# Patient Record
Sex: MALE | Race: WHITE | NOT HISPANIC OR LATINO | Employment: FULL TIME | URBAN - METROPOLITAN AREA
[De-identification: names, ages, dates, MRNs, and addresses within clinical notes are randomized per-mention and may not be internally consistent; named-entity substitution may affect disease eponyms.]

---

## 2017-01-13 ENCOUNTER — ANTICOAGULATION THERAPY VISIT (OUTPATIENT)
Dept: ANTICOAGULATION | Facility: CLINIC | Age: 47
End: 2017-01-13
Payer: COMMERCIAL

## 2017-01-13 DIAGNOSIS — M06.09 RHEUMATOID ARTHRITIS OF MULTIPLE SITES WITH NEGATIVE RHEUMATOID FACTOR (H): Primary | ICD-10-CM

## 2017-01-13 DIAGNOSIS — Z79.01 LONG-TERM (CURRENT) USE OF ANTICOAGULANTS: Primary | ICD-10-CM

## 2017-01-13 LAB — INR POINT OF CARE: 2.4 (ref 0.86–1.14)

## 2017-01-13 PROCEDURE — 36416 COLLJ CAPILLARY BLOOD SPEC: CPT

## 2017-01-13 PROCEDURE — 99207 ZZC NO CHARGE NURSE ONLY: CPT

## 2017-01-13 PROCEDURE — 85610 PROTHROMBIN TIME: CPT | Mod: QW

## 2017-01-13 RX ORDER — TRAMADOL HYDROCHLORIDE 50 MG/1
TABLET ORAL
Qty: 60 TABLET | Refills: 0 | Status: SHIPPED | OUTPATIENT
Start: 2017-01-13 | End: 2017-02-13

## 2017-01-13 NOTE — PROGRESS NOTES
ANTICOAGULATION FOLLOW-UP CLINIC VISIT    Patient Name:  Hollis Diggs  Date:  1/13/2017  Contact Type:  Face to Face    SUBJECTIVE:     Patient Findings     Positives No Problem Findings           OBJECTIVE    INR PROTIME   Date Value Ref Range Status   01/13/2017 2.4* 0.86 - 1.14 Final     FACTOR 2 ASSAY   Date Value Ref Range Status   12/17/2015 33* 60 - 140 % Final     Comment:     Analyte Specific Reagents (ASRs) are used in many laboratory tests necessary   for   standard medical care and generally do not require FDA approval.  This test   was   developed and its performance characteristics determined by [facility].  It has not been cleared or approved by   the US Food and Drug Administration.         ASSESSMENT / PLAN  INR assessment THER    Recheck INR In: 5 WEEKS    INR Location Clinic      Anticoagulation Summary as of 1/13/2017     INR goal 2.0-3.0   Selected INR 2.4 (1/13/2017)   Maintenance plan 7.5 mg (5 mg x 1.5) on Tue, Thu; 5 mg (5 mg x 1) all other days   Full instructions 7.5 mg on Tue, Thu; 5 mg all other days   Weekly total 40 mg   Plan last modified Lilliam Blanchard, RN (1/13/2017)   Next INR check 2/17/2017   Target end date     Indications   DVT (deep venous thrombosis) (H) (Resolved) [I82.409]  Long-term (current) use of anticoagulants [Z79.01] [Z79.01]         Anticoagulation Episode Summary     INR check location     Preferred lab     Send INR reminders to [facility] POOL    Comments 5 mg       Anticoagulation Care Providers     Provider Role Specialty Phone number    Sylvester Cash MD [facility] [phone]            See the Encounter Report to view Anticoagulation Flowsheet and Dosing Calendar (Go to Encounters tab in chart review, and find the Anticoagulation Therapy Visit)    Dosage adjustment made based on physician directed care plan.    Lilliam Blanchard, QUENTIN

## 2017-02-13 DIAGNOSIS — M06.09 RHEUMATOID ARTHRITIS OF MULTIPLE SITES WITH NEGATIVE RHEUMATOID FACTOR (H): ICD-10-CM

## 2017-02-13 RX ORDER — TRAMADOL HYDROCHLORIDE 50 MG/1
TABLET ORAL
Qty: 60 TABLET | Refills: 0 | Status: SHIPPED | OUTPATIENT
Start: 2017-02-13 | End: 2017-03-13

## 2017-02-17 ENCOUNTER — ANTICOAGULATION THERAPY VISIT (OUTPATIENT)
Dept: ANTICOAGULATION | Facility: CLINIC | Age: 47
End: 2017-02-17
Payer: COMMERCIAL

## 2017-02-17 DIAGNOSIS — Z79.01 LONG-TERM (CURRENT) USE OF ANTICOAGULANTS: ICD-10-CM

## 2017-02-17 LAB — INR POINT OF CARE: 2.6 (ref 0.86–1.14)

## 2017-02-17 PROCEDURE — 99207 ZZC NO CHARGE NURSE ONLY: CPT

## 2017-02-17 PROCEDURE — 85610 PROTHROMBIN TIME: CPT | Mod: QW

## 2017-02-17 PROCEDURE — 36416 COLLJ CAPILLARY BLOOD SPEC: CPT

## 2017-02-17 NOTE — MR AVS SNAPSHOT
Hollis Diggs   2/17/2017 8:15 AM   Anticoagulation Therapy Visit    Description:  46 year old male   Provider:  [facility]   Department:  Ph Anticoag           INR as of 2/17/2017     Today's INR 2.6      Anticoagulation Summary as of 2/17/2017     INR goal 2.0-3.0   Today's INR 2.6   Full instructions 7.5 mg on Tue, Thu; 5 mg all other days   Next INR check 3/31/2017    Indications   DVT (deep venous thrombosis) (H) (Resolved) [I82.409]  Long-term (current) use of anticoagulants [Z79.01] [Z79.01]         Your next Anticoagulation Clinic appointment(s)     Feb 17, 2017  8:15 AM CST   Anticoagulation Visit with PH ANTI COAG   [address] [address]   [phone]            Mar 31, 2017  8:15 AM CDT   Anticoagulation Visit with PH ANTI COAG   [address] [address]   [phone]              Contact Numbers     Clinic Number:         February 2017 Details    Sun Mon Tue Wed Thu Fri Sat        1               2               3               4                 5               6               7               8               9               10               11                 12               13               14               15               16               17      5 mg   See details      18      5 mg           19      5 mg         20      5 mg         21      7.5 mg         22      5 mg         23      7.5 mg         24      5 mg         25      5 mg           26      5 mg         27      5 mg         28      7.5 mg              Date Details   02/17 This INR check               How to take your warfarin dose     To take:  5 mg Take 1 of the 5 mg tablets.    To take:  7.5 mg Take 1.5 of the 5 mg tablets.           March 2017 Details    Sun Mon Tue Wed Thu Fri Sat        1      5 mg         2      7.5 mg         3      5 mg         4      5 mg            5      5 mg         6      5 mg         7      7.5 mg         8      5 mg         9      7.5 mg         10      5 mg         11      5 mg           12      5 mg         13      5 mg         14      7.5 mg         15      5 mg         16      7.5 mg         17      5 mg         18      5 mg           19      5 mg         20      5 mg         21      7.5 mg         22      5 mg         23      7.5 mg         24      5 mg         25      5 mg           26      5 mg         27      5 mg         28      7.5 mg         29      5 mg         30      7.5 mg         31              Date Details   No additional details    Date of next INR:  3/31/2017         How to take your warfarin dose     To take:  5 mg Take 1 of the 5 mg tablets.    To take:  7.5 mg Take 1.5 of the 5 mg tablets.

## 2017-03-13 DIAGNOSIS — M06.09 RHEUMATOID ARTHRITIS OF MULTIPLE SITES WITH NEGATIVE RHEUMATOID FACTOR (H): ICD-10-CM

## 2017-03-13 RX ORDER — TRAMADOL HYDROCHLORIDE 50 MG/1
TABLET ORAL
Qty: 60 TABLET | Refills: 0 | Status: SHIPPED | OUTPATIENT
Start: 2017-03-13 | End: 2017-04-14

## 2017-03-14 ENCOUNTER — TELEPHONE (OUTPATIENT)
Dept: RHEUMATOLOGY | Facility: CLINIC | Age: 47
End: 2017-03-14

## 2017-03-14 NOTE — TELEPHONE ENCOUNTER
I left a message for the patient to return a call to the clinic to let us know if he would be willing to switch from Dr. Hedrick's schedule to Alpesh Jefferson on 5/10/2017.

## 2017-03-21 NOTE — TELEPHONE ENCOUNTER
Left message for patient to return a call to the clinic to see if he would switch his 5/10/17 appt with Dr. Hedrick to Alpesh Jefferson's schedule.

## 2017-03-31 ENCOUNTER — ANTICOAGULATION THERAPY VISIT (OUTPATIENT)
Dept: ANTICOAGULATION | Facility: CLINIC | Age: 47
End: 2017-03-31
Payer: COMMERCIAL

## 2017-03-31 DIAGNOSIS — Z79.01 LONG-TERM (CURRENT) USE OF ANTICOAGULANTS: ICD-10-CM

## 2017-03-31 LAB — INR POINT OF CARE: 2.6 (ref 0.86–1.14)

## 2017-03-31 PROCEDURE — 36416 COLLJ CAPILLARY BLOOD SPEC: CPT

## 2017-03-31 PROCEDURE — 99207 ZZC NO CHARGE NURSE ONLY: CPT

## 2017-03-31 PROCEDURE — 85610 PROTHROMBIN TIME: CPT | Mod: QW

## 2017-03-31 NOTE — PROGRESS NOTES
ANTICOAGULATION FOLLOW-UP CLINIC VISIT    Patient Name:  Hollis Diggs  Date:  3/31/2017  Contact Type:  Face to Face    SUBJECTIVE:     Patient Findings     Positives No Problem Findings           OBJECTIVE    INR Protime   Date Value Ref Range Status   03/31/2017 2.6 (A) 0.86 - 1.14 Final     Factor 2 Assay   Date Value Ref Range Status   12/17/2015 33 (L) 60 - 140 % Final     Comment:     Analyte Specific Reagents (ASRs) are used in many laboratory tests necessary   for   standard medical care and generally do not require FDA approval.  This test   was   developed and its performance characteristics determined by [facility].  It has not been cleared or approved by   the US Food and Drug Administration.         ASSESSMENT / PLAN  INR assessment THER    Recheck INR In: 6 WEEKS    INR Location Clinic      Anticoagulation Summary as of 3/31/2017     INR goal 2.0-3.0   Today's INR 2.6   Maintenance plan 7.5 mg (5 mg x 1.5) on Tue, Thu; 5 mg (5 mg x 1) all other days   Full instructions 7.5 mg on Tue, Thu; 5 mg all other days   Weekly total 40 mg   No change documented Lilliam Blanchard RN   Plan last modified Lilliam Blanchard RN (1/13/2017)   Next INR check 5/12/2017   Target end date     Indications   DVT (deep venous thrombosis) (H) (Resolved) [I82.409]  Long-term (current) use of anticoagulants [Z79.01] [Z79.01]         Anticoagulation Episode Summary     INR check location     Preferred lab     Send INR reminders to [facility] POOL    Comments 5 mg       Anticoagulation Care Providers     Provider Role Specialty Phone number    Sylvester Cash MD [facility] [phone]            See the Encounter Report to view Anticoagulation Flowsheet and Dosing Calendar (Go to Encounters tab in chart review, and find the Anticoagulation Therapy Visit)    Dosage adjustment made based on physician directed care plan.    Lilliam Blanchard RN

## 2017-03-31 NOTE — MR AVS SNAPSHOT
Hollis Diggs   3/31/2017 8:15 AM   Anticoagulation Therapy Visit    Description:  46 year old male   Provider:  [facility] ANTI COAG   Department:  Ph Anticoag           INR as of 3/31/2017     Today's INR 2.6      Anticoagulation Summary as of 3/31/2017     INR goal 2.0-3.0   Today's INR 2.6   Full instructions 7.5 mg on Tue, Thu; 5 mg all other days   Next INR check 5/12/2017    Indications   DVT (deep venous thrombosis) (H) (Resolved) [I82.409]  Long-term (current) use of anticoagulants [Z79.01] [Z79.01]         Your next Anticoagulation Clinic appointment(s)     Mar 31, 2017  8:15 AM CDT   Anticoagulation Visit with PH ANTI COAG   [address] [address]   [phone]            May 12, 2017  8:15 AM CDT   Anticoagulation Visit with PH ANTI COAG   [address] [address]   [phone]              Contact Numbers     Clinic Number:         March 2017 Details    Sun Mon Tue Wed Thu Fri Sat        1               2               3               4                 5               6               7               8               9               10               11                 12               13               14               15               16               17               18                 19               20               21               22               23               24               25                 26               27               28               29               30               31      5 mg   See details        Date Details   03/31 This INR check               How to take your warfarin dose     To take:  5 mg Take 1 of the 5 mg tablets.           April 2017 Details    Sun Mon Tue Wed Thu Fri Sat           1      5 mg           2      5 mg         3      5 mg         4      7.5 mg         5      5 mg         6      7.5 mg         7       5 mg         8      5 mg           9      5 mg         10      5 mg         11      7.5 mg         12      5 mg         13      7.5 mg         14      5 mg         15      5 mg           16      5 mg         17      5 mg         18      7.5 mg         19      5 mg         20      7.5 mg         21      5 mg         22      5 mg           23      5 mg         24      5 mg         25      7.5 mg         26      5 mg         27      7.5 mg         28      5 mg         29      5 mg           30      5 mg                Date Details   No additional details            How to take your warfarin dose     To take:  5 mg Take 1 of the 5 mg tablets.    To take:  7.5 mg Take 1.5 of the 5 mg tablets.           May 2017 Details    Sun Mon Tue Wed Thu Fri Sat      1      5 mg         2      7.5 mg         3      5 mg         4      7.5 mg         5      5 mg         6      5 mg           7      5 mg         8      5 mg         9      7.5 mg         10      5 mg         11      7.5 mg         12            13                 14               15               16               17               18               19               20                 21               22               23               24               25               26               27                 28               29               30               31                   Date Details   No additional details    Date of next INR:  5/12/2017         How to take your warfarin dose     To take:  5 mg Take 1 of the 5 mg tablets.    To take:  7.5 mg Take 1.5 of the 5 mg tablets.

## 2017-04-05 DIAGNOSIS — M06.09 RHEUMATOID ARTHRITIS OF MULTIPLE SITES WITH NEGATIVE RHEUMATOID FACTOR (H): ICD-10-CM

## 2017-04-06 RX ORDER — LEFLUNOMIDE 20 MG/1
20 TABLET ORAL DAILY
Qty: 30 TABLET | Refills: 0 | Status: SHIPPED | OUTPATIENT
Start: 2017-04-06 | End: 2017-05-12

## 2017-04-06 NOTE — TELEPHONE ENCOUNTER
Medication/Dose: leflunomide (ARAVA) 20 MG tablet  Last Written Prescription Date: 12/14/16  Last Fill Quantity: 30, # refills: 0  Last Office Visit with Rheumatology Provider:  12/14/16  Last opthalmology visit: na  Next 5 appointments (look out 90 days)     May 10, 2017  2:30 PM CDT   Return Visit with FACUNDO Ortez CNP   [facility]    [address] [address] [facility] [address] [phone]                     WBC   Date Value Ref Range Status   12/09/2016 4.4 4.0 - 11.0 10e9/L Final     RBC Count   Date Value Ref Range Status   12/09/2016 5.70 4.4 - 5.9 10e12/L Final     Hemoglobin   Date Value Ref Range Status   12/09/2016 15.3 13.3 - 17.7 g/dL Final     Hematocrit   Date Value Ref Range Status   12/09/2016 45.4 40.0 - 53.0 % Final     MCV   Date Value Ref Range Status   12/09/2016 80 78 - 100 fl Final     MCH   Date Value Ref Range Status   12/09/2016 26.8 26.5 - 33.0 pg Final     MCHC   Date Value Ref Range Status   12/09/2016 33.7 31.5 - 36.5 g/dL Final     RDW   Date Value Ref Range Status   12/09/2016 13.8 10.0 - 15.0 % Final     Platelet Count   Date Value Ref Range Status   12/09/2016 231 150 - 450 10e9/L Final     AST   Date Value Ref Range Status   12/09/2016 24 0 - 45 U/L Final     ALT   Date Value Ref Range Status   12/09/2016 36 0 - 70 U/L Final     Creatinine   Date Value Ref Range Status   12/09/2016 0.93 0.66 - 1.25 mg/dL Final     Albumin   Date Value Ref Range Status   12/09/2016 3.3 (L) 3.4 - 5.0 g/dL Final     Color Urine (no units)   Date Value   10/05/2015 Straw     Appearance Urine (no units)   Date Value   10/05/2015 Clear     Glucose Urine (mg/dL)   Date Value   10/05/2015 Negative     Bilirubin Urine (no units)   Date Value   10/05/2015 Negative     Ketones Urine (mg/dL)   Date Value   10/05/2015 Negative     Specific Gravity Urine (no units)   Date Value   10/05/2015 1.003     pH Urine (pH)   Date Value   10/05/2015 5.0      Protein Albumin Urine (mg/dL)   Date Value   10/05/2015 Negative     Urobilinogen Urine (EU/dL)   Date Value   06/28/2013 0.2     Nitrite Urine (no units)   Date Value   10/05/2015 Negative     Leukocyte Esterase Urine (no units)   Date Value   10/05/2015 Negative               Standing lab orders every 3 months per providers last office visit.     Attempted to contact patient, left detailed message that patient needs to complete laboratory testing ASAP.     Set up for 30 days supply, 0 refills    Routed to Dr. Hedrick for review and approval of medication request.      Monica Matos, SURESHN, RN, PHN  Rheumatology Care Coordinator  [facility]

## 2017-04-10 ENCOUNTER — TELEPHONE (OUTPATIENT)
Dept: FAMILY MEDICINE | Facility: CLINIC | Age: 47
End: 2017-04-10

## 2017-04-10 NOTE — TELEPHONE ENCOUNTER
Reason for call:  Patient reporting a symptom    Symptom or request: leg pain in thigh area, can barely walk on it and painful to touch.    Duration (how long have symptoms been present): going on 5 days and worsening    Have you been treated for this before? yes    Additional comments:     Phone Number patient can be reached at:  Cell number on file:    Telephone Information:   Mobile [phone]       Best Time:      Can we leave a detailed message on this number:  YES    Call taken on 4/10/2017 at 8:41 AM by Lyndsey Bender

## 2017-04-10 NOTE — TELEPHONE ENCOUNTER
Per Dr. Cash he will need to wait for our next non acute opening or he will need to see another provider KB/BETHEL

## 2017-04-10 NOTE — TELEPHONE ENCOUNTER
Hollis Diggs is a 46 year old male who calls with hip/thigh pain.    NURSING ASSESSMENT:  Description:  Patient is calling to report he has had thigh pain for the past 5 days.  He is reporting it is numb just in that area, painful to the touch and numb.  Patient is reporting the pain to be at 7/10.  He states he cannot come in to be seen until after 3:00 on Tuesday or Wednesday because he has to work.  He would like to be seen in [place] by PCP.  He is informed message can be sent to PCP about possible working in on Wednesday, or today.  Patient is denying the following: swelling, increased warmth to the area, redness, inability to walk, chest pain, coughing up blood, SOB, cold or blue foot or toes, lack of pulse to foot, new prescription, fever, waking him up at night.  Onset/duration:  5 days  Precip. factors:  None - no injury  Associated symptoms:  See above  Improves/worsens symptoms:  same  Pain scale (0-10)   7/10  Last exam/Treatment:  10/13/16  Allergies:   Allergies   Allergen Reactions     No Known Drug Allergies          NURSING PLAN: Routed to provider Yes    RECOMMENDED DISPOSITION:  See in 24 hours   Will comply with recommendation: Yes  If further questions/concerns or if symptoms do not improve, worsen or new symptoms develop, call your PCP or [facility] as soon as possible.      Guideline used: Leg pain/swelling  Telephone Triage Protocols for Nurses, Fifth Edition, Umu Kitchen RN

## 2017-04-10 NOTE — TELEPHONE ENCOUNTER
Left message for patient to call back and speak with any .    Thank you,  Melina Dobbs   for [facility]

## 2017-04-11 NOTE — TELEPHONE ENCOUNTER
I see patient has been scheduled for an appt.  Thank you,  Melina Dobbs   for [facility]

## 2017-04-14 ENCOUNTER — OFFICE VISIT (OUTPATIENT)
Dept: FAMILY MEDICINE | Facility: CLINIC | Age: 47
End: 2017-04-14
Payer: COMMERCIAL

## 2017-04-14 ENCOUNTER — HOSPITAL ENCOUNTER (OUTPATIENT)
Dept: ULTRASOUND IMAGING | Facility: CLINIC | Age: 47
Discharge: HOME | End: 2017-04-14
Attending: FAMILY MEDICINE | Admitting: FAMILY MEDICINE
Payer: COMMERCIAL

## 2017-04-14 VITALS
BODY MASS INDEX: 32.2 KG/M2 | WEIGHT: 243 LBS | RESPIRATION RATE: 16 BRPM | TEMPERATURE: 96.1 F | OXYGEN SATURATION: 98 % | DIASTOLIC BLOOD PRESSURE: 80 MMHG | HEART RATE: 77 BPM | SYSTOLIC BLOOD PRESSURE: 132 MMHG | HEIGHT: 73 IN

## 2017-04-14 DIAGNOSIS — Z79.899 ENCOUNTER FOR LONG-TERM (CURRENT) USE OF HIGH-RISK MEDICATION: ICD-10-CM

## 2017-04-14 DIAGNOSIS — M06.00 SERONEGATIVE RHEUMATOID ARTHRITIS (H): ICD-10-CM

## 2017-04-14 DIAGNOSIS — Z86.718 HISTORY OF DEEP VENOUS THROMBOSIS: ICD-10-CM

## 2017-04-14 DIAGNOSIS — M51.369 DDD (DEGENERATIVE DISC DISEASE), LUMBAR: ICD-10-CM

## 2017-04-14 DIAGNOSIS — M79.605 LEG PAIN, LATERAL, LEFT: Primary | ICD-10-CM

## 2017-04-14 DIAGNOSIS — M79.605 LEG PAIN, LATERAL, LEFT: ICD-10-CM

## 2017-04-14 LAB
ALBUMIN SERPL-MCNC: 3.6 G/DL (ref 3.4–5)
ALT SERPL W P-5'-P-CCNC: 29 U/L (ref 0–70)
AST SERPL W P-5'-P-CCNC: 21 U/L (ref 0–45)
BASOPHILS # BLD AUTO: 0 10E9/L (ref 0–0.2)
BASOPHILS NFR BLD AUTO: 0.7 %
CREAT SERPL-MCNC: 0.93 MG/DL (ref 0.66–1.25)
CRP SERPL-MCNC: <2.9 MG/L (ref 0–8)
DIFFERENTIAL METHOD BLD: NORMAL
EOSINOPHIL # BLD AUTO: 0.1 10E9/L (ref 0–0.7)
EOSINOPHIL NFR BLD AUTO: 3 %
ERYTHROCYTE [DISTWIDTH] IN BLOOD BY AUTOMATED COUNT: 14.1 % (ref 10–15)
GFR SERPL CREATININE-BSD FRML MDRD: 87 ML/MIN/1.7M2
HCT VFR BLD AUTO: 45.9 % (ref 40–53)
HGB BLD-MCNC: 15.7 G/DL (ref 13.3–17.7)
IMM GRANULOCYTES # BLD: 0 10E9/L (ref 0–0.4)
IMM GRANULOCYTES NFR BLD: 0 %
LYMPHOCYTES # BLD AUTO: 1.4 10E9/L (ref 0.8–5.3)
LYMPHOCYTES NFR BLD AUTO: 30.8 %
MCH RBC QN AUTO: 26.7 PG (ref 26.5–33)
MCHC RBC AUTO-ENTMCNC: 34.2 G/DL (ref 31.5–36.5)
MCV RBC AUTO: 78 FL (ref 78–100)
MONOCYTES # BLD AUTO: 0.4 10E9/L (ref 0–1.3)
MONOCYTES NFR BLD AUTO: 8.9 %
NEUTROPHILS # BLD AUTO: 2.5 10E9/L (ref 1.6–8.3)
NEUTROPHILS NFR BLD AUTO: 56.6 %
PLATELET # BLD AUTO: 234 10E9/L (ref 150–450)
RBC # BLD AUTO: 5.89 10E12/L (ref 4.4–5.9)
WBC # BLD AUTO: 4.4 10E9/L (ref 4–11)

## 2017-04-14 PROCEDURE — 85025 COMPLETE CBC W/AUTO DIFF WBC: CPT | Performed by: NURSE PRACTITIONER

## 2017-04-14 PROCEDURE — 82040 ASSAY OF SERUM ALBUMIN: CPT | Performed by: NURSE PRACTITIONER

## 2017-04-14 PROCEDURE — 84460 ALANINE AMINO (ALT) (SGPT): CPT | Performed by: NURSE PRACTITIONER

## 2017-04-14 PROCEDURE — 86140 C-REACTIVE PROTEIN: CPT | Performed by: NURSE PRACTITIONER

## 2017-04-14 PROCEDURE — 82565 ASSAY OF CREATININE: CPT | Performed by: NURSE PRACTITIONER

## 2017-04-14 PROCEDURE — 99214 OFFICE O/P EST MOD 30 MIN: CPT | Performed by: FAMILY MEDICINE

## 2017-04-14 PROCEDURE — 36415 COLL VENOUS BLD VENIPUNCTURE: CPT | Performed by: NURSE PRACTITIONER

## 2017-04-14 PROCEDURE — 93971 EXTREMITY STUDY: CPT | Mod: LT

## 2017-04-14 PROCEDURE — 84450 TRANSFERASE (AST) (SGOT): CPT | Performed by: NURSE PRACTITIONER

## 2017-04-14 RX ORDER — OXYCODONE AND ACETAMINOPHEN 5; 325 MG/1; MG/1
1 TABLET ORAL EVERY 8 HOURS PRN
Qty: 30 TABLET | Refills: 0 | Status: SHIPPED | OUTPATIENT
Start: 2017-04-14 | End: 2017-05-09

## 2017-04-14 RX ORDER — TRAMADOL HYDROCHLORIDE 50 MG/1
TABLET ORAL
Qty: 60 TABLET | Refills: 0 | Status: SHIPPED | OUTPATIENT
Start: 2017-04-14 | End: 2017-05-24

## 2017-04-14 NOTE — NURSING NOTE
"Chief Complaint   Patient presents with     Musculoskeletal Problem     Left leg pain x 5 days       Initial /80  Pulse 77  Temp 96.1  F (35.6  C) (Temporal)  Resp 16  Ht 6' 1\" (1.854 m)  Wt 243 lb (110.2 kg)  SpO2 98%  BMI 32.06 kg/m2 Estimated body mass index is 32.06 kg/(m^2) as calculated from the following:    Height as of this encounter: 6' 1\" (1.854 m).    Weight as of this encounter: 243 lb (110.2 kg).  Medication Reconciliation: complete    "

## 2017-04-14 NOTE — PROGRESS NOTES
SUBJECTIVE:                                                    Hollis Diggs is a 46 year old male who presents to clinic today for the following health issues:      Joint Pain     Onset: 5 days ago    Description:   Location: Left thigh  Character: Sharp and Burning    Intensity: moderate, severe    Progression of Symptoms: worse    Accompanying Signs & Symptoms:  Other symptoms: Numbness   History:   Previous similar pain: Yes when he had a blood clot in his lower leg      Precipitating factors:   Trauma or overuse: no     Alleviating factors:  Improved by: nothing       Therapies Tried and outcome: None          Problem list and histories reviewed & adjusted, as indicated.  Additional history: as documented        Reviewed and updated as needed this visit by clinical staff  Tobacco  Allergies  Meds  Soc Hx      Reviewed and updated as needed this visit by Provider        SUBJECTIVE:  Hollis  is a 46 year old male who presents for:  Onset of left leg pain in his upper lateral over thigh area that came on rather rapidly over the last 5 days. He is concerned of another blood clot. He has had a DVT in this leg. His latest INR was therapeutic however. No known injury. But coincident with this his back has become very bad. He has long history of low back pain. Had been stable. He's had no cough no shortness of breath no fever.    Past Medical History:   Diagnosis Date     Asthma     Exercise     blood clot in leg      Depressive disorder, not elsewhere classified     Depression (non-psychotic)     ANKIT (generalised anxiety disorder)      HH (hiatus hernia)      Hypercholesteremia     normal with weight loss 3/09     Lumbar disc herniation 1992     Past Surgical History:   Procedure Laterality Date     APPENDECTOMY       C NONSPECIFIC PROCEDURE  91 or 92    back surgery. lumbar. lamiectomy     COLONOSCOPY N/A 8/2/2016    Procedure: COMBINED COLONOSCOPY, SINGLE OR MULTIPLE BIOPSY/POLYPECTOMY BY BIOPSY;  Surgeon:  Sydnee Walton MD;  Location: [facility] OR     COLONOSCOPY WITH CO2 INSUFFLATION N/A 8/2/2016    Procedure: COLONOSCOPY WITH CO2 INSUFFLATION;  Surgeon: Sydnee Walton MD;  Location: [facility] OR     COMBINED ESOPHAGOSCOPY, GASTROSCOPY, DUODENOSCOPY (EGD) WITH CO2 INSUFFLATION N/A 8/2/2016    Procedure: COMBINED ESOPHAGOSCOPY, GASTROSCOPY, DUODENOSCOPY (EGD) WITH CO2 INSUFFLATION;  Surgeon: Sydnee Walton MD;  Location: [facility] OR     ESOPHAGOSCOPY, GASTROSCOPY, DUODENOSCOPY (EGD), COMBINED N/A 8/2/2016    Procedure: COMBINED ESOPHAGOSCOPY, GASTROSCOPY, DUODENOSCOPY (EGD), BIOPSY SINGLE OR MULTIPLE;  Surgeon: Sydnee Walton MD;  Location: [facility] OR     HC REMOVAL OF TONSILS,<10 Y/O      Tonsils <11y.o.     HC REPAIR INCISIONAL HERNIA,REDUCIBLE  1970's    Hernia Repair, Incisional, Unilateral     HC UGI ENDOSCOPY DIAG W BIOPSY  02/01/06     HC VASECTOMY UNILAT/BILAT W POSTOP SEMEN  1/05    Vasectomy     History back lumbar laminectomy       Social History   Substance Use Topics     Smoking status: Never Smoker     Smokeless tobacco: Never Used     Alcohol use No      Comment: rare     Current Outpatient Prescriptions   Medication Sig Dispense Refill     oxyCODONE-acetaminophen (PERCOCET) 5-325 MG per tablet Take 1 tablet by mouth every 8 hours as needed for moderate to severe pain 30 tablet 0     traMADol (ULTRAM) 50 MG tablet Take 1 tablet as needed twice day for pain. Max 2 tab daily. No driving or alcohol use while taking medication. 60 tablet 0     leflunomide (ARAVA) 20 MG tablet Take 1 tablet (20 mg) by mouth daily 30 tablet 0     finasteride (PROSCAR) 5 MG tablet TAKE ONE TABLET BY MOUTH ONCE DAILY 30 tablet 10     warfarin (COUMADIN) 5 MG tablet Take 7.5 mg on Tuesday, Thursday and 5 mg all other days, or as directed by the coumadin clinic. 100 tablet 3     OLANZapine (ZYPREXA) 5 MG tablet TAKE THREE TABLETS BY MOUTH AT BEDTIME 90 tablet 4     DULoxetine (CYMBALTA) 20 MG  "capsule Take 1 capsule (20 mg) by mouth 2 times daily 60 capsule 3     amitriptyline (ELAVIL) 10 MG tablet Take 1 tablet (10 mg) by mouth At Bedtime 60 tablet 6     folic acid (FOLVITE) 1 MG tablet TAKE ONE TABLET BY MOUTH ONCE DAILY 90 tablet 3     tretinoin (RETIN-A) 0.05 % cream Apply  topically At Bedtime. 45 g 3     albuterol (PROAIR HFA, PROVENTIL HFA, VENTOLIN HFA) 108 (90 BASE) MCG/ACT inhaler Inhale 2 puffs into the lungs every 4 hours as needed for shortness of breath / dyspnea 3 Inhaler 1       REVIEW OF SYSTEMS:   5 point ROS negative except as noted above in HPI, including Gen., Resp, CV, GI &  system review.     OBJECTIVE:  Vitals: /80  Pulse 77  Temp 96.1  F (35.6  C) (Temporal)  Resp 16  Ht 6' 1\" (1.854 m)  Wt 243 lb (110.2 kg)  SpO2 98%  BMI 32.06 kg/m2  BMI= Body mass index is 32.06 kg/(m^2).  Appears uncomfortable. Throat clear. Neck supple. Lungs are clear to auscultation. Heart regular rhythm normal. He has some tenderness to deep palpation in the lateral mid to upper thigh area on the left. Straight leg raising is negative. Calf is soft. He goes from sitting to standing with considerable discomfort. Difficult for him to straighten out. A Doppler ultrasound was done because his left lower extremity. This was clear.    ASSESSMENT:  Acute left leg pain. #2 degenerative lumbar disc disease. #3 history of DVT    PLAN:  Felt we needed to rule out a DVT in his left leg as he said it felt similar to when he had one. Now I'm thinking mainly pain is from his back. Renewed his tramadol and Percocet neither one of which he was taking. He wants to give it a few days of just resting before we pursue any other diagnostic or imaging workup.        Sylvester Cash MD  [facility]              "

## 2017-04-15 ASSESSMENT — PATIENT HEALTH QUESTIONNAIRE - PHQ9: SUM OF ALL RESPONSES TO PHQ QUESTIONS 1-9: 7

## 2017-05-04 DIAGNOSIS — M06.09 RHEUMATOID ARTHRITIS OF MULTIPLE SITES WITH NEGATIVE RHEUMATOID FACTOR (H): ICD-10-CM

## 2017-05-04 RX ORDER — FOLIC ACID 1 MG/1
1 TABLET ORAL DAILY
Qty: 90 TABLET | Refills: 3 | Status: SHIPPED | OUTPATIENT
Start: 2017-05-04 | End: 2018-10-19

## 2017-05-04 NOTE — TELEPHONE ENCOUNTER
folic acid (FOLVITE) 1 MG tablet      Last Written Prescription Date: 4/11/16  Last Fill Quantity: 90,  # refills: 3   Last Office Visit with G, PHILIPPEP or [facility] prescribing provider: 12/14/16                                         Next 5 appointments (look out 90 days)     May 10, 2017  2:30 PM CDT   Return Visit with FACUNDO Ortez CNP   [facility]    [address] [place] [address]   [phone]                    Prescription approved per Rheumatology Refill Protocol for 90 days supply and 3 refills.    Monica Maots, BSN, RN, PHN  Rheumatology Care Coordinator  [facility]

## 2017-05-09 DIAGNOSIS — M51.369 DDD (DEGENERATIVE DISC DISEASE), LUMBAR: ICD-10-CM

## 2017-05-09 RX ORDER — OXYCODONE AND ACETAMINOPHEN 5; 325 MG/1; MG/1
1 TABLET ORAL EVERY 8 HOURS PRN
Qty: 30 TABLET | Refills: 0 | Status: SHIPPED | OUTPATIENT
Start: 2017-05-09 | End: 2017-06-08

## 2017-05-09 NOTE — TELEPHONE ENCOUNTER
Script faxed to [facility] [phone] and put in black box up front.  Karen Mccarty MA

## 2017-05-12 ENCOUNTER — ANTICOAGULATION THERAPY VISIT (OUTPATIENT)
Dept: ANTICOAGULATION | Facility: CLINIC | Age: 47
End: 2017-05-12
Payer: COMMERCIAL

## 2017-05-12 ENCOUNTER — OFFICE VISIT (OUTPATIENT)
Dept: RHEUMATOLOGY | Facility: CLINIC | Age: 47
End: 2017-05-12
Payer: COMMERCIAL

## 2017-05-12 VITALS
DIASTOLIC BLOOD PRESSURE: 82 MMHG | BODY MASS INDEX: 31.69 KG/M2 | SYSTOLIC BLOOD PRESSURE: 126 MMHG | HEART RATE: 95 BPM | WEIGHT: 240.2 LBS

## 2017-05-12 DIAGNOSIS — M06.09 RHEUMATOID ARTHRITIS OF MULTIPLE SITES WITH NEGATIVE RHEUMATOID FACTOR (H): ICD-10-CM

## 2017-05-12 DIAGNOSIS — Z79.01 LONG-TERM (CURRENT) USE OF ANTICOAGULANTS: ICD-10-CM

## 2017-05-12 DIAGNOSIS — Z79.899 LONG-TERM USE OF HIGH-RISK MEDICATION: ICD-10-CM

## 2017-05-12 DIAGNOSIS — M06.00 SERONEGATIVE RHEUMATOID ARTHRITIS (H): Primary | ICD-10-CM

## 2017-05-12 LAB — INR POINT OF CARE: 2.4 (ref 0.86–1.14)

## 2017-05-12 PROCEDURE — 85610 PROTHROMBIN TIME: CPT | Mod: QW

## 2017-05-12 PROCEDURE — 99207 ZZC NO CHARGE NURSE ONLY: CPT

## 2017-05-12 PROCEDURE — 36416 COLLJ CAPILLARY BLOOD SPEC: CPT

## 2017-05-12 PROCEDURE — 99213 OFFICE O/P EST LOW 20 MIN: CPT | Performed by: NURSE PRACTITIONER

## 2017-05-12 RX ORDER — LEFLUNOMIDE 20 MG/1
20 TABLET ORAL DAILY
Qty: 30 TABLET | Refills: 3 | Status: SHIPPED | OUTPATIENT
Start: 2017-05-12 | End: 2017-09-21

## 2017-05-12 ASSESSMENT — PAIN SCALES - GENERAL: PAINLEVEL: MILD PAIN (3)

## 2017-05-12 NOTE — MR AVS SNAPSHOT
Hollis Diggs   5/12/2017 8:15 AM   Anticoagulation Therapy Visit    Description:  47 year old male   Provider:  [facility]   Department:  Ph Anticoag           INR as of 5/12/2017     Today's INR 2.4      Anticoagulation Summary as of 5/12/2017     INR goal 2.0-3.0   Today's INR 2.4   Full instructions 7.5 mg on Tue, Thu; 5 mg all other days   Next INR check 6/23/2017    Indications   DVT (deep venous thrombosis) (H) (Resolved) [I82.409]  Long-term (current) use of anticoagulants [Z79.01] [Z79.01]         Your next Anticoagulation Clinic appointment(s)     May 12, 2017  8:15 AM CDT   Anticoagulation Visit with [facility]   [address] [address]   [phone]            Jun 23, 2017  8:15 AM CDT   Anticoagulation Visit with [facility]   [facility] ([address] [address]   [phone]              Contact Numbers     Clinic Number:         May 2017 Details    Sun Mon Tue Wed Thu Fri Sat      1               2               3               4               5               6                 7               8               9               10               11               12      5 mg   See details      13      5 mg           14      5 mg         15      5 mg         16      7.5 mg         17      5 mg         18      7.5 mg         19      5 mg         20      5 mg           21      5 mg         22      5 mg         23      7.5 mg         24      5 mg         25      7.5 mg         26      5 mg         27      5 mg           28      5 mg         29      5 mg         30      7.5 mg         31      5 mg             Date Details   05/12 This INR check               How to take your warfarin dose     To take:  5 mg Take 1 of the 5 mg tablets.    To take:  7.5 mg Take 1.5 of the 5 mg tablets.           June 2017 Details    Sun Mon Tue Wed Thu Fri Sat         1       7.5 mg         2      5 mg         3      5 mg           4      5 mg         5      5 mg         6      7.5 mg         7      5 mg         8      7.5 mg         9      5 mg         10      5 mg           11      5 mg         12      5 mg         13      7.5 mg         14      5 mg         15      7.5 mg         16      5 mg         17      5 mg           18      5 mg         19      5 mg         20      7.5 mg         21      5 mg         22      7.5 mg         23            24                 25               26               27               28               29               30                 Date Details   No additional details    Date of next INR:  6/23/2017         How to take your warfarin dose     To take:  5 mg Take 1 of the 5 mg tablets.    To take:  7.5 mg Take 1.5 of the 5 mg tablets.

## 2017-05-12 NOTE — PROGRESS NOTES
"Rheumatology Visit     Hollis Diggs \"Cayden\" MRN# [id number]   YOB: 1970 Age: 47 year old     Date of visit: May 12, 2017  Last seen: 2-7-2016   Primary care provider: Sylvester Cash          Assessment/Plan:     1. Seronegative non-erosive RA (-MARCUS/RF/CCP/dsDNA/C3/C4/ADDI panel, no erosion x-ray 5-2015 x mild PFS knees, +FH RA). 100% relief w/prednisone [progressive symptoms, some synovitis R wrist, EAS, incomplete prayer sign]. Failed SSZ and MTX. RA-remission on arava monotherapy. Labs NL 4-2017. Based on this, will continue this plan. Explained he needs to inform coumadin clinic of medication changes closely monitor INRs.    2. Long term high risk medication. Labs monitoring for immunosuppression.   3. APLS on coumadin. F/U Dr. Dr. Cantu. S/p LLE DVT.  +FH mom blood clot.   3. Heberdone nodes (OA).   4. LBP, lumbar hx laminectomy for herniated discs. No symptoms now. F/U PCP     Plan:  1. Arava 20 mg once a day    --Labs Q 8-12 week. Close INR with coumadin clinic.   2. RTC 4-5 months   3. F/U oncology. Any s/sx clot, or symptoms seek immediate medical attn.    The patient understood the rational for the tests, treatment, labs and written information on SSZ given. Patient shared in the decision making. All questions were answered to best of my ability and the patient's satisfaction  Alpesh Jefferson APRN, CNP, MSN  [facility]  Department of Rheumatology & Autoimmune Disorders          History of Present Illness:   Hlolis Diggs is a 47 year old male who presents as f/u for sero-negative RA. Prednisone 100% responsive. Failed SSZ and MTX (stopped 7-2016). Arava 20 mg once a day since 7-2016      Forward hx 5-2015: C/o several years bilateral knee pain (hard to get up if on knees), b/l wrists, ankles, bilateral thumbs areas S/t, stiffness in his hands, hard to get out of bed mornings, hard to get out of the car. Better as day goes on but hard if sits too long. " "Mornings-feels weaker to get out of bed, all his joints very stiff, hard to tell swelling in those areas, only swelling  In near his thumb areas. Hands are not weak, at times may drop objects, able to use and maybe harder to open jar. Same the past few years, more in ankles and knees. Inner wrists thumbs 5 out 10, knees 8--9 out 10, ankles 8-9/10 pain. Hx lumbar surgery once year severe pain, will lay or on floor for week then gets better. Driving makes it harder or sleeping. Worse with bending over. May wake with back pain, some if he walks will get better. Better sits for then try to get up gets stiff. Knees over his patella bother the most, like when he goes from sit to stand, or on the floor to get up. Tried mobic-not effective so stopped. Ibuprofen 5 tab at a time and no relief so stopped. Not tried medrol or prednisone. Last prednisone for back about 1 year ago.   Interval hx 9-9-2015: Prednisone trial [Take 20 mg day, then 15 mg day, then 10 mg day then 5 mg day each for 5 days then stop]---Progressive symptoms, more EAS, reduced ROM in his joints [knees, ankles, wrists, bilateral 2nd DIPs, mild in MCP, whole body stiff with joints and muscles, hands morning right hand right wrist to anterior hand]. No red, swelling. Able to use hands. Back is pain x 1 weeks, lower to left side \"stiff\" in the mornings. Hx lumbar surgery. Taking percocet for back, helps overall pain. Ibuprofen 5 tab not helpful. Energy low worse now. No prednisone. No other changes in PMSH nor family hx. No reflux now, occasionally. Asthma controlled.     Copy forward 12-7-2015:  Last visit started low dose MTX [pain wrists, shoulders, ankles, hands and knees, EAS with pain 8 out 10 that was impacting his QoL and function].   Methotrexate 10 mg Q Tues, FA 1 mg day. Tolerating. Taken 2 months. No s/e GI, upset stomach, sores. Pain continues in ankles, then right wrist with swelling, then knees and hands. Hands are weaker, harder to . EAS " "for about 4 hours, then improves. Little improvement on low dose MTX--not like when the prednisone gave him 100% relief. Able to make a fist and bend his fingers now-improvement. No NSAIDs.   Low/mid back pain, its been 4-5 yrs ago since evaluated by PCP or specialist. Hx lumbar laminectomy, last MRI 2009. More pain this last week, non-radiating \"like someone kicked me in my spine\" worse with movement. Non-radiating, no change in BB, no leg weakness and feels this is improving. Declined PT or imaging. He will discuss with PCP as he's prescribing his narcotics and pain medications.   Denies fever, chills, cough, SOB, CP, raynauds, psoriasis. Seen Dr. Cantu unprovoked LLE popiteal clot as his testing showed +lupus and cardiolipin. He has f/u in March with her for labs and U/S. No recur blood clot. Mild pain left knee,  Intermittent but no swelling and notices when twists. He declined U/S as feels better.     May 12, 2017  Seen Dr. Hedrick  now on arava 20 mg once a day (July 2016) , dx PFS (prior x-rays)     RA controlled no flaring, joint pain or swelling and is doing great. Taking arava 20 mg once a day with daily FA 1 mg day. NO infections, or changes to his PMSH. No back pain this went away. Mild intermittent in his knees (PFS) and is not doing his exercises which he will do. No swelling. Getting INRs as on coumadin and I asked him to f/u with oncologist which he agreed to do. Hands are stronger, no weakness. No EAS. Feels great. No swelling in neck. No NSAID or prednisone in a long time. No s/e to the arava and wishes to continue this. PMSH reviewed and update by me today         Past Medical/Surgical History:   Injuries-None  No Blood transfusions  Medical-seronegative RA   Past Medical History:   Diagnosis Date     Antiphospholipid syndrome (H)      Arthritis      Asthma     Exercise     blood clot in leg      Depressive disorder, not elsewhere classified     Depression (non-psychotic)     ANKIT " "(generalised anxiety disorder)      HH (hiatus hernia)      Hypercholesteremia     normal with weight loss 3/09     Lumbar disc herniation 1992     Seronegative rheumatoid arthritis (H)      Surgical-  Past Surgical History:   Procedure Laterality Date     APPENDECTOMY       C NONSPECIFIC PROCEDURE  91 or 92    back surgery. lumbar. lamiectomy     COLONOSCOPY N/A 8/2/2016    Procedure: COMBINED COLONOSCOPY, SINGLE OR MULTIPLE BIOPSY/POLYPECTOMY BY BIOPSY;  Surgeon: Sydnee Walton MD;  Location: [facility] OR     COLONOSCOPY WITH CO2 INSUFFLATION N/A 8/2/2016    Procedure: COLONOSCOPY WITH CO2 INSUFFLATION;  Surgeon: Sydnee Walton MD;  Location: [facility] OR     COMBINED ESOPHAGOSCOPY, GASTROSCOPY, DUODENOSCOPY (EGD) WITH CO2 INSUFFLATION N/A 8/2/2016    Procedure: COMBINED ESOPHAGOSCOPY, GASTROSCOPY, DUODENOSCOPY (EGD) WITH CO2 INSUFFLATION;  Surgeon: Sydnee Walton MD;  Location: [facility] OR     ESOPHAGOSCOPY, GASTROSCOPY, DUODENOSCOPY (EGD), COMBINED N/A 8/2/2016    Procedure: COMBINED ESOPHAGOSCOPY, GASTROSCOPY, DUODENOSCOPY (EGD), BIOPSY SINGLE OR MULTIPLE;  Surgeon: Sydnee Walton MD;  Location: [facility] OR     HC REMOVAL OF TONSILS,<12 Y/O      Tonsils <11y.o.     HC REPAIR INCISIONAL HERNIA,REDUCIBLE  1970's    Hernia Repair, Incisional, Unilateral     HC UGI ENDOSCOPY DIAG W BIOPSY  02/01/06     HC VASECTOMY UNILAT/BILAT W POSTOP SEMEN  1/05    Vasectomy     History back lumbar laminectomy            Family/Social History:   Family-    Family History   Problem Relation Age of Onset     DIABETES Maternal Grandmother      CANCER Maternal Grandfather      lung     Arthritis Cousin      cousins x 2 \"RA\"     CEREBROVASCULAR DISEASE Maternal Grandmother      tim age ~70     Musculoskeletal Disorder Maternal Aunt      MS     Alcohol/Drug Paternal Grandfather      alcoholic     Neurologic Disorder Paternal Grandmother      Parkinsons      Brain Tumor Mother      Benign     HEART " "DISEASE Father      \"wont tell anyone\"     Family History Negative       psoriasis, crohns, UC, SLE     Deep Vein Thrombosis Mother      \"neck\"      Social-4 children. Detailed cars. Rare alcohol use. Never smoke. No IVDU or illicit drugs. No STD, hepatitis or TB exposures. Drives long distance to work             Allergies/Medications:     Allergies   Allergen Reactions     No Known Drug Allergies         Current Outpatient Prescriptions   Medication Sig Dispense Refill     oxyCODONE-acetaminophen (PERCOCET) 5-325 MG per tablet Take 1 tablet by mouth every 8 hours as needed for moderate to severe pain 30 tablet 0     folic acid (FOLVITE) 1 MG tablet Take 1 tablet (1 mg) by mouth daily 90 tablet 3     traMADol (ULTRAM) 50 MG tablet Take 1 tablet as needed twice day for pain. Max 2 tab daily. No driving or alcohol use while taking medication. 60 tablet 0     leflunomide (ARAVA) 20 MG tablet Take 1 tablet (20 mg) by mouth daily 30 tablet 0     finasteride (PROSCAR) 5 MG tablet TAKE ONE TABLET BY MOUTH ONCE DAILY 30 tablet 10     warfarin (COUMADIN) 5 MG tablet Take 7.5 mg on Tuesday, Thursday and 5 mg all other days, or as directed by the coumadin clinic. 100 tablet 3     OLANZapine (ZYPREXA) 5 MG tablet TAKE THREE TABLETS BY MOUTH AT BEDTIME 90 tablet 4     DULoxetine (CYMBALTA) 20 MG capsule Take 1 capsule (20 mg) by mouth 2 times daily 60 capsule 3     amitriptyline (ELAVIL) 10 MG tablet Take 1 tablet (10 mg) by mouth At Bedtime 60 tablet 6     tretinoin (RETIN-A) 0.05 % cream Apply  topically At Bedtime. 45 g 3     albuterol (PROAIR HFA, PROVENTIL HFA, VENTOLIN HFA) 108 (90 BASE) MCG/ACT inhaler Inhale 2 puffs into the lungs every 4 hours as needed for shortness of breath / dyspnea 3 Inhaler 1            Review of Systems:   +See MDHAQ and above  -no infections this year     CONSTITUTIONAL: No fevers, night sweats or unintentional weight change. No acute distress, swollen glands  EYES: No vision change, diplopia, " pain in eyes or red eyes   EARS, NOSE, MOUTH, THROAT: No tinnitus or hearing change, no epistaxis or nasal discharge, no oral lesions, throat clear. Normal saliva pool.  No drymouth. No thyroid  enlargement  CARDIOVASCULAR: No chest pain, palpitations, or pain with walking, no orthopnea or PND   RESPIRATORY: No dyspnea, cough, shortness of breath or wheezing. No pleurisy.   GI: No nausea, vomiting, diarrhea or constipation, no abdominal pain, or blood in stools.   : No change in urine, no dysuria or hematuria   MUSCKL: See above No enthesitis, plantar fascitis or heel pain.  INTEGUMENTARY: No concerning lesions or moles   NEURO: No loss of strength or sensation, no numbness or tingling, no tremor, no dizziness.  No falls   ENDO: No polyuria or polydipsia, no temperature intolerance   HEME/LYMPH:No concerning bumps, bleeding problems, or swollen lymph nodes. No recent infections, hospitalizations or new illnesses.   ALLERGY: hay straw, grass  PSYCH: +depression controlled denies suicidal ideation  Otherwise 14 point ROS obtained, reviewed and found negative.          Physical Exam:   Blood pressure 126/82, pulse 95, weight 109 kg (240 lb 3.2 oz).  Wt Readings from Last 4 Encounters:   05/12/17 109 kg (240 lb 3.2 oz)   04/14/17 110.2 kg (243 lb)   12/14/16 108.3 kg (238 lb 12.8 oz)   10/13/16 106.1 kg (234 lb)     Repeat BP still DBP 95  Constitutional: WD-WN-WG cooperative  Eyes: nl EOM, PERRLA, vision, conjunctiva, sclera  ENT: nl external ears, nose, hearing, lips, teeth, gums, throat. Nl saliva pool  No mucous membrane lesions, normal saliva pool  Neck: no mass or thyroid enlargement. non-tender.  Resp: lungs clear to auscultation, nl to palpation, nl breath sounds  CV: RRR, no murmurs, rubs or gallops, no edema  GI: no ABD mass or tenderness, no HSM. No RUQ pain.   : not tested  Lymph: no cervical, supraclavicular, inguinal or epitrochlear nodes  MSK: +heberdones. All TMJ, neck, shoulder, elbow, wrist,  MCP/PIP/DIP, spine, hip, knee, ankle, and foot MTP/IP joints were examined and  found normal. No active synovitis or deformity. Full ROM.  No periuncle erythema. Normal  strength. No dactylitis,  tenosynovitis, enthespathy. Negative MCP and MTP squeeze. No impingment signs of shoulders. Negative Lhermitte's sign. -SI/ITB or trochanteric bursa T.   9-9-2015:  incomplete prayer sign, right wrist S/T, prominent right styloid, erythema over 2-3 MCPs and prominent, laxity sytloids. , right 2nd finger curl painful, +heberdone nodes 2 PIPs, 3PIPs less, pain right wrist with flexion, pain in thenar areas bilaterally with the left trace cool swelling, left 3rd pain finger curl, mild pain at full adduction.   Skin: No nail pitting, alopecia, rash, nodules or lesions.   Neuro: nl cranial nerves, strength, sensation   Psych: nl judgement, orientation, memory, affect.         Labs/Imaging:   Reviewed medications, labs, imaging, and EMR.   Reviewed Rheumatology Lab Flowsheet & Lab Flowsheet    10/2015 Negative [MTB QG, UA, CBCD, ALt, AST, ALB, hep B/C, G6PD, lymes ] Vitamin D 28=recommend 1000 unit day. Need hep b next draw  Component      Latest Ref Rng & Units 4/14/2017   WBC      4.0 - 11.0 10e9/L 4.4   RBC Count      4.4 - 5.9 10e12/L 5.89   Hemoglobin      13.3 - 17.7 g/dL 15.7   Hematocrit      40.0 - 53.0 % 45.9   MCV      78 - 100 fl 78   MCH      26.5 - 33.0 pg 26.7   MCHC      31.5 - 36.5 g/dL 34.2   RDW      10.0 - 15.0 % 14.1   Platelet Count      150 - 450 10e9/L 234   Diff Method       Automated Method   % Neutrophils      % 56.6   % Lymphocytes      % 30.8   % Monocytes      % 8.9   % Eosinophils      % 3.0   % Basophils      % 0.7   % Immature Granulocytes      % 0.0   Absolute Neutrophil      1.6 - 8.3 10e9/L 2.5   Absolute Lymphocytes      0.8 - 5.3 10e9/L 1.4   Absolute Monocytes      0.0 - 1.3 10e9/L 0.4   Absolute Eosinophils      0.0 - 0.7 10e9/L 0.1   Absolute Basophils      0.0 - 0.2 10e9/L 0.0   Abs  Immature Granulocytes      0 - 0.4 10e9/L 0.0   Creatinine      0.66 - 1.25 mg/dL 0.93   GFR Estimate      >60 mL/min/1.7m2 87   GFR Estimate If Black      >60 mL/min/1.7m2 >90 . . .   ALT      0 - 70 U/L 29   AST      0 - 45 U/L 21   Albumin      3.4 - 5.0 g/dL 3.6   CRP Inflammation      0.0 - 8.0 mg/L <2.9   INR      0.86 - 1.14          Exam: Bilateral hands and wrists, previous each. 5/1/2015.  Comparison: None.  Clinical history: Pain.  Findings: AP, oblique, and lateral views of the bilateral hands and  wrists were obtained. The bones are well aligned. Joint spaces are  well-maintained. No erosive changes are noted. No soft tissue  abnormalities are seen.  IMPRESSION  Impression: No erosive changes in the bilateral hands or wrists.  DESIRE BRENNAN MD    Recent Labs   Lab Test  04/14/17   0757  12/09/16   0808  06/28/16   1513   12/17/15   1010   07/11/14   0823  06/28/13   1045  01/10/13   0834   WBC  4.4  4.4  7.2   < >  4.9   < >  6.4  6.7  6.2   HGB  15.7  15.3  15.3   < >  15.7   < >  15.9  15.7  16.0   HCT  45.9  45.4  44.7   < >  45.7   < >  47.4  46.2  46.6   MCV  78  80  82   < >  83   < >  83  83  82   PLT  234  231  230   < >  230   < >  249  217  252   BUN   --   6*   --    --    --    --   12  9  12   TSH   --   2.45   --    --    --    --   1.22   --   3.06   AST  21  24  20   < >  20   < >  22   --   23   ALT  29  36  28   < >  37   < >  32   --   30   ALKPHOS   --   80   --    --   82   --   59   --   64    < > = values in this interval not displayed.     Alpesh Jefferson APRN, CNP, MSN  [facility]  Department of Rheumatology & Autoimmune Disorders

## 2017-05-12 NOTE — PROGRESS NOTES
ANTICOAGULATION FOLLOW-UP CLINIC VISIT    Patient Name:  Hollis Diggs  Date:  5/12/2017  Contact Type:  Face to Face    SUBJECTIVE:     Patient Findings     Positives No Problem Findings           OBJECTIVE    INR Protime   Date Value Ref Range Status   05/12/2017 2.4 (A) 0.86 - 1.14 Final     Factor 2 Assay   Date Value Ref Range Status   12/17/2015 33 (L) 60 - 140 % Final     Comment:     Analyte Specific Reagents (ASRs) are used in many laboratory tests necessary   for   standard medical care and generally do not require FDA approval.  This test   was   developed and its performance characteristics determined by [facility].  It has not been cleared or approved by   the [place] Food and Drug Administration.         ASSESSMENT / PLAN  INR assessment THER    Recheck INR In: 6 WEEKS    INR Location Clinic      Anticoagulation Summary as of 5/12/2017     INR goal 2.0-3.0   Today's INR 2.4   Maintenance plan 7.5 mg (5 mg x 1.5) on Tue, Thu; 5 mg (5 mg x 1) all other days   Full instructions 7.5 mg on Tue, Thu; 5 mg all other days   Weekly total 40 mg   No change documented Lilliam Blanchard RN   Plan last modified Lilliam Blanchard RN (1/13/2017)   Next INR check 6/23/2017   Target end date     Indications   DVT (deep venous thrombosis) (H) (Resolved) [I82.409]  Long-term (current) use of anticoagulants [Z79.01] [Z79.01]         Anticoagulation Episode Summary     INR check location     Preferred lab     Send INR reminders to [facility] POOL    Comments 5 mg       Anticoagulation Care Providers     Provider Role Specialty Phone number    Sylvester Cash MD [facility] [phone]            See the Encounter Report to view Anticoagulation Flowsheet and Dosing Calendar (Go to Encounters tab in chart review, and find the Anticoagulation Therapy Visit)    Dosage adjustment made based on physician directed care plan.    Lilliam Blanchard RN

## 2017-05-12 NOTE — MR AVS SNAPSHOT
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      Your next 10 appointments already scheduled     Jun 23, 2017  8:15 AM CDT   Anticoagulation Visit with PH ANTI COAG   [facility])    [address] [place] [phone]   [phone]            Jul 14, 2017  8:15 AM CDT   LAB with NL LAB PMC   [facility])    [address] [place] [phone]   [phone]           Patient must bring picture ID.  Patient should be prepared to give a urine specimen  Please do not eat 10-12 hours before your appointment if you are coming in fasting for labs on lipids, cholesterol, or glucose (sugar).  Pregnant women should follow their Care Team instructions. Water with medications is okay. Do not drink coffee or other fluids.   If you have concerns about taking  your medications, please ask at office or if scheduling via [employer], send a message by clicking on Secure Messaging, Message Your Care Team.            Oct 13, 2017  9:00 AM CDT   Return Visit with FACUNDO Ortez CNP [facility] ([facility])    [id number] [place] [id number] [phone]              Future tests that were ordered for you today     Open Standing Orders        Priority Remaining Interval Expires Ordered     AST Routine 5/5 every 8-12 weeks 5/12/2018 5/12/2017    ALT Routine 5/5 every 8-12 weeks 5/12/2018 5/12/2017    Albumin level Routine 5/5 every 8-12 weeks 5/12/2018 5/12/2017    Creatinine Routine 5/5 every 8-12 weeks 5/12/2018 5/12/2017    CRP inflammation Routine 5/5 every 8-12 weeks 5/12/2018 5/12/2017    Erythrocyte sedimentation rate auto Routine 5/5 every 8-12 weeks 5/12/2018 5/12/2017    CBC with platelets Routine 5/5 every 8-12 weeks 5/12/2018 5/12/2017            Who to contact     If you have questions or need follow up information about today's clinic visit or your schedule please contact [facility] directly at [phone].  Normal or non-critical lab and imaging results will be communicated to you by [employer], letter or phone within 4 business days after the clinic has received the results. If you do not hear from us within 7 days, please contact the clinic through [employer] or phone. If you have a critical or abnormal lab result, we will notify you by phone as soon as possible.  Submit refill requests through [employer] or call your pharmacy and they will forward the refill request to us. Please allow 3 business days for your refill to be completed.          Additional Information About Your Visit        MyChart Information     [employer] gives you secure access to your electronic health record. If you see a primary care provider, you can also send messages to your care team and make appointments. If you have questions, please call your primary care clinic.  If you do not have a primary care provider, please call [phone] and they will assist you.      [employer] is an electronic gateway that provides easy, online access to your medical records. With [employer], you can request a clinic appointment, read your test results, renew a prescription or communicate with your care team.     To access your existing account, please contact your [facility] or call  [phone] for assistance.        Care EveryWhere ID     This is your Care EveryWhere ID. This could be used by other organizations to access your [place] medical records  [id number]        Your Vitals Were     Pulse BMI (Body Mass Index)                95 31.69 kg/m2           Blood Pressure from Last 3 Encounters:   05/12/17 126/82   04/14/17 132/80   12/14/16 148/78    Weight from Last 3 Encounters:   05/12/17 109 kg (240 lb 3.2 oz)   04/14/17 110.2 kg (243 lb)   12/14/16 108.3 kg (238 lb 12.8 oz)                 Where to get your medicines      These medications were sent to [facility] [place] - [address]  [address], [facility] [address]     Phone:  [phone]     leflunomide 20 MG tablet          Primary Care Provider Office Phone # Fax #    Sylvester Cash [phone]       [facility] [address] [place] [address]        Thank you!     Thank you for choosing [facility]  for your care. Our goal is always to provide you with excellent care. Hearing back from our patients is one way we can continue to improve our services. Please take a few minutes to complete the written survey that you may receive in the mail after your visit with us. Thank you!             Your Updated Medication List - Protect others around you: Learn how to safely use, store and throw away your medicines at www.disposemymeds.org.          This list is accurate as of: 5/12/17 11:11 AM.  Always use your most recent med list.                   Brand Name Dispense Instructions for use    albuterol 108 (90 BASE) MCG/ACT Inhaler    PROAIR HFA/PROVENTIL HFA/VENTOLIN HFA    3 Inhaler    Inhale 2 puffs into the lungs every 4 hours as needed for shortness of breath / dyspnea       amitriptyline 10 MG tablet    ELAVIL    60 tablet    Take 1 tablet (10 mg) by mouth At Bedtime       DULoxetine 20 MG EC capsule    CYMBALTA    60 capsule    Take 1 capsule (20 mg) by  mouth 2 times daily       finasteride 5 MG tablet    PROSCAR    30 tablet    TAKE ONE TABLET BY MOUTH ONCE DAILY       folic acid 1 MG tablet    FOLVITE    90 tablet    Take 1 tablet (1 mg) by mouth daily       leflunomide 20 MG tablet    ARAVA    30 tablet    Take 1 tablet (20 mg) by mouth daily       OLANZapine 5 MG tablet    zyPREXA    90 tablet    TAKE THREE TABLETS BY MOUTH AT BEDTIME       oxyCODONE-acetaminophen 5-325 MG per tablet    PERCOCET    30 tablet    Take 1 tablet by mouth every 8 hours as needed for moderate to severe pain       traMADol 50 MG tablet    ULTRAM    60 tablet    Take 1 tablet as needed twice day for pain. Max 2 tab daily. No driving or alcohol use while taking medication.       tretinoin 0.05 % cream    RETIN-A    45 g    Apply  topically At Bedtime.       warfarin 5 MG tablet    COUMADIN    100 tablet    Take 7.5 mg on Tuesday, Thursday and 5 mg all other days, or as directed by the coumadin clinic.

## 2017-05-12 NOTE — NURSING NOTE
"Hollis Diggs's goals for this visit include:   Chief Complaint   Patient presents with     RECHECK       He requests these members of his care team be copied on today's visit information: yes    PCP: Sylvester Cash    Referring Provider:  ESTABLISHED PATIENT  No address on file    Chief Complaint   Patient presents with     RECHECK       Initial /82  Pulse 95  Wt 109 kg (240 lb 3.2 oz)  BMI 31.69 kg/m2 Estimated body mass index is 31.69 kg/(m^2) as calculated from the following:    Height as of 4/14/17: 1.854 m (6' 1\").    Weight as of this encounter: 109 kg (240 lb 3.2 oz).  Medication Reconciliation: complete    Do you need any medication refills at today's visit? No     Amorryue Marcial CMA        "

## 2017-05-24 DIAGNOSIS — M51.369 DDD (DEGENERATIVE DISC DISEASE), LUMBAR: ICD-10-CM

## 2017-05-24 RX ORDER — TRAMADOL HYDROCHLORIDE 50 MG/1
TABLET ORAL
Qty: 60 TABLET | Refills: 0 | Status: SHIPPED | OUTPATIENT
Start: 2017-05-24 | End: 2017-06-26

## 2017-05-24 NOTE — TELEPHONE ENCOUNTER
Script faxed to [facility] [phone] pharmacy.  Karen Mccarty MA

## 2017-06-08 DIAGNOSIS — M51.369 DDD (DEGENERATIVE DISC DISEASE), LUMBAR: ICD-10-CM

## 2017-06-08 RX ORDER — OXYCODONE AND ACETAMINOPHEN 5; 325 MG/1; MG/1
1 TABLET ORAL EVERY 8 HOURS PRN
Qty: 30 TABLET | Refills: 0 | Status: SHIPPED | OUTPATIENT
Start: 2017-06-08 | End: 2017-07-11

## 2017-06-08 NOTE — TELEPHONE ENCOUNTER
Oxycodone-acetaminophen (PERCOCET) 5-325 MG per tablet      Last Written Prescription Date:  05/09/2017  Last Fill Quantity: 30,   # refills: 0  Last Office Visit with [facility] or [facility] prescribing provider: 04/14/2017  Future Office visit:       Routing refill request to provider for review/approval because:  Drug not on the [facility], [facility] or [facility] refill protocol or controlled substance    Marisa Cordon CMA

## 2017-06-23 ENCOUNTER — ANTICOAGULATION THERAPY VISIT (OUTPATIENT)
Dept: ANTICOAGULATION | Facility: CLINIC | Age: 47
End: 2017-06-23
Payer: COMMERCIAL

## 2017-06-23 DIAGNOSIS — Z79.01 LONG-TERM (CURRENT) USE OF ANTICOAGULANTS: ICD-10-CM

## 2017-06-23 LAB — INR POINT OF CARE: 2.9 (ref 0.86–1.14)

## 2017-06-23 PROCEDURE — 99207 ZZC NO CHARGE NURSE ONLY: CPT

## 2017-06-23 PROCEDURE — 85610 PROTHROMBIN TIME: CPT | Mod: QW

## 2017-06-23 PROCEDURE — 36416 COLLJ CAPILLARY BLOOD SPEC: CPT

## 2017-06-23 NOTE — PROGRESS NOTES
ANTICOAGULATION FOLLOW-UP CLINIC VISIT    Patient Name:  Hollis Diggs  Date:  6/23/2017  Contact Type:  Face to Face    SUBJECTIVE:     Patient Findings     Positives No Problem Findings           OBJECTIVE    INR Protime   Date Value Ref Range Status   06/23/2017 2.9 (A) 0.86 - 1.14 Final     Factor 2 Assay   Date Value Ref Range Status   12/17/2015 33 (L) 60 - 140 % Final     Comment:     Analyte Specific Reagents (ASRs) are used in many laboratory tests necessary   for   standard medical care and generally do not require FDA approval.  This test   was   developed and its performance characteristics determined by [facility].  It has not been cleared or approved by   the US Food and Drug Administration.         ASSESSMENT / PLAN  INR assessment THER    Recheck INR In: 6 WEEKS    INR Location Clinic      Anticoagulation Summary as of 6/23/2017     INR goal 2.0-3.0   Today's INR 2.9   Maintenance plan 7.5 mg (5 mg x 1.5) on Tue, Thu; 5 mg (5 mg x 1) all other days   Full instructions 7.5 mg on Tue, Thu; 5 mg all other days   Weekly total 40 mg   No change documented Lilliam Pro RN   Plan last modified Lilliam Pro RN (1/13/2017)   Next INR check 8/4/2017   Target end date     Indications   DVT (deep venous thrombosis) (H) (Resolved) [I82.409]  Long-term (current) use of anticoagulants [Z79.01] [Z79.01]         Anticoagulation Episode Summary     INR check location     Preferred lab     Send INR reminders to [facility] POOL    Comments 5 mg       Anticoagulation Care Providers     Provider Role Specialty Phone number    Sylvester Cash MD [facility] [phone]            See the Encounter Report to view Anticoagulation Flowsheet and Dosing Calendar (Go to Encounters tab in chart review, and find the Anticoagulation Therapy Visit)    Dosage adjustment made based on physician directed care plan.        Lilliam Pro RN

## 2017-06-23 NOTE — MR AVS SNAPSHOT
Hollis Diggs   6/23/2017 8:15 AM   Anticoagulation Therapy Visit    Description:  47 year old male   Provider:  ALYSSA ANTI COAGRACE   Department:  [facility]           INR as of 6/23/2017     Today's INR 2.9      Anticoagulation Summary as of 6/23/2017     INR goal 2.0-3.0   Today's INR 2.9   Full instructions 7.5 mg on Tue, Thu; 5 mg all other days   Next INR check 8/4/2017    Indications   DVT (deep venous thrombosis) (H) (Resolved) [I82.409]  Long-term (current) use of anticoagulants [Z79.01] [Z79.01]         Your next Anticoagulation Clinic appointment(s)     Aug 04, 2017  8:30 AM CDT   Anticoagulation Visit with PH ANTI COAG   [facility] ([address] [address]   [phone]              Contact Numbers     Clinic Number:         June 2017 Details    Sun Mon Tue Wed Thu Fri Sat         1               2               3                 4               5               6               7               8               9               10                 11               12               13               14               15               16               17                 18               19               20               21               22               23      5 mg   See details      24      5 mg           25      5 mg         26      5 mg         27      7.5 mg         28      5 mg         29      7.5 mg         30      5 mg           Date Details   06/23 This INR check               How to take your warfarin dose     To take:  5 mg Take 1 of the 5 mg tablets.    To take:  7.5 mg Take 1.5 of the 5 mg tablets.           July 2017 Details    Sun Mon Tue Wed Thu Fri Sat           1      5 mg           2      5 mg         3      5 mg         4      7.5 mg         5      5 mg         6      7.5 mg         7      5 mg         8      5 mg           9      5 mg         10      5 mg         11      7.5 mg         12      5 mg         13      7.5 mg          14      5 mg         15      5 mg           16      5 mg         17      5 mg         18      7.5 mg         19      5 mg         20      7.5 mg         21      5 mg         22      5 mg           23      5 mg         24      5 mg         25      7.5 mg         26      5 mg         27      7.5 mg         28      5 mg         29      5 mg           30      5 mg         31      5 mg               Date Details   No additional details            How to take your warfarin dose     To take:  5 mg Take 1 of the 5 mg tablets.    To take:  7.5 mg Take 1.5 of the 5 mg tablets.           August 2017 Details    Sun Mon Tue Wed Thu Fri Sat       1      7.5 mg         2      5 mg         3      7.5 mg         4            5                 6               7               8               9               10               11               12                 13               14               15               16               17               18               19                 20               21               22               23               24               25               26                 27               28               29               30               31                  Date Details   No additional details    Date of next INR:  8/4/2017         How to take your warfarin dose     To take:  5 mg Take 1 of the 5 mg tablets.    To take:  7.5 mg Take 1.5 of the 5 mg tablets.

## 2017-06-26 DIAGNOSIS — M51.369 DDD (DEGENERATIVE DISC DISEASE), LUMBAR: ICD-10-CM

## 2017-06-26 RX ORDER — TRAMADOL HYDROCHLORIDE 50 MG/1
TABLET ORAL
Qty: 60 TABLET | Refills: 0 | Status: SHIPPED | OUTPATIENT
Start: 2017-06-26 | End: 2017-07-28

## 2017-07-11 DIAGNOSIS — M51.369 DDD (DEGENERATIVE DISC DISEASE), LUMBAR: ICD-10-CM

## 2017-07-11 NOTE — TELEPHONE ENCOUNTER
Oxycodone-acetamiophen      Last Written Prescription Date:  6/8/17  Last Fill Quantity: 30,   # refills: 0  Last Office Visit with [facility] or [facility] prescribing provider: 4/14/17  Future Office visit:       Routing refill request to provider for review/approval because:  Drug not on the [facility], [facility] or  Health refill protocol or controlled substance

## 2017-07-12 RX ORDER — OXYCODONE AND ACETAMINOPHEN 5; 325 MG/1; MG/1
1 TABLET ORAL EVERY 8 HOURS PRN
Qty: 30 TABLET | Refills: 0 | Status: SHIPPED | OUTPATIENT
Start: 2017-07-12 | End: 2017-08-10

## 2017-07-14 DIAGNOSIS — Z79.899 LONG-TERM USE OF HIGH-RISK MEDICATION: ICD-10-CM

## 2017-07-14 DIAGNOSIS — M06.00 SERONEGATIVE RHEUMATOID ARTHRITIS (H): ICD-10-CM

## 2017-07-14 DIAGNOSIS — M06.09 RHEUMATOID ARTHRITIS OF MULTIPLE SITES WITH NEGATIVE RHEUMATOID FACTOR (H): ICD-10-CM

## 2017-07-14 LAB
ALBUMIN SERPL-MCNC: 3.3 G/DL (ref 3.4–5)
ALT SERPL W P-5'-P-CCNC: 28 U/L (ref 0–70)
AST SERPL W P-5'-P-CCNC: 20 U/L (ref 0–45)
CREAT SERPL-MCNC: 0.92 MG/DL (ref 0.66–1.25)
CRP SERPL-MCNC: <2.9 MG/L (ref 0–8)
ERYTHROCYTE [DISTWIDTH] IN BLOOD BY AUTOMATED COUNT: 13.8 % (ref 10–15)
ERYTHROCYTE [SEDIMENTATION RATE] IN BLOOD BY WESTERGREN METHOD: 7 MM/H (ref 0–15)
GFR SERPL CREATININE-BSD FRML MDRD: 88 ML/MIN/1.7M2
HCT VFR BLD AUTO: 45 % (ref 40–53)
HGB BLD-MCNC: 14.6 G/DL (ref 13.3–17.7)
MCH RBC QN AUTO: 26.6 PG (ref 26.5–33)
MCHC RBC AUTO-ENTMCNC: 32.4 G/DL (ref 31.5–36.5)
MCV RBC AUTO: 82 FL (ref 78–100)
PLATELET # BLD AUTO: 225 10E9/L (ref 150–450)
RBC # BLD AUTO: 5.49 10E12/L (ref 4.4–5.9)
WBC # BLD AUTO: 4.9 10E9/L (ref 4–11)

## 2017-07-14 PROCEDURE — 36415 COLL VENOUS BLD VENIPUNCTURE: CPT | Performed by: NURSE PRACTITIONER

## 2017-07-14 PROCEDURE — 84450 TRANSFERASE (AST) (SGOT): CPT | Performed by: NURSE PRACTITIONER

## 2017-07-14 PROCEDURE — 85027 COMPLETE CBC AUTOMATED: CPT | Performed by: NURSE PRACTITIONER

## 2017-07-14 PROCEDURE — 84460 ALANINE AMINO (ALT) (SGPT): CPT | Performed by: NURSE PRACTITIONER

## 2017-07-14 PROCEDURE — 82040 ASSAY OF SERUM ALBUMIN: CPT | Performed by: NURSE PRACTITIONER

## 2017-07-14 PROCEDURE — 85652 RBC SED RATE AUTOMATED: CPT | Performed by: NURSE PRACTITIONER

## 2017-07-14 PROCEDURE — 86140 C-REACTIVE PROTEIN: CPT | Performed by: NURSE PRACTITIONER

## 2017-07-14 PROCEDURE — 82565 ASSAY OF CREATININE: CPT | Performed by: NURSE PRACTITIONER

## 2017-07-14 NOTE — PROGRESS NOTES
The blood counts, liver, kidney and CRP inflammation labs are normal.     Alpesh LOREDO, CNP, MSN  7/14/2017  10:58 AM

## 2017-07-28 DIAGNOSIS — M51.369 DDD (DEGENERATIVE DISC DISEASE), LUMBAR: ICD-10-CM

## 2017-08-01 RX ORDER — TRAMADOL HYDROCHLORIDE 50 MG/1
TABLET ORAL
Qty: 60 TABLET | Refills: 0 | Status: SHIPPED | OUTPATIENT
Start: 2017-08-01 | End: 2017-08-31

## 2017-08-04 ENCOUNTER — ANTICOAGULATION THERAPY VISIT (OUTPATIENT)
Dept: ANTICOAGULATION | Facility: CLINIC | Age: 47
End: 2017-08-04
Payer: COMMERCIAL

## 2017-08-04 DIAGNOSIS — Z79.01 LONG-TERM (CURRENT) USE OF ANTICOAGULANTS: ICD-10-CM

## 2017-08-04 LAB — INR POINT OF CARE: 2.5 (ref 0.86–1.14)

## 2017-08-04 PROCEDURE — 36416 COLLJ CAPILLARY BLOOD SPEC: CPT

## 2017-08-04 PROCEDURE — 99207 ZZC NO CHARGE NURSE ONLY: CPT

## 2017-08-04 PROCEDURE — 85610 PROTHROMBIN TIME: CPT | Mod: QW

## 2017-08-04 NOTE — MR AVS SNAPSHOT
Hollis Diggs   8/4/2017 8:30 AM   Anticoagulation Therapy Visit    Description:  47 year old male   Provider:  PH ANTI COAG   Department:  Ph Anticoag           INR as of 8/4/2017     Today's INR 2.5      Anticoagulation Summary as of 8/4/2017     INR goal 2.0-3.0   Today's INR 2.5   Full instructions 7.5 mg on Tue, Thu; 5 mg all other days   Next INR check 9/15/2017    Indications   DVT (deep venous thrombosis) (H) (Resolved) [I82.409]  Long-term (current) use of anticoagulants [Z79.01] [Z79.01]         Your next Anticoagulation Clinic appointment(s)     Aug 04, 2017  8:30 AM CDT   Anticoagulation Visit with PH ANTI COAG   [address] [address]   [phone]            Sep 15, 2017  8:15 AM CDT   Anticoagulation Visit with PH ANTI COAG   [address] [address]   [phone]              Contact Numbers     Clinic Number:         August 2017 Details    Sun Mon Tue Wed Thu Fri Sat       1               2               3               4      5 mg   See details      5      5 mg           6      5 mg         7      5 mg         8      7.5 mg         9      5 mg         10      7.5 mg         11      5 mg         12      5 mg           13      5 mg         14      5 mg         15      7.5 mg         16      5 mg         17      7.5 mg         18      5 mg         19      5 mg           20      5 mg         21      5 mg         22      7.5 mg         23      5 mg         24      7.5 mg         25      5 mg         26      5 mg           27      5 mg         28      5 mg         29      7.5 mg         30      5 mg         31      7.5 mg            Date Details   08/04 This INR check               How to take your warfarin dose     To take:  5 mg Take 1 of the 5 mg tablets.    To take:  7.5 mg Take 1.5 of the 5 mg tablets.           September 2017  Details    Sun Mon Tue Wed Thu Fri Sat          1      5 mg         2      5 mg           3      5 mg         4      5 mg         5      7.5 mg         6      5 mg         7      7.5 mg         8      5 mg         9      5 mg           10      5 mg         11      5 mg         12      7.5 mg         13      5 mg         14      7.5 mg         15            16                 17               18               19               20               21               22               23                 24               25               26               27               28               29               30                Date Details   No additional details    Date of next INR:  9/15/2017         How to take your warfarin dose     To take:  5 mg Take 1 of the 5 mg tablets.    To take:  7.5 mg Take 1.5 of the 5 mg tablets.

## 2017-08-04 NOTE — PROGRESS NOTES
ANTICOAGULATION FOLLOW-UP CLINIC VISIT    Patient Name:  Hollis Diggs  Date:  8/4/2017  Contact Type:  Face to Face    SUBJECTIVE:     Patient Findings     Positives No Problem Findings           OBJECTIVE    INR Protime   Date Value Ref Range Status   08/04/2017 2.5 (A) 0.86 - 1.14 Final     Factor 2 Assay   Date Value Ref Range Status   12/17/2015 33 (L) 60 - 140 % Final     Comment:     Analyte Specific Reagents (ASRs) are used in many laboratory tests necessary   for   standard medical care and generally do not require FDA approval.  This test   was   developed and its performance characteristics determined by [facility].  It has not been cleared or approved by   the US Food and Drug Administration.         ASSESSMENT / PLAN  INR assessment THER    Recheck INR In: 6 WEEKS    INR Location Clinic      Anticoagulation Summary as of 8/4/2017     INR goal 2.0-3.0   Today's INR 2.5   Maintenance plan 7.5 mg (5 mg x 1.5) on Tue, Thu; 5 mg (5 mg x 1) all other days   Full instructions 7.5 mg on Tue, Thu; 5 mg all other days   Weekly total 40 mg   No change documented Lilliam Pro RN   Plan last modified Lilliam Pro RN (1/13/2017)   Next INR check 9/15/2017   Target end date     Indications   DVT (deep venous thrombosis) (H) (Resolved) [I82.409]  Long-term (current) use of anticoagulants [Z79.01] [Z79.01]         Anticoagulation Episode Summary     INR check location     Preferred lab     Send INR reminders to [facility] POOL    Comments 5 mg       Anticoagulation Care Providers     Provider Role Specialty Phone number    Sylvester Cash MD [facility] [phone]            See the Encounter Report to view Anticoagulation Flowsheet and Dosing Calendar (Go to Encounters tab in chart review, and find the Anticoagulation Therapy Visit)    Dosage adjustment made based on physician directed care plan.    Lilliam Pro RN

## 2017-08-10 DIAGNOSIS — M51.369 DDD (DEGENERATIVE DISC DISEASE), LUMBAR: ICD-10-CM

## 2017-08-10 RX ORDER — OXYCODONE AND ACETAMINOPHEN 5; 325 MG/1; MG/1
1 TABLET ORAL EVERY 8 HOURS PRN
Qty: 30 TABLET | Refills: 0 | Status: SHIPPED | OUTPATIENT
Start: 2017-08-10 | End: 2017-09-08

## 2017-08-10 NOTE — TELEPHONE ENCOUNTER
Oxycodone-acetaminophen (percocert) 5-325 MG per tablet      Last Written Prescription Date:  8/10/17  Last Fill Quantity: 30,   # refills: 0  Last Office Visit with [facility] or  [employer] prescribing provider: 4/14/17  Future Office visit:    Next 5 appointments (look out 90 days)     Oct 13, 2017  9:00 AM CDT   Return Visit with FACUNDO Ortez CNP   [facility]    [address] [place] [address] [phone]                   Routing refill request to provider for review/approval because:  Drug not on the [facility], [facility] or  [employer] refill protocol or controlled substance

## 2017-08-10 NOTE — TELEPHONE ENCOUNTER
Routing refill request to provider for review/approval because:  Drug not on the [facility] refill protocol     Li Eaton RN

## 2017-08-10 NOTE — TELEPHONE ENCOUNTER
Oxycodone-acetaminophen      Last Written Prescription Date:  7/12/17  Last Fill Quantity: 30,   # refills: 0  Last Office Visit with [facility], [facility] or [facility] prescribing provider: 4/14/17  Future Office visit:    Next 5 appointments (look out 90 days)     Oct 13, 2017  9:00 AM CDT   Return Visit with FACUNDO Ortez CNP   [facility] ([facility])    [address] [place] [id number] [phone]                   Routing refill request to provider for review/approval because:  Drug not on the [facility] or [employer] refill protocol or controlled substance

## 2017-08-11 RX ORDER — OXYCODONE AND ACETAMINOPHEN 5; 325 MG/1; MG/1
1 TABLET ORAL EVERY 8 HOURS PRN
Qty: 30 TABLET | Refills: 0 | OUTPATIENT
Start: 2017-08-11

## 2017-08-31 DIAGNOSIS — M51.369 DDD (DEGENERATIVE DISC DISEASE), LUMBAR: ICD-10-CM

## 2017-08-31 RX ORDER — TRAMADOL HYDROCHLORIDE 50 MG/1
TABLET ORAL
Qty: 60 TABLET | Refills: 0 | Status: SHIPPED | OUTPATIENT
Start: 2017-08-31 | End: 2017-10-06

## 2017-08-31 NOTE — TELEPHONE ENCOUNTER
Tramadol      Last Written Prescription Date:  8/1/17  Last Fill Quantity: 60,   # refills: 0  Last Office Visit with [facility], [facility] or [facility] prescribing provider: 4/4/17  Future Office visit:    Next 5 appointments (look out 90 days)     Sep 15, 2017  8:40 AM CDT   PHYSICAL with Sylvester Cash MD   [facility]    [address] [place] [address]   [phone]            Oct 13, 2017  9:00 AM CDT   Return Visit with FACUNDO Ortez [facility] ([facility])    [address] [place] [address] [phone]                   Routing refill request to provider for review/approval because:  Drug not on the [facility], [facility] or [facility] refill protocol or controlled substance

## 2017-09-08 DIAGNOSIS — M51.369 DDD (DEGENERATIVE DISC DISEASE), LUMBAR: ICD-10-CM

## 2017-09-08 RX ORDER — OXYCODONE AND ACETAMINOPHEN 5; 325 MG/1; MG/1
1 TABLET ORAL EVERY 8 HOURS PRN
Qty: 30 TABLET | Refills: 0 | Status: SHIPPED | OUTPATIENT
Start: 2017-09-08 | End: 2017-09-28

## 2017-09-08 NOTE — TELEPHONE ENCOUNTER
Oxycodone-acetaminophen      Last Written Prescription Date:  8/10/17  Last Fill Quantity: 30,   # refills: 0  Last Office Visit with [facility], [facility] or [facility] prescribing provider: 4/14/17  Future Office visit:    Next 5 appointments (look out 90 days)     Sep 15, 2017  8:40 AM CDT   PHYSICAL with Sylvester Cash MD   [facility]    [address] [place] [id number] [phone]            Oct 13, 2017  9:00 AM CDT   Return Visit with FACUNDO Ortez CNP   [facility] ([facility])    [address] [place] [address] [phone]                   Routing refill request to provider for review/approval because:  Drug not on the [facility], [facility] or [facility] refill protocol or controlled substance

## 2017-09-15 ENCOUNTER — ANTICOAGULATION THERAPY VISIT (OUTPATIENT)
Dept: ANTICOAGULATION | Facility: CLINIC | Age: 47
End: 2017-09-15
Payer: COMMERCIAL

## 2017-09-15 ENCOUNTER — OFFICE VISIT (OUTPATIENT)
Dept: FAMILY MEDICINE | Facility: CLINIC | Age: 47
End: 2017-09-15
Payer: COMMERCIAL

## 2017-09-15 VITALS
HEIGHT: 73 IN | BODY MASS INDEX: 31.41 KG/M2 | HEART RATE: 88 BPM | DIASTOLIC BLOOD PRESSURE: 88 MMHG | RESPIRATION RATE: 16 BRPM | SYSTOLIC BLOOD PRESSURE: 138 MMHG | WEIGHT: 237 LBS

## 2017-09-15 DIAGNOSIS — Z00.00 ROUTINE GENERAL MEDICAL EXAMINATION AT A HEALTH CARE FACILITY: Primary | ICD-10-CM

## 2017-09-15 DIAGNOSIS — Z79.01 LONG-TERM (CURRENT) USE OF ANTICOAGULANTS: ICD-10-CM

## 2017-09-15 DIAGNOSIS — L90.8 SKIN AGING: ICD-10-CM

## 2017-09-15 DIAGNOSIS — B07.0 PLANTAR WARTS: ICD-10-CM

## 2017-09-15 DIAGNOSIS — Z23 NEED FOR PROPHYLACTIC VACCINATION AND INOCULATION AGAINST INFLUENZA: ICD-10-CM

## 2017-09-15 LAB — INR POINT OF CARE: 3.1 (ref 0.86–1.14)

## 2017-09-15 PROCEDURE — 90686 IIV4 VACC NO PRSV 0.5 ML IM: CPT | Performed by: FAMILY MEDICINE

## 2017-09-15 PROCEDURE — 99207 ZZC NO CHARGE NURSE ONLY: CPT

## 2017-09-15 PROCEDURE — 99396 PREV VISIT EST AGE 40-64: CPT | Mod: 25 | Performed by: FAMILY MEDICINE

## 2017-09-15 PROCEDURE — 36416 COLLJ CAPILLARY BLOOD SPEC: CPT

## 2017-09-15 PROCEDURE — 85610 PROTHROMBIN TIME: CPT | Mod: QW

## 2017-09-15 PROCEDURE — 90471 IMMUNIZATION ADMIN: CPT | Performed by: FAMILY MEDICINE

## 2017-09-15 PROCEDURE — 17110 DESTRUCTION B9 LES UP TO 14: CPT | Performed by: FAMILY MEDICINE

## 2017-09-15 RX ORDER — TRETINOIN 0.5 MG/G
CREAM TOPICAL
Qty: 45 G | Refills: 3 | Status: SHIPPED | OUTPATIENT
Start: 2017-09-15 | End: 2019-04-17

## 2017-09-15 ASSESSMENT — PAIN SCALES - GENERAL: PAINLEVEL: NO PAIN (0)

## 2017-09-15 NOTE — MR AVS SNAPSHOT
Hollis Diggs   9/15/2017 8:15 AM   Anticoagulation Therapy Visit    Description:  47 year old male   Provider:  PH ANTI COAG   Department:  Ph Anticoag           INR as of 9/15/2017     Today's INR 3.1!      Anticoagulation Summary as of 9/15/2017     INR goal 2.0-3.0   Today's INR 3.1!   Full instructions 7.5 mg on Tue, Thu; 5 mg all other days   Next INR check 10/27/2017    Indications   DVT (deep venous thrombosis) (H) (Resolved) [I82.409]  Long-term (current) use of anticoagulants [Z79.01] [Z79.01]         Your next Anticoagulation Clinic appointment(s)     Sep 15, 2017  8:15 AM CDT   Anticoagulation Visit with PH ANTI COAG   [facility] ([address] [address]   [phone]            Oct 27, 2017  8:15 AM CDT   Anticoagulation Visit with PH ANTI COAG   [facility] ([address] [address]   [phone]              Contact Numbers     Clinic Number:         September 2017 Details    Sun Mon Tue Wed Thu Fri Sat          1               2                 3               4               5               6               7               8               9                 10               11               12               13               14               15      5 mg   See details      16      5 mg           17      5 mg         18      5 mg         19      7.5 mg         20      5 mg         21      7.5 mg         22      5 mg         23      5 mg           24      5 mg         25      5 mg         26      7.5 mg         27      5 mg         28      7.5 mg         29      5 mg         30      5 mg          Date Details   09/15 This INR check               How to take your warfarin dose     To take:  5 mg Take 1 of the 5 mg tablets.    To take:  7.5 mg Take 1.5 of the 5 mg tablets.           October 2017 Details    Sun Mon Tue Wed Thu Fri Sat     1      5 mg         2      5  mg         3      7.5 mg         4      5 mg         5      7.5 mg         6      5 mg         7      5 mg           8      5 mg         9      5 mg         10      7.5 mg         11      5 mg         12      7.5 mg         13      5 mg         14      5 mg           15      5 mg         16      5 mg         17      7.5 mg         18      5 mg         19      7.5 mg         20      5 mg         21      5 mg           22      5 mg         23      5 mg         24      7.5 mg         25      5 mg         26      7.5 mg         27            28                 29               30               31                    Date Details   No additional details    Date of next INR:  10/27/2017         How to take your warfarin dose     To take:  5 mg Take 1 of the 5 mg tablets.    To take:  7.5 mg Take 1.5 of the 5 mg tablets.

## 2017-09-15 NOTE — NURSING NOTE
"Chief Complaint   Patient presents with     Physical       Initial /88  Pulse 88  Resp 16  Ht 6' 1\" (1.854 m)  Wt 237 lb (107.5 kg)  BMI 31.27 kg/m2 Estimated body mass index is 31.27 kg/(m^2) as calculated from the following:    Height as of this encounter: 6' 1\" (1.854 m).    Weight as of this encounter: 237 lb (107.5 kg).  Medication Reconciliation: complete   Prior to injection verified patient identity using patient's name and date of birth.. Patient instructed to wait 20 minutes before leaving clinic. Observe for s/sx of complications and or reactions.    Screening Questionnaire for Adult Immunization    Are you sick today?   No   Do you have allergies to medications, food, a vaccine component or latex?   No   Have you ever had a serious reaction after receiving a vaccination?   No   Do you have a long-term health problem with heart disease, lung disease, asthma, kidney disease, metabolic disease (e.g. diabetes), anemia, or other blood disorder?   No   Do you have cancer, leukemia, HIV/AIDS, or any other immune system problem?   No   In the past 3 months, have you taken medications that affect  your immune system, such as prednisone, other steroids, or anticancer drugs; drugs for the treatment of rheumatoid arthritis, Crohn s disease, or psoriasis; or have you had radiation treatments?   No   Have you had a seizure, or a brain or other nervous system problem?   No   During the past year, have you received a transfusion of blood or blood     products, or been given immune (gamma) globulin or antiviral drug?   No   For women: Are you pregnant or is there a chance you could become        pregnant during the next month?   No   Have you received any vaccinations in the past 4 weeks?   No     Immunization questionnaire answers were all negative.        Per orders of Dr. Cash, injection of flu vaccine given by Danitza Loco. Patient instructed to remain in clinic for 15 minutes afterwards, and to " report any adverse reaction to me immediately.     Screening performed by Danitza Loco on 9/15/2017 at 9:30 AM.    Danitza Loco [facility] ([facility])

## 2017-09-15 NOTE — PROGRESS NOTES
ANTICOAGULATION FOLLOW-UP CLINIC VISIT    Patient Name:  Hollis Diggs  Date:  9/15/2017  Contact Type:  Face to Face    SUBJECTIVE:     Patient Findings     Positives No Problem Findings           OBJECTIVE    INR Protime   Date Value Ref Range Status   09/15/2017 3.1 (A) 0.86 - 1.14 Final     Factor 2 Assay   Date Value Ref Range Status   12/17/2015 33 (L) 60 - 140 % Final     Comment:     Analyte Specific Reagents (ASRs) are used in many laboratory tests necessary   for   standard medical care and generally do not require FDA approval.  This test   was   developed and its performance characteristics determined by [facility].  It has not been cleared or approved by   the US Food and Drug Administration.         ASSESSMENT / PLAN  INR assessment THER    Recheck INR In: 6 WEEKS    INR Location Clinic      Anticoagulation Summary as of 9/15/2017     INR goal 2.0-3.0   Today's INR 3.1!   Maintenance plan 7.5 mg (5 mg x 1.5) on Tue, Thu; 5 mg (5 mg x 1) all other days   Full instructions 7.5 mg on Tue, Thu; 5 mg all other days   Weekly total 40 mg   No change documented Lilliam Pro RN   Plan last modified Lilliam Pro RN (1/13/2017)   Next INR check 10/27/2017   Target end date     Indications   DVT (deep venous thrombosis) (H) (Resolved) [I82.409]  Long-term (current) use of anticoagulants [Z79.01] [Z79.01]         Anticoagulation Episode Summary     INR check location     Preferred lab     Send INR reminders to MIHM POOL    Comments 5 mg       Anticoagulation Care Providers     Provider Role Specialty Phone number    Sylvester Cash MD [facility] [phone]            See the Encounter Report to view Anticoagulation Flowsheet and Dosing Calendar (Go to Encounters tab in chart review, and find the Anticoagulation Therapy Visit)    Dosage adjustment made based on physician directed care plan.      Lilliam Pro RN

## 2017-09-15 NOTE — MR AVS SNAPSHOT
After Visit Summary   9/15/2017    Hollis Diggs    MRN: [id number]           Patient Information     Date Of Birth          1970        Visit Information        Provider Department      9/15/2017 8:40 AM Sylvester Cash MD [facility]        Today's Diagnoses     Routine general medical examination at a health care facility    -  1    Skin aging        Need for prophylactic vaccination and inoculation against influenza        Plantar warts          Care Instructions      Preventive Health Recommendations  Male Ages 40 to 49    Yearly exam:             See your health care provider every year in order to  o   Review health changes.   o   Discuss preventive care.    o   Review your medicines if your doctor has prescribed any.    You should be tested each year for STDs (sexually transmitted diseases) if you re at risk.     Have a cholesterol test every 5 years.     Have a colonoscopy (test for colon cancer) if someone in your family has had colon cancer or polyps before age 50.     After age 45, have a diabetes test (fasting glucose). If you are at risk for diabetes, you should have this test every 3 years.      Talk with your health care provider about whether or not a prostate cancer screening test (PSA) is right for you.    Shots: Get a flu shot each year. Get a tetanus shot every 10 years.     Nutrition:    Eat at least 5 servings of fruits and vegetables daily.     Eat whole-grain bread, whole-wheat pasta and brown rice instead of white grains and rice.     Talk to your provider about Calcium and Vitamin D.     Lifestyle    Exercise for at least 150 minutes a week (30 minutes a day, 5 days a week). This will help you control your weight and prevent disease.     Limit alcohol to one drink per day.     No smoking.     Wear sunscreen to prevent skin cancer.     See your dentist every six months for an exam and cleaning.              Follow-ups after your visit        Your next  "10 appointments already scheduled     Oct 13, 2017  9:00 AM CDT   Return Visit with FACUNDO Ortez CNP   [facility]    [id number] [place] [address] [phone]            Oct 27, 2017  8:15 AM CDT   Anticoagulation Visit with PH ANTI COAG   [facility]    [address] [address] [phone]              Who to contact     If you have questions or need follow up information about today's clinic visit or your schedule please contact [facility] directly at [phone].  Normal or non-critical lab and imaging results will be communicated to you by Coravinhart, letter or phone within 4 business days after the clinic has received the results. If you do not hear from us within 7 days, please contact the clinic through Coravinhart or phone. If you have a critical or abnormal lab result, we will notify you by phone as soon as possible.  Submit refill requests through [employer] [facility] or call your pharmacy and they will forward the refill request to us. Please allow 3 business days for your refill to be completed.          Additional Information About Your Visit        Coravinhart Information     [employer] gives you secure access to your electronic health record. If you see a primary care provider, you can also send messages to your care team and make appointments. If you have questions, please call your primary care clinic.  If you do not have a primary care provider, please call [phone] and they will assist you.        Care EveryWhere ID     This is your Care EveryWhere ID. This could be used by other organizations to access your [place] medical records  [id number]        Your Vitals Were     Pulse Respirations Height BMI (Body Mass Index)          88 16 6' 1\" (1.854 m) 31.27 kg/m2         Blood Pressure from Last 3 Encounters:   09/15/17 138/88   05/12/17 126/82   04/14/17 " 132/80    Weight from Last 3 Encounters:   09/15/17 237 lb (107.5 kg)   05/12/17 240 lb 3.2 oz (109 kg)   04/14/17 243 lb (110.2 kg)              We Performed the Following     DESTRUCT BENIGN LESION, UP TO 14     FLU VAC, SPLIT VIRUS IM > 3 YO (QUADRIVALENT) [65856]     Vaccine Administration, Initial [25540]          Where to get your medicines      These medications were sent to [address]  [address], [facility] [address]     Phone:  [phone]     tretinoin 0.05 % cream          Primary Care Provider Office Phone # Fax #    Sylvester Cash [phone]       [facility] [address] [place] [address]        Equal Access to Services     [facility] FIDEL : Hadii cuauhtemoc nance hadasho Sorenetta, waaxda luqadaha, qaybta kaalmada adechristianoyada, cody shane . So [facility] [phone].    ATENCIÓN: Si habla español, tiene a martin disposición servicios gratuitos de asistencia lingüística. Mike al [phone].    We comply with applicable federal civil rights laws and [place] laws. We do not discriminate on the basis of race, color, national origin, age, disability sex, sexual orientation or gender identity.            Thank you!     Thank you for choosing [facility]  for your care. Our goal is always to provide you with excellent care. Hearing back from our patients is one way we can continue to improve our services. Please take a few minutes to complete the written survey that you may receive in the mail after your visit with us. Thank you!             Your Updated Medication List - Protect others around you: Learn how to safely use, store and throw away your medicines at www.disposemymeds.org.          This list is accurate as of: 9/15/17  9:58 AM.  Always use your most recent med list.                   Brand Name Dispense Instructions for use Diagnosis    albuterol 108 (90 BASE) MCG/ACT Inhaler    PROAIR  HFA/PROVENTIL HFA/VENTOLIN HFA    3 Inhaler    Inhale 2 puffs into the lungs every 4 hours as needed for shortness of breath / dyspnea    Bronchospasm       amitriptyline 10 MG tablet    ELAVIL    60 tablet    Take 1 tablet (10 mg) by mouth At Bedtime    Midline low back pain, with sciatica presence unspecified       DULoxetine 20 MG EC capsule    CYMBALTA    60 capsule    Take 1 capsule (20 mg) by mouth 2 times daily    Major depressive disorder, recurrent episode, mild (H)       finasteride 5 MG tablet    PROSCAR    30 tablet    TAKE ONE TABLET BY MOUTH ONCE DAILY    Alopecia       folic acid 1 MG tablet    FOLVITE    90 tablet    Take 1 tablet (1 mg) by mouth daily    Rheumatoid arthritis of multiple sites with negative rheumatoid factor (H)       leflunomide 20 MG tablet    ARAVA    30 tablet    Take 1 tablet (20 mg) by mouth daily    Rheumatoid arthritis of multiple sites with negative rheumatoid factor (H)       oxyCODONE-acetaminophen 5-325 MG per tablet    PERCOCET    30 tablet    Take 1 tablet by mouth every 8 hours as needed for moderate to severe pain    DDD (degenerative disc disease), lumbar       traMADol 50 MG tablet    ULTRAM    60 tablet    Take 1 tablet as needed twice day for pain. Max 2 tab daily. No driving or alcohol use while taking medication.    DDD (degenerative disc disease), lumbar       tretinoin 0.05 % cream    RETIN-A    45 g    Apply  topically At Bedtime.    Skin aging       warfarin 5 MG tablet    COUMADIN    100 tablet    Take 7.5 mg on Tuesday, Thursday and 5 mg all other days, or as directed by the coumadin clinic.    Deep vein thrombosis (DVT) of proximal lower extremity, unspecified chronicity, unspecified laterality (H)

## 2017-09-15 NOTE — PROGRESS NOTES
SUBJECTIVE:   CC: Hollis Diggs is an 47 year old male who presents for preventative health visit.     Healthy Habits:    Do you get at least three servings of calcium containing foods daily (dairy, green leafy vegetables, etc.)? yes    Amount of exercise or daily activities, outside of work: 0 day(s) per week    Problems taking medications regularly No    Medication side effects: No    Have you had an eye exam in the past two years? yes    Do you see a dentist twice per year? yes    Do you have sleep apnea, excessive snoring or daytime drowsiness?yes    He has 2 warts on his left foot that are really bothering him. He would like these frozen. He is followed by rheumatology and he is followed by the Coumadin clinic. He has chronic back pain and anxiety.      Anxiety Follow-Up    Status since last visit: Stable    Other associated symptoms:None    Complicating factors:   Significant life event: No   Current substance abuse: None  Depression symptoms: No  ANKIT-7 SCORE 9/18/2015 10/14/2015 4/27/2016   Total Score - - -   Total Score 4 1 18       GAD7                Today's PHQ-2 Score:   PHQ-2 ( 1999 [employer]) 9/15/2017 10/13/2016   Q1: Little interest or pleasure in doing things 0 1   Q2: Feeling down, depressed or hopeless 0 1   PHQ-2 Score 0 2         Abuse: Current or Past(Physical, Sexual or Emotional)- No  Do you feel safe in your environment - Yes  Social History   Substance Use Topics     Smoking status: Never Smoker     Smokeless tobacco: Never Used     Alcohol use No      Comment: rare     The patient does not drink >3 drinks per day nor >7 drinks per week.    Last PSA: No results found for: PSA    Reviewed orders with patient. Reviewed health maintenance and updated orders accordingly - Yes      Reviewed and updated as needed this visit by clinical staff  Tobacco  Allergies  Meds         Reviewed and updated as needed this visit by Provider        Past Medical History:   Diagnosis Date      Antiphospholipid syndrome (H)      Arthritis      Asthma     Exercise     blood clot in leg      Depressive disorder, not elsewhere classified     Depression (non-psychotic)     ANKIT (generalised anxiety disorder)      HH (hiatus hernia)      Hypercholesteremia     normal with weight loss 3/09     Lumbar disc herniation 1992     Seronegative rheumatoid arthritis (H)       Past Surgical History:   Procedure Laterality Date     APPENDECTOMY       C NONSPECIFIC PROCEDURE  91 or 92    back surgery. lumbar. lamiectomy     COLONOSCOPY N/A 8/2/2016    Procedure: COMBINED COLONOSCOPY, SINGLE OR MULTIPLE BIOPSY/POLYPECTOMY BY BIOPSY;  Surgeon: Sydnee Walton MD;  Location: [facility] OR     COLONOSCOPY WITH CO2 INSUFFLATION N/A 8/2/2016    Procedure: COLONOSCOPY WITH CO2 INSUFFLATION;  Surgeon: Sydnee Walton MD;  Location: [facility]     COMBINED ESOPHAGOSCOPY, GASTROSCOPY, DUODENOSCOPY (EGD) WITH CO2 INSUFFLATION N/A 8/2/2016    Procedure: COMBINED ESOPHAGOSCOPY, GASTROSCOPY, DUODENOSCOPY (EGD) WITH CO2 INSUFFLATION;  Surgeon: Sydnee Walton MD;  Location: [facility]     ESOPHAGOSCOPY, GASTROSCOPY, DUODENOSCOPY (EGD), COMBINED N/A 8/2/2016    Procedure: COMBINED ESOPHAGOSCOPY, GASTROSCOPY, DUODENOSCOPY (EGD), BIOPSY SINGLE OR MULTIPLE;  Surgeon: Sydnee Walton MD;  Location: [facility] OR     HC REMOVAL OF TONSILS,<12 Y/O      Tonsils <11y.o.     HC REPAIR INCISIONAL HERNIA,REDUCIBLE  1970's    Hernia Repair, Incisional, Unilateral     HC UGI ENDOSCOPY DIAG W BIOPSY  02/01/06     HC VASECTOMY UNILAT/BILAT W POSTOP SEMEN  1/05    Vasectomy     History back lumbar laminectomy         ROS:  C: NEGATIVE for fever, chills, change in weight  INTEGUMENTARY/SKIN: 2 plantar warts that are painful  E: NEGATIVE for vision changes or irritation  ENT: NEGATIVE for ear, mouth and throat problems  R: NEGATIVE for significant cough or SOB  CV: NEGATIVE for chest pain, palpitations or peripheral edema  GI:  "NEGATIVE for nausea, abdominal pain, heartburn, or change in bowel habits   male: negative for dysuria, hematuria, decreased urinary stream, erectile dysfunction, urethral discharge  M: NEGATIVE for significant arthralgias or myalgia  N: NEGATIVE for weakness, dizziness or paresthesias  P: NEGATIVE for changes in mood or affect    OBJECTIVE:   /88  Pulse 88  Resp 16  Ht 6' 1\" (1.854 m)  Wt 237 lb (107.5 kg)  BMI 31.27 kg/m2  EXAM:  GENERAL: healthy, alert and no distress  EYES: Eyes grossly normal to inspection, PERRL and conjunctivae and sclerae normal  HENT: ear canals and TM's normal, nose and mouth without ulcers or lesions  NECK: no adenopathy, no asymmetry, masses, or scars and thyroid normal to palpation  RESP: lungs clear to auscultation - no rales, rhonchi or wheezes  CV: regular rate and rhythm, normal S1 S2, no S3 or S4, no murmur, click or rub, no peripheral edema and peripheral pulses strong  ABDOMEN: soft, nontender, no hepatosplenomegaly, no masses and bowel sounds normal  MS: no gross musculoskeletal defects noted, no edema  SKIN: 2 warts on the lateral surface of the left foot prepped with alcohol and pared down with sterile blade and frozen solidly twice with liquid nitrogen.  NEURO: Normal strength and tone, mentation intact and speech normal  PSYCH: mentation appears normal, affect normal/bright    ASSESSMENT/PLAN:   1. Routine general medical examination at a health care facility  Generally feeling well.  - FLU VAC, SPLIT VIRUS IM > 3 YO (QUADRIVALENT) [97594]    2. Skin aging    - tretinoin (RETIN-A) 0.05 % cream; Apply  topically At Bedtime.  Dispense: 45 g; Refill: 3    3. Need for prophylactic vaccination and inoculation against influenza    - Vaccine Administration, Initial [41089]  - FLU VAC, SPLIT VIRUS IM > 3 YO (QUADRIVALENT) [71169]    4. Plantar warts  To watch for blistering infection return in 2-3 weeks if not resolving.  - DESTRUCT BENIGN LESION, UP TO " "14    COUNSELING:  Reviewed preventive health counseling, as reflected in patient instructions       Regular exercise       Healthy diet/nutrition       Vision screening         reports that he has never smoked. He has never used smokeless tobacco.      Estimated body mass index is 31.27 kg/(m^2) as calculated from the following:    Height as of this encounter: 6' 1\" (1.854 m).    Weight as of this encounter: 237 lb (107.5 kg).   Weight management plan: Discussed healthy diet and exercise guidelines and patient will follow up in 12 months in clinic to re-evaluate.    Counseling Resources:  ATP IV Guidelines  Pooled Cohorts Equation Calculator  FRAX Risk Assessment  ICSI Preventive Guidelines  Dietary Guidelines for Americans, 2010  [employer]'s MyPlate  ASA Prophylaxis  Lung CA Screening    Sylvester Cash MD  [facility]  Injectable Influenza Immunization Documentation    1.  Are you sick today? (Fever of 100.5 or higher on the day of the clinic)   No    2.  Have you ever had Guillain-[place] Syndrome within 6 weeks of an influenza vaccionation?  No    3. Do you have a life-threatening allergy to eggs?  No    4. Do you have a life-threatening allergy to a component of the vaccine? May include antibiotics, gelatin or latex.  No     5. Have you ever had a reaction to a dose of flu vaccine that needed immediate medical attention?  No     Form completed by Danitza Loco CMA (AAMA)     "

## 2017-09-28 DIAGNOSIS — M51.369 DDD (DEGENERATIVE DISC DISEASE), LUMBAR: ICD-10-CM

## 2017-09-28 RX ORDER — OXYCODONE AND ACETAMINOPHEN 5; 325 MG/1; MG/1
1 TABLET ORAL EVERY 8 HOURS PRN
Qty: 30 TABLET | Refills: 0 | Status: SHIPPED | OUTPATIENT
Start: 2017-09-28 | End: 2017-10-20

## 2017-09-28 NOTE — TELEPHONE ENCOUNTER
Oxycodone-acetaminophen      Last Written Prescription Date:  9/8/17  Last Fill Quantity: 30,   # refills: 0  Last Office Visit with [facility], [facility] or [facility] prescribing provider: 9/15/17  Future Office visit:    Next 5 appointments (look out 90 days)     Oct 13, 2017  9:00 AM CDT   Return Visit with FACUNDO Ortez CNP   [facility]    [address] [place] [id number] [phone]                   Routing refill request to provider for review/approval because:  Drug not on the [facility], [facility] or [facility] refill protocol or controlled substance

## 2017-10-06 DIAGNOSIS — F33.9 DEPRESSION, RECURRENT (H): Primary | ICD-10-CM

## 2017-10-06 DIAGNOSIS — Z79.899 HIGH RISK MEDICATION USE: ICD-10-CM

## 2017-10-06 DIAGNOSIS — Z13.1 SCREENING FOR DIABETES MELLITUS: ICD-10-CM

## 2017-10-06 DIAGNOSIS — F41.1 GENERALIZED ANXIETY DISORDER: ICD-10-CM

## 2017-10-06 DIAGNOSIS — Z13.220 LIPID SCREENING: ICD-10-CM

## 2017-10-06 DIAGNOSIS — M51.369 DDD (DEGENERATIVE DISC DISEASE), LUMBAR: ICD-10-CM

## 2017-10-06 DIAGNOSIS — E55.9 VITAMIN D DEFICIENCY: ICD-10-CM

## 2017-10-06 DIAGNOSIS — Z51.81 THERAPEUTIC DRUG MONITORING: ICD-10-CM

## 2017-10-06 LAB
ALBUMIN SERPL-MCNC: 3.5 G/DL (ref 3.4–5)
ALP SERPL-CCNC: 84 U/L (ref 40–150)
ALT SERPL W P-5'-P-CCNC: 33 U/L (ref 0–70)
ANION GAP SERPL CALCULATED.3IONS-SCNC: 9 MMOL/L (ref 3–14)
AST SERPL W P-5'-P-CCNC: 22 U/L (ref 0–45)
BASOPHILS # BLD AUTO: 0 10E9/L (ref 0–0.2)
BASOPHILS NFR BLD AUTO: 0.5 %
BILIRUB SERPL-MCNC: 0.5 MG/DL (ref 0.2–1.3)
BUN SERPL-MCNC: 9 MG/DL (ref 7–30)
CALCIUM SERPL-MCNC: 8.6 MG/DL (ref 8.5–10.1)
CHLORIDE SERPL-SCNC: 109 MMOL/L (ref 94–109)
CHOLEST SERPL-MCNC: 224 MG/DL
CO2 SERPL-SCNC: 27 MMOL/L (ref 20–32)
CREAT SERPL-MCNC: 0.96 MG/DL (ref 0.66–1.25)
DEPRECATED CALCIDIOL+CALCIFEROL SERPL-MC: 41 UG/L (ref 20–75)
DIFFERENTIAL METHOD BLD: NORMAL
EOSINOPHIL # BLD AUTO: 0.1 10E9/L (ref 0–0.7)
EOSINOPHIL NFR BLD AUTO: 2.8 %
ERYTHROCYTE [DISTWIDTH] IN BLOOD BY AUTOMATED COUNT: 14.1 % (ref 10–15)
GFR SERPL CREATININE-BSD FRML MDRD: 83 ML/MIN/1.7M2
GLUCOSE SERPL-MCNC: 105 MG/DL (ref 70–99)
HBA1C MFR BLD: 5.3 % (ref 4.3–6)
HCT VFR BLD AUTO: 45.1 % (ref 40–53)
HDLC SERPL-MCNC: 51 MG/DL
HGB BLD-MCNC: 14.6 G/DL (ref 13.3–17.7)
IMM GRANULOCYTES # BLD: 0 10E9/L (ref 0–0.4)
IMM GRANULOCYTES NFR BLD: 0 %
LDLC SERPL CALC-MCNC: 157 MG/DL
LYMPHOCYTES # BLD AUTO: 1.4 10E9/L (ref 0.8–5.3)
LYMPHOCYTES NFR BLD AUTO: 32.9 %
MCH RBC QN AUTO: 26.6 PG (ref 26.5–33)
MCHC RBC AUTO-ENTMCNC: 32.4 G/DL (ref 31.5–36.5)
MCV RBC AUTO: 82 FL (ref 78–100)
MONOCYTES # BLD AUTO: 0.4 10E9/L (ref 0–1.3)
MONOCYTES NFR BLD AUTO: 8.5 %
NEUTROPHILS # BLD AUTO: 2.4 10E9/L (ref 1.6–8.3)
NEUTROPHILS NFR BLD AUTO: 55.3 %
NONHDLC SERPL-MCNC: 173 MG/DL
PLATELET # BLD AUTO: 222 10E9/L (ref 150–450)
POTASSIUM SERPL-SCNC: 3.1 MMOL/L (ref 3.4–5.3)
PROT SERPL-MCNC: 7.2 G/DL (ref 6.8–8.8)
RBC # BLD AUTO: 5.49 10E12/L (ref 4.4–5.9)
SODIUM SERPL-SCNC: 145 MMOL/L (ref 133–144)
TRIGL SERPL-MCNC: 81 MG/DL
TSH SERPL DL<=0.005 MIU/L-ACNC: 1.8 MU/L (ref 0.4–4)
WBC # BLD AUTO: 4.3 10E9/L (ref 4–11)

## 2017-10-06 PROCEDURE — 82306 VITAMIN D 25 HYDROXY: CPT | Performed by: NURSE PRACTITIONER

## 2017-10-06 PROCEDURE — 80175 DRUG SCREEN QUAN LAMOTRIGINE: CPT | Mod: 90 | Performed by: NURSE PRACTITIONER

## 2017-10-06 PROCEDURE — 99000 SPECIMEN HANDLING OFFICE-LAB: CPT | Performed by: NURSE PRACTITIONER

## 2017-10-06 PROCEDURE — 83036 HEMOGLOBIN GLYCOSYLATED A1C: CPT | Mod: QW | Performed by: NURSE PRACTITIONER

## 2017-10-06 PROCEDURE — 36415 COLL VENOUS BLD VENIPUNCTURE: CPT | Performed by: NURSE PRACTITIONER

## 2017-10-06 PROCEDURE — 80050 GENERAL HEALTH PANEL: CPT | Performed by: NURSE PRACTITIONER

## 2017-10-06 PROCEDURE — 80061 LIPID PANEL: CPT | Performed by: NURSE PRACTITIONER

## 2017-10-07 LAB — LAMOTRIGINE SERPL-MCNC: 3.9 UG/ML (ref 2.5–15)

## 2017-10-10 RX ORDER — TRAMADOL HYDROCHLORIDE 50 MG/1
TABLET ORAL
Qty: 60 TABLET | Refills: 0 | Status: SHIPPED | OUTPATIENT
Start: 2017-10-10 | End: 2017-11-17

## 2017-10-20 DIAGNOSIS — M51.369 DDD (DEGENERATIVE DISC DISEASE), LUMBAR: ICD-10-CM

## 2017-10-20 RX ORDER — OXYCODONE AND ACETAMINOPHEN 5; 325 MG/1; MG/1
1 TABLET ORAL EVERY 8 HOURS PRN
Qty: 30 TABLET | Refills: 0 | Status: SHIPPED | OUTPATIENT
Start: 2017-10-20 | End: 2017-11-07

## 2017-10-20 NOTE — TELEPHONE ENCOUNTER
Oxycodone-acetaminophen      Last Written Prescription Date:  9/28/17  Last Fill Quantity: 30,   # refills: 0  Future Office visit:    Next 5 appointments (look out 90 days)     Oct 27, 2017  2:00 PM CDT   [facility] Visit with Sylvester Cash MD   [facility]    [address] [place] [address]   [phone]                   Routing refill request to provider for review/approval because:  Drug not on the [facility], [facility] or [facility] refill protocol or controlled substance

## 2017-10-27 ENCOUNTER — OFFICE VISIT (OUTPATIENT)
Dept: FAMILY MEDICINE | Facility: CLINIC | Age: 47
End: 2017-10-27
Payer: COMMERCIAL

## 2017-10-27 ENCOUNTER — ANTICOAGULATION THERAPY VISIT (OUTPATIENT)
Dept: ANTICOAGULATION | Facility: CLINIC | Age: 47
End: 2017-10-27
Payer: COMMERCIAL

## 2017-10-27 VITALS
HEIGHT: 73 IN | SYSTOLIC BLOOD PRESSURE: 132 MMHG | HEART RATE: 114 BPM | DIASTOLIC BLOOD PRESSURE: 82 MMHG | TEMPERATURE: 98.5 F | WEIGHT: 244.4 LBS | BODY MASS INDEX: 32.39 KG/M2 | OXYGEN SATURATION: 98 %

## 2017-10-27 DIAGNOSIS — B07.0 PLANTAR WARTS: Primary | ICD-10-CM

## 2017-10-27 DIAGNOSIS — Z79.01 LONG-TERM (CURRENT) USE OF ANTICOAGULANTS: ICD-10-CM

## 2017-10-27 LAB — INR POINT OF CARE: 2.3 (ref 0.86–1.14)

## 2017-10-27 PROCEDURE — 17110 DESTRUCTION B9 LES UP TO 14: CPT | Performed by: FAMILY MEDICINE

## 2017-10-27 PROCEDURE — 36416 COLLJ CAPILLARY BLOOD SPEC: CPT

## 2017-10-27 PROCEDURE — 85610 PROTHROMBIN TIME: CPT | Mod: QW

## 2017-10-27 PROCEDURE — 99207 ZZC NO CHARGE NURSE ONLY: CPT

## 2017-10-27 NOTE — MR AVS SNAPSHOT
Hollis Diggs   10/27/2017 8:15 AM   Anticoagulation Therapy Visit    Description:  47 year old male   Provider:  PH ANTI COAG   Department:  Ph Anticoag           INR as of 10/27/2017     Today's INR 2.3      Anticoagulation Summary as of 10/27/2017     INR goal 2.0-3.0   Today's INR 2.3   Full instructions 7.5 mg on Tue, Thu; 5 mg all other days   Next INR check 12/8/2017    Indications   DVT (deep venous thrombosis) (H) (Resolved) [I82.409]  Long-term (current) use of anticoagulants [Z79.01] [Z79.01]         Your next Anticoagulation Clinic appointment(s)     Oct 27, 2017  8:15 AM CDT   Anticoagulation Visit with PH ANTI COAG   [address] [address]   [phone]            Dec 08, 2017  8:15 AM CST   Anticoagulation Visit with PH ANTI COAG   [address] [address]   [phone]              Contact Numbers     Clinic Number:         October 2017 Details    Sun Mon Tue Wed Thu Fri Sat     1               2               3               4               5               6               7                 8               9               10               11               12               13               14                 15               16               17               18               19               20               21                 22               23               24               25               26               27      5 mg   See details      28      5 mg           29      5 mg         30      5 mg         31      7.5 mg              Date Details   10/27 This INR check               How to take your warfarin dose     To take:  5 mg Take 1 of the 5 mg tablets.    To take:  7.5 mg Take 1.5 of the 5 mg tablets.           November 2017 Details    Sun Mon Tue Wed Thu Fri Sat        1      5 mg         2      7.5 mg         3      5 mg          4      5 mg           5      5 mg         6      5 mg         7      7.5 mg         8      5 mg         9      7.5 mg         10      5 mg         11      5 mg           12      5 mg         13      5 mg         14      7.5 mg         15      5 mg         16      7.5 mg         17      5 mg         18      5 mg           19      5 mg         20      5 mg         21      7.5 mg         22      5 mg         23      7.5 mg         24      5 mg         25      5 mg           26      5 mg         27      5 mg         28      7.5 mg         29      5 mg         30      7.5 mg            Date Details   No additional details            How to take your warfarin dose     To take:  5 mg Take 1 of the 5 mg tablets.    To take:  7.5 mg Take 1.5 of the 5 mg tablets.           December 2017 Details    Sun Mon Tue Wed Thu Fri Sat          1      5 mg         2      5 mg           3      5 mg         4      5 mg         5      7.5 mg         6      5 mg         7      7.5 mg         8            9                 10               11               12               13               14               15               16                 17               18               19               20               21               22               23                 24               25               26               27               28               29               30                 31                      Date Details   No additional details    Date of next INR:  12/8/2017         How to take your warfarin dose     To take:  5 mg Take 1 of the 5 mg tablets.    To take:  7.5 mg Take 1.5 of the 5 mg tablets.

## 2017-10-27 NOTE — PROGRESS NOTES
SUBJECTIVE:   Hollis Diggs is a 47 year old male who presents to clinic today for the following health issues:  Patient had these warts frozen off about 1 month ago and they are back.     WART(S)  Onset: Left foot     Description:   Location: left foot   Number of warts: 2   Painful: YES    Accompanying Signs & Symptoms:  Signs of infection: no     History:   History of trauma: no   Prior warts: YES- 1 month ago.     Therapies Tried and outcome: liquid nitrogen          Problem list and histories reviewed & adjusted, as indicated.  Additional history: as documented        Reviewed and updated as needed this visit by clinical staff     Reviewed and updated as needed this visit by Provider        SUBJECTIVE:  Hollis  is a 47 year old male who presents for:  All above to 2 warts on his left foot lateral forefoot. They have callused up very hard.    Past Medical History:   Diagnosis Date     Antiphospholipid syndrome (H)      Arthritis      Asthma     Exercise     blood clot in leg      Depressive disorder, not elsewhere classified     Depression (non-psychotic)     ANKIT (generalised anxiety disorder)      HH (hiatus hernia)      Hypercholesteremia     normal with weight loss 3/09     Lumbar disc herniation 1992     Seronegative rheumatoid arthritis (H)      Past Surgical History:   Procedure Laterality Date     APPENDECTOMY       C NONSPECIFIC PROCEDURE  91 or 92    back surgery. lumbar. lamiectomy     COLONOSCOPY N/A 8/2/2016    Procedure: COMBINED COLONOSCOPY, SINGLE OR MULTIPLE BIOPSY/POLYPECTOMY BY BIOPSY;  Surgeon: Sydnee Walton MD;  Location: [facility] OR     COLONOSCOPY WITH CO2 INSUFFLATION N/A 8/2/2016    Procedure: COLONOSCOPY WITH CO2 INSUFFLATION;  Surgeon: Sydnee Walton MD;  Location: [facility]     COMBINED ESOPHAGOSCOPY, GASTROSCOPY, DUODENOSCOPY (EGD) WITH CO2 INSUFFLATION N/A 8/2/2016    Procedure: COMBINED ESOPHAGOSCOPY, GASTROSCOPY, DUODENOSCOPY (EGD) WITH CO2 INSUFFLATION;   Surgeon: Sydnee Walton MD;  Location: [facility]     ESOPHAGOSCOPY, GASTROSCOPY, DUODENOSCOPY (EGD), COMBINED N/A 8/2/2016    Procedure: COMBINED ESOPHAGOSCOPY, GASTROSCOPY, DUODENOSCOPY (EGD), BIOPSY SINGLE OR MULTIPLE;  Surgeon: Sydnee Walton MD;  Location: [facility] OR     HC REMOVAL OF TONSILS,<12 Y/O      Tonsils <11y.o.     HC REPAIR INCISIONAL HERNIA,REDUCIBLE  1970's    Hernia Repair, Incisional, Unilateral     HC UGI ENDOSCOPY DIAG W BIOPSY  02/01/06     HC VASECTOMY UNILAT/BILAT W POSTOP SEMEN  1/05    Vasectomy     History back lumbar laminectomy       Social History   Substance Use Topics     Smoking status: Never Smoker     Smokeless tobacco: Never Used     Alcohol use No      Comment: rare     Current Outpatient Prescriptions   Medication Sig Dispense Refill     oxyCODONE-acetaminophen (PERCOCET) 5-325 MG per tablet Take 1 tablet by mouth every 8 hours as needed for moderate to severe pain 30 tablet 0     traMADol (ULTRAM) 50 MG tablet Take 1 tablet as needed twice day for pain. Max 2 tab daily. No driving or alcohol use while taking medication. 60 tablet 0     leflunomide (ARAVA) 20 MG tablet TAKE ONE TABLET BY MOUTH ONCE DAILY 30 tablet 2     tretinoin (RETIN-A) 0.05 % cream Apply  topically At Bedtime. 45 g 3     folic acid (FOLVITE) 1 MG tablet Take 1 tablet (1 mg) by mouth daily 90 tablet 3     finasteride (PROSCAR) 5 MG tablet TAKE ONE TABLET BY MOUTH ONCE DAILY 30 tablet 10     warfarin (COUMADIN) 5 MG tablet Take 7.5 mg on Tuesday, Thursday and 5 mg all other days, or as directed by the coumadin clinic. 100 tablet 3     DULoxetine (CYMBALTA) 20 MG capsule Take 1 capsule (20 mg) by mouth 2 times daily 60 capsule 3     amitriptyline (ELAVIL) 10 MG tablet Take 1 tablet (10 mg) by mouth At Bedtime 60 tablet 6     albuterol (PROAIR HFA, PROVENTIL HFA, VENTOLIN HFA) 108 (90 BASE) MCG/ACT inhaler Inhale 2 puffs into the lungs every 4 hours as needed for shortness of breath /  "dyspnea 3 Inhaler 1       REVIEW OF SYSTEMS:   5 point ROS negative except as noted above in HPI, including Gen., Resp, CV, GI &  system review.     OBJECTIVE:  Vitals: /82 (BP Location: Right arm, Patient Position: Chair, Cuff Size: Adult Large)  Pulse 114  Temp 98.5  F (36.9  C) (Tympanic)  Ht 6' 1\" (1.854 m)  Wt 244 lb 6.4 oz (110.9 kg)  SpO2 98%  BMI 32.24 kg/m2  BMI= Body mass index is 32.24 kg/(m^2).  He appears comfortable. Foot shows 2 plantar warts with very hard callus. These cored out with a 15 blade. Frozen solidly twice with liquid nitrogen for 40 seconds each.    ASSESSMENT:  Plantar warts    PLAN:  Watch for blistering keep covered if so will return if not resolved        Sylvester Cash MD  [facility]              "

## 2017-10-27 NOTE — PROGRESS NOTES
ANTICOAGULATION FOLLOW-UP CLINIC VISIT    Patient Name:  Hollis Diggs  Date:  10/27/2017  Contact Type:  Face to Face    SUBJECTIVE:     Patient Findings     Positives No Problem Findings           OBJECTIVE    INR Protime   Date Value Ref Range Status   10/27/2017 2.3 (A) 0.86 - 1.14 Final     Factor 2 Assay   Date Value Ref Range Status   12/17/2015 33 (L) 60 - 140 % Final     Comment:     Analyte Specific Reagents (ASRs) are used in many laboratory tests necessary   for   standard medical care and generally do not require FDA approval.  This test   was   developed and its performance characteristics determined by [facility].  It has not been cleared or approved by   the US Food and Drug Administration.         ASSESSMENT / PLAN  INR assessment THER    Recheck INR In: 6 WEEKS    INR Location Clinic      Anticoagulation Summary as of 10/27/2017     INR goal 2.0-3.0   Today's INR 2.3   Maintenance plan 7.5 mg (5 mg x 1.5) on Tue, Thu; 5 mg (5 mg x 1) all other days   Full instructions 7.5 mg on Tue, Thu; 5 mg all other days   Weekly total 40 mg   No change documented Lilliam Pro RN   Plan last modified Lilliam Pro RN (1/13/2017)   Next INR check 12/8/2017   Target end date     Indications   DVT (deep venous thrombosis) (H) (Resolved) [I82.409]  Long-term (current) use of anticoagulants [Z79.01] [Z79.01]         Anticoagulation Episode Summary     INR check location     Preferred lab     Send INR reminders to [facility] POOL    Comments 5 mg       Anticoagulation Care Providers     Provider Role Specialty Phone number    Sylvester Cash MD [facility] [phone]            See the Encounter Report to view Anticoagulation Flowsheet and Dosing Calendar (Go to Encounters tab in chart review, and find the Anticoagulation Therapy Visit)    Dosage adjustment made based on physician directed care plan.      Lilliam Pro RN

## 2017-10-27 NOTE — MR AVS SNAPSHOT
After Visit Summary   10/27/2017    Hollis Diggs    MRN: [id number]           Patient Information     Date Of Birth          1970        Visit Information        Provider Department      10/27/2017 2:00 PM Sylvester Cash MD [facility]        Today's Diagnoses     Plantar warts    -  1       Follow-ups after your visit        Your next 10 appointments already scheduled     Dec 08, 2017  8:15 AM CST   Anticoagulation Visit with PH ANTI COAG   [facility] ([facility])    [address] [place] [id number] [phone]              Who to contact     If you have questions or need follow up information about today's clinic visit or your schedule please contact [facility] directly at [phone].  Normal or non-critical lab and imaging results will be communicated to you by MyChart, letter or phone within 4 business days after the clinic has received the results. If you do not hear from us within 7 days, please contact the clinic through MyChart or phone. If you have a critical or abnormal lab result, we will notify you by phone as soon as possible.  Submit refill requests through [employer] or call your pharmacy and they will forward the refill request to us. Please allow 3 business days for your refill to be completed.          Additional Information About Your Visit        MyChart Information     [employer] gives you secure access to your electronic health record. If you see a primary care provider, you can also send messages to your care team and make appointments. If you have questions, please call your primary care clinic.  If you do not have a primary care provider, please call [phone] and they will assist you.        Care EveryWhere ID     This is your Care EveryWhere ID. This could be used by other organizations to access your [place] medical records  [id number]        Your Vitals Were     Pulse Temperature  "Height Pulse Oximetry BMI (Body Mass Index)       114 98.5  F (36.9  C) (Tympanic) 6' 1\" (1.854 m) 98% 32.24 kg/m2        Blood Pressure from Last 3 Encounters:   10/27/17 132/82   09/15/17 138/88   05/12/17 126/82    Weight from Last 3 Encounters:   10/27/17 244 lb 6.4 oz (110.9 kg)   09/15/17 237 lb (107.5 kg)   05/12/17 240 lb 3.2 oz (109 kg)              We Performed the Following     DESTRUCT BENIGN LESION, UP TO 14        Primary Care Provider Office Phone # Fax #    Sylvester Cash [phone]       [facility] [address] [place] [address]        Equal Access to Services     [facility] FIDEL : Hadii cuauhtemoc nance hadasho Soomaali, waaxda luqadaha, qaybta kaalmada adeegyada, cody shane . So [facility] [phone].    ATENCIÓN: Si habla español, tiene a martin disposición servicios gratuitos de asistencia lingüística. Mike al [phone].    We comply with applicable federal civil rights laws and [place] laws. We do not discriminate on the basis of race, color, national origin, age, disability, sex, sexual orientation, or gender identity.            Thank you!     Thank you for choosing [facility]  for your care. Our goal is always to provide you with excellent care. Hearing back from our patients is one way we can continue to improve our services. Please take a few minutes to complete the written survey that you may receive in the mail after your visit with us. Thank you!             Your Updated Medication List - Protect others around you: Learn how to safely use, store and throw away your medicines at www.disposemymeds.org.          This list is accurate as of: 10/27/17  2:52 PM.  Always use your most recent med list.                   Brand Name Dispense Instructions for use Diagnosis    albuterol 108 (90 BASE) MCG/ACT Inhaler    PROAIR HFA/PROVENTIL HFA/VENTOLIN HFA    3 Inhaler    Inhale 2 puffs into the lungs every 4 hours as " needed for shortness of breath / dyspnea    Bronchospasm       amitriptyline 10 MG tablet    ELAVIL    60 tablet    Take 1 tablet (10 mg) by mouth At Bedtime    Midline low back pain, with sciatica presence unspecified       DULoxetine 20 MG EC capsule    CYMBALTA    60 capsule    Take 1 capsule (20 mg) by mouth 2 times daily    Major depressive disorder, recurrent episode, mild (H)       finasteride 5 MG tablet    PROSCAR    30 tablet    TAKE ONE TABLET BY MOUTH ONCE DAILY    Alopecia       folic acid 1 MG tablet    FOLVITE    90 tablet    Take 1 tablet (1 mg) by mouth daily    Rheumatoid arthritis of multiple sites with negative rheumatoid factor (H)       leflunomide 20 MG tablet    ARAVA    30 tablet    TAKE ONE TABLET BY MOUTH ONCE DAILY    Rheumatoid arthritis of multiple sites with negative rheumatoid factor (H)       oxyCODONE-acetaminophen 5-325 MG per tablet    PERCOCET    30 tablet    Take 1 tablet by mouth every 8 hours as needed for moderate to severe pain    DDD (degenerative disc disease), lumbar       traMADol 50 MG tablet    ULTRAM    60 tablet    Take 1 tablet as needed twice day for pain. Max 2 tab daily. No driving or alcohol use while taking medication.    DDD (degenerative disc disease), lumbar       tretinoin 0.05 % cream    RETIN-A    45 g    Apply  topically At Bedtime.    Skin aging       warfarin 5 MG tablet    COUMADIN    100 tablet    Take 7.5 mg on Tuesday, Thursday and 5 mg all other days, or as directed by the coumadin clinic.    Deep vein thrombosis (DVT) of proximal lower extremity, unspecified chronicity, unspecified laterality (H)

## 2017-10-27 NOTE — NURSING NOTE
"Chief Complaint   Patient presents with     Wart     wart removal        Initial /82 (BP Location: Right arm, Patient Position: Chair, Cuff Size: Adult Large)  Pulse 114  Temp 98.5  F (36.9  C) (Tympanic)  Ht 6' 1\" (1.854 m)  Wt 244 lb 6.4 oz (110.9 kg)  SpO2 98%  BMI 32.24 kg/m2 Estimated body mass index is 32.24 kg/(m^2) as calculated from the following:    Height as of this encounter: 6' 1\" (1.854 m).    Weight as of this encounter: 244 lb 6.4 oz (110.9 kg).  Medication Reconciliation: complete    "

## 2017-11-07 DIAGNOSIS — M51.369 DDD (DEGENERATIVE DISC DISEASE), LUMBAR: ICD-10-CM

## 2017-11-10 DIAGNOSIS — M51.369 DDD (DEGENERATIVE DISC DISEASE), LUMBAR: ICD-10-CM

## 2017-11-10 RX ORDER — OXYCODONE AND ACETAMINOPHEN 5; 325 MG/1; MG/1
1 TABLET ORAL EVERY 8 HOURS PRN
Qty: 30 TABLET | Refills: 0 | Status: SHIPPED | OUTPATIENT
Start: 2017-11-10 | End: 2017-11-10

## 2017-11-10 NOTE — TELEPHONE ENCOUNTER
.Oxycodone  Last Written Prescription Date:  09/08/17  Last Fill Quantity: 30,   # refills: 0  Future Office visit:       Routing refill request to provider for review/approval because:  Drug not on the [facility], P or [facility] refill protocol or controlled substance

## 2017-11-14 RX ORDER — OXYCODONE AND ACETAMINOPHEN 5; 325 MG/1; MG/1
1 TABLET ORAL EVERY 8 HOURS PRN
Qty: 30 TABLET | Refills: 0 | Status: SHIPPED | OUTPATIENT
Start: 2017-11-14 | End: 2017-12-14

## 2017-11-17 DIAGNOSIS — M51.369 DDD (DEGENERATIVE DISC DISEASE), LUMBAR: ICD-10-CM

## 2017-11-17 RX ORDER — TRAMADOL HYDROCHLORIDE 50 MG/1
TABLET ORAL
Qty: 60 TABLET | Refills: 0 | Status: SHIPPED | OUTPATIENT
Start: 2017-11-17 | End: 2017-12-27

## 2017-11-17 NOTE — TELEPHONE ENCOUNTER
Tramadol 50 MG      Last Written Prescription Date:  10-10-17  Last Fill Quantity: 60,   # refills: 0  Future Office visit:       Routing refill request to provider for review/approval because:  Drug not on the [facility], P or [facility] refill protocol or controlled substance

## 2017-11-24 DIAGNOSIS — I82.4Y9 DEEP VEIN THROMBOSIS (DVT) OF PROXIMAL LOWER EXTREMITY, UNSPECIFIED CHRONICITY, UNSPECIFIED LATERALITY (H): ICD-10-CM

## 2017-11-24 RX ORDER — WARFARIN SODIUM 5 MG/1
TABLET ORAL
Qty: 100 TABLET | Refills: 3 | Status: SHIPPED | OUTPATIENT
Start: 2017-11-24 | End: 2018-03-09

## 2017-11-24 NOTE — TELEPHONE ENCOUNTER
warfarin (COUMADIN) 5 MG tablet   Last Written Prescription Date: 12-14-16  Last Fill Qty: 100, # refills: 3  Last Office Visit with G, P or [facility] prescribing provider: 10-27-17       Date and Result of Last PT/INR:   Lab Results   Component Value Date    INR 2.3 10/27/2017    INR 3.1 09/15/2017    INR 3.15 06/28/2016    INR 2.28 01/13/2016

## 2017-12-14 DIAGNOSIS — M51.369 DDD (DEGENERATIVE DISC DISEASE), LUMBAR: ICD-10-CM

## 2017-12-14 RX ORDER — OXYCODONE AND ACETAMINOPHEN 5; 325 MG/1; MG/1
1 TABLET ORAL EVERY 8 HOURS PRN
Qty: 30 TABLET | Refills: 0 | Status: SHIPPED | OUTPATIENT
Start: 2017-12-14 | End: 2017-12-22

## 2017-12-14 NOTE — TELEPHONE ENCOUNTER
Oxycodone-Acetaminophen 5-325 MG      Last Written Prescription Date:  11/14/17  Last Fill Quantity: 30,   # refills: 0  Last Office Visit: 10/27/17  Future Office visit:    Next 5 appointments (look out 90 days)     Dec 22, 2017  8:00 AM CST   [facility] with Sylvester Cash [facility]   [facility]    [address] [place] [id number] [phone]                   Routing refill request to provider for review/approval because:  Drug not on the [facility], [facility] or [facility] refill protocol or controlled substance

## 2017-12-15 ENCOUNTER — ANTICOAGULATION THERAPY VISIT (OUTPATIENT)
Dept: ANTICOAGULATION | Facility: CLINIC | Age: 47
End: 2017-12-15
Payer: COMMERCIAL

## 2017-12-15 DIAGNOSIS — Z79.01 LONG-TERM (CURRENT) USE OF ANTICOAGULANTS: ICD-10-CM

## 2017-12-15 LAB — INR POINT OF CARE: 1.7 (ref 0.86–1.14)

## 2017-12-15 PROCEDURE — 99207 ZZC NO CHARGE NURSE ONLY: CPT

## 2017-12-15 PROCEDURE — 85610 PROTHROMBIN TIME: CPT | Mod: QW

## 2017-12-15 PROCEDURE — 36416 COLLJ CAPILLARY BLOOD SPEC: CPT

## 2017-12-15 NOTE — MR AVS SNAPSHOT
Hollis Diggs   12/15/2017 8:45 AM   Anticoagulation Therapy Visit    Description:  47 year old male   Provider:  ALYSSA ANTI COAG   Department:  Ph Anticoag           INR as of 12/15/2017     Today's INR 1.7!      Anticoagulation Summary as of 12/15/2017     INR goal 2.0-3.0   Today's INR 1.7!   Full instructions 12/15: 10 mg; Otherwise 7.5 mg on Tue, Thu; 5 mg all other days   Next INR check 1/26/2018    Indications   DVT (deep venous thrombosis) (H) (Resolved) [I82.409]  Long-term (current) use of anticoagulants [Z79.01] [Z79.01]         Your next Anticoagulation Clinic appointment(s)     Jan 26, 2018  8:15 AM CST   Anticoagulation Visit with ALYSSA ANTI RADHA   [facility]    [address] [place] [address]   [phone]              Contact Numbers     Clinic Number:         December 2017 Details    Sun Mon Tue Wed Thu Fri Sat          1               2                 3               4               5               6               7               8               9                 10               11               12               13               14               15      10 mg   See details      16      5 mg           17      5 mg         18      5 mg         19      7.5 mg         20      5 mg         21      7.5 mg         22      5 mg         23      5 mg           24      5 mg         25      5 mg         26      7.5 mg         27      5 mg         28      7.5 mg         29      5 mg         30      5 mg           31      5 mg                Date Details   12/15 This INR check               How to take your warfarin dose     To take:  5 mg Take 1 of the 5 mg tablets.    To take:  7.5 mg Take 1.5 of the 5 mg tablets.    To take:  10 mg Take 2 of the 5 mg tablets.           January 2018 Details    Sun Mon Tue Wed Thu Fri Sat      1      5 mg         2      7.5 mg         3      5 mg         4      7.5 mg         5      5 mg         6      5 mg            7      5 mg         8      5 mg         9      7.5 mg         10      5 mg         11      7.5 mg         12      5 mg         13      5 mg           14      5 mg         15      5 mg         16      7.5 mg         17      5 mg         18      7.5 mg         19      5 mg         20      5 mg           21      5 mg         22      5 mg         23      7.5 mg         24      5 mg         25      7.5 mg         26            27                 28               29               30               31                   Date Details   No additional details    Date of next INR:  1/26/2018         How to take your warfarin dose     To take:  5 mg Take 1 of the 5 mg tablets.    To take:  7.5 mg Take 1.5 of the 5 mg tablets.

## 2017-12-15 NOTE — PROGRESS NOTES
ANTICOAGULATION FOLLOW-UP CLINIC VISIT    Patient Name:  Hollis Diggs  Date:  12/15/2017  Contact Type:  Face to Face    SUBJECTIVE:     Patient Findings     Positives Missed doses (missed dose last week)           OBJECTIVE    INR Protime   Date Value Ref Range Status   12/15/2017 1.7 (A) 0.86 - 1.14 Final     Factor 2 Assay   Date Value Ref Range Status   12/17/2015 33 (L) 60 - 140 % Final     Comment:     Analyte Specific Reagents (ASRs) are used in many laboratory tests necessary   for   standard medical care and generally do not require FDA approval.  This test   was   developed and its performance characteristics determined by [facility].  It has not been cleared or approved by   the US Food and Drug Administration.         ASSESSMENT / PLAN  INR assessment SUB    Recheck INR In: 6 WEEKS    INR Location Clinic      Anticoagulation Summary as of 12/15/2017     INR goal 2.0-3.0   Today's INR 1.7!   Maintenance plan 7.5 mg (5 mg x 1.5) on Tue, Thu; 5 mg (5 mg x 1) all other days   Full instructions 7.5 mg on Tue, Thu; 5 mg all other days   Weekly total 40 mg   Plan last modified Lilliam Pro RN (1/13/2017)   Next INR check 1/26/2018   Target end date     Indications   DVT (deep venous thrombosis) (H) (Resolved) [I82.409]  Long-term (current) use of anticoagulants [Z79.01] [Z79.01]         Anticoagulation Episode Summary     INR check location     Preferred lab     Send INR reminders to [facility] POOL    Comments 5 mg       Anticoagulation Care Providers     Provider Role Specialty Phone number    Sylvester Cash MD [facility] [phone]            See the Encounter Report to view Anticoagulation Flowsheet and Dosing Calendar (Go to Encounters tab in chart review, and find the Anticoagulation Therapy Visit)    Dosage adjustment made based on physician directed care plan.  10 mg x1 then resume     Lilliam Pro, QUENTIN

## 2017-12-22 ENCOUNTER — TELEPHONE (OUTPATIENT)
Dept: FAMILY MEDICINE | Facility: CLINIC | Age: 47
End: 2017-12-22

## 2017-12-22 ENCOUNTER — OFFICE VISIT (OUTPATIENT)
Dept: FAMILY MEDICINE | Facility: CLINIC | Age: 47
End: 2017-12-22
Payer: COMMERCIAL

## 2017-12-22 ENCOUNTER — HOSPITAL ENCOUNTER (OUTPATIENT)
Dept: ULTRASOUND IMAGING | Facility: CLINIC | Age: 47
Discharge: HOME | End: 2017-12-22
Attending: FAMILY MEDICINE | Admitting: FAMILY MEDICINE
Payer: COMMERCIAL

## 2017-12-22 VITALS
BODY MASS INDEX: 32.06 KG/M2 | OXYGEN SATURATION: 100 % | SYSTOLIC BLOOD PRESSURE: 130 MMHG | WEIGHT: 243 LBS | HEART RATE: 99 BPM | TEMPERATURE: 95.4 F | DIASTOLIC BLOOD PRESSURE: 84 MMHG

## 2017-12-22 DIAGNOSIS — M51.369 DDD (DEGENERATIVE DISC DISEASE), LUMBAR: ICD-10-CM

## 2017-12-22 DIAGNOSIS — M79.662 PAIN OF LEFT LOWER LEG: ICD-10-CM

## 2017-12-22 DIAGNOSIS — Z86.718 HISTORY OF DEEP VENOUS THROMBOSIS: ICD-10-CM

## 2017-12-22 DIAGNOSIS — B07.0 PLANTAR WARTS: ICD-10-CM

## 2017-12-22 DIAGNOSIS — Z86.718 HISTORY OF DEEP VENOUS THROMBOSIS: Primary | ICD-10-CM

## 2017-12-22 PROCEDURE — 99214 OFFICE O/P EST MOD 30 MIN: CPT | Mod: 25 | Performed by: FAMILY MEDICINE

## 2017-12-22 PROCEDURE — 93971 EXTREMITY STUDY: CPT | Mod: LT

## 2017-12-22 PROCEDURE — 17110 DESTRUCTION B9 LES UP TO 14: CPT | Mod: 76 | Performed by: FAMILY MEDICINE

## 2017-12-22 RX ORDER — OXYCODONE AND ACETAMINOPHEN 5; 325 MG/1; MG/1
1 TABLET ORAL EVERY 8 HOURS PRN
Qty: 30 TABLET | Refills: 0 | Status: SHIPPED | OUTPATIENT
Start: 2017-12-22 | End: 2018-01-10

## 2017-12-22 ASSESSMENT — PAIN SCALES - GENERAL: PAINLEVEL: EXTREME PAIN (8)

## 2017-12-22 NOTE — PROGRESS NOTES
SUBJECTIVE:   Hollis Diggs is a 47 year old male who presents to clinic today for the following health issues:      Chief Complaint   Patient presents with     RECHECK     wart     Musculoskeletal Problem     left, knee down stop at ankle, about a week             Problem list and histories reviewed & adjusted, as indicated.  Additional history: as documented        Reviewed and updated as needed this visit by clinical staff  Tobacco  Allergies  Meds       Reviewed and updated as needed this visit by Provider        SUBJECTIVE:  Hollis  is a 47 year old male who presents for: Warts on his foot that have returned.  These have been frozen before.  He wants to try it one more time.  He also needs a refill on his oxycodone for his back pain.  He mentions that on his left leg behind the knee he is having some pain.  He has a history of DVT.  He is on anticoagulation.  His last INR was slightly subtherapeutic    Past Medical History:   Diagnosis Date     Antiphospholipid syndrome (H)      Arthritis      Asthma     Exercise     blood clot in leg      Depressive disorder, not elsewhere classified     Depression (non-psychotic)     ANKIT (generalised anxiety disorder)      HH (hiatus hernia)      Hypercholesteremia     normal with weight loss 3/09     Lumbar disc herniation 1992     Seronegative rheumatoid arthritis (H)      Past Surgical History:   Procedure Laterality Date     APPENDECTOMY       C NONSPECIFIC PROCEDURE  91 or 92    back surgery. lumbar. lamiectomy     COLONOSCOPY N/A 8/2/2016    Procedure: COMBINED COLONOSCOPY, SINGLE OR MULTIPLE BIOPSY/POLYPECTOMY BY BIOPSY;  Surgeon: Sydnee Walton MD;  Location:  OR     COLONOSCOPY WITH CO2 INSUFFLATION N/A 8/2/2016    Procedure: COLONOSCOPY WITH CO2 INSUFFLATION;  Surgeon: Sydnee Walton MD;  Location:  OR     COMBINED ESOPHAGOSCOPY, GASTROSCOPY, DUODENOSCOPY (EGD) WITH CO2 INSUFFLATION N/A 8/2/2016    Procedure: COMBINED  ESOPHAGOSCOPY, GASTROSCOPY, DUODENOSCOPY (EGD) WITH CO2 INSUFFLATION;  Surgeon: Sydnee Walton MD;  Location: [facility] OR     ESOPHAGOSCOPY, GASTROSCOPY, DUODENOSCOPY (EGD), COMBINED N/A 8/2/2016    Procedure: COMBINED ESOPHAGOSCOPY, GASTROSCOPY, DUODENOSCOPY (EGD), BIOPSY SINGLE OR MULTIPLE;  Surgeon: Sydnee Walton MD;  Location: [facility] OR     HC REMOVAL OF TONSILS,<12 Y/O      Tonsils <11y.o.     HC REPAIR INCISIONAL HERNIA,REDUCIBLE  1970's    Hernia Repair, Incisional, Unilateral     HC UGI ENDOSCOPY DIAG W BIOPSY  02/01/06     HC VASECTOMY UNILAT/BILAT W POSTOP SEMEN  1/05    Vasectomy     History back lumbar laminectomy       Social History   Substance Use Topics     Smoking status: Never Smoker     Smokeless tobacco: Never Used     Alcohol use No      Comment: rare     Current Outpatient Prescriptions   Medication Sig Dispense Refill     oxyCODONE-acetaminophen (PERCOCET) 5-325 MG per tablet Take 1 tablet by mouth every 8 hours as needed for moderate to severe pain 30 tablet 0     warfarin (COUMADIN) 5 MG tablet TAKE 1 & 1/2 TABLETS BY MOUTH ON TUESDAY AND THURSDAY. AND TAKE 1 TABLET ALL OTHER DAYS OF THE WEEK OR AS DIRECTED BY COUMADIN CLINIC. 100 tablet 3     traMADol (ULTRAM) 50 MG tablet Take 1 tablet as needed twice day for pain. Max 2 tab daily. No driving or alcohol use while taking medication. 60 tablet 0     leflunomide (ARAVA) 20 MG tablet TAKE ONE TABLET BY MOUTH ONCE DAILY 30 tablet 2     tretinoin (RETIN-A) 0.05 % cream Apply  topically At Bedtime. 45 g 3     folic acid (FOLVITE) 1 MG tablet Take 1 tablet (1 mg) by mouth daily 90 tablet 3     finasteride (PROSCAR) 5 MG tablet TAKE ONE TABLET BY MOUTH ONCE DAILY 30 tablet 10     DULoxetine (CYMBALTA) 20 MG capsule Take 1 capsule (20 mg) by mouth 2 times daily 60 capsule 3     amitriptyline (ELAVIL) 10 MG tablet Take 1 tablet (10 mg) by mouth At Bedtime 60 tablet 6     albuterol (PROAIR HFA, PROVENTIL HFA, VENTOLIN HFA) 108 (90  BASE) MCG/ACT inhaler Inhale 2 puffs into the lungs every 4 hours as needed for shortness of breath / dyspnea 3 Inhaler 1       REVIEW OF SYSTEMS:   5 point ROS negative except as noted above in HPI, including Gen., Resp, CV, GI &  system review.     OBJECTIVE:  Vitals: /84  Pulse 99  Temp 95.4  F (35.2  C) (Tympanic)  Wt 243 lb (110.2 kg)  SpO2 100%  BMI 32.06 kg/m2  BMI= Body mass index is 32.06 kg/(m^2).  He is in no distress.  Lungs are clear.  He has warts were treated on his left foot 2 of them plantar warts frozen solidly twice with liquid nitrogen for 40 seconds each.  Palpation of the left calf does have some tenderness deep down no swelling no redness or warmth.  Good range of motion of the knee negative drawer sign no tenderness on the joint line.  Skin is normal.  Doppler ultrasound was performed.    ASSESSMENT:  #1 pain in the left lower leg with history of DVT #2 plantar warts #3 degenerative lumbar disc disease.    PLAN:  He will watch for blistering of these warts hopefully this will do it.  Discussed the ultrasound report with him after he came back.  This must be just some musculoskeletal issue.  Renewed his oxycodone that he takes for his chronic back pain as needed.        Sylvester Cash MD  [facility]            \

## 2017-12-22 NOTE — MR AVS SNAPSHOT
After Visit Summary   12/22/2017    Hollis Diggs    MRN: [id number]           Patient Information     Date Of Birth          1970        Visit Information        Provider Department      12/22/2017 8:00 AM Sylvester Cash MD [facility]        Today's Diagnoses     History of deep venous thrombosis    -  1    Plantar warts        Pain of left lower leg        DDD (degenerative disc disease), lumbar           Follow-ups after your visit        Your next 10 appointments already scheduled     Jan 26, 2018  8:15 AM CST   Anticoagulation Visit with PH ANTI COAG   [facility] ([facility]    [address] [place] [id number] [phone]              Who to contact     If you have questions or need follow up information about today's clinic visit or your schedule please contact [facility] directly at [phone].  Normal or non-critical lab and imaging results will be communicated to you by Emtricshart, letter or phone within 4 business days after the clinic has received the results. If you do not hear from us within 7 days, please contact the clinic through Emtricshart or phone. If you have a critical or abnormal lab result, we will notify you by phone as soon as possible.  Submit refill requests through Filecoin or call your pharmacy and they will forward the refill request to us. Please allow 3 business days for your refill to be completed.          Additional Information About Your Visit        MyChart Information     File[employer] gives you secure access to your electronic health record. If you see a primary care provider, you can also send messages to your care team and make appointments. If you have questions, please call your primary care clinic.  If you do not have a primary care provider, please call [phone] and they will assist you.        Care EveryWhere ID     This is your Care EveryWhere ID. This could be used by other  organizations to access your [facility] medical records  [id number]        Your Vitals Were     Pulse Temperature Pulse Oximetry BMI (Body Mass Index)          99 95.4  F (35.2  C) (Tympanic) 100% 32.06 kg/m2         Blood Pressure from Last 3 Encounters:   12/22/17 130/84   10/27/17 132/82   09/15/17 138/88    Weight from Last 3 Encounters:   12/22/17 243 lb (110.2 kg)   10/27/17 244 lb 6.4 oz (110.9 kg)   09/15/17 237 lb (107.5 kg)              We Performed the Following     DESTRUCT BENIGN LESION, UP TO 14          Where to get your medicines      Some of these will need a paper prescription and others can be bought over the counter.  Ask your nurse if you have questions.     Bring a paper prescription for each of these medications     oxyCODONE-acetaminophen 5-325 MG per tablet          Primary Care Provider Office Phone # Fax #    Sylvester Cash [phone]       [place] [address] [place] [address]        Equal Access to Services     [facility]: Hadii cuauhtemoc ku hadasho Soomaali, waaxda luqadaha, qaybta kaalmada adeegyajolene, cody shane . So [facility] [phone].    ATENCIÓN: Si habla español, tiene a martin disposición servicios gratuitos de asistencia lingüística. Llame al [phone].    We comply with applicable federal civil rights laws and [place] laws. We do not discriminate on the basis of race, color, national origin, age, disability, sex, sexual orientation, or gender identity.            Thank you!     Thank you for choosing [facility]  for your care. Our goal is always to provide you with excellent care. Hearing back from our patients is one way we can continue to improve our services. Please take a few minutes to complete the written survey that you may receive in the mail after your visit with us. Thank you!             Your Updated Medication List - Protect others around you: Learn how to safely use, store  and throw away your medicines at www.disposemymeds.org.          This list is accurate as of: 12/22/17 11:59 PM.  Always use your most recent med list.                   Brand Name Dispense Instructions for use Diagnosis    albuterol 108 (90 BASE) MCG/ACT Inhaler    PROAIR HFA/PROVENTIL HFA/VENTOLIN HFA    3 Inhaler    Inhale 2 puffs into the lungs every 4 hours as needed for shortness of breath / dyspnea    Bronchospasm       amitriptyline 10 MG tablet    ELAVIL    60 tablet    Take 1 tablet (10 mg) by mouth At Bedtime    Midline low back pain, with sciatica presence unspecified       DULoxetine 20 MG EC capsule    CYMBALTA    60 capsule    Take 1 capsule (20 mg) by mouth 2 times daily    Major depressive disorder, recurrent episode, mild (H)       finasteride 5 MG tablet    PROSCAR    30 tablet    TAKE ONE TABLET BY MOUTH ONCE DAILY    Alopecia       folic acid 1 MG tablet    FOLVITE    90 tablet    Take 1 tablet (1 mg) by mouth daily    Rheumatoid arthritis of multiple sites with negative rheumatoid factor (H)       leflunomide 20 MG tablet    ARAVA    30 tablet    TAKE ONE TABLET BY MOUTH ONCE DAILY    Rheumatoid arthritis of multiple sites with negative rheumatoid factor (H)       oxyCODONE-acetaminophen 5-325 MG per tablet    PERCOCET    30 tablet    Take 1 tablet by mouth every 8 hours as needed for moderate to severe pain    DDD (degenerative disc disease), lumbar       traMADol 50 MG tablet    ULTRAM    60 tablet    Take 1 tablet as needed twice day for pain. Max 2 tab daily. No driving or alcohol use while taking medication.    DDD (degenerative disc disease), lumbar       tretinoin 0.05 % cream    RETIN-A    45 g    Apply  topically At Bedtime.    Skin aging       warfarin 5 MG tablet    COUMADIN    100 tablet    TAKE 1 & 1/2 TABLETS BY MOUTH ON TUESDAY AND THURSDAY. AND TAKE 1 TABLET ALL OTHER DAYS OF THE WEEK OR AS DIRECTED BY COUMADIN CLINIC.    Deep vein thrombosis (DVT) of proximal lower extremity,  unspecified chronicity, unspecified laterality (H)

## 2017-12-22 NOTE — TELEPHONE ENCOUNTER
----- Message from Sylvester Cash MD sent at 12/22/2017 12:39 PM CST -----  Abdominal ultrasound was negative for clot.  I suspect just a strained ligament or muscle give it some time if worsening would have him see orthopedics

## 2017-12-22 NOTE — NURSING NOTE
"Chief Complaint   Patient presents with     RECHECK     wart     Musculoskeletal Problem     left, knee down stop at ankle, about a week       Initial /84  Pulse 99  Temp 95.4  F (35.2  C) (Tympanic)  Wt 243 lb (110.2 kg)  SpO2 100%  BMI 32.06 kg/m2 Estimated body mass index is 32.06 kg/(m^2) as calculated from the following:    Height as of 10/27/17: 6' 1\" (1.854 m).    Weight as of this encounter: 243 lb (110.2 kg).  Medication Reconciliation: complete    "

## 2017-12-22 NOTE — LETTER
[address]   [address]  Tel. [phone] / Fax [phone]    December 27, 2017        Hollis Diggs  [address] [place] [id number]          Dear Hollis,    Abdominal ultrasound was negative for clot.  I suspect just a strained ligament or muscle give it some time if worsening would have him see orthopedics.    Please contact our clinic if you want to orthopedics consult or if you have any further questions.     Sincerely,        Sylvester Cash M.D.

## 2017-12-22 NOTE — PROGRESS NOTES
Abdominal ultrasound was negative for clot.  I suspect just a strained ligament or muscle give it some time if worsening would have him see orthopedics

## 2017-12-27 DIAGNOSIS — M51.369 DDD (DEGENERATIVE DISC DISEASE), LUMBAR: ICD-10-CM

## 2017-12-27 RX ORDER — TRAMADOL HYDROCHLORIDE 50 MG/1
TABLET ORAL
Qty: 60 TABLET | Refills: 0 | Status: SHIPPED | OUTPATIENT
Start: 2017-12-27 | End: 2018-01-29

## 2017-12-27 NOTE — TELEPHONE ENCOUNTER
Tramadol 50 MG       Last Written Prescription Date:  11/17/17  Last Fill Quantity: 60,   # refills: 0  Last Office Visit: 12/11/17  Future Office visit:       Routing refill request to provider for review/approval because:  Drug not on the [facility], P or [facility] refill protocol or controlled substance

## 2018-01-10 DIAGNOSIS — M51.369 DDD (DEGENERATIVE DISC DISEASE), LUMBAR: ICD-10-CM

## 2018-01-10 NOTE — TELEPHONE ENCOUNTER
Oxycodone-Acetaminophen 5-325 MG      Last Written Prescription Date:  12/22/17  Last Fill Quantity: 30,   # refills: 0  Last Office Visit: 12/22/17  Future Office visit:       Routing refill request to provider for review/approval because:  Drug not on the [facility], [facility] or [facility] refill protocol or controlled substance

## 2018-01-11 DIAGNOSIS — L65.9 ALOPECIA: ICD-10-CM

## 2018-01-11 RX ORDER — OXYCODONE AND ACETAMINOPHEN 5; 325 MG/1; MG/1
1 TABLET ORAL EVERY 8 HOURS PRN
Qty: 30 TABLET | Refills: 0 | Status: SHIPPED | OUTPATIENT
Start: 2018-01-11 | End: 2018-02-06

## 2018-01-11 RX ORDER — OXYCODONE AND ACETAMINOPHEN 5; 325 MG/1; MG/1
1 TABLET ORAL EVERY 8 HOURS PRN
Qty: 30 TABLET | Refills: 0 | Status: SHIPPED | OUTPATIENT
Start: 2018-01-11 | End: 2018-01-11

## 2018-01-11 NOTE — TELEPHONE ENCOUNTER
Last Written Prescription Date:  02/10/2017  Last Fill Quantity: 30,  # refills: 10   Last Office Visit with [facility], [facility] or [facility] prescribing provider:  12/22/2017   Future Office Visit:       Requested Prescriptions   Pending Prescriptions Disp Refills     finasteride (PROSCAR) 5 MG tablet [Pharmacy Med Name: FINASTERIDE 5MG TABS] 30 tablet 10     Sig: TAKE ONE TABLET BY MOUTH ONCE DAILY    There is no refill protocol information for this order

## 2018-01-12 RX ORDER — FINASTERIDE 5 MG/1
TABLET, FILM COATED ORAL
Qty: 30 TABLET | Refills: 10 | Status: SHIPPED | OUTPATIENT
Start: 2018-01-12 | End: 2018-10-19

## 2018-01-12 NOTE — TELEPHONE ENCOUNTER
Proscar  Routing refill request to provider for review/approval because:  Drug not on the [facility] refill protocol     Arvind Gomez RN, BSN

## 2018-01-26 ENCOUNTER — TELEPHONE (OUTPATIENT)
Dept: ANTICOAGULATION | Facility: CLINIC | Age: 48
End: 2018-01-26

## 2018-01-26 ENCOUNTER — ANTICOAGULATION THERAPY VISIT (OUTPATIENT)
Dept: ANTICOAGULATION | Facility: CLINIC | Age: 48
End: 2018-01-26
Payer: COMMERCIAL

## 2018-01-26 DIAGNOSIS — Z79.01 LONG-TERM (CURRENT) USE OF ANTICOAGULANTS: ICD-10-CM

## 2018-01-26 DIAGNOSIS — Z86.718 PERSONAL HISTORY OF DVT (DEEP VEIN THROMBOSIS): Primary | ICD-10-CM

## 2018-01-26 LAB — INR POINT OF CARE: 1.8 (ref 0.86–1.14)

## 2018-01-26 PROCEDURE — 36416 COLLJ CAPILLARY BLOOD SPEC: CPT

## 2018-01-26 PROCEDURE — 85610 PROTHROMBIN TIME: CPT | Mod: QW

## 2018-01-26 PROCEDURE — 99207 ZZC NO CHARGE NURSE ONLY: CPT

## 2018-01-26 NOTE — MR AVS SNAPSHOT
Hollis Diggs   1/26/2018 8:15 AM   Anticoagulation Therapy Visit    Description:  47 year old male   Provider:  PH ANTI COAG   Department:  Ph Anticoag           INR as of 1/26/2018     Today's INR 1.8!      Anticoagulation Summary as of 1/26/2018     INR goal 2.0-3.0   Today's INR 1.8!   Full instructions 1/26: 10 mg; Otherwise 7.5 mg on Tue, Thu; 5 mg all other days   Next INR check 3/9/2018    Indications   DVT (deep venous thrombosis) (H) (Resolved) [I82.409]  Long-term (current) use of anticoagulants [Z79.01] [Z79.01]         Your next Anticoagulation Clinic appointment(s)     Jan 26, 2018  8:15 AM CST   Anticoagulation Visit with PH ANTI COAG   [facility] ([address] [address]   [phone]            Mar 09, 2018  8:15 AM CST   Anticoagulation Visit with PH ANTI COAG   [address] [address]   [phone]              Contact Numbers     Clinic Number:         January 2018 Details    Sun Mon Tue Wed Thu Fri Sat      1               2               3               4               5               6                 7               8               9               10               11               12               13                 14               15               16               17               18               19               20                 21               22               23               24               25               26      10 mg   See details      27      5 mg           28      5 mg         29      5 mg         30      7.5 mg         31      5 mg             Date Details   01/26 This INR check               How to take your warfarin dose     To take:  5 mg Take 1 of the 5 mg tablets.    To take:  7.5 mg Take 1.5 of the 5 mg tablets.    To take:  10 mg Take 2 of the 5 mg tablets.           February 2018 Details    Sun Mon Tue Wed Thu  Fri Sat         1      7.5 mg         2      5 mg         3      5 mg           4      5 mg         5      5 mg         6      7.5 mg         7      5 mg         8      7.5 mg         9      5 mg         10      5 mg           11      5 mg         12      5 mg         13      7.5 mg         14      5 mg         15      7.5 mg         16      5 mg         17      5 mg           18      5 mg         19      5 mg         20      7.5 mg         21      5 mg         22      7.5 mg         23      5 mg         24      5 mg           25      5 mg         26      5 mg         27      7.5 mg         28      5 mg             Date Details   No additional details            How to take your warfarin dose     To take:  5 mg Take 1 of the 5 mg tablets.    To take:  7.5 mg Take 1.5 of the 5 mg tablets.           March 2018 Details    Sun Mon Tue Wed Thu Fri Sat         1      7.5 mg         2      5 mg         3      5 mg           4      5 mg         5      5 mg         6      7.5 mg         7      5 mg         8      7.5 mg         9            10                 11               12               13               14               15               16               17                 18               19               20               21               22               23               24                 25               26               27               28               29               30               31                Date Details   No additional details    Date of next INR:  3/9/2018         How to take your warfarin dose     To take:  5 mg Take 1 of the 5 mg tablets.    To take:  7.5 mg Take 1.5 of the 5 mg tablets.

## 2018-01-26 NOTE — TELEPHONE ENCOUNTER
Please review and renew this patients INR referral, orders pending. Thank you!      Lilliam Pro RN

## 2018-01-26 NOTE — PROGRESS NOTES
ANTICOAGULATION FOLLOW-UP CLINIC VISIT    Patient Name:  Hollis Diggs  Date:  1/26/2018  Contact Type:  Face to Face    SUBJECTIVE:     Patient Findings     Positives Unexplained INR or factor level change           OBJECTIVE    INR Protime   Date Value Ref Range Status   01/26/2018 1.8 (A) 0.86 - 1.14 Final     Factor 2 Assay   Date Value Ref Range Status   12/17/2015 33 (L) 60 - 140 % Final     Comment:     Analyte Specific Reagents (ASRs) are used in many laboratory tests necessary   for   standard medical care and generally do not require FDA approval.  This test   was   developed and its performance characteristics determined by [facility].  It has not been cleared or approved by   the US Food and Drug Administration.         ASSESSMENT / PLAN  INR assessment SUB    Recheck INR In: 6 WEEKS    INR Location Clinic      Anticoagulation Summary as of 1/26/2018     INR goal 2.0-3.0   Today's INR 1.8!   Maintenance plan 7.5 mg (5 mg x 1.5) on Tue, Thu; 5 mg (5 mg x 1) all other days   Full instructions 1/26: 10 mg; Otherwise 7.5 mg on Tue, Thu; 5 mg all other days   Weekly total 40 mg   Plan last modified Lilliam Pro RN (1/13/2017)   Next INR check 3/9/2018   Target end date     Indications   DVT (deep venous thrombosis) (H) (Resolved) [I82.409]  Long-term (current) use of anticoagulants [Z79.01] [Z79.01]         Anticoagulation Episode Summary     INR check location     Preferred lab     Send INR reminders to [facility] POOL    Comments 5 mg       Anticoagulation Care Providers     Provider Role Specialty Phone number    Sylvester Cash MD [facility] [phone]            See the Encounter Report to view Anticoagulation Flowsheet and Dosing Calendar (Go to Encounters tab in chart review, and find the Anticoagulation Therapy Visit)    Dosage adjustment made based on physician directed care plan.    10 mg x1 then resume     Lilliam BOSE  QUENTIN Pro

## 2018-01-29 DIAGNOSIS — M51.369 DDD (DEGENERATIVE DISC DISEASE), LUMBAR: ICD-10-CM

## 2018-01-29 RX ORDER — TRAMADOL HYDROCHLORIDE 50 MG/1
TABLET ORAL
Qty: 60 TABLET | Refills: 0 | Status: SHIPPED | OUTPATIENT
Start: 2018-01-29 | End: 2018-03-02

## 2018-01-29 NOTE — TELEPHONE ENCOUNTER
Tramadol 50 MG       Last Written Prescription Date:  12/27/17  Last Fill Quantity: 60,   # refills: 0  Last Office Visit: 12/22/17  Future Office visit:       Routing refill request to provider for review/approval because:  Drug not on the [facility], [facility] or [facility] refill protocol or controlled substance

## 2018-02-06 DIAGNOSIS — M51.369 DDD (DEGENERATIVE DISC DISEASE), LUMBAR: ICD-10-CM

## 2018-02-06 NOTE — TELEPHONE ENCOUNTER
percocet      Last Written Prescription Date:  1/11/18  Last Fill Quantity: 30,   # refills: 0  Last Office Visit: 12/22/17  Future Office visit:       Routing refill request to provider for review/approval because:  Drug not on the [facility], [facility] or [facility] refill protocol or controlled substance

## 2018-02-07 RX ORDER — OXYCODONE AND ACETAMINOPHEN 5; 325 MG/1; MG/1
1 TABLET ORAL EVERY 8 HOURS PRN
Qty: 30 TABLET | Refills: 0 | Status: SHIPPED | OUTPATIENT
Start: 2018-02-07 | End: 2018-02-22

## 2018-02-22 DIAGNOSIS — M51.369 DDD (DEGENERATIVE DISC DISEASE), LUMBAR: ICD-10-CM

## 2018-02-22 RX ORDER — OXYCODONE AND ACETAMINOPHEN 5; 325 MG/1; MG/1
1 TABLET ORAL EVERY 8 HOURS PRN
Qty: 30 TABLET | Refills: 0 | Status: SHIPPED | OUTPATIENT
Start: 2018-02-22 | End: 2018-03-09

## 2018-02-22 NOTE — TELEPHONE ENCOUNTER
Oxycodone-acetaminophen 5-325 MG      Last Written Prescription Date:  2/7/18  Last Fill Quantity: 30,   # refills: 0  Last Office Visit: 12/22/17  Future Office visit:       Routing refill request to provider for review/approval because:  Drug not on the [facility], [facility] or [facility] refill protocol or controlled substance

## 2018-03-02 DIAGNOSIS — M51.369 DDD (DEGENERATIVE DISC DISEASE), LUMBAR: ICD-10-CM

## 2018-03-02 RX ORDER — TRAMADOL HYDROCHLORIDE 50 MG/1
TABLET ORAL
Qty: 60 TABLET | Refills: 0 | Status: SHIPPED | OUTPATIENT
Start: 2018-03-02 | End: 2018-04-06

## 2018-03-02 NOTE — TELEPHONE ENCOUNTER
Tramadol 50 MG       Last Written Prescription Date:  1/29/18  Last Fill Quantity: 60,   # refills: 0  Last Office Visit: 12/22/17  Future Office visit:       Routing refill request to provider for review/approval because:  Drug not on the [facility], [facility] or [facility] refill protocol or controlled substance

## 2018-03-09 ENCOUNTER — ANTICOAGULATION THERAPY VISIT (OUTPATIENT)
Dept: ANTICOAGULATION | Facility: CLINIC | Age: 48
End: 2018-03-09
Payer: COMMERCIAL

## 2018-03-09 DIAGNOSIS — Z79.01 LONG-TERM (CURRENT) USE OF ANTICOAGULANTS: ICD-10-CM

## 2018-03-09 DIAGNOSIS — M51.369 DDD (DEGENERATIVE DISC DISEASE), LUMBAR: ICD-10-CM

## 2018-03-09 DIAGNOSIS — I82.4Y9 DEEP VEIN THROMBOSIS (DVT) OF PROXIMAL LOWER EXTREMITY, UNSPECIFIED CHRONICITY, UNSPECIFIED LATERALITY (H): ICD-10-CM

## 2018-03-09 LAB — INR PPP: 1.8

## 2018-03-09 PROCEDURE — 99207 ZZC NO CHARGE NURSE ONLY: CPT

## 2018-03-09 RX ORDER — WARFARIN SODIUM 5 MG/1
TABLET ORAL
Qty: 100 TABLET | Refills: 3 | COMMUNITY
Start: 2018-03-09 | End: 2018-04-20

## 2018-03-09 NOTE — MR AVS SNAPSHOT
Julius Olson   3/9/2018 8:15 AM   Anticoagulation Therapy Visit    Description:  47 year old male   Provider:  ALYSSA ANTI COAG   Department:  Ph Anticoag           INR as of 3/9/2018     Today's INR 1.8!      Anticoagulation Summary as of 3/9/2018     INR goal 2.0-3.0   Today's INR 1.8!   Full instructions 7.5 mg on Tue, Thu, Sat; 5 mg all other days   Next INR check 4/20/2018    Indications   DVT (deep venous thrombosis) (H) (Resolved) [I82.409]  Long-term (current) use of anticoagulants [Z79.01] [Z79.01]         Your next Anticoagulation Clinic appointment(s)     Apr 20, 2018  8:15 AM CDT   Anticoagulation Visit with PH ANTI COAG   [facility] ([address] [address]   [phone]              Contact Numbers     Clinic Number:         March 2018 Details    Sun Mon Tue Wed Thu Fri Sat         1               2               3                 4               5               6               7               8               9      5 mg   See details      10      7.5 mg           11      5 mg         12      5 mg         13      7.5 mg         14      5 mg         15      7.5 mg         16      5 mg         17      7.5 mg           18      5 mg         19      5 mg         20      7.5 mg         21      5 mg         22      7.5 mg         23      5 mg         24      7.5 mg           25      5 mg         26      5 mg         27      7.5 mg         28      5 mg         29      7.5 mg         30      5 mg         31      7.5 mg          Date Details   03/09 This INR check               How to take your warfarin dose     To take:  5 mg Take 1 of the 5 mg tablets.    To take:  7.5 mg Take 1.5 of the 5 mg tablets.           April 2018 Details    Sun Mon Tue Wed Thu Fri Sat     1      5 mg         2      5 mg         3      7.5 mg         4      5 mg         5      7.5 mg         6      5 mg         7      7.5 mg           8      5 mg         9      5  mg         10      7.5 mg         11      5 mg         12      7.5 mg         13      5 mg         14      7.5 mg           15      5 mg         16      5 mg         17      7.5 mg         18      5 mg         19      7.5 mg         20            21                 22               23               24               25               26               27               28                 29               30                     Date Details   No additional details    Date of next INR:  4/20/2018         How to take your warfarin dose     To take:  5 mg Take 1 of the 5 mg tablets.    To take:  7.5 mg Take 1.5 of the 5 mg tablets.

## 2018-03-09 NOTE — TELEPHONE ENCOUNTER
Oxycodone 5-325 MG       Last Written Prescription Date:  12/22/17  Last Fill Quantity: 30,   # refills: 0  Last Office Visit: 12/22/17  Future Office visit:       Routing refill request to provider for review/approval because:  Drug not on the [facility], [facility] or [facility] refill protocol or controlled substance

## 2018-03-09 NOTE — PROGRESS NOTES
ANTICOAGULATION FOLLOW-UP CLINIC VISIT    Patient Name:  Hollis Diggs  Date:  3/9/2018  Contact Type:  Face to Face    SUBJECTIVE:     Patient Findings     Positives Unexplained INR or factor level change           OBJECTIVE    INR   Date Value Ref Range Status   03/09/2018 1.8  Final     Factor 2 Assay   Date Value Ref Range Status   12/17/2015 33 (L) 60 - 140 % Final     Comment:     Analyte Specific Reagents (ASRs) are used in many laboratory tests necessary   for   standard medical care and generally do not require FDA approval.  This test   was   developed and its performance characteristics determined by [facility].  It has not been cleared or approved by   the US Food and Drug Administration.         ASSESSMENT / PLAN  INR assessment SUB    Recheck INR In: 6 WEEKS    INR Location Clinic      Anticoagulation Summary as of 3/9/2018     INR goal 2.0-3.0   Today's INR 1.8!   Maintenance plan 7.5 mg (5 mg x 1.5) on Tue, Thu, Sat; 5 mg (5 mg x 1) all other days   Full instructions 7.5 mg on Tue, Thu, Sat; 5 mg all other days   Weekly total 42.5 mg   Plan last modified Lilliam Pro RN (3/9/2018)   Next INR check 4/20/2018   Target end date     Indications   DVT (deep venous thrombosis) (H) (Resolved) [I82.409]  Long-term (current) use of anticoagulants [Z79.01] [Z79.01]         Anticoagulation Episode Summary     INR check location     Preferred lab     Send INR reminders to [facility] POOL    Comments 5 mg, dosing card, PM dose      Anticoagulation Care Providers     Provider Role Specialty Phone number    Sylvester Cash MD [facility] [phone]            See the Encounter Report to view Anticoagulation Flowsheet and Dosing Calendar (Go to Encounters tab in chart review, and find the Anticoagulation Therapy Visit)    Dosage adjustment made based on physician directed care plan.      Lilliam Pro RN

## 2018-03-12 RX ORDER — OXYCODONE AND ACETAMINOPHEN 5; 325 MG/1; MG/1
1 TABLET ORAL EVERY 8 HOURS PRN
Qty: 30 TABLET | Refills: 0 | Status: SHIPPED | OUTPATIENT
Start: 2018-03-12 | End: 2018-03-26

## 2018-03-26 DIAGNOSIS — M51.369 DDD (DEGENERATIVE DISC DISEASE), LUMBAR: ICD-10-CM

## 2018-03-26 NOTE — TELEPHONE ENCOUNTER
Oxycodone 5-325 MG       Last Written Prescription Date:  3/12/18  Last Fill Quantity: 30,   # refills: 0  Last Office Visit: 12/22/17  Future Office visit:       Routing refill request to provider for review/approval because:  Drug not on the [facility], [facility] or [facility] refill protocol or controlled substance

## 2018-03-28 RX ORDER — OXYCODONE AND ACETAMINOPHEN 5; 325 MG/1; MG/1
1 TABLET ORAL EVERY 8 HOURS PRN
Qty: 30 TABLET | Refills: 0 | Status: SHIPPED | OUTPATIENT
Start: 2018-03-28 | End: 2018-04-16

## 2018-04-06 DIAGNOSIS — M51.369 DDD (DEGENERATIVE DISC DISEASE), LUMBAR: ICD-10-CM

## 2018-04-09 RX ORDER — TRAMADOL HYDROCHLORIDE 50 MG/1
TABLET ORAL
Qty: 60 TABLET | Refills: 0 | Status: SHIPPED | OUTPATIENT
Start: 2018-04-09 | End: 2018-05-10

## 2018-04-09 NOTE — TELEPHONE ENCOUNTER
Tramadol      Last Written Prescription Date:  3/2/18  Last Fill Quantity: 60,   # refills: 0  Last Office Visit: 12/22/17  Future Office visit:       Routing refill request to provider for review/approval because:  Drug not on the [facility], [facility] or [facility] refill protocol or controlled substance

## 2018-04-16 DIAGNOSIS — M51.369 DDD (DEGENERATIVE DISC DISEASE), LUMBAR: ICD-10-CM

## 2018-04-16 RX ORDER — OXYCODONE AND ACETAMINOPHEN 5; 325 MG/1; MG/1
1 TABLET ORAL EVERY 8 HOURS PRN
Qty: 30 TABLET | Refills: 0 | Status: SHIPPED | OUTPATIENT
Start: 2018-04-16 | End: 2018-04-27

## 2018-04-16 NOTE — TELEPHONE ENCOUNTER
Oxycodone 5-325 MG       Last Written Prescription Date:  3/28/18  Last Fill Quantity: 30,   # refills: 0  Last Office Visit: 12/22/17  Future Office visit:       Routing refill request to provider for review/approval because:  Drug not on the [facility], [facility] or [facility] refill protocol or controlled substance

## 2018-04-20 ENCOUNTER — ANTICOAGULATION THERAPY VISIT (OUTPATIENT)
Dept: ANTICOAGULATION | Facility: CLINIC | Age: 48
End: 2018-04-20
Payer: COMMERCIAL

## 2018-04-20 DIAGNOSIS — I82.4Y9 DEEP VEIN THROMBOSIS (DVT) OF PROXIMAL LOWER EXTREMITY, UNSPECIFIED CHRONICITY, UNSPECIFIED LATERALITY (H): ICD-10-CM

## 2018-04-20 DIAGNOSIS — Z79.01 LONG-TERM (CURRENT) USE OF ANTICOAGULANTS: ICD-10-CM

## 2018-04-20 LAB — INR POINT OF CARE: 1.7 (ref 0.86–1.14)

## 2018-04-20 PROCEDURE — 99207 ZZC NO CHARGE NURSE ONLY: CPT

## 2018-04-20 PROCEDURE — 85610 PROTHROMBIN TIME: CPT | Mod: QW

## 2018-04-20 PROCEDURE — 36416 COLLJ CAPILLARY BLOOD SPEC: CPT

## 2018-04-20 RX ORDER — WARFARIN SODIUM 5 MG/1
TABLET ORAL
Qty: 100 TABLET | Refills: 3 | COMMUNITY
Start: 2018-04-20 | End: 2019-01-11 | Stop reason: DRUGHIGH

## 2018-04-20 NOTE — MR AVS SNAPSHOT
Hollis Diggs   4/20/2018 8:15 AM   Anticoagulation Therapy Visit    Description:  47 year old male   Provider:  PH ANTI COAG   Department:  Ph Anticoag           INR as of 4/20/2018     Today's INR 1.7!      Anticoagulation Summary as of 4/20/2018     INR goal 2.0-3.0   Today's INR 1.7!   Full instructions 5 mg on Mon, Fri; 7.5 mg all other days   Next INR check 6/1/2018    Indications   DVT (deep venous thrombosis) (H) (Resolved) [I82.409]  Long-term (current) use of anticoagulants [Z79.01] [Z79.01]         Your next Anticoagulation Clinic appointment(s)     Apr 20, 2018  8:15 AM CDT   Anticoagulation Visit with PH ANTI COAG   [address] [address]   [phone]            Jun 01, 2018  8:15 AM CDT   Anticoagulation Visit with PH ANTI COAG   [facility] ([address] [address]   [phone]              Contact Numbers     Clinic Number:         April 2018 Details    Sun Mon Tue Wed Thu Fri Sat     1               2               3               4               5               6               7                 8               9               10               11               12               13               14                 15               16               17               18               19               20      5 mg   See details      21      7.5 mg           22      7.5 mg         23      5 mg         24      7.5 mg         25      7.5 mg         26      7.5 mg         27      5 mg         28      7.5 mg           29      7.5 mg         30      5 mg               Date Details   04/20 This INR check               How to take your warfarin dose     To take:  5 mg Take 1 of the 5 mg tablets.    To take:  7.5 mg Take 1.5 of the 5 mg tablets.           May 2018 Details    Sun Mon Tue Wed Thu Fri Sat       1      7.5 mg         2      7.5 mg         3       7.5 mg         4      5 mg         5      7.5 mg           6      7.5 mg         7      5 mg         8      7.5 mg         9      7.5 mg         10      7.5 mg         11      5 mg         12      7.5 mg           13      7.5 mg         14      5 mg         15      7.5 mg         16      7.5 mg         17      7.5 mg         18      5 mg         19      7.5 mg           20      7.5 mg         21      5 mg         22      7.5 mg         23      7.5 mg         24      7.5 mg         25      5 mg         26      7.5 mg           27      7.5 mg         28      5 mg         29      7.5 mg         30      7.5 mg         31      7.5 mg            Date Details   No additional details            How to take your warfarin dose     To take:  5 mg Take 1 of the 5 mg tablets.    To take:  7.5 mg Take 1.5 of the 5 mg tablets.           June 2018 Details    Sun Mon Tue Wed Thu Fri Sat          1            2                 3               4               5               6               7               8               9                 10               11               12               13               14               15               16                 17               18               19               20               21               22               23                 24               25               26               27               28               29               30                Date Details   No additional details    Date of next INR:  6/1/2018         How to take your warfarin dose     To take:  5 mg Take 1 of the 5 mg tablets.

## 2018-04-20 NOTE — PROGRESS NOTES
ANTICOAGULATION FOLLOW-UP CLINIC VISIT    Patient Name:  Hollis Diggs  Date:  4/20/2018  Contact Type:  Face to Face    SUBJECTIVE:     Patient Findings     Positives Unexplained INR or factor level change           OBJECTIVE    INR Protime   Date Value Ref Range Status   04/20/2018 1.7 (A) 0.86 - 1.14 Final     Factor 2 Assay   Date Value Ref Range Status   12/17/2015 33 (L) 60 - 140 % Final     Comment:     Analyte Specific Reagents (ASRs) are used in many laboratory tests necessary   for   standard medical care and generally do not require FDA approval.  This test   was   developed and its performance characteristics determined by [facility].  It has not been cleared or approved by   the US Food and Drug Administration.         ASSESSMENT / PLAN  INR assessment THER    Recheck INR In: 6 WEEKS    INR Location Clinic      Anticoagulation Summary as of 4/20/2018     INR goal 2.0-3.0   Today's INR 1.7!   Maintenance plan 5 mg (5 mg x 1) on Mon, Fri; 7.5 mg (5 mg x 1.5) all other days   Full instructions 5 mg on Mon, Fri; 7.5 mg all other days   Weekly total 47.5 mg   Plan last modified Lilliam Pro RN (4/20/2018)   Next INR check 6/1/2018   Target end date     Indications   DVT (deep venous thrombosis) (H) (Resolved) [I82.409]  Long-term (current) use of anticoagulants [Z79.01] [Z79.01]         Anticoagulation Episode Summary     INR check location     Preferred lab     Send INR reminders to [facility] POOL    Comments 5 mg, dosing card, PM dose      Anticoagulation Care Providers     Provider Role Specialty Phone number    Sylvester Cash MD [facility] [phone]            See the Encounter Report to view Anticoagulation Flowsheet and Dosing Calendar (Go to Encounters tab in chart review, and find the Anticoagulation Therapy Visit)    Dosage adjustment made based on physician directed care plan.        Lilliam Pro, RN

## 2018-04-27 DIAGNOSIS — M51.369 DDD (DEGENERATIVE DISC DISEASE), LUMBAR: ICD-10-CM

## 2018-04-27 RX ORDER — OXYCODONE AND ACETAMINOPHEN 5; 325 MG/1; MG/1
1 TABLET ORAL EVERY 8 HOURS PRN
Qty: 30 TABLET | Refills: 0 | Status: SHIPPED | OUTPATIENT
Start: 2018-04-27 | End: 2018-05-17

## 2018-04-27 NOTE — TELEPHONE ENCOUNTER
Oxycodone 5-325 MG       Last Written Prescription Date:  4/16/18  Last Fill Quantity: 30,   # refills: 0  Last Office Visit: 12/22/17  Future Office visit:       Routing refill request to provider for review/approval because:  Drug not on the [facility], [facility] or [facility] refill protocol or controlled substance

## 2018-05-10 DIAGNOSIS — M51.369 DDD (DEGENERATIVE DISC DISEASE), LUMBAR: ICD-10-CM

## 2018-05-10 RX ORDER — TRAMADOL HYDROCHLORIDE 50 MG/1
TABLET ORAL
Qty: 60 TABLET | Refills: 0 | Status: SHIPPED | OUTPATIENT
Start: 2018-05-10 | End: 2018-06-08

## 2018-05-10 NOTE — TELEPHONE ENCOUNTER
Tramadol 50 MG       Last Written Prescription Date:  4/9/18  Last Fill Quantity: 60,   # refills: 0  Last Office Visit: 12/22/17  Future Office visit:       Routing refill request to provider for review/approval because:  Drug not on the [facility], [facility] or [facility] refill protocol or controlled substance

## 2018-05-17 DIAGNOSIS — M51.369 DDD (DEGENERATIVE DISC DISEASE), LUMBAR: ICD-10-CM

## 2018-05-17 RX ORDER — OXYCODONE AND ACETAMINOPHEN 5; 325 MG/1; MG/1
1 TABLET ORAL EVERY 8 HOURS PRN
Qty: 30 TABLET | Refills: 0 | Status: SHIPPED | OUTPATIENT
Start: 2018-05-17 | End: 2018-05-31

## 2018-05-17 NOTE — TELEPHONE ENCOUNTER
Oxycodone 5-325 MG       Last Written Prescription Date:  4/27/18  Last Fill Quantity: 30,   # refills: 0  Last Office Visit: 12/22/17  Future Office visit:       Routing refill request to provider for review/approval because:  Drug not on the [facility], [facility] or [facility] refill protocol or controlled substance

## 2018-05-31 DIAGNOSIS — M51.369 DDD (DEGENERATIVE DISC DISEASE), LUMBAR: ICD-10-CM

## 2018-05-31 RX ORDER — OXYCODONE AND ACETAMINOPHEN 5; 325 MG/1; MG/1
1 TABLET ORAL EVERY 8 HOURS PRN
Qty: 30 TABLET | Refills: 0 | Status: SHIPPED | OUTPATIENT
Start: 2018-05-31 | End: 2018-06-08

## 2018-05-31 NOTE — TELEPHONE ENCOUNTER
Oxycodone 5-325 MG       Last Written Prescription Date:  5/17/18  Last Fill Quantity: 30,   # refills: 0  Last Office Visit: 12/22/17  Future Office visit:       Routing refill request to provider for review/approval because:  Drug not on the [facility], [facility] or [facility] refill protocol or controlled substance

## 2018-06-01 ENCOUNTER — ANTICOAGULATION THERAPY VISIT (OUTPATIENT)
Dept: ANTICOAGULATION | Facility: CLINIC | Age: 48
End: 2018-06-01
Payer: COMMERCIAL

## 2018-06-01 DIAGNOSIS — Z79.01 LONG-TERM (CURRENT) USE OF ANTICOAGULANTS: ICD-10-CM

## 2018-06-01 LAB — INR POINT OF CARE: 1.7 (ref 0.86–1.14)

## 2018-06-01 PROCEDURE — 85610 PROTHROMBIN TIME: CPT | Mod: QW

## 2018-06-01 PROCEDURE — 99207 ZZC NO CHARGE NURSE ONLY: CPT

## 2018-06-01 PROCEDURE — 36416 COLLJ CAPILLARY BLOOD SPEC: CPT

## 2018-06-01 NOTE — PROGRESS NOTES
ANTICOAGULATION FOLLOW-UP CLINIC VISIT    Patient Name:  Hollis Diggs  Date:  6/1/2018  Contact Type:  Face to Face    SUBJECTIVE:     Patient Findings     Positives Missed doses (missed 5 mg dose on Monday this week)           OBJECTIVE    INR Protime   Date Value Ref Range Status   06/01/2018 1.7 (A) 0.86 - 1.14 Final     Factor 2 Assay   Date Value Ref Range Status   12/17/2015 33 (L) 60 - 140 % Final     Comment:     Analyte Specific Reagents (ASRs) are used in many laboratory tests necessary   for   standard medical care and generally do not require FDA approval.  This test   was   developed and its performance characteristics determined by [facility].  It has not been cleared or approved by   the [place] Food and Drug Administration.         ASSESSMENT / PLAN  INR assessment SUB    Recheck INR In: 6 WEEKS    INR Location Clinic      Anticoagulation Summary as of 6/1/2018     INR goal 2.0-3.0   Today's INR 1.7!   Warfarin maintenance plan 5 mg (5 mg x 1) on Mon, Fri; 7.5 mg (5 mg x 1.5) all other days   Full warfarin instructions 6/1: 10 mg; Otherwise 5 mg on Mon, Fri; 7.5 mg all other days   Weekly warfarin total 47.5 mg   Plan last modified Lilliam Pro RN (4/20/2018)   Next INR check 7/13/2018   Target end date     Indications   DVT (deep venous thrombosis) (H) (Resolved) [I82.409]  Long-term (current) use of anticoagulants [Z79.01] [Z79.01]         Anticoagulation Episode Summary     INR check location     Preferred lab     Send INR reminders to [facility]    Comments 5 mg, dosing card, PM dose      Anticoagulation Care Providers     Provider Role Specialty Phone number    Sylvester Cash MD [facility] [phone]            See the Encounter Report to view Anticoagulation Flowsheet and Dosing Calendar (Go to Encounters tab in chart review, and find the Anticoagulation Therapy Visit)    Dosage adjustment made based on physician  directed care plan.      Lilliam Pro RN

## 2018-06-01 NOTE — MR AVS SNAPSHOT
Hollis Diggs   6/1/2018 8:15 AM   Anticoagulation Therapy Visit    Description:  48 year old male   Provider:  PH ANTI COAG   Department:  Ph Anticoag           INR as of 6/1/2018     Today's INR 1.7!      Anticoagulation Summary as of 6/1/2018     INR goal 2.0-3.0   Today's INR 1.7!   Full warfarin instructions 6/1: 10 mg; Otherwise 5 mg on Mon, Fri; 7.5 mg all other days   Next INR check 7/13/2018    Indications   DVT (deep venous thrombosis) (H) (Resolved) [I82.409]  Long-term (current) use of anticoagulants [Z79.01] [Z79.01]         Your next Anticoagulation Clinic appointment(s)     Jun 01, 2018  8:15 AM CDT   Anticoagulation Visit with PH ANTI COAG   [facility] ([address] [address]   [phone]            Jul 13, 2018  8:15 AM CDT   Anticoagulation Visit with PH ANTI COAG   [address] [address]   [phone]              Contact Numbers     Clinic Number:         June 2018 Details    Sun Mon Tue Wed Thu Fri Sat          1      10 mg   See details      2      7.5 mg           3      7.5 mg         4      5 mg         5      7.5 mg         6      7.5 mg         7      7.5 mg         8      5 mg         9      7.5 mg           10      7.5 mg         11      5 mg         12      7.5 mg         13      7.5 mg         14      7.5 mg         15      5 mg         16      7.5 mg           17      7.5 mg         18      5 mg         19      7.5 mg         20      7.5 mg         21      7.5 mg         22      5 mg         23      7.5 mg           24      7.5 mg         25      5 mg         26      7.5 mg         27      7.5 mg         28      7.5 mg         29      5 mg         30      7.5 mg          Date Details   06/01 This INR check               How to take your warfarin dose     To take:  5 mg Take 1 of the 5 mg tablets.    To take:  7.5 mg Take 1.5 of  the 5 mg tablets.    To take:  10 mg Take 2 of the 5 mg tablets.           July 2018 Details    Sun Mon Tue Wed Thu Fri Sat     1      7.5 mg         2      5 mg         3      7.5 mg         4      7.5 mg         5      7.5 mg         6      5 mg         7      7.5 mg           8      7.5 mg         9      5 mg         10      7.5 mg         11      7.5 mg         12      7.5 mg         13            14                 15               16               17               18               19               20               21                 22               23               24               25               26               27               28                 29               30               31                    Date Details   No additional details    Date of next INR:  7/13/2018         How to take your warfarin dose     To take:  5 mg Take 1 of the 5 mg tablets.    To take:  7.5 mg Take 1.5 of the 5 mg tablets.

## 2018-06-08 ENCOUNTER — OFFICE VISIT (OUTPATIENT)
Dept: FAMILY MEDICINE | Facility: CLINIC | Age: 48
End: 2018-06-08
Payer: COMMERCIAL

## 2018-06-08 VITALS
BODY MASS INDEX: 32.87 KG/M2 | OXYGEN SATURATION: 97 % | WEIGHT: 248 LBS | SYSTOLIC BLOOD PRESSURE: 112 MMHG | TEMPERATURE: 97 F | HEART RATE: 110 BPM | HEIGHT: 73 IN | DIASTOLIC BLOOD PRESSURE: 74 MMHG

## 2018-06-08 DIAGNOSIS — M51.369 DDD (DEGENERATIVE DISC DISEASE), LUMBAR: ICD-10-CM

## 2018-06-08 DIAGNOSIS — M25.561 ACUTE PAIN OF RIGHT KNEE: Primary | ICD-10-CM

## 2018-06-08 PROCEDURE — 99214 OFFICE O/P EST MOD 30 MIN: CPT | Performed by: FAMILY MEDICINE

## 2018-06-08 RX ORDER — TRAMADOL HYDROCHLORIDE 50 MG/1
TABLET ORAL
Qty: 60 TABLET | Refills: 0 | Status: SHIPPED | OUTPATIENT
Start: 2018-06-08 | End: 2018-07-16

## 2018-06-08 RX ORDER — OXYCODONE AND ACETAMINOPHEN 5; 325 MG/1; MG/1
1 TABLET ORAL EVERY 8 HOURS PRN
Qty: 30 TABLET | Refills: 0 | Status: SHIPPED | OUTPATIENT
Start: 2018-06-08 | End: 2018-06-20

## 2018-06-08 NOTE — MR AVS SNAPSHOT
After Visit Summary   6/8/2018    Hollis Diggs    MRN: [id number]           Patient Information     Date Of Birth          1970        Visit Information        Provider Department      6/8/2018 2:40 PM Sylvester Cash MD [facility]        Today's Diagnoses     Acute pain of right knee    -  1    DDD (degenerative disc disease), lumbar           Follow-ups after your visit        Your next 10 appointments already scheduled     Jul 13, 2018  8:15 AM CDT   Anticoagulation Visit with PH ANTI COAG   [facility] ([facility]    [address] [place] [address] [phone]              Who to contact     If you have questions or need follow up information about today's clinic visit or your schedule please contact [facility] directly at [phone].  Normal or non-critical lab and imaging results will be communicated to you by MyChart, letter or phone within 4 business days after the clinic has received the results. If you do not hear from us within 7 days, please contact the clinic through [employer] or phone. If you have a critical or abnormal lab result, we will notify you by phone as soon as possible.  Submit refill requests through [employer] or call your pharmacy and they will forward the refill request to us. Please allow 3 business days for your refill to be completed.          Additional Information About Your Visit        MyChart Information     [employer] gives you secure access to your electronic health record. If you see a primary care provider, you can also send messages to your care team and make appointments. If you have questions, please call your primary care clinic.  If you do not have a primary care provider, please call [phone] and they will assist you.        Care EveryWhere ID     This is your Care EveryWhere ID. This could be used by other organizations to access your [facility]  "records  [id number]        Your Vitals Were     Pulse Temperature Height Pulse Oximetry BMI (Body Mass Index)       110 97  F (36.1  C) (Temporal) 6' 1\" (1.854 m) 97% 32.72 kg/m2        Blood Pressure from Last 3 Encounters:   06/08/18 112/74   12/22/17 130/84   10/27/17 132/82    Weight from Last 3 Encounters:   06/08/18 248 lb (112.5 kg)   12/22/17 243 lb (110.2 kg)   10/27/17 244 lb 6.4 oz (110.9 kg)              Today, you had the following     No orders found for display         Where to get your medicines      Some of these will need a paper prescription and others can be bought over the counter.  Ask your nurse if you have questions.     Bring a paper prescription for each of these medications     oxyCODONE-acetaminophen 5-325 MG per tablet    traMADol 50 MG tablet         Information about OPIOIDS     PRESCRIPTION OPIOIDS: WHAT YOU NEED TO KNOW   You have a prescription for an opioid (narcotic) pain medicine. Opioids can cause addiction. If you have a history of chemical dependency of any type, you are at a higher risk of becoming addicted to opioids. Only take this medicine after all other options have been tried. Take it for as short a time and as few doses as possible.     Do not:    Drive. If you drive while taking these medicines, you could be arrested for driving under the influence (DUI).    Operate heavy machinery    Do any other dangerous activities while taking these medicines.     Drink any alcohol while taking these medicines.      Take with any other medicines that contain acetaminophen. Read all labels carefully. Look for the word  acetaminophen  or  Tylenol.  Ask your pharmacist if you have questions or are unsure.    Store your pills in a secure place, locked if possible. We will not replace any lost or stolen medicine. If you don t finish your medicine, please throw away (dispose) as directed by your pharmacist. The [place] [employer]ution [employer] Agency has more information about safe " disposal: https://www.pca.[facility].us/living-green/managing-unwanted-medications    All opioids tend to cause constipation. Drink plenty of water and eat foods that have a lot of fiber, such as fruits, vegetables, prune juice, apple juice and high-fiber cereal. Take a laxative (Miralax, milk of magnesia, Colace, Senna) if you don t move your bowels at least every other day.          Primary Care Provider Office Phone # Fax #    Sylvester Cash [phone]       [address] [facility] [address]        Equal Access to Services     [facility] Center[facility] : Hadii cuauhtemoc nance hadasho Sorenetta, waaxda luqadaha, qaybta kaalmada adeegyada, cody shane . So [facility] [phone].    ATENCIÓN: Si habla español, tiene a martin disposición servicios gratuitos de asistencia lingüística. Llame al [phone].    We comply with applicable federal civil rights laws and [place] laws. We do not discriminate on the basis of race, color, national origin, age, disability, sex, sexual orientation, or gender identity.            Thank you!     Thank you for choosing [facility]  for your care. Our goal is always to provide you with excellent care. Hearing back from our patients is one way we can continue to improve our services. Please take a few minutes to complete the written survey that you may receive in the mail after your visit with us. Thank you!             Your Updated Medication List - Protect others around you: Learn how to safely use, store and throw away your medicines at www.disposemymeds.org.          This list is accurate as of 6/8/18 11:59 PM.  Always use your most recent med list.                   Brand Name Dispense Instructions for use Diagnosis    albuterol 108 (90 Base) MCG/ACT Inhaler    PROAIR HFA/PROVENTIL HFA/VENTOLIN HFA    3 Inhaler    Inhale 2 puffs into the lungs every 4 hours as needed for shortness of breath / dyspnea    Bronchospasm        amitriptyline 10 MG tablet    ELAVIL    60 tablet    Take 1 tablet (10 mg) by mouth At Bedtime    Midline low back pain, with sciatica presence unspecified       DULoxetine 20 MG EC capsule    CYMBALTA    60 capsule    Take 1 capsule (20 mg) by mouth 2 times daily    Major depressive disorder, recurrent episode, mild (H)       finasteride 5 MG tablet    PROSCAR    30 tablet    TAKE ONE TABLET BY MOUTH ONCE DAILY    Alopecia       folic acid 1 MG tablet    FOLVITE    90 tablet    Take 1 tablet (1 mg) by mouth daily    Rheumatoid arthritis of multiple sites with negative rheumatoid factor (H)       leflunomide 20 MG tablet    ARAVA    30 tablet    TAKE ONE TABLET BY MOUTH ONCE DAILY    Rheumatoid arthritis of multiple sites with negative rheumatoid factor (H)       oxyCODONE-acetaminophen 5-325 MG per tablet    PERCOCET    30 tablet    Take 1 tablet by mouth every 8 hours as needed for moderate to severe pain    DDD (degenerative disc disease), lumbar       traMADol 50 MG tablet    ULTRAM    60 tablet    Take 1 tablet as needed twice day for pain. Max 2 tab daily. No driving or alcohol use while taking medication.    DDD (degenerative disc disease), lumbar       tretinoin 0.05 % cream    RETIN-A    45 g    Apply  topically At Bedtime.    Skin aging       warfarin 5 MG tablet    COUMADIN    100 tablet    Take 5 mg Mon, Fri and 7.5 mg all other days, or as directed by the coumadin clinic.    Deep vein thrombosis (DVT) of proximal lower extremity, unspecified chronicity, unspecified laterality (H)

## 2018-06-08 NOTE — PROGRESS NOTES
SUBJECTIVE:   Hollis Diggs is a 48 year old male who presents to clinic today for the following health issues:    Patient woke up 2 weeks ago with right knee pain.     Joint Pain    Onset: 2 wks     Description:   Location: right knee  Character: Sharp    Intensity: moderate    Progression of Symptoms: better    Accompanying Signs & Symptoms:  Other symptoms: none    History:   Previous similar pain: no       Precipitating factors:   Trauma or overuse: no     Alleviating factors:  Improved by: nothing    Therapies Tried and outcome: nothing             Problem list and histories reviewed & adjusted, as indicated.  Additional history: as documented        Reviewed and updated as needed this visit by clinical staff       Reviewed and updated as needed this visit by Provider        SUBJECTIVE:  Hollis  is a 48 year old male who presents for: Pain in his right knee.  Been going on for about 2 weeks.  Does not recall doing anything.  It seems to be getting better.  He needed a refill on some of his medication so he decided to continue with the appointment.  He does have Percocet and tramadol that he takes for chronic back pain.  Seems to be helping with the knee.  Mainly when he fully flexes it does not bother him.    Past Medical History:   Diagnosis Date     Antiphospholipid syndrome (H)      Arthritis      Asthma     Exercise     blood clot in leg      Depressive disorder, not elsewhere classified     Depression (non-psychotic)     ANKIT (generalised anxiety disorder)      HH (hiatus hernia)      Hypercholesteremia     normal with weight loss 3/09     Lumbar disc herniation 1992     Seronegative rheumatoid arthritis (H)      Past Surgical History:   Procedure Laterality Date     APPENDECTOMY       C NONSPECIFIC PROCEDURE  91 or 92    back surgery. lumbar. lamiectomy     COLONOSCOPY N/A 8/2/2016    Procedure: COMBINED COLONOSCOPY, SINGLE OR MULTIPLE BIOPSY/POLYPECTOMY BY BIOPSY;  Surgeon: Sydnee Walton  MD Bety;  Location: [facility]     COLONOSCOPY WITH CO2 INSUFFLATION N/A 8/2/2016    Procedure: COLONOSCOPY WITH CO2 INSUFFLATION;  Surgeon: Sydnee Walton MD;  Location: [facility]     COMBINED ESOPHAGOSCOPY, GASTROSCOPY, DUODENOSCOPY (EGD) WITH CO2 INSUFFLATION N/A 8/2/2016    Procedure: COMBINED ESOPHAGOSCOPY, GASTROSCOPY, DUODENOSCOPY (EGD) WITH CO2 INSUFFLATION;  Surgeon: Sydnee Walton MD;  Location: [facility]     ESOPHAGOSCOPY, GASTROSCOPY, DUODENOSCOPY (EGD), COMBINED N/A 8/2/2016    Procedure: COMBINED ESOPHAGOSCOPY, GASTROSCOPY, DUODENOSCOPY (EGD), BIOPSY SINGLE OR MULTIPLE;  Surgeon: Sydnee Walton MD;  Location: [facility] OR     HC REMOVAL OF TONSILS,<12 Y/O      Tonsils <11y.o.     HC REPAIR INCISIONAL HERNIA,REDUCIBLE  1970's    Hernia Repair, Incisional, Unilateral     HC UGI ENDOSCOPY DIAG W BIOPSY  02/01/06     HC VASECTOMY UNILAT/BILAT W POSTOP SEMEN  1/05    Vasectomy     History back lumbar laminectomy       Social History   Substance Use Topics     Smoking status: Never Smoker     Smokeless tobacco: Never Used     Alcohol use No      Comment: rare     Current Outpatient Prescriptions   Medication Sig Dispense Refill     albuterol (PROAIR HFA, PROVENTIL HFA, VENTOLIN HFA) 108 (90 BASE) MCG/ACT inhaler Inhale 2 puffs into the lungs every 4 hours as needed for shortness of breath / dyspnea 3 Inhaler 1     amitriptyline (ELAVIL) 10 MG tablet Take 1 tablet (10 mg) by mouth At Bedtime 60 tablet 6     DULoxetine (CYMBALTA) 20 MG capsule Take 1 capsule (20 mg) by mouth 2 times daily 60 capsule 3     finasteride (PROSCAR) 5 MG tablet TAKE ONE TABLET BY MOUTH ONCE DAILY 30 tablet 10     folic acid (FOLVITE) 1 MG tablet Take 1 tablet (1 mg) by mouth daily 90 tablet 3     leflunomide (ARAVA) 20 MG tablet TAKE ONE TABLET BY MOUTH ONCE DAILY 30 tablet 2     oxyCODONE-acetaminophen (PERCOCET) 5-325 MG per tablet Take 1 tablet by mouth every 8 hours as needed for moderate to severe  "pain 30 tablet 0     traMADol (ULTRAM) 50 MG tablet Take 1 tablet as needed twice day for pain. Max 2 tab daily. No driving or alcohol use while taking medication. 60 tablet 0     tretinoin (RETIN-A) 0.05 % cream Apply  topically At Bedtime. 45 g 3     warfarin (COUMADIN) 5 MG tablet Take 5 mg Mon, Fri and 7.5 mg all other days, or as directed by the coumadin clinic. 100 tablet 3       REVIEW OF SYSTEMS:   5 point ROS negative except as noted above in HPI, including Gen., Resp, CV, GI &  system review.     OBJECTIVE:  Vitals: /74 (BP Location: Left arm, Patient Position: Chair, Cuff Size: Adult Large)  Pulse 110  Temp 97  F (36.1  C) (Temporal)  Ht 6' 1\" (1.854 m)  Wt 248 lb (112.5 kg)  SpO2 97%  BMI 32.72 kg/m2  BMI= Body mass index is 32.72 kg/(m^2).  Alert appears in no distress.  His knee does not appear to be red or warm.  Some tenderness posteriorly.  Not particularly swollen.  Good range of motion.  Collateral ligaments intact.  Negative drawer sign.    ASSESSMENT:  #1 acute right knee pain #2 chronic back pain #3 history of DVT on anticoagulation    PLAN:  Because of the anticoagulation were not able to recommend nonsteroidals with him.  It would be nice if he did not have to take narcotic pain medicines.  He will try to get by with just Tylenol as much as he can for the knee.  Did renew his Percocet today as well however.        Sylvester Cash MD  [facility]              "

## 2018-06-20 DIAGNOSIS — M51.369 DDD (DEGENERATIVE DISC DISEASE), LUMBAR: ICD-10-CM

## 2018-06-20 NOTE — TELEPHONE ENCOUNTER
Percocet 5-325 MG       Last Written Prescription Date:  6-8-18  Last Fill Quantity: 30,   # refills: 0  Last Office Visit: 6/8/18  Future Office visit:       Routing refill request to provider for review/approval because:  Drug not on the [facility], [facility] or [facility] refill protocol or controlled substance

## 2018-06-21 RX ORDER — OXYCODONE AND ACETAMINOPHEN 5; 325 MG/1; MG/1
1 TABLET ORAL EVERY 8 HOURS PRN
Qty: 30 TABLET | Refills: 0 | Status: SHIPPED | OUTPATIENT
Start: 2018-06-21 | End: 2018-07-05

## 2018-07-05 DIAGNOSIS — M51.369 DDD (DEGENERATIVE DISC DISEASE), LUMBAR: ICD-10-CM

## 2018-07-05 NOTE — TELEPHONE ENCOUNTER
Percocet 5-325 MG       Last Written Prescription Date:  6-21-18  Last Fill Quantity: 30,   # refills: 0  Last Office Visit: 6/8/18  Future Office visit:       Routing refill request to provider for review/approval because:  Drug not on the [facility], [facility] or [facility] refill protocol or controlled substance

## 2018-07-06 RX ORDER — OXYCODONE AND ACETAMINOPHEN 5; 325 MG/1; MG/1
1 TABLET ORAL EVERY 8 HOURS PRN
Qty: 30 TABLET | Refills: 0 | Status: SHIPPED | OUTPATIENT
Start: 2018-07-06 | End: 2018-07-20

## 2018-07-13 ENCOUNTER — ANTICOAGULATION THERAPY VISIT (OUTPATIENT)
Dept: ANTICOAGULATION | Facility: OTHER | Age: 48
End: 2018-07-13

## 2018-07-13 DIAGNOSIS — Z79.01 LONG-TERM (CURRENT) USE OF ANTICOAGULANTS: ICD-10-CM

## 2018-07-13 DIAGNOSIS — Z79.01 LONG-TERM (CURRENT) USE OF ANTICOAGULANTS: Primary | ICD-10-CM

## 2018-07-13 LAB — INR BLD: 2.9 (ref 0.86–1.14)

## 2018-07-13 PROCEDURE — 36416 COLLJ CAPILLARY BLOOD SPEC: CPT | Performed by: FAMILY MEDICINE

## 2018-07-13 PROCEDURE — 85610 PROTHROMBIN TIME: CPT | Mod: QW | Performed by: FAMILY MEDICINE

## 2018-07-13 NOTE — MR AVS SNAPSHOT
Hollis FINNEGAN Diggs   7/13/2018   Anticoagulation Therapy Visit    Description:  48 year old male   Provider:  Sylvester Cash MD   Department:  [facility]           INR as of 7/13/2018     Today's INR 2.9      Anticoagulation Summary as of 7/13/2018     INR goal 2.0-3.0   Today's INR 2.9   Full warfarin instructions 5 mg on Mon, Fri; 7.5 mg all other days   Next INR check 8/24/2018    Indications   DVT (deep venous thrombosis) (H) (Resolved) [I82.409]  Long-term (current) use of anticoagulants [Z79.01] [Z79.01]         Contact Numbers     Clinic Number:         July 2018 Details    Sun Mon Tue Wed Thu Fri Sat     1               2               3               4               5               6               7                 8               9               10               11               12               13      5 mg   See details      14      7.5 mg           15      7.5 mg         16      5 mg         17      7.5 mg         18      7.5 mg         19      7.5 mg         20      5 mg         21      7.5 mg           22      7.5 mg         23      5 mg         24      7.5 mg         25      7.5 mg         26      7.5 mg         27      5 mg         28      7.5 mg           29      7.5 mg         30      5 mg         31      7.5 mg              Date Details   07/13 This INR check               How to take your warfarin dose     To take:  5 mg Take 1 of the 5 mg tablets.    To take:  7.5 mg Take 1.5 of the 5 mg tablets.           August 2018 Details    Sun Mon Tue Wed Thu Fri Sat        1      7.5 mg         2      7.5 mg         3      5 mg         4      7.5 mg           5      7.5 mg         6      5 mg         7      7.5 mg         8      7.5 mg         9      7.5 mg         10      5 mg         11      7.5 mg           12      7.5 mg         13      5 mg         14      7.5 mg         15      7.5 mg         16      7.5 mg         17      5 mg         18      7.5 mg           19      7.5 mg         20       5 mg         21      7.5 mg         22      7.5 mg         23      7.5 mg         24            25                 26               27               28               29               30               31                 Date Details   No additional details    Date of next INR:  8/24/2018         How to take your warfarin dose     To take:  5 mg Take 1 of the 5 mg tablets.    To take:  7.5 mg Take 1.5 of the 5 mg tablets.

## 2018-07-13 NOTE — PROGRESS NOTES
ANTICOAGULATION FOLLOW-UP CLINIC VISIT    Patient Name:  Hollis Diggs  Date:  7/13/2018  Contact Type:  Telephone    SUBJECTIVE:     Patient Findings     Positives No Problem Findings           OBJECTIVE    INR Point of Care   Date Value Ref Range Status   07/13/2018 2.9 (H) 0.86 - 1.14 Final     Comment:     This test is intended for monitoring Coumadin therapy.  Results are not   accurate in patients with prolonged INR due to factor deficiency.       Factor 2 Assay   Date Value Ref Range Status   12/17/2015 33 (L) 60 - 140 % Final     Comment:     Analyte Specific Reagents (ASRs) are used in many laboratory tests necessary   for   standard medical care and generally do not require FDA approval.  This test   was   developed and its performance characteristics determined by [facility].  It has not been cleared or approved by   the US Food and Drug Administration.         ASSESSMENT / PLAN  INR assessment THER    Recheck INR In: 6 WEEKS    INR Location Outside lab      Anticoagulation Summary as of 7/13/2018     INR goal 2.0-3.0   Today's INR 2.9   Warfarin maintenance plan 5 mg (5 mg x 1) on Mon, Fri; 7.5 mg (5 mg x 1.5) all other days   Full warfarin instructions 5 mg on Mon, Fri; 7.5 mg all other days   Weekly warfarin total 47.5 mg   No change documented Jesse Flores, RN   Plan last modified Lilliam Pro, RN (4/20/2018)   Next INR check 8/24/2018   Target end date     Indications   DVT (deep venous thrombosis) (H) (Resolved) [I82.409]  Long-term (current) use of anticoagulants [Z79.01] [Z79.01]         Anticoagulation Episode Summary     INR check location     Preferred lab     Send INR reminders to [facility]    Comments 5 mg, dosing card, PM dose      Anticoagulation Care Providers     Provider Role Specialty Phone number    Sylvester Cash MD [facility] [phone]            See the Encounter Report to view Anticoagulation  Flowsheet and Dosing Calendar (Go to Encounters tab in chart review, and find the Anticoagulation Therapy Visit)    Dosage adjustment made based on physician directed care plan.    Jesse Flores RN

## 2018-07-16 DIAGNOSIS — M51.369 DDD (DEGENERATIVE DISC DISEASE), LUMBAR: ICD-10-CM

## 2018-07-16 RX ORDER — TRAMADOL HYDROCHLORIDE 50 MG/1
TABLET ORAL
Qty: 60 TABLET | Refills: 0 | Status: SHIPPED | OUTPATIENT
Start: 2018-07-16 | End: 2018-08-24

## 2018-07-16 NOTE — TELEPHONE ENCOUNTER
Requested Prescriptions   Pending Prescriptions Disp Refills     traMADol (ULTRAM) 50 MG tablet 60 tablet 0     Sig: Take 1 tablet as needed twice day for pain. Max 2 tab daily. No driving or alcohol use while taking medication.    There is no refill protocol information for this order        Last Written Prescription Date:  06/08/2018  Last Fill Quantity: 60,  # refills: 0   Last office visit: 6/8/2018 with prescribing provider:  06/08/2018   Future Office Visit:

## 2018-07-20 DIAGNOSIS — M51.369 DDD (DEGENERATIVE DISC DISEASE), LUMBAR: ICD-10-CM

## 2018-07-20 RX ORDER — OXYCODONE AND ACETAMINOPHEN 5; 325 MG/1; MG/1
1 TABLET ORAL EVERY 8 HOURS PRN
Qty: 30 TABLET | Refills: 0 | Status: SHIPPED | OUTPATIENT
Start: 2018-07-20 | End: 2018-08-02

## 2018-07-20 NOTE — TELEPHONE ENCOUNTER
Percocet       Last Written Prescription Date:  7/6/18  Last Fill Quantity: 30,   # refills: 0  Last Office Visit: 6-8-18  Future Office visit:       Routing refill request to provider for review/approval because:  Drug not on the [facility], P or [facility] refill protocol or controlled substance

## 2018-08-02 DIAGNOSIS — M51.369 DDD (DEGENERATIVE DISC DISEASE), LUMBAR: ICD-10-CM

## 2018-08-02 RX ORDER — OXYCODONE AND ACETAMINOPHEN 5; 325 MG/1; MG/1
1 TABLET ORAL EVERY 8 HOURS PRN
Qty: 30 TABLET | Refills: 0 | Status: SHIPPED | OUTPATIENT
Start: 2018-08-02 | End: 2018-08-20

## 2018-08-02 NOTE — TELEPHONE ENCOUNTER
Rx placed in [employer] carrier box at  to be picked up from [facility] pharmacy.  Mary Kothari MA

## 2018-08-02 NOTE — TELEPHONE ENCOUNTER
Percocet  5-325 MG     Last Written Prescription Date:  7/20/18  Last Fill Quantity: 30,   # refills: 0  Last Office Visit: 6-8-18  Future Office visit:       Routing refill request to provider for review/approval because:  Drug not on the [facility], [facility] or [facility] refill protocol or controlled substance

## 2018-08-07 ENCOUNTER — TELEPHONE (OUTPATIENT)
Dept: FAMILY MEDICINE | Facility: CLINIC | Age: 48
End: 2018-08-07

## 2018-08-07 NOTE — TELEPHONE ENCOUNTER
Prior Authorization Retail Medication Request    Medication/Dose: oxyCODONE-acetaminophen (PERCOCET) 5-325 MG per tablet  ICD code (if different than what is on RX):  M51.36  Previously Tried and Failed:    Rationale:      Insurance Name:  [employer]  Insurance ID:  [id number]      Pharmacy Information (if different than what is on RX)  Name:  [facility]  Phone:  [phone]

## 2018-08-07 NOTE — TELEPHONE ENCOUNTER
Central Prior Authorization Team   Phone: [phone]      PA Initiation    Medication: oxyCODONE-acetaminophen (PERCOCET) 5-325 MG per tablet  Insurance Company: [employer] - Phone [phone] Fax [phone]  Pharmacy Filling the Rx: [facility] - [place], [place] - [address]  Filling Pharmacy Phone: [phone]  Filling Pharmacy Fax: [phone]  Start Date: 8/7/2018

## 2018-08-08 NOTE — TELEPHONE ENCOUNTER
Prior Authorization Approval    Authorization Effective Date: 7/7/2018  Authorization Expiration Date: 8/7/2019  Medication: oxyCODONE-acetaminophen (PERCOCET) 5-325 MG per tablet-APPROVED   Approved Dose/Quantity:    Reference #: [id number]   Insurance Company: [employer] - Phone [phone] Fax [phone]  Expected CoPay:       CoPay Card Available:      Foundation Assistance Needed:    Which Pharmacy is filling the prescription (Not needed for infusion/clinic administered): [facility] [place] - [address]  Pharmacy Notified: Yes  Patient Notified: Yes

## 2018-08-20 DIAGNOSIS — M51.369 DDD (DEGENERATIVE DISC DISEASE), LUMBAR: ICD-10-CM

## 2018-08-20 RX ORDER — OXYCODONE AND ACETAMINOPHEN 5; 325 MG/1; MG/1
1 TABLET ORAL EVERY 8 HOURS PRN
Qty: 30 TABLET | Refills: 0 | Status: SHIPPED | OUTPATIENT
Start: 2018-08-20 | End: 2018-09-06

## 2018-08-20 NOTE — TELEPHONE ENCOUNTER
Percocet       Last Written Prescription Date:  8/2/18  Last Fill Quantity: 30,   # refills: 0  Last Office Visit: 6/8/18  Future Office visit:       Routing refill request to provider for review/approval because:  Drug not on the [facility], [facility] or [facility] refill protocol or controlled substance

## 2018-08-24 DIAGNOSIS — M51.369 DDD (DEGENERATIVE DISC DISEASE), LUMBAR: ICD-10-CM

## 2018-08-24 RX ORDER — TRAMADOL HYDROCHLORIDE 50 MG/1
TABLET ORAL
Qty: 60 TABLET | Refills: 0 | Status: SHIPPED | OUTPATIENT
Start: 2018-08-24 | End: 2018-09-25

## 2018-08-24 NOTE — TELEPHONE ENCOUNTER
Tramadol 50 MG       Last Written Prescription Date:  7/16/18  Last Fill Quantity: 60,   # refills: 0  Last Office Visit: 6-8-18  Future Office visit:       Routing refill request to provider for review/approval because:  Drug not on the [facility], P or [facility] refill protocol or controlled substance

## 2018-09-06 DIAGNOSIS — M51.369 DDD (DEGENERATIVE DISC DISEASE), LUMBAR: ICD-10-CM

## 2018-09-06 RX ORDER — OXYCODONE AND ACETAMINOPHEN 5; 325 MG/1; MG/1
1 TABLET ORAL EVERY 8 HOURS PRN
Qty: 30 TABLET | Refills: 0 | Status: SHIPPED | OUTPATIENT
Start: 2018-09-06 | End: 2018-09-20

## 2018-09-06 NOTE — TELEPHONE ENCOUNTER
Percocet 5-325 MG       Last Written Prescription Date:  8/20/18  Last Fill Quantity: 30,   # refills: 0  Last Office Visit: 6-8-18  Future Office visit:       Routing refill request to provider for review/approval because:  Drug not on the [facility], [facility] or [facility] refill protocol or controlled substance

## 2018-09-20 DIAGNOSIS — M51.369 DDD (DEGENERATIVE DISC DISEASE), LUMBAR: ICD-10-CM

## 2018-09-20 NOTE — TELEPHONE ENCOUNTER
Percocet 5-325 MG       Last Written Prescription Date:  9-6-18  Last Fill Quantity: 30,   # refills: 0  Last Office Visit: 6/8/18  Future Office visit:       Routing refill request to provider for review/approval because:  Drug not on the [facility], [facility] or [facility] refill protocol or controlled substance

## 2018-09-21 ENCOUNTER — ANTICOAGULATION THERAPY VISIT (OUTPATIENT)
Dept: ANTICOAGULATION | Facility: CLINIC | Age: 48
End: 2018-09-21
Payer: COMMERCIAL

## 2018-09-21 DIAGNOSIS — Z79.01 LONG-TERM (CURRENT) USE OF ANTICOAGULANTS: ICD-10-CM

## 2018-09-21 LAB — INR POINT OF CARE: 3.1 (ref 0.86–1.14)

## 2018-09-21 PROCEDURE — 36416 COLLJ CAPILLARY BLOOD SPEC: CPT

## 2018-09-21 PROCEDURE — 85610 PROTHROMBIN TIME: CPT | Mod: QW

## 2018-09-21 PROCEDURE — 99207 ZZC NO CHARGE NURSE ONLY: CPT

## 2018-09-21 RX ORDER — OXYCODONE AND ACETAMINOPHEN 5; 325 MG/1; MG/1
1 TABLET ORAL EVERY 8 HOURS PRN
Qty: 30 TABLET | Refills: 0 | Status: SHIPPED | OUTPATIENT
Start: 2018-09-21 | End: 2018-10-03

## 2018-09-21 NOTE — MR AVS SNAPSHOT
Hollis FINNEGAN Diggs   9/21/2018 3:45 PM   Anticoagulation Therapy Visit    Description:  48 year old male   Provider:  ALYSSA ANTI COARGACE   Department:  Ph Anticoag           INR as of 9/21/2018     Today's INR 3.1!      Anticoagulation Summary as of 9/21/2018     INR goal 2.0-3.0   Today's INR 3.1!   Full warfarin instructions 5 mg on Mon, Fri; 7.5 mg all other days   Next INR check 11/16/2018    Indications   DVT (deep venous thrombosis) (H) (Resolved) [I82.409]  Long-term (current) use of anticoagulants [Z79.01] [Z79.01]         Your next Anticoagulation Clinic appointment(s)     Nov 16, 2018  8:15 AM CST   Anticoagulation Visit with [facility] ANTI COAG   [facility]    [address] [place] [id number]   [phone]              Contact Numbers     Clinic Number:         September 2018 Details    Sun Mon Tue Wed Thu Fri Sat           1                 2               3               4               5               6               7               8                 9               10               11               12               13               14               15                 16               17               18               19               20               21      5 mg   See details      22      7.5 mg           23      7.5 mg         24      5 mg         25      7.5 mg         26      7.5 mg         27      7.5 mg         28      5 mg         29      7.5 mg           30      7.5 mg                Date Details   09/21 This INR check               How to take your warfarin dose     To take:  5 mg Take 1 of the 5 mg tablets.    To take:  7.5 mg Take 1.5 of the 5 mg tablets.           October 2018 Details    Sun Mon Tue Wed Thu Fri Sat      1      5 mg         2      7.5 mg         3      7.5 mg         4      7.5 mg         5      5 mg         6      7.5 mg           7      7.5 mg         8      5 mg         9      7.5 mg         10      7.5 mg         11       7.5 mg         12      5 mg         13      7.5 mg           14      7.5 mg         15      5 mg         16      7.5 mg         17      7.5 mg         18      7.5 mg         19      5 mg         20      7.5 mg           21      7.5 mg         22      5 mg         23      7.5 mg         24      7.5 mg         25      7.5 mg         26      5 mg         27      7.5 mg           28      7.5 mg         29      5 mg         30      7.5 mg         31      7.5 mg             Date Details   No additional details            How to take your warfarin dose     To take:  5 mg Take 1 of the 5 mg tablets.    To take:  7.5 mg Take 1.5 of the 5 mg tablets.           November 2018 Details    Sun Mon Tue Wed Thu Fri Sat         1      7.5 mg         2      5 mg         3      7.5 mg           4      7.5 mg         5      5 mg         6      7.5 mg         7      7.5 mg         8      7.5 mg         9      5 mg         10      7.5 mg           11      7.5 mg         12      5 mg         13      7.5 mg         14      7.5 mg         15      7.5 mg         16            17                 18               19               20               21               22               23               24                 25               26               27               28               29               30                 Date Details   No additional details    Date of next INR:  11/16/2018         How to take your warfarin dose     To take:  5 mg Take 1 of the 5 mg tablets.    To take:  7.5 mg Take 1.5 of the 5 mg tablets.

## 2018-09-21 NOTE — TELEPHONE ENCOUNTER
I have placed the script at the  in the [facility]  folder. Ekta Marroquin CMA ([facility])

## 2018-09-21 NOTE — PROGRESS NOTES
ANTICOAGULATION FOLLOW-UP CLINIC VISIT    Patient Name:  Hollis Diggs  Date:  9/21/2018  Contact Type:  Face to Face    SUBJECTIVE:     Patient Findings     Positives No Problem Findings           OBJECTIVE    INR Protime   Date Value Ref Range Status   09/21/2018 3.1 (A) 0.86 - 1.14 Final     Factor 2 Assay   Date Value Ref Range Status   12/17/2015 33 (L) 60 - 140 % Final     Comment:     Analyte Specific Reagents (ASRs) are used in many laboratory tests necessary   for   standard medical care and generally do not require FDA approval.  This test   was   developed and its performance characteristics determined by [facility].  It has not been cleared or approved by   the US Food and Drug Administration.         ASSESSMENT / PLAN  INR assessment THER    Recheck INR In: 8 WEEKS    INR Location Clinic      Anticoagulation Summary as of 9/21/2018     INR goal 2.0-3.0   Today's INR 3.1!   Warfarin maintenance plan 5 mg (5 mg x 1) on Mon, Fri; 7.5 mg (5 mg x 1.5) all other days   Full warfarin instructions 5 mg on Mon, Fri; 7.5 mg all other days   Weekly warfarin total 47.5 mg   No change documented Jesse Flores, RN   Plan last modified Lilliam Pro, RN (4/20/2018)   Next INR check 11/16/2018   Target end date     Indications   DVT (deep venous thrombosis) (H) (Resolved) [I82.409]  Long-term (current) use of anticoagulants [Z79.01] [Z79.01]         Anticoagulation Episode Summary     INR check location     Preferred lab     Send INR reminders to [facility] POOL    Comments 5 mg, dosing card, PM dose      Anticoagulation Care Providers     Provider Role Specialty Phone number    Sylvester Cash MD [facility] [phone]            See the Encounter Report to view Anticoagulation Flowsheet and Dosing Calendar (Go to Encounters tab in chart review, and find the Anticoagulation Therapy Visit)    Dosage adjustment made based on physician directed  care plan.    Jesse Flores RN

## 2018-09-25 DIAGNOSIS — M51.369 DDD (DEGENERATIVE DISC DISEASE), LUMBAR: ICD-10-CM

## 2018-09-25 RX ORDER — TRAMADOL HYDROCHLORIDE 50 MG/1
TABLET ORAL
Qty: 60 TABLET | Refills: 0 | Status: SHIPPED | OUTPATIENT
Start: 2018-09-25 | End: 2018-10-19

## 2018-09-25 NOTE — TELEPHONE ENCOUNTER
Tramadol      Last Written Prescription Date:  8/24/18  Last Fill Quantity: 60,   # refills: 0  Last Office Visit: 6/8/18  Future Office visit:       Routing refill request to provider for review/approval because:  Drug not on the [facility], P or [facility] refill protocol or controlled substance

## 2018-10-03 DIAGNOSIS — M51.369 DDD (DEGENERATIVE DISC DISEASE), LUMBAR: ICD-10-CM

## 2018-10-03 DIAGNOSIS — I82.4Y9 DEEP VEIN THROMBOSIS (DVT) OF PROXIMAL LOWER EXTREMITY, UNSPECIFIED CHRONICITY, UNSPECIFIED LATERALITY (H): ICD-10-CM

## 2018-10-03 RX ORDER — WARFARIN SODIUM 5 MG/1
TABLET ORAL
Qty: 100 TABLET | Refills: 3 | Status: SHIPPED | OUTPATIENT
Start: 2018-10-03 | End: 2019-01-11

## 2018-10-03 RX ORDER — OXYCODONE AND ACETAMINOPHEN 5; 325 MG/1; MG/1
1 TABLET ORAL EVERY 8 HOURS PRN
Qty: 30 TABLET | Refills: 0 | Status: SHIPPED | OUTPATIENT
Start: 2018-10-03 | End: 2018-10-09

## 2018-10-03 NOTE — TELEPHONE ENCOUNTER
"Warfarin  Last Written Prescription Date:  04/20/2018  Last Fill Quantity: 100,  # refills: 3   Last office visit: 6/8/2018 with prescribing provider:  Shailesh   Future Office Visit:  None    Routing to Coumadin [facility] for refill.     Requested Prescriptions   Pending Prescriptions Disp Refills     warfarin (COUMADIN) 5 MG tablet [Pharmacy Med Name: WARFARIN SODIUM 5MG TABS] 100 tablet 3     Sig: TAKE 1 & 1/2 TABLETS BY MOUTH TUESDAY AND THURSDAY AND TAKE 1 TABLET ON ALL OTHER DAYS OF THE WEEK OR AS DIRECTED BY THE COUMADIN CLINIC    Vitamin K Antagonists Failed    10/3/2018  9:44 AM       Failed - INR is within goal in the past 6 weeks    Confirm INR is within goal in the past 6 weeks.   Recent Labs   Lab Test 09/21/18   INR  3.1*          Passed - Recent (12 mo) or future (30 days) visit within the authorizing provider's specialty    Patient had office visit in the last 12 months or has a visit in the next 30 days with authorizing provider or within the authorizing provider's specialty.  See \"Patient Info\" tab in inbasket, or \"Choose Columns\" in Meds & Orders section of the refill encounter.         Passed - Patient is 18 years of age or older      Li Eaton RN   "

## 2018-10-03 NOTE — TELEPHONE ENCOUNTER
percocet      Last Written Prescription Date:  9/21/18  Last Fill Quantity: 30,   # refills: 0  Last Office Visit: 6/08/18  Future Office visit:       Routing refill request to provider for review/approval because:  Drug not on the [facility], P or [facility] refill protocol or controlled substance

## 2018-10-08 DIAGNOSIS — M51.369 DDD (DEGENERATIVE DISC DISEASE), LUMBAR: ICD-10-CM

## 2018-10-09 DIAGNOSIS — M51.369 DDD (DEGENERATIVE DISC DISEASE), LUMBAR: ICD-10-CM

## 2018-10-09 NOTE — TELEPHONE ENCOUNTER
Percocet 5-325 MG       Last Written Prescription Date:  10/3/18  Last Fill Quantity: 30,   # refills: 0  Last Office Visit: 6-8-18  Future Office visit:       Routing refill request to provider for review/approval because:  Drug not on the [facility], [facility] or [facility] refill protocol or controlled substance

## 2018-10-10 RX ORDER — OXYCODONE AND ACETAMINOPHEN 5; 325 MG/1; MG/1
1 TABLET ORAL EVERY 8 HOURS PRN
Qty: 30 TABLET | Refills: 0 | Status: SHIPPED | OUTPATIENT
Start: 2018-10-10 | End: 2018-10-24

## 2018-10-10 NOTE — TELEPHONE ENCOUNTER
Signed original Rx faxed to [facility] and put in bin at  to be picked up by Pharmacy. Shakira,

## 2018-10-19 ENCOUNTER — OFFICE VISIT (OUTPATIENT)
Dept: FAMILY MEDICINE | Facility: CLINIC | Age: 48
End: 2018-10-19
Payer: COMMERCIAL

## 2018-10-19 VITALS
BODY MASS INDEX: 32.19 KG/M2 | RESPIRATION RATE: 14 BRPM | OXYGEN SATURATION: 97 % | HEIGHT: 73 IN | WEIGHT: 242.9 LBS | TEMPERATURE: 97.1 F | DIASTOLIC BLOOD PRESSURE: 72 MMHG | SYSTOLIC BLOOD PRESSURE: 124 MMHG | HEART RATE: 94 BPM

## 2018-10-19 DIAGNOSIS — L65.9 ALOPECIA: ICD-10-CM

## 2018-10-19 DIAGNOSIS — N52.9 ERECTILE DYSFUNCTION, UNSPECIFIED ERECTILE DYSFUNCTION TYPE: ICD-10-CM

## 2018-10-19 DIAGNOSIS — M51.369 DDD (DEGENERATIVE DISC DISEASE), LUMBAR: ICD-10-CM

## 2018-10-19 DIAGNOSIS — J98.01 BRONCHOSPASM: ICD-10-CM

## 2018-10-19 DIAGNOSIS — Z23 NEED FOR PROPHYLACTIC VACCINATION AND INOCULATION AGAINST INFLUENZA: ICD-10-CM

## 2018-10-19 DIAGNOSIS — Z13.6 CARDIOVASCULAR SCREENING; LDL GOAL LESS THAN 160: ICD-10-CM

## 2018-10-19 DIAGNOSIS — F33.0 MAJOR DEPRESSIVE DISORDER, RECURRENT EPISODE, MILD (H): ICD-10-CM

## 2018-10-19 DIAGNOSIS — Z00.01 ENCOUNTER FOR GENERAL ADULT MEDICAL EXAMINATION WITH ABNORMAL FINDINGS: Primary | ICD-10-CM

## 2018-10-19 LAB
ALBUMIN SERPL-MCNC: 3.7 G/DL (ref 3.4–5)
ALP SERPL-CCNC: 91 U/L (ref 40–150)
ALT SERPL W P-5'-P-CCNC: 22 U/L (ref 0–70)
ANION GAP SERPL CALCULATED.3IONS-SCNC: 5 MMOL/L (ref 3–14)
AST SERPL W P-5'-P-CCNC: 18 U/L (ref 0–45)
BASOPHILS # BLD AUTO: 0 10E9/L (ref 0–0.2)
BASOPHILS NFR BLD AUTO: 0.4 %
BILIRUB SERPL-MCNC: 0.4 MG/DL (ref 0.2–1.3)
BUN SERPL-MCNC: 10 MG/DL (ref 7–30)
CALCIUM SERPL-MCNC: 8.8 MG/DL (ref 8.5–10.1)
CHLORIDE SERPL-SCNC: 107 MMOL/L (ref 94–109)
CHOLEST SERPL-MCNC: 224 MG/DL
CO2 SERPL-SCNC: 30 MMOL/L (ref 20–32)
CREAT SERPL-MCNC: 1.02 MG/DL (ref 0.66–1.25)
DIFFERENTIAL METHOD BLD: ABNORMAL
EOSINOPHIL NFR BLD AUTO: 3.7 %
ERYTHROCYTE [DISTWIDTH] IN BLOOD BY AUTOMATED COUNT: 13.5 % (ref 10–15)
GFR SERPL CREATININE-BSD FRML MDRD: 77 ML/MIN/1.7M2
GLUCOSE SERPL-MCNC: 98 MG/DL (ref 70–99)
HCT VFR BLD AUTO: 43.1 % (ref 40–53)
HDLC SERPL-MCNC: 57 MG/DL
HGB BLD-MCNC: 13.6 G/DL (ref 13.3–17.7)
IMM GRANULOCYTES # BLD: 0 10E9/L (ref 0–0.4)
IMM GRANULOCYTES NFR BLD: 0.2 %
LDLC SERPL CALC-MCNC: 152 MG/DL
LYMPHOCYTES # BLD AUTO: 1.5 10E9/L (ref 0.8–5.3)
LYMPHOCYTES NFR BLD AUTO: 28.3 %
MCH RBC QN AUTO: 26.3 PG (ref 26.5–33)
MCHC RBC AUTO-ENTMCNC: 31.6 G/DL (ref 31.5–36.5)
MCV RBC AUTO: 83 FL (ref 78–100)
MONOCYTES # BLD AUTO: 0.3 10E9/L (ref 0–1.3)
MONOCYTES NFR BLD AUTO: 5.9 %
NEUTROPHILS # BLD AUTO: 3.4 10E9/L (ref 1.6–8.3)
NEUTROPHILS NFR BLD AUTO: 61.5 %
NONHDLC SERPL-MCNC: 167 MG/DL
NRBC # BLD AUTO: 0 10*3/UL
NRBC BLD AUTO-RTO: 0 /100
PLATELET # BLD AUTO: 285 10E9/L (ref 150–450)
POTASSIUM SERPL-SCNC: 4.3 MMOL/L (ref 3.4–5.3)
PROT SERPL-MCNC: 7.1 G/DL (ref 6.8–8.8)
RBC # BLD AUTO: 5.18 10E12/L (ref 4.4–5.9)
SODIUM SERPL-SCNC: 142 MMOL/L (ref 133–144)
TRIGL SERPL-MCNC: 77 MG/DL
WBC # BLD AUTO: 5.5 10E9/L (ref 4–11)

## 2018-10-19 PROCEDURE — 85025 COMPLETE CBC W/AUTO DIFF WBC: CPT | Performed by: FAMILY MEDICINE

## 2018-10-19 PROCEDURE — 36415 COLL VENOUS BLD VENIPUNCTURE: CPT | Performed by: FAMILY MEDICINE

## 2018-10-19 PROCEDURE — 80175 DRUG SCREEN QUAN LAMOTRIGINE: CPT | Mod: 90 | Performed by: FAMILY MEDICINE

## 2018-10-19 PROCEDURE — 99000 SPECIMEN HANDLING OFFICE-LAB: CPT | Performed by: FAMILY MEDICINE

## 2018-10-19 PROCEDURE — 80061 LIPID PANEL: CPT | Performed by: FAMILY MEDICINE

## 2018-10-19 PROCEDURE — 90686 IIV4 VACC NO PRSV 0.5 ML IM: CPT | Performed by: FAMILY MEDICINE

## 2018-10-19 PROCEDURE — G0008 ADMIN INFLUENZA VIRUS VAC: HCPCS | Performed by: FAMILY MEDICINE

## 2018-10-19 PROCEDURE — 80053 COMPREHEN METABOLIC PANEL: CPT | Performed by: FAMILY MEDICINE

## 2018-10-19 PROCEDURE — 99214 OFFICE O/P EST MOD 30 MIN: CPT | Mod: 25 | Performed by: FAMILY MEDICINE

## 2018-10-19 PROCEDURE — 99396 PREV VISIT EST AGE 40-64: CPT | Mod: 25 | Performed by: FAMILY MEDICINE

## 2018-10-19 RX ORDER — ALBUTEROL SULFATE 90 UG/1
2 AEROSOL, METERED RESPIRATORY (INHALATION) EVERY 4 HOURS PRN
Qty: 3 INHALER | Refills: 1 | Status: SHIPPED | OUTPATIENT
Start: 2018-10-19 | End: 2019-11-08

## 2018-10-19 RX ORDER — DULOXETIN HYDROCHLORIDE 30 MG/1
CAPSULE, DELAYED RELEASE ORAL
Qty: 270 CAPSULE | Refills: 1 | Status: SHIPPED | OUTPATIENT
Start: 2018-10-19 | End: 2019-04-17

## 2018-10-19 RX ORDER — TRAMADOL HYDROCHLORIDE 50 MG/1
TABLET ORAL
Qty: 60 TABLET | Refills: 0 | Status: SHIPPED | OUTPATIENT
Start: 2018-10-19 | End: 2018-11-26

## 2018-10-19 RX ORDER — SILDENAFIL CITRATE 20 MG/1
TABLET ORAL
Qty: 30 TABLET | Refills: 1 | Status: SHIPPED | OUTPATIENT
Start: 2018-10-19 | End: 2018-12-11

## 2018-10-19 RX ORDER — ARIPIPRAZOLE 5 MG/1
5 TABLET ORAL AT BEDTIME
Qty: 30 TABLET | Refills: 2 | Status: SHIPPED | OUTPATIENT
Start: 2018-10-19 | End: 2019-01-06

## 2018-10-19 RX ORDER — LAMOTRIGINE 300 MG/1
1 TABLET, EXTENDED RELEASE ORAL DAILY
Qty: 90 TABLET | Refills: 0 | COMMUNITY
Start: 2018-10-19 | End: 2019-04-17

## 2018-10-19 RX ORDER — FINASTERIDE 5 MG/1
1 TABLET, FILM COATED ORAL DAILY
Qty: 30 TABLET | Refills: 10 | Status: SHIPPED | OUTPATIENT
Start: 2018-10-19 | End: 2019-12-21

## 2018-10-19 ASSESSMENT — ENCOUNTER SYMPTOMS
HEMATURIA: 0
EYE PAIN: 0
DIZZINESS: 0
FEVER: 0
HEMATOCHEZIA: 0
NERVOUS/ANXIOUS: 1
DIARRHEA: 0
ABDOMINAL PAIN: 0
COUGH: 0
CONSTIPATION: 0
FREQUENCY: 0
CHILLS: 0

## 2018-10-19 ASSESSMENT — PATIENT HEALTH QUESTIONNAIRE - PHQ9
10. IF YOU CHECKED OFF ANY PROBLEMS, HOW DIFFICULT HAVE THESE PROBLEMS MADE IT FOR YOU TO DO YOUR WORK, TAKE CARE OF THINGS AT HOME, OR GET ALONG WITH OTHER PEOPLE: VERY DIFFICULT
SUM OF ALL RESPONSES TO PHQ QUESTIONS 1-9: 18
SUM OF ALL RESPONSES TO PHQ QUESTIONS 1-9: 18

## 2018-10-19 ASSESSMENT — PAIN SCALES - GENERAL: PAINLEVEL: SEVERE PAIN (7)

## 2018-10-19 NOTE — PROGRESS NOTES
SUBJECTIVE:   CC: Hollis Diggs is an 48 year old male who presents for preventative health visit.     Physical   Annual:     Getting at least 3 servings of Calcium per day:  NO    Bi-annual eye exam:  NO    Dental care twice a year:  Yes    Sleep apnea or symptoms of sleep apnea:  Daytime drowsiness and Excessive snoring    Diet:  Regular (no restrictions)    Frequency of exercise:  2-3 days/week    Duration of exercise:  15-30 minutes    Taking medications regularly:  Yes    Medication side effects:  Other    Additional concerns today:  YES        Patient would like to get flu shot also has questions about excessive sweating states sweats all the time    Today's PHQ-2 Score:   PHQ-2 ( 1999 Pfizer) 10/19/2018   Q1: Little interest or pleasure in doing things 1   Q2: Feeling down, depressed or hopeless 2   PHQ-2 Score 3   Q1: Little interest or pleasure in doing things Several days   Q2: Feeling down, depressed or hopeless More than half the days   PHQ-2 Score 3       Abuse: Current or Past(Physical, Sexual or Emotional)- No  Do you feel safe in your environment - Yes    Social History   Substance Use Topics     Smoking status: Never Smoker     Smokeless tobacco: Never Used     Alcohol use No      Comment: rare     Alcohol Use 10/19/2018   If you drink alcohol do you typically have greater than 3 drinks per day OR greater than 7 drinks per week? No       Last PSA: No results found for: PSA    Reviewed orders with patient. Reviewed health maintenance and updated orders accordingly - Yes      Reviewed and updated as needed this visit by clinical staff         Reviewed and updated as needed this visit by Provider        Past Medical History:   Diagnosis Date     Antiphospholipid syndrome (H)      Arthritis      Asthma     Exercise     blood clot in leg      Depressive disorder, not elsewhere classified     Depression (non-psychotic)     ANKIT (generalised anxiety disorder)      HH (hiatus hernia)       Hypercholesteremia     normal with weight loss 3/09     Lumbar disc herniation 1992     Seronegative rheumatoid arthritis (H)       Past Surgical History:   Procedure Laterality Date     APPENDECTOMY       C NONSPECIFIC PROCEDURE  91 or 92    back surgery. lumbar. lamiectomy     COLONOSCOPY N/A 8/2/2016    Procedure: COMBINED COLONOSCOPY, SINGLE OR MULTIPLE BIOPSY/POLYPECTOMY BY BIOPSY;  Surgeon: Sydnee Walton MD;  Location: [facility] OR     COLONOSCOPY WITH CO2 INSUFFLATION N/A 8/2/2016    Procedure: COLONOSCOPY WITH CO2 INSUFFLATION;  Surgeon: Sydnee Walton MD;  Location: [facility]     COMBINED ESOPHAGOSCOPY, GASTROSCOPY, DUODENOSCOPY (EGD) WITH CO2 INSUFFLATION N/A 8/2/2016    Procedure: COMBINED ESOPHAGOSCOPY, GASTROSCOPY, DUODENOSCOPY (EGD) WITH CO2 INSUFFLATION;  Surgeon: Sydnee Walton MD;  Location: [facility]     ESOPHAGOSCOPY, GASTROSCOPY, DUODENOSCOPY (EGD), COMBINED N/A 8/2/2016    Procedure: COMBINED ESOPHAGOSCOPY, GASTROSCOPY, DUODENOSCOPY (EGD), BIOPSY SINGLE OR MULTIPLE;  Surgeon: Sydene Walton MD;  Location: [facility] OR     HC REMOVAL OF TONSILS,<12 Y/O      Tonsils <11y.o.     HC REPAIR INCISIONAL HERNIA,REDUCIBLE  1970's    Hernia Repair, Incisional, Unilateral     HC UGI ENDOSCOPY DIAG W BIOPSY  02/01/06     HC VASECTOMY UNILAT/BILAT W POSTOP SEMEN  1/05    Vasectomy     History back lumbar laminectomy         Review of Systems   Constitutional: Negative for chills and fever.   HENT: Negative for congestion and ear pain.    Eyes: Negative for pain.   Respiratory: Negative for cough.    Cardiovascular: Negative for chest pain.   Gastrointestinal: Negative for abdominal pain, constipation, diarrhea and hematochezia.   Genitourinary: Negative for frequency, genital sores and hematuria.   Neurological: Negative for dizziness.   Psychiatric/Behavioral: The patient is nervous/anxious.    In addition to above  CONSTITUTIONAL: NEGATIVE for fever, chills, change in  weight  INTEGUMENTARY/SKIN: NEGATIVE for worrisome rashes, moles or lesions  EYES: NEGATIVE for vision changes or irritation  ENT: NEGATIVE for ear, mouth and throat problems  RESP: NEGATIVE for significant cough or SOB  CV: NEGATIVE for chest pain, palpitations or peripheral edema  GI: NEGATIVE for nausea, abdominal pain, heartburn, or change in bowel habits   male: erectile dysfunction  MUSCULOSKELETAL: Tonic low back pain.  NEURO: NEGATIVE for weakness, dizziness or paresthesias    OBJECTIVE:   There were no vitals taken for this visit.    Physical Exam  GENERAL: healthy, alert and no distress  EYES: Eyes grossly normal to inspection, PERRL and conjunctivae and sclerae normal  HENT: ear canals and TM's normal, nose and mouth without ulcers or lesions  NECK: no adenopathy, no asymmetry, masses, or scars and thyroid normal to palpation  RESP: lungs clear to auscultation - no rales, rhonchi or wheezes  CV: regular rate and rhythm, normal S1 S2, no S3 or S4, no murmur, click or rub, no peripheral edema and peripheral pulses strong  ABDOMEN: soft, nontender, no hepatosplenomegaly, no masses and bowel sounds normal  MS: no gross musculoskeletal defects noted, no edema  SKIN: no suspicious lesions or rashes  NEURO: Normal strength and tone, mentation intact and speech normal  PSYCH: mentation appears normal and affect flat    Diagnostic Test Results:  Results for orders placed or performed in visit on 10/19/18 (from the past 24 hour(s))   CBC with platelets differential   Result Value Ref Range    WBC 5.5 4.0 - 11.0 10e9/L    RBC Count 5.18 4.4 - 5.9 10e12/L    Hemoglobin 13.6 13.3 - 17.7 g/dL    Hematocrit 43.1 40.0 - 53.0 %    MCV 83 78 - 100 fl    MCH 26.3 (L) 26.5 - 33.0 pg    MCHC 31.6 31.5 - 36.5 g/dL    RDW 13.5 10.0 - 15.0 %    Platelet Count 285 150 - 450 10e9/L    Diff Method Automated Method     % Neutrophils 61.5 %    % Lymphocytes 28.3 %    % Monocytes 5.9 %    % Eosinophils 3.7 %    % Basophils 0.4 %     % Immature Granulocytes 0.2 %    Nucleated RBCs 0 0 /100    Absolute Neutrophil 3.4 1.6 - 8.3 10e9/L    Absolute Lymphocytes 1.5 0.8 - 5.3 10e9/L    Absolute Monocytes 0.3 0.0 - 1.3 10e9/L    Absolute Basophils 0.0 0.0 - 0.2 10e9/L    Abs Immature Granulocytes 0.0 0 - 0.4 10e9/L    Absolute Nucleated RBC 0.0    Lipid Profile   Result Value Ref Range    Cholesterol 224 (H) <200 mg/dL    Triglycerides 77 <150 mg/dL    HDL Cholesterol 57 >39 mg/dL    LDL Cholesterol Calculated 152 (H) <100 mg/dL    Non HDL Cholesterol 167 (H) <130 mg/dL   Comprehensive metabolic panel (BMP + Alb, Alk Phos, ALT, AST, Total. Bili, TP)   Result Value Ref Range    Sodium 142 133 - 144 mmol/L    Potassium 4.3 3.4 - 5.3 mmol/L    Chloride 107 94 - 109 mmol/L    Carbon Dioxide 30 20 - 32 mmol/L    Anion Gap 5 3 - 14 mmol/L    Glucose 98 70 - 99 mg/dL    Urea Nitrogen 10 7 - 30 mg/dL    Creatinine 1.02 0.66 - 1.25 mg/dL    GFR Estimate 77 >60 mL/min/1.7m2    GFR Estimate If Black >90 >60 mL/min/1.7m2    Calcium 8.8 8.5 - 10.1 mg/dL    Bilirubin Total 0.4 0.2 - 1.3 mg/dL    Albumin 3.7 3.4 - 5.0 g/dL    Protein Total 7.1 6.8 - 8.8 g/dL    Alkaline Phosphatase 91 40 - 150 U/L    ALT 22 0 - 70 U/L    AST 18 0 - 45 U/L       ASSESSMENT/PLAN:   1. Encounter for general adult medical examination with abnormal findings  Physically fairly healthy by exam.  See below for further discussion.    2. Major depressive disorder, recurrent episode, mild (H)  He is seeing a psychiatrist.  He is currently on 90 mg a day of Cymbalta.  This is been an adjustment.  His psychiatrist is now leaving practice.  They wanted him to start him on Latuda.  It is over $1000 a month.  He has been on Abilify from me before.  He seems to think this was adequate.  We will go back to that at 5 mg at bedtime along with his other medication.  Manage this until he can see a new psychiatrist there in January.  Goes to [facility].  - DULoxetine (CYMBALTA) 30 MG EC capsule; Three  capsules once daily  Dispense: 270 capsule; Refill: 1  - Lamotrigine Level  - CBC with platelets differential  - ARIPiprazole (ABILIFY) 5 MG tablet; Take 1 tablet (5 mg) by mouth At Bedtime  Dispense: 30 tablet; Refill: 2    3. DDD (degenerative disc disease), lumbar  Continues to take tramadol and oxycodone for this.  - traMADol (ULTRAM) 50 MG tablet; Take 1 tablet as needed twice day for pain. Max 2 tab daily. No driving or alcohol use while taking medication.  Dispense: 60 tablet; Refill: 0    4. Erectile dysfunction, unspecified erectile dysfunction type  This is a new revelation for him is been going on for a while.  Could be due to his polypharmacy and his depression.  Discussed functions of Viagra and possible side effects.  We will try him on this.  - sildenafil (REVATIO) 20 MG tablet; Take 1-2 tablet  by mouth  daily as needed prior to sexual activity.  Never use with nitroglycerin, terazosin or doxazosin.  Dispense: 30 tablet; Refill: 1    5. CARDIOVASCULAR SCREENING; LDL GOAL LESS THAN 160  Notify with results.  - Lipid Profile  - Comprehensive metabolic panel (BMP + Alb, Alk Phos, ALT, AST, Total. Bili, TP)    6. Bronchospasm  Uses as needed.  - albuterol (PROAIR HFA/PROVENTIL HFA/VENTOLIN HFA) 108 (90 Base) MCG/ACT inhaler; Inhale 2 puffs into the lungs every 4 hours as needed for shortness of breath / dyspnea  Dispense: 3 Inhaler; Refill: 1    7. Alopecia  Working  - finasteride (PROSCAR) 5 MG tablet; Take 1 tablet (5 mg) by mouth daily  Dispense: 30 tablet; Refill: 10    8. Need for prophylactic vaccination and inoculation against influenza    - FLU VACCINE, SPLIT VIRUS, IM (QUADRIVALENT) [69736]- >3 YRS  - ADMIN INFLUENZA (For MEDICARE Patients ONLY) []    COUNSELING:   Reviewed preventive health counseling, as reflected in patient instructions       Regular exercise       Healthy diet/nutrition       Vision screening    BP Readings from Last 1 Encounters:   06/08/18 112/74     Estimated body  "mass index is 32.72 kg/(m^2) as calculated from the following:    Height as of 6/8/18: 6' 1\" (1.854 m).    Weight as of 6/8/18: 248 lb (112.5 kg).      Weight management plan: Discussed healthy diet and exercise guidelines and patient will follow up in 12 months in clinic to re-evaluate.     reports that he has never smoked. He has never used smokeless tobacco.      Counseling Resources:  ATP IV Guidelines  Pooled Cohorts Equation Calculator  FRAX Risk Assessment  ICSI Preventive Guidelines  Dietary Guidelines for Americans, 2010  USDA's MyPlate  ASA Prophylaxis  Lung CA Screening    Sylvester Cash MD  [facility]    Injectable Influenza Immunization Documentation    1.  Is the person to be vaccinated sick today?   No    2. Does the person to be vaccinated have an allergy to a component   of the vaccine?   No  Egg Allergy Algorithm Link    3. Has the person to be vaccinated ever had a serious reaction   to influenza vaccine in the past?   No    4. Has the person to be vaccinated ever had Guillain-Barré syndrome?   No    Form completed by Mary Ktohari MA         "

## 2018-10-19 NOTE — MR AVS SNAPSHOT
After Visit Summary   10/19/2018    Hollis Diggs    MRN: [id number]           Patient Information     Date Of Birth          1970        Visit Information        Provider Department      10/19/2018 8:00 AM Sylvester Cash MD [facility]        Today's Diagnoses     Encounter for general adult medical examination with abnormal findings    -  1    Major depressive disorder, recurrent episode, mild (H)        DDD (degenerative disc disease), lumbar        Erectile dysfunction, unspecified erectile dysfunction type        CARDIOVASCULAR SCREENING; LDL GOAL LESS THAN 160        Bronchospasm        Alopecia        Need for prophylactic vaccination and inoculation against influenza          Care Instructions      Preventive Health Recommendations  Male Ages 40 to 49    Yearly exam:             See your health care provider every year in order to  o   Review health changes.   o   Discuss preventive care.    o   Review your medicines if your doctor has prescribed any.    You should be tested each year for STDs (sexually transmitted diseases) if you re at risk.     Have a cholesterol test every 5 years.     Have a colonoscopy (test for colon cancer) if someone in your family has had colon cancer or polyps before age 50.     After age 45, have a diabetes test (fasting glucose). If you are at risk for diabetes, you should have this test every 3 years.      Talk with your health care provider about whether or not a prostate cancer screening test (PSA) is right for you.    Shots: Get a flu shot each year. Get a tetanus shot every 10 years.     Nutrition:    Eat at least 5 servings of fruits and vegetables daily.     Eat whole-grain bread, whole-wheat pasta and brown rice instead of white grains and rice.     Get adequate Calcium and Vitamin D.     Lifestyle    Exercise for at least 150 minutes a week (30 minutes a day, 5 days a week). This will help you control your weight and prevent  "disease.     Limit alcohol to one drink per day.     No smoking.     Wear sunscreen to prevent skin cancer.     See your dentist every six months for an exam and cleaning.              Follow-ups after your visit        Your next 10 appointments already scheduled     Nov 16, 2018  8:15 AM CST   Anticoagulation Visit with PH ANTI COAG   [facility]    [address] [place] [id number]   [phone]              Who to contact     If you have questions or need follow up information about today's clinic visit or your schedule please contact [facility] directly at [phone].  Normal or non-critical lab and imaging results will be communicated to you by [facility], letter or phone within 4 business days after the clinic has received the results. If you do not hear from us within 7 days, please contact the clinic through [facility] or phone. If you have a critical or abnormal lab result, we will notify you by phone as soon as possible.  Submit refill requests through [facility] or call your pharmacy and they will forward the refill request to us. Please allow 3 business days for your refill to be completed.          Additional Information About Your Visit        [facility] Information     [facility] gives you secure access to your electronic health record. If you see a primary care provider, you can also send messages to your care team and make appointments. If you have questions, please call your primary care clinic.  If you do not have a primary care provider, please call [phone] and they will assist you.        Care EveryWhere ID     This is your Care EveryWhere ID. This could be used by other organizations to access your [facility] medical records  [id number]        Your Vitals Were     Pulse Temperature Respirations Height Pulse Oximetry BMI (Body Mass Index)    94 97.1  F (36.2  C) (Temporal) 14 6' 1\" (1.854 m) 97% 32.05 kg/m2       Blood Pressure from " Last 3 Encounters:   10/19/18 124/72   06/08/18 112/74   12/22/17 130/84    Weight from Last 3 Encounters:   10/19/18 242 lb 14.4 oz (110.2 kg)   06/08/18 248 lb (112.5 kg)   12/22/17 243 lb (110.2 kg)              We Performed the Following     ADMIN INFLUENZA (For MEDICARE Patients ONLY) []     CBC with platelets differential     Comprehensive metabolic panel (BMP + Alb, Alk Phos, ALT, AST, Total. Bili, TP)     FLU VACCINE, SPLIT VIRUS, IM (QUADRIVALENT) [54597]- >3 YRS     Lamotrigine Level     Lipid Profile     OFFICE/OUTPT VISIT,EST,LEVL IV          Today's Medication Changes          These changes are accurate as of 10/19/18  1:01 PM.  If you have any questions, ask your nurse or doctor.               Start taking these medicines.        Dose/Directions    ARIPiprazole 5 MG tablet   Commonly known as:  ABILIFY   Used for:  Major depressive disorder, recurrent episode, mild (H)   Started by:  Sylvester Cash MD        Dose:  5 mg   Take 1 tablet (5 mg) by mouth At Bedtime   Quantity:  30 tablet   Refills:  2       sildenafil 20 MG tablet   Commonly known as:  REVATIO   Used for:  Erectile dysfunction, unspecified erectile dysfunction type   Started by:  Sylvester Cash MD        Take 1-2 tablet  by mouth  daily as needed prior to sexual activity.  Never use with nitroglycerin, terazosin or doxazosin.   Quantity:  30 tablet   Refills:  1         These medicines have changed or have updated prescriptions.        Dose/Directions    DULoxetine 30 MG EC capsule   Commonly known as:  CYMBALTA   This may have changed:    - medication strength  - how much to take  - how to take this  - when to take this  - additional instructions   Used for:  Major depressive disorder, recurrent episode, mild (H)   Changed by:  Sylvester Cash MD        Three capsules once daily   Quantity:  270 capsule   Refills:  1       finasteride 5 MG tablet   Commonly known as:  PROSCAR   This may have changed:  See the new  instructions.   Used for:  Alopecia   Changed by:  Sylvester Cash MD        Dose:  1 tablet   Take 1 tablet (5 mg) by mouth daily   Quantity:  30 tablet   Refills:  10            Where to get your medicines      These medications were sent to [facility] - [place], [place] - [address]  [address], [facility] [address]     Phone:  [phone]     albuterol 108 (90 Base) MCG/ACT inhaler    ARIPiprazole 5 MG tablet    DULoxetine 30 MG EC capsule    finasteride 5 MG tablet         Some of these will need a paper prescription and others can be bought over the counter.  Ask your nurse if you have questions.     Bring a paper prescription for each of these medications     sildenafil 20 MG tablet    traMADol 50 MG tablet               Information about OPIOIDS     PRESCRIPTION OPIOIDS: WHAT YOU NEED TO KNOW   We gave you an opioid (narcotic) pain medicine. It is important to manage your pain, but opioids are not always the best choice. You should first try all the other options your care team gave you. Take this medicine for as short a time (and as few doses) as possible.    Some activities can increase your pain, such as bandage changes or therapy sessions. It may help to take your pain medicine 30 to 60 minutes before these activities. Reduce your stress by getting enough sleep, working on hobbies you enjoy and practicing relaxation or meditation. Talk to your care team about ways to manage your pain beyond prescription opioids.    These medicines have risks:    DO NOT drive when on new or higher doses of pain medicine. These medicines can affect your alertness and reaction times, and you could be arrested for driving under the influence (DUI). If you need to use opioids long-term, talk to your care team about driving.    DO NOT operate heavy machinery    DO NOT do any other dangerous activities while taking these medicines.    DO NOT drink any alcohol while taking these medicines.     If the opioid prescribed  includes acetaminophen, DO NOT take with any other medicines that contain acetaminophen. Read all labels carefully. Look for the word  acetaminophen  or  Tylenol.  Ask your pharmacist if you have questions or are unsure.    You can get addicted to pain medicines, especially if you have a history of addiction (chemical, alcohol or substance dependence). Talk to your care team about ways to reduce this risk.    All opioids tend to cause constipation. Drink plenty of water and eat foods that have a lot of fiber, such as fruits, vegetables, prune juice, apple juice and high-fiber cereal. Take a laxative (Miralax, milk of magnesia, Colace, Senna) if you don t move your bowels at least every other day. Other side effects include upset stomach, sleepiness, dizziness, throwing up, tolerance (needing more of the medicine to have the same effect), physical dependence and slowed breathing.    Store your pills in a secure place, locked if possible. We will not replace any lost or stolen medicine. If you don t finish your medicine, please throw away (dispose) as directed by your pharmacist. The [place] Pollution [employer] has more information about safe disposal: [email or web address]-[email or web address]-medications         Primary Care Provider Office Phone # Fax #    Sylvester Cash [phone]       [address] [facility] [address]        Equal Access to Services     MASOUD PARRA : Hadii cuauhtemoc nance hadasho Sovenusali, waaxda luqadaha, qaybta kaalmada leroy, cody reynolds. So [facility] [phone].    ATENCIÓN: Si habla español, tiene a martin disposición servicios gratuitos de asistencia lingüística. Llame al [phone].    We comply with applicable federal civil rights laws and [place] laws. We do not discriminate on the basis of race, color, national origin, age, disability, sex, sexual orientation, or gender identity.            Thank you!      Thank you for choosing [facility]  for your care. Our goal is always to provide you with excellent care. Hearing back from our patients is one way we can continue to improve our services. Please take a few minutes to complete the written survey that you may receive in the mail after your visit with us. Thank you!             Your Updated Medication List - Protect others around you: Learn how to safely use, store and throw away your medicines at www.disposemymeds.org.          This list is accurate as of 10/19/18  1:01 PM.  Always use your most recent med list.                   Brand Name Dispense Instructions for use Diagnosis    albuterol 108 (90 Base) MCG/ACT inhaler    PROAIR HFA/PROVENTIL HFA/VENTOLIN HFA    3 Inhaler    Inhale 2 puffs into the lungs every 4 hours as needed for shortness of breath / dyspnea    Bronchospasm       amitriptyline 10 MG tablet    ELAVIL    60 tablet    Take 1 tablet (10 mg) by mouth At Bedtime    Midline low back pain, with sciatica presence unspecified       ARIPiprazole 5 MG tablet    ABILIFY    30 tablet    Take 1 tablet (5 mg) by mouth At Bedtime    Major depressive disorder, recurrent episode, mild (H)       DULoxetine 30 MG EC capsule    CYMBALTA    270 capsule    Three capsules once daily    Major depressive disorder, recurrent episode, mild (H)       finasteride 5 MG tablet    PROSCAR    30 tablet    Take 1 tablet (5 mg) by mouth daily    Alopecia       LAMICTAL  MG Tb24   Generic drug:  LamoTRIgine     90 tablet    Take 1 tablet by mouth daily        oxyCODONE-acetaminophen 5-325 MG per tablet    PERCOCET    30 tablet    Take 1 tablet by mouth every 8 hours as needed for moderate to severe pain    DDD (degenerative disc disease), lumbar       sildenafil 20 MG tablet    REVATIO    30 tablet    Take 1-2 tablet  by mouth  daily as needed prior to sexual activity.  Never use with nitroglycerin, terazosin or doxazosin.    Erectile dysfunction, unspecified  erectile dysfunction type       traMADol 50 MG tablet    ULTRAM    60 tablet    Take 1 tablet as needed twice day for pain. Max 2 tab daily. No driving or alcohol use while taking medication.    DDD (degenerative disc disease), lumbar       tretinoin 0.05 % cream    RETIN-A    45 g    Apply  topically At Bedtime.    Skin aging       * warfarin 5 MG tablet    COUMADIN    100 tablet    Take 5 mg Mon, Fri and 7.5 mg all other days, or as directed by the coumadin clinic.    Deep vein thrombosis (DVT) of proximal lower extremity, unspecified chronicity, unspecified laterality (H)       * warfarin 5 MG tablet    COUMADIN    100 tablet    Take 5 mg on Monday and Friday and 7.5 mg all other days, or as directed by the Coumadin Clinic.    Deep vein thrombosis (DVT) of proximal lower extremity, unspecified chronicity, unspecified laterality (H)       * Notice:  This list has 2 medication(s) that are the same as other medications prescribed for you. Read the directions carefully, and ask your doctor or other care provider to review them with you.

## 2018-10-20 LAB — LAMOTRIGINE SERPL-MCNC: 6.3 UG/ML (ref 2.5–15)

## 2018-10-24 DIAGNOSIS — M51.369 DDD (DEGENERATIVE DISC DISEASE), LUMBAR: ICD-10-CM

## 2018-10-24 RX ORDER — OXYCODONE AND ACETAMINOPHEN 5; 325 MG/1; MG/1
1 TABLET ORAL EVERY 8 HOURS PRN
Qty: 30 TABLET | Refills: 0 | Status: SHIPPED | OUTPATIENT
Start: 2018-10-24 | End: 2018-11-05

## 2018-10-24 NOTE — TELEPHONE ENCOUNTER
oxycodone      Last Written Prescription Date:  10/10/18  Last Fill Quantity: 30,   # refills: 0  Last Office Visit: 10/19/18  Future Office visit:       Routing refill request to provider for review/approval because:  Drug not on the [facility], P or [facility] refill protocol or controlled substance

## 2018-11-05 DIAGNOSIS — M51.369 DDD (DEGENERATIVE DISC DISEASE), LUMBAR: ICD-10-CM

## 2018-11-05 RX ORDER — OXYCODONE AND ACETAMINOPHEN 5; 325 MG/1; MG/1
1 TABLET ORAL EVERY 8 HOURS PRN
Qty: 30 TABLET | Refills: 0 | Status: SHIPPED | OUTPATIENT
Start: 2018-11-05 | End: 2018-11-19

## 2018-11-05 NOTE — TELEPHONE ENCOUNTER
oxycodone      Last Written Prescription Date:  10/24/18  Last Fill Quantity: 30,   # refills: 0  Last Office Visit: 10/19/18  Future Office visit:       Routing refill request to provider for review/approval because:  Drug not on the [facility], P or [facility] refill protocol or controlled substance

## 2018-11-07 NOTE — PROGRESS NOTES
Labs from 2 weeks ago show your Lamictal level in the normal range.  Chemistry panel is normal.  Cholesterol is still elevated at 224 and your LDL is 152 which is high.  These are about the same level as it is been for a year or 2.  Consider getting on something for this at this time.  If you wish to then go with Lipitor 10 mg a day and recheck these levels in 6-8 weeks

## 2018-11-08 ENCOUNTER — TELEPHONE (OUTPATIENT)
Dept: FAMILY MEDICINE | Facility: CLINIC | Age: 48
End: 2018-11-08

## 2018-11-08 DIAGNOSIS — Z13.6 CARDIOVASCULAR SCREENING; LDL GOAL LESS THAN 160: Primary | ICD-10-CM

## 2018-11-08 NOTE — TELEPHONE ENCOUNTER
Pt informed. He will go ahead with the Lipitor. Please send to [facility]. He will make a lab only appt in 6-8 weeks. Please place orders.  Thank you,  Monica Saeed- Pt Rep.

## 2018-11-08 NOTE — TELEPHONE ENCOUNTER
----- Message from Sylvester Cash MD sent at 11/7/2018  4:22 PM CST -----  Labs from 2 weeks ago show your Lamictal level in the normal range.  Chemistry panel is normal.  Cholesterol is still elevated at 224 and your LDL is 152 which is high.  These are about the same level as it is been for a year or 2.  Consider getting on something for this at this time.  If you wish to then go with Lipitor 10 mg a day and recheck these levels in 6-8 weeks

## 2018-11-08 NOTE — TELEPHONE ENCOUNTER
Left message to call back. Please relay results to pt when calls back and find out if he would like an RX sent.

## 2018-11-12 RX ORDER — ATORVASTATIN CALCIUM 10 MG/1
10 TABLET, FILM COATED ORAL DAILY
Qty: 30 TABLET | Refills: 1 | Status: SHIPPED | OUTPATIENT
Start: 2018-11-12 | End: 2019-01-06

## 2018-11-16 ENCOUNTER — ANTICOAGULATION THERAPY VISIT (OUTPATIENT)
Dept: ANTICOAGULATION | Facility: CLINIC | Age: 48
End: 2018-11-16
Payer: COMMERCIAL

## 2018-11-16 LAB — INR POINT OF CARE: 2.6 (ref 0.86–1.14)

## 2018-11-16 PROCEDURE — 85610 PROTHROMBIN TIME: CPT | Mod: QW

## 2018-11-16 PROCEDURE — 36416 COLLJ CAPILLARY BLOOD SPEC: CPT

## 2018-11-16 PROCEDURE — 99207 ZZC NO CHARGE NURSE ONLY: CPT

## 2018-11-16 NOTE — MR AVS SNAPSHOT
Hollis Diggs   11/16/2018 8:15 AM   Anticoagulation Therapy Visit    Description:  48 year old male   Provider:  ALYSSA ANTI COAGRACE   Department:  [facility]           INR as of 11/16/2018     Today's INR 2.6      Anticoagulation Summary as of 11/16/2018     INR goal 2.0-3.0   Today's INR 2.6   Full warfarin instructions 5 mg on Mon, Fri; 7.5 mg all other days   Next INR check 1/11/2019    Indications   DVT (deep venous thrombosis) (H) (Resolved) [I82.409]  Long-term (current) use of anticoagulants [Z79.01] [Z79.01]         Your next Anticoagulation Clinic appointment(s)     Jan 11, 2019  8:15 AM CST   [facility]    [address] [place] [id number]   [phone]              Contact Numbers     Clinic Number:         November 2018 Details    Sun Mon Tue Wed Thu Fri Sat         1               2               3                 4               5               6               7               8               9               10                 11               12               13               14               15               16      5 mg   See details      17      7.5 mg           18      7.5 mg         19      5 mg         20      7.5 mg         21      7.5 mg         22      7.5 mg         23      5 mg         24      7.5 mg           25      7.5 mg         26      5 mg         27      7.5 mg         28      7.5 mg         29      7.5 mg         30      5 mg           Date Details   11/16 This INR check               How to take your warfarin dose     To take:  5 mg Take 1 of the 5 mg tablets.    To take:  7.5 mg Take 1.5 of the 5 mg tablets.           December 2018 Details    Sun Mon Tue Wed Thu Fri Sat           1      7.5 mg           2      7.5 mg         3      5 mg         4      7.5 mg         5      7.5 mg         6      7.5 mg         7      5 mg         8      7.5 mg           9      7.5 mg         10       5 mg         11      7.5 mg         12      7.5 mg         13      7.5 mg         14      5 mg         15      7.5 mg           16      7.5 mg         17      5 mg         18      7.5 mg         19      7.5 mg         20      7.5 mg         21      5 mg         22      7.5 mg           23      7.5 mg         24      5 mg         25      7.5 mg         26      7.5 mg         27      7.5 mg         28      5 mg         29      7.5 mg           30      7.5 mg         31      5 mg               Date Details   No additional details            How to take your warfarin dose     To take:  5 mg Take 1 of the 5 mg tablets.    To take:  7.5 mg Take 1.5 of the 5 mg tablets.           January 2019 Details    Sun Mon Tue Wed Thu Fri Sat       1      7.5 mg         2      7.5 mg         3      7.5 mg         4      5 mg         5      7.5 mg           6      7.5 mg         7      5 mg         8      7.5 mg         9      7.5 mg         10      7.5 mg         11            12                 13               14               15               16               17               18               19                 20               21               22               23               24               25               26                 27               28               29               30               31                  Date Details   No additional details    Date of next INR:  1/11/2019         How to take your warfarin dose     To take:  5 mg Take 1 of the 5 mg tablets.    To take:  7.5 mg Take 1.5 of the 5 mg tablets.

## 2018-11-16 NOTE — PROGRESS NOTES
ANTICOAGULATION FOLLOW-UP CLINIC VISIT    Patient Name:  Hollis Diggs  Date:  11/16/2018  Contact Type:  Face to Face    SUBJECTIVE:     Patient Findings     Positives No Problem Findings           OBJECTIVE    INR Protime   Date Value Ref Range Status   11/16/2018 2.6 (A) 0.86 - 1.14 Final     Factor 2 Assay   Date Value Ref Range Status   12/17/2015 33 (L) 60 - 140 % Final     Comment:     Analyte Specific Reagents (ASRs) are used in many laboratory tests necessary   for   standard medical care and generally do not require FDA approval.  This test   was   developed and its performance characteristics determined by [facility].  It has not been cleared or approved by   the US Food and Drug Administration.         ASSESSMENT / PLAN  INR assessment THER    Recheck INR In: 8 WEEKS    INR Location Clinic      Anticoagulation Summary as of 11/16/2018     INR goal 2.0-3.0   Today's INR 2.6   Warfarin maintenance plan 5 mg (5 mg x 1) on Mon, Fri; 7.5 mg (5 mg x 1.5) all other days   Full warfarin instructions 5 mg on Mon, Fri; 7.5 mg all other days   Weekly warfarin total 47.5 mg   No change documented Lilliam Pro, RN   Plan last modified Lilliam Pro RN (4/20/2018)   Next INR check 1/11/2019   Target end date     Indications   DVT (deep venous thrombosis) (H) (Resolved) [I82.409]  Long-term (current) use of anticoagulants [Z79.01] [Z79.01]         Anticoagulation Episode Summary     INR check location     Preferred lab     Send INR reminders to [facility] POOL    Comments 5 mg, dosing card, PM dose      Anticoagulation Care Providers     Provider Role Specialty Phone number    Sylvester Cash MD [facility] [phone]            See the Encounter Report to view Anticoagulation Flowsheet and Dosing Calendar (Go to Encounters tab in chart review, and find the Anticoagulation Therapy Visit)    Dosage adjustment made based on physician directed  care plan..    Lilliam Pro RN

## 2018-11-19 DIAGNOSIS — M51.369 DDD (DEGENERATIVE DISC DISEASE), LUMBAR: ICD-10-CM

## 2018-11-19 RX ORDER — OXYCODONE AND ACETAMINOPHEN 5; 325 MG/1; MG/1
1 TABLET ORAL EVERY 8 HOURS PRN
Qty: 30 TABLET | Refills: 0 | Status: SHIPPED | OUTPATIENT
Start: 2018-11-19 | End: 2018-12-03

## 2018-11-19 NOTE — TELEPHONE ENCOUNTER
Norco 5-325 MG       Last Written Prescription Date:  11/5/18  Last Fill Quantity: 30,   # refills: 0  Last Office Visit: *10/19/18  Future Office visit:       Routing refill request to provider for review/approval because:  Drug not on the [facility], [facility] or [facility] refill protocol or controlled substance

## 2018-11-26 DIAGNOSIS — M51.369 DDD (DEGENERATIVE DISC DISEASE), LUMBAR: ICD-10-CM

## 2018-11-26 NOTE — TELEPHONE ENCOUNTER
Tramadol 50 MG       Last Written Prescription Date:  10/19/18  Last Fill Quantity: 60,   # refills: 0  Last Office Visit: 10/19/18  Future Office visit:       Routing refill request to provider for review/approval because:  Drug not on the [facility], [facility] or [facility] refill protocol or controlled substance

## 2018-11-27 RX ORDER — TRAMADOL HYDROCHLORIDE 50 MG/1
TABLET ORAL
Qty: 60 TABLET | Refills: 0 | Status: SHIPPED | OUTPATIENT
Start: 2018-11-27 | End: 2019-01-04

## 2018-12-03 DIAGNOSIS — M51.369 DDD (DEGENERATIVE DISC DISEASE), LUMBAR: ICD-10-CM

## 2018-12-03 RX ORDER — OXYCODONE AND ACETAMINOPHEN 5; 325 MG/1; MG/1
1 TABLET ORAL EVERY 8 HOURS PRN
Qty: 30 TABLET | Refills: 0 | Status: SHIPPED | OUTPATIENT
Start: 2018-12-03 | End: 2018-12-17

## 2018-12-03 NOTE — TELEPHONE ENCOUNTER
Oxycodone       Last Written Prescription Date:  11/19/2018  Last Fill Quantity: 30,   # refills: 0  Last Office Visit: 10/19/2018  Future Office visit:       Routing refill request to provider for review/approval because:  Drug not on the [facility], [facility] or [facility] refill protocol or controlled substance

## 2018-12-11 DIAGNOSIS — N52.9 ERECTILE DYSFUNCTION, UNSPECIFIED ERECTILE DYSFUNCTION TYPE: ICD-10-CM

## 2018-12-12 NOTE — TELEPHONE ENCOUNTER
"Last Written Prescription Date:  10/19/18  Last Fill Quantity: 30,  # refills: 1   Last office visit: 10/19/2018 with prescribing provider:  Sylvester Cash   Future Office Visit:      Requested Prescriptions   Pending Prescriptions Disp Refills     sildenafil (REVATIO) 20 MG tablet [Pharmacy Med Name: SILDENAFIL CITRATE 20MG TABS] 30 tablet 1     Sig: TAKE ONE TO TWO TABLETS BY MOUTH ONCE DAILY AS NEEDED PRIOR TO SEXUAL ACTIVITY. NEVER USE WITH NITROGLYCERIN, TERAZOSIN OR DOXAZOSIN    Erectile Dysfuction Protocol Failed - 12/11/2018  1:02 AM       Failed - Absence of Alpha Blockers on Med list       Passed - Absence of nitrates on medication list       Passed - Recent (12 mo) or future (30 days) visit within the authorizing provider's specialty    Patient had office visit in the last 12 months or has a visit in the next 30 days with authorizing provider or within the authorizing provider's specialty.  See \"Patient Info\" tab in inbasket, or \"Choose Columns\" in Meds & Orders section of the refill encounter.             Passed - Patient is age 18 or older        Routing refill request to provider for review/approval because:  Alpha Blocker on med list    Candice Jeong RN         "

## 2018-12-13 RX ORDER — SILDENAFIL CITRATE 20 MG/1
TABLET ORAL
Qty: 30 TABLET | Refills: 1 | Status: SHIPPED | OUTPATIENT
Start: 2018-12-13 | End: 2019-11-08

## 2018-12-17 DIAGNOSIS — M51.369 DDD (DEGENERATIVE DISC DISEASE), LUMBAR: ICD-10-CM

## 2018-12-17 RX ORDER — OXYCODONE AND ACETAMINOPHEN 5; 325 MG/1; MG/1
1 TABLET ORAL EVERY 8 HOURS PRN
Qty: 30 TABLET | Refills: 0 | Status: SHIPPED | OUTPATIENT
Start: 2018-12-17 | End: 2019-01-02

## 2018-12-17 NOTE — TELEPHONE ENCOUNTER
Oxycodone       Last Written Prescription Date:  12/3/2018  Last Fill Quantity: 30,   # refills: 0  Last Office Visit: 10/19/2018  Future Office visit:       Routing refill request to provider for review/approval because:  Drug not on the [facility], [facility] or [facility] refill protocol or controlled substance

## 2018-12-31 ENCOUNTER — TELEPHONE (OUTPATIENT)
Dept: FAMILY MEDICINE | Facility: CLINIC | Age: 48
End: 2018-12-31

## 2018-12-31 DIAGNOSIS — M51.369 DDD (DEGENERATIVE DISC DISEASE), LUMBAR: ICD-10-CM

## 2018-12-31 NOTE — TELEPHONE ENCOUNTER
Percocet 5-325 MG       Last Written Prescription Date:  12/17/18  Last Fill Quantity: 30,   # refills: 0  Last Office Visit: 10/19/18  Future Office visit:       Routing refill request to provider for review/approval because:  Drug not on the [facility], [facility] or [facility] refill protocol or controlled substance

## 2019-01-02 RX ORDER — OXYCODONE AND ACETAMINOPHEN 5; 325 MG/1; MG/1
1 TABLET ORAL EVERY 8 HOURS PRN
Qty: 30 TABLET | Refills: 0 | Status: SHIPPED | OUTPATIENT
Start: 2019-01-02 | End: 2019-01-14

## 2019-01-04 DIAGNOSIS — M51.369 DDD (DEGENERATIVE DISC DISEASE), LUMBAR: ICD-10-CM

## 2019-01-04 RX ORDER — TRAMADOL HYDROCHLORIDE 50 MG/1
TABLET ORAL
Qty: 60 TABLET | Refills: 0 | Status: SHIPPED | OUTPATIENT
Start: 2019-01-04 | End: 2019-02-11

## 2019-01-04 NOTE — TELEPHONE ENCOUNTER
Tramadol 50 MG       Last Written Prescription Date:  11/27/18  Last Fill Quantity: 60,   # refills: 0  Last Office Visit: 10/19/18  Future Office visit:       Routing refill request to provider for review/approval because:  Drug not on the [facility], [facility] or [facility] refill protocol or controlled substance

## 2019-01-06 DIAGNOSIS — Z13.6 CARDIOVASCULAR SCREENING; LDL GOAL LESS THAN 160: ICD-10-CM

## 2019-01-06 DIAGNOSIS — F33.0 MAJOR DEPRESSIVE DISORDER, RECURRENT EPISODE, MILD (H): ICD-10-CM

## 2019-01-07 RX ORDER — ATORVASTATIN CALCIUM 10 MG/1
TABLET, FILM COATED ORAL
Qty: 30 TABLET | Refills: 3 | Status: SHIPPED | OUTPATIENT
Start: 2019-01-07 | End: 2019-04-29

## 2019-01-07 RX ORDER — ARIPIPRAZOLE 5 MG/1
TABLET ORAL
Qty: 30 TABLET | Refills: 2 | Status: SHIPPED | OUTPATIENT
Start: 2019-01-07 | End: 2019-04-04

## 2019-01-07 NOTE — TELEPHONE ENCOUNTER
Atorvastatin Last Written Prescription Date:  11/12/18  Last Fill Quantity: 30,  # refills: 1   Last office visit: 10/19/2018 with prescribing provider:  Sylvester Smith Office Visit:      Aripiprazole Last Written Prescription Date:  10/19/18  Last Fill Quantity: 30,  # refills: 2   Last office visit: 10/19/2018 with prescribing provider:  Sylvester Smith Office Visit:      Requested Prescriptions   Pending Prescriptions Disp Refills     ARIPiprazole (ABILIFY) 5 MG tablet [Pharmacy Med Name: ARIPIPRAZOLE 5MG TABS] 30 tablet 2     Sig: TAKE ONE TABLET BY MOUTH AT BEDTIME    Antipsychotic Medications Passed - 1/6/2019  1:03 AM       Passed - Blood pressure under 140/90 in past 12 months    BP Readings from Last 3 Encounters:   10/19/18 124/72   06/08/18 112/74   12/22/17 130/84                Passed - Patient is 12 years of age or older       Passed - Lipid panel on file within the past 12 months    Recent Labs   Lab Test 10/19/18  0847  07/11/14  0823   CHOL 224*   < > 193   TRIG 77   < > 131   HDL 57   < > 41   *   < > 126   NHDL 167*   < >  --    VLDL  --   --  26   CHOLHDLRATIO  --   --  5.0    < > = values in this interval not displayed.              Passed - CBC on file in past 12 months    Recent Labs   Lab Test 10/19/18  0847   WBC 5.5   RBC 5.18   HGB 13.6   HCT 43.1                   Passed - Heart Rate on file within past 12 months    Pulse Readings from Last 3 Encounters:   10/19/18 94   06/08/18 110   12/22/17 99              Passed - A1c or Glucose on file in past 12 months    Recent Labs   Lab Test 10/19/18  0847 10/06/17  0811   GLC 98 105*   A1C  --  5.3       Please review patients last 3 weights. If a weight gain of >10 lbs exists, you may refill the prescription once after instructing the patient to schedule an appointment within the next 30 days.    Wt Readings from Last 3 Encounters:   10/19/18 110.2 kg (242 lb 14.4 oz)   06/08/18 112.5 kg (248 lb)   12/22/17 110.2  "kg (243 lb)            Passed - Medication is active on med list       Passed - Recent (6 mo) or future (30 days) visit within the authorizing provider's specialty    Patient had office visit in the last 6 months or has a visit in the next 30 days with authorizing provider or within the authorizing provider's specialty.  See \"Patient Info\" tab in inbasket, or \"Choose Columns\" in Meds & Orders section of the refill encounter.            atorvastatin (LIPITOR) 10 MG tablet [Pharmacy Med Name: ATORVASTATIN 10MG TABS] 30 tablet 1     Sig: TAKE ONE TABLET BY MOUTH ONCE DAILY    Statins Protocol Passed - 1/6/2019  1:03 AM       Passed - LDL on file in past 12 months    Recent Labs   Lab Test 10/19/18  0847   *            Passed - No abnormal creatine kinase in past 12 months    Recent Labs   Lab Test 09/18/15  1003                  Passed - Recent (12 mo) or future (30 days) visit within the authorizing provider's specialty    Patient had office visit in the last 12 months or has a visit in the next 30 days with authorizing provider or within the authorizing provider's specialty.  See \"Patient Info\" tab in inbasket, or \"Choose Columns\" in Meds & Orders section of the refill encounter.             Passed - Medication is active on med list       Passed - Patient is age 18 or older        PHQ-9 SCORE 4/14/2017 10/19/2018 10/19/2018   PHQ-9 Total Score - - -   PHQ-9 Total Score MyChart - - 18 (Moderately severe depression)   PHQ-9 Total Score 7 18 20       Atorvastatin Prescription approved per [facility] Refill Protocol.  Aripiprazole Routing refill request to provider for review/approval because:  Labs out of range:  PHQ-9    Candice Jeong RN        "

## 2019-01-11 ENCOUNTER — ANTICOAGULATION THERAPY VISIT (OUTPATIENT)
Dept: ANTICOAGULATION | Facility: CLINIC | Age: 49
End: 2019-01-11
Payer: COMMERCIAL

## 2019-01-11 DIAGNOSIS — I82.4Y9 DEEP VEIN THROMBOSIS (DVT) OF PROXIMAL LOWER EXTREMITY, UNSPECIFIED CHRONICITY, UNSPECIFIED LATERALITY (H): ICD-10-CM

## 2019-01-11 LAB — INR POINT OF CARE: 2.2 (ref 0.86–1.14)

## 2019-01-11 PROCEDURE — 99207 ZZC NO CHARGE NURSE ONLY: CPT

## 2019-01-11 PROCEDURE — 36416 COLLJ CAPILLARY BLOOD SPEC: CPT

## 2019-01-11 PROCEDURE — 85610 PROTHROMBIN TIME: CPT | Mod: QW

## 2019-01-11 RX ORDER — WARFARIN SODIUM 5 MG/1
TABLET ORAL
Qty: 100 TABLET | Refills: 3 | COMMUNITY
Start: 2019-01-11 | End: 2019-07-12

## 2019-01-11 NOTE — PROGRESS NOTES
ANTICOAGULATION FOLLOW-UP CLINIC VISIT    Patient Name:  Hollis Diggs  Date:  2019  Contact Type:  Face to Face    SUBJECTIVE:     Patient Findings     Positives:   Med error (pt states he has been taking 7.5 mg daily)           OBJECTIVE    INR Protime   Date Value Ref Range Status   2019 2.2 (A) 0.86 - 1.14 Final     Factor 2 Assay   Date Value Ref Range Status   2015 33 (L) 60 - 140 % Final     Comment:     Analyte Specific Reagents (ASRs) are used in many laboratory tests necessary   for   standard medical care and generally do not require FDA approval.  This test   was   developed and its performance characteristics determined by [facility].  It has not been cleared or approved by   the US Food and Drug Administration.         ASSESSMENT / PLAN  INR assessment THER    Recheck INR In: 8 WEEKS    INR Location Clinic      Anticoagulation Summary  As of 2019    INR goal:   2.0-3.0   TTR:   63.6 % (2 y)   INR used for dosin.2 (2019)   Warfarin maintenance plan:   7.5 mg (5 mg x 1.5) every day   Full warfarin instructions:   7.5 mg every day   Weekly warfarin total:   52.5 mg   Plan last modified:   Lilliam Pro RN (2019)   Next INR check:   3/8/2019   Target end date:       Indications    DVT (deep venous thrombosis) (H) (Resolved) [I82.409]  Long-term (current) use of anticoagulants [Z79.01] [Z79.01]             Anticoagulation Episode Summary     INR check location:       Preferred lab:       Send INR reminders to:   FELICIA CROOKS    Comments:   5 mg, dosing card, PM dose      Anticoagulation Care Providers     Provider Role Specialty Phone number    Sylvester Cash MD [facility] [phone]            See the Encounter Report to view Anticoagulation Flowsheet and Dosing Calendar (Go to Encounters tab in chart review, and find the Anticoagulation Therapy Visit)    Dosage adjustment made based on  physician directed care plan.      Lilliam Pro RN

## 2019-01-14 DIAGNOSIS — M51.369 DDD (DEGENERATIVE DISC DISEASE), LUMBAR: ICD-10-CM

## 2019-01-14 RX ORDER — OXYCODONE AND ACETAMINOPHEN 5; 325 MG/1; MG/1
1 TABLET ORAL EVERY 8 HOURS PRN
Qty: 30 TABLET | Refills: 0 | Status: SHIPPED | OUTPATIENT
Start: 2019-01-14 | End: 2019-01-25

## 2019-01-14 NOTE — TELEPHONE ENCOUNTER
Percocet 5-325 MG       Last Written Prescription Date:  1/2/19  Last Fill Quantity: 30,   # refills: 0  Last Office Visit: 10/19/18  Future Office visit:       Routing refill request to provider for review/approval because:  Drug not on the [facility], [facility] or [facility] refill protocol or controlled substance

## 2019-01-24 DIAGNOSIS — M51.369 DDD (DEGENERATIVE DISC DISEASE), LUMBAR: ICD-10-CM

## 2019-01-25 RX ORDER — OXYCODONE AND ACETAMINOPHEN 5; 325 MG/1; MG/1
1 TABLET ORAL EVERY 8 HOURS PRN
Qty: 30 TABLET | Refills: 0 | Status: SHIPPED | OUTPATIENT
Start: 2019-01-25 | End: 2019-02-04

## 2019-02-04 DIAGNOSIS — M51.369 DDD (DEGENERATIVE DISC DISEASE), LUMBAR: ICD-10-CM

## 2019-02-04 RX ORDER — OXYCODONE AND ACETAMINOPHEN 5; 325 MG/1; MG/1
1 TABLET ORAL EVERY 8 HOURS PRN
Qty: 30 TABLET | Refills: 0 | Status: SHIPPED | OUTPATIENT
Start: 2019-02-04 | End: 2019-02-18

## 2019-02-04 NOTE — TELEPHONE ENCOUNTER
Signed original Rx faxed to [facility] and put in bin at  to be picked up by Pharmacy. Shakira,

## 2019-02-04 NOTE — TELEPHONE ENCOUNTER
Oxycodone-acetaminophen      Last Written Prescription Date:  01/25/2019  Last Fill Quantity: 30,   # refills: 0  Last Office Visit: 10/19/2018  Future Office visit:       Routing refill request to provider for review/approval because:  Drug not on the [facility], [facility] or [facility] refill protocol or controlled substance    Laura Das MA 2/4/2019  11:37 AM

## 2019-02-11 DIAGNOSIS — M51.369 DDD (DEGENERATIVE DISC DISEASE), LUMBAR: ICD-10-CM

## 2019-02-11 RX ORDER — TRAMADOL HYDROCHLORIDE 50 MG/1
TABLET ORAL
Qty: 60 TABLET | Refills: 0 | Status: SHIPPED | OUTPATIENT
Start: 2019-02-11 | End: 2019-03-15

## 2019-02-11 NOTE — TELEPHONE ENCOUNTER
Tramadol 50 MG       Last Written Prescription Date:  1/4/19  Last Fill Quantity: 60,   # refills: 0  Last Office Visit: 10/19/18  Future Office visit:       Routing refill request to provider for review/approval because:  Drug not on the [facility], P or [facility] refill protocol or controlled substance

## 2019-02-18 DIAGNOSIS — M51.369 DDD (DEGENERATIVE DISC DISEASE), LUMBAR: ICD-10-CM

## 2019-02-18 RX ORDER — OXYCODONE AND ACETAMINOPHEN 5; 325 MG/1; MG/1
1 TABLET ORAL EVERY 8 HOURS PRN
Qty: 30 TABLET | Refills: 0 | Status: SHIPPED | OUTPATIENT
Start: 2019-02-18 | End: 2019-03-01

## 2019-02-18 NOTE — TELEPHONE ENCOUNTER
Oxycodone       Last Written Prescription Date:  2/4/2019  Last Fill Quantity: 30,   # refills: 0  Last Office Visit: 10/19/2018  Future Office visit:       Routing refill request to provider for review/approval because:  Drug not on the [facility], [facility] or [facility] refill protocol or controlled substance

## 2019-02-28 DIAGNOSIS — M51.369 DDD (DEGENERATIVE DISC DISEASE), LUMBAR: ICD-10-CM

## 2019-02-28 NOTE — TELEPHONE ENCOUNTER
Oxycodone      Last Written Prescription Date:  02/18/2019  Last Fill Quantity: 30,   # refills: 0  Last Office Visit: 10/19/2018  Future Office visit:       Routing refill request to provider for review/approval because:  Drug not on the [facility], [facility] or [facility] refill protocol or controlled substance    Laura Das MA 2/28/2019  3:55 PM

## 2019-03-01 RX ORDER — OXYCODONE AND ACETAMINOPHEN 5; 325 MG/1; MG/1
1 TABLET ORAL EVERY 8 HOURS PRN
Qty: 30 TABLET | Refills: 0 | Status: SHIPPED | OUTPATIENT
Start: 2019-03-01 | End: 2019-03-11

## 2019-03-08 ENCOUNTER — TELEPHONE (OUTPATIENT)
Dept: ANTICOAGULATION | Facility: CLINIC | Age: 49
End: 2019-03-08

## 2019-03-08 ENCOUNTER — ANTICOAGULATION THERAPY VISIT (OUTPATIENT)
Dept: ANTICOAGULATION | Facility: CLINIC | Age: 49
End: 2019-03-08
Payer: COMMERCIAL

## 2019-03-08 DIAGNOSIS — I82.409 DVT (DEEP VENOUS THROMBOSIS) (H): Primary | ICD-10-CM

## 2019-03-08 LAB — INR POINT OF CARE: 2.2 (ref 0.9–1.1)

## 2019-03-08 PROCEDURE — 36416 COLLJ CAPILLARY BLOOD SPEC: CPT

## 2019-03-08 PROCEDURE — 85610 PROTHROMBIN TIME: CPT | Mod: QW

## 2019-03-08 NOTE — PROGRESS NOTES
ANTICOAGULATION FOLLOW-UP CLINIC VISIT    Patient Name:  Hollis Diggs  Date:  3/8/2019  Contact Type:  Face to Face    SUBJECTIVE:     Patient Findings     Positives:   No Problem Findings           OBJECTIVE    INR Protime   Date Value Ref Range Status   2019 2.2 (A) 0.9 - 1.1 Final     Factor 2 Assay   Date Value Ref Range Status   2015 33 (L) 60 - 140 % Final     Comment:     Analyte Specific Reagents (ASRs) are used in many laboratory tests necessary   for   standard medical care and generally do not require FDA approval.  This test   was   developed and its performance characteristics determined by [facility].  It has not been cleared or approved by   the US Food and Drug Administration.         ASSESSMENT / PLAN  INR assessment THER    Recheck INR In: 8 WEEKS    INR Location Clinic      Anticoagulation Summary  As of 3/8/2019    INR goal:   2.0-3.0   TTR:   66.2 % (2.1 y)   INR used for dosin.2 (3/8/2019)   Warfarin maintenance plan:   7.5 mg (5 mg x 1.5) every day   Full warfarin instructions:   7.5 mg every day   Weekly warfarin total:   52.5 mg   No change documented:   Lilliam Pro, RN   Plan last modified:   Lilliam Pro RN (2019)   Next INR check:   5/10/2019   Target end date:       Indications    DVT (deep venous thrombosis) (H) (Resolved) [I82.409]  Long-term (current) use of anticoagulants [Z79.01] [Z79.01]             Anticoagulation Episode Summary     INR check location:       Preferred lab:       Send INR reminders to:   FELICIA CROOKS    Comments:   5 mg, dosing card, PM dose      Anticoagulation Care Providers     Provider Role Specialty Phone number    Sylvester Cash MD [facility] [phone]            See the Encounter Report to view Anticoagulation Flowsheet and Dosing Calendar (Go to Encounters tab in chart review, and find the Anticoagulation Therapy Visit)    Dosage adjustment made  based on physician directed care plan.    Lilliam Pro RN

## 2019-03-11 DIAGNOSIS — M51.369 DDD (DEGENERATIVE DISC DISEASE), LUMBAR: ICD-10-CM

## 2019-03-11 RX ORDER — OXYCODONE AND ACETAMINOPHEN 5; 325 MG/1; MG/1
1 TABLET ORAL EVERY 8 HOURS PRN
Qty: 30 TABLET | Refills: 0 | Status: SHIPPED | OUTPATIENT
Start: 2019-03-11 | End: 2019-03-21

## 2019-03-11 NOTE — TELEPHONE ENCOUNTER
Oxycodone       Last Written Prescription Date:  3/1/2019  Last Fill Quantity: 30,   # refills: 0  Last Office Visit: 10/19/2018  Future Office visit:       Routing refill request to provider for review/approval because:  Drug not on the [facility], [facility] or [facility] refill protocol or controlled substance

## 2019-03-11 NOTE — TELEPHONE ENCOUNTER
Signed original Rx faxed to [facility] and put in bin at  to be picked up by Pharmacy. Shakira,

## 2019-03-15 DIAGNOSIS — M51.369 DDD (DEGENERATIVE DISC DISEASE), LUMBAR: ICD-10-CM

## 2019-03-15 RX ORDER — TRAMADOL HYDROCHLORIDE 50 MG/1
TABLET ORAL
Qty: 60 TABLET | Refills: 0 | Status: SHIPPED | OUTPATIENT
Start: 2019-03-15 | End: 2019-04-17

## 2019-03-15 NOTE — TELEPHONE ENCOUNTER
Tramadol 50 MG       Last Written Prescription Date:  2/11/19  Last Fill Quantity: 60,   # refills: 0  Last Office Visit: 10/19/18  Future Office visit:       Routing refill request to provider for review/approval because:  Drug not on the [facility], [facility] or [facility] refill protocol or controlled substance

## 2019-03-21 DIAGNOSIS — M51.369 DDD (DEGENERATIVE DISC DISEASE), LUMBAR: ICD-10-CM

## 2019-03-21 RX ORDER — OXYCODONE AND ACETAMINOPHEN 5; 325 MG/1; MG/1
1 TABLET ORAL EVERY 8 HOURS PRN
Qty: 30 TABLET | Refills: 0 | Status: SHIPPED | OUTPATIENT
Start: 2019-03-21 | End: 2019-04-03

## 2019-03-21 NOTE — TELEPHONE ENCOUNTER
Percocet 5-325 MG       Last Written Prescription Date:  3/11/19  Last Fill Quantity: 30,   # refills: 0  Last Office Visit: 10/19/18  Future Office visit:       Routing refill request to provider for review/approval because:  Drug not on the [facility], [facility] or [facility] refill protocol or controlled substance

## 2019-04-03 DIAGNOSIS — M51.369 DDD (DEGENERATIVE DISC DISEASE), LUMBAR: ICD-10-CM

## 2019-04-03 RX ORDER — OXYCODONE AND ACETAMINOPHEN 5; 325 MG/1; MG/1
1 TABLET ORAL EVERY 8 HOURS PRN
Qty: 30 TABLET | Refills: 0 | Status: SHIPPED | OUTPATIENT
Start: 2019-04-03 | End: 2019-04-15

## 2019-04-03 NOTE — TELEPHONE ENCOUNTER
Percocet 5-325 MG       Last Written Prescription Date:  3/21/19  Last Fill Quantity: 30,   # refills: 0  Last Office Visit: 10/19/18  Future Office visit:       Routing refill request to provider for review/approval because:  Drug not on the [facility], [facility] or [facility] refill protocol or controlled substance

## 2019-04-04 DIAGNOSIS — F33.0 MAJOR DEPRESSIVE DISORDER, RECURRENT EPISODE, MILD (H): ICD-10-CM

## 2019-04-05 NOTE — TELEPHONE ENCOUNTER
aripiprazole  Last Written Prescription Date:  1/7/2019  Last Fill Quantity: 30,  # refills: 2   Last office visit: 10/19/2018 with prescribing provider:      Future Office Visit:      Requested Prescriptions   Pending Prescriptions Disp Refills     ARIPiprazole (ABILIFY) 5 MG tablet [Pharmacy Med Name: ARIPIPRAZOLE 5MG TABS] 30 tablet 2     Sig: TAKE ONE TABLET BY MOUTH AT BEDTIME    Antipsychotic Medications Passed - 4/4/2019  1:04 AM       Passed - Blood pressure under 140/90 in past 12 months    BP Readings from Last 3 Encounters:   10/19/18 124/72   06/08/18 112/74   12/22/17 130/84          Passed - Patient is 12 years of age or older       Passed - Lipid panel on file within the past 12 months    Recent Labs   Lab Test 10/19/18  0847  07/11/14  0823   CHOL 224*   < > 193   TRIG 77   < > 131   HDL 57   < > 41   *   < > 126   NHDL 167*   < >  --    VLDL  --   --  26   CHOLHDLRATIO  --   --  5.0    < > = values in this interval not displayed.          Passed - CBC on file in past 12 months    Recent Labs   Lab Test 10/19/18  0847   WBC 5.5   RBC 5.18   HGB 13.6   HCT 43.1             Passed - Heart Rate on file within past 12 months    Pulse Readings from Last 3 Encounters:   10/19/18 94   06/08/18 110   12/22/17 99          Passed - A1c or Glucose on file in past 12 months    Recent Labs   Lab Test 10/19/18  0847 10/06/17  0811   GLC 98 105*   A1C  --  5.3   Please review patients last 3 weights. If a weight gain of >10 lbs exists, you may refill the prescription once after instructing the patient to schedule an appointment within the next 30 days.    Wt Readings from Last 3 Encounters:   10/19/18 110.2 kg (242 lb 14.4 oz)   06/08/18 112.5 kg (248 lb)   12/22/17 110.2 kg (243 lb)          Passed - Medication is active on med list       Passed - Recent (6 mo) or future (30 days) visit within the authorizing provider's specialty    Patient had office visit in the last 6 months or has a visit in the  "next 30 days with authorizing provider or within the authorizing provider's specialty.  See \"Patient Info\" tab in inbasket, or \"Choose Columns\" in Meds & Orders section of the refill encounter.              PHQ-9 SCORE 4/14/2017 10/19/2018 10/19/2018   PHQ-9 Total Score - - -   PHQ-9 Total Score MyChart - - 18 (Moderately severe depression)   PHQ-9 Total Score 7 18 20     Routing refill request to provider for review/approval because:  Unable to fill per RN protocol due to PHQ-9 >5.  Also due for depression check with provider. Sending to scheduling for outreach.      Amanda Cash RN on 4/5/2019 at 4:05 PM    "

## 2019-04-07 RX ORDER — ARIPIPRAZOLE 5 MG/1
TABLET ORAL
Qty: 30 TABLET | Refills: 0 | Status: SHIPPED | OUTPATIENT
Start: 2019-04-07 | End: 2019-05-06

## 2019-04-15 DIAGNOSIS — M51.369 DDD (DEGENERATIVE DISC DISEASE), LUMBAR: ICD-10-CM

## 2019-04-15 RX ORDER — OXYCODONE AND ACETAMINOPHEN 5; 325 MG/1; MG/1
1 TABLET ORAL EVERY 8 HOURS PRN
Qty: 30 TABLET | Refills: 0 | Status: SHIPPED | OUTPATIENT
Start: 2019-04-15 | End: 2019-04-17

## 2019-04-15 NOTE — TELEPHONE ENCOUNTER
Percocet 5-325 MG       Last Written Prescription Date:  4/3/19  Last Fill Quantity: 30,   # refills: 0  Last Office Visit: 10/19/18  Future Office visit:    Next 5 appointments (look out 90 days)    Apr 17, 2019  4:20 PM CDT  [facility] [address] [place] [id number]  [phone]           Routing refill request to provider for review/approval because:  Drug not on the [facility], [facility] or  Health refill protocol or controlled substance

## 2019-04-17 ENCOUNTER — OFFICE VISIT (OUTPATIENT)
Dept: FAMILY MEDICINE | Facility: CLINIC | Age: 49
End: 2019-04-17
Payer: COMMERCIAL

## 2019-04-17 VITALS
SYSTOLIC BLOOD PRESSURE: 134 MMHG | HEART RATE: 118 BPM | RESPIRATION RATE: 10 BRPM | HEIGHT: 73 IN | OXYGEN SATURATION: 100 % | WEIGHT: 243.8 LBS | BODY MASS INDEX: 32.31 KG/M2 | DIASTOLIC BLOOD PRESSURE: 86 MMHG | TEMPERATURE: 96.6 F

## 2019-04-17 DIAGNOSIS — M51.369 DDD (DEGENERATIVE DISC DISEASE), LUMBAR: ICD-10-CM

## 2019-04-17 DIAGNOSIS — F33.0 MAJOR DEPRESSIVE DISORDER, RECURRENT EPISODE, MILD (H): Primary | ICD-10-CM

## 2019-04-17 DIAGNOSIS — L90.8 SKIN AGING: ICD-10-CM

## 2019-04-17 PROCEDURE — 99214 OFFICE O/P EST MOD 30 MIN: CPT | Performed by: FAMILY MEDICINE

## 2019-04-17 RX ORDER — OXYCODONE AND ACETAMINOPHEN 5; 325 MG/1; MG/1
1 TABLET ORAL EVERY 8 HOURS PRN
Qty: 30 TABLET | Refills: 0 | Status: SHIPPED | OUTPATIENT
Start: 2019-04-17 | End: 2019-04-17

## 2019-04-17 RX ORDER — DULOXETIN HYDROCHLORIDE 30 MG/1
CAPSULE, DELAYED RELEASE ORAL
Qty: 270 CAPSULE | Refills: 1 | Status: SHIPPED | OUTPATIENT
Start: 2019-04-17 | End: 2019-10-18

## 2019-04-17 RX ORDER — TRAMADOL HYDROCHLORIDE 50 MG/1
TABLET ORAL
Qty: 60 TABLET | Refills: 0 | Status: SHIPPED | OUTPATIENT
Start: 2019-04-17 | End: 2019-05-17

## 2019-04-17 RX ORDER — LAMOTRIGINE 300 MG/1
1 TABLET, EXTENDED RELEASE ORAL DAILY
Qty: 90 TABLET | Refills: 1 | Status: SHIPPED | OUTPATIENT
Start: 2019-04-17 | End: 2019-11-08

## 2019-04-17 RX ORDER — TRETINOIN 0.5 MG/G
CREAM TOPICAL
Qty: 45 G | Refills: 3 | Status: ON HOLD | OUTPATIENT
Start: 2019-04-17 | End: 2020-06-23

## 2019-04-17 ASSESSMENT — PAIN SCALES - GENERAL: PAINLEVEL: NO PAIN (0)

## 2019-04-17 ASSESSMENT — MIFFLIN-ST. JEOR: SCORE: 2024.75

## 2019-04-17 NOTE — PROGRESS NOTES
SUBJECTIVE:   Hollis Diggs is a 48 year old male who presents to clinic today for the following   health issues:        Medication Followup of duloxetine and ariprazole    Taking Medication as prescribed: yes    Side Effects:  None    Medication Helping Symptoms:  yes           Additional history: as documented    Reviewed  and updated as needed this visit by clinical staff         Reviewed and updated as needed this visit by Provider       SUBJECTIVE:  Hollis  is a 48 year old male who presents for: Follow-up of his anxiety and depression.  Also has chronic back pain.  He uses fairly regular oxycodone and tramadol.  He takes Cymbalta Lamictal Abilify and Elavil at bedtime.  Mental health seems to be stable.  He is going to be needing a new psychiatrist or psychiatric nurse practitioner soon as his has left the [facility].    Past Medical History:   Diagnosis Date     Antiphospholipid syndrome (H)      Arthritis      Asthma     Exercise     blood clot in leg      Depressive disorder, not elsewhere classified     Depression (non-psychotic)     ANKIT (generalised anxiety disorder)      HH (hiatus hernia)      Hypercholesteremia     normal with weight loss 3/09     Lumbar disc herniation 1992     Seronegative rheumatoid arthritis (H)      Past Surgical History:   Procedure Laterality Date     APPENDECTOMY       C NONSPECIFIC PROCEDURE  91 or 92    back surgery. lumbar. lamiectomy     COLONOSCOPY N/A 8/2/2016    Procedure: COMBINED COLONOSCOPY, SINGLE OR MULTIPLE BIOPSY/POLYPECTOMY BY BIOPSY;  Surgeon: Sydnee Walton MD;  Location: [facility] OR     COLONOSCOPY WITH CO2 INSUFFLATION N/A 8/2/2016    Procedure: COLONOSCOPY WITH CO2 INSUFFLATION;  Surgeon: Sydnee Walton MD;  Location: [facility] OR     COMBINED ESOPHAGOSCOPY, GASTROSCOPY, DUODENOSCOPY (EGD) WITH CO2 INSUFFLATION N/A 8/2/2016    Procedure: COMBINED ESOPHAGOSCOPY, GASTROSCOPY, DUODENOSCOPY (EGD) WITH CO2 INSUFFLATION;  Surgeon: Isabelle  Sydene Albert MD;  Location: [facility] OR     ESOPHAGOSCOPY, GASTROSCOPY, DUODENOSCOPY (EGD), COMBINED N/A 8/2/2016    Procedure: COMBINED ESOPHAGOSCOPY, GASTROSCOPY, DUODENOSCOPY (EGD), BIOPSY SINGLE OR MULTIPLE;  Surgeon: Sydnee Walton MD;  Location: [facility] OR     HC REMOVAL OF TONSILS,<12 Y/O      Tonsils <11y.o.     HC REPAIR INCISIONAL HERNIA,REDUCIBLE  1970's    Hernia Repair, Incisional, Unilateral     HC UGI ENDOSCOPY DIAG W BIOPSY  02/01/06     HC VASECTOMY UNILAT/BILAT W POSTOP SEMEN  1/05    Vasectomy     History back lumbar laminectomy       Social History     Tobacco Use     Smoking status: Never Smoker     Smokeless tobacco: Never Used   Substance Use Topics     Alcohol use: No     Comment: rare     Current Outpatient Medications   Medication Sig Dispense Refill     albuterol (PROAIR HFA/PROVENTIL HFA/VENTOLIN HFA) 108 (90 Base) MCG/ACT inhaler Inhale 2 puffs into the lungs every 4 hours as needed for shortness of breath / dyspnea 3 Inhaler 1     amitriptyline (ELAVIL) 10 MG tablet Take 1 tablet (10 mg) by mouth At Bedtime 60 tablet 6     ARIPiprazole (ABILIFY) 5 MG tablet TAKE ONE TABLET BY MOUTH AT BEDTIME 30 tablet 0     atorvastatin (LIPITOR) 10 MG tablet TAKE ONE TABLET BY MOUTH ONCE DAILY 30 tablet 3     DULoxetine (CYMBALTA) 30 MG capsule Three capsules once daily 270 capsule 1     finasteride (PROSCAR) 5 MG tablet Take 1 tablet (5 mg) by mouth daily 30 tablet 10     LamoTRIgine (LAMICTAL XR) 300 MG TB24 Take 1 tablet by mouth daily 90 tablet 1     sildenafil (REVATIO) 20 MG tablet TAKE ONE TO TWO TABLETS BY MOUTH ONCE DAILY AS NEEDED PRIOR TO SEXUAL ACTIVITY. NEVER USE WITH NITROGLYCERIN, TERAZOSIN OR DOXAZOSIN 30 tablet 1     traMADol (ULTRAM) 50 MG tablet Take 1 tablet as needed twice day for pain. Max 2 tab daily. No driving or alcohol use while taking medication. 60 tablet 0     tretinoin (RETIN-A) 0.05 % external cream Apply  topically At Bedtime. 45 g 3     warfarin (COUMADIN)  "5 MG tablet Take 7.5 mg daily, or as directed by the [facility]. 100 tablet 3       REVIEW OF SYSTEMS:   5 point ROS negative except as noted above in HPI, including Gen., Resp, CV, GI &  system review.     OBJECTIVE:  Vitals: /86   Pulse 118   Temp 96.6  F (35.9  C) (Temporal)   Resp 10   Ht 1.854 m (6' 1\")   Wt 110.6 kg (243 lb 12.8 oz)   SpO2 100%   BMI 32.17 kg/m    BMI= Body mass index is 32.17 kg/m .  He is alert appears in no distress.  Affect is quite bright today.  His back is some tenderness in the lower lumbar region.  Extremities with no edema.  Gait is regular.  Skin clear.    ASSESSMENT:  #1 depression #2 chronic back pain    PLAN:  Renewed his oxycodone and his tramadol.  We will fill his medications until we can set him up with our psychiatric clinic that is going to be starting here this summer.  He is willing to switch over.  Suggested follow-up this summer for 49-year-old physical and get things set up.        Sylvester Cash MD  [facility]                "

## 2019-04-19 RX ORDER — OXYCODONE AND ACETAMINOPHEN 5; 325 MG/1; MG/1
1 TABLET ORAL EVERY 8 HOURS PRN
Qty: 30 TABLET | Refills: 0 | COMMUNITY
Start: 2019-04-19 | End: 2019-04-29

## 2019-04-23 ENCOUNTER — TELEPHONE (OUTPATIENT)
Dept: FAMILY MEDICINE | Facility: CLINIC | Age: 49
End: 2019-04-23

## 2019-04-23 NOTE — TELEPHONE ENCOUNTER
Prior Authorization Retail Medication Request    Medication/Dose: lamoTRIgine ER 300MG er tablets    //   Key: [id number] - Rx #: [id number]    ICD code (if different than what is on RX):    Previously Tried and Failed:    Rationale:      Insurance Name:  [employer]     Insurance ID:  [id number]      Pharmacy Information (if different than what is on RX)  Name:    Phone:

## 2019-04-27 NOTE — TELEPHONE ENCOUNTER
Central Prior Authorization Team   Phone: [phone]      PA Initiation    Medication: lamoTRIgine ER 300MG er tablets  Insurance Company: [employer] - Phone [phone] Fax [phone]  Pharmacy Filling the Rx: [facility] - [place] - [address]  Filling Pharmacy Phone: [phone]  Filling Pharmacy Fax:    Start Date: 4/27/2019

## 2019-04-27 NOTE — TELEPHONE ENCOUNTER
Prior Authorization Approval    Authorization Effective Date: 3/27/2019  Authorization Expiration Date: 4/27/2024  Medication: lamoTRIgine ER 300MG er tablets  Approved Dose/Quantity:    Reference #: [id number]   Insurance Company: [email or web address] - Phone [phone] Fax [phone]  Expected CoPay:       CoPay Card Available:      Foundation Assistance Needed:    Which Pharmacy is filling the prescription (Not needed for infusion/clinic administered): [facility] [place] - [facility] [address]  Pharmacy Notified: Yes  Patient Notified: Yes

## 2019-04-29 DIAGNOSIS — Z13.6 CARDIOVASCULAR SCREENING; LDL GOAL LESS THAN 160: ICD-10-CM

## 2019-04-29 DIAGNOSIS — M51.369 DDD (DEGENERATIVE DISC DISEASE), LUMBAR: ICD-10-CM

## 2019-04-29 RX ORDER — OXYCODONE AND ACETAMINOPHEN 5; 325 MG/1; MG/1
1 TABLET ORAL EVERY 8 HOURS PRN
Qty: 30 TABLET | Refills: 0 | Status: SHIPPED | OUTPATIENT
Start: 2019-04-29 | End: 2019-05-08

## 2019-04-29 NOTE — TELEPHONE ENCOUNTER
Oxycodone       Last Written Prescription Date:  4/19/2019  Last Fill Quantity: 30,   # refills: 0  Last Office Visit: 4/17/2019  Future Office visit:       Routing refill request to provider for review/approval because:  Drug not on the [facility], [facility] or [facility] refill protocol or controlled substance

## 2019-04-30 RX ORDER — ATORVASTATIN CALCIUM 10 MG/1
TABLET, FILM COATED ORAL
Qty: 30 TABLET | Refills: 4 | Status: SHIPPED | OUTPATIENT
Start: 2019-04-30 | End: 2019-09-13

## 2019-04-30 NOTE — TELEPHONE ENCOUNTER
"Prescription approved per RN refill protocol.  Dayana Blum RN, BSN    Lipitor   Last Written Prescription Date:  1/7/2019  Last Fill Quantity: 30,  # refills: 3   Last office visit: 4/17/2019 with prescribing provider:  jerson   Future Office Visit:      Requested Prescriptions   Pending Prescriptions Disp Refills     atorvastatin (LIPITOR) 10 MG tablet [Pharmacy Med Name: ATORVASTATIN CALCIUM 10MG TABS] 30 tablet 3     Sig: TAKE ONE TABLET BY MOUTH ONCE DAILY       Statins Protocol Passed - 4/29/2019  1:05 AM        Passed - LDL on file in past 12 months     Recent Labs   Lab Test 10/19/18  0847   *             Passed - No abnormal creatine kinase in past 12 months     Recent Labs   Lab Test 09/18/15  1003                   Passed - Recent (12 mo) or future (30 days) visit within the authorizing provider's specialty     Patient had office visit in the last 12 months or has a visit in the next 30 days with authorizing provider or within the authorizing provider's specialty.  See \"Patient Info\" tab in inbasket, or \"Choose Columns\" in Meds & Orders section of the refill encounter.              Passed - Medication is active on med list        Passed - Patient is age 18 or older          Dayana Blum RN on 4/30/2019 at 11:19 AM    "

## 2019-05-06 DIAGNOSIS — F33.0 MAJOR DEPRESSIVE DISORDER, RECURRENT EPISODE, MILD (H): ICD-10-CM

## 2019-05-08 DIAGNOSIS — M51.369 DDD (DEGENERATIVE DISC DISEASE), LUMBAR: ICD-10-CM

## 2019-05-08 RX ORDER — ARIPIPRAZOLE 5 MG/1
TABLET ORAL
Qty: 30 TABLET | Refills: 4 | Status: SHIPPED | OUTPATIENT
Start: 2019-05-08 | End: 2019-09-13

## 2019-05-08 RX ORDER — OXYCODONE AND ACETAMINOPHEN 5; 325 MG/1; MG/1
1 TABLET ORAL EVERY 8 HOURS PRN
Qty: 30 TABLET | Refills: 0 | Status: SHIPPED | OUTPATIENT
Start: 2019-05-08 | End: 2019-05-17

## 2019-05-08 NOTE — TELEPHONE ENCOUNTER
abilify  Last Written Prescription Date:  4/7/2019  Last Fill Quantity: 30,  # refills: 0   Last office visit: 4/17/2019 with prescribing provider:      Future Office Visit:      Requested Prescriptions   Pending Prescriptions Disp Refills     ARIPiprazole (ABILIFY) 5 MG tablet [Pharmacy Med Name: ARIPIPRAZOLE 5MG TABS] 30 tablet 0     Sig: TAKE ONE TABLET BY MOUTH AT BEDTIME       Antipsychotic Medications Passed - 5/7/2019  5:07 PM        Passed - Blood pressure under 140/90 in past 12 months     BP Readings from Last 3 Encounters:   04/17/19 134/86   10/19/18 124/72   06/08/18 112/74           Passed - Patient is 12 years of age or older        Passed - Lipid panel on file within the past 12 months     Recent Labs   Lab Test 10/19/18  0847  07/11/14  0823   CHOL 224*   < > 193   TRIG 77   < > 131   HDL 57   < > 41   *   < > 126   NHDL 167*   < >  --    VLDL  --   --  26   CHOLHDLRATIO  --   --  5.0    < > = values in this interval not displayed.           Passed - CBC on file in past 12 months     Recent Labs   Lab Test 10/19/18  0847   WBC 5.5   RBC 5.18   HGB 13.6   HCT 43.1              Passed - Heart Rate on file within past 12 months     Pulse Readings from Last 3 Encounters:   04/17/19 118   10/19/18 94   06/08/18 110           Passed - A1c or Glucose on file in past 12 months     Recent Labs   Lab Test 10/19/18  0847 10/06/17  0811   GLC 98 105*   A1C  --  5.3       Please review patients last 3 weights. If a weight gain of >10 lbs exists, you may refill the prescription once after instructing the patient to schedule an appointment within the next 30 days.    Wt Readings from Last 3 Encounters:   04/17/19 110.6 kg (243 lb 12.8 oz)   10/19/18 110.2 kg (242 lb 14.4 oz)   06/08/18 112.5 kg (248 lb)             Passed - Medication is active on med list        Passed - Recent (6 mo) or future (30 days) visit within the authorizing provider's specialty     Patient had office visit in the last 6  "months or has a visit in the next 30 days with authorizing provider or within the authorizing provider's specialty.  See \"Patient Info\" tab in inbasket, or \"Choose Columns\" in Meds & Orders section of the refill encounter.            PHQ-9 SCORE 4/14/2017 10/19/2018 10/19/2018   PHQ-9 Total Score - - -   PHQ-9 Total Score MyChart - - 18 (Moderately severe depression)   PHQ-9 Total Score 7 18 20     Routing refill request to provider for review/approval because:  Unable to fill per RN protocol due to PHQ-9 >5.  Amanda Cash RN on 5/8/2019 at 9:11 AM    "

## 2019-05-10 ENCOUNTER — ANTICOAGULATION THERAPY VISIT (OUTPATIENT)
Dept: ANTICOAGULATION | Facility: CLINIC | Age: 49
End: 2019-05-10
Payer: COMMERCIAL

## 2019-05-10 LAB — INR POINT OF CARE: 2.2 (ref 0.9–1.1)

## 2019-05-10 PROCEDURE — 85610 PROTHROMBIN TIME: CPT | Mod: QW

## 2019-05-10 PROCEDURE — 99207 ZZC NO CHARGE NURSE ONLY: CPT

## 2019-05-10 PROCEDURE — 36416 COLLJ CAPILLARY BLOOD SPEC: CPT

## 2019-05-10 NOTE — PROGRESS NOTES
ANTICOAGULATION FOLLOW-UP CLINIC VISIT    Patient Name:  oHllis Diggs  Date:  5/10/2019  Contact Type:  Face to Face    SUBJECTIVE:  Patient Findings     Comments:   The patient was assessed for diet, medication, and activity level changes, missed or extra doses, bruising or bleeding, with no problem findings.  Lilliam Pro RN          Clinical Outcomes     Negatives:   Major bleeding event, Thromboembolic event, Anticoagulation-related hospital admission, Anticoagulation-related ED visit, Anticoagulation-related fatality    Comments:   The patient was assessed for diet, medication, and activity level changes, missed or extra doses, bruising or bleeding, with no problem findings.  Lilliam Pro RN             OBJECTIVE    INR Protime   Date Value Ref Range Status   05/10/2019 2.2 (A) 0.9 - 1.1 Final     Factor 2 Assay   Date Value Ref Range Status   2015 33 (L) 60 - 140 % Final     Comment:     Analyte Specific Reagents (ASRs) are used in many laboratory tests necessary   for   standard medical care and generally do not require FDA approval.  This test   was   developed and its performance characteristics determined by [facility].  It has not been cleared or approved by   the US Food and Drug Administration.         ASSESSMENT / PLAN  INR assessment THER    Recheck INR In: 8 WEEKS    INR Location Clinic      Anticoagulation Summary  As of 5/10/2019    INR goal:   2.0-3.0   TTR:   68.8 % (2.3 y)   INR used for dosin.2 (5/10/2019)   Warfarin maintenance plan:   7.5 mg (5 mg x 1.5) every day   Full warfarin instructions:   7.5 mg every day   Weekly warfarin total:   52.5 mg   No change documented:   Lilliam Pro RN   Plan last modified:   Lilliam Pro RN (2019)   Next INR check:   2019   Target end date:       Indications    DVT (deep venous thrombosis) (H) (Resolved) [I82.409]  Long-term (current) use of anticoagulants  [Z79.01] [Z79.01]             Anticoagulation Episode Summary     INR check location:       Preferred lab:       Send INR reminders to:   FELICIA CROOKS    Comments:   5 mg, dosing card, PM dose      Anticoagulation Care Providers     Provider Role Specialty Phone number    Sylvester Cash MD [facility] [phone]            See the Encounter Report to view Anticoagulation Flowsheet and Dosing Calendar (Go to Encounters tab in chart review, and find the Anticoagulation Therapy Visit)    Dosage adjustment made based on physician directed care plan.      Lilliam Pro RN

## 2019-05-16 DIAGNOSIS — M51.369 DDD (DEGENERATIVE DISC DISEASE), LUMBAR: ICD-10-CM

## 2019-05-17 RX ORDER — OXYCODONE AND ACETAMINOPHEN 5; 325 MG/1; MG/1
1 TABLET ORAL EVERY 8 HOURS PRN
Qty: 30 TABLET | Refills: 0 | Status: SHIPPED | OUTPATIENT
Start: 2019-05-17 | End: 2019-05-31

## 2019-05-17 RX ORDER — TRAMADOL HYDROCHLORIDE 50 MG/1
TABLET ORAL
Qty: 60 TABLET | Refills: 0 | Status: SHIPPED | OUTPATIENT
Start: 2019-05-17 | End: 2019-06-19

## 2019-05-17 NOTE — TELEPHONE ENCOUNTER
Oxycodone Prescription brought to  for [facility].    Tramadol Prescription faxed to [facility].

## 2019-05-17 NOTE — TELEPHONE ENCOUNTER
Requested Prescriptions   Pending Prescriptions Disp Refills     oxyCODONE-acetaminophen (PERCOCET) 5-325 MG tablet 30 tablet 0     Sig: Take 1 tablet by mouth every 8 hours as needed for moderate to severe pain       There is no refill protocol information for this order   Last Written Prescription Date:  5/8/19  Last Fill Quantity: 30,  # refills: 0   Last office visit: 4/17/2019 with prescribing provider:     Future Office Visit:    Routing refill request to provider for review/approval because:  Drug not on the [facility] refill protocol            traMADol (ULTRAM) 50 MG tablet 60 tablet 0     Sig: Take 1 tablet as needed twice day for pain. Max 2 tab daily. No driving or alcohol use while taking medication.       There is no refill protocol information for this order      Last Written Prescription Date:  4/17/19  Last Fill Quantity: 60 # refills: 0  Last office visit: 4/17/2019 with prescribing provider:     Future Office Visit:    Routing refill request to provider for review/approval because:  Drug not on the [facility] refill protocol     Delia Morris RN

## 2019-05-31 DIAGNOSIS — M51.369 DDD (DEGENERATIVE DISC DISEASE), LUMBAR: ICD-10-CM

## 2019-05-31 RX ORDER — OXYCODONE AND ACETAMINOPHEN 5; 325 MG/1; MG/1
1 TABLET ORAL EVERY 8 HOURS PRN
Qty: 30 TABLET | Refills: 0 | Status: SHIPPED | OUTPATIENT
Start: 2019-05-31 | End: 2019-06-17

## 2019-05-31 NOTE — TELEPHONE ENCOUNTER
Script faxed to [facility] [phone] and put in black box up front.  aKren Mccarty MA

## 2019-05-31 NOTE — TELEPHONE ENCOUNTER
Requested Prescriptions   Pending Prescriptions Disp Refills     oxyCODONE-acetaminophen (PERCOCET) 5-325 MG tablet 30 tablet 0     Sig: Take 1 tablet by mouth every 8 hours as needed for moderate to severe pain       There is no refill protocol information for this order        Last Written Prescription Date:  5/17/2019  Last Fill Quantity: 30,  # refills: 0   Last office visit: 4/17/2019 with prescribing provider:     Future Office Visit:      Routing refill request to provider for review/approval because:  Drug not on the [facility] refill protocol      for DDD lumbar    Delia Morris RN

## 2019-06-05 ENCOUNTER — TELEPHONE (OUTPATIENT)
Dept: FAMILY MEDICINE | Facility: CLINIC | Age: 49
End: 2019-06-05

## 2019-06-05 NOTE — TELEPHONE ENCOUNTER
Patient is due for a PHQ-9.  Index start date:2/18/2019  Index end date:6/18/2019    Please call patient.

## 2019-06-12 DIAGNOSIS — M51.369 DDD (DEGENERATIVE DISC DISEASE), LUMBAR: Primary | ICD-10-CM

## 2019-06-12 NOTE — TELEPHONE ENCOUNTER
I have attempted to call the pt to update a PHQ-9. I left message for pt to call back. I will call back another time. Ekta Marroquin CMA ([facility])

## 2019-06-12 NOTE — TELEPHONE ENCOUNTER
Patient called back- attempted to reach out. Please call when able        Emy Cash ~ Patient Representative  [address] [address]  [email or web address]  [email or web address]  Office:  [phone]  Fax:  [phone]

## 2019-06-13 NOTE — TELEPHONE ENCOUNTER
Requested Prescriptions   Pending Prescriptions Disp Refills     oxyCODONE-acetaminophen (PERCOCET) 5-325 MG tablet 30 tablet 0     Sig: Take 1 tablet by mouth every 8 hours as needed for moderate to severe pain       There is no refill protocol information for this order        Last Written Prescription Date:  5/31/219  Last Fill Quantity: 30,  # refills: 0   Last office visit: 4/17/2019 with prescribing provider:     Future Office Visit:      Routing refill request to provider for review/approval because:  Drug not on the [facility] refill protocol     Delia Morris RN

## 2019-06-14 ASSESSMENT — PATIENT HEALTH QUESTIONNAIRE - PHQ9: SUM OF ALL RESPONSES TO PHQ QUESTIONS 1-9: 4

## 2019-06-14 NOTE — TELEPHONE ENCOUNTER
I have contacted pt and completed a PHQ-9.     PHQ-9 SCORE 6/14/2019   PHQ-9 Total Score -   PHQ-9 Total Score MyChart -   PHQ-9 Total Score 4     Ekta Marroquin CMA ([facility])

## 2019-06-17 DIAGNOSIS — M51.369 DDD (DEGENERATIVE DISC DISEASE), LUMBAR: Primary | ICD-10-CM

## 2019-06-17 RX ORDER — OXYCODONE AND ACETAMINOPHEN 5; 325 MG/1; MG/1
1 TABLET ORAL EVERY 8 HOURS PRN
Qty: 30 TABLET | Refills: 0 | Status: SHIPPED | OUTPATIENT
Start: 2019-06-17 | End: 2019-07-01

## 2019-06-18 NOTE — TELEPHONE ENCOUNTER
Message left for patient that written prescription was brought to [facility].    Brown Paris, BETHEL

## 2019-06-19 RX ORDER — TRAMADOL HYDROCHLORIDE 50 MG/1
TABLET ORAL
Qty: 60 TABLET | Refills: 0 | Status: SHIPPED | OUTPATIENT
Start: 2019-06-19 | End: 2019-07-25

## 2019-06-19 NOTE — TELEPHONE ENCOUNTER
ULTRAM 50 mg   Last Written Prescription Date:  5/17/19  Last Fill Quantity: 60,  # refills: 0   Last office visit: 4/17/2019 with prescribing provider:     Future Office Visit:  NONE  DX: Degenerative Disc Disease  Routing refill request to provider for review/approval because:  Drug not on the [facility] refill protocol   QUENTIN Zimmerman

## 2019-06-28 DIAGNOSIS — M51.369 DDD (DEGENERATIVE DISC DISEASE), LUMBAR: ICD-10-CM

## 2019-06-28 DIAGNOSIS — I82.4Y9 DEEP VEIN THROMBOSIS (DVT) OF PROXIMAL LOWER EXTREMITY, UNSPECIFIED CHRONICITY, UNSPECIFIED LATERALITY (H): ICD-10-CM

## 2019-06-28 RX ORDER — WARFARIN SODIUM 5 MG/1
TABLET ORAL
Qty: 130 TABLET | Refills: 1 | Status: SHIPPED | OUTPATIENT
Start: 2019-06-28 | End: 2020-01-03

## 2019-07-01 RX ORDER — OXYCODONE AND ACETAMINOPHEN 5; 325 MG/1; MG/1
1 TABLET ORAL EVERY 8 HOURS PRN
Qty: 30 TABLET | Refills: 0 | Status: SHIPPED | OUTPATIENT
Start: 2019-07-01 | End: 2019-07-12

## 2019-07-01 NOTE — TELEPHONE ENCOUNTER
Requested Prescriptions   Pending Prescriptions Disp Refills     oxyCODONE-acetaminophen (PERCOCET) 5-325 MG tablet 30 tablet 0     Sig: Take 1 tablet by mouth every 8 hours as needed for moderate to severe pain       There is no refill protocol information for this order        Last Written Prescription Date:  6/17/2019  Last Fill Quantity: 30,  # refills: 0   Last office visit: 4/17/2019 with prescribing provider:     Future Office Visit:      DDD (degenerative disc disease), lumbar [M51.36]  - Primary   Routing refill request to provider for review/approval because:  Drug not on the [facility] refill protocol     Delia Morris RN

## 2019-07-12 ENCOUNTER — OFFICE VISIT (OUTPATIENT)
Dept: FAMILY MEDICINE | Facility: CLINIC | Age: 49
End: 2019-07-12
Payer: COMMERCIAL

## 2019-07-12 ENCOUNTER — ANTICOAGULATION THERAPY VISIT (OUTPATIENT)
Dept: ANTICOAGULATION | Facility: CLINIC | Age: 49
End: 2019-07-12
Payer: COMMERCIAL

## 2019-07-12 VITALS
HEIGHT: 73 IN | HEART RATE: 79 BPM | WEIGHT: 249.3 LBS | SYSTOLIC BLOOD PRESSURE: 126 MMHG | OXYGEN SATURATION: 98 % | BODY MASS INDEX: 33.04 KG/M2 | TEMPERATURE: 97.3 F | DIASTOLIC BLOOD PRESSURE: 78 MMHG

## 2019-07-12 DIAGNOSIS — M51.369 DDD (DEGENERATIVE DISC DISEASE), LUMBAR: ICD-10-CM

## 2019-07-12 DIAGNOSIS — G56.22 LESION OF LEFT ULNAR NERVE: Primary | ICD-10-CM

## 2019-07-12 DIAGNOSIS — Z79.01 LONG TERM CURRENT USE OF ANTICOAGULANT THERAPY: ICD-10-CM

## 2019-07-12 LAB — INR POINT OF CARE: 1.5 (ref 0.9–1.1)

## 2019-07-12 PROCEDURE — 85610 PROTHROMBIN TIME: CPT | Mod: QW

## 2019-07-12 PROCEDURE — 99213 OFFICE O/P EST LOW 20 MIN: CPT | Performed by: FAMILY MEDICINE

## 2019-07-12 PROCEDURE — 36416 COLLJ CAPILLARY BLOOD SPEC: CPT

## 2019-07-12 PROCEDURE — 99207 ZZC NO CHARGE NURSE ONLY: CPT

## 2019-07-12 RX ORDER — PREDNISONE 20 MG/1
TABLET ORAL
Qty: 18 TABLET | Refills: 1 | Status: SHIPPED | OUTPATIENT
Start: 2019-07-12 | End: 2019-07-30

## 2019-07-12 RX ORDER — OXYCODONE AND ACETAMINOPHEN 5; 325 MG/1; MG/1
1 TABLET ORAL EVERY 8 HOURS PRN
Qty: 90 TABLET | Refills: 0 | Status: SHIPPED | OUTPATIENT
Start: 2019-07-12 | End: 2019-08-09

## 2019-07-12 ASSESSMENT — MIFFLIN-ST. JEOR: SCORE: 2044.7

## 2019-07-12 NOTE — PROGRESS NOTES
Subjective     Hollis Diggs is a 49 year old male who presents to clinic today for the following health issues:    HPI   Left hand numbness no known injury.       Duration: 2 weeks     Description (location/character/radiation): 4th and 5th digits are numb.     Intensity:  severe    Accompanying signs and symptoms: no other symptoms     History (similar episodes/previous evaluation): None    Precipitating or alleviating factors: None    Therapies tried and outcome: None     SUBJECTIVE:  Hollis  is a 49 year old male who presents for: Tingling in his left little finger and ring finger.  This came on about 2 weeks ago.  Woke up one morning with it.  Does not recall sleeping oddly.  No injury.  No elbow pain.  He feels his strength is down little bit.  Lives with his left hand and had a difficult time writing out a check.  No headaches.  No neck pain.    Past Medical History:   Diagnosis Date     Antiphospholipid syndrome (H)      Arthritis      Asthma     Exercise     blood clot in leg      Depressive disorder, not elsewhere classified     Depression (non-psychotic)     ANKIT (generalised anxiety disorder)      HH (hiatus hernia)      Hypercholesteremia     normal with weight loss 3/09     Lumbar disc herniation 1992     Seronegative rheumatoid arthritis (H)      Past Surgical History:   Procedure Laterality Date     APPENDECTOMY       C NONSPECIFIC PROCEDURE  91 or 92    back surgery. lumbar. lamiectomy     COLONOSCOPY N/A 8/2/2016    Procedure: COMBINED COLONOSCOPY, SINGLE OR MULTIPLE BIOPSY/POLYPECTOMY BY BIOPSY;  Surgeon: Sydnee Walton MD;  Location:  OR     COLONOSCOPY WITH CO2 INSUFFLATION N/A 8/2/2016    Procedure: COLONOSCOPY WITH CO2 INSUFFLATION;  Surgeon: Sydnee Walton MD;  Location: [facility]     COMBINED ESOPHAGOSCOPY, GASTROSCOPY, DUODENOSCOPY (EGD) WITH CO2 INSUFFLATION N/A 8/2/2016    Procedure: COMBINED ESOPHAGOSCOPY, GASTROSCOPY, DUODENOSCOPY (EGD) WITH CO2  INSUFFLATION;  Surgeon: Sydnee Walton MD;  Location: [facility] OR     ESOPHAGOSCOPY, GASTROSCOPY, DUODENOSCOPY (EGD), COMBINED N/A 8/2/2016    Procedure: COMBINED ESOPHAGOSCOPY, GASTROSCOPY, DUODENOSCOPY (EGD), BIOPSY SINGLE OR MULTIPLE;  Surgeon: Sydnee Walton MD;  Location: [facility] OR     HC REMOVAL OF TONSILS,<12 Y/O      Tonsils <11y.o.     HC REPAIR INCISIONAL HERNIA,REDUCIBLE  1970's    Hernia Repair, Incisional, Unilateral     HC UGI ENDOSCOPY DIAG W BIOPSY  02/01/06     HC VASECTOMY UNILAT/BILAT W POSTOP SEMEN  1/05    Vasectomy     History back lumbar laminectomy       Social History     Tobacco Use     Smoking status: Never Smoker     Smokeless tobacco: Never Used   Substance Use Topics     Alcohol use: No     Comment: rare     Current Outpatient Medications   Medication Sig Dispense Refill     albuterol (PROAIR HFA/PROVENTIL HFA/VENTOLIN HFA) 108 (90 Base) MCG/ACT inhaler Inhale 2 puffs into the lungs every 4 hours as needed for shortness of breath / dyspnea 3 Inhaler 1     amitriptyline (ELAVIL) 10 MG tablet Take 1 tablet (10 mg) by mouth At Bedtime 60 tablet 6     ARIPiprazole (ABILIFY) 5 MG tablet TAKE ONE TABLET BY MOUTH AT BEDTIME 30 tablet 4     atorvastatin (LIPITOR) 10 MG tablet TAKE ONE TABLET BY MOUTH ONCE DAILY 30 tablet 4     DULoxetine (CYMBALTA) 30 MG capsule Three capsules once daily 270 capsule 1     finasteride (PROSCAR) 5 MG tablet Take 1 tablet (5 mg) by mouth daily 30 tablet 10     LamoTRIgine (LAMICTAL XR) 300 MG TB24 Take 1 tablet by mouth daily 90 tablet 1     oxyCODONE-acetaminophen (PERCOCET) 5-325 MG tablet Take 1 tablet by mouth every 8 hours as needed for moderate to severe pain Must last 30 days. 90 tablet 0     predniSONE (DELTASONE) 20 MG tablet Take 3 tabs daily for 3 days, then 2 tabs daily for 3 days, then 1 tab daily for 3 days 18 tablet 1     sildenafil (REVATIO) 20 MG tablet TAKE ONE TO TWO TABLETS BY MOUTH ONCE DAILY AS NEEDED PRIOR TO SEXUAL  "ACTIVITY. NEVER USE WITH NITROGLYCERIN, TERAZOSIN OR DOXAZOSIN 30 tablet 1     traMADol (ULTRAM) 50 MG tablet Take 1 tablet as needed twice day for pain. Max 2 tab daily. No driving or alcohol use while taking medication. 60 tablet 0     tretinoin (RETIN-A) 0.05 % external cream Apply  topically At Bedtime. 45 g 3     warfarin (COUMADIN) 5 MG tablet Take 7.5 mg daily, or as directed by the [facility]. 130 tablet 1       REVIEW OF SYSTEMS:   5 point ROS negative except as noted above in HPI, including Gen., Resp, CV, GI &  system review.     OBJECTIVE:  Vitals: /78 (BP Location: Right arm, Patient Position: Sitting, Cuff Size: Adult Large)   Pulse 79   Temp 97.3  F (36.3  C) (Temporal)   Ht 1.854 m (6' 1\")   Wt 113.1 kg (249 lb 4.8 oz)   SpO2 98%   BMI 32.89 kg/m    BMI= Body mass index is 32.89 kg/m .  He is alert appears in no distress.  His left arm is normal-appearing full range of motion of the elbow.  No tenderness to palpation anywhere around the elbow.  Tingling in his little finger and ring finger.  Some weakness to  noted.  No discoloration good pulses negative Tinel sign negative Phalen sign    ASSESSMENT:  Ulnar nerve lesion    PLAN:  To try him on some prednisone tapering down.  If he is not improving after the high dose of the prednisone he will call us and we will set him up for the consult with Ortho and probably an EMG.  Renewed his Percocet for degenerative lumbar disc disease he takes 3 a day and will go with 90 which should last him a month.  He has never abused this.        Sylvester Cash MD  [facility]            "

## 2019-07-12 NOTE — PROGRESS NOTES
ANTICOAGULATION FOLLOW-UP CLINIC VISIT    Patient Name:  Hollis Diggs  Date:  2019  Contact Type:  Face to Face    SUBJECTIVE:  Patient Findings     Positives:   Missed doses (missed last nights dose)             OBJECTIVE    INR Protime   Date Value Ref Range Status   2019 1.5 (A) 0.9 - 1.1 Final     Factor 2 Assay   Date Value Ref Range Status   2015 33 (L) 60 - 140 % Final     Comment:     Analyte Specific Reagents (ASRs) are used in many laboratory tests necessary   for   standard medical care and generally do not require FDA approval.  This test   was   developed and its performance characteristics determined by [facility].  It has not been cleared or approved by   the US Food and Drug Administration.         ASSESSMENT / PLAN  INR assessment SUB    Recheck INR In: 2 WEEKS    INR Location Clinic      Anticoagulation Summary  As of 2019    INR goal:   2.0-3.0   TTR:   66.0 % (2.5 y)   INR used for dosin.5! (2019)   Warfarin maintenance plan:   7.5 mg (5 mg x 1.5) every day   Full warfarin instructions:   : 10 mg; Otherwise 7.5 mg every day   Weekly warfarin total:   52.5 mg   Plan last modified:   Lilliam Pro RN (2019)   Next INR check:   2019   Target end date:       Indications    DVT (deep venous thrombosis) (H) (Resolved) [I82.409]  Long-term (current) use of anticoagulants [Z79.01] [Z79.01]             Anticoagulation Episode Summary     INR check location:       Preferred lab:       Send INR reminders to:   [facility] [facility]    Comments:   5 mg, dosing card, PM dose      Anticoagulation Care Providers     Provider Role Specialty Phone number    Sylvester Cash MD [facility] [phone]            See the Encounter Report to view Anticoagulation Flowsheet and Dosing Calendar (Go to Encounters tab in chart review, and find the Anticoagulation Therapy Visit)    Dosage adjustment  made based on physician directed care plan.      Lilliam Pro RN

## 2019-07-16 ENCOUNTER — TELEPHONE (OUTPATIENT)
Dept: FAMILY MEDICINE | Facility: CLINIC | Age: 49
End: 2019-07-16

## 2019-07-16 DIAGNOSIS — R20.0 NUMBNESS OF LEFT HAND: Primary | ICD-10-CM

## 2019-07-16 DIAGNOSIS — M79.642 PAIN OF LEFT HAND: ICD-10-CM

## 2019-07-16 NOTE — TELEPHONE ENCOUNTER
Reason for Call:  Other prescription    Detailed comments: Patient was seen for a nerve issue with his hand and arm. Was put on prednisone and told to call PCP if that did not help. Things have not gotten any better. Would like a call back to discuss further treatment. Please call patient back when PCP returns to the office.     Phone Number Patient can be reached at: Home number on file [phone] (home)    Best Time: any    Can we leave a detailed message on this number? YES    Call taken on 7/16/2019 at 4:41 PM by Aniyah Dobbs

## 2019-07-17 NOTE — TELEPHONE ENCOUNTER
Per Dr. Cash: Patient needs Sport med consult. Please set up for patient.    Lyudmila RACHEAL Jenkins on 7/17/2019 at 9:29 AM

## 2019-07-23 DIAGNOSIS — M51.369 DDD (DEGENERATIVE DISC DISEASE), LUMBAR: Primary | ICD-10-CM

## 2019-07-24 DIAGNOSIS — E55.9 HYPOVITAMINOSIS D: Primary | ICD-10-CM

## 2019-07-24 NOTE — TELEPHONE ENCOUNTER
Requested Prescriptions   Pending Prescriptions Disp Refills     traMADol (ULTRAM) 50 MG tablet 60 tablet 0     Sig: Take 1 tablet as needed twice day for pain. Max 2 tab daily. No driving or alcohol use while taking medication.       There is no refill protocol information for this order        Last Written Prescription Date:  6/19/19  Last Fill Quantity: 60,  # refills: 0   Last office visit: 7/12/2019 with prescribing provider:     Future Office Visit:      Routing refill request to provider for review/approval because:  Drug not on the [facility] refill protocol   Karen Kitchen, SURESHN, RN

## 2019-07-24 NOTE — TELEPHONE ENCOUNTER
"Fax from [facility] in [place] is requesting prescription for Vitamin D3 2000 units capsules qty 90.    Requested Prescriptions   Pending Prescriptions Disp Refills     vitamin D3 (CHOLECALCIFEROL) 2000 units (50 mcg) tablet 90 tablet 3     Sig: Take 1 tablet (2,000 Units) by mouth daily   Last Written Prescription Date:  NA  Last Fill Quantity: NA,  # refills: NA   Last office visit: 7/12/2019 with prescribing provider:     Future Office Visit:        Vitamin Supplements (Adult) Protocol Failed - 7/24/2019  3:51 PM        Failed - Medication is active on med list        Passed - High dose Vitamin D not ordered        Passed - Recent (12 mo) or future (30 days) visit within the authorizing provider's specialty     Patient had office visit in the last 12 months or has a visit in the next 30 days with authorizing provider or within the authorizing provider's specialty.  See \"Patient Info\" tab in inbasket, or \"Choose Columns\" in Meds & Orders section of the refill encounter.              Routing refill request to provider for review/approval because:  Drug not active on patient's medication list    Delia Morris RN          "

## 2019-07-25 RX ORDER — TRAMADOL HYDROCHLORIDE 50 MG/1
TABLET ORAL
Qty: 60 TABLET | Refills: 0 | Status: SHIPPED | OUTPATIENT
Start: 2019-07-25 | End: 2019-08-27

## 2019-07-26 ENCOUNTER — ANTICOAGULATION THERAPY VISIT (OUTPATIENT)
Dept: ANTICOAGULATION | Facility: CLINIC | Age: 49
End: 2019-07-26
Payer: COMMERCIAL

## 2019-07-26 DIAGNOSIS — Z79.01 LONG TERM CURRENT USE OF ANTICOAGULANT THERAPY: ICD-10-CM

## 2019-07-26 LAB — INR POINT OF CARE: 3.6 (ref 0.86–1.14)

## 2019-07-26 PROCEDURE — 85610 PROTHROMBIN TIME: CPT | Mod: QW

## 2019-07-26 PROCEDURE — 99207 ZZC NO CHARGE NURSE ONLY: CPT

## 2019-07-26 PROCEDURE — 36416 COLLJ CAPILLARY BLOOD SPEC: CPT

## 2019-07-26 RX ORDER — CHOLECALCIFEROL (VITAMIN D3) 50 MCG
1 TABLET ORAL DAILY
Qty: 90 TABLET | Refills: 3 | Status: SHIPPED | OUTPATIENT
Start: 2019-07-26 | End: 2020-07-15

## 2019-07-26 NOTE — PROGRESS NOTES
ANTICOAGULATION FOLLOW-UP CLINIC VISIT    Patient Name:  Hollis Diggs  Date:  7/26/2019  Contact Type:  Face to Face    SUBJECTIVE:  Patient Findings         Clinical Outcomes     Negatives:   Major bleeding event, Thromboembolic event, Anticoagulation-related hospital admission, Anticoagulation-related ED visit, Anticoagulation-related fatality           OBJECTIVE    INR Protime   Date Value Ref Range Status   07/26/2019 3.6 (A) 0.86 - 1.14 Final     Factor 2 Assay   Date Value Ref Range Status   12/17/2015 33 (L) 60 - 140 % Final     Comment:     Analyte Specific Reagents (ASRs) are used in many laboratory tests necessary   for   standard medical care and generally do not require FDA approval.  This test   was   developed and its performance characteristics determined by [facility].  It has not been cleared or approved by   the US Food and Drug Administration.         ASSESSMENT / PLAN  INR assessment SUPRA    Recheck INR In: 2 WEEKS    INR Location Clinic      Anticoagulation Summary  As of 7/26/2019    INR goal:   2.0-3.0   TTR:   65.7 % (2.5 y)   INR used for dosing:   3.6! (7/26/2019)   Warfarin maintenance plan:   7.5 mg (5 mg x 1.5) every day   Full warfarin instructions:   7/26: Hold; Otherwise 7.5 mg every day   Weekly warfarin total:   52.5 mg   Plan last modified:   Lilliam Pro RN (1/11/2019)   Next INR check:   8/9/2019   Target end date:       Indications    DVT (deep venous thrombosis) (H) (Resolved) [I82.409]  Long-term (current) use of anticoagulants [Z79.01] [Z79.01]             Anticoagulation Episode Summary     INR check location:       Preferred lab:       Send INR reminders to:   [facility] [facility]    Comments:   5 mg, dosing card, PM dose      Anticoagulation Care Providers     Provider Role Specialty Phone number    Sylvester Cash MD [facility] [phone]            See the Encounter Report to view  Anticoagulation Flowsheet and Dosing Calendar (Go to Encounters tab in chart review, and find the Anticoagulation Therapy Visit)    Dosage adjustment made based on physician directed care plan.    Karen Kitchen RN

## 2019-07-30 ENCOUNTER — OFFICE VISIT (OUTPATIENT)
Dept: ORTHOPEDICS | Facility: CLINIC | Age: 49
End: 2019-07-30
Payer: COMMERCIAL

## 2019-07-30 VITALS
DIASTOLIC BLOOD PRESSURE: 79 MMHG | WEIGHT: 248.5 LBS | HEART RATE: 79 BPM | HEIGHT: 73 IN | SYSTOLIC BLOOD PRESSURE: 118 MMHG | BODY MASS INDEX: 32.93 KG/M2

## 2019-07-30 DIAGNOSIS — G56.22 ULNAR NEUROPATHY AT ELBOW, LEFT: Primary | ICD-10-CM

## 2019-07-30 PROCEDURE — 99203 OFFICE O/P NEW LOW 30 MIN: CPT | Performed by: PHYSICAL MEDICINE & REHABILITATION

## 2019-07-30 ASSESSMENT — MIFFLIN-ST. JEOR: SCORE: 2041.07

## 2019-07-30 NOTE — PROGRESS NOTES
Sports Medicine Clinic Visit    PCP: Sylvester Cash    CC: Patient presents with:  Left Hand - Pain      HPI:  Hollis Diggs is a 49 year old male who is seen in consultation at the request of Dr. Cash.   He notes left hand pain and numbness that began 1 month ago. He notes he woke up with the numbness. He notes numbness over the thenar eminence, 4th and 5th finger.  He rates the pain at a  6/10 at its worst and a 6/10 currently.  Symptoms are relieved with nothing. Symptoms are worsened by nothing, symptoms remain constant. He endorses stiffness, numbness, tingling, and weakness.   He denies swelling, bruising, popping, grinding, catching, locking and instability.  Other treatment has included prednisone. He notes difficulty with writing as he is unable to hold a pen.       Review of Systems:  Musculoskeletal: as above  Remainder of review of systems is negative including constitutional, eyes, ENT, CV, pulmonary, GI, , endocrine, skin, hematologic, and neurologic except as noted in HPI or medical history.    History reviewed. No pertinent past surgical/medical/family/social history other than as mentioned in HPI.    Patient Active Problem List   Diagnosis     Abdominal pain, epigastric     Anxiety     Ingrown toenail     Alopecia     CARDIOVASCULAR SCREENING; LDL GOAL LESS THAN 160     Anxiety state     Pain in joint, multiple sites     Major depressive disorder, recurrent episode, mild (H)     Midline low back pain, with sciatica presence unspecified     Rheumatoid arthritis of multiple sites with negative rheumatoid factor (H)     Long-term (current) use of anticoagulants [Z79.01]     Seronegative rheumatoid arthritis (H)     On prednisone therapy     History of deep venous thrombosis     DDD (degenerative disc disease), lumbar     Long-term use of high-risk medication     Past Medical History:   Diagnosis Date     Antiphospholipid syndrome (H)      Arthritis      Asthma     Exercise     blood clot in  "leg      Depressive disorder, not elsewhere classified     Depression (non-psychotic)     ANKIT (generalised anxiety disorder)      HH (hiatus hernia)      Hypercholesteremia     normal with weight loss 3/09     Lumbar disc herniation 1992     Seronegative rheumatoid arthritis (H)      Past Surgical History:   Procedure Laterality Date     APPENDECTOMY       C NONSPECIFIC PROCEDURE  91 or 92    back surgery. lumbar. lamiectomy     COLONOSCOPY N/A 8/2/2016    Procedure: COMBINED COLONOSCOPY, SINGLE OR MULTIPLE BIOPSY/POLYPECTOMY BY BIOPSY;  Surgeon: Sydnee Walton MD;  Location: [facility] OR     COLONOSCOPY WITH CO2 INSUFFLATION N/A 8/2/2016    Procedure: COLONOSCOPY WITH CO2 INSUFFLATION;  Surgeon: Sydnee Walton MD;  Location: [facility] OR     COMBINED ESOPHAGOSCOPY, GASTROSCOPY, DUODENOSCOPY (EGD) WITH CO2 INSUFFLATION N/A 8/2/2016    Procedure: COMBINED ESOPHAGOSCOPY, GASTROSCOPY, DUODENOSCOPY (EGD) WITH CO2 INSUFFLATION;  Surgeon: Sydnee Walton MD;  Location: [facility] OR     ESOPHAGOSCOPY, GASTROSCOPY, DUODENOSCOPY (EGD), COMBINED N/A 8/2/2016    Procedure: COMBINED ESOPHAGOSCOPY, GASTROSCOPY, DUODENOSCOPY (EGD), BIOPSY SINGLE OR MULTIPLE;  Surgeon: Sydnee Walton MD;  Location: [facility] OR     HC REMOVAL OF TONSILS,<10 Y/O      Tonsils <11y.o.     HC REPAIR INCISIONAL HERNIA,REDUCIBLE  1970's    Hernia Repair, Incisional, Unilateral     HC UGI ENDOSCOPY DIAG W BIOPSY  02/01/06     HC VASECTOMY UNILAT/BILAT W POSTOP SEMEN  1/05    Vasectomy     History back lumbar laminectomy       Family History   Problem Relation Age of Onset     Brain Tumor Mother         Benign     Deep Vein Thrombosis Mother         \"neck\"      Heart Disease Father         \"wont tell anyone\"     Neurologic Disorder Paternal Grandmother         Parkinsons      Alcohol/Drug Paternal Grandfather         alcoholic     Cancer Maternal Grandfather         lung     Diabetes Maternal Grandmother      Cerebrovascular " "Disease Maternal Grandmother         tim age ~70     Arthritis Cousin         cousins x 2 \"RA\"     Musculoskeletal Disorder Maternal Aunt         MS     Family History Negative Other         psoriasis, crohns, UC, SLE     Social History     Socioeconomic History     Marital status:      Spouse name: Cassie     Number of children: 2     Years of education: 15     Highest education level: Not on file   Occupational History     Occupation:      Employer: [employer]   Social Needs     Financial resource strain: Not on file     Food insecurity:     Worry: Not on file     Inability: Not on file     Transportation needs:     Medical: Not on file     Non-medical: Not on file   Tobacco Use     Smoking status: Never Smoker     Smokeless tobacco: Never Used   Substance and Sexual Activity     Alcohol use: No     Comment: rare     Drug use: No     Sexual activity: Yes   Lifestyle     Physical activity:     Days per week: Not on file     Minutes per session: Not on file     Stress: Not on file   Relationships     Social connections:     Talks on phone: Not on file     Gets together: Not on file     Attends Yazidi service: Not on file     Active member of club or organization: Not on file     Attends meetings of clubs or organizations: Not on file     Relationship status: Not on file     Intimate partner violence:     Fear of current or ex partner: Not on file     Emotionally abused: Not on file     Physically abused: Not on file     Forced sexual activity: Not on file   Other Topics Concern      Service No     Blood Transfusions No     Caffeine Concern Yes     Comment: 64 oz q day     Occupational Exposure Not Asked     Hobby Hazards Not Asked     Sleep Concern Yes     Stress Concern No     Weight Concern No     Special Diet Not Asked     Back Care Not Asked     Exercise Yes     Comment: sometimes     Bike Helmet Not Asked     Seat Belt Yes     Self-Exams No     Parent/sibling w/ CABG, MI " "or angioplasty before 65F 55M? Not Asked   Social History Narrative    4 children healthy       He works at the [employer] in [place]    Current Outpatient Medications   Medication     albuterol (PROAIR HFA/PROVENTIL HFA/VENTOLIN HFA) 108 (90 Base) MCG/ACT inhaler     amitriptyline (ELAVIL) 10 MG tablet     ARIPiprazole (ABILIFY) 5 MG tablet     atorvastatin (LIPITOR) 10 MG tablet     DULoxetine (CYMBALTA) 30 MG capsule     finasteride (PROSCAR) 5 MG tablet     LamoTRIgine (LAMICTAL XR) 300 MG TB24     order for DME     oxyCODONE-acetaminophen (PERCOCET) 5-325 MG tablet     sildenafil (REVATIO) 20 MG tablet     traMADol (ULTRAM) 50 MG tablet     tretinoin (RETIN-A) 0.05 % external cream     vitamin D3 (CHOLECALCIFEROL) 2000 units (50 mcg) tablet     warfarin (COUMADIN) 5 MG tablet     No current facility-administered medications for this visit.      Allergies   Allergen Reactions     No Known Drug Allergies          Objective:  /79   Pulse 79   Ht 1.854 m (6' 1\")   Wt 112.7 kg (248 lb 8 oz)   BMI 32.79 kg/m      General: Alert and in no distress    Head: Normocephalic, atraumatic  Eyes: no scleral icterus or conjunctival erythema   Oropharynx:  Mucous membranes moist  Skin: no erythema, petechiae, or jaundice  CV: regular rhythm by palpation, 2+ distal pulses  Resp: normal respiratory effort without conversational dyspnea   Psych: normal mood and affect    Gait: Non-antalgic, appropriate coordination and balance   Neuro: Motor strength and sensation as noted below    Musculoskeletal:    Bilateral Wrist and Hand exam    Inspection:       No swelling, bruising or deformity bilaterally    ROM:  -Full active range of motion of the forearm, wrist, hand, and digits bilaterally    Strength:  Forearm supination 5/5 bilaterally  Forearm pronation 5/5 bilaterally  Wrist extension 5/5 bilaterally  Wrist flexion 5/5 bilaterally   strength 4/5 left, 5/5 right  Finger abduction 4/5 left, 5/5 right    Special " Tests:  -Positive Tinel's at the left cubital tunnel.  -Negative Tinel's at the left Guyon's canal.    Neurovascular:       2+ radial pulses bilaterally       Altered sensation to light touch over the right ulnar hand, 4th and 5th digits.  On the 4th digit, there is less numbness on the radial side compared to the ulnar side.      Radiology:  No imaging obtained today    Assessment:  1. Ulnar neuropathy at elbow, left        Plan:  Discussed the assessment with the patient and developed a plan together:  -Avoid leaning on the elbows or repetitive traumat to the elbows.  -Avoid prolonged elbow flexion.    -Recommend using a foam elbow pad to limit inadvertent compression of the elbow.  -Recommend wrapping the elbow in a towel at night to prevent considerable elbow flexion.  -Occupational therapy ordered at [facility].  Please do 5-6 days of exercises per week between formal sessions and the home exercises they provide.    -If the above therapies are ineffective, would consider EMG and possibly an orthopedic surgery referral to consider ulnar nerve transposition.    -Follow up as needed if symptoms fail to improve or worsen.  Please call with questions or concerns.        Laura Pierre MD, CAQ Sports Medicine  [facility]

## 2019-07-30 NOTE — LETTER
7/30/2019         RE: Hollis Diggs  [address] [facility] [address]        Dear Colleague,    Thank you for referring your patient, Hollis Diggs, to the [facility]. Please see a copy of my visit note below.    Sports Medicine Clinic Visit    PCP: Sylvester Cash    CC: Patient presents with:  Left Hand - Pain      HPI:  Hollis Diggs is a 49 year old male who is seen in consultation at the request of Dr. Cash.   He notes left hand pain and numbness that began 1 month ago. He notes he woke up with the numbness. He notes numbness over the thenar eminence, 4th and 5th finger.  He rates the pain at a  6/10 at its worst and a 6/10 currently.  Symptoms are relieved with nothing. Symptoms are worsened by nothing, symptoms remain constant. He endorses stiffness, numbness, tingling, and weakness.   He denies swelling, bruising, popping, grinding, catching, locking and instability.  Other treatment has included prednisone. He notes difficulty with writing as he is unable to hold a pen.       Review of Systems:  Musculoskeletal: as above  Remainder of review of systems is negative including constitutional, eyes, ENT, CV, pulmonary, GI, , endocrine, skin, hematologic, and neurologic except as noted in HPI or medical history.    History reviewed. No pertinent past surgical/medical/family/social history other than as mentioned in HPI.    Patient Active Problem List   Diagnosis     Abdominal pain, epigastric     Anxiety     Ingrown toenail     Alopecia     CARDIOVASCULAR SCREENING; LDL GOAL LESS THAN 160     Anxiety state     Pain in joint, multiple sites     Major depressive disorder, recurrent episode, mild (H)     Midline low back pain, with sciatica presence unspecified     Rheumatoid arthritis of multiple sites with negative rheumatoid factor (H)     Long-term (current) use of anticoagulants [Z79.01]     Seronegative rheumatoid arthritis (H)     On prednisone therapy      "History of deep venous thrombosis     DDD (degenerative disc disease), lumbar     Long-term use of high-risk medication     Past Medical History:   Diagnosis Date     Antiphospholipid syndrome (H)      Arthritis      Asthma     Exercise     blood clot in leg      Depressive disorder, not elsewhere classified     Depression (non-psychotic)     ANKTI (generalised anxiety disorder)      HH (hiatus hernia)      Hypercholesteremia     normal with weight loss 3/09     Lumbar disc herniation 1992     Seronegative rheumatoid arthritis (H)      Past Surgical History:   Procedure Laterality Date     APPENDECTOMY       C NONSPECIFIC PROCEDURE  91 or 92    back surgery. lumbar. lamiectomy     COLONOSCOPY N/A 8/2/2016    Procedure: COMBINED COLONOSCOPY, SINGLE OR MULTIPLE BIOPSY/POLYPECTOMY BY BIOPSY;  Surgeon: Sydnee Walton MD;  Location: [facility] OR     COLONOSCOPY WITH CO2 INSUFFLATION N/A 8/2/2016    Procedure: COLONOSCOPY WITH CO2 INSUFFLATION;  Surgeon: Sydnee Walton MD;  Location: [facility] OR     COMBINED ESOPHAGOSCOPY, GASTROSCOPY, DUODENOSCOPY (EGD) WITH CO2 INSUFFLATION N/A 8/2/2016    Procedure: COMBINED ESOPHAGOSCOPY, GASTROSCOPY, DUODENOSCOPY (EGD) WITH CO2 INSUFFLATION;  Surgeon: Sydnee Walton MD;  Location: [facility] OR     ESOPHAGOSCOPY, GASTROSCOPY, DUODENOSCOPY (EGD), COMBINED N/A 8/2/2016    Procedure: COMBINED ESOPHAGOSCOPY, GASTROSCOPY, DUODENOSCOPY (EGD), BIOPSY SINGLE OR MULTIPLE;  Surgeon: Sydnee Walton MD;  Location: [facility] OR     HC REMOVAL OF TONSILS,<10 Y/O      Tonsils <11y.o.     HC REPAIR INCISIONAL HERNIA,REDUCIBLE  1970's    Hernia Repair, Incisional, Unilateral     HC UGI ENDOSCOPY DIAG W BIOPSY  02/01/06     HC VASECTOMY UNILAT/BILAT W POSTOP SEMEN  1/05    Vasectomy     History back lumbar laminectomy       Family History   Problem Relation Age of Onset     Brain Tumor Mother         Benign     Deep Vein Thrombosis Mother         \"neck\"      Heart Disease " "Father         \"wont tell anyone\"     Neurologic Disorder Paternal Grandmother         Parkinsons      Alcohol/Drug Paternal Grandfather         alcoholic     Cancer Maternal Grandfather         lung     Diabetes Maternal Grandmother      Cerebrovascular Disease Maternal Grandmother         tim age ~70     Arthritis Cousin         cousins x 2 \"RA\"     Musculoskeletal Disorder Maternal Aunt         MS     Family History Negative Other         psoriasis, crohns, UC, SLE     Social History     Socioeconomic History     Marital status:      Spouse name: Cassie     Number of children: 2     Years of education: 15     Highest education level: Not on file   Occupational History     Occupation:      Employer: [employer]   Social Needs     Financial resource strain: Not on file     Food insecurity:     Worry: Not on file     Inability: Not on file     Transportation needs:     Medical: Not on file     Non-medical: Not on file   Tobacco Use     Smoking status: Never Smoker     Smokeless tobacco: Never Used   Substance and Sexual Activity     Alcohol use: No     Comment: rare     Drug use: No     Sexual activity: Yes   Lifestyle     Physical activity:     Days per week: Not on file     Minutes per session: Not on file     Stress: Not on file   Relationships     Social connections:     Talks on phone: Not on file     Gets together: Not on file     Attends Uatsdin service: Not on file     Active member of club or organization: Not on file     Attends meetings of clubs or organizations: Not on file     Relationship status: Not on file     Intimate partner violence:     Fear of current or ex partner: Not on file     Emotionally abused: Not on file     Physically abused: Not on file     Forced sexual activity: Not on file   Other Topics Concern      Service No     Blood Transfusions No     Caffeine Concern Yes     Comment: 64 oz q day     Occupational Exposure Not Asked     Hobby Hazards Not " "Asked     Sleep Concern Yes     Stress Concern No     Weight Concern No     Special Diet Not Asked     Back Care Not Asked     Exercise Yes     Comment: sometimes     Bike Helmet Not Asked     Seat Belt Yes     Self-Exams No     Parent/sibling w/ CABG, MI or angioplasty before 65F 55M? Not Asked   Social History Narrative    4 children healthy       He works at the [employer] in [place]    Current Outpatient Medications   Medication     albuterol (PROAIR HFA/PROVENTIL HFA/VENTOLIN HFA) 108 (90 Base) MCG/ACT inhaler     amitriptyline (ELAVIL) 10 MG tablet     ARIPiprazole (ABILIFY) 5 MG tablet     atorvastatin (LIPITOR) 10 MG tablet     DULoxetine (CYMBALTA) 30 MG capsule     finasteride (PROSCAR) 5 MG tablet     LamoTRIgine (LAMICTAL XR) 300 MG TB24     order for DME     oxyCODONE-acetaminophen (PERCOCET) 5-325 MG tablet     sildenafil (REVATIO) 20 MG tablet     traMADol (ULTRAM) 50 MG tablet     tretinoin (RETIN-A) 0.05 % external cream     vitamin D3 (CHOLECALCIFEROL) 2000 units (50 mcg) tablet     warfarin (COUMADIN) 5 MG tablet     No current facility-administered medications for this visit.      Allergies   Allergen Reactions     No Known Drug Allergies          Objective:  /79   Pulse 79   Ht 1.854 m (6' 1\")   Wt 112.7 kg (248 lb 8 oz)   BMI 32.79 kg/m       General: Alert and in no distress    Head: Normocephalic, atraumatic  Eyes: no scleral icterus or conjunctival erythema   Oropharynx:  Mucous membranes moist  Skin: no erythema, petechiae, or jaundice  CV: regular rhythm by palpation, 2+ distal pulses  Resp: normal respiratory effort without conversational dyspnea   Psych: normal mood and affect    Gait: Non-antalgic, appropriate coordination and balance   Neuro: Motor strength and sensation as noted below    Musculoskeletal:    Bilateral Wrist and Hand exam    Inspection:       No swelling, bruising or deformity bilaterally    ROM:  -Full active range of motion of the forearm, wrist, hand, and " digits bilaterally    Strength:  Forearm supination 5/5 bilaterally  Forearm pronation 5/5 bilaterally  Wrist extension 5/5 bilaterally  Wrist flexion 5/5 bilaterally   strength 4/5 left, 5/5 right  Finger abduction 4/5 left, 5/5 right    Special Tests:  -Positive Tinel's at the left cubital tunnel.  -Negative Tinel's at the left Guyon's canal.    Neurovascular:       2+ radial pulses bilaterally       Altered sensation to light touch over the right ulnar hand, 4th and 5th digits.  On the 4th digit, there is less numbness on the radial side compared to the ulnar side.      Radiology:  No imaging obtained today    Assessment:  1. Ulnar neuropathy at elbow, left        Plan:  Discussed the assessment with the patient and developed a plan together:  -Avoid leaning on the elbows or repetitive traumat to the elbows.  -Avoid prolonged elbow flexion.    -Recommend using a foam elbow pad to limit inadvertent compression of the elbow.  -Recommend wrapping the elbow in a towel at night to prevent considerable elbow flexion.  -Occupational therapy ordered at [facility].  Please do 5-6 days of exercises per week between formal sessions and the home exercises they provide.    -If the above therapies are ineffective, would consider EMG and possibly an orthopedic surgery referral to consider ulnar nerve transposition.    -Follow up as needed if symptoms fail to improve or worsen.  Please call with questions or concerns.        Laura Pierre MD, [facility]  [facility]    Again, thank you for allowing me to participate in the care of your patient.        Sincerely,        Cristina Pierre MD

## 2019-07-30 NOTE — PATIENT INSTRUCTIONS
-Avoid leaning on the elbows or repetitive traumat to the elbows.  -Avoid prolonged elbow flexion.    -Could consider using a foam elbow pad to limit inadvertent compression of the elbow.  -Recommend wrapping the elbow in a towel at night to prevent considerable elbow flexion.  -Occupational therapy ordered at [facility].  Please do 5-6 days of exercises per week between formal sessions and the home exercises they provide.    -If the above therapies are ineffective, would consider EMG and possibly an orthopedic surgery referral to consider ulnar nerve transposition.    -Follow up as needed if symptoms fail to improve or worsen.  Please call with questions or concerns.

## 2019-08-09 ENCOUNTER — ANTICOAGULATION THERAPY VISIT (OUTPATIENT)
Dept: ANTICOAGULATION | Facility: CLINIC | Age: 49
End: 2019-08-09
Payer: COMMERCIAL

## 2019-08-09 DIAGNOSIS — M51.369 DDD (DEGENERATIVE DISC DISEASE), LUMBAR: Primary | ICD-10-CM

## 2019-08-09 DIAGNOSIS — Z79.01 LONG TERM CURRENT USE OF ANTICOAGULANT THERAPY: ICD-10-CM

## 2019-08-09 LAB — INR POINT OF CARE: 3 (ref 0.9–1.1)

## 2019-08-09 PROCEDURE — 85610 PROTHROMBIN TIME: CPT | Mod: QW

## 2019-08-09 PROCEDURE — 36416 COLLJ CAPILLARY BLOOD SPEC: CPT

## 2019-08-09 PROCEDURE — 99207 ZZC NO CHARGE NURSE ONLY: CPT

## 2019-08-09 NOTE — PROGRESS NOTES
ANTICOAGULATION FOLLOW-UP CLINIC VISIT    Patient Name:  Hollis Diggs  Date:  8/9/2019  Contact Type:  Face to Face    SUBJECTIVE:  Patient Findings     Comments:   The patient was assessed for diet, medication, and activity level changes, missed or extra doses, bruising or bleeding, with no problem findings.  Lilliam Pro RN          Clinical Outcomes     Negatives:   Major bleeding event, Thromboembolic event, Anticoagulation-related hospital admission, Anticoagulation-related ED visit, Anticoagulation-related fatality    Comments:   The patient was assessed for diet, medication, and activity level changes, missed or extra doses, bruising or bleeding, with no problem findings.  Lilliam Pro RN             OBJECTIVE    INR Protime   Date Value Ref Range Status   08/09/2019 3.0 (A) 0.9 - 1.1 Final     Factor 2 Assay   Date Value Ref Range Status   12/17/2015 33 (L) 60 - 140 % Final     Comment:     Analyte Specific Reagents (ASRs) are used in many laboratory tests necessary   for   standard medical care and generally do not require FDA approval.  This test   was   developed and its performance characteristics determined by [facility].  It has not been cleared or approved by   the US Food and Drug Administration.         ASSESSMENT / PLAN  INR assessment THER    Recheck INR In: 6 WEEKS    INR Location Clinic      Anticoagulation Summary  As of 8/9/2019    INR goal:   2.0-3.0   TTR:   64.7 % (2.5 y)   INR used for dosing:   3.0 (8/9/2019)   Warfarin maintenance plan:   7.5 mg (5 mg x 1.5) every day   Full warfarin instructions:   7.5 mg every day   Weekly warfarin total:   52.5 mg   No change documented:   Lilliam Pro RN   Plan last modified:   Lilliam Pro RN (1/11/2019)   Next INR check:   9/20/2019   Target end date:       Indications    DVT (deep venous thrombosis) (H) (Resolved) [I82.409]  Long-term (current) use of anticoagulants  [Z79.01] [Z79.01]             Anticoagulation Episode Summary     INR check location:       Preferred lab:       Send INR reminders to:   AVANDRESSA MARIA DEL CARMENERIKA JIMENEZ    Comments:   5 mg, dosing card, PM dose      Anticoagulation Care Providers     Provider Role Specialty Phone number    Sylvester Cash MD [facility] [phone]            See the Encounter Report to view Anticoagulation Flowsheet and Dosing Calendar (Go to Encounters tab in chart review, and find the Anticoagulation Therapy Visit)    Dosage adjustment made based on physician directed care plan.      Lilliam Pro RN

## 2019-08-09 NOTE — TELEPHONE ENCOUNTER
Oxycodone - Acetaminophen        Last Written Prescription Date:  7/12/19  Last Fill Quantity: 90,   # refills: 0  Last Office Visit: 7/12/19  Future Office visit:       Routing refill request to provider for review/approval because:  Drug not on the [facility], [facility] or [facility] refill protocol or controlled substance

## 2019-08-12 RX ORDER — OXYCODONE AND ACETAMINOPHEN 5; 325 MG/1; MG/1
1 TABLET ORAL EVERY 8 HOURS PRN
Qty: 90 TABLET | Refills: 0 | Status: SHIPPED | OUTPATIENT
Start: 2019-08-12 | End: 2019-09-11

## 2019-08-19 DIAGNOSIS — L65.9 ALOPECIA: ICD-10-CM

## 2019-08-20 RX ORDER — FINASTERIDE 5 MG/1
TABLET, FILM COATED ORAL
Qty: 30 TABLET | Refills: 10 | OUTPATIENT
Start: 2019-08-20

## 2019-08-20 NOTE — TELEPHONE ENCOUNTER
"Denied  Refills current    Requested Prescriptions   Pending Prescriptions Disp Refills     finasteride (PROSCAR) 5 MG tablet [Pharmacy Med Name: FINASTERIDE 5MG TABS] 30 tablet 10     Sig: TAKE ONE TABLET BY MOUTH ONCE DAILY   Last Written Prescription Date:  10/19/2018  Last Fill Quantity: 30,  # refills: 10   Last office visit: 7/12/2019 with prescribing provider:     Future Office Visit:        Alpha Blockers Failed - 8/19/2019  1:04 AM        Failed - Patient does not have Tadalafil, Vardenafil, or Sildenafil on their medication list        Passed - Blood pressure under 140/90 in past 12 months     BP Readings from Last 3 Encounters:   07/30/19 118/79   07/12/19 126/78   04/17/19 134/86           Passed - Recent (12 mo) or future (30 days) visit within the authorizing provider's specialty     Patient had office visit in the last 12 months or has a visit in the next 30 days with authorizing provider or within the authorizing provider's specialty.  See \"Patient Info\" tab in inbasket, or \"Choose Columns\" in Meds & Orders section of the refill encounter.            Passed - Medication is active on med list        Passed - Patient is 18 years of age or older      Delia Morris RN    "

## 2019-08-27 DIAGNOSIS — M51.369 DDD (DEGENERATIVE DISC DISEASE), LUMBAR: Primary | ICD-10-CM

## 2019-08-27 RX ORDER — TRAMADOL HYDROCHLORIDE 50 MG/1
TABLET ORAL
Qty: 60 TABLET | Refills: 0 | Status: SHIPPED | OUTPATIENT
Start: 2019-08-27 | End: 2019-09-23

## 2019-08-27 NOTE — TELEPHONE ENCOUNTER
Tramadol        Last Written Prescription Date:  7/25/19  Last Fill Quantity: 60,   # refills: 0  Last Office Visit: 7/12/19  Future Office visit:       Routing refill request to provider for review/approval because:  Drug not on the [facility], [facility] or [facility] refill protocol or controlled substance

## 2019-09-06 ENCOUNTER — HOSPITAL ENCOUNTER (OUTPATIENT)
Dept: OCCUPATIONAL THERAPY | Facility: CLINIC | Age: 49
Setting detail: THERAPIES SERIES
End: 2019-09-06
Attending: PHYSICAL MEDICINE & REHABILITATION
Payer: COMMERCIAL

## 2019-09-06 DIAGNOSIS — G56.22 ULNAR NEUROPATHY AT ELBOW, LEFT: ICD-10-CM

## 2019-09-06 PROCEDURE — 97110 THERAPEUTIC EXERCISES: CPT | Mod: GO | Performed by: OCCUPATIONAL THERAPIST

## 2019-09-06 PROCEDURE — 97035 APP MDLTY 1+ULTRASOUND EA 15: CPT | Mod: GO | Performed by: OCCUPATIONAL THERAPIST

## 2019-09-06 PROCEDURE — 97166 OT EVAL MOD COMPLEX 45 MIN: CPT | Mod: GO | Performed by: OCCUPATIONAL THERAPIST

## 2019-09-06 NOTE — PROGRESS NOTES
"  Occupational therapy Evaluation       09/06/19 0900   General Information/History   Start Of Care Date 09/06/19   Referring Physician Dr Cristina Pierre   Orders Evaluate And Treat As Indicated   Orders Date 07/30/19   Medical Diagnosis left elbow ulnar neuropathy G56.22   Precautions/Limitations limit leaning on elbow or flexion of elbow.   Additional Occupational Profile Info/Pertinent history of current problem The patient is a 48 y/o male who reported increased left hand and finger numbing, when he woke up one morning on 6/30/19.  Left hand, 4th and 5th digits remain numb per his report, this date.  He stated has not completed any preventive or restorative measures which were provided by the physician.  However, he is avoiding leaning on the left elbow.  He reports numbing of the left hand/fingers constant, loss of strength and stiffness in fingers.  Decreased functional use for daily tasks/activiites.  PMH: arthritis, anxiety DDD and lumbar disc herniation.   Previous treatment or current condition none   Past medical history PMH: arthritis, anxiety DDD and lumbar disc herniation.   How/Where did it occur From insidious onset  (sleeping)   Onset date of current episode/exacerbation 06/30/19   Chronicity New   Hand Dominance Left   Affected side Left   Functional limitations perform activities of daily living;perform required work activities;perform desired leisure / sports activities   Reported Symptoms Numbness;Loss of strength;Loss of Motion/Stiffness   QuickDASH [Functional Disability Questionnaire; 0-100 (0=no dysfunction; 100=dysfunction)]   (UEFI: 58/80)   Important Activities house mgmt, clean car/gareage, yardwork/weeding, video playing   Patient role/Employment history Employed   Occupation    Patient/Family goals statement \"get rid of the numbness and get my strength back\"   General observations Hollis presents with high anxiety and stress posturing of the left shoulder and " arm, during OT eval.   Fall Risk Screen   Fall screen completed by OT   Have you fallen 2 or more times in the past year? No   Have you fallen and had an injury in the past year? No   Is patient a fall risk? No   Pain   Pain Primary Pain Report   Primary Pain Report   Location left   Radiation Hand   Frequency Intermittent   Scale 7/10   Pain Is Same All Of The Time   Pain Is Exacerbated By Rest   Progression Since Onset Unchanged   ROM   ROM AROM   AROM   Comments WNL elbow, shoulder , hand.  Limited end range supination   Sensation Findings   Sensation Findings Sensation   Sensation   - Left Decreased Ulnar Nerve Distribution  (elbow)   Strength   Strength Strength    Avg - Left 79#  (below)    Avg - Right 102#   Lateral Pinch - Left 11#  (below)   Lateral Pinch - Right 23#   3 Point Pinch - Left 10#  (below)   3 Point Pinch - Right 18#   Education Assessment   Preferred Learning Style Listening;Demonstration   Barriers to Learning No barriers   Therapy Interventions   Planned Therapy Interventions Ultrasound;Strengthening;ROM;Stretching;Manual Therapy;Home Program   Clinical Impression   Criteria for Skilled Therapeutic Interventions Met yes;treatment indicated   OT Diagnosis Decreased functional use and strength with the left hand/arm for daily tasks/activities for BADL/IADLs.   Influenced by the following impairments Pain;Decreased range of motion;Decreased strength;Impaired sensation   Assessment of Occupational Performance 3-5 Performance Deficits   Identified Performance Deficits work, home mgmt, yardwork, meal prep, driving   Clinical Decision Making (Complexity) Moderate complexity   Therapy Frequency 2x week   Predicted Duration of Therapy Intervention (days/wks) 8 weeks   Risks and Benefits of Treatment have been explained. Yes   Patient, Family & other staff in agreement with plan of care Yes   Hand Goals   Hand Goals Driving;Work;Household Chores   Household Chores   Current Functional Task  Washing and drying dishes;Gripping;Carrying   Previous Performance Level Independent   Current Performance Level Moderate difficulty   Goal Target Task Hold dish rag and wash dishes;Weight bear through hand to wash floors;Sweep floors;Open a tight or new jar   Goal Target Performance Level No difficulty   Due Date 11/06/19   Work   Current Functional Task Repetitive tasks;Reaching;Carrying;Lifting   Previous Performance Level Independent   Current Performance Level Moderate difficulty   Goal Target Task Hold and manipulate tools;Hold and assemble parts;Complete repetitive tasks;Write legibly   Goal Target Performance Level No difficulty   Due Date 11/06/19   Driving   Current Functional Task Holding steering wheel;Opening car door   Previous Performance Level Independent   Current Performance Level Moderate difficulty   Goal Target Task  steering wheel; door handle   Goal Target Performance Level No difficulty   Due Date 11/06/19   Total Evaluation Time   OT Eval, Moderate Complexity Minutes (46629) 10       Thank you for referring Hollis  To OT services to assist with decrease numbing and improve functional use of the hand for daily tasks/activities.    If you have any questions or concerns, please contact me at [phone].    Teresita Mitchell MA, OTR/L  [facility]

## 2019-09-10 ENCOUNTER — TELEPHONE (OUTPATIENT)
Dept: FAMILY MEDICINE | Facility: CLINIC | Age: 49
End: 2019-09-10

## 2019-09-10 NOTE — TELEPHONE ENCOUNTER
Reason for Call:  Same Day Appointment, Requested Provider:  Sylvester Cash MD    PCP: Sylvester Cash    Reason for visit: Patient has extreme pain in his wrist. It is turning black and blue and is very swollen. He has not had an injury. Is not sure why it is doing this. He has an appointment next week with PCP, but cannot wait that long. He only wants to see Dr. Cash. Declined triage. Can he be worked in as soon as possible?    Duration of symptoms: 1 day    Have you been treated for this in the past? No    Additional comments:     Can we leave a detailed message on this number? YES    Phone number patient can be reached at: Home number on file [phone] (home)    Best Time: any    Call taken on 9/10/2019 at 5:28 PM by Aniyah Dobbs CNA

## 2019-09-11 DIAGNOSIS — M51.369 DDD (DEGENERATIVE DISC DISEASE), LUMBAR: ICD-10-CM

## 2019-09-11 RX ORDER — OXYCODONE AND ACETAMINOPHEN 5; 325 MG/1; MG/1
1 TABLET ORAL EVERY 8 HOURS PRN
Qty: 90 TABLET | Refills: 0 | Status: SHIPPED | OUTPATIENT
Start: 2019-09-11 | End: 2019-09-12

## 2019-09-11 NOTE — TELEPHONE ENCOUNTER
Patient was called. Directed to . Left message. Dr. Cash was going to offer the 9am appointment this morning. If patient does call back in time please offer it. He needs to be able to make it ON TIME. Otherwise please offer the 3 PM  Only today.  Lyudmila Jenkins on 9/11/2019 at 8:25 AM

## 2019-09-11 NOTE — TELEPHONE ENCOUNTER
Hollis states that he would like to wait to see Dr. Cash tomorrow and would like to know if he can be worked in tomorrow.

## 2019-09-11 NOTE — TELEPHONE ENCOUNTER
Unfortunately the 3 PM spot has been filled by another patient. If patient calls back please help him schedule with another provider who has openings. Or patient can wait until tomorrow and we can see what Dr. Cash may have available.  Lyudmila Jenkins on 9/11/2019 at 8:51 AM

## 2019-09-11 NOTE — TELEPHONE ENCOUNTER
Called patient again left VM. I have placed the patient on Dr. Cash's schedule for tomorrow morning at 9 AM. Please inform the patient and close the encounter if this is an acceptable time.  Lyudmila Jenkins on 9/11/2019 at 1:02 PM

## 2019-09-11 NOTE — TELEPHONE ENCOUNTER
Oxycodone-Acetaminophen        Last Written Prescription Date:  8/12/19  Last Fill Quantity: 90,   # refills: 0  Last Office Visit: 7/12/19  Future Office visit:    Next 5 appointments (look out 90 days)    Sep 16, 2019  2:20 PM CDT  [facility] [address] [place] [id number]  [phone]           Routing refill request to provider for review/approval because:  Drug not on the [facility], [facility] or  Health refill protocol or controlled substance

## 2019-09-11 NOTE — TELEPHONE ENCOUNTER
Patient called back, relayed message above.        Emy Cash ~ Patient Representative  [address] [address]  [email or web address]  [email or web address]  Office:  [phone]  Fax:  [phone]

## 2019-09-12 ENCOUNTER — OFFICE VISIT (OUTPATIENT)
Dept: FAMILY MEDICINE | Facility: CLINIC | Age: 49
End: 2019-09-12
Payer: COMMERCIAL

## 2019-09-12 ENCOUNTER — HOSPITAL ENCOUNTER (OUTPATIENT)
Dept: GENERAL RADIOLOGY | Facility: CLINIC | Age: 49
End: 2019-09-12
Attending: FAMILY MEDICINE
Payer: COMMERCIAL

## 2019-09-12 VITALS
HEIGHT: 73 IN | DIASTOLIC BLOOD PRESSURE: 78 MMHG | HEART RATE: 100 BPM | WEIGHT: 245.4 LBS | SYSTOLIC BLOOD PRESSURE: 118 MMHG | TEMPERATURE: 97.3 F | OXYGEN SATURATION: 97 % | BODY MASS INDEX: 32.52 KG/M2 | RESPIRATION RATE: 18 BRPM

## 2019-09-12 DIAGNOSIS — R22.30 LOCALIZED SWELLING OF FOREARM: Primary | ICD-10-CM

## 2019-09-12 DIAGNOSIS — M51.369 DDD (DEGENERATIVE DISC DISEASE), LUMBAR: ICD-10-CM

## 2019-09-12 DIAGNOSIS — R22.30 LOCALIZED SWELLING OF FOREARM: ICD-10-CM

## 2019-09-12 DIAGNOSIS — Z23 NEED FOR PROPHYLACTIC VACCINATION AND INOCULATION AGAINST INFLUENZA: ICD-10-CM

## 2019-09-12 PROCEDURE — 73090 X-RAY EXAM OF FOREARM: CPT | Mod: TC

## 2019-09-12 PROCEDURE — 90471 IMMUNIZATION ADMIN: CPT | Performed by: FAMILY MEDICINE

## 2019-09-12 PROCEDURE — 90682 RIV4 VACC RECOMBINANT DNA IM: CPT | Performed by: FAMILY MEDICINE

## 2019-09-12 PROCEDURE — 99213 OFFICE O/P EST LOW 20 MIN: CPT | Mod: 25 | Performed by: FAMILY MEDICINE

## 2019-09-12 RX ORDER — OXYCODONE AND ACETAMINOPHEN 5; 325 MG/1; MG/1
1 TABLET ORAL EVERY 8 HOURS PRN
Qty: 90 TABLET | Refills: 0 | Status: SHIPPED | OUTPATIENT
Start: 2019-09-12 | End: 2019-10-10

## 2019-09-12 ASSESSMENT — MIFFLIN-ST. JEOR: SCORE: 2027.01

## 2019-09-12 NOTE — PROGRESS NOTES
Subjective     Hollis Diggs is a 49 year old male who presents to clinic today for the following health issues:    HPI       Patient would like a flu shot today.      Musculoskeletal problem/pain      Duration: 09/10/19    Description  Location: right wrist.    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 But he cannot recall injuring the wrist doing that.   Aggravating factors include: lifting.     Therapies tried and outcome: heat didn't do anything. Cannot take ibuprofen due to warfarin use.     SUBJECTIVE:  Hollis  is a 49 year old male who presents for: Swelling on the dorsum of his right forearm over the distal radius.  No known injury.  Does do manual work.  He is actually left hand predominant.  He notices a little discomfort here if he lifts something but otherwise is more concerned about why there is swelling.  No numbness or tingling into the hand.    Past Medical History:   Diagnosis Date     Antiphospholipid syndrome (H)      Arthritis      Asthma     Exercise     blood clot in leg      Depressive disorder, not elsewhere classified     Depression (non-psychotic)     ANKIT (generalised anxiety disorder)      HH (hiatus hernia)      Hypercholesteremia     normal with weight loss 3/09     Lumbar disc herniation 1992     Seronegative rheumatoid arthritis (H)      Past Surgical History:   Procedure Laterality Date     APPENDECTOMY       C NONSPECIFIC PROCEDURE  91 or 92    back surgery. lumbar. lamiectomy     COLONOSCOPY N/A 8/2/2016    Procedure: COMBINED COLONOSCOPY, SINGLE OR MULTIPLE BIOPSY/POLYPECTOMY BY BIOPSY;  Surgeon: Sydnee Walton MD;  Location: [facility] OR     COLONOSCOPY WITH CO2 INSUFFLATION N/A 8/2/2016    Procedure: COLONOSCOPY WITH CO2 INSUFFLATION;  Surgeon:  Sydnee Walton MD;  Location: [facility]     COMBINED ESOPHAGOSCOPY, GASTROSCOPY, DUODENOSCOPY (EGD) WITH CO2 INSUFFLATION N/A 8/2/2016    Procedure: COMBINED ESOPHAGOSCOPY, GASTROSCOPY, DUODENOSCOPY (EGD) WITH CO2 INSUFFLATION;  Surgeon: Sydnee Walton MD;  Location: [facility]     ESOPHAGOSCOPY, GASTROSCOPY, DUODENOSCOPY (EGD), COMBINED N/A 8/2/2016    Procedure: COMBINED ESOPHAGOSCOPY, GASTROSCOPY, DUODENOSCOPY (EGD), BIOPSY SINGLE OR MULTIPLE;  Surgeon: Sydnee Walton MD;  Location: [facility] OR     HC REMOVAL OF TONSILS,<11 Y/O      Tonsils <11y.o.     HC REPAIR INCISIONAL HERNIA,REDUCIBLE  1970's    Hernia Repair, Incisional, Unilateral     HC UGI ENDOSCOPY DIAG W BIOPSY  02/01/06     HC VASECTOMY UNILAT/BILAT W POSTOP SEMEN  1/05    Vasectomy     History back lumbar laminectomy       Social History     Tobacco Use     Smoking status: Never Smoker     Smokeless tobacco: Never Used   Substance Use Topics     Alcohol use: No     Comment: rare     Current Outpatient Medications   Medication Sig Dispense Refill     albuterol (PROAIR HFA/PROVENTIL HFA/VENTOLIN HFA) 108 (90 Base) MCG/ACT inhaler Inhale 2 puffs into the lungs every 4 hours as needed for shortness of breath / dyspnea 3 Inhaler 1     amitriptyline (ELAVIL) 10 MG tablet Take 1 tablet (10 mg) by mouth At Bedtime 60 tablet 6     ARIPiprazole (ABILIFY) 5 MG tablet TAKE ONE TABLET BY MOUTH AT BEDTIME 30 tablet 4     atorvastatin (LIPITOR) 10 MG tablet TAKE ONE TABLET BY MOUTH ONCE DAILY 30 tablet 4     DULoxetine (CYMBALTA) 30 MG capsule Three capsules once daily 270 capsule 1     finasteride (PROSCAR) 5 MG tablet Take 1 tablet (5 mg) by mouth daily 30 tablet 10     LamoTRIgine (LAMICTAL XR) 300 MG TB24 Take 1 tablet by mouth daily 90 tablet 1     order for DME Equipment being ordered: elbow/heel protector, mesh 1 Device 0     oxyCODONE-acetaminophen (PERCOCET) 5-325 MG tablet Take 1 tablet by mouth every 8 hours as needed for  "moderate to severe pain Must last 30 days. 90 tablet 0     sildenafil (REVATIO) 20 MG tablet TAKE ONE TO TWO TABLETS BY MOUTH ONCE DAILY AS NEEDED PRIOR TO SEXUAL ACTIVITY. NEVER USE WITH NITROGLYCERIN, TERAZOSIN OR DOXAZOSIN 30 tablet 1     traMADol (ULTRAM) 50 MG tablet Take 1 tablet as needed twice day for pain. Max 2 tab daily. No driving or alcohol use while taking medication. 60 tablet 0     tretinoin (RETIN-A) 0.05 % external cream Apply  topically At Bedtime. 45 g 3     vitamin D3 (CHOLECALCIFEROL) 2000 units (50 mcg) tablet Take 1 tablet (2,000 Units) by mouth daily 90 tablet 3     warfarin (COUMADIN) 5 MG tablet Take 7.5 mg daily, or as directed by the [facility]. 130 tablet 1       REVIEW OF SYSTEMS:   5 point ROS negative except as noted above in HPI, including Gen., Resp, CV, GI &  system review.     OBJECTIVE:  Vitals: /78 (BP Location: Left arm, Patient Position: Sitting, Cuff Size: Adult Large)   Pulse 100   Temp 97.3  F (36.3  C) (Temporal)   Resp 18   Ht 1.854 m (6' 1\")   Wt 111.3 kg (245 lb 6.4 oz)   SpO2 97%   BMI 32.38 kg/m    BMI= Body mass index is 32.38 kg/m .  He is alert and appears in no distress.  There is a little bit of swelling in the dorsal right forearm over the distal radius.  Not red or warm.  Full range of motion at the wrist.   strength is normal.  X-ray is normal.    ASSESSMENT:  Forearm swelling    PLAN:  He just can use ice here.  He has Percocet for his back.  We will get a put him into a forearm splint which she should wear during the day at work.  I do not think he needs any work restrictions for now but can wear the splint.  Follow him up in a couple of weeks if not improving with time.    Weight management plan: Discussed healthy diet and exercise guidelines    Sylvester Cash MD  [facility]            "

## 2019-09-13 DIAGNOSIS — Z13.6 CARDIOVASCULAR SCREENING; LDL GOAL LESS THAN 160: ICD-10-CM

## 2019-09-13 DIAGNOSIS — F33.0 MAJOR DEPRESSIVE DISORDER, RECURRENT EPISODE, MILD (H): ICD-10-CM

## 2019-09-13 RX ORDER — ARIPIPRAZOLE 5 MG/1
TABLET ORAL
Qty: 30 TABLET | Refills: 4 | Status: SHIPPED | OUTPATIENT
Start: 2019-09-13 | End: 2020-02-11

## 2019-09-13 RX ORDER — ATORVASTATIN CALCIUM 10 MG/1
TABLET, FILM COATED ORAL
Qty: 30 TABLET | Refills: 4 | Status: SHIPPED | OUTPATIENT
Start: 2019-09-13 | End: 2020-02-18

## 2019-09-13 NOTE — TELEPHONE ENCOUNTER
Atorvastatin Last Written Prescription Date:  4/30/19  Last Fill Quantity: 30,  # refills: 4   Last office visit: 9/12/2019 with prescribing provider:  Sylvester Smith Office Visit:   Next 5 appointments (look out 90 days)    Sep 16, 2019  2:20 PM CDT  Office Visit with Sylvester Cash MD  [address] [address]  [phone]         Aripiprazole Last Written Prescription Date:  5/8/19  Last Fill Quantity: 30,  # refills: 4   Last office visit: 9/12/2019 with prescribing provider:  Sylvester Smith Office Visit:   Next 5 appointments (look out 90 days)    Sep 16, 2019  2:20 PM CDT  Office Visit with Sylvester Cash MD  [facility] ([address] [address]  [phone]         Requested Prescriptions   Pending Prescriptions Disp Refills     ARIPiprazole (ABILIFY) 5 MG tablet [Pharmacy Med Name: ARIPIPRAZOLE 5MG TABS] 30 tablet 4     Sig: TAKE ONE TABLET BY MOUTH AT BEDTIME       Antipsychotic Medications Passed - 9/13/2019  1:06 AM        Passed - Blood pressure under 140/90 in past 12 months     BP Readings from Last 3 Encounters:   09/12/19 118/78   07/30/19 118/79   07/12/19 126/78                 Passed - Patient is 12 years of age or older        Passed - Lipid panel on file within the past 12 months     Recent Labs   Lab Test 10/19/18  0847  07/11/14  0823   CHOL 224*   < > 193   TRIG 77   < > 131   HDL 57   < > 41   *   < > 126   NHDL 167*   < >  --    VLDL  --   --  26   CHOLHDLRATIO  --   --  5.0    < > = values in this interval not displayed.               Passed - CBC on file in past 12 months     Recent Labs   Lab Test 10/19/18  0847   WBC 5.5   RBC 5.18   HGB 13.6   HCT 43.1                    Passed - Heart Rate on file within past 12 months     Pulse Readings from Last 3 Encounters:   09/12/19 100   07/30/19 79   07/12/19 79  "              Passed - A1c or Glucose on file in past 12 months     Recent Labs   Lab Test 10/19/18  0847 10/06/17  0811   GLC 98 105*   A1C  --  5.3       Please review patients last 3 weights. If a weight gain of >10 lbs exists, you may refill the prescription once after instructing the patient to schedule an appointment within the next 30 days.    Wt Readings from Last 3 Encounters:   09/12/19 111.3 kg (245 lb 6.4 oz)   07/30/19 112.7 kg (248 lb 8 oz)   07/12/19 113.1 kg (249 lb 4.8 oz)             Passed - Medication is active on med list        Passed - Recent (6 mo) or future (30 days) visit within the authorizing provider's specialty     Patient had office visit in the last 6 months or has a visit in the next 30 days with authorizing provider or within the authorizing provider's specialty.  See \"Patient Info\" tab in inbasket, or \"Choose Columns\" in Meds & Orders section of the refill encounter.            atorvastatin (LIPITOR) 10 MG tablet [Pharmacy Med Name: ATORVASTATIN CALCIUM 10MG TABS] 30 tablet 4     Sig: TAKE ONE TABLET BY MOUTH ONCE DAILY       Statins Protocol Passed - 9/13/2019  1:06 AM        Passed - LDL on file in past 12 months     Recent Labs   Lab Test 10/19/18  0847   *             Passed - No abnormal creatine kinase in past 12 months     Recent Labs   Lab Test 09/18/15  1003                   Passed - Recent (12 mo) or future (30 days) visit within the authorizing provider's specialty     Patient had office visit in the last 12 months or has a visit in the next 30 days with authorizing provider or within the authorizing provider's specialty.  See \"Patient Info\" tab in inbasket, or \"Choose Columns\" in Meds & Orders section of the refill encounter.              Passed - Medication is active on med list        Passed - Patient is age 18 or older        Prescription approved per [facility] Refill Protocol.    Candice Jeong RN on 9/13/2019 at 1:15 PM    "

## 2019-09-17 ENCOUNTER — HOSPITAL ENCOUNTER (OUTPATIENT)
Dept: OCCUPATIONAL THERAPY | Facility: CLINIC | Age: 49
Setting detail: THERAPIES SERIES
End: 2019-09-17
Attending: PHYSICAL MEDICINE & REHABILITATION
Payer: COMMERCIAL

## 2019-09-17 ENCOUNTER — TELEPHONE (OUTPATIENT)
Dept: FAMILY MEDICINE | Facility: CLINIC | Age: 49
End: 2019-09-17

## 2019-09-17 PROCEDURE — 97110 THERAPEUTIC EXERCISES: CPT | Mod: GO

## 2019-09-17 PROCEDURE — 97035 APP MDLTY 1+ULTRASOUND EA 15: CPT | Mod: GO

## 2019-09-17 PROCEDURE — 97140 MANUAL THERAPY 1/> REGIONS: CPT | Mod: GO

## 2019-09-17 NOTE — TELEPHONE ENCOUNTER
Prior Authorization Retail Medication Request    Medication/Dose: oxyCODONE-Acetaminophen 5-325MG tablets    Key: [id number]    ICD code (if different than what is on RX):    Previously Tried and Failed:    Rationale:      Insurance Name:  [employer]  Insurance ID:  [id number]      Pharmacy Information (if different than what is on RX)  Name:    Phone:

## 2019-09-20 NOTE — TELEPHONE ENCOUNTER
Prior Authorization Approval    Authorization Effective Date: 8/21/2019  Authorization Expiration Date: 9/19/2020  Medication: oxyCODONE-Acetaminophen 5-325MG tablets- APPROVED   Approved Dose/Quantity:   Reference #:     Insurance Company: [employer] - Phone [phone] Fax [phone]  Expected CoPay:       CoPay Card Available:      Foundation Assistance Needed:    Which Pharmacy is filling the prescription (Not needed for infusion/clinic administered): [facility] - [place], [place] - [address]  Pharmacy Notified: Yes- pharmacy will work on rebilling medication.  Patient Notified: Comment:  **Instructed pharmacy to notify patient when script is ready to /ship.**

## 2019-09-20 NOTE — TELEPHONE ENCOUNTER
Central Prior Authorization Team  Phone: [phone]    PA Initiation    Medication: oxyCODONE-Acetaminophen 5-325MG tablets  Insurance Company: [employer] - Phone [phone] Fax [phone]  Pharmacy Filling the Rx: [facility] - [place], [place] - [address]  Filling Pharmacy Phone: [phone]  Filling Pharmacy Fax:    Start Date: 9/20/2019

## 2019-09-23 DIAGNOSIS — M51.369 DDD (DEGENERATIVE DISC DISEASE), LUMBAR: Primary | ICD-10-CM

## 2019-09-23 RX ORDER — TRAMADOL HYDROCHLORIDE 50 MG/1
TABLET ORAL
Qty: 60 TABLET | Refills: 0 | Status: SHIPPED | OUTPATIENT
Start: 2019-09-23 | End: 2019-10-25

## 2019-09-23 NOTE — TELEPHONE ENCOUNTER
Tramadol        Last Written Prescription Date:  8/27/19  Last Fill Quantity: 60,   # refills: 0  Last Office Visit: 9/12/19  Future Office visit:       Routing refill request to provider for review/approval because:  Drug not on the [facility], [facility] or [facility] refill protocol or controlled substance

## 2019-09-24 ENCOUNTER — HOSPITAL ENCOUNTER (OUTPATIENT)
Dept: OCCUPATIONAL THERAPY | Facility: CLINIC | Age: 49
Setting detail: THERAPIES SERIES
End: 2019-09-24
Attending: PHYSICAL MEDICINE & REHABILITATION
Payer: COMMERCIAL

## 2019-09-24 PROCEDURE — 97140 MANUAL THERAPY 1/> REGIONS: CPT | Mod: GO

## 2019-09-24 PROCEDURE — 97110 THERAPEUTIC EXERCISES: CPT | Mod: GO

## 2019-09-24 PROCEDURE — 97035 APP MDLTY 1+ULTRASOUND EA 15: CPT | Mod: GO

## 2019-09-27 ENCOUNTER — ANTICOAGULATION THERAPY VISIT (OUTPATIENT)
Dept: ANTICOAGULATION | Facility: CLINIC | Age: 49
End: 2019-09-27
Payer: COMMERCIAL

## 2019-09-27 ENCOUNTER — HOSPITAL ENCOUNTER (OUTPATIENT)
Dept: OCCUPATIONAL THERAPY | Facility: CLINIC | Age: 49
Setting detail: THERAPIES SERIES
End: 2019-09-27
Attending: PHYSICAL MEDICINE & REHABILITATION
Payer: COMMERCIAL

## 2019-09-27 DIAGNOSIS — Z79.01 LONG TERM CURRENT USE OF ANTICOAGULANT THERAPY: ICD-10-CM

## 2019-09-27 LAB — INR POINT OF CARE: 2.7 (ref 0.9–1.1)

## 2019-09-27 PROCEDURE — 85610 PROTHROMBIN TIME: CPT | Mod: QW

## 2019-09-27 PROCEDURE — 99207 ZZC NO CHARGE NURSE ONLY: CPT

## 2019-09-27 PROCEDURE — 97035 APP MDLTY 1+ULTRASOUND EA 15: CPT | Mod: GO | Performed by: OCCUPATIONAL THERAPIST

## 2019-09-27 PROCEDURE — 36416 COLLJ CAPILLARY BLOOD SPEC: CPT

## 2019-09-27 PROCEDURE — 97110 THERAPEUTIC EXERCISES: CPT | Mod: GO | Performed by: OCCUPATIONAL THERAPIST

## 2019-09-27 PROCEDURE — 97140 MANUAL THERAPY 1/> REGIONS: CPT | Mod: GO | Performed by: OCCUPATIONAL THERAPIST

## 2019-09-27 NOTE — PROGRESS NOTES
ANTICOAGULATION FOLLOW-UP CLINIC VISIT    Patient Name:  Hollis Diggs  Date:  2019  Contact Type:  Face to Face    SUBJECTIVE:  Patient Findings     Comments:   The patient was assessed for diet, medication, and activity level changes, missed or extra doses, bruising or bleeding, with no problem findings.  Lilliam Pro RN          Clinical Outcomes     Negatives:   Major bleeding event, Thromboembolic event, Anticoagulation-related hospital admission, Anticoagulation-related ED visit, Anticoagulation-related fatality    Comments:   The patient was assessed for diet, medication, and activity level changes, missed or extra doses, bruising or bleeding, with no problem findings.  Lilliam Pro RN             OBJECTIVE    INR Protime   Date Value Ref Range Status   2019 2.7 (A) 0.9 - 1.1 Final     Factor 2 Assay   Date Value Ref Range Status   2015 33 (L) 60 - 140 % Final     Comment:     Analyte Specific Reagents (ASRs) are used in many laboratory tests necessary   for   standard medical care and generally do not require FDA approval.  This test   was   developed and its performance characteristics determined by [facility].  It has not been cleared or approved by   the US Food and Drug Administration.         ASSESSMENT / PLAN  INR assessment THER    Recheck INR In: 6 WEEKS    INR Location Clinic      Anticoagulation Summary  As of 2019    INR goal:   2.0-3.0   TTR:   66.5 % (2.7 y)   INR used for dosin.7 (2019)   Warfarin maintenance plan:   7.5 mg (5 mg x 1.5) every day   Full warfarin instructions:   7.5 mg every day   Weekly warfarin total:   52.5 mg   No change documented:   Lilliam Pro RN   Plan last modified:   Lilliam Pro RN (2019)   Next INR check:   2019   Target end date:       Indications    DVT (deep venous thrombosis) (H) (Resolved) [I82.409]  Long-term (current) use of anticoagulants  [Z79.01] [Z79.01]             Anticoagulation Episode Summary     INR check location:       Preferred lab:       Send INR reminders to:   AVANDRESSA MARIA DEL CARMENERIKA JIMENEZ    Comments:   5 mg, dosing card, PM dose      Anticoagulation Care Providers     Provider Role Specialty Phone number    Sylvester Cash MD [facility] [phone]            See the Encounter Report to view Anticoagulation Flowsheet and Dosing Calendar (Go to Encounters tab in chart review, and find the Anticoagulation Therapy Visit)    Dosage adjustment made based on physician directed care plan.      Lilliam Pro RN

## 2019-10-04 ENCOUNTER — HOSPITAL ENCOUNTER (OUTPATIENT)
Dept: OCCUPATIONAL THERAPY | Facility: CLINIC | Age: 49
Setting detail: THERAPIES SERIES
End: 2019-10-04
Attending: PHYSICAL MEDICINE & REHABILITATION
Payer: COMMERCIAL

## 2019-10-04 PROCEDURE — 97140 MANUAL THERAPY 1/> REGIONS: CPT | Mod: GO | Performed by: OCCUPATIONAL THERAPIST

## 2019-10-04 PROCEDURE — 97035 APP MDLTY 1+ULTRASOUND EA 15: CPT | Mod: GO | Performed by: OCCUPATIONAL THERAPIST

## 2019-10-04 PROCEDURE — 97110 THERAPEUTIC EXERCISES: CPT | Mod: GO | Performed by: OCCUPATIONAL THERAPIST

## 2019-10-10 DIAGNOSIS — M51.369 DDD (DEGENERATIVE DISC DISEASE), LUMBAR: Primary | ICD-10-CM

## 2019-10-10 RX ORDER — OXYCODONE AND ACETAMINOPHEN 5; 325 MG/1; MG/1
1 TABLET ORAL EVERY 8 HOURS PRN
Qty: 90 TABLET | Refills: 0 | Status: SHIPPED | OUTPATIENT
Start: 2019-10-10 | End: 2019-11-08

## 2019-10-10 NOTE — TELEPHONE ENCOUNTER
Oxycodone-Acetaminophen        Last Written Prescription Date:  9/12/19  Last Fill Quantity: 90,   # refills: 0  Last Office Visit: 9/12/19  Future Office visit:    Next 5 appointments (look out 90 days)    Oct 23, 2019  9:20 AM CDT  [facility] [address] [place] [id number]  [phone]           Routing refill request to provider for review/approval because:  Drug not on the [facility], [facility] or  Health refill protocol or controlled substance

## 2019-10-18 DIAGNOSIS — F33.0 MAJOR DEPRESSIVE DISORDER, RECURRENT EPISODE, MILD (H): ICD-10-CM

## 2019-10-21 RX ORDER — DULOXETIN HYDROCHLORIDE 30 MG/1
CAPSULE, DELAYED RELEASE ORAL
Qty: 270 CAPSULE | Refills: 0 | Status: SHIPPED | OUTPATIENT
Start: 2019-10-21 | End: 2020-01-20

## 2019-10-21 NOTE — TELEPHONE ENCOUNTER
"  Requested Prescriptions   Pending Prescriptions Disp Refills     DULoxetine (CYMBALTA) 30 MG capsule [Pharmacy Med Name: DULOXETINE HCL 30MG CPEP] 270 capsule 1     Sig: TAKE THREE CAPSULES BY MOUTH EVERY DAY   Last Written Prescription Date:  4/17/2019  Last Fill Quantity: 270,  # refills: 1   Last office visit: 9/12/2019 with prescribing provider:     Future Office Visit:   Next 5 appointments (look out 90 days)    Nov 08, 2019  2:00 PM CST  PHYSICAL with Sylvester Cash MD  [facility] ([address] [id number] [phone]             Serotonin-Norepinephrine Reuptake Inhibitors  Passed - 10/18/2019  1:28 AM        Passed - Blood pressure under 140/90 in past 12 months     BP Readings from Last 3 Encounters:   09/12/19 118/78   07/30/19 118/79   07/12/19 126/78           Passed - PHQ-9 score of less than 5 in past 6 months     Please review last PHQ-9 score.   PHQ-9 score:    PHQ-9 SCORE 6/14/2019   PHQ-9 Total Score -   PHQ-9 Total Score MyChart -   PHQ-9 Total Score 4             Passed - Medication is active on med list        Passed - Patient is age 18 or older        Passed - Recent (6 mo) or future (30 days) visit within the authorizing provider's specialty     Patient had office visit in the last 6 months or has a visit in the next 30 days with authorizing provider or within the authorizing provider's specialty.  See \"Patient Info\" tab in inbasket, or \"Choose Columns\" in Meds & Orders section of the refill encounter.          Prescription approved per [facility] Refill Protocol.  Delia Morris RN      "

## 2019-10-25 DIAGNOSIS — M51.369 DDD (DEGENERATIVE DISC DISEASE), LUMBAR: Primary | ICD-10-CM

## 2019-10-25 RX ORDER — TRAMADOL HYDROCHLORIDE 50 MG/1
TABLET ORAL
Qty: 60 TABLET | Refills: 0 | Status: SHIPPED | OUTPATIENT
Start: 2019-10-25 | End: 2019-11-25

## 2019-10-25 NOTE — TELEPHONE ENCOUNTER
Tramadol        Last Written Prescription Date:  9/23/19  Last Fill Quantity: 60,   # refills: 0  Last Office Visit: 9/12/19  Future Office visit:    Next 5 appointments (look out 90 days)    Nov 08, 2019  2:00 PM CST  PHYSICAL with Sylvester Cash MD  [facility] [address] [place] [address]  [phone]           Routing refill request to provider for review/approval because:  Drug not on the [facility], [facility] or [facility] refill protocol or controlled substance

## 2019-11-01 ENCOUNTER — OFFICE VISIT (OUTPATIENT)
Dept: URGENT CARE | Facility: RETAIL CLINIC | Age: 49
End: 2019-11-01
Payer: COMMERCIAL

## 2019-11-01 VITALS
DIASTOLIC BLOOD PRESSURE: 86 MMHG | RESPIRATION RATE: 16 BRPM | SYSTOLIC BLOOD PRESSURE: 127 MMHG | OXYGEN SATURATION: 99 % | HEART RATE: 99 BPM | TEMPERATURE: 96.8 F

## 2019-11-01 DIAGNOSIS — B35.0 TINEA CAPITIS: Primary | ICD-10-CM

## 2019-11-01 PROCEDURE — 99213 OFFICE O/P EST LOW 20 MIN: CPT | Performed by: NURSE PRACTITIONER

## 2019-11-01 RX ORDER — TERBINAFINE HYDROCHLORIDE 250 MG/1
250 TABLET ORAL DAILY
Qty: 30 TABLET | Refills: 0 | Status: SHIPPED | OUTPATIENT
Start: 2019-11-01 | End: 2019-11-25

## 2019-11-01 ASSESSMENT — ENCOUNTER SYMPTOMS
FEVER: 0
DIZZINESS: 0
VOMITING: 0
ADENOPATHY: 0
WEAKNESS: 0
FATIGUE: 0
SHORTNESS OF BREATH: 0
PARESTHESIAS: 0
COLOR CHANGE: 1
WHEEZING: 0
APPETITE CHANGE: 0
CHEST TIGHTNESS: 0
ACTIVITY CHANGE: 0
PALPITATIONS: 0
DIAPHORESIS: 0
CHILLS: 0
ABDOMINAL PAIN: 0
NAUSEA: 0
SLEEP DISTURBANCE: 0
LIGHT-HEADEDNESS: 0

## 2019-11-01 ASSESSMENT — PAIN SCALES - GENERAL: PAINLEVEL: NO PAIN (0)

## 2019-11-01 NOTE — PROGRESS NOTES
Chief Complaint   Patient presents with     Derm Problem     posible ring worm     SUBJECTIVE:  Hollis Diggs is a 49 year old male who presents to the clinic today for a rash. His hairdresser told him he might have ringworm.  Onset of rash was unknown, he has not noticed it.  Location of the rash: scalp more so posterior  Quality/symptoms of rash: red maculopapular rash with several raised red oval shaped rings  Associated symptoms include: nothing.  Symptoms are moderate and rash seems to be stable.  Previous history of a similar rash? No  Treatment measures tried include:  none  Recent exposure history: none known  Patient denies new meds, pets, foods, soaps, detergents, lotions, or enviornmental contacts.    Past Medical History:   Diagnosis Date     Antiphospholipid syndrome (H)      Arthritis      Asthma     Exercise     blood clot in leg      Depressive disorder, not elsewhere classified     Depression (non-psychotic)     ANKIT (generalised anxiety disorder)      HH (hiatus hernia)      Hypercholesteremia     normal with weight loss 3/09     Lumbar disc herniation 1992     Seronegative rheumatoid arthritis (H)      albuterol (PROAIR HFA/PROVENTIL HFA/VENTOLIN HFA) 108 (90 Base) MCG/ACT inhaler, Inhale 2 puffs into the lungs every 4 hours as needed for shortness of breath / dyspnea  amitriptyline (ELAVIL) 10 MG tablet, Take 1 tablet (10 mg) by mouth At Bedtime  ARIPiprazole (ABILIFY) 5 MG tablet, TAKE ONE TABLET BY MOUTH AT BEDTIME  atorvastatin (LIPITOR) 10 MG tablet, TAKE ONE TABLET BY MOUTH ONCE DAILY  DULoxetine (CYMBALTA) 30 MG capsule, TAKE THREE CAPSULES BY MOUTH EVERY DAY  finasteride (PROSCAR) 5 MG tablet, Take 1 tablet (5 mg) by mouth daily  LamoTRIgine (LAMICTAL XR) 300 MG TB24, Take 1 tablet by mouth daily  order for DME, Equipment being ordered: elbow/heel protector, mesh  oxyCODONE-acetaminophen (PERCOCET) 5-325 MG tablet, Take 1 tablet by mouth every 8 hours as needed for moderate to severe pain  Must last 30 days.  sildenafil (REVATIO) 20 MG tablet, TAKE ONE TO TWO TABLETS BY MOUTH ONCE DAILY AS NEEDED PRIOR TO SEXUAL ACTIVITY. NEVER USE WITH NITROGLYCERIN, TERAZOSIN OR DOXAZOSIN  traMADol (ULTRAM) 50 MG tablet, Take 1 tablet as needed twice day for pain. Max 2 tab daily. No driving or alcohol use while taking medication.  tretinoin (RETIN-A) 0.05 % external cream, Apply  topically At Bedtime.  vitamin D3 (CHOLECALCIFEROL) 2000 units (50 mcg) tablet, Take 1 tablet (2,000 Units) by mouth daily  warfarin (COUMADIN) 5 MG tablet, Take 7.5 mg daily, or as directed by the [facility].    No current facility-administered medications on file prior to visit.     Social History     Tobacco Use     Smoking status: Never Smoker     Smokeless tobacco: Never Used   Substance Use Topics     Alcohol use: Yes     Comment: rare     Allergies   Allergen Reactions     No Known Drug Allergies      Review of Systems   Constitutional: Negative for activity change, appetite change, chills, diaphoresis, fatigue and fever.   Respiratory: Negative for chest tightness, shortness of breath and wheezing.    Cardiovascular: Negative for palpitations.   Gastrointestinal: Negative for abdominal pain, nausea and vomiting.   Skin: Positive for color change and rash.   Neurological: Negative for dizziness, weakness, light-headedness and paresthesias.   Hematological: Negative for adenopathy.   Psychiatric/Behavioral: Negative for sleep disturbance.     EXAM:   /86   Pulse 99   Temp 96.8  F (36  C) (Temporal)   Resp 16   SpO2 99%     Physical Exam  Vitals signs reviewed.   Constitutional:       Appearance: Normal appearance.   HENT:      Head: Normocephalic and atraumatic.      Nose: Nose normal.      Mouth/Throat:      Mouth: Mucous membranes are moist.      Pharynx: Oropharynx is clear.   Eyes:      Extraocular Movements: Extraocular movements intact.      Conjunctiva/sclera: Conjunctivae normal.      Pupils: Pupils are equal,  round, and reactive to light.   Neck:      Musculoskeletal: Normal range of motion and neck supple.   Cardiovascular:      Rate and Rhythm: Normal rate.   Pulmonary:      Effort: Pulmonary effort is normal.   Musculoskeletal: Normal range of motion.   Skin:     General: Skin is warm and dry.      Findings: Erythema and rash present.      Comments: Scattered erythematous maculopapular lesions with several erythematous oval shaped rings on posterior scalp   Neurological:      General: No focal deficit present.      Mental Status: He is alert and oriented to person, place, and time.   Psychiatric:         Mood and Affect: Mood normal.         Behavior: Behavior normal.       ASSESSMENT:    ICD-10-CM    1. Tinea capitis B35.0 terbinafine (LAMISIL) 250 MG tablet     PLAN:  Patient Instructions     Tinea capitis - ring worm  Oral terbinafine 250 mg daily for 4 weeks.  Discussed medication interactions with patient, close monitoring, and discontinue if side effects.  Monitor for improvement, if not improving would recommend seeing primary care provider for further eval / skin testing.    Patient Education     Ringworm of the Scalp  Ringworm is caused by a fungus, not a worm. It can be passed from animals (such as cats and dogs) or people infected with the fungus. The infection starts as a small, red, itchy sore. It grows larger, in the shape of a round, 1-to-2-inch ring with clear skin in the center. When the fungus infects the scalp, it causes round bald patches that are itchy and flaky. Sometimes these areas may scar. Or the hair may not grow back.  Ringworm of the scalp can be hard to treat. You will need to take oral medicine for 2 to 12 weeks. Be sure to follow special instructions for taking the medicine. Creams don't work to clear up the infection.  Home care  Follow these guidelines when caring for yourself at home:    It may take up to 12 weeks for the infection to fully clear. To stop it from coming back, keep  taking the medicine until the rash is gone and your healthcare provider has told you to stop. Throw out any teixeira, hairbrushes, barrettes, hats, or other products that have touched your head. Or you can disinfect these items by soaking them in diluted chlorine bleach. (Use 1/4 cup bleach per gallon of water). Clean towels, pillowcases, sheets, and other linens or clothing each time they may have touched the infected area. Wash them in hot water with a strong detergent. Dry them on high heat. Use a different towel to dry your head. Don t use this towel on the rest of your body.    Ringworm of the scalp is very contagious. This means it spreads easily to other people. Other family members may need to be treated. Don't share hats, teixeira, hairbrushes, towels, pillowcases, or helmets while infected. Any child with ringworm of the scalp should stay out of school or day care until prescription medicine is started, or until the healthcare provider says it is OK to return. Your child should not play contact sports until the provider says it is OK.    Shampooing with a medicated shampoo will help keep the ringworm from spreading. But it will not cure the ringworm.    You don't need to cut or shave the hair.    Have your  check your pet for signs of ringworm.  Follow-up care  Follow up with your healthcare provider, or as advised.  When to seek medical advice  Call your healthcare provider right away if any of these occur:    Ringworm comes back    Scalp swelling or pain get worse    Fluid or pus drains from the rash    Fever in adults: 100.4 F (38.0 C) or above, lasting for 24 to 48 hours    Fever in a child (see Fever and children, below)     Fever and children  Always use a digital thermometer to check your child s temperature. Never use a mercury thermometer.  For infants and toddlers, be sure to use a rectal thermometer correctly. A rectal thermometer may accidentally poke a hole in (perforate) the rectum. It  may also pass on germs from the stool. Always follow the product maker s directions for proper use. If you don t feel comfortable taking a rectal temperature, use another method. When you talk to your child s healthcare provider, tell him or her which method you used to take your child s temperature.  Here are guidelines for fever temperature. Ear temperatures aren t accurate before 6 months of age. Don t take an oral temperature until your child is at least 4 years old.  Infant under 3 months old:    Ask your child s healthcare provider how you should take the temperature.    Rectal or forehead (temporal artery) temperature of 100.4 F (38 C) or higher, or as directed by the provider    Armpit temperature of 99 F (37.2 C) or higher, or as directed by the provider  Child age 3 to 36 months:    Rectal, forehead (temporal artery), or ear temperature of 102 F (38.9 C) or higher, or as directed by the provider    Armpit temperature of 101 F (38.3 C) or higher, or as directed by the provider  Child of any age:    Repeated temperature of 104 F (40 C) or higher, or as directed by the provider    Fever that lasts more than 24 hours in a child under 2 years old. Or a fever that lasts for 3 days in a child 2 years or older.   Date Last Reviewed: 8/1/2016 2000-2018 [employer]. [address] [place] [address], [place] [address]. All rights reserved. This information is not intended as a substitute for professional medical care. Always follow your healthcare professional's instructions.             Follow up with primary care provider with any problems, questions or concerns or if symptoms worsen or fail to improve. Patient agreed to plan and verbalized understanding.    Alana Rooney, TAVO  [facility]

## 2019-11-01 NOTE — PATIENT INSTRUCTIONS
Tinea capitis - ring worm  Oral terbinafine 250 mg daily for 4 weeks.  Discussed medication interactions with patient, close monitoring, and discontinue if side effects.  Monitor for improvement, if not improving would recommend seeing primary care provider for further eval / skin testing.    Patient Education     Ringworm of the Scalp  Ringworm is caused by a fungus, not a worm. It can be passed from animals (such as cats and dogs) or people infected with the fungus. The infection starts as a small, red, itchy sore. It grows larger, in the shape of a round, 1-to-2-inch ring with clear skin in the center. When the fungus infects the scalp, it causes round bald patches that are itchy and flaky. Sometimes these areas may scar. Or the hair may not grow back.  Ringworm of the scalp can be hard to treat. You will need to take oral medicine for 2 to 12 weeks. Be sure to follow special instructions for taking the medicine. Creams don't work to clear up the infection.  Home care  Follow these guidelines when caring for yourself at home:    It may take up to 12 weeks for the infection to fully clear. To stop it from coming back, keep taking the medicine until the rash is gone and your healthcare provider has told you to stop. Throw out any teixeira, hairbrushes, barrettes, hats, or other products that have touched your head. Or you can disinfect these items by soaking them in diluted chlorine bleach. (Use 1/4 cup bleach per gallon of water). Clean towels, pillowcases, sheets, and other linens or clothing each time they may have touched the infected area. Wash them in hot water with a strong detergent. Dry them on high heat. Use a different towel to dry your head. Don t use this towel on the rest of your body.    Ringworm of the scalp is very contagious. This means it spreads easily to other people. Other family members may need to be treated. Don't share hats, teixeira, hairbrushes, towels, pillowcases, or helmets while infected.  Any child with ringworm of the scalp should stay out of school or day care until prescription medicine is started, or until the healthcare provider says it is OK to return. Your child should not play contact sports until the provider says it is OK.    Shampooing with a medicated shampoo will help keep the ringworm from spreading. But it will not cure the ringworm.    You don't need to cut or shave the hair.    Have your  check your pet for signs of ringworm.  Follow-up care  Follow up with your healthcare provider, or as advised.  When to seek medical advice  Call your healthcare provider right away if any of these occur:    Ringworm comes back    Scalp swelling or pain get worse    Fluid or pus drains from the rash    Fever in adults: 100.4 F (38.0 C) or above, lasting for 24 to 48 hours    Fever in a child (see Fever and children, below)     Fever and children  Always use a digital thermometer to check your child s temperature. Never use a mercury thermometer.  For infants and toddlers, be sure to use a rectal thermometer correctly. A rectal thermometer may accidentally poke a hole in (perforate) the rectum. It may also pass on germs from the stool. Always follow the product maker s directions for proper use. If you don t feel comfortable taking a rectal temperature, use another method. When you talk to your child s healthcare provider, tell him or her which method you used to take your child s temperature.  Here are guidelines for fever temperature. Ear temperatures aren t accurate before 6 months of age. Don t take an oral temperature until your child is at least 4 years old.  Infant under 3 months old:    Ask your child s healthcare provider how you should take the temperature.    Rectal or forehead (temporal artery) temperature of 100.4 F (38 C) or higher, or as directed by the provider    Armpit temperature of 99 F (37.2 C) or higher, or as directed by the provider  Child age 3 to 36  months:    Rectal, forehead (temporal artery), or ear temperature of 102 F (38.9 C) or higher, or as directed by the provider    Armpit temperature of 101 F (38.3 C) or higher, or as directed by the provider  Child of any age:    Repeated temperature of 104 F (40 C) or higher, or as directed by the provider    Fever that lasts more than 24 hours in a child under 2 years old. Or a fever that lasts for 3 days in a child 2 years or older.   Date Last Reviewed: 8/1/2016 2000-2018 [employer]. [address] [place] [address]. All rights reserved. This information is not intended as a substitute for professional medical care. Always follow your healthcare professional's instructions.

## 2019-11-08 ENCOUNTER — OFFICE VISIT (OUTPATIENT)
Dept: FAMILY MEDICINE | Facility: CLINIC | Age: 49
End: 2019-11-08
Payer: COMMERCIAL

## 2019-11-08 ENCOUNTER — ANTICOAGULATION THERAPY VISIT (OUTPATIENT)
Dept: ANTICOAGULATION | Facility: CLINIC | Age: 49
End: 2019-11-08
Payer: COMMERCIAL

## 2019-11-08 VITALS
DIASTOLIC BLOOD PRESSURE: 78 MMHG | TEMPERATURE: 96.6 F | WEIGHT: 244.9 LBS | HEART RATE: 109 BPM | BODY MASS INDEX: 32.46 KG/M2 | HEIGHT: 73 IN | SYSTOLIC BLOOD PRESSURE: 126 MMHG | OXYGEN SATURATION: 100 %

## 2019-11-08 DIAGNOSIS — Z12.5 SCREENING FOR PROSTATE CANCER: ICD-10-CM

## 2019-11-08 DIAGNOSIS — M51.369 DDD (DEGENERATIVE DISC DISEASE), LUMBAR: ICD-10-CM

## 2019-11-08 DIAGNOSIS — Z00.00 ROUTINE GENERAL MEDICAL EXAMINATION AT A HEALTH CARE FACILITY: Primary | ICD-10-CM

## 2019-11-08 DIAGNOSIS — N52.9 ERECTILE DYSFUNCTION, UNSPECIFIED ERECTILE DYSFUNCTION TYPE: ICD-10-CM

## 2019-11-08 DIAGNOSIS — J98.01 BRONCHOSPASM: ICD-10-CM

## 2019-11-08 DIAGNOSIS — Z13.6 CARDIOVASCULAR SCREENING; LDL GOAL LESS THAN 160: ICD-10-CM

## 2019-11-08 DIAGNOSIS — Z79.01 LONG TERM CURRENT USE OF ANTICOAGULANT THERAPY: ICD-10-CM

## 2019-11-08 LAB
ALBUMIN SERPL-MCNC: 3.7 G/DL (ref 3.4–5)
ALP SERPL-CCNC: 102 U/L (ref 40–150)
ALT SERPL W P-5'-P-CCNC: 27 U/L (ref 0–70)
ANION GAP SERPL CALCULATED.3IONS-SCNC: 2 MMOL/L (ref 3–14)
AST SERPL W P-5'-P-CCNC: 20 U/L (ref 0–45)
BILIRUB SERPL-MCNC: 0.5 MG/DL (ref 0.2–1.3)
BUN SERPL-MCNC: 8 MG/DL (ref 7–30)
CALCIUM SERPL-MCNC: 8.8 MG/DL (ref 8.5–10.1)
CHLORIDE SERPL-SCNC: 108 MMOL/L (ref 94–109)
CHOLEST SERPL-MCNC: 146 MG/DL
CO2 SERPL-SCNC: 31 MMOL/L (ref 20–32)
CREAT SERPL-MCNC: 1.06 MG/DL (ref 0.66–1.25)
ERYTHROCYTE [DISTWIDTH] IN BLOOD BY AUTOMATED COUNT: 13.6 % (ref 10–15)
GFR SERPL CREATININE-BSD FRML MDRD: 81 ML/MIN/{1.73_M2}
GLUCOSE SERPL-MCNC: 79 MG/DL (ref 70–99)
HCT VFR BLD AUTO: 45.4 % (ref 40–53)
HDLC SERPL-MCNC: 54 MG/DL
HGB BLD-MCNC: 14.7 G/DL (ref 13.3–17.7)
INR POINT OF CARE: 2.7 (ref 0.9–1.1)
LDLC SERPL CALC-MCNC: 78 MG/DL
MCH RBC QN AUTO: 26.6 PG (ref 26.5–33)
MCHC RBC AUTO-ENTMCNC: 32.4 G/DL (ref 31.5–36.5)
MCV RBC AUTO: 82 FL (ref 78–100)
NONHDLC SERPL-MCNC: 92 MG/DL
PLATELET # BLD AUTO: 290 10E9/L (ref 150–450)
POTASSIUM SERPL-SCNC: 3.6 MMOL/L (ref 3.4–5.3)
PROT SERPL-MCNC: 7.7 G/DL (ref 6.8–8.8)
PSA SERPL-ACNC: 0.97 UG/L (ref 0–4)
RBC # BLD AUTO: 5.53 10E12/L (ref 4.4–5.9)
SODIUM SERPL-SCNC: 141 MMOL/L (ref 133–144)
TRIGL SERPL-MCNC: 71 MG/DL
WBC # BLD AUTO: 6.1 10E9/L (ref 4–11)

## 2019-11-08 PROCEDURE — G0103 PSA SCREENING: HCPCS | Performed by: FAMILY MEDICINE

## 2019-11-08 PROCEDURE — 85027 COMPLETE CBC AUTOMATED: CPT | Performed by: FAMILY MEDICINE

## 2019-11-08 PROCEDURE — 36416 COLLJ CAPILLARY BLOOD SPEC: CPT

## 2019-11-08 PROCEDURE — 99396 PREV VISIT EST AGE 40-64: CPT | Performed by: FAMILY MEDICINE

## 2019-11-08 PROCEDURE — 80053 COMPREHEN METABOLIC PANEL: CPT | Performed by: FAMILY MEDICINE

## 2019-11-08 PROCEDURE — 99207 ZZC NO CHARGE NURSE ONLY: CPT

## 2019-11-08 PROCEDURE — 85610 PROTHROMBIN TIME: CPT | Mod: QW

## 2019-11-08 PROCEDURE — 80061 LIPID PANEL: CPT | Performed by: FAMILY MEDICINE

## 2019-11-08 RX ORDER — SILDENAFIL CITRATE 20 MG/1
TABLET ORAL
Qty: 30 TABLET | Refills: 1 | Status: SHIPPED | OUTPATIENT
Start: 2019-11-08 | End: 2020-01-03

## 2019-11-08 RX ORDER — OXYCODONE AND ACETAMINOPHEN 5; 325 MG/1; MG/1
1 TABLET ORAL EVERY 8 HOURS PRN
Qty: 90 TABLET | Refills: 0 | Status: SHIPPED | OUTPATIENT
Start: 2019-11-08 | End: 2019-12-05

## 2019-11-08 RX ORDER — ALBUTEROL SULFATE 90 UG/1
2 AEROSOL, METERED RESPIRATORY (INHALATION) EVERY 4 HOURS PRN
Qty: 3 INHALER | Refills: 1 | Status: ON HOLD | OUTPATIENT
Start: 2019-11-08 | End: 2020-06-22

## 2019-11-08 ASSESSMENT — ENCOUNTER SYMPTOMS
FREQUENCY: 0
FEVER: 0
COUGH: 0
EYE PAIN: 0
DIZZINESS: 0
HEMATOCHEZIA: 0
NERVOUS/ANXIOUS: 1
ABDOMINAL PAIN: 0
CONSTIPATION: 0
DIARRHEA: 0
CHILLS: 0
HEMATURIA: 0

## 2019-11-08 ASSESSMENT — PATIENT HEALTH QUESTIONNAIRE - PHQ9: SUM OF ALL RESPONSES TO PHQ QUESTIONS 1-9: 7

## 2019-11-08 ASSESSMENT — MIFFLIN-ST. JEOR: SCORE: 2024.74

## 2019-11-08 NOTE — PROGRESS NOTES
SUBJECTIVE:   CC: Hollis Diggs is an 49 year old male who presents for preventative health visit.     Patient has no questions or concerns at this time.     He is fasting today.         Healthy Habits:     Getting at least 3 servings of Calcium per day:  Yes    Bi-annual eye exam:  Yes    Dental care twice a year:  Yes    Sleep apnea or symptoms of sleep apnea:  Daytime drowsiness and Excessive snoring    Diet:  Regular (no restrictions)    Duration of exercise:  Less than 15 minutes    Taking medications regularly:  Yes    Medication side effects:  None    PHQ-2 Total Score: 2    Additional concerns today:  No              Today's PHQ-2 Score:   PHQ-2 ( 1999 [employer]) 11/8/2019   Q1: Little interest or pleasure in doing things 1   Q2: Feeling down, depressed or hopeless 1   PHQ-2 Score 2   Q1: Little interest or pleasure in doing things Several days   Q2: Feeling down, depressed or hopeless Several days   PHQ-2 Score 2       Abuse: Current or Past(Physical, Sexual or Emotional)- No  Do you feel safe in your environment? Yes        Social History     Tobacco Use     Smoking status: Never Smoker     Smokeless tobacco: Never Used   Substance Use Topics     Alcohol use: Yes     Comment: rare     If you drink alcohol do you typically have >3 drinks per day or >7 drinks per week? No    Alcohol Use 11/8/2019   Prescreen: >3 drinks/day or >7 drinks/week? No   Prescreen: >3 drinks/day or >7 drinks/week? -   No flowsheet data found.    Last PSA: No results found for: PSA    Reviewed orders with patient. Reviewed health maintenance and updated orders accordingly - Yes      Reviewed and updated as needed this visit by clinical staff  Tobacco  Allergies  Meds  Med Hx  Surg Hx  Fam Hx  Soc Hx        Reviewed and updated as needed this visit by Provider        Past Medical History:   Diagnosis Date     Antiphospholipid syndrome (H)      Arthritis      Asthma     Exercise     blood clot in leg      Depressive disorder, not  elsewhere classified     Depression (non-psychotic)     ANKIT (generalised anxiety disorder)      HH (hiatus hernia)      Hypercholesteremia     normal with weight loss 3/09     Lumbar disc herniation 1992     Seronegative rheumatoid arthritis (H)       Past Surgical History:   Procedure Laterality Date     APPENDECTOMY       C NONSPECIFIC PROCEDURE  91 or 92    back surgery. lumbar. lamiectomy     COLONOSCOPY N/A 8/2/2016    Procedure: COMBINED COLONOSCOPY, SINGLE OR MULTIPLE BIOPSY/POLYPECTOMY BY BIOPSY;  Surgeon: Sydnee Walton MD;  Location: [facility] OR     COLONOSCOPY WITH CO2 INSUFFLATION N/A 8/2/2016    Procedure: COLONOSCOPY WITH CO2 INSUFFLATION;  Surgeon: Sydnee Walton MD;  Location: [facility]     COMBINED ESOPHAGOSCOPY, GASTROSCOPY, DUODENOSCOPY (EGD) WITH CO2 INSUFFLATION N/A 8/2/2016    Procedure: COMBINED ESOPHAGOSCOPY, GASTROSCOPY, DUODENOSCOPY (EGD) WITH CO2 INSUFFLATION;  Surgeon: Sydnee Walton MD;  Location: [facility] OR     ESOPHAGOSCOPY, GASTROSCOPY, DUODENOSCOPY (EGD), COMBINED N/A 8/2/2016    Procedure: COMBINED ESOPHAGOSCOPY, GASTROSCOPY, DUODENOSCOPY (EGD), BIOPSY SINGLE OR MULTIPLE;  Surgeon: Sydnee Walton MD;  Location: [facility] OR     HC REMOVAL OF TONSILS,<10 Y/O      Tonsils <11y.o.     HC REPAIR INCISIONAL HERNIA,REDUCIBLE  1970's    Hernia Repair, Incisional, Unilateral     HC UGI ENDOSCOPY DIAG W BIOPSY  02/01/06     HC VASECTOMY UNILAT/BILAT W POSTOP SEMEN  1/05    Vasectomy     History back lumbar laminectomy         Review of Systems   Constitutional: Negative for chills and fever.   HENT: Negative for congestion and ear pain.    Eyes: Negative for pain.   Respiratory: Negative for cough.    Cardiovascular: Negative for chest pain.   Gastrointestinal: Negative for abdominal pain, constipation, diarrhea and hematochezia.   Genitourinary: Negative for frequency and hematuria.   Neurological: Negative for dizziness.   Psychiatric/Behavioral: The  "patient is nervous/anxious.          OBJECTIVE:   /78 (BP Location: Left arm, Patient Position: Sitting, Cuff Size: Adult Large)   Pulse 109   Temp 96.6  F (35.9  C) (Temporal)   Ht 1.854 m (6' 1\")   Wt 111.1 kg (244 lb 14.4 oz)   SpO2 100%   BMI 32.31 kg/m      Physical Exam  GENERAL: healthy, alert and no distress  EYES: Eyes grossly normal to inspection, PERRL and conjunctivae and sclerae normal  HENT: ear canals and TM's normal, nose and mouth without ulcers or lesions  NECK: no adenopathy, no asymmetry, masses, or scars and thyroid normal to palpation  RESP: lungs clear to auscultation - no rales, rhonchi or wheezes  CV: regular rate and rhythm, normal S1 S2, no S3 or S4, no murmur, click or rub, no peripheral edema and peripheral pulses strong  ABDOMEN: soft, nontender, no hepatosplenomegaly, no masses and bowel sounds normal  MS: no gross musculoskeletal defects noted, no edema  SKIN: no suspicious lesions or rashes  NEURO: Normal strength and tone, mentation intact and speech normal  PSYCH: mentation appears normal and anxious    Diagnostic Test Results:  Labs reviewed in Epic  Results for orders placed or performed in visit on 11/08/19 (from the past 24 hour(s))   CBC with platelets   Result Value Ref Range    WBC 6.1 4.0 - 11.0 10e9/L    RBC Count 5.53 4.4 - 5.9 10e12/L    Hemoglobin 14.7 13.3 - 17.7 g/dL    Hematocrit 45.4 40.0 - 53.0 %    MCV 82 78 - 100 fl    MCH 26.6 26.5 - 33.0 pg    MCHC 32.4 31.5 - 36.5 g/dL    RDW 13.6 10.0 - 15.0 %    Platelet Count 290 150 - 450 10e9/L       ASSESSMENT/PLAN:   1. Routine general medical examination at a health care facility  No current concerns.    2. Bronchospasm  Uses this just as needed.  - albuterol (PROAIR HFA/PROVENTIL HFA/VENTOLIN HFA) 108 (90 Base) MCG/ACT inhaler; Inhale 2 puffs into the lungs every 4 hours as needed for shortness of breath / dyspnea  Dispense: 3 Inhaler; Refill: 1  - CBC with platelets    3. Erectile dysfunction, unspecified " "erectile dysfunction type  He is instructed he could take easily 2-3 a day as needed.  - sildenafil (REVATIO) 20 MG tablet; TAKE ONE TO TWO TABLETS BY MOUTH ONCE DAILY AS NEEDED PRIOR TO SEXUAL ACTIVITY. NEVER USE WITH NITROGLYCERIN, TERAZOSIN OR DOXAZOSIN  Dispense: 30 tablet; Refill: 1    4. DDD (degenerative disc disease), lumbar  Chronically on this not abusing it reordered.  - oxyCODONE-acetaminophen (PERCOCET) 5-325 MG tablet; Take 1 tablet by mouth every 8 hours as needed for moderate to severe pain Must last 30 days.  Dispense: 90 tablet; Refill: 0    5. CARDIOVASCULAR SCREENING; LDL GOAL LESS THAN 160  We will notify with results.  - Lipid Profile  - Comprehensive metabolic panel    6. Screening for prostate cancer  Will notify with results.  - PSA, screen    COUNSELING:   Reviewed preventive health counseling, as reflected in patient instructions       Regular exercise       Healthy diet/nutrition       Vision screening    Estimated body mass index is 32.31 kg/m  as calculated from the following:    Height as of this encounter: 1.854 m (6' 1\").    Weight as of this encounter: 111.1 kg (244 lb 14.4 oz).     Weight management plan: Discussed healthy diet and exercise guidelines     reports that he has never smoked. He has never used smokeless tobacco.      Counseling Resources:  ATP IV Guidelines  Pooled Cohorts Equation Calculator  FRAX Risk Assessment  ICSI Preventive Guidelines  Dietary Guidelines for Americans, 2010  USDA's MyPlate  ASA Prophylaxis  Lung CA Screening    Sylvester Cash MD  [facility]  "

## 2019-11-08 NOTE — PROGRESS NOTES
ANTICOAGULATION FOLLOW-UP CLINIC VISIT    Patient Name:  Hollis Diggs  Date:  2019  Contact Type:  Face to Face    SUBJECTIVE:  Patient Findings     Comments:   The patient was assessed for diet, medication, and activity level changes, missed or extra doses, bruising or bleeding, with no problem findings.  Lilliam Pro RN          Clinical Outcomes     Negatives:   Major bleeding event, Thromboembolic event, Anticoagulation-related hospital admission, Anticoagulation-related ED visit, Anticoagulation-related fatality    Comments:   The patient was assessed for diet, medication, and activity level changes, missed or extra doses, bruising or bleeding, with no problem findings.  Lilliam Pro RN             OBJECTIVE    INR Protime   Date Value Ref Range Status   2019 2.7 (A) 0.9 - 1.1 Final     Factor 2 Assay   Date Value Ref Range Status   2015 33 (L) 60 - 140 % Final     Comment:     Analyte Specific Reagents (ASRs) are used in many laboratory tests necessary   for   standard medical care and generally do not require FDA approval.  This test   was   developed and its performance characteristics determined by [facility].  It has not been cleared or approved by   the US Food and Drug Administration.         ASSESSMENT / PLAN  INR assessment THER    Recheck INR In: 6 WEEKS    INR Location Clinic      Anticoagulation Summary  As of 2019    INR goal:   2.0-3.0   TTR:   67.8 % (2.8 y)   INR used for dosin.7 (2019)   Warfarin maintenance plan:   7.5 mg (5 mg x 1.5) every day   Full warfarin instructions:   7.5 mg every day   Weekly warfarin total:   52.5 mg   No change documented:   Lilliam Pro RN   Plan last modified:   Lilliam Pro RN (2019)   Next INR check:   2019   Target end date:       Indications    DVT (deep venous thrombosis) (H) (Resolved) [I82.409]  Long-term (current) use of anticoagulants  [Z79.01] [Z79.01]             Anticoagulation Episode Summary     INR check location:       Preferred lab:       Send INR reminders to:   AVANDRESSA MARIA DEL CARMENERIKA JIMENEZ    Comments:   5 mg, dosing card, PM dose      Anticoagulation Care Providers     Provider Role Specialty Phone number    Sylvester Cash MD [facility] [phone]            See the Encounter Report to view Anticoagulation Flowsheet and Dosing Calendar (Go to Encounters tab in chart review, and find the Anticoagulation Therapy Visit)    Dosage adjustment made based on physician directed care plan.      Lilliam Pro RN

## 2019-11-25 DIAGNOSIS — B35.0 TINEA CAPITIS: ICD-10-CM

## 2019-11-25 DIAGNOSIS — M51.369 DDD (DEGENERATIVE DISC DISEASE), LUMBAR: ICD-10-CM

## 2019-11-25 RX ORDER — TRAMADOL HYDROCHLORIDE 50 MG/1
TABLET ORAL
Qty: 60 TABLET | Refills: 0 | Status: SHIPPED | OUTPATIENT
Start: 2019-11-25 | End: 2019-12-27

## 2019-11-25 RX ORDER — TERBINAFINE HYDROCHLORIDE 250 MG/1
250 TABLET ORAL DAILY
Qty: 30 TABLET | Refills: 0 | Status: SHIPPED | OUTPATIENT
Start: 2019-11-25 | End: 2019-12-19

## 2019-11-25 NOTE — TELEPHONE ENCOUNTER
Tramadol       Last Written Prescription Date:  10/25/2019  Last Fill Quantity: 60,   # refills: 0  Last Office Visit: 11/8/2019  Future Office visit:       Routing refill request to provider for review/approval because:  Drug not on the [facility], [facility] or [facility] refill protocol or controlled substance

## 2019-12-04 NOTE — PROGRESS NOTES
Outpatient Occupational Therapy Discharge Note     Patient: Hollis Diggs  : 1970    Beginning/End Dates of Reporting Period:  19 to 2019  Pt has been seen 5 OT treatment sessions since SOC.    Referring Provider: Dr Cristina Pierre    Therapy Diagnosis: Left elbow ulnar neuropathy; effecting functional use of the hand/arm for BADL/IADLs.    Client Self Report:   Pt has not scheduled ongoing OT appts.  OT to discharge at this time.    Objective Measurements:  Pt not available for final testing and results.      Goals:   Pt not available fir final testing and results.    Plan:  Discharge from therapy.    Reason for Discharge: Patient chooses to discontinue therapy.  Patient has failed to schedule further appointments.    Discharge Plan: Patient to continue home program.        Thank you for referring Hollis  To OT services to assist with improved function.    If you have any questions or concerns, please contact me at [phone].    Teresita Mitchell MA, OTR/[facility]

## 2019-12-05 DIAGNOSIS — M51.369 DDD (DEGENERATIVE DISC DISEASE), LUMBAR: Primary | ICD-10-CM

## 2019-12-05 RX ORDER — OXYCODONE AND ACETAMINOPHEN 5; 325 MG/1; MG/1
1 TABLET ORAL EVERY 8 HOURS PRN
Qty: 90 TABLET | Refills: 0 | Status: SHIPPED | OUTPATIENT
Start: 2019-12-06 | End: 2020-01-05

## 2019-12-05 NOTE — TELEPHONE ENCOUNTER
Oxycodone-acetaminophen        Last Written Prescription Date:  11/8/19  Last Fill Quantity: 90,   # refills: 0  Last Office Visit: 11/8/19  Future Office visit:       Routing refill request to provider for review/approval because:  Drug not on the [facility], P or [facility] refill protocol or controlled substance

## 2019-12-19 DIAGNOSIS — B35.0 TINEA CAPITIS: ICD-10-CM

## 2019-12-19 RX ORDER — TERBINAFINE HYDROCHLORIDE 250 MG/1
TABLET ORAL
Qty: 30 TABLET | Refills: 0 | Status: SHIPPED | OUTPATIENT
Start: 2019-12-19 | End: 2020-01-20

## 2019-12-19 NOTE — TELEPHONE ENCOUNTER
Terbinafine 250 MG       Last Written Prescription Date:  11/25/19  Last Fill Quantity: 30,   # refills: 0  Last Office Visit: 11/8/19  Future Office visit:       Routing refill request to provider for review/approval because:  Drug not on the [facility], P or [facility] refill protocol or controlled substance

## 2019-12-20 ENCOUNTER — ANTICOAGULATION THERAPY VISIT (OUTPATIENT)
Dept: ANTICOAGULATION | Facility: CLINIC | Age: 49
End: 2019-12-20
Payer: COMMERCIAL

## 2019-12-20 DIAGNOSIS — Z79.01 LONG TERM CURRENT USE OF ANTICOAGULANT THERAPY: ICD-10-CM

## 2019-12-20 LAB — INR POINT OF CARE: 1.4 (ref 0.9–1.1)

## 2019-12-20 PROCEDURE — 99207 ZZC NO CHARGE NURSE ONLY: CPT

## 2019-12-20 PROCEDURE — 85610 PROTHROMBIN TIME: CPT | Mod: QW

## 2019-12-20 PROCEDURE — 36416 COLLJ CAPILLARY BLOOD SPEC: CPT

## 2019-12-20 NOTE — PROGRESS NOTES
ANTICOAGULATION FOLLOW-UP CLINIC VISIT    Patient Name:  Hollis Diggs  Date:  2019  Contact Type:  Face to Face    SUBJECTIVE:  Patient Findings     Comments:   Patient denies any identifiable changes that caused the subtherapeutic INR.   Lilliam Pro RN            Clinical Outcomes     Comments:   Patient denies any identifiable changes that caused the subtherapeutic INR.   Lilliam Pro RN               OBJECTIVE    INR Protime   Date Value Ref Range Status   2019 1.4 (A) 0.9 - 1.1 Final     Factor 2 Assay   Date Value Ref Range Status   2015 33 (L) 60 - 140 % Final     Comment:     Analyte Specific Reagents (ASRs) are used in many laboratory tests necessary   for   standard medical care and generally do not require FDA approval.  This test   was   developed and its performance characteristics determined by [facility].  It has not been cleared or approved by   the US Food and Drug Administration.         ASSESSMENT / PLAN  INR assessment SUB    Recheck INR In: 2 WEEKS    INR Location Clinic      Anticoagulation Summary  As of 2019    INR goal:   2.0-3.0   TTR:   76.5 % (1 y)   INR used for dosin.4! (2019)   Warfarin maintenance plan:   10 mg (5 mg x 2) every Mon; 7.5 mg (5 mg x 1.5) all other days   Full warfarin instructions:   : 10 mg; Otherwise 10 mg every Mon; 7.5 mg all other days   Weekly warfarin total:   55 mg   Plan last modified:   Lilliam Pro RN (2019)   Next INR check:   1/3/2020   Target end date:       Indications    DVT (deep venous thrombosis) (H) (Resolved) [I82.409]  Long-term (current) use of anticoagulants [Z79.01] [Z79.01]             Anticoagulation Episode Summary     INR check location:       Preferred lab:       Send INR reminders to:   [facility] [place]    Comments:   5 mg, dosing card, PM dose      Anticoagulation Care Providers     Provider Role Specialty Phone number     Sylvester Cash MD [facility] [phone]            See the Encounter Report to view Anticoagulation Flowsheet and Dosing Calendar (Go to Encounters tab in chart review, and find the Anticoagulation Therapy Visit)    Dosage adjustment made based on physician directed care plan.      Lilliam Pro RN

## 2019-12-21 ENCOUNTER — MYC REFILL (OUTPATIENT)
Dept: FAMILY MEDICINE | Facility: CLINIC | Age: 49
End: 2019-12-21

## 2019-12-21 DIAGNOSIS — L65.9 ALOPECIA: ICD-10-CM

## 2019-12-23 RX ORDER — FINASTERIDE 5 MG/1
1 TABLET, FILM COATED ORAL DAILY
Qty: 30 TABLET | Refills: 10 | Status: ON HOLD | OUTPATIENT
Start: 2019-12-23 | End: 2020-06-22

## 2019-12-23 NOTE — TELEPHONE ENCOUNTER
Finasteride 5 MG       Last Written Prescription Date:  10/19/18  Last Fill Quantity: 30,   # refills: 10  Last Office Visit: 11/8/19  Future Office visit:       Routing refill request to provider for review/approval because:  Drug not on the [facility], P or [facility] refill protocol or controlled substance

## 2019-12-27 DIAGNOSIS — M51.369 DDD (DEGENERATIVE DISC DISEASE), LUMBAR: ICD-10-CM

## 2019-12-27 RX ORDER — TRAMADOL HYDROCHLORIDE 50 MG/1
TABLET ORAL
Qty: 60 TABLET | Refills: 0 | Status: SHIPPED | OUTPATIENT
Start: 2019-12-27 | End: 2020-01-23

## 2019-12-27 NOTE — TELEPHONE ENCOUNTER
traMADol (ULTRAM) 50 MG tablet        Last Written Prescription Date:  11/25/19  Last Fill Quantity: 60,   # refills: 0  Last Office Visit: 11/8/19  Future Office visit:       Routing refill request to provider for review/approval because:  Drug not on the [facility], [facility] or [facility] refill protocol or controlled substance    Dianna Jenkins LPN........12/27/2019 8:27 AM

## 2019-12-31 DIAGNOSIS — N52.9 ERECTILE DYSFUNCTION, UNSPECIFIED ERECTILE DYSFUNCTION TYPE: ICD-10-CM

## 2019-12-31 DIAGNOSIS — I82.4Y9 DEEP VEIN THROMBOSIS (DVT) OF PROXIMAL LOWER EXTREMITY, UNSPECIFIED CHRONICITY, UNSPECIFIED LATERALITY (H): ICD-10-CM

## 2020-01-03 RX ORDER — WARFARIN SODIUM 5 MG/1
TABLET ORAL
Qty: 135 TABLET | Refills: 1 | Status: SHIPPED | OUTPATIENT
Start: 2020-01-03 | End: 2020-01-10

## 2020-01-03 RX ORDER — SILDENAFIL CITRATE 20 MG/1
TABLET ORAL
Qty: 30 TABLET | Refills: 1 | Status: ON HOLD | OUTPATIENT
Start: 2020-01-03 | End: 2020-06-22

## 2020-01-03 NOTE — TELEPHONE ENCOUNTER
"warfarin  Last Written Prescription Date:  6/28/2019  Last Fill Quantity: 130,  # refills: 1   Last office visit: 11/8/2019 with prescribing provider:      Future Office Visit:   Next 5 appointments (look out 90 days)    Jan 08, 2020 11:40 AM CST  Office Visit with Sylvester Cash MD  [facility] ([address] [id number] [phone]           Requested Prescriptions   Pending Prescriptions Disp Refills     warfarin ANTICOAGULANT (COUMADIN) 5 MG tablet [Pharmacy Med Name: WARFARIN SODIUM 5MG TABS] 130 tablet 1     Sig: TAKE 1 AND 1/2 TABLETS BY MOUTH ONCE DAILY OR AS DIRECTED BY COUMADIN CLINIC       Vitamin K Antagonists Failed - 12/31/2019  1:06 AM        Failed - INR is within goal in the past 6 weeks     Confirm INR is within goal in the past 6 weeks.     Recent Labs   Lab Test 12/20/19   INR 1.4*           Failed - Medication is active on med list        Passed - Recent (12 mo) or future (30 days) visit within the authorizing provider's specialty     Patient has had an office visit with the authorizing provider or a provider within the authorizing providers department within the previous 12 mos or has a future within next 30 days. See \"Patient Info\" tab in inbasket, or \"Choose Columns\" in Meds & Orders section of the refill encounter.              Passed - Patient is 18 years of age or older       Sildenafil  Last Written Prescription Date:  11/8/2019  Last Fill Quantity: 30,  # refills: 1   Last office visit: 11/8/2019 with prescribing provider:      Future Office Visit:   Next 5 appointments (look out 90 days)    Jan 08, 2020 11:40 AM CST  [facility] Visit with [facility]  [facility] [address] [facility] [id number] [phone]              sildenafil (REVATIO) 20 MG tablet [Pharmacy Med Name: SILDENAFIL CITRATE 20MG TABS] 30 tablet 1     Sig: TAKE 1 TO 2 TABLETS BY MOUTH ONCE " "DAILY AS NEEDED PRIOR TO SEXUAL ACTIVITY. NEVER USE WITH NITROGLYCERIN, TERAZOSIN OR DOXAZOSIN       Erectile Dysfuction Protocol Passed - 12/31/2019  1:06 AM        Passed - Absence of nitrates on medication list        Passed - Absence of Alpha Blockers on Med list        Passed - Recent (12 mo) or future (30 days) visit within the authorizing provider's specialty     Patient has had an office visit with the authorizing provider or a provider within the authorizing providers department within the previous 12 mos or has a future within next 30 days. See \"Patient Info\" tab in inbasket, or \"Choose Columns\" in Meds & Orders section of the refill encounter.              Passed - Medication is active on med list        Passed - Patient is age 18 or older          Prescription approved per [facility] Refill Protocol.      Amanda Cash RN on 1/3/2020 at 4:39 PM    "

## 2020-01-05 ENCOUNTER — MYC REFILL (OUTPATIENT)
Dept: FAMILY MEDICINE | Facility: CLINIC | Age: 50
End: 2020-01-05

## 2020-01-05 DIAGNOSIS — M51.369 DDD (DEGENERATIVE DISC DISEASE), LUMBAR: ICD-10-CM

## 2020-01-06 ENCOUNTER — MYC REFILL (OUTPATIENT)
Dept: FAMILY MEDICINE | Facility: CLINIC | Age: 50
End: 2020-01-06

## 2020-01-06 DIAGNOSIS — M51.369 DDD (DEGENERATIVE DISC DISEASE), LUMBAR: ICD-10-CM

## 2020-01-06 RX ORDER — OXYCODONE AND ACETAMINOPHEN 5; 325 MG/1; MG/1
1 TABLET ORAL EVERY 8 HOURS PRN
Qty: 90 TABLET | Refills: 0 | Status: SHIPPED | OUTPATIENT
Start: 2020-01-06 | End: 2020-02-03

## 2020-01-06 RX ORDER — OXYCODONE AND ACETAMINOPHEN 5; 325 MG/1; MG/1
1 TABLET ORAL EVERY 8 HOURS PRN
Qty: 90 TABLET | Refills: 0 | Status: SHIPPED | OUTPATIENT
Start: 2020-01-06 | End: 2020-05-29

## 2020-01-06 NOTE — TELEPHONE ENCOUNTER
Percocet 5-325 MG      Last Written Prescription Date:  12/6/19  Last Fill Quantity: 90,   # refills: 0  Last Office Visit: 11/8/19  Future Office visit:    Next 5 appointments (look out 90 days)    Jan 08, 2020 11:40 AM CST  Office Visit with Sylvester Cash MD  [facility] [address] [place] [id number]  [phone]           Routing refill request to provider for review/approval because:  Drug not on the [facility], [facility] or  Health refill protocol or controlled substance

## 2020-01-06 NOTE — TELEPHONE ENCOUNTER
Percocet 5-325 MG       Last Written Prescription Date:  12/6/19  Last Fill Quantity: 90,   # refills: 0  Last Office Visit: 11/8/19  Future Office visit:    Next 5 appointments (look out 90 days)    Jan 08, 2020 11:40 AM CST  Office Visit with Sylvester Cash MD  [facility]) [address] [place] [phone]  [phone]           Routing refill request to provider for review/approval because:  Drug not on the [facility], [facility] or  Health refill protocol or controlled substance

## 2020-01-08 ENCOUNTER — OFFICE VISIT (OUTPATIENT)
Dept: FAMILY MEDICINE | Facility: CLINIC | Age: 50
End: 2020-01-08
Payer: COMMERCIAL

## 2020-01-08 ENCOUNTER — TELEPHONE (OUTPATIENT)
Dept: FAMILY MEDICINE | Facility: CLINIC | Age: 50
End: 2020-01-08

## 2020-01-08 VITALS
RESPIRATION RATE: 16 BRPM | WEIGHT: 251 LBS | TEMPERATURE: 96.9 F | DIASTOLIC BLOOD PRESSURE: 84 MMHG | BODY MASS INDEX: 33.27 KG/M2 | HEART RATE: 116 BPM | HEIGHT: 73 IN | OXYGEN SATURATION: 96 % | SYSTOLIC BLOOD PRESSURE: 126 MMHG

## 2020-01-08 DIAGNOSIS — G47.419 PRIMARY NARCOLEPSY WITHOUT CATAPLEXY: Primary | ICD-10-CM

## 2020-01-08 DIAGNOSIS — E66.811 CLASS 1 OBESITY DUE TO EXCESS CALORIES WITHOUT SERIOUS COMORBIDITY WITH BODY MASS INDEX (BMI) OF 33.0 TO 33.9 IN ADULT: ICD-10-CM

## 2020-01-08 DIAGNOSIS — E66.09 CLASS 1 OBESITY DUE TO EXCESS CALORIES WITHOUT SERIOUS COMORBIDITY WITH BODY MASS INDEX (BMI) OF 33.0 TO 33.9 IN ADULT: ICD-10-CM

## 2020-01-08 PROCEDURE — 99214 OFFICE O/P EST MOD 30 MIN: CPT | Performed by: FAMILY MEDICINE

## 2020-01-08 RX ORDER — PHENTERMINE HYDROCHLORIDE 37.5 MG/1
37.5 TABLET ORAL
Qty: 30 TABLET | Refills: 0 | Status: SHIPPED | OUTPATIENT
Start: 2020-01-08 | End: 2020-02-05

## 2020-01-08 ASSESSMENT — MIFFLIN-ST. JEOR: SCORE: 2052.41

## 2020-01-08 NOTE — TELEPHONE ENCOUNTER
Prior Authorization Retail Medication Request    Medication/Dose: Sildenafil Citrate 20MG tablets    Key: [id number]    ICD code (if different than what is on RX):    Previously Tried and Failed:    Rationale:      Insurance Name:  [employer]     Insurance ID:  [id number]      Pharmacy Information (if different than what is on RX)  Name:    Phone:

## 2020-01-08 NOTE — LETTER
[address] [address]  Phone: [phone]  Fax: [phone]    January 8, 2020        Hollis Diggs  [address] [place] [phone]          To whom it may concern:    RE: Hollis Diggs    Patient was seen in clinic today for issue of daytime sleepiness.  He is being referred to our sleep center for further evaluation.    Please contact me for questions or concerns.      Sincerely,        Sylvester Cash MD

## 2020-01-09 NOTE — TELEPHONE ENCOUNTER
Central Prior Authorization Team   Phone: [phone]    PA Initiation    Medication: Sildenafil Citrate 20MG tablets  Insurance Company: [employer] - Phone [phone] Fax [phone]  Pharmacy Filling the Rx: [facility] - [place] - [address]  Filling Pharmacy Phone: [phone]  Filling Pharmacy Fax: [phone]  Start Date: 1/9/2020    Your Tracking Number is [id number]

## 2020-01-10 ENCOUNTER — ANTICOAGULATION THERAPY VISIT (OUTPATIENT)
Dept: ANTICOAGULATION | Facility: CLINIC | Age: 50
End: 2020-01-10
Payer: COMMERCIAL

## 2020-01-10 DIAGNOSIS — Z79.01 LONG TERM CURRENT USE OF ANTICOAGULANT THERAPY: ICD-10-CM

## 2020-01-10 DIAGNOSIS — I82.4Y9 DEEP VEIN THROMBOSIS (DVT) OF PROXIMAL LOWER EXTREMITY, UNSPECIFIED CHRONICITY, UNSPECIFIED LATERALITY (H): ICD-10-CM

## 2020-01-10 LAB — INR POINT OF CARE: 1.7 (ref 0.9–1.1)

## 2020-01-10 PROCEDURE — 99207 ZZC NO CHARGE NURSE ONLY: CPT

## 2020-01-10 PROCEDURE — 36416 COLLJ CAPILLARY BLOOD SPEC: CPT

## 2020-01-10 PROCEDURE — 85610 PROTHROMBIN TIME: CPT | Mod: QW

## 2020-01-10 RX ORDER — WARFARIN SODIUM 5 MG/1
TABLET ORAL
Qty: 135 TABLET | Refills: 1 | Status: ON HOLD | COMMUNITY
Start: 2020-01-10 | End: 2020-06-23

## 2020-01-10 NOTE — PROGRESS NOTES
ANTICOAGULATION FOLLOW-UP CLINIC VISIT    Patient Name:  Hollis Diggs  Date:  1/10/2020  Contact Type:  Face to Face    SUBJECTIVE:  Patient Findings     Comments:   Patient denies any identifiable changes that caused the subtherapeutic INR. Lilliam Pro RN            Clinical Outcomes     Comments:   Patient denies any identifiable changes that caused the subtherapeutic INR. Lliliam Pro RN               OBJECTIVE    INR Protime   Date Value Ref Range Status   01/10/2020 1.7 (A) 0.9 - 1.1 Final     Factor 2 Assay   Date Value Ref Range Status   2015 33 (L) 60 - 140 % Final     Comment:     Analyte Specific Reagents (ASRs) are used in many laboratory tests necessary   for   standard medical care and generally do not require FDA approval.  This test   was   developed and its performance characteristics determined by [facility].  It has not been cleared or approved by   the US Food and Drug Administration.         ASSESSMENT / PLAN  INR assessment SUB    Recheck INR In: 3 WEEKS    INR Location Clinic      Anticoagulation Summary  As of 1/10/2020    INR goal:   2.0-3.0   TTR:   70.8 % (1 y)   INR used for dosin.7! (1/10/2020)   Warfarin maintenance plan:   10 mg (5 mg x 2) every Mon, Fri; 7.5 mg (5 mg x 1.5) all other days   Full warfarin instructions:   10 mg every Mon, Fri; 7.5 mg all other days   Weekly warfarin total:   57.5 mg   Plan last modified:   Lilliam Pro RN (1/10/2020)   Next INR check:   2020   Target end date:       Indications    DVT (deep venous thrombosis) (H) (Resolved) [I82.409]  Long-term (current) use of anticoagulants [Z79.01] [Z79.01]             Anticoagulation Episode Summary     INR check location:       Preferred lab:       Send INR reminders to:   [facility] [place]    Comments:   5 mg, dosing card, PM dose      Anticoagulation Care Providers     Provider Role Specialty Phone number    Sylvester Cash  MD [facility] [phone]            See the Encounter Report to view Anticoagulation Flowsheet and Dosing Calendar (Go to Encounters tab in chart review, and find the Anticoagulation Therapy Visit)    Dosage adjustment made based on physician directed care plan.      Lilliam Pro RN

## 2020-01-10 NOTE — TELEPHONE ENCOUNTER
Central Prior Authorization Team   Phone: [phone]    PRIOR AUTHORIZATION DENIED    Medication: Sildenafil Citrate 20MG tablets    Denial Date: 1/10/2020    Denial Rational: Medication is only covered for pulmonary arterial hypertension. Sildenafil Citrate 25, 50 & 100mg tablets are covered without an authorization. His group covers up to 12 tablets PER 30 days.    Appeal Information: N/A    [employer] Request #: [id number]  [employer] Tracking Number: [id number] Patient Name: Hollis Diggs  Practitioner: Sylvester Cash  Contact Name: Lor  Contact Phone: [phone]  Auth Status: Other  Comments: Revatio is for pulmonary hypertension. Sildenafil Citrate 25, 50 & 100mg tablets are covered without an authorization. His group covers up to 12 tablets PER 30 days.

## 2020-01-11 ENCOUNTER — TELEPHONE (OUTPATIENT)
Dept: FAMILY MEDICINE | Facility: CLINIC | Age: 50
End: 2020-01-11

## 2020-01-11 NOTE — TELEPHONE ENCOUNTER
Prior Authorization Retail Medication Request    Medication/Dose: oxyCODONE-acetaminophen (PERCOCET) 5-325 MG tablet    Key: [id number]  ICD code (if different than what is on RX):    Previously Tried and Failed:    Rationale:      Insurance Name:  [employer] NON [facility] (Managed Care)  Insurance ID:  [id number]      Pharmacy Information (if different than what is on RX)  Name:   Phone:

## 2020-01-14 NOTE — TELEPHONE ENCOUNTER
Central Prior Authorization Team   Phone: [phone]      PA Initiation    Medication: oxyCODONE-acetaminophen (PERCOCET) 5-325 MG tablet  Insurance Company: [employer] - Phone [phone] Fax [phone]  Pharmacy Filling the Rx: [facility] - [place], [place] - [address]  Filling Pharmacy Phone: [phone]  Filling Pharmacy Fax:    Start Date: 1/14/2020

## 2020-01-14 NOTE — TELEPHONE ENCOUNTER
Prior Authorization Approval    Authorization Effective Date: 1/6/2020  Authorization Expiration Date: 3/6/2020  Medication: oxyCODONE-acetaminophen (PERCOCET) 5-325 MG tablet  Approved Dose/Quantity:    Reference #:     Insurance Company: [employer] - Phone [phone] Fax [phone]  Expected CoPay:       CoPay Card Available:      Foundation Assistance Needed:    Which Pharmacy is filling the prescription (Not needed for infusion/clinic administered): [facility] [place], [place] - [address]  Pharmacy Notified: Yes  Patient Notified: Yes **Instructed pharmacy to notify patient when script is ready to /ship.**

## 2020-01-15 DIAGNOSIS — B35.0 TINEA CAPITIS: ICD-10-CM

## 2020-01-15 DIAGNOSIS — F33.0 MAJOR DEPRESSIVE DISORDER, RECURRENT EPISODE, MILD (H): ICD-10-CM

## 2020-01-20 RX ORDER — DULOXETIN HYDROCHLORIDE 30 MG/1
CAPSULE, DELAYED RELEASE ORAL
Qty: 270 CAPSULE | Refills: 0 | Status: SHIPPED | OUTPATIENT
Start: 2020-01-20 | End: 2020-04-16

## 2020-01-20 RX ORDER — TERBINAFINE HYDROCHLORIDE 250 MG/1
TABLET ORAL
Qty: 30 TABLET | Refills: 0 | Status: SHIPPED | OUTPATIENT
Start: 2020-01-20 | End: 2020-02-18

## 2020-01-20 NOTE — TELEPHONE ENCOUNTER
"CYMBALTA  Last Written Prescription Date:  10/21/19  Last Fill Quantity: 270,  # refills: 0   Last office visit: 1/8/2020 with prescribing provider:     Future Office Visit:  NONE  PHQ-9 SCORE 10/19/2018 6/14/2019 11/8/2019   PHQ-9 Total Score - - -   PHQ-9 Total Score MyChart 18 (Moderately severe depression) - -   PHQ-9 Total Score 20 4 7     LAMISIL  Last Written Prescription Date:  12/19/19  Last Fill Quantity: 30,  # refills: 0   Last office visit: 1/8/2020 with prescribing provider:     Future Office Visit:  NONE  Forwarding LMISIL to Primary Care Provider as out of RN scope of practice  WHEN not ON MED LIST..................   QUENTIN Zimmerman.  Requested Prescriptions   Pending Prescriptions Disp Refills     DULoxetine (CYMBALTA) 30 MG capsule [Pharmacy Med Name: DULOXETINE HCL 30MG CPEP] 270 capsule 0     Sig: TAKE THREE CAPSULES BY MOUTH EVERY DAY       Serotonin-Norepinephrine Reuptake Inhibitors  Failed - 1/15/2020  1:18 AM        Failed - PHQ-9 score of less than 5 in past 6 months     Please review last PHQ-9 score.         Passed - Blood pressure under 140/90 in past 12 months     BP Readings from Last 3 Encounters:   01/08/20 126/84   11/08/19 126/78   11/01/19 127/86           Passed - Medication is active on med list        Passed - Patient is age 18 or older        Passed - Recent (6 mo) or future (30 days) visit within the authorizing provider's specialty     Patient had office visit in the last 6 months or has a visit in the next 30 days with authorizing provider or within the authorizing provider's specialty.  See \"Patient Info\" tab in inbasket, or \"Choose Columns\" in Meds & Orders section of the refill encounter.            terbinafine (LAMISIL) 250 MG tablet [Pharmacy Med Name: TERBINAFINE HCL 250MG TABS] 30 tablet 0     Sig: TAKE ONE TABLET BY MOUTH ONCE DAILY       There is no refill protocol information for this order      Routing refill request to provider for review/approval because:  Drug " not on the [facility] refill protocol and PHQ9 score > 5  ErasmoRN

## 2020-01-23 ENCOUNTER — MYC REFILL (OUTPATIENT)
Dept: FAMILY MEDICINE | Facility: CLINIC | Age: 50
End: 2020-01-23

## 2020-01-23 DIAGNOSIS — M51.369 DDD (DEGENERATIVE DISC DISEASE), LUMBAR: ICD-10-CM

## 2020-01-24 RX ORDER — TRAMADOL HYDROCHLORIDE 50 MG/1
TABLET ORAL
Qty: 60 TABLET | Refills: 0 | Status: SHIPPED | OUTPATIENT
Start: 2020-01-24 | End: 2020-02-21

## 2020-01-24 NOTE — TELEPHONE ENCOUNTER
Tramadol 50 MG       Last Written Prescription Date:  12/27/19  Last Fill Quantity: 60,   # refills: 0  Last Office Visit: 1/8/2020  Future Office visit:       Routing refill request to provider for review/approval because:  Drug not on the [facility], P or [facility] refill protocol or controlled substance

## 2020-01-28 ENCOUNTER — TELEPHONE (OUTPATIENT)
Dept: FAMILY MEDICINE | Facility: CLINIC | Age: 50
End: 2020-01-28

## 2020-01-28 NOTE — TELEPHONE ENCOUNTER
Prior Authorization Retail Medication Request    Medication/Dose: traMADol HCl 50MG tablets    Key: [id number]    ICD code (if different than what is on RX):      Previously Tried and Failed:    Rationale:      Insurance Name:  [employer]     Insurance ID:  [id number]      Pharmacy Information (if different than what is on RX)  Name:    Phone:

## 2020-01-31 ENCOUNTER — ANTICOAGULATION THERAPY VISIT (OUTPATIENT)
Dept: ANTICOAGULATION | Facility: CLINIC | Age: 50
End: 2020-01-31
Payer: COMMERCIAL

## 2020-01-31 DIAGNOSIS — Z79.01 LONG TERM CURRENT USE OF ANTICOAGULANT THERAPY: ICD-10-CM

## 2020-01-31 LAB — INR POINT OF CARE: 2.1 (ref 0.9–1.1)

## 2020-01-31 PROCEDURE — 36416 COLLJ CAPILLARY BLOOD SPEC: CPT

## 2020-01-31 PROCEDURE — 99207 ZZC NO CHARGE NURSE ONLY: CPT

## 2020-01-31 PROCEDURE — 85610 PROTHROMBIN TIME: CPT | Mod: QW

## 2020-01-31 NOTE — PROGRESS NOTES
ANTICOAGULATION FOLLOW-UP CLINIC VISIT    Patient Name:  Hollis Diggs  Date:  2020  Contact Type:  Face to Face    SUBJECTIVE:  Patient Findings     Comments:   The patient was assessed for diet, medication, and activity level changes, missed or extra doses, bruising or bleeding, with no problem findings.  Lilliam Pro RN          Clinical Outcomes     Negatives:   Major bleeding event, Thromboembolic event, Anticoagulation-related hospital admission, Anticoagulation-related ED visit, Anticoagulation-related fatality    Comments:   The patient was assessed for diet, medication, and activity level changes, missed or extra doses, bruising or bleeding, with no problem findings.  Lilliam Pro RN             OBJECTIVE    INR Protime   Date Value Ref Range Status   2020 2.1 (A) 0.9 - 1.1 Final     Factor 2 Assay   Date Value Ref Range Status   2015 33 (L) 60 - 140 % Final     Comment:     Analyte Specific Reagents (ASRs) are used in many laboratory tests necessary   for   standard medical care and generally do not require FDA approval.  This test   was   developed and its performance characteristics determined by [facility].  It has not been cleared or approved by   the US Food and Drug Administration.         ASSESSMENT / PLAN  INR assessment THER    Recheck INR In: 6 WEEKS    INR Location Clinic      Anticoagulation Summary  As of 2020    INR goal:   2.0-3.0   TTR:   66.4 % (1 y)   INR used for dosin.1 (2020)   Warfarin maintenance plan:   10 mg (5 mg x 2) every Mon, Fri; 7.5 mg (5 mg x 1.5) all other days   Full warfarin instructions:   10 mg every Mon, Fri; 7.5 mg all other days   Weekly warfarin total:   57.5 mg   No change documented:   Lilliam Pro RN   Plan last modified:   Lilliam Pro RN (1/10/2020)   Next INR check:   3/13/2020   Target end date:       Indications    DVT (deep venous thrombosis) (H)  (Resolved) [I82.409]  Long-term (current) use of anticoagulants [Z79.01] [Z79.01]             Anticoagulation Episode Summary     INR check location:       Preferred lab:       Send INR reminders to:   [facility] [place]    Comments:   5 mg, dosing card, PM dose      Anticoagulation Care Providers     Provider Role Specialty Phone number    Sylvester Cash MD [facility] [phone]            See the Encounter Report to view Anticoagulation Flowsheet and Dosing Calendar (Go to Encounters tab in chart review, and find the Anticoagulation Therapy Visit)    Dosage adjustment made based on physician directed care plan.      Lilliam Pro RN

## 2020-02-01 NOTE — TELEPHONE ENCOUNTER
Central Prior Authorization Team   Phone: [phone]      PA Initiation    Medication: traMADol HCl 50MG tablets-Initiated  Insurance Company: [employer] - Phone [phone] Fax [phone]  Pharmacy Filling the Rx: [facility] - [place] - [address]  Filling Pharmacy Phone: [phone]  Filling Pharmacy Fax:    Start Date: 2/1/2020

## 2020-02-03 ENCOUNTER — MYC REFILL (OUTPATIENT)
Dept: FAMILY MEDICINE | Facility: CLINIC | Age: 50
End: 2020-02-03

## 2020-02-03 DIAGNOSIS — M51.369 DDD (DEGENERATIVE DISC DISEASE), LUMBAR: ICD-10-CM

## 2020-02-03 NOTE — TELEPHONE ENCOUNTER
Prior Authorization Approval    Authorization Effective Date: 2/1/2020  Authorization Expiration Date: 4/1/2020  Medication: traMADol HCl 50MG tablets-APPROVED  Approved Dose/Quantity:   Reference #:     Insurance Company: [employer] - Phone [phone] Fax [phone]  Expected CoPay:       CoPay Card Available:      Foundation Assistance Needed:    Which Pharmacy is filling the prescription (Not needed for infusion/clinic administered): [facility] - [place], [place] - [address]  Pharmacy Notified: Yes  Patient Notified: No    Pharmacy will notify patient when medication is ready.

## 2020-02-03 NOTE — TELEPHONE ENCOUNTER
Percocet 5-325 MG       Last Written Prescription Date:  1/6/2020  Last Fill Quantity: 90,   # refills: 0  Last Office Visit: 1/8/2020  Future Office visit:       Routing refill request to provider for review/approval because:  Drug not on the [facility], [facility] or [facility] refill protocol or controlled substance

## 2020-02-04 RX ORDER — OXYCODONE AND ACETAMINOPHEN 5; 325 MG/1; MG/1
1 TABLET ORAL EVERY 8 HOURS PRN
Qty: 90 TABLET | Refills: 0 | Status: SHIPPED | OUTPATIENT
Start: 2020-02-04 | End: 2020-03-01

## 2020-02-05 ENCOUNTER — MYC REFILL (OUTPATIENT)
Dept: FAMILY MEDICINE | Facility: CLINIC | Age: 50
End: 2020-02-05

## 2020-02-05 DIAGNOSIS — E66.811 CLASS 1 OBESITY DUE TO EXCESS CALORIES WITHOUT SERIOUS COMORBIDITY WITH BODY MASS INDEX (BMI) OF 33.0 TO 33.9 IN ADULT: ICD-10-CM

## 2020-02-05 DIAGNOSIS — E66.09 CLASS 1 OBESITY DUE TO EXCESS CALORIES WITHOUT SERIOUS COMORBIDITY WITH BODY MASS INDEX (BMI) OF 33.0 TO 33.9 IN ADULT: ICD-10-CM

## 2020-02-05 RX ORDER — PHENTERMINE HYDROCHLORIDE 37.5 MG/1
37.5 TABLET ORAL
Qty: 30 TABLET | Refills: 0 | Status: SHIPPED | OUTPATIENT
Start: 2020-02-05 | End: 2020-03-02

## 2020-02-05 NOTE — TELEPHONE ENCOUNTER
phentermine       Last Written Prescription Date:  1/08/20  Last Fill Quantity: 30,   # refills: 0  Last Office Visit: 1/08/20  Future Office visit:       Routing refill request to provider for review/approval because:  Drug not on the [facility], [facility] or [facility] refill protocol or controlled substance

## 2020-02-10 DIAGNOSIS — F33.0 MAJOR DEPRESSIVE DISORDER, RECURRENT EPISODE, MILD (H): ICD-10-CM

## 2020-02-11 RX ORDER — ARIPIPRAZOLE 5 MG/1
TABLET ORAL
Qty: 90 TABLET | Refills: 3 | Status: ON HOLD | OUTPATIENT
Start: 2020-02-11 | End: 2020-06-22

## 2020-02-11 NOTE — TELEPHONE ENCOUNTER
Prescription approved per [facility] Refill Protocol.  Amanda Cash RN

## 2020-02-11 NOTE — TELEPHONE ENCOUNTER
aripiprazole  Last Written Prescription Date:  9/13/2019  Last Fill Quantity: 30,  # refills: 4   Last office visit: 1/8/2020 with prescribing provider:      Future Office Visit:      Requested Prescriptions   Pending Prescriptions Disp Refills     ARIPiprazole (ABILIFY) 5 MG tablet [Pharmacy Med Name: ARIPIPRAZOLE 5MG TABS] 30 tablet 4     Sig: TAKE ONE TABLET BY MOUTH AT BEDTIME       Antipsychotic Medications Passed - 2/10/2020  1:20 AM        Passed - Blood pressure under 140/90 in past 12 months     BP Readings from Last 3 Encounters:   01/08/20 126/84   11/08/19 126/78   11/01/19 127/86           Passed - Patient is 12 years of age or older        Passed - Lipid panel on file within the past 12 months     Recent Labs   Lab Test 11/08/19  1432  07/11/14  0823   CHOL 146   < > 193   TRIG 71   < > 131   HDL 54   < > 41   LDL 78   < > 126   NHDL 92   < >  --    VLDL  --   --  26   CHOLHDLRATIO  --   --  5.0    < > = values in this interval not displayed.           Passed - CBC on file in past 12 months     Recent Labs   Lab Test 11/08/19  1432   WBC 6.1   RBC 5.53   HGB 14.7   HCT 45.4                    Passed - Heart Rate on file within past 12 months     Pulse Readings from Last 3 Encounters:   01/08/20 116   11/08/19 109   11/01/19 99               Passed - A1c or Glucose on file in past 12 months     Recent Labs   Lab Test 11/08/19  1432  10/06/17  0811   GLC 79   < > 105*   A1C  --   --  5.3    < > = values in this interval not displayed.       Please review patients last 3 weights. If a weight gain of >10 lbs exists, you may refill the prescription once after instructing the patient to schedule an appointment within the next 30 days.    Wt Readings from Last 3 Encounters:   01/08/20 113.9 kg (251 lb)   11/08/19 111.1 kg (244 lb 14.4 oz)   09/12/19 111.3 kg (245 lb 6.4 oz)             Passed - Medication is active on med list        Passed - Recent (6 mo) or future (30 days) visit within the  "authorizing provider's specialty     Patient had office visit in the last 6 months or has a visit in the next 30 days with authorizing provider or within the authorizing provider's specialty.  See \"Patient Info\" tab in inbasket, or \"Choose Columns\" in Meds & Orders section of the refill encounter.            Amanda Cash RN on 2/11/2020 at 1:16 PM    "

## 2020-02-17 DIAGNOSIS — Z13.6 CARDIOVASCULAR SCREENING; LDL GOAL LESS THAN 160: ICD-10-CM

## 2020-02-17 DIAGNOSIS — B35.0 TINEA CAPITIS: ICD-10-CM

## 2020-02-18 RX ORDER — ATORVASTATIN CALCIUM 10 MG/1
TABLET, FILM COATED ORAL
Qty: 30 TABLET | Refills: 8 | Status: ON HOLD | OUTPATIENT
Start: 2020-02-18 | End: 2020-06-22

## 2020-02-18 RX ORDER — TERBINAFINE HYDROCHLORIDE 250 MG/1
TABLET ORAL
Qty: 30 TABLET | Refills: 0 | Status: SHIPPED | OUTPATIENT
Start: 2020-02-18 | End: 2020-03-17

## 2020-02-18 NOTE — TELEPHONE ENCOUNTER
"Routing Lamisil refill request to provider for review/approval because:  Drug not on the [facility] refill protocol   QUENTIN Zimmerman      Lipitor 10 MG  Last Written Prescription Date:  9/13/19  Last Fill Quantity: 30,  # refills: 4   Last office visit: 1/8/2020 with prescribing provider:  Dr. Lao  Future Office Visit:  NONE  Requested Prescriptions   Pending Prescriptions Disp Refills     ATORVASTATIN 10 MG PO tablet [Pharmacy Med Name: ATORVASTATIN CALCIUM 10MG TABS] 30 tablet 4     Sig: TAKE ONE TABLET BY MOUTH ONCE DAILY       Statins Protocol Passed - 2/17/2020  1:05 AM        Passed - LDL on file in past 12 months     Recent Labs   Lab Test 11/08/19  1432   LDL 78           Passed - No abnormal creatine kinase in past 12 months     Recent Labs   Lab Test 09/18/15  1003               Passed - Recent (12 mo) or future (30 days) visit within the authorizing provider's specialty     Patient has had an office visit with the authorizing provider or a provider within the authorizing providers department within the previous 12 mos or has a future within next 30 days. See \"Patient Info\" tab in inbasket, or \"Choose Columns\" in Meds & Orders section of the refill encounter.            Passed - Medication is active on med list        Passed - Patient is age 18 or older        TERBINAFINE 250 MG PO tablet [Pharmacy Med Name: TERBINAFINE HCL 250MG TABS] 30 tablet 0     Sig: TAKE ONE TABLET BY MOUTH ONCE DAILY       There is no refill protocol information for this order      Lamisil  250 mg  Last Written Prescription Date:  1.20.20  Last Fill Quantity: 30,  # refills: 0   Last office visit: 1/8/2020 with prescribing provider:  Dr. Lao   Future Office Visit: NONE  QUENTIN Zimmerman              "

## 2020-02-21 ENCOUNTER — MYC REFILL (OUTPATIENT)
Dept: FAMILY MEDICINE | Facility: CLINIC | Age: 50
End: 2020-02-21

## 2020-02-21 DIAGNOSIS — M51.369 DDD (DEGENERATIVE DISC DISEASE), LUMBAR: ICD-10-CM

## 2020-02-21 RX ORDER — TRAMADOL HYDROCHLORIDE 50 MG/1
TABLET ORAL
Qty: 60 TABLET | Refills: 0 | Status: SHIPPED | OUTPATIENT
Start: 2020-02-21 | End: 2020-03-18

## 2020-02-21 NOTE — TELEPHONE ENCOUNTER
Tramadol 50 MG       Last Written Prescription Date:  1/24/2020  Last Fill Quantity: 60,   # refills: 0  Last Office Visit: 1/8/2020  Future Office visit:       Routing refill request to provider for review/approval because:  Drug not on the [facility], [facility] or [facility] refill protocol or controlled substance

## 2020-03-01 ENCOUNTER — MYC REFILL (OUTPATIENT)
Dept: FAMILY MEDICINE | Facility: CLINIC | Age: 50
End: 2020-03-01

## 2020-03-01 DIAGNOSIS — M51.369 DDD (DEGENERATIVE DISC DISEASE), LUMBAR: ICD-10-CM

## 2020-03-02 ENCOUNTER — MYC REFILL (OUTPATIENT)
Dept: FAMILY MEDICINE | Facility: CLINIC | Age: 50
End: 2020-03-02

## 2020-03-02 DIAGNOSIS — E66.811 CLASS 1 OBESITY DUE TO EXCESS CALORIES WITHOUT SERIOUS COMORBIDITY WITH BODY MASS INDEX (BMI) OF 33.0 TO 33.9 IN ADULT: ICD-10-CM

## 2020-03-02 DIAGNOSIS — E66.09 CLASS 1 OBESITY DUE TO EXCESS CALORIES WITHOUT SERIOUS COMORBIDITY WITH BODY MASS INDEX (BMI) OF 33.0 TO 33.9 IN ADULT: ICD-10-CM

## 2020-03-02 RX ORDER — OXYCODONE AND ACETAMINOPHEN 5; 325 MG/1; MG/1
1 TABLET ORAL EVERY 8 HOURS PRN
Qty: 90 TABLET | Refills: 0 | Status: SHIPPED | OUTPATIENT
Start: 2020-03-02 | End: 2020-03-31

## 2020-03-02 RX ORDER — PHENTERMINE HYDROCHLORIDE 37.5 MG/1
37.5 TABLET ORAL
Qty: 30 TABLET | Refills: 0 | Status: SHIPPED | OUTPATIENT
Start: 2020-03-02 | End: 2020-03-25

## 2020-03-02 NOTE — TELEPHONE ENCOUNTER
phentermine (ADIPEX-P) 37.5 MG tablet       Last Written Prescription Date:  2/05/20  Last Fill Quantity: 30,   # refills: 0  Last Office Visit: 01/08/20  Future Office visit:       Routing refill request to provider for review/approval because:  Drug not on the [facility], [facility] or [facility] refill protocol or controlled substance

## 2020-03-13 ENCOUNTER — ANTICOAGULATION THERAPY VISIT (OUTPATIENT)
Dept: ANTICOAGULATION | Facility: CLINIC | Age: 50
End: 2020-03-13
Payer: COMMERCIAL

## 2020-03-13 DIAGNOSIS — Z79.01 LONG TERM CURRENT USE OF ANTICOAGULANT THERAPY: ICD-10-CM

## 2020-03-13 LAB — INR POINT OF CARE: 3.2 (ref 0.9–1.1)

## 2020-03-13 PROCEDURE — 99207 ZZC NO CHARGE NURSE ONLY: CPT

## 2020-03-13 PROCEDURE — 85610 PROTHROMBIN TIME: CPT | Mod: QW

## 2020-03-13 PROCEDURE — 36416 COLLJ CAPILLARY BLOOD SPEC: CPT

## 2020-03-13 NOTE — PROGRESS NOTES
ANTICOAGULATION FOLLOW-UP CLINIC VISIT    Patient Name:  Hollis Diggs  Date:  3/13/2020  Contact Type:  Face to Face    SUBJECTIVE:  Patient Findings     Comments:   Patient denies any identifiable changes that caused the supratherapeutic INR. Lilliam Pro RN            Clinical Outcomes     Comments:   Patient denies any identifiable changes that caused the supratherapeutic INR. Lilliam Pro RN               OBJECTIVE    INR Protime   Date Value Ref Range Status   03/13/2020 3.2 (A) 0.9 - 1.1 Final     Factor 2 Assay   Date Value Ref Range Status   12/17/2015 33 (L) 60 - 140 % Final     Comment:     Analyte Specific Reagents (ASRs) are used in many laboratory tests necessary   for   standard medical care and generally do not require FDA approval.  This test   was   developed and its performance characteristics determined by [facility].  It has not been cleared or approved by   the US Food and Drug Administration.         ASSESSMENT / PLAN  INR assessment SUPRA    Recheck INR In: 6 WEEKS    INR Location Clinic      Anticoagulation Summary  As of 3/13/2020    INR goal:   2.0-3.0   TTR:   64.4 % (1 y)   INR used for dosing:   3.2! (3/13/2020)   Warfarin maintenance plan:   10 mg (5 mg x 2) every Mon, Fri; 7.5 mg (5 mg x 1.5) all other days   Full warfarin instructions:   3/13: 5 mg; Otherwise 10 mg every Mon, Fri; 7.5 mg all other days   Weekly warfarin total:   57.5 mg   Plan last modified:   Lilliam Pro RN (1/10/2020)   Next INR check:   4/24/2020   Target end date:       Indications    DVT (deep venous thrombosis) (H) (Resolved) [I82.409]  Long-term (current) use of anticoagulants [Z79.01] [Z79.01]             Anticoagulation Episode Summary     INR check location:       Preferred lab:       Send INR reminders to:   [facility] [place]    Comments:   5 mg, dosing card, PM dose      Anticoagulation Care Providers     Provider Role Specialty Phone  number    Sylvester Cash MD [facility] [phone]            See the Encounter Report to view Anticoagulation Flowsheet and Dosing Calendar (Go to Encounters tab in chart review, and find the Anticoagulation Therapy Visit)    Dosage adjustment made based on physician directed care plan..    Lilliam Pro RN

## 2020-03-16 DIAGNOSIS — B35.0 TINEA CAPITIS: ICD-10-CM

## 2020-03-17 RX ORDER — TERBINAFINE HYDROCHLORIDE 250 MG/1
TABLET ORAL
Qty: 30 TABLET | Refills: 0 | Status: SHIPPED | OUTPATIENT
Start: 2020-03-17 | End: 2020-05-07

## 2020-03-17 NOTE — TELEPHONE ENCOUNTER
Requested Prescriptions   Pending Prescriptions Disp Refills     terbinafine (LAMISIL) 250 MG tablet [Pharmacy Med Name: TERBINAFINE HCL 250MG TABS] 30 tablet 0     Sig: TAKE ONE TABLET BY MOUTH ONCE DAILY       There is no refill protocol information for this order      Tinea capitis [B35.0]     Last Written Prescription Date:  2/18/2020  Last Fill Quantity: 30,  # refills: 0   Last office visit: 1/8/2020 with prescribing provider:     Future Office Visit:      Routing refill request to provider for review/approval because:  Drug not on the [facility] refill protocol     Delia Morris RN

## 2020-03-18 ENCOUNTER — MYC REFILL (OUTPATIENT)
Dept: FAMILY MEDICINE | Facility: CLINIC | Age: 50
End: 2020-03-18

## 2020-03-18 DIAGNOSIS — M51.369 DDD (DEGENERATIVE DISC DISEASE), LUMBAR: ICD-10-CM

## 2020-03-19 RX ORDER — TRAMADOL HYDROCHLORIDE 50 MG/1
TABLET ORAL
Qty: 60 TABLET | Refills: 0 | Status: SHIPPED | OUTPATIENT
Start: 2020-03-19 | End: 2020-04-16

## 2020-03-19 NOTE — TELEPHONE ENCOUNTER
traMADol       Last Written Prescription Date:  2/21/20  Last Fill Quantity: 60,   # refills: 0  Last Office Visit: 1/8/20  Future Office visit:       Routing refill request to provider for review/approval because:  Drug not on the [facility], [facility] or [facility] refill protocol or controlled substance

## 2020-03-25 ENCOUNTER — VIRTUAL VISIT (OUTPATIENT)
Dept: FAMILY MEDICINE | Facility: CLINIC | Age: 50
End: 2020-03-25
Payer: COMMERCIAL

## 2020-03-25 DIAGNOSIS — F41.1 ANXIETY STATE: Primary | ICD-10-CM

## 2020-03-25 DIAGNOSIS — E66.09 CLASS 1 OBESITY DUE TO EXCESS CALORIES WITHOUT SERIOUS COMORBIDITY WITH BODY MASS INDEX (BMI) OF 33.0 TO 33.9 IN ADULT: ICD-10-CM

## 2020-03-25 DIAGNOSIS — E66.811 CLASS 1 OBESITY DUE TO EXCESS CALORIES WITHOUT SERIOUS COMORBIDITY WITH BODY MASS INDEX (BMI) OF 33.0 TO 33.9 IN ADULT: ICD-10-CM

## 2020-03-25 PROCEDURE — 99214 OFFICE O/P EST MOD 30 MIN: CPT | Mod: TEL | Performed by: FAMILY MEDICINE

## 2020-03-25 RX ORDER — HYDROXYZINE HYDROCHLORIDE 25 MG/1
25 TABLET, FILM COATED ORAL 3 TIMES DAILY PRN
Qty: 30 TABLET | Refills: 1 | Status: ON HOLD | OUTPATIENT
Start: 2020-03-25 | End: 2020-06-22

## 2020-03-25 RX ORDER — PHENTERMINE HYDROCHLORIDE 37.5 MG/1
37.5 TABLET ORAL
Qty: 30 TABLET | Refills: 0 | Status: SHIPPED | OUTPATIENT
Start: 2020-03-25 | End: 2020-04-13

## 2020-03-25 ASSESSMENT — ANXIETY QUESTIONNAIRES
GAD7 TOTAL SCORE: 19
IF YOU CHECKED OFF ANY PROBLEMS ON THIS QUESTIONNAIRE, HOW DIFFICULT HAVE THESE PROBLEMS MADE IT FOR YOU TO DO YOUR WORK, TAKE CARE OF THINGS AT HOME, OR GET ALONG WITH OTHER PEOPLE: VERY DIFFICULT
2. NOT BEING ABLE TO STOP OR CONTROL WORRYING: NEARLY EVERY DAY
5. BEING SO RESTLESS THAT IT IS HARD TO SIT STILL: MORE THAN HALF THE DAYS
6. BECOMING EASILY ANNOYED OR IRRITABLE: NEARLY EVERY DAY
3. WORRYING TOO MUCH ABOUT DIFFERENT THINGS: NEARLY EVERY DAY
1. FEELING NERVOUS, ANXIOUS, OR ON EDGE: NEARLY EVERY DAY
7. FEELING AFRAID AS IF SOMETHING AWFUL MIGHT HAPPEN: MORE THAN HALF THE DAYS

## 2020-03-25 ASSESSMENT — PATIENT HEALTH QUESTIONNAIRE - PHQ9
SUM OF ALL RESPONSES TO PHQ QUESTIONS 1-9: 17
5. POOR APPETITE OR OVEREATING: NEARLY EVERY DAY

## 2020-03-25 NOTE — PROGRESS NOTES
"Hollis Diggs is a 49 year old male who is being evaluated via a billable telephone visit.      The patient has been notified of following:     \"This telephone visit will be conducted via a call between you and your physician/provider. We have found that certain health care needs can be provided without the need for a physical exam.  This service lets us provide the care you need with a short phone conversation.  If a prescription is necessary we can send it directly to your pharmacy.  If lab work is needed we can place an order for that and you can then stop by our lab to have the test done at a later time.    If during the course of the call the physician/provider feels a telephone visit is not appropriate, you will not be charged for this service.\"     Hollis Diggs complains of    Chief Complaint   Patient presents with     Depression     follow up        I have reviewed and updated the patient's Past Medical History, Social History, Family History and Medication List.    Patient is also looking for a work note for today. He mentioned to his employer that his stomach was not feeling well. His place of work then proceeded to disinfect his work station and send him home. Denies fever, SOB or coughing.      ALLERGIES  No known drug allergies    Depression Followup    How are you doing with your depression since your last visit? Worsened due to the anxiety he's experiencing.    Are you having other symptoms that might be associated with depression? Yes:  Patient is having trouble sleeping. He is getting up a lot. He's struggling to fall asleep some nights.     Have you had a significant life event?  No     Are you feeling anxious or having panic attacks?   Yes:  No panic attacks but he is definately feeling nxious lately.     Do you have any concerns with your use of alcohol or other drugs? No    Social History     Tobacco Use     Smoking status: Never Smoker     Smokeless tobacco: Never Used   Substance Use " Topics     Alcohol use: Yes     Comment: rare     Drug use: No     PHQ 6/14/2019 11/8/2019 3/25/2020   PHQ-9 Total Score 4 7 17   Q9: Thoughts of better off dead/self-harm past 2 weeks Not at all Not at all Not at all     ANKIT-7 SCORE 10/14/2015 4/27/2016 3/25/2020   Total Score - - -   Total Score 1 18 19     Last PHQ-9 3/25/2020   1.  Little interest or pleasure in doing things 3   2.  Feeling down, depressed, or hopeless 2   3.  Trouble falling or staying asleep, or sleeping too much 3   4.  Feeling tired or having little energy 3   5.  Poor appetite or overeating 0   6.  Feeling bad about yourself 2   7.  Trouble concentrating 2   8.  Moving slowly or restless 2   Q9: Thoughts of better off dead/self-harm past 2 weeks 0   PHQ-9 Total Score 17   Difficulty at work, home, or with people Very difficult     ANKIT-7  3/25/2020   1. Feeling nervous, anxious, or on edge 3   2. Not being able to stop or control worrying 3   3. Worrying too much about different things 3   4. Trouble relaxing 3   5. Being so restless that it is hard to sit still 2   6. Becoming easily annoyed or irritable 3   7. Feeling afraid, as if something awful might happen 2   ANKIT-7 Total Score 19   If you checked any problems, how difficult have they made it for you to do your work, take care of things at home, or get along with other people? Very difficult         Suicide Assessment Five-step Evaluation and Treatment (SAFE-T)    Additional provider notes: Telephone visit done today when initiated the call was he had an unsettled stomach at work and went to the bathroom had a loose stool and they told him he needed to go home and they decontaminated his workspace and needed a note from his physician saying he is okay to return.  He is feeling fine right now.  He has not had any fever no other symptoms.  Neck and issue is his anxiety is getting the best of him.  He is on Abilify and Cymbalta.  ANKIT score was 19 and his PHQ 9 score was 17 down today.   Finally he has been taking some extra phentermine and has lost some weight and been successful.  Per discussion I told him that that could be making him a little bit wired and anxious I really recommended backing down on that.  We will offer him some hydroxyzine to take for breakthrough but I did not really want to change his Abilify and Cymbalta at this time and he is okay with that.     Assessment/Plan:  1. Anxiety state  Introduce hydroxyzine see if this will help him.  Told him it could make him tired follow-up closely.  - hydrOXYzine (ATARAX) 25 MG tablet; Take 1 tablet (25 mg) by mouth 3 times daily as needed for anxiety  Dispense: 30 tablet; Refill: 1    2. Class 1 obesity due to excess calories without serious comorbidity with body mass index (BMI) of 33.0 to 33.9 in adult  Renewed is a little early but I do not think he should be taking more and may be contributing to his anxiety.  - phentermine (ADIPEX-P) 37.5 MG tablet; Take 1 tablet (37.5 mg) by mouth every morning (before breakfast)  Dispense: 30 tablet; Refill: 0    Printed a letter stating that he could go back to work that it did not sound like he had any kind of infectious disease process.  Phone call duration:  12 minutes    Sylvester Cash MD

## 2020-03-25 NOTE — LETTER
[address] [address]  Phone: [phone]  Fax: [phone]    March 25, 2020        Hollis Diggs  [address] [facility] [address]          To whom it may concern:    RE: Hollis Diggs    Patient of mine at the clinic.  He was evaluated today and may return to work tomorrow without any restrictions.    Please contact me for questions or concerns.      Sincerely,        Sylvester Cash MD

## 2020-03-26 ASSESSMENT — ANXIETY QUESTIONNAIRES: GAD7 TOTAL SCORE: 19

## 2020-03-30 ENCOUNTER — TELEPHONE (OUTPATIENT)
Dept: FAMILY MEDICINE | Facility: CLINIC | Age: 50
End: 2020-03-30

## 2020-03-30 NOTE — TELEPHONE ENCOUNTER
Reason for Call:  Other question     Detailed comments: patient calling states he has a autoimmune disorder and would like to know if he is more at risk if he would get the COVD-19 virus. Please call to advise     Phone Number Patient can be reached at: Cell number on file:    Telephone Information:   Mobile [phone]       Best Time: any    Can we leave a detailed message on this number? YES    Call taken on 3/30/2020 at 4:17 PM by Lucy Bentley

## 2020-03-31 ENCOUNTER — MYC REFILL (OUTPATIENT)
Dept: FAMILY MEDICINE | Facility: CLINIC | Age: 50
End: 2020-03-31

## 2020-03-31 DIAGNOSIS — M51.369 DDD (DEGENERATIVE DISC DISEASE), LUMBAR: ICD-10-CM

## 2020-03-31 NOTE — TELEPHONE ENCOUNTER
The patient called back and information has been given.   Thank you,  Melina Dobbs   for [facility]

## 2020-04-01 RX ORDER — OXYCODONE AND ACETAMINOPHEN 5; 325 MG/1; MG/1
1 TABLET ORAL EVERY 8 HOURS PRN
Qty: 90 TABLET | Refills: 0 | Status: SHIPPED | OUTPATIENT
Start: 2020-04-01 | End: 2020-04-30

## 2020-04-01 NOTE — TELEPHONE ENCOUNTER
Oxycodone 5-325      Last Written Prescription Date:  03/20/2020  Last Fill Quantity: 90,   # refills: 0  Last Office Visit: 03/25/2020- Virtual visit  Future Office visit:       Routing refill request to provider for review/approval because:  Drug not on the [facility], [facility] or [facility] refill protocol or controlled substance

## 2020-04-06 ENCOUNTER — TELEPHONE (OUTPATIENT)
Dept: FAMILY MEDICINE | Facility: CLINIC | Age: 50
End: 2020-04-06

## 2020-04-06 NOTE — TELEPHONE ENCOUNTER
Prior Authorization Retail Medication Request-Key: [id number]      Medication/Dose: oxyCODONE-acetaminophen (PERCOCET) 5-325 MG tablet  ICD code (if different than what is on RX):    Previously Tried and Failed:    Rationale:      Insurance Name:  [employer]  Insurance ID:  [id number]      Pharmacy Information (if different than what is on RX)  Name:    Phone:

## 2020-04-06 NOTE — TELEPHONE ENCOUNTER
PA Initiation    Medication: oxyCODONE-acetaminophen (PERCOCET) 5-325 MG tablet - INITIATED  Insurance Company: [employer] - Phone [phone] Fax [phone]  Pharmacy Filling the Rx: [facility] - [place] - [address]  Filling Pharmacy Phone: [phone]  Filling Pharmacy Fax:    Start Date: 4/6/2020    [facility] KEY: [id number]

## 2020-04-09 NOTE — TELEPHONE ENCOUNTER
[employer] calling states that the reason they are denying it is is because patient has gotten more than 2 request for this kind of medication in a 60 day period.   Rajni states that it is up to the patients insurance plan if they needs the prior authorization or not.

## 2020-04-09 NOTE — TELEPHONE ENCOUNTER
Spoke to pharmacist at [facility] to relay PA not needed information. Pharmacist states that they are still not able to process their claim. They have tried DUR codes and this is still rejecting. They cannot call help desk for rejections that state QTY limitations because there are no overrides possible without a PA.    Called [employer] back and spoke to Prisca relaying all this conflicting information. Prisca states she is going to reach out to a supervisor about this and get back to me directly.    Andreina SANDOVAL  Central PA Team

## 2020-04-09 NOTE — TELEPHONE ENCOUNTER
Called [employer] directly to check the status of this Prior Authorization Request. Spoke to Terrell AVELAR who was able to confirm that the plan did not receive my request via EPA. Started request again today. Will follow up in a few days if I do not receive a determination letter.    Andreina VILLALOBOS Team

## 2020-04-09 NOTE — TELEPHONE ENCOUNTER
Please see latest encounter in chart for more details regarding this request.    Thank you,  Central PA Team

## 2020-04-09 NOTE — TELEPHONE ENCOUNTER
"Prior Authorization Not Possible Per Insurance    Medication: oxyCODONE-acetaminophen (PERCOCET) 5-325 MG tablet  Insurance Company: [employer] - Phone [phone] Fax [phone]  Expected CoPay:      Pharmacy Filling the Rx: [facility] - [place], [place] - [address]  Pharmacy Notified: Yes  Patient Notified: Yes        Received voicemail from Prisca at [facility]. Prisca stated that she spoke with supervisors and were told essentially the same thing in the letter that I had received today. [facility]'s no longer require PA's as of 02/14/2020. She states the pharmacy CAN call the pharmacy help desk to get an override put in for their claim because this is considered a \"soft error\".    I called [facility] back and spoke to the pharmacist. Relayed this information and he stated that he ALSO got more information from the plan. The override still won't work because the plan ALSO WILL NOT pay for 2 or more opoid RX's in less than a 60 day period. Patient will have to unfortunately pay out of pocket for 1 of the Rx's he gets because insurance won't pay for both. I have attempted 2 Pa requests to the plan and both will not allow a review for this because of plan limits.    Andreina SANDOVAL  Central PA Team  "

## 2020-04-15 DIAGNOSIS — F33.0 MAJOR DEPRESSIVE DISORDER, RECURRENT EPISODE, MILD (H): ICD-10-CM

## 2020-04-16 ENCOUNTER — MYC REFILL (OUTPATIENT)
Dept: FAMILY MEDICINE | Facility: CLINIC | Age: 50
End: 2020-04-16

## 2020-04-16 DIAGNOSIS — E66.09 CLASS 1 OBESITY DUE TO EXCESS CALORIES WITHOUT SERIOUS COMORBIDITY WITH BODY MASS INDEX (BMI) OF 33.0 TO 33.9 IN ADULT: ICD-10-CM

## 2020-04-16 DIAGNOSIS — M51.369 DDD (DEGENERATIVE DISC DISEASE), LUMBAR: ICD-10-CM

## 2020-04-16 DIAGNOSIS — E66.811 CLASS 1 OBESITY DUE TO EXCESS CALORIES WITHOUT SERIOUS COMORBIDITY WITH BODY MASS INDEX (BMI) OF 33.0 TO 33.9 IN ADULT: ICD-10-CM

## 2020-04-16 RX ORDER — PHENTERMINE HYDROCHLORIDE 37.5 MG/1
37.5 TABLET ORAL
Qty: 30 TABLET | Refills: 0 | OUTPATIENT
Start: 2020-04-16

## 2020-04-16 RX ORDER — DULOXETIN HYDROCHLORIDE 30 MG/1
CAPSULE, DELAYED RELEASE ORAL
Qty: 90 CAPSULE | Refills: 0 | Status: SHIPPED | OUTPATIENT
Start: 2020-04-16 | End: 2020-05-12

## 2020-04-16 NOTE — TELEPHONE ENCOUNTER
"Routing refill request to provider for review/approval because:  PHQ9 score out of range  T'd up 1 month for provider review.    Requested Prescriptions   Pending Prescriptions Disp Refills     DULoxetine (CYMBALTA) 30 MG capsule [Pharmacy Med Name: DULOXETINE HCL 30MG CPEP] 270 capsule 0     Sig: TAKE THREE CAPSULES BY MOUTH EVERY DAY   Last Written Prescription Date:  1/20/2020  Last Fill Quantity: 270,  # refills: 0   Last office visit: 3/25/2020 with prescribing provider:     Future Office Visit:        Serotonin-Norepinephrine Reuptake Inhibitors  Failed - 4/15/2020  1:05 AM        Failed - PHQ-9 score of less than 5 in past 6 months     Please review last PHQ-9 score.   PHQ-9 score:    PHQ 3/25/2020   PHQ-9 Total Score 17   Q9: Thoughts of better off dead/self-harm past 2 weeks Not at all           Passed - Blood pressure under 140/90 in past 12 months     BP Readings from Last 3 Encounters:   01/08/20 126/84   11/08/19 126/78   11/01/19 127/86           Passed - Medication is active on med list        Passed - Patient is age 18 or older        Passed - Recent (6 mo) or future (30 days) visit within the authorizing provider's specialty     Patient had office visit in the last 6 months or has a visit in the next 30 days with authorizing provider or within the authorizing provider's specialty.  See \"Patient Info\" tab in inbasket, or \"Choose Columns\" in Meds & Orders section of the refill encounter.             Delia Morris RN      "

## 2020-04-16 NOTE — TELEPHONE ENCOUNTER
phentermine (ADIPEX-P) 37.5 MG tablet      Last Written Prescription Date:  3/25/20  Last Fill Quantity: 30,   # refills: 0  Last Office Visit: 3/25/20  Future Office visit:       Routing refill request to provider for review/approval because:  Drug not on the [facility], [facility] or [facility] refill protocol or controlled substance

## 2020-04-16 NOTE — TELEPHONE ENCOUNTER
tramadol      Last Written Prescription Date:  03/19/20  Last Fill Quantity: 60,   # refills: 0  Last Office Visit: 03/25/20  Future Office visit:       Routing refill request to provider for review/approval because:  Drug not on the [facility], P or [facility] refill protocol or controlled substance

## 2020-04-17 RX ORDER — TRAMADOL HYDROCHLORIDE 50 MG/1
TABLET ORAL
Qty: 60 TABLET | Refills: 0 | Status: SHIPPED | OUTPATIENT
Start: 2020-04-17 | End: 2020-05-15

## 2020-04-20 DIAGNOSIS — Z79.01 LONG TERM CURRENT USE OF ANTICOAGULANT THERAPY: Primary | ICD-10-CM

## 2020-04-20 DIAGNOSIS — I82.409 DVT (DEEP VENOUS THROMBOSIS) (H): ICD-10-CM

## 2020-04-29 NOTE — PROGRESS NOTES
"Hollis Diggs is a 49 year old male who is being evaluated via a billable video visit.      The patient has been notified of following:     \"This video visit will be conducted via a call between you and your physician/provider. We have found that certain health care needs can be provided without the need for an in-person physical exam.  This service lets us provide the care you need with a video conversation.  If a prescription is necessary we can send it directly to your pharmacy.  If lab work is needed we can place an order for that and you can then stop by our lab to have the test done at a later time.    Video visits are billed at different rates depending on your insurance coverage.  Please reach out to your insurance provider with any questions.    If during the course of the call the physician/provider feels a video visit is not appropriate, you will not be charged for this service.\"    Patient has given verbal consent for Video visit? Yes    How would you like to obtain your AVS? [facility]    Patient would like the video invitation sent by: Text to cell phone: [phone]    Will anyone else be joining your video visit? No      Video-Visit Details    Type of service:  Video Visit    Video Start Time: 10:43  Video End Time: 11:08    Originating Location (pt. Location): Home    Distant Location (provider location):  [facility]     Platform used for Video Visit: Doximity          Does Hollis have a CPAP/Bipap?  No     B[place] Sleep Scale: 17  Sleep Consultation:    Date on this visit: 5/7/2020    Hollis Diggs is sent by No ref. provider found for a sleep consultation regarding snoring and excessive daytime sleepiness..    Primary Physician: Sylvester Cash     Hollis Diggs reports nightly snoring and kicking for years.     Hollis goes to sleep at 9:00 PM during the week. He wakes up at 4:30 AM with an alarm. He falls asleep in 20 minutes to hours.  Hollis has difficulty falling " asleep.  He wakes up 2-4 times a night for  minutes to an hour before falling back to sleep.  Hollis wakes up to go to the bathroom and uncertain reasons.  On weekends, Hollis goes to sleep at 9:00 PM.  He wakes up at 5:00 AM without an alarm. He falls asleep in 20 minutes to hours.  Started years ago Patient gets an average of 3 to 6 hours of sleep per night.  Patient denies any family history of insomnia denies any specific event that set her off as far as he can remember.  There is no family history of sleep disordered breathing.    Patient does not use electronics in bed, watch TV in bed and read in bed.     Hollis does not do shift work.  He works day shifts.      Hollis does snore every night. Patient does have a regular bed partner. There is  report of kicking and poor quality of sleep.  He does not have witnessed apneas. They occasionally sleep separately.  Patient sleeps on his back and side. He denies occasional morning dry mouth, morning headaches, morning confusion and restless legs. Hollis has frequent dream enactment 3 times per week.     He denies sleep walking as a child.  Hollis denies difficulty breathing through his nose, claustrophobia, reflux at night, heartburn and depression.      Hollis has gained 0-5 pounds in the last year.  Patient describes themself as a morning person.    Patient's [place] Sleepiness score 17/24 consistent with excessive  daytime sleepiness.      Hollis naps 0 times per day He admits dozing while driving long trips only.   Patient was counseled on the importance of driving while alert, to pull over if drowsy, or nap before getting into the vehicle if sleepy.  He uses 12 sodas/day. Last caffeine intake is usually before 5 pm.    Allergies:    Allergies   Allergen Reactions     No Known Drug Allergies        Medications:    Current Outpatient Medications   Medication Sig Dispense Refill     albuterol (PROAIR HFA/PROVENTIL HFA/VENTOLIN HFA) 108 (90 Base) MCG/ACT  inhaler Inhale 2 puffs into the lungs every 4 hours as needed for shortness of breath / dyspnea 3 Inhaler 1     ARIPiprazole (ABILIFY) 5 MG tablet TAKE ONE TABLET BY MOUTH AT BEDTIME 90 tablet 3     ATORVASTATIN 10 MG PO tablet TAKE ONE TABLET BY MOUTH ONCE DAILY 30 tablet 8     DULoxetine (CYMBALTA) 30 MG capsule TAKE THREE CAPSULES BY MOUTH EVERY DAY 90 capsule 0     finasteride (PROSCAR) 5 MG tablet Take 1 tablet (5 mg) by mouth daily 30 tablet 10     hydrOXYzine (ATARAX) 25 MG tablet Take 1 tablet (25 mg) by mouth 3 times daily as needed for anxiety 30 tablet 1     oxyCODONE-acetaminophen (PERCOCET) 5-325 MG tablet Take 1 tablet by mouth every 8 hours as needed for moderate to severe pain Must last 30 days. 90 tablet 0     oxyCODONE-acetaminophen (PERCOCET) 5-325 MG tablet Take 1 tablet by mouth every 8 hours as needed for moderate to severe pain Must last 30 days. 90 tablet 0     phentermine (ADIPEX-P) 37.5 MG tablet Take 1 tablet (37.5 mg) by mouth every morning (before breakfast) 30 tablet 0     sildenafil (REVATIO) 20 MG tablet TAKE 1 TO 2 TABLETS BY MOUTH ONCE DAILY AS NEEDED PRIOR TO SEXUAL ACTIVITY. NEVER USE WITH NITROGLYCERIN, TERAZOSIN OR DOXAZOSIN 30 tablet 1     traMADol (ULTRAM) 50 MG tablet Take 1 tablet as needed twice day for pain. Max 2 tab daily. No driving or alcohol use while taking medication. 60 tablet 0     tretinoin (RETIN-A) 0.05 % external cream Apply  topically At Bedtime. 45 g 3     vitamin D3 (CHOLECALCIFEROL) 2000 units (50 mcg) tablet Take 1 tablet (2,000 Units) by mouth daily 90 tablet 3     warfarin ANTICOAGULANT (COUMADIN) 5 MG tablet Take 10 mg on Monday, Friday and 7.5 mg all other days, or as directed by the [facility] 135 tablet 1     order for DME Equipment being ordered: elbow/heel protector, mesh (Patient not taking: Reported on 1/8/2020) 1 Device 0       Problem List:  Patient Active Problem List    Diagnosis Date Noted     Class 1 obesity due to excess calories  without serious comorbidity with body mass index (BMI) of 33.0 to 33.9 in adult 01/08/2020     Priority: Medium     Long-term use of high-risk medication 05/12/2017     Priority: Medium     History of deep venous thrombosis 04/14/2017     Priority: Medium     DDD (degenerative disc disease), lumbar 04/14/2017     Priority: Medium     On prednisone therapy 07/27/2016     Priority: Medium     Seronegative rheumatoid arthritis (H) 06/28/2016     Priority: Medium     Long-term (current) use of anticoagulants [Z79.01] 03/16/2016     Priority: Medium     Rheumatoid arthritis of multiple sites with negative rheumatoid factor (H) 12/07/2015     Priority: Medium     Midline low back pain, with sciatica presence unspecified 10/14/2015     Priority: Medium     Major depressive disorder, recurrent episode, mild (H) 10/07/2015     Priority: Medium     Pain in joint, multiple sites 03/20/2015     Priority: Medium     Anxiety state 02/10/2015     Priority: Medium     Problem list name updated by automated process. Provider to review       CARDIOVASCULAR SCREENING; LDL GOAL LESS THAN 160 01/15/2013     Priority: Medium     Alopecia 02/19/2010     Priority: Medium     Ingrown toenail 08/18/2009     Priority: Medium     Anxiety 07/09/2008     Priority: Medium     Abdominal pain, epigastric 01/25/2006     Priority: Medium        Past Medical/Surgical History:  Past Medical History:   Diagnosis Date     Antiphospholipid syndrome (H)      Arthritis      Asthma     Exercise     blood clot in leg      Depressive disorder, not elsewhere classified     Depression (non-psychotic)     ANKIT (generalised anxiety disorder)      HH (hiatus hernia)      Hypercholesteremia     normal with weight loss 3/09     Lumbar disc herniation 1992     Seronegative rheumatoid arthritis (H)      Past Surgical History:   Procedure Laterality Date     APPENDECTOMY       C NONSPECIFIC PROCEDURE  91 or 92    back surgery. lumbar. lamiectomy     COLONOSCOPY N/A  8/2/2016    Procedure: COMBINED COLONOSCOPY, SINGLE OR MULTIPLE BIOPSY/POLYPECTOMY BY BIOPSY;  Surgeon: Sydnee Walton MD;  Location: [facility]     COLONOSCOPY WITH CO2 INSUFFLATION N/A 8/2/2016    Procedure: COLONOSCOPY WITH CO2 INSUFFLATION;  Surgeon: Sydnee Walton MD;  Location: [facility]     COMBINED ESOPHAGOSCOPY, GASTROSCOPY, DUODENOSCOPY (EGD) WITH CO2 INSUFFLATION N/A 8/2/2016    Procedure: COMBINED ESOPHAGOSCOPY, GASTROSCOPY, DUODENOSCOPY (EGD) WITH CO2 INSUFFLATION;  Surgeon: Sydnee Walton MD;  Location: [facility] OR     ESOPHAGOSCOPY, GASTROSCOPY, DUODENOSCOPY (EGD), COMBINED N/A 8/2/2016    Procedure: COMBINED ESOPHAGOSCOPY, GASTROSCOPY, DUODENOSCOPY (EGD), BIOPSY SINGLE OR MULTIPLE;  Surgeon: Sydnee Walton MD;  Location: [facility] OR     HC REMOVAL OF TONSILS,<12 Y/O      Tonsils <11y.o.     HC REPAIR INCISIONAL HERNIA,REDUCIBLE  1970's    Hernia Repair, Incisional, Unilateral     HC UGI ENDOSCOPY DIAG W BIOPSY  02/01/06     HC VASECTOMY UNILAT/BILAT W POSTOP SEMEN  1/05    Vasectomy     History back lumbar laminectomy         Social History:  Social History     Socioeconomic History     Marital status:      Spouse name: Cassie     Number of children: 2     Years of education: 15     Highest education level: Not on file   Occupational History     Occupation:      Employer: [employer]   Social Needs     Financial resource strain: Not on file     Food insecurity     Worry: Not on file     Inability: Not on file     Transportation needs     Medical: Not on file     Non-medical: Not on file   Tobacco Use     Smoking status: Never Smoker     Smokeless tobacco: Never Used   Substance and Sexual Activity     Alcohol use: Yes     Comment: rare     Drug use: No     Sexual activity: Yes   Lifestyle     Physical activity     Days per week: Not on file     Minutes per session: Not on file     Stress: Not on file   Relationships     Social  "connections     Talks on phone: Not on file     Gets together: Not on file     Attends [employer] service: Not on file     Active member of club or organization: Not on file     Attends meetings of clubs or organizations: Not on file     Relationship status: Not on file     Intimate partner violence     Fear of current or ex partner: Not on file     Emotionally abused: Not on file     Physically abused: Not on file     Forced sexual activity: Not on file   Other Topics Concern      Service No     Blood Transfusions No     Caffeine Concern Yes     Comment: 64 oz q day     Occupational Exposure Not Asked     Hobby Hazards Not Asked     Sleep Concern Yes     Stress Concern No     Weight Concern No     Special Diet Not Asked     Back Care Not Asked     Exercise Yes     Comment: sometimes     Bike Helmet Not Asked     Seat Belt Yes     Self-Exams No     Parent/sibling w/ CABG, MI or angioplasty before 65F 55M? Not Asked   Social History Narrative    4 children healthy       Family History:  Family History   Problem Relation Age of Onset     Brain Tumor Mother         Benign     Deep Vein Thrombosis Mother         \"neck\"      Heart Disease Father         \"wont tell anyone\"     Neurologic Disorder Paternal Grandmother         Parkinsons      Alcohol/Drug Paternal Grandfather         alcoholic     Cancer Maternal Grandfather         lung     Diabetes Maternal Grandmother      Cerebrovascular Disease Maternal Grandmother         tim age ~70     Arthritis Cousin         cousins x 2 \"RA\"     Musculoskeletal Disorder Maternal Aunt         MS     Family History Negative Other         psoriasis, crohns, UC, SLE       Review of Systems:  A complete review of systems reviewed by me is negative with the exeption of what has been mentioned in the history of present illness.  CONSTITUTIONAL: NEGATIVE for weight gain/loss, fever, chills, sweats or night sweats, drug allergies.  EYES: NEGATIVE for changes in vision, blind spots, " "double vision.  ENT: NEGATIVE for ear pain, sore throat, sinus pain, post-nasal drip, runny nose, bloody nose  CARDIAC: NEGATIVE for fast heartbeats or fluttering in chest, chest pain or pressure, breathlessness when lying flat, swollen legs or swollen feet.  NEUROLOGIC: NEGATIVE headaches, weakness or numbness in the arms or legs.  DERMATOLOGIC: NEGATIVE for rashes, new moles or change in mole(s)  PULMONARY: NEGATIVE SOB at rest, SOB with activity, dry cough, productive cough, coughing up blood, wheezing or whistling when breathing.    GASTROINTESTINAL: NEGATIVE for nausea or vomitting, loose or watery stools, fat or grease in stools, constipation, abdominal pain, bowel movements black in color or blood noted.  GENITOURINARY: NEGATIVE for pain during urination, blood in urine, urinating more frequently than usual, irregular menstrual periods.  MUSCULOSKELETAL: NEGATIVE for muscle pain, bone or joint pain, swollen joints.  ENDOCRINE: NEGATIVE for increased thirst or urination, diabetes.  LYMPHATIC: NEGATIVE for swollen lymph nodes, lumps or bumps in the breasts or nipple discharge.    Physical Examination:  Vitals: Ht 1.854 m (6' 1\")   Wt 104.3 kg (230 lb)   BMI 30.34 kg/m    BMI= Body mass index is 30.34 kg/m .         [place] Total Score 5/7/2020   Total score - [place] 17       MELVI Total Score: 26 (05/07/20 1035)    GENERAL APPEARANCE: healthy, alert, no distress and over weight  EYES: Eyes grossly normal to inspection, PERRL and conjunctivae and sclerae normal  NEURO: Normal strength and tone, mentation intact and speech normal  PSYCH: mentation appears normal and affect normal/bright  Mallampati Class: III.  Tonsillar Stage: could not assess.  Class 1 occlusion  Impression/Plan:    Patient appears to have severe sleep onset and maintenance insomnia.  Also he his wife is observing he admits to potential REM behavioral disorder.  In the context of those findings it would be important to assess him for sleep " disordered breathing contributing to both insomnia and REM behavioral disorder.  I will order Ambien 5 mg for the patient to bring with him and take if unable to fall asleep in the lab.        He will follow up with me in approximately two weeks after his sleep study has been competed to review the results and discuss plan of care.       Polysomnography reviewed.  Obstructive sleep apnea reviewed.  Complications of untreated sleep apnea were reviewed.    Jitendra Manzo MD      CC: No ref. provider found

## 2020-04-30 ENCOUNTER — MYC REFILL (OUTPATIENT)
Dept: FAMILY MEDICINE | Facility: CLINIC | Age: 50
End: 2020-04-30

## 2020-04-30 DIAGNOSIS — M51.369 DDD (DEGENERATIVE DISC DISEASE), LUMBAR: ICD-10-CM

## 2020-04-30 RX ORDER — OXYCODONE AND ACETAMINOPHEN 5; 325 MG/1; MG/1
1 TABLET ORAL EVERY 8 HOURS PRN
Qty: 90 TABLET | Refills: 0 | Status: SHIPPED | OUTPATIENT
Start: 2020-04-30 | End: 2020-05-29

## 2020-04-30 NOTE — TELEPHONE ENCOUNTER
Percocet 5-325 MG       Last Written Prescription Date:  1/6/2020  Last Fill Quantity: 90,   # refills: 0  Last Office Visit: 3/25/2020  Future Office visit:    Next 5 appointments (look out 90 days)    May 07, 2020 11:00 AM CDT  Telephone Visit with Jitendra Manzo MD  [facility] ([facility] [address] [place] [id number] [phone]           Routing refill request to provider for review/approval because:  Drug not on the [facility], [facility] or  Health refill protocol or controlled substance

## 2020-05-07 ENCOUNTER — VIRTUAL VISIT (OUTPATIENT)
Dept: SLEEP MEDICINE | Facility: CLINIC | Age: 50
End: 2020-05-07
Payer: COMMERCIAL

## 2020-05-07 VITALS — WEIGHT: 230 LBS | HEIGHT: 73 IN | BODY MASS INDEX: 30.48 KG/M2

## 2020-05-07 DIAGNOSIS — F51.04 PSYCHOPHYSIOLOGICAL INSOMNIA: Primary | ICD-10-CM

## 2020-05-07 DIAGNOSIS — R06.83 SNORING: ICD-10-CM

## 2020-05-07 DIAGNOSIS — G47.19 EXCESSIVE DAYTIME SLEEPINESS: ICD-10-CM

## 2020-05-07 PROCEDURE — 99203 OFFICE O/P NEW LOW 30 MIN: CPT | Mod: GT | Performed by: OTOLARYNGOLOGY

## 2020-05-07 RX ORDER — ZOLPIDEM TARTRATE 5 MG/1
TABLET ORAL
Qty: 1 TABLET | Refills: 0 | Status: ON HOLD | OUTPATIENT
Start: 2020-05-07 | End: 2020-06-22

## 2020-05-07 ASSESSMENT — MIFFLIN-ST. JEOR: SCORE: 1957.15

## 2020-05-07 ASSESSMENT — PAIN SCALES - GENERAL: PAINLEVEL: NO PAIN (0)

## 2020-05-12 DIAGNOSIS — F33.0 MAJOR DEPRESSIVE DISORDER, RECURRENT EPISODE, MILD (H): ICD-10-CM

## 2020-05-12 RX ORDER — DULOXETIN HYDROCHLORIDE 30 MG/1
CAPSULE, DELAYED RELEASE ORAL
Qty: 90 CAPSULE | Refills: 0 | Status: SHIPPED | OUTPATIENT
Start: 2020-05-12 | End: 2020-06-11

## 2020-05-12 NOTE — TELEPHONE ENCOUNTER
Cymbalta 30 mg       Last Written Prescription Date:  04/16/2020  Last Fill Quantity: 90,   # refills: 0  Last Office Visit: 01/08/2020  Future Office visit:       Routing refill request to provider for review/approval because:  Drug not on the [facility], [facility] or [facility] refill protocol or controlled substance

## 2020-05-14 ENCOUNTER — MYC REFILL (OUTPATIENT)
Dept: FAMILY MEDICINE | Facility: CLINIC | Age: 50
End: 2020-05-14

## 2020-05-14 DIAGNOSIS — E66.09 CLASS 1 OBESITY DUE TO EXCESS CALORIES WITHOUT SERIOUS COMORBIDITY WITH BODY MASS INDEX (BMI) OF 33.0 TO 33.9 IN ADULT: ICD-10-CM

## 2020-05-14 DIAGNOSIS — E66.811 CLASS 1 OBESITY DUE TO EXCESS CALORIES WITHOUT SERIOUS COMORBIDITY WITH BODY MASS INDEX (BMI) OF 33.0 TO 33.9 IN ADULT: ICD-10-CM

## 2020-05-14 DIAGNOSIS — M51.369 DDD (DEGENERATIVE DISC DISEASE), LUMBAR: ICD-10-CM

## 2020-05-14 RX ORDER — PHENTERMINE HYDROCHLORIDE 37.5 MG/1
37.5 TABLET ORAL
Qty: 30 TABLET | Refills: 0 | Status: CANCELLED | OUTPATIENT
Start: 2020-05-14

## 2020-05-15 ENCOUNTER — TELEPHONE (OUTPATIENT)
Dept: FAMILY MEDICINE | Facility: CLINIC | Age: 50
End: 2020-05-15

## 2020-05-15 RX ORDER — TRAMADOL HYDROCHLORIDE 50 MG/1
TABLET ORAL
Qty: 60 TABLET | Refills: 0 | OUTPATIENT
Start: 2020-05-15

## 2020-05-15 RX ORDER — TRAMADOL HYDROCHLORIDE 50 MG/1
TABLET ORAL
Qty: 60 TABLET | Refills: 0 | Status: ON HOLD | OUTPATIENT
Start: 2020-05-15 | End: 2020-06-22

## 2020-05-15 NOTE — TELEPHONE ENCOUNTER
See phone note. Called patient and left VM. Kalibroderick Swanson will do a 1 time refill but none further until patient has a follow up with primary.   Lyudmila Jenkins on 5/15/2020 at 10:34 AM

## 2020-05-15 NOTE — TELEPHONE ENCOUNTER
phentermine (ADIPEX-P) 37.5 MG tablet       Last Written Prescription Date:  4/22/20  Last Fill Quantity: 30,   # refills: 0  Last Office Visit: 1/08/20  Future Office visit:       Routing refill request to provider for review/approval because:  Drug not on the [facility], [facility] or [facility] refill protocol or controlled substance

## 2020-05-15 NOTE — TELEPHONE ENCOUNTER
Tramadol 50 mg       Last Written Prescription Date: 04/17/2020  Last Fill Quantity: 60,   # refills: 0  Last Office Visit: 03/25/2020  Future Office visit:       Routing refill request to provider for review/approval because:  Drug not on the [facility], [facility] or [facility] refill protocol or controlled substance

## 2020-05-15 NOTE — TELEPHONE ENCOUNTER
Patient needs a follow up (telephone/video) visit with Dr. Cash for further refills of his tramadol. Called an [facility] a  for patient to call back. Please assist patient in scheduling follow up.   Lyudmila Jenkins on 5/15/2020 at 10:32 AM

## 2020-05-15 NOTE — TELEPHONE ENCOUNTER
Patient notified and will call the pharmacy next week and request this.     Marisa Lockett CMA ([facility]) 5/15/2020

## 2020-05-22 ENCOUNTER — ANTICOAGULATION THERAPY VISIT (OUTPATIENT)
Dept: ANTICOAGULATION | Facility: CLINIC | Age: 50
End: 2020-05-22

## 2020-05-22 ENCOUNTER — TELEPHONE (OUTPATIENT)
Dept: ANTICOAGULATION | Facility: CLINIC | Age: 50
End: 2020-05-22

## 2020-05-22 DIAGNOSIS — I82.409 DVT (DEEP VENOUS THROMBOSIS) (H): Primary | ICD-10-CM

## 2020-05-22 DIAGNOSIS — E66.09 CLASS 1 OBESITY DUE TO EXCESS CALORIES WITHOUT SERIOUS COMORBIDITY WITH BODY MASS INDEX (BMI) OF 33.0 TO 33.9 IN ADULT: ICD-10-CM

## 2020-05-22 DIAGNOSIS — Z79.01 LONG TERM CURRENT USE OF ANTICOAGULANT THERAPY: ICD-10-CM

## 2020-05-22 DIAGNOSIS — E66.811 CLASS 1 OBESITY DUE TO EXCESS CALORIES WITHOUT SERIOUS COMORBIDITY WITH BODY MASS INDEX (BMI) OF 33.0 TO 33.9 IN ADULT: ICD-10-CM

## 2020-05-22 DIAGNOSIS — I82.409 DVT (DEEP VENOUS THROMBOSIS) (H): ICD-10-CM

## 2020-05-22 LAB
CAPILLARY BLOOD COLLECTION: NORMAL
INR BLD: 2.2 (ref 0.86–1.14)

## 2020-05-22 PROCEDURE — 99207 ZZC NO CHARGE NURSE ONLY: CPT | Performed by: FAMILY MEDICINE

## 2020-05-22 PROCEDURE — 85610 PROTHROMBIN TIME: CPT | Mod: QW | Performed by: FAMILY MEDICINE

## 2020-05-22 PROCEDURE — 36416 COLLJ CAPILLARY BLOOD SPEC: CPT | Performed by: FAMILY MEDICINE

## 2020-05-22 NOTE — TELEPHONE ENCOUNTER
Please review and renew this patients INR referral, orders pending. Thank you!      Lilliam Pro RN

## 2020-05-22 NOTE — PROGRESS NOTES
ANTICOAGULATION FOLLOW-UP CLINIC VISIT    Patient Name:  Hollis Diggs  Date:  2020  Contact Type:  Telephone    SUBJECTIVE:  Patient Findings     Comments:   I left a detailed voicemail with the orders reflected in flowsheet. I have also requested a call back if there have been any missed doses, concerns, illness, fever, or if there have been any changes in medications, activity level, or diet  Lilliam Pro RN          Clinical Outcomes     Comments:   I left a detailed voicemail with the orders reflected in flowsheet. I have also requested a call back if there have been any missed doses, concerns, illness, fever, or if there have been any changes in medications, activity level, or diet  Lilliam Pro RN             OBJECTIVE    Recent labs: (last 7 days)     20  0808   INR 2.2*       ASSESSMENT / PLAN  INR assessment THER    Recheck INR In: 6 WEEKS    INR Location Outside lab      Anticoagulation Summary  As of 2020    INR goal:   2.0-3.0   TTR:   60.5 % (1 y)   INR used for dosin.2 (2020)   Warfarin maintenance plan:   10 mg (5 mg x 2) every Mon, Fri; 7.5 mg (5 mg x 1.5) all other days   Full warfarin instructions:   10 mg every Mon, Fri; 7.5 mg all other days   Weekly warfarin total:   57.5 mg   No change documented:   Lilliam Pro RN   Plan last modified:   Lilliam Pro RN (1/10/2020)   Next INR check:   7/3/2020   Target end date:       Indications    DVT (deep venous thrombosis) (H) (Resolved) [I82.409]  Long-term (current) use of anticoagulants [Z79.01] [Z79.01]             Anticoagulation Episode Summary     INR check location:       Preferred lab:       Send INR reminders to:   [facility] [place]    Comments:   5 mg, dosing card, PM dose      Anticoagulation Care Providers     Provider Role Specialty Phone number    Sylvester Cash MD [facility] [phone]            See the Encounter Report to view Anticoagulation Flowsheet and  Dosing Calendar (Go to Encounters tab in chart review, and find the Anticoagulation Therapy Visit)    Dosage adjustment made based on physician directed care plan.      Lilliam Pro RN

## 2020-05-22 NOTE — TELEPHONE ENCOUNTER
Phentermine        Last Written Prescription Date:  4/22/2020  Last Fill Quantity: 30,   # refills: 0  Last Office Visit: 3/25/2020  Future Office visit:    Next 5 appointments (look out 90 days)    May 28, 2020  3:40 PM CDT  Telephone Visit with Sylvester Cash MD  [facility] [address] [place] [phone]  [phone]           Routing refill request to provider for review/approval because:  Drug not on the [facility], [facility] or [facility] refill protocol or controlled substance

## 2020-05-26 RX ORDER — PHENTERMINE HYDROCHLORIDE 37.5 MG/1
TABLET ORAL
Qty: 30 TABLET | Refills: 0 | Status: SHIPPED | OUTPATIENT
Start: 2020-05-26 | End: 2020-06-15

## 2020-05-28 ENCOUNTER — VIRTUAL VISIT (OUTPATIENT)
Dept: FAMILY MEDICINE | Facility: CLINIC | Age: 50
End: 2020-05-28
Payer: COMMERCIAL

## 2020-05-28 DIAGNOSIS — G89.29 CHRONIC MIDLINE LOW BACK PAIN WITH BILATERAL SCIATICA: Primary | ICD-10-CM

## 2020-05-28 DIAGNOSIS — M54.41 CHRONIC MIDLINE LOW BACK PAIN WITH BILATERAL SCIATICA: Primary | ICD-10-CM

## 2020-05-28 DIAGNOSIS — M06.09 RHEUMATOID ARTHRITIS OF MULTIPLE SITES WITH NEGATIVE RHEUMATOID FACTOR (H): ICD-10-CM

## 2020-05-28 DIAGNOSIS — E66.811 CLASS 1 OBESITY DUE TO EXCESS CALORIES WITHOUT SERIOUS COMORBIDITY WITH BODY MASS INDEX (BMI) OF 33.0 TO 33.9 IN ADULT: ICD-10-CM

## 2020-05-28 DIAGNOSIS — F33.0 MAJOR DEPRESSIVE DISORDER, RECURRENT EPISODE, MILD (H): ICD-10-CM

## 2020-05-28 DIAGNOSIS — M54.42 CHRONIC MIDLINE LOW BACK PAIN WITH BILATERAL SCIATICA: Primary | ICD-10-CM

## 2020-05-28 DIAGNOSIS — E66.09 CLASS 1 OBESITY DUE TO EXCESS CALORIES WITHOUT SERIOUS COMORBIDITY WITH BODY MASS INDEX (BMI) OF 33.0 TO 33.9 IN ADULT: ICD-10-CM

## 2020-05-28 DIAGNOSIS — Z79.01 LONG TERM CURRENT USE OF ANTICOAGULANT THERAPY: ICD-10-CM

## 2020-05-28 PROCEDURE — 99214 OFFICE O/P EST MOD 30 MIN: CPT | Mod: TEL | Performed by: FAMILY MEDICINE

## 2020-05-28 NOTE — PROGRESS NOTES
"Hollis Diggs is a 50 year old male who is being evaluated via a billable telephone visit.      The patient has been notified of following:     \"This telephone visit will be conducted via a call between you and your physician/provider. We have found that certain health care needs can be provided without the need for a physical exam.  This service lets us provide the care you need with a short phone conversation.  If a prescription is necessary we can send it directly to your pharmacy.  If lab work is needed we can place an order for that and you can then stop by our lab to have the test done at a later time.    Telephone visits are billed at different rates depending on your insurance coverage. During this emergency period, for some insurers they may be billed the same as an in-person visit.  Please reach out to your insurance provider with any questions.    If during the course of the call the physician/provider feels a telephone visit is not appropriate, you will not be charged for this service.\"    Patient has given verbal consent for Telephone visit?  Yes    What phone number would you like to be contacted at? [phone]    How would you like to obtain your AVS? MyChart    Subjective     Hollis Diggs is a 50 year old male who presents via phone visit today for the following health issues:    HPI  Chronic/Recurring Back Pain Follow Up      Where is your back pain located? (Select all that apply) low back both    How would you describe your back pain?  sharp    Where does your back pain spread? nowhere    Since your last clinic visit for back pain, how has your pain changed? unchanged    Does your back pain interfere with your job? No    Since your last visit, have you tried any new treatment? No                 Patient Active Problem List   Diagnosis     Abdominal pain, epigastric     Anxiety     Ingrown toenail     Alopecia     CARDIOVASCULAR SCREENING; LDL GOAL LESS THAN 160     Anxiety state     Pain in " joint, multiple sites     Major depressive disorder, recurrent episode, mild (H)     Midline low back pain, with sciatica presence unspecified     Rheumatoid arthritis of multiple sites with negative rheumatoid factor (H)     Long-term (current) use of anticoagulants [Z79.01]     Seronegative rheumatoid arthritis (H)     On prednisone therapy     History of deep venous thrombosis     DDD (degenerative disc disease), lumbar     Long-term use of high-risk medication     Class 1 obesity due to excess calories without serious comorbidity with body mass index (BMI) of 33.0 to 33.9 in adult     Past Surgical History:   Procedure Laterality Date     APPENDECTOMY       C NONSPECIFIC PROCEDURE  91 or 92    back surgery. lumbar. lamiectomy     COLONOSCOPY N/A 8/2/2016    Procedure: COMBINED COLONOSCOPY, SINGLE OR MULTIPLE BIOPSY/POLYPECTOMY BY BIOPSY;  Surgeon: Sydnee Walton MD;  Location: [facility]     COLONOSCOPY WITH CO2 INSUFFLATION N/A 8/2/2016    Procedure: COLONOSCOPY WITH CO2 INSUFFLATION;  Surgeon: Sydnee Walton MD;  Location: [facility]     COMBINED ESOPHAGOSCOPY, GASTROSCOPY, DUODENOSCOPY (EGD) WITH CO2 INSUFFLATION N/A 8/2/2016    Procedure: COMBINED ESOPHAGOSCOPY, GASTROSCOPY, DUODENOSCOPY (EGD) WITH CO2 INSUFFLATION;  Surgeon: Sydnee Walton MD;  Location: [facility]     ESOPHAGOSCOPY, GASTROSCOPY, DUODENOSCOPY (EGD), COMBINED N/A 8/2/2016    Procedure: COMBINED ESOPHAGOSCOPY, GASTROSCOPY, DUODENOSCOPY (EGD), BIOPSY SINGLE OR MULTIPLE;  Surgeon: Sydnee Walton MD;  Location: [facility] OR     HC REMOVAL OF TONSILS,<12 Y/O      Tonsils <11y.o.     HC REPAIR INCISIONAL HERNIA,REDUCIBLE  1970's    Hernia Repair, Incisional, Unilateral     HC UGI ENDOSCOPY DIAG W BIOPSY  02/01/06     HC VASECTOMY UNILAT/BILAT W POSTOP SEMEN  1/05    Vasectomy     History back lumbar laminectomy         Social History     Tobacco Use     Smoking status: Never Smoker     Smokeless tobacco: Never  "Used   Substance Use Topics     Alcohol use: Yes     Comment: rare     Family History   Problem Relation Age of Onset     Brain Tumor Mother         Benign     Deep Vein Thrombosis Mother         \"neck\"      Heart Disease Father         \"wont tell anyone\"     Neurologic Disorder Paternal Grandmother         Parkinsons      Alcohol/Drug Paternal Grandfather         alcoholic     Cancer Maternal Grandfather         lung     Diabetes Maternal Grandmother      Cerebrovascular Disease Maternal Grandmother         tim age ~70     Arthritis Cousin         cousins x 2 \"RA\"     Musculoskeletal Disorder Maternal Aunt         MS     Family History Negative Other         psoriasis, crohns, UC, SLE         Current Outpatient Medications   Medication Sig Dispense Refill     albuterol (PROAIR HFA/PROVENTIL HFA/VENTOLIN HFA) 108 (90 Base) MCG/ACT inhaler Inhale 2 puffs into the lungs every 4 hours as needed for shortness of breath / dyspnea 3 Inhaler 1     ARIPiprazole (ABILIFY) 5 MG tablet TAKE ONE TABLET BY MOUTH AT BEDTIME 90 tablet 3     ATORVASTATIN 10 MG PO tablet TAKE ONE TABLET BY MOUTH ONCE DAILY 30 tablet 8     DULoxetine (CYMBALTA) 30 MG capsule TAKE THREE CAPSULES BY MOUTH EVERY DAY 90 capsule 0     finasteride (PROSCAR) 5 MG tablet Take 1 tablet (5 mg) by mouth daily 30 tablet 10     hydrOXYzine (ATARAX) 25 MG tablet Take 1 tablet (25 mg) by mouth 3 times daily as needed for anxiety 30 tablet 1     order for DME Equipment being ordered: elbow/heel protector, mesh 1 Device 0     oxyCODONE-acetaminophen (PERCOCET) 5-325 MG tablet Take 1 tablet by mouth every 8 hours as needed for moderate to severe pain Must last 30 days. 90 tablet 0     oxyCODONE-acetaminophen (PERCOCET) 5-325 MG tablet Take 1 tablet by mouth every 8 hours as needed for moderate to severe pain Must last 30 days. 90 tablet 0     phentermine (ADIPEX-P) 37.5 MG tablet TAKE ONE TABLET BY MOUTH EVERY MORNING BEFORE BREAKFAST 30 tablet 0     sildenafil " (REVATIO) 20 MG tablet TAKE 1 TO 2 TABLETS BY MOUTH ONCE DAILY AS NEEDED PRIOR TO SEXUAL ACTIVITY. NEVER USE WITH NITROGLYCERIN, TERAZOSIN OR DOXAZOSIN 30 tablet 1     traMADol (ULTRAM) 50 MG tablet Take 1 tablet as needed twice day for pain. Max 2 tab daily. No driving or alcohol use while taking medication. 60 tablet 0     tretinoin (RETIN-A) 0.05 % external cream Apply  topically At Bedtime. 45 g 3     vitamin D3 (CHOLECALCIFEROL) 2000 units (50 mcg) tablet Take 1 tablet (2,000 Units) by mouth daily 90 tablet 3     warfarin ANTICOAGULANT (COUMADIN) 5 MG tablet Take 10 mg on Monday, Friday and 7.5 mg all other days, or as directed by the [facility] 135 tablet 1     zolpidem (AMBIEN) 5 MG tablet Take tablet by mouth 15 minutes prior to sleep, for Sleep Study 1 tablet 0       Reviewed and updated as needed this visit by Provider         Review of Systems   Constitutional, HEENT, cardiovascular, pulmonary, gi and gu systems are negative, except as otherwise noted.       Objective   Reported vitals:  There were no vitals taken for this visit.   healthy, alert and no distress  PSYCH: Alert and oriented times 3; coherent speech, normal   rate and volume, able to articulate logical thoughts, able   to abstract reason, no tangential thoughts, no hallucinations   or delusions  His affect is normal and pleasant  RESP: No cough, no audible wheezing, able to talk in full sentences  Remainder of exam unable to be completed due to telephone visits    Diagnostic Test Results:  none       Telephone encounter: Virtual visit with him regarding the use of his medications.  He has just been feeling unwell lately in a general manner.  Decided to discontinue all of his medications except for the Cymbalta for his depression and anxiety and oxycodone for his back.  He discontinued tramadol Abilify atorvastatin finasteride hydroxyzine and phentermine.  Has been a couple of days.  Spoke with him and his wife.  They also feel that he  needs to get into see his room call and make arrangements for this.  He did lose 15 pounds while taking the phentermine.    Assessment/Plan:  1. Chronic midline low back pain with bilateral sciatica  Continue on his Percocet but discontinue his tramadol for now.    2. Class 1 obesity due to excess calories without serious comorbidity with body mass index (BMI) of 33.0 to 33.9 in adult  Discontinued phentermine.    3. Rheumatoid arthritis of multiple sites with negative rheumatoid factor (H)  They will notify the rheumatologist to see if they can get him.    4. Major depressive disorder, recurrent episode, mild (H)  Continuing on Cymbalta he has discontinued the Abilify.  We will have to watch this closely.  Suggest follow-up in a couple of months we may be doing some lab work until.    5. Long-term (current) use of anticoagulants [Z79.01]  He continues on his Coumadin because of DVTs.      Return in about 2 months (around 7/28/2020) for Lab Work, Routine Visit.      Phone call duration:  13 minutes    Sylvester Cash MD

## 2020-05-29 ENCOUNTER — MYC REFILL (OUTPATIENT)
Dept: FAMILY MEDICINE | Facility: CLINIC | Age: 50
End: 2020-05-29

## 2020-05-29 DIAGNOSIS — M51.369 DDD (DEGENERATIVE DISC DISEASE), LUMBAR: ICD-10-CM

## 2020-05-29 RX ORDER — OXYCODONE AND ACETAMINOPHEN 5; 325 MG/1; MG/1
1 TABLET ORAL EVERY 8 HOURS PRN
Qty: 90 TABLET | Refills: 0 | Status: SHIPPED | OUTPATIENT
Start: 2020-05-29 | End: 2020-06-29

## 2020-05-29 NOTE — TELEPHONE ENCOUNTER
oxycodone      Last Written Prescription Date:  04/30/20  Last Fill Quantity: 90,   # refills: 0  Last Office Visit: 05/29/20  Future Office visit:       Routing refill request to provider for review/approval because:  Drug not on the [facility], [facility] or [facility] refill protocol or controlled substance

## 2020-06-15 ENCOUNTER — MYC REFILL (OUTPATIENT)
Dept: FAMILY MEDICINE | Facility: CLINIC | Age: 50
End: 2020-06-15

## 2020-06-15 DIAGNOSIS — E66.811 CLASS 1 OBESITY DUE TO EXCESS CALORIES WITHOUT SERIOUS COMORBIDITY WITH BODY MASS INDEX (BMI) OF 33.0 TO 33.9 IN ADULT: ICD-10-CM

## 2020-06-15 DIAGNOSIS — E66.09 CLASS 1 OBESITY DUE TO EXCESS CALORIES WITHOUT SERIOUS COMORBIDITY WITH BODY MASS INDEX (BMI) OF 33.0 TO 33.9 IN ADULT: ICD-10-CM

## 2020-06-16 RX ORDER — PHENTERMINE HYDROCHLORIDE 37.5 MG/1
37.5 TABLET ORAL
Qty: 30 TABLET | Refills: 0 | Status: ON HOLD | OUTPATIENT
Start: 2020-06-16 | End: 2020-06-22

## 2020-06-16 NOTE — TELEPHONE ENCOUNTER
phentermine (ADIPEX-P) 37.5 MG tablet       Last Written Prescription Date:  5/26/20  Last Fill Quantity: 30,   # refills: 0  Last Office Visit: 5/28/20  Future Office visit:       Routing refill request to provider for review/approval because:  Drug not on the [facility], [facility] or [facility] refill protocol or controlled substance

## 2020-06-21 ENCOUNTER — HOSPITAL ENCOUNTER (EMERGENCY)
Facility: CLINIC | Age: 50
Discharge: PSYCHIATRIC OR SUBSTANCE USE FACILITY | End: 2020-06-22
Attending: NURSE PRACTITIONER | Admitting: NURSE PRACTITIONER
Payer: COMMERCIAL

## 2020-06-21 ENCOUNTER — NURSE TRIAGE (OUTPATIENT)
Dept: NURSING | Facility: CLINIC | Age: 50
End: 2020-06-21

## 2020-06-21 ENCOUNTER — TELEPHONE (OUTPATIENT)
Dept: BEHAVIORAL HEALTH | Facility: CLINIC | Age: 50
End: 2020-06-21

## 2020-06-21 VITALS
DIASTOLIC BLOOD PRESSURE: 103 MMHG | WEIGHT: 235 LBS | BODY MASS INDEX: 31 KG/M2 | SYSTOLIC BLOOD PRESSURE: 156 MMHG | HEART RATE: 95 BPM | RESPIRATION RATE: 16 BRPM | OXYGEN SATURATION: 99 % | TEMPERATURE: 99.4 F

## 2020-06-21 DIAGNOSIS — R45.851 FEELING SUICIDAL: ICD-10-CM

## 2020-06-21 DIAGNOSIS — F32.A DEPRESSION, UNSPECIFIED DEPRESSION TYPE: ICD-10-CM

## 2020-06-21 LAB
AMPHETAMINES UR QL: ABNORMAL NG/ML
BARBITURATES UR QL SCN: NOT DETECTED NG/ML
BENZODIAZ UR QL SCN: NOT DETECTED NG/ML
BUPRENORPHINE UR QL: NOT DETECTED NG/ML
CANNABINOIDS UR QL: NOT DETECTED NG/ML
COCAINE UR QL SCN: NOT DETECTED NG/ML
D-METHAMPHET UR QL: NOT DETECTED NG/ML
METHADONE UR QL SCN: NOT DETECTED NG/ML
OPIATES UR QL SCN: NOT DETECTED NG/ML
OXYCODONE UR QL SCN: NOT DETECTED NG/ML
PCP UR QL SCN: NOT DETECTED NG/ML
PROPOXYPH UR QL: NOT DETECTED NG/ML
TRICYCLICS UR QL SCN: NOT DETECTED NG/ML

## 2020-06-21 PROCEDURE — 90791 PSYCH DIAGNOSTIC EVALUATION: CPT

## 2020-06-21 PROCEDURE — 99285 EMERGENCY DEPT VISIT HI MDM: CPT | Mod: 25 | Performed by: FAMILY MEDICINE

## 2020-06-21 PROCEDURE — U0003 INFECTIOUS AGENT DETECTION BY NUCLEIC ACID (DNA OR RNA); SEVERE ACUTE RESPIRATORY SYNDROME CORONAVIRUS 2 (SARS-COV-2) (CORONAVIRUS DISEASE [COVID-19]), AMPLIFIED PROBE TECHNIQUE, MAKING USE OF HIGH THROUGHPUT TECHNOLOGIES AS DESCRIBED BY CMS-2020-01-R: HCPCS | Performed by: NURSE PRACTITIONER

## 2020-06-21 PROCEDURE — 80306 DRUG TEST PRSMV INSTRMNT: CPT | Performed by: NURSE PRACTITIONER

## 2020-06-21 PROCEDURE — C9803 HOPD COVID-19 SPEC COLLECT: HCPCS | Performed by: FAMILY MEDICINE

## 2020-06-21 PROCEDURE — 99285 EMERGENCY DEPT VISIT HI MDM: CPT | Mod: Z6 | Performed by: FAMILY MEDICINE

## 2020-06-21 NOTE — TELEPHONE ENCOUNTER
Depression is worsening and has had thoughts of suicide but not right now.  Caller will take patient to ER per guidelines.    Ginger Lyon RN  [facility]  .    Reason for Disposition    [1] Depression AND [2] unable to do any of normal activities (e.g., self care, school, work; in comparison to baseline).    Additional Information    Negative: Patient attempted suicide    Negative: Patient is threatening suicide now    Negative: Violent behavior, or threatening to physically hurt or kill someone    Negative: [1] Patient is very confused (disoriented, slurred speech) AND [2] no other adult (e.g., friend or family member) available    Negative: [1] Difficult to awaken or acting very confused (disoriented, slurred speech) AND [2] new onset    Negative: Sounds like a life-threatening emergency to the triager    Protocols used: DEPRESSION-A-AH

## 2020-06-22 ENCOUNTER — HOSPITAL ENCOUNTER (INPATIENT)
Facility: CLINIC | Age: 50
LOS: 2 days | Discharge: HOME | DRG: 885 | End: 2020-06-24
Attending: PSYCHIATRY & NEUROLOGY | Admitting: PSYCHIATRY & NEUROLOGY
Payer: COMMERCIAL

## 2020-06-22 DIAGNOSIS — Z79.01 LONG TERM CURRENT USE OF ANTICOAGULANT THERAPY: ICD-10-CM

## 2020-06-22 DIAGNOSIS — M06.09 RHEUMATOID ARTHRITIS OF MULTIPLE SITES WITH NEGATIVE RHEUMATOID FACTOR (H): Primary | ICD-10-CM

## 2020-06-22 DIAGNOSIS — F41.1 ANXIETY STATE: ICD-10-CM

## 2020-06-22 DIAGNOSIS — Z86.718 HISTORY OF DEEP VENOUS THROMBOSIS: ICD-10-CM

## 2020-06-22 DIAGNOSIS — F33.0 MAJOR DEPRESSIVE DISORDER, RECURRENT EPISODE, MILD (H): ICD-10-CM

## 2020-06-22 DIAGNOSIS — I82.4Y9 DEEP VEIN THROMBOSIS (DVT) OF PROXIMAL LOWER EXTREMITY, UNSPECIFIED CHRONICITY, UNSPECIFIED LATERALITY (H): ICD-10-CM

## 2020-06-22 PROBLEM — R45.89 SUICIDAL BEHAVIOR: Status: ACTIVE | Noted: 2020-06-22

## 2020-06-22 LAB
ALBUMIN SERPL-MCNC: 3.4 G/DL (ref 3.4–5)
ALP SERPL-CCNC: 93 U/L (ref 40–150)
ALT SERPL W P-5'-P-CCNC: 22 U/L (ref 0–70)
ANION GAP SERPL CALCULATED.3IONS-SCNC: 6 MMOL/L (ref 3–14)
AST SERPL W P-5'-P-CCNC: 14 U/L (ref 0–45)
BILIRUB SERPL-MCNC: 0.9 MG/DL (ref 0.2–1.3)
BUN SERPL-MCNC: 7 MG/DL (ref 7–30)
CALCIUM SERPL-MCNC: 9 MG/DL (ref 8.5–10.1)
CHLORIDE SERPL-SCNC: 107 MMOL/L (ref 94–109)
CO2 SERPL-SCNC: 26 MMOL/L (ref 20–32)
CREAT SERPL-MCNC: 1.01 MG/DL (ref 0.66–1.25)
DEPRECATED CALCIDIOL+CALCIFEROL SERPL-MC: 34 UG/L (ref 20–75)
ERYTHROCYTE [DISTWIDTH] IN BLOOD BY AUTOMATED COUNT: 13.4 % (ref 10–15)
FOLATE SERPL-MCNC: 16.1 NG/ML
GFR SERPL CREATININE-BSD FRML MDRD: 86 ML/MIN/{1.73_M2}
GLUCOSE SERPL-MCNC: 124 MG/DL (ref 70–99)
HCT VFR BLD AUTO: 44.6 % (ref 40–53)
HGB BLD-MCNC: 14.8 G/DL (ref 13.3–17.7)
INR PPP: 1.44 (ref 0.86–1.14)
MCH RBC QN AUTO: 27.2 PG (ref 26.5–33)
MCHC RBC AUTO-ENTMCNC: 33.2 G/DL (ref 31.5–36.5)
MCV RBC AUTO: 82 FL (ref 78–100)
PLATELET # BLD AUTO: 254 10E9/L (ref 150–450)
POTASSIUM SERPL-SCNC: 3.2 MMOL/L (ref 3.4–5.3)
PROT SERPL-MCNC: 7 G/DL (ref 6.8–8.8)
RBC # BLD AUTO: 5.44 10E12/L (ref 4.4–5.9)
SARS-COV-2 PCR COMMENT: NORMAL
SARS-COV-2 RNA SPEC QL NAA+PROBE: NEGATIVE
SARS-COV-2 RNA SPEC QL NAA+PROBE: NORMAL
SODIUM SERPL-SCNC: 139 MMOL/L (ref 133–144)
SPECIMEN SOURCE: NORMAL
SPECIMEN SOURCE: NORMAL
VIT B12 SERPL-MCNC: 225 PG/ML (ref 193–986)
WBC # BLD AUTO: 6.8 10E9/L (ref 4–11)

## 2020-06-22 PROCEDURE — 80053 COMPREHEN METABOLIC PANEL: CPT | Performed by: PHYSICIAN ASSISTANT

## 2020-06-22 PROCEDURE — 36415 COLL VENOUS BLD VENIPUNCTURE: CPT | Performed by: NURSE PRACTITIONER

## 2020-06-22 PROCEDURE — 99207 ZZC CONSULT E&M CHANGED TO SUBSEQUENT LEVEL: CPT | Performed by: PHYSICIAN ASSISTANT

## 2020-06-22 PROCEDURE — 82746 ASSAY OF FOLIC ACID SERUM: CPT | Performed by: NURSE PRACTITIONER

## 2020-06-22 PROCEDURE — 36415 COLL VENOUS BLD VENIPUNCTURE: CPT | Performed by: CLINICAL NURSE SPECIALIST

## 2020-06-22 PROCEDURE — 82306 VITAMIN D 25 HYDROXY: CPT | Performed by: NURSE PRACTITIONER

## 2020-06-22 PROCEDURE — 36415 COLL VENOUS BLD VENIPUNCTURE: CPT | Performed by: PHYSICIAN ASSISTANT

## 2020-06-22 PROCEDURE — 25000132 ZZH RX MED GY IP 250 OP 250 PS 637: Performed by: CLINICAL NURSE SPECIALIST

## 2020-06-22 PROCEDURE — 25000132 ZZH RX MED GY IP 250 OP 250 PS 637: Performed by: PHYSICIAN ASSISTANT

## 2020-06-22 PROCEDURE — 12400002 ZZH R&B MH SENIOR/ADOLESCENT

## 2020-06-22 PROCEDURE — 25000132 ZZH RX MED GY IP 250 OP 250 PS 637: Performed by: PSYCHIATRY & NEUROLOGY

## 2020-06-22 PROCEDURE — 25000128 H RX IP 250 OP 636: Performed by: PHYSICIAN ASSISTANT

## 2020-06-22 PROCEDURE — 25000132 ZZH RX MED GY IP 250 OP 250 PS 637: Performed by: NURSE PRACTITIONER

## 2020-06-22 PROCEDURE — 99223 1ST HOSP IP/OBS HIGH 75: CPT | Mod: GT | Performed by: NURSE PRACTITIONER

## 2020-06-22 PROCEDURE — 99233 SBSQ HOSP IP/OBS HIGH 50: CPT | Performed by: PHYSICIAN ASSISTANT

## 2020-06-22 PROCEDURE — 85027 COMPLETE CBC AUTOMATED: CPT | Performed by: NURSE PRACTITIONER

## 2020-06-22 PROCEDURE — 85027 COMPLETE CBC AUTOMATED: CPT | Performed by: PHYSICIAN ASSISTANT

## 2020-06-22 PROCEDURE — 85610 PROTHROMBIN TIME: CPT | Performed by: CLINICAL NURSE SPECIALIST

## 2020-06-22 PROCEDURE — 82607 VITAMIN B-12: CPT | Performed by: NURSE PRACTITIONER

## 2020-06-22 RX ORDER — VITAMIN B COMPLEX
50 TABLET ORAL DAILY
Status: DISCONTINUED | OUTPATIENT
Start: 2020-06-22 | End: 2020-06-24 | Stop reason: HOSPADM

## 2020-06-22 RX ORDER — POTASSIUM CHLORIDE 1500 MG/1
40 TABLET, EXTENDED RELEASE ORAL ONCE
Status: COMPLETED | OUTPATIENT
Start: 2020-06-22 | End: 2020-06-22

## 2020-06-22 RX ORDER — WARFARIN SODIUM 7.5 MG/1
7.5 TABLET ORAL
Status: DISCONTINUED | OUTPATIENT
Start: 2020-06-23 | End: 2020-06-22

## 2020-06-22 RX ORDER — HYDROXYZINE HYDROCHLORIDE 25 MG/1
25 TABLET, FILM COATED ORAL EVERY 4 HOURS PRN
Status: DISCONTINUED | OUTPATIENT
Start: 2020-06-22 | End: 2020-06-24 | Stop reason: HOSPADM

## 2020-06-22 RX ORDER — NALOXONE HYDROCHLORIDE 0.4 MG/ML
.1-.4 INJECTION, SOLUTION INTRAMUSCULAR; INTRAVENOUS; SUBCUTANEOUS
Status: DISCONTINUED | OUTPATIENT
Start: 2020-06-22 | End: 2020-06-24 | Stop reason: HOSPADM

## 2020-06-22 RX ORDER — GABAPENTIN 100 MG/1
200-300 CAPSULE ORAL 3 TIMES DAILY PRN
Status: DISCONTINUED | OUTPATIENT
Start: 2020-06-22 | End: 2020-06-24 | Stop reason: HOSPADM

## 2020-06-22 RX ORDER — TRAZODONE HYDROCHLORIDE 50 MG/1
50 TABLET, FILM COATED ORAL
Status: DISCONTINUED | OUTPATIENT
Start: 2020-06-22 | End: 2020-06-24 | Stop reason: HOSPADM

## 2020-06-22 RX ORDER — WARFARIN SODIUM 10 MG/1
10 TABLET ORAL
Status: DISCONTINUED | OUTPATIENT
Start: 2020-06-22 | End: 2020-06-22

## 2020-06-22 RX ORDER — DULOXETIN HYDROCHLORIDE 60 MG/1
120 CAPSULE, DELAYED RELEASE ORAL DAILY
Status: DISCONTINUED | OUTPATIENT
Start: 2020-06-23 | End: 2020-06-24 | Stop reason: HOSPADM

## 2020-06-22 RX ORDER — ACETAMINOPHEN 325 MG/1
650 TABLET ORAL EVERY 4 HOURS PRN
Status: DISCONTINUED | OUTPATIENT
Start: 2020-06-22 | End: 2020-06-24 | Stop reason: HOSPADM

## 2020-06-22 RX ORDER — DULOXETIN HYDROCHLORIDE 30 MG/1
30 CAPSULE, DELAYED RELEASE ORAL DAILY
Status: DISCONTINUED | OUTPATIENT
Start: 2020-06-22 | End: 2020-06-22

## 2020-06-22 RX ORDER — LITHIUM CARBONATE 450 MG
450 TABLET, EXTENDED RELEASE ORAL AT BEDTIME
Status: DISCONTINUED | OUTPATIENT
Start: 2020-06-22 | End: 2020-06-24 | Stop reason: HOSPADM

## 2020-06-22 RX ORDER — WARFARIN SODIUM 10 MG/1
10 TABLET ORAL
Status: COMPLETED | OUTPATIENT
Start: 2020-06-22 | End: 2020-06-22

## 2020-06-22 RX ORDER — OXYCODONE AND ACETAMINOPHEN 5; 325 MG/1; MG/1
1 TABLET ORAL EVERY 8 HOURS PRN
Status: DISCONTINUED | OUTPATIENT
Start: 2020-06-22 | End: 2020-06-24 | Stop reason: HOSPADM

## 2020-06-22 RX ADMIN — ENOXAPARIN SODIUM 105 MG: 120 INJECTION SUBCUTANEOUS at 12:40

## 2020-06-22 RX ADMIN — GABAPENTIN 200 MG: 100 CAPSULE ORAL at 16:17

## 2020-06-22 RX ADMIN — DULOXETINE HYDROCHLORIDE 90 MG: 30 CAPSULE, DELAYED RELEASE ORAL at 15:05

## 2020-06-22 RX ADMIN — DULOXETINE HYDROCHLORIDE 30 MG: 30 CAPSULE, DELAYED RELEASE ORAL at 08:44

## 2020-06-22 RX ADMIN — POTASSIUM CHLORIDE 40 MEQ: 1500 TABLET, EXTENDED RELEASE ORAL at 11:26

## 2020-06-22 RX ADMIN — LITHIUM CARBONATE 450 MG: 450 TABLET, EXTENDED RELEASE ORAL at 21:53

## 2020-06-22 RX ADMIN — TRAZODONE HYDROCHLORIDE 50 MG: 50 TABLET ORAL at 22:01

## 2020-06-22 RX ADMIN — GABAPENTIN 100 MG: 100 CAPSULE ORAL at 17:52

## 2020-06-22 RX ADMIN — WARFARIN SODIUM 10 MG: 10 TABLET ORAL at 17:52

## 2020-06-22 RX ADMIN — ACETAMINOPHEN 650 MG: 325 TABLET, FILM COATED ORAL at 12:02

## 2020-06-22 RX ADMIN — ENOXAPARIN SODIUM 105 MG: 120 INJECTION SUBCUTANEOUS at 21:53

## 2020-06-22 RX ADMIN — MELATONIN 50 MCG: at 15:05

## 2020-06-22 ASSESSMENT — ACTIVITIES OF DAILY LIVING (ADL)
SWALLOWING: 0-->SWALLOWS FOODS/LIQUIDS WITHOUT DIFFICULTY
RETIRED_EATING: 0-->INDEPENDENT
PRIOR_FUNCTIONAL_LEVEL_COMMENT: .
BATHING: 0-->INDEPENDENT
ORAL_HYGIENE: INDEPENDENT
BATHING: 0-->INDEPENDENT
FALL_HISTORY_WITHIN_LAST_SIX_MONTHS: NO
RETIRED_COMMUNICATION: 0-->UNDERSTANDS/COMMUNICATES WITHOUT DIFFICULTY
COGNITION: 0 - NO COGNITION ISSUES REPORTED
TRANSFERRING: 0-->INDEPENDENT
TOILETING: 0-->INDEPENDENT
FALL_HISTORY_WITHIN_LAST_SIX_MONTHS: NO
DRESS: 0-->INDEPENDENT
RETIRED_EATING: 0-->INDEPENDENT
LAUNDRY: WITH SUPERVISION
DRESS: SCRUBS (BEHAVIORAL HEALTH);INDEPENDENT
TOILETING: 0-->INDEPENDENT
AMBULATION: 0-->INDEPENDENT
AMBULATION: 0-->INDEPENDENT
DRESS: 0-->INDEPENDENT
SWALLOWING: 0-->SWALLOWS FOODS/LIQUIDS WITHOUT DIFFICULTY
HYGIENE/GROOMING: INDEPENDENT
RETIRED_COMMUNICATION: 0-->UNDERSTANDS/COMMUNICATES WITHOUT DIFFICULTY
COGNITION: 0 - NO COGNITION ISSUES REPORTED
TRANSFERRING: 0-->INDEPENDENT

## 2020-06-22 ASSESSMENT — MIFFLIN-ST. JEOR: SCORE: 1949.43

## 2020-06-22 NOTE — PHARMACY-ADMISSION MEDICATION HISTORY
Clinical Pharmacy - Warfarin Dosing Consult     Pharmacy has been consulted to manage this patient s warfarin therapy.    Hollis is on warfarin (life long anticoagulation) for h/o recurrent DVTs and antiphospholipid syndrome. INR goal 2-3. Hollis is followed at [facility] ([facility]] in [place], [place] for his anticoagulation.    PTA warfarin regiment  Warfarin 10 mg on Monday and Thursday  Warfarin 7.5 mg on Sunday, Tuesday, Wednesday, Friday and Saturday       INR   Date Value Ref Range Status   06/22/2020 1.44 (H) 0.86 - 1.14 Final     INR Point of Care   Date Value Ref Range Status   05/22/2020 2.2 (H) 0.86 - 1.14 Final     Comment:     This test is intended for monitoring Coumadin therapy.  Results are not   accurate in patients with prolonged INR due to factor deficiency.       Factor 2 Assay   Date Value Ref Range Status   12/17/2015 33 (L) 60 - 140 % Final     Comment:     Analyte Specific Reagents (ASRs) are used in many laboratory tests necessary   for   standard medical care and generally do not require FDA approval.  This test   was   developed and its performance characteristics determined by [facility].  It has not been cleared or approved by   the [place] Food and Drug Administration.         Recommend warfarin 10 mg today.  Pharmacy will monitor Hollis Diggs daily and order warfarin doses to achieve specified goal.      Please contact pharmacy as soon as possible if the warfarin needs to be held for a procedure or if the warfarin goals change.     Monica Gray, PharmD, BCPP  Behavioral Health Pharmacist  [facility]  [facility] Ascom: [phone] or [phone]  [facility] Phone: [phone]

## 2020-06-22 NOTE — H&P
"DATE OF ADMISSION: 6/22/2020                                     PATIENT'S [id number]   DATE OF SERVICE: 6/22/2020                                           PATIENT'S: 1970  ADMITTING PROVIDER: Jose Oglesby MD  ATTENDING PROVIDER: Jose LOREDO CNP  LEGAL STATUS:  Voluntary  SOURCES OF INFORMATION: Information was obtained from the patient and available records.  CHIEF COMPLAIN: \"Her depression and anxiety\".  HISTORY F PRESENT ILLNESS: Per ED note: DONNIE Diggs is a 50 year old male who presents to the emergency room today with complaints of feeling depressed.  Patient has a longstanding history of depression which he is on Cymbalta for, he was on Abilify as well but stopped this about a month ago.  Patient reports that his depression is gotten to the point where he just does not feel like doing anything all day.  Patient reports he has done therapy in the past but did not find this helpful and has not been to a therapist in quite some time.  Patient denies any drug abuse.  Patient does lives with his wife and 2 younger children who he reports are supportive.  Patient does report that over the last couple of days he has had thoughts of dying but denies any actual plan.  Met with the patient.  He is a good historian.  Confirms the information in the previous paragraph.  States that he has been depressed for a long time however, this episode just started on Friday.  He was having suicidal thoughts but did not make a plan.  Denies any stressors contributing to the decompensation.  Does states that he is generally stressed out but nothing out of the usual for him.  Currently rates depression as high.  He feels sad and lacks interest in everyday activities.  He is sleeping about 5 hours a night.  He is not taking naps.  His energy is low, memory is good, concentration is impaired, appetite decreased, he eats about 1 meal a day.  Does not feel suicidal today.  He endorses hopelessness, " helplessness, and worthlessness, ruminating thoughts, and irritability.  Reports high anxiety that is constant throughout the day.  Reports history of panic attacks but not for years, with symptoms such as shortness of breath, and palpitations.  Reports history of what appeared to be hypomanic episodes that manifest with insomnia, and increased energy.  These episodes happen every few weeks but do not last long.  Denies history of psychosis including auditory visual hallucinations, paranoia, delusions.  Denies PTSD, OCD, eating disorders, borderline personality disorder, suicide attempts, self injury behaviors. Denies seizures, head injuries, and loss of consciousness.  Telemedicine Visit: The patient's condition can be safely assessed and treated via synchronous audio and visual telemedicine encounter.       Start time: 1030  Stop time: 1050     Reason for Telemedicine Visit: Covid-19     Originating Site (Patient Location): [facility]     Distant Site (Provider Location): Home office     Consent:  The patient/guardian has verbally consented to: the potential risks and benefits of telemedicine (video visit) versus in person care; bill my insurance or make self-payment for services provided; and responsibility for payment of non-covered services.      Mode of Communication:  Video Conference via Skype     As the provider I attest to compliance with applicable laws and regulations related to telemedicine.     SUBSTANCE USE HISTORY:   The patient denies history of abusing drugs and alcohol.  He has a beer every couple of weeks.  He is not a smoker.    PSYCHIATRIC HISTORY:   The patient has a history of depression and anxiety.  This is his first hospitalization for mental illness.  Denies suicide attempts and self injury behaviors.  Denies violence towards others.  Denies ECT treatment.  He does not have a psychiatrist.  His medications are prescribed by his primary care provider.  Reports being on Cymbalta for several  years.  Past medication trials include Zoloft, Prozac, Paxil, Wellbutrin, Effexor, amitriptyline, and Abilify.  PAST MEDICAL HISTORY:   Past Medical History:   Diagnosis Date     Antiphospholipid syndrome (H)      Arthritis      Asthma     Exercise     blood clot in leg      Depressive disorder, not elsewhere classified     Depression (non-psychotic)     ANKIT (generalised anxiety disorder)      HH (hiatus hernia)      Hypercholesteremia     normal with weight loss 3/09     Lumbar disc herniation 1992     Seronegative rheumatoid arthritis (H)        Past Surgical History:   Procedure Laterality Date     APPENDECTOMY       C NONSPECIFIC PROCEDURE  91 or 92    back surgery. lumbar. lamiectomy     COLONOSCOPY N/A 8/2/2016    Procedure: COMBINED COLONOSCOPY, SINGLE OR MULTIPLE BIOPSY/POLYPECTOMY BY BIOPSY;  Surgeon: Sydnee Walton MD;  Location: [facility] OR     COLONOSCOPY WITH CO2 INSUFFLATION N/A 8/2/2016    Procedure: COLONOSCOPY WITH CO2 INSUFFLATION;  Surgeon: Sydnee Walton MD;  Location: [facility]     COMBINED ESOPHAGOSCOPY, GASTROSCOPY, DUODENOSCOPY (EGD) WITH CO2 INSUFFLATION N/A 8/2/2016    Procedure: COMBINED ESOPHAGOSCOPY, GASTROSCOPY, DUODENOSCOPY (EGD) WITH CO2 INSUFFLATION;  Surgeon: Sydnee Walton MD;  Location: [facility] OR     ESOPHAGOSCOPY, GASTROSCOPY, DUODENOSCOPY (EGD), COMBINED N/A 8/2/2016    Procedure: COMBINED ESOPHAGOSCOPY, GASTROSCOPY, DUODENOSCOPY (EGD), BIOPSY SINGLE OR MULTIPLE;  Surgeon: Sydnee Walton MD;  Location: [facility] OR     HC REMOVAL OF TONSILS,<12 Y/O      Tonsils <11y.o.     HC REPAIR INCISIONAL HERNIA,REDUCIBLE  1970's    Hernia Repair, Incisional, Unilateral     HC UGI ENDOSCOPY DIAG W BIOPSY  02/01/06     HC VASECTOMY UNILAT/BILAT W POSTOP SEMEN  1/05    Vasectomy     History back lumbar laminectomy         ALLERGIES:    Allergies   Allergen Reactions     No Known Drug Allergies      FAMILY HISTORY:  Family history is negative for mental  "illness.  His father was struggling with alcoholism.  Family History   Problem Relation Age of Onset     Brain Tumor Mother         Benign     Deep Vein Thrombosis Mother         \"neck\"      Heart Disease Father         \"wont tell anyone\"     Neurologic Disorder Paternal Grandmother         Parkinsons      Alcohol/Drug Paternal Grandfather         alcoholic     Cancer Maternal Grandfather         lung     Diabetes Maternal Grandmother      Cerebrovascular Disease Maternal Grandmother         tim age ~70     Arthritis Cousin         cousins x 2 \"RA\"     Musculoskeletal Disorder Maternal Aunt         MS     Family History Negative Other         psoriasis, crohns, UC, SLE       SOCIAL HISTORY: The patient grew up in [place].  He graduated from [employer].  He has some college.  Currently lives in [place] with his wife and 2 younger children.  He has been  for 27 years.  He has 4 children, 2 are adults.  His parents are  and are still alive.  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  Denies  and legal history.   MEDICAL REVIEW OF SYSTEM: Please refer to the review of systems done by Dorothy Arriola APRN CNP on 6/22/2020, which I reviewed and confirmed. The remaining 10-point systems review was negative based on my exam.   MEDICATIONS PRIOR TO ADMISSION:   Prior to Admission medications    Medication Sig Start Date End Date Taking? Authorizing Provider   DULoxetine (CYMBALTA) 30 MG capsule TAKE THREE CAPSULES BY MOUTH EVERY DAY 6/11/20  Yes Kali Swanson NP   oxyCODONE-acetaminophen (PERCOCET) 5-325 MG tablet Take 1 tablet by mouth every 8 hours as needed for moderate to severe pain Must last 30 days. 5/29/20  Yes Sylvester Cash MD   phentermine (ADIPEX-P) 37.5 MG tablet Take 1 tablet (37.5 mg) by mouth every morning (before breakfast) TAKE ONE TABLET BY MOUTH EVERY MORNING BEFORE BREAKFAST 6/16/20  Yes Sylvester Cash MD   tretinoin " (RETIN-A) 0.05 % external cream Apply  topically At Bedtime. 4/17/19  Yes Sylvester Cash MD   warfarin ANTICOAGULANT (COUMADIN) 5 MG tablet Take 10 mg on Monday, Friday and 7.5 mg all other days, or as directed by the [facility] 1/10/20  Yes    albuterol (PROAIR HFA/PROVENTIL HFA/VENTOLIN HFA) 108 (90 Base) MCG/ACT inhaler Inhale 2 puffs into the lungs every 4 hours as needed for shortness of breath / dyspnea 11/8/19   Sylvester Cash MD   ARIPiprazole (ABILIFY) 5 MG tablet TAKE ONE TABLET BY MOUTH AT BEDTIME 2/11/20   Sylvester Cash MD   ATORVASTATIN 10 MG PO tablet TAKE ONE TABLET BY MOUTH ONCE DAILY 2/18/20   Sylvester Cash MD   finasteride (PROSCAR) 5 MG tablet Take 1 tablet (5 mg) by mouth daily 12/23/19   Sylvester Cash MD   hydrOXYzine (ATARAX) 25 MG tablet Take 1 tablet (25 mg) by mouth 3 times daily as needed for anxiety 3/25/20   Sylvester Cash MD   order for DME Equipment being ordered: elbow/heel protector, mesh 7/30/19   Cristina Pierre MD   sildenafil (REVATIO) 20 MG tablet TAKE 1 TO 2 TABLETS BY MOUTH ONCE DAILY AS NEEDED PRIOR TO SEXUAL ACTIVITY. NEVER USE WITH NITROGLYCERIN, TERAZOSIN OR DOXAZOSIN 1/3/20   Sylvester Cash MD   traMADol (ULTRAM) 50 MG tablet Take 1 tablet as needed twice day for pain. Max 2 tab daily. No driving or alcohol use while taking medication. 5/15/20   Kali Swanson NP   vitamin D3 (CHOLECALCIFEROL) 2000 units (50 mcg) tablet Take 1 tablet (2,000 Units) by mouth daily 7/26/19   Kali Swanson NP   zolpidem (AMBIEN) 5 MG tablet Take tablet by mouth 15 minutes prior to sleep, for Sleep Study 5/7/20   Jitendra Manzo MD     LABORATORY DATA:   Recent Results (from the past 672 hour(s))   Asymptomatic COVID-19 Virus (Coronavirus) by PCR    Collection Time: 06/21/20  9:17 PM    Specimen: Nasopharyngeal   Result Value Ref Range    COVID-19 Virus PCR to [facility] - Source Nasopharyngeal     COVID-19 Virus PCR to [facility] -  "Result       Test received-See reflex to IDDL test SARS CoV2 (COVID-19) Virus RT-PCR   SARS-CoV-2 COVID-19 Virus (Coronavirus) RT-PCR Nasopharyngeal    Collection Time: 06/21/20  9:17 PM    Specimen: Nasopharyngeal   Result Value Ref Range    SARS-CoV-2 Virus Specimen Source Nasopharyngeal     SARS-CoV-2 PCR Result NEGATIVE     SARS-CoV-2 PCR Comment       Testing was performed using the Xpert Xpress SARS-CoV-2 Assay on the Cepheid Gene-Xpert   Instrument Systems. Additional information about this Emergency Use Authorization (EUA)   assay can be found via the Lab Guide.     Urine Drugs of Abuse Screen Panel 13    Collection Time: 06/21/20 10:37 PM   Result Value Ref Range    Cannabinoids (01-uei-5-carboxy-9-THC) Not Detected NDET^Not Detected ng/mL    Phencyclidine (Phencyclidine) Not Detected NDET^Not Detected ng/mL    Cocaine (Benzoylecgonine) Not Detected NDET^Not Detected ng/mL    Methamphetamine (d-Methamphetamine) Not Detected NDET^Not Detected ng/mL    Opiates (Morphine) Not Detected NDET^Not Detected ng/mL    Amphetamine (d-Amphetamine) Detected, Abnormal Result (A) NDET^Not Detected ng/mL    Benzodiazepines (Nordiazepam) Not Detected NDET^Not Detected ng/mL    Tricyclic Antidepressants (Desipramine) Not Detected NDET^Not Detected ng/mL    Methadone (Methadone) Not Detected NDET^Not Detected ng/mL    Barbiturates (Butalbital) Not Detected NDET^Not Detected ng/mL    Oxycodone (Oxycodone) Not Detected NDET^Not Detected ng/mL    Propoxyphene (Norpropoxyphene) Not Detected NDET^Not Detected ng/mL    Buprenorphine (Buprenorphine) Not Detected NDET^Not Detected ng/mL     PHYSICAL EXAMINATON:   Temp: 97.5  F (36.4  C) Temp src: Oral BP: 133/86 Pulse: 63   Resp: 16        6' 1\" 229 lbs 6.4 oz Body mass index is 30.27 kg/m .  MENTAL STATUS EXAM: The patient is a very pleasant, slightly overweight,  male who is clean, and dressed in hospital scrubs.  He is calm, pleasant, cooperative.  Eye contact is good, " mood is depressed and anxious, affect is flat, speech is clear and coherent, psychomotor behavior is negative for agitation retardation, thought process is logical and goal oriented, no loose associations, thought content is negative for suicidal homicidal ideation, paranoia, delusions, auditory visual hallucinations, insight and judgment are intact, he is oriented to self, date, place, situation, attention span and concentration are intact, recent remote memory are intact, he has no problems expressing himself, fund of knowledge is adequate for the level of education and training.    DIAGNOSIS:  1.  Major depressive disorder, recurrent, severe, without psychosis  2.  Generalized anxiety disorder  3.  Medical problems includes arthritis, degenerative disc disease, cardiac problems, obesity among others.  PLAN AND RECOMMENDATIONS: The patient is a very pleasant,  male who was admitted with increased depression, anxiety, suicidal ideation without a plan.  He is currently denying any suicidal thoughts.  He wants to go home.  He feels uncomfortable on the unit.  The patient was agreeable to stay for few days well with the medication changes.  Discussed various options.  Patient agreed to the following: Increase Cymbalta 220 mg every morning.  Start lithium 450 mg at bedtime as a mood stabilizer.  PRN medications will include trazodone, gabapentin,  and hydroxyzine.  Blood work was reviewed. Potassium is 3.2. Internal medicine follow-up for medical problems.  Estimated length of stay 3 to 5 days.  Disposition, to home.  The patient was consulted on nature of illness and treatment options. Care was coordinated with the treatment team.  Attestation: Patient has been seen and evaluated by demetrius LOREDO CNP  6/22/2020  7:16 AM  This note was created with the help of Dragon dictation system. All grammatical/typing errors or context distortion are unintentional and inherent to software.

## 2020-06-22 NOTE — PROGRESS NOTES
06/22/20 0406   Patient Belongings   Did you bring any home meds/supplements to the hospital?  No   Patient Belongings remains with patient;locker;sent to security per site process   Patient Belongings Remaining with Patient clothing   Patient Belongings Put in Hospital Secure Location (Security or Locker, etc.) cash/credit card;cell phone/electronics;clothing;wallet;shoes   Belongings Search Yes   Clothing Search Yes   Second Staff Gelacio ZEE               Admission:  Pt. Admitted on the night shift to room 365 bed two.  Pt. Will have in his room a T-shirt and shorts.  Locked in storage on the floor of 3B are the following:  shoes, cell phone and a .  Pt.'s wallet, money and cards were sent to the \[facility]\"" safe, envelope no., [id number].  [employer] 6/22/2020.  I am responsible for any personal items that are not sent to the safe or pharmacy.  [facility] is not responsible for loss, theft or damage of any property in my possession.    Signature:  _________________________________ Date: _______  Time: _____                                              Staff Signature:  ____________________________ Date: ________  Time: _____      2nd Staff person, if patient is unable/unwilling to sign:    Signature: ________________________________ Date: ________  Time: _____     Discharge:  [facility] has returned all of my personal belongings:    Signature: _________________________________ Date: ________  Time: _____                                          Staff Signature:  ____________________________ Date: ________  Time: _____

## 2020-06-22 NOTE — PLAN OF CARE
"Writer introduced self to patient near beginning of shift. Pt was laying in bed, awake. Pt stated that he's \"not doing great.\" Pt became tearful and expressed to writer that he doesn't want to be here in the hospital. Writer asked pt what he likes to do to pass the time and if there's anything in the lounge that he might enjoy doing, pt said \"no, nothing.\" Writer asked pt if he would like a medication for anxiety, pt said \"my anxiety is through the roof, so yeah, I'd like something.\" Writer gave pt 200 mg gabapentin.    A short time later, pt's wife called with concerns. She told writer that she had spoken to pt and that he is having a hard time in the hospital. \"He said he feels like a prisoner there. That it's nothing like he thought it would be.\" Writer listened to her concerns and her recommendations. She thought that pt is not aware that he is allowed to use the phone, and that he is concerned that he needs to go to groups in order to discharge, and that groups therapy causes anxiety for him. She also said \"he has a lot of trouble asking for help. He may not tell anyone if he needs something.\" Pt's wife had also been concerned about pt's recent lack of sleep.    Writer checked back in with pt. Pt stated that the gabapentin helped but that he was still feeling anxious. Writer offered hydroxyzine at this time, pt said \"that doesn't do anything for me.\" Writer gave pt 100 mg gabapentin, as his dose was written for pt to be given 200-300 mg TID. Writer informed pt that he was allowed to use the phone during the day, but that it gets turned off during groups. Writer told pt that while groups are encouraged and can be quite beneficial for many people, that attendance is not required for discharge. Pt was given headphones.    Checked in on pt shortly after 2100, he was asleep.    Administered lovenox, lithium and prn trazodone at 2200. Pt declined any further meds/needs at this time. Pt's affect brighter.  "

## 2020-06-22 NOTE — PLAN OF CARE
Problem: General Plan of Care (Inpatient Behavioral)  Goal: Individualization/Patient Specific Goal (IP Behavioral)  Description: The patient and/or their representative will achieve their patient-specific goals related to the plan of care.    The patient-specific goals include:  Note: Reasons you are in the hospital:  1)  Depression  2)  Suicidal ideation - no plan    Goals for Discharge:  1)  Want to feel better

## 2020-06-22 NOTE — PLAN OF CARE
Reasons you are in the hospital:  1)  Depression  2)  Suicidal ideation - no plan    Goals for Discharge:  1)  Want to feel better

## 2020-06-22 NOTE — PLAN OF CARE
Problem: Adult Behavioral Health Plan of Care  Goal: Team Discussion  Description: Team Plan:  Note: BEHAVIORAL TEAM DISCUSSION    Participants: FACUNDO Duarte DNP, Tammy Osorio RN, [facility]  Progress New admit  Anticipated length of stay: 5-7 days  Continued Stay Criteria/Rationale: Depression  Medical/Physical: See medical notes  Precautions:   Behavioral Orders   Procedures     Code 1 - Restrict to Unit     Routine Programming     As clinically indicated     Status 15     Every 15 minutes.     Suicide precautions     Patients on Suicide Precautions should have a Combination Diet ordered that includes a Diet selection(s) AND a Behavioral Tray selection for Safe Tray - with utensils, or Safe Tray - NO utensils       Plan: The plan is to assess the patient for mental health and medication needs.  The patient will be prescribed medications to treat the identified symptoms.  Upon discharge the patient will be referred to services as appropriate based on the assessment.  Rationale for change in precautions or plan: N/A      Jose LOREDO CNP  Date: 06/23/20  Time: 1:50 PM

## 2020-06-22 NOTE — PROGRESS NOTES
Work Completed: Met with pt. Completed biopsychosocial, PPC    Discharge plan or goal: To discharge home once mental health stabilizes                Barriers to discharge: None identified at this time

## 2020-06-22 NOTE — PROGRESS NOTES
PRESCRIPTIONS  Total Prescriptions: 33  Total Private Pay: 14  Fill Date ID Written Drug Qty Days Prescriber Rx # Pharmacy Refill Daily Dose * Pymt Type   06/16/2020 1 06/16/2020 Phentermine 37.5 Mg Tablet  30.00 30 [facility] [id number] [place] ([id number][place] 0/[place]  [place]  06/02/2020 1 05/29/2020 Oxycodone-Acetaminophen 5-325  90.00 30 [facility] [id number] [place] ([id number][place] 0/0 22.50 [place]  05/26/2020 1 05/26/2020 Phentermine 37.5 Mg Tablet  30.00 30 [facility] [id number] [id number][place]  05/15/2020 1 05/15/2020 Tramadol Hcl 50 Mg Tablet  60.00 30 To [facility] [id number] [place] ([id number][place] 0/0 10.[place]  05/07/2020 1 05/07/2020 Zolpidem Tartrate 5 Mg Tablet  1.00 1 Ol [facility] [id number] [place] ([id number][place] 0/0 0.25 [place]  05/03/2020 1 04/30/2020 Oxycodone-Acetaminophen 5-325  90.00 30 [facility] [id number] [place] ([id number][place] 0/[place]  04/20/2020 1 04/20/2020 Phentermine 37.5 Mg Tablet  30.00 30 [facility] [id number] [id number][place]  04/17/2020 1 04/17/2020 Tramadol Hcl 50 Mg Tablet  60.00 30 Cl M d [id number] [place] ([id number]) 0/0 10.[place]  04/09/2020 1 04/01/2020 Oxycodone-Acetaminophen 5-325  90.00 30 [facility] [id number] [place] ([id number][place] 0/0 22.[place]  03/25/2020 1 03/25/2020 Phentermine 37.5 Mg Tablet  30.00 30 [facility] [id number] [id number][place]  03/19/2020 1 03/19/2020 Tramadol Hcl 50 Mg Tablet  60.00 30 [facility] [id number] [place] ([id number][place] 0/[place] 10.[place]  03/05/2020 1 03/02/2020 Oxycodone-Acetaminophen 5-325  90.00 30 [facility] [id number] [place] ([id number][place] 0/0 22.[place]  03/02/2020 1 03/02/2020 Phentermine 37.5 Mg Tablet  30.00 30 [facility] [id number] [id number]  02/21/2020 1 02/21/2020 Tramadol Hcl 50 Mg Tablet  60.00 30 De [place] [id number] [place] ([id number][place] 0/0 10.[place]  02/05/2020 1 02/05/2020 Phentermine 37.5 Mg Tablet  30.00 30 [facility] [id number] [place] ([id number][place]  02/05/2020 1 02/04/2020 Oxycodone-Acetaminophen 5-325  90.00 30 [facility] [id number] [place] ([id number]) 0/0 22.50  [place]  02/01/2020 1 01/24/2020 Tramadol Hcl 50 Mg Tablet  32.00 16 [facility] [id number] [place] ([id number][place] 1/0 10.[place]  01/26/2020 1 01/24/2020 Tramadol Hcl 50 Mg Tablet  14.00 7 [facility] [id number] [place] ([id number][place] 0/0 10.[place]  01/09/2020 1 01/08/2020 Phentermine 37.5 Mg Tablet  30.00 30 [facility] [id number] [place] ([id number][place]  01/06/2020 1 01/06/2020 Oxycodone-Acetaminophen 5-325  90.00 30 [facility] [id number] [place] ([id number][place] 0/0 22.[place]  12/27/2019 1 12/27/2019 Tramadol Hcl 50 Mg Tablet  60.00 30 [facility] [id number] [place] ([id number][place] 0/0 10.[place]  12/08/2019 1 12/05/2019 Oxycodone-Acetaminophen 5-325  90.00 30 [facility] [id number] [place] ([id number]) 0/0 22.[place]  11/25/2019 1 11/25/2019 Tramadol Hcl 50 Mg Tablet  60.00 30 [facility] [id number] [place] ([id number][place] 0/0 10.[place]  11/08/2019 1 11/08/2019 Oxycodone-Acetaminophen 5-325  90.00 30 [facility] [id number] [place] ([id number]) 0/0 22.[place]  10/25/2019 1 10/25/2019 Tramadol Hcl 50 Mg Tablet  60.00 30 [facility] [id number] [place] ([id number][place] 0/0 10.[place]  10/11/2019 1 10/11/2019 Oxycodone-Acetaminophen 5-325  90.00 30 [facility] [id number] [place] ([id number]) 0/0 22.[place]  09/23/2019 1 09/23/2019 Tramadol Hcl 50 Mg Tablet  60.00 30 [facility] [id number] [place] ([id number][place] 0/0 10.[place]  09/11/2019 1 09/11/2019 Oxycodone-Acetaminophen 5-325  90.00 30 [facility] [id number] [place] ([id number][place] 0/0 22.[place]  08/28/2019 1 08/27/2019 Tramadol Hcl 50 Mg Tablet  60.00 30 [facility] [id number] [place] ([id number]) 0/0 10.[place]  08/12/2019 1 08/12/2019 Oxycodone-Acetaminophen 5-325  90.00 30 [facility] [id number] [place] ([id number]) 0/0 22.[place]  07/25/2019 1 07/25/2019 Tramadol Hcl 50 Mg Tablet  60.00 30 To [facility] [id number] [place] ([id number]) 0/0 10.[place]  07/12/2019 1 07/12/2019 Oxycodone-Acetaminophen 5-325  90.00 30 [facility] [id number] [place] ([id number]) 0/0 22.[place]  07/01/2019 1 07/01/2019 Oxycodone-Acetaminophen 5-325  30.00 10 To [facility] [id number] [place]  ([id number]) 0/0 22.50 [place]    Providers  Total Providers: 5  Name Address [facility] Zipcode Phone  Kush Dubon [address]  [facility] [address] [phone]  Jitendra Manzo  [facility]  [place] [address] [phone]  Siva Ramírez MD [address]  [place] [address] [phone]  Sylvester Cash  [facility]  [facility] [address] [phone]  Kali Swanson [address]  [place] [address] [phone]  Pharmacies  Total Pharmacies: 1  Name Address [facility] Zipcode Phone  [id number] [phone] [address] [facility] [address] [phone]

## 2020-06-22 NOTE — PROGRESS NOTES
"Pt is a 50 year old male coming from [facility] ED due to SI thoughts and worsening depression. Pt has a long Hx of depression and has been talking Cymbalta. Pt states his depression has gotten so bad he doesn't want to do anything all day long. Pt has seen a therapist in the past but it was not effective. Pt currently lives with his wife and two children who are supportive. Pt tells ED that over the last couple days he had suicidal ideation. Pt currently denies this. Pt denies hx of prior inpatient treatment or suicide attempts. When writer arrived to  he was calm and cooperative. Pt was searched and no contraband was found. Writer interviewed pt but it was difficult to get pt to elaborate. Pt answered many questions with \"I dont know.\" Writer asked pt if he had any friends or family that commit suicide and he stated \"yes.\" Pt was asked who did and he stated \"I do not know.\" Pt appears very tired and wanted to go to bed. (Please finish interviewing)    Pt is on warfarin and needs a INR this morning before he can receive the warfarin. The medication was placed on hold in the MAR until the INR value is known. Internal medicine consult was placed.   "

## 2020-06-22 NOTE — PROGRESS NOTES
Pt had doses of Lovenox scheduled for 1115 and 2315.  Spoke with pharmacy about adjusting doses to 1000 and 2200. Pt's INR 1.44. Pharmacy okayed the change to start tonight at 2200. Scheduled times were adjusted in the MAR.

## 2020-06-22 NOTE — PROGRESS NOTES
Pt has been isolative in his room. Pt has been guarded. He denied all mental health symptoms; however, pt had a flat and blunted affect. Appears depressed.    Pt was medication compliant. Poor appetite.    We'll continue to monitor.

## 2020-06-22 NOTE — PHARMACY-ADMISSION MEDICATION HISTORY
Admission medication history for the June 22, 2020 admission is complete.     Interview Sources:  Patient, [facility] records    Reliability of Source:  Good    Medication Adherence: Good    Current Outpatient Pharmacy:    [address] - [facility] [address]    Changes made to PTA medication list (reason)  Added: none  Deleted: albuterol, aripiprazole, atorvastatin, finasteride, hydroxyzine, phentermine, sildenafil, tramadol, zolpidem  Changed:     Additional medication history information:   The patient was able to discuss his medications without difficulty. He reported he takes warfarin 10 mg on Monday and Thursdays and warfarin 7.5 mg on Sunday, Tuesday, Wednesday, Friday and Saturday.    Oxycodone-acetaminophen 5/325 mg last filled 06/02/2020 for quantity 90 for 30 day supply      Prior to Admission Medication List:  Prior to Admission medications    Medication Sig Last Dose Taking? Auth Provider   DULoxetine (CYMBALTA) 30 MG capsule TAKE THREE CAPSULES BY MOUTH EVERY DAY 6/21/2020 at Unknown time Yes Kali Swanson NP   oxyCODONE-acetaminophen (PERCOCET) 5-325 MG tablet Take 1 tablet by mouth every 8 hours as needed for moderate to severe pain Must last 30 days. Past Week at Unknown time Yes Sylvester Cash MD   tretinoin (RETIN-A) 0.05 % external cream Apply  topically At Bedtime. 6/20/2020 at Unknown time Yes Sylvester Cash MD   vitamin D3 (CHOLECALCIFEROL) 2000 units (50 mcg) tablet Take 1 tablet (2,000 Units) by mouth daily 6/21/2020 at Unknown time Yes Kali Swanson NP   warfarin ANTICOAGULANT (COUMADIN) 5 MG tablet Take 10 mg on Monday, Thursday and 7.5 mg all other days, or as directed by the [facility] 6/21/2020 at Unknown time Yes        Time spent: 30 minutes    Medication history completed by:   Monica Gray, PharmD, BCPP  Behavioral Health Pharmacist  [facility] Ascom:  [phone] or [phone]  [facility] Phone: [phone]

## 2020-06-22 NOTE — PROGRESS NOTES
Initial Psychosocial Assessment     I have reviewed the chart, met with the patient, and developed Care Plan.  Information for assessment was obtained from: patient and chart        Presenting Problem:   Pt is a 50 year old male who presents to the emergency room today with complaints of feeling depressed.  Patient has a longstanding history of depression which he is on Cymbalta for, he was on Abilify as well but stopped this about a month ago.  Patient reports that his depression is gotten to the point where he just does not feel like doing anything all day.  Patient reports he has done therapy in the past but did not find this helpful and has not been to a therapist in quite some time.  Patient denies any drug abuse.  Patient does lives with his wife and 2 younger children who he reports are supportive.  Patient does report that over the last couple of days he has had thoughts of dying but denies any actual plan. He denies hx of prior inpatient mental health admission or suicide attempts. He is asymptomatic for COVID-19. COVID test is pending. Medically cleared.    History of Mental Health and Chemical Dependency:  Pt reports that he has been diagnosed with Major Depression.  Has been on medications for this. This is pt s first mental health admission. Pt denies drug or alcohol use or abuse.    Family Description (Constellation, Family Psychiatric History):  Spouse , 4 kids, two still at home. One brother, parents. Born in [facility] and raised by both parents.  No history of abuse.    Significant Life Events (Illness, Abuse, Trauma, Death):  None identified.     Living Situation:  Pt lives with spouse and 2 kids.     Educational Background:  Did not assess.     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     Financial Status:  Employment income.     Legal Issues:  None identified.     Ethnic/Cultural Issues:  None identified.     Spiritual Orientation:  Spiritual       Intio Service  History:  No     Social Functioning (organization, interests):  Pt was very pleasant and calm. He reports that he is quite depressed and would like psychiatry and therapy referrals at discharge.     Current Treatment Providers are:  None - would like referrals     Social Service Assessment/Plan:  Patient has been admitted for psychiatric stabilization due to suicidal ideation. Patient will have psychiatric assessment and medication management by the psychiatrist. Medications will be reviewed and adjusted per MD as indicated. The treatment team will continue to assess and stabilize the patient's mental health symptoms with the use of medications and therapeutic programming. Hospital staff will provide a safe environment and a therapeutic milieu. Staff will continue to assess patient as needed. Patient will participate in unit groups and activities. Patient will receive individual and group support on the unit.  CTC will do individual inpatient treatment planning and after care planning. CTC will discuss options for increasing community supports with the patient. CTC will coordinate with outpatient providers and will place referrals to ensure appropriate follow up care is in place.

## 2020-06-22 NOTE — ED TRIAGE NOTES
Pt here with Depression, states just can't get himself to do anything. Suicidal thoughts , no plan

## 2020-06-22 NOTE — TELEPHONE ENCOUNTER
S: DEC called at 2057 to place a 49 y/o male from the [facility] ED for inpatient mental health treatment.    B: Hollis Diggs is a 50 year old male who presents to the emergency room today with complaints of feeling depressed.  Patient has a longstanding history of depression which he is on Cymbalta for, he was on Abilify as well but stopped this about a month ago.  Patient reports that his depression is gotten to the point where he just does not feel like doing anything all day.  Patient reports he has done therapy in the past but did not find this helpful and has not been to a therapist in quite some time.  Patient denies any drug abuse.  Patient does lives with his wife and 2 younger children who he reports are supportive.  Patient does report that over the last couple of days he has had thoughts of dying but denies any actual plan. He denies hx of prior inpatient mental health admission or suicide attempts. He is asymptomatic for COVID-19. COVID test is pending. Medically cleared.    A: Voluntary.    R: On call paged at 2116 to review for placement on [facility]. On call approved admission at 2138. Unit notified at 2159. ED notified at 2201.  Patient cleared and ready for behavioral bed placement: Yes

## 2020-06-22 NOTE — CONSULTS
[facility]  Internal Medicine Consult     Hollis Diggs MRN# [id number]   Age: 50 year old YOB: 1970     Date of Admission: 6/22/2020  Date of Consult: 6/22/2020    Primary Care Provider: Sylvester Cash    Requesting Service: Behavioral Health - Jose Oglesby MD  Reason for Consult: General Medical Evaluation      SUBJECTIVE   CC:   Chronic pain   Assessment and Plan/Recommendations:   Hollis Diggs is a 50 year old male with history of depression, anxiety, chronic pain, RA, antiphospholipid syndrome with prior DVT, asthma, HLD, and insomnia who was admitted to [facility] with worsening depression    Anxiety, depression: With worsening depression  - Management per psych     Chronic pain: Follows OP with Dr. Sylvester Cash, last seen 5/58 via virtual visit. PTA on Percocet.  - Ok to continue Percocet from a medicine standpoint but will defer decision to primary team  - Of note, Tramadol recently stopped. Suggest not resuming     RA: No recent OP follow up. Would like OP RA consult, will order.    Antiphospholipid syndrome, recurrent DVTs: On lifelong coumadin. INR 1.44  - Pharmacy to dose coumadin   - Bridge with Lovenox, will continue to follow INR    Asthma: Stable, exercise induced. Suggest continuing prn albuterol     HLD: Suggest continuing statin    Hypokalemia: Mild, K 3.2. Replace with 40 mEq. Contact medicine if unable to tolerate PO intake     Medicine will follow INR    Please contact if future questions or concerns arise. Thank you for the opportunity to be a part of this patient's care.      Radha Wood PA-C  Internal Medicine [facility] Hospitalist  [phone]  June 22, 2020         HPI:   Hollis Diggs is a 50 year old male with history of depression, anxiety, chronic pain, RA, antiphospholipid syndrome with prior DVT, asthma, HLD, and insomnia who was admitted to station [facility] with worsening depression      Patient reports his medical conditions are  stable. His chronic pain is at BL. He denies percocet overuse. He is complaint with PTA coumadin. Denies dyspnea, chest pain, palpitations, or peripheral edema. RA is stable. He is not currently on targeted therapy but would like to follow up OP with rheumatology and discuss treatment options. Pain is in the hands, elbows, knees. Denies recent illness, fever, confusion, acute visual changes, dizziness, chest pain, dyspnea, cough, abdominal pain, N/V, urinary symptoms, change in bowels, peripheral edema, new onset joint pain, or rash       Past Medical History:     Past Medical History:   Diagnosis Date     Antiphospholipid syndrome (H)      Arthritis      Asthma     Exercise     blood clot in leg      Depressive disorder, not elsewhere classified     Depression (non-psychotic)     ANKIT (generalised anxiety disorder)      HH (hiatus hernia)      Hypercholesteremia     normal with weight loss 3/09     Lumbar disc herniation 1992     Seronegative rheumatoid arthritis (H)         Reviewed and updated in [facility].     Past Surgical History:      Past Surgical History:   Procedure Laterality Date     APPENDECTOMY       C NONSPECIFIC PROCEDURE  91 or 92    back surgery. lumbar. lamiectomy     COLONOSCOPY N/A 8/2/2016    Procedure: COMBINED COLONOSCOPY, SINGLE OR MULTIPLE BIOPSY/POLYPECTOMY BY BIOPSY;  Surgeon: Sydnee Walton MD;  Location: [facility] OR     COLONOSCOPY WITH CO2 INSUFFLATION N/A 8/2/2016    Procedure: COLONOSCOPY WITH CO2 INSUFFLATION;  Surgeon: Sydnee Walton MD;  Location: [facility] OR     COMBINED ESOPHAGOSCOPY, GASTROSCOPY, DUODENOSCOPY (EGD) WITH CO2 INSUFFLATION N/A 8/2/2016    Procedure: COMBINED ESOPHAGOSCOPY, GASTROSCOPY, DUODENOSCOPY (EGD) WITH CO2 INSUFFLATION;  Surgeon: Sydnee Walton MD;  Location: [facility] OR     ESOPHAGOSCOPY, GASTROSCOPY, DUODENOSCOPY (EGD), COMBINED N/A 8/2/2016    Procedure: COMBINED ESOPHAGOSCOPY, GASTROSCOPY, DUODENOSCOPY (EGD), BIOPSY SINGLE OR  "MULTIPLE;  Surgeon: Sydnee Walton MD;  Location: [facility] OR     HC REMOVAL OF TONSILS,<13 Y/O      Tonsils <11y.o.     HC REPAIR INCISIONAL HERNIA,REDUCIBLE  1970's    Hernia Repair, Incisional, Unilateral     HC UGI ENDOSCOPY DIAG W BIOPSY  02/01/06     HC VASECTOMY UNILAT/BILAT W POSTOP SEMEN  1/05    Vasectomy     History back lumbar laminectomy           Social History:   Tobacco use: Denies  Alcohol use: rarely  Elicit drug use: denies     Family History:     Family History   Problem Relation Age of Onset     Brain Tumor Mother         Benign     Deep Vein Thrombosis Mother         \"neck\"      Heart Disease Father         \"wont tell anyone\"     Neurologic Disorder Paternal Grandmother         Parkinsons      Alcohol/Drug Paternal Grandfather         alcoholic     Cancer Maternal Grandfather         lung     Diabetes Maternal Grandmother      Cerebrovascular Disease Maternal Grandmother         tim age ~70     Arthritis Cousin         cousins x 2 \"RA\"     Musculoskeletal Disorder Maternal Aunt         MS     Family History Negative Other         psoriasis, crohns, UC, SLE         Allergies:     Allergies   Allergen Reactions     No Known Drug Allergies          Medications:   Reviewed. Please see MAR     Review of Systems:   10 point ROS of systems including Constitutional, Eyes, Respiratory, Cardiovascular, Gastroenterology, Genitourinary, Integumentary, Muscularskeletal, Psychiatric were all negative except for pertinent positives noted in my HPI.    OBJECTIVE   Physical Exam:   Vitals were reviewed  Blood pressure 138/88, pulse 78, temperature 97.6  F (36.4  C), temperature source Oral, resp. rate 16, height 1.854 m (6' 1\"), weight 104.1 kg (229 lb 6.4 oz), SpO2 99 %.  General: Alert and oriented x3, flat affect  HEENT: Anicteric sclera, MMM  Cardiovascular: RRR, S1S2. No murmur noted  Lungs: CTAB without wheezing or crackles   GI: Abdomen soft, non-tender with bowel sounds present. No guarding or " rebound   Vascular: No peripheral edema, distal pulses palpable  Neurologic: No focal deficits, CN II-XII grossly intact  Skin: No jaundice, rashes, or lesions        Data:        Lab Results   Component Value Date     11/08/2019    Lab Results   Component Value Date    CHLORIDE 108 11/08/2019    Lab Results   Component Value Date    BUN 8 11/08/2019      Lab Results   Component Value Date    POTASSIUM 3.6 11/08/2019    Lab Results   Component Value Date    CO2 31 11/08/2019    Lab Results   Component Value Date    CR 1.06 11/08/2019        Lab Results   Component Value Date    WBC 6.1 11/08/2019    HGB 14.7 11/08/2019    HCT 45.4 11/08/2019    MCV 82 11/08/2019     11/08/2019     Lab Results   Component Value Date    WBC 6.1 11/08/2019

## 2020-06-22 NOTE — ED PROVIDER NOTES
History     Chief Complaint   Patient presents with     Depression     HPI  Hollis Diggs is a 50 year old male who presents to the emergency room today with complaints of feeling depressed.  Patient has a longstanding history of depression which he is on Cymbalta for, he was on Abilify as well but stopped this about a month ago.  Patient reports that his depression is gotten to the point where he just does not feel like doing anything all day.  Patient reports he has done therapy in the past but did not find this helpful and has not been to a therapist in quite some time.  Patient denies any drug abuse.  Patient does lives with his wife and 2 younger children who he reports are supportive.  Patient does report that over the last couple of days he has had thoughts of dying but denies any actual plan.    Allergies:  Allergies   Allergen Reactions     No Known Drug Allergies        Problem List:    Patient Active Problem List    Diagnosis Date Noted     Class 1 obesity due to excess calories without serious comorbidity with body mass index (BMI) of 33.0 to 33.9 in adult 01/08/2020     Priority: Medium     Long-term use of high-risk medication 05/12/2017     Priority: Medium     History of deep venous thrombosis 04/14/2017     Priority: Medium     DDD (degenerative disc disease), lumbar 04/14/2017     Priority: Medium     On prednisone therapy 07/27/2016     Priority: Medium     Seronegative rheumatoid arthritis (H) 06/28/2016     Priority: Medium     Long-term (current) use of anticoagulants [Z79.01] 03/16/2016     Priority: Medium     Rheumatoid arthritis of multiple sites with negative rheumatoid factor (H) 12/07/2015     Priority: Medium     Midline low back pain, with sciatica presence unspecified 10/14/2015     Priority: Medium     Major depressive disorder, recurrent episode, mild (H) 10/07/2015     Priority: Medium     Pain in joint, multiple sites 03/20/2015     Priority: Medium     Anxiety state  02/10/2015     Priority: Medium     Problem list name updated by automated process. Provider to review       CARDIOVASCULAR SCREENING; LDL GOAL LESS THAN 160 01/15/2013     Priority: Medium     Alopecia 02/19/2010     Priority: Medium     Ingrown toenail 08/18/2009     Priority: Medium     Anxiety 07/09/2008     Priority: Medium     Abdominal pain, epigastric 01/25/2006     Priority: Medium        Past Medical History:    Past Medical History:   Diagnosis Date     Antiphospholipid syndrome (H)      Arthritis      Asthma      blood clot in leg      Depressive disorder, not elsewhere classified      ANKIT (generalised anxiety disorder)      HH (hiatus hernia)      Hypercholesteremia      Lumbar disc herniation 1992     Seronegative rheumatoid arthritis (H)        Past Surgical History:    Past Surgical History:   Procedure Laterality Date     APPENDECTOMY       C NONSPECIFIC PROCEDURE  91 or 92    back surgery. lumbar. lamiectomy     COLONOSCOPY N/A 8/2/2016    Procedure: COMBINED COLONOSCOPY, SINGLE OR MULTIPLE BIOPSY/POLYPECTOMY BY BIOPSY;  Surgeon: Sydnee Walton MD;  Location: [facility] OR     COLONOSCOPY WITH CO2 INSUFFLATION N/A 8/2/2016    Procedure: COLONOSCOPY WITH CO2 INSUFFLATION;  Surgeon: Sydnee Walton MD;  Location: [facility] OR     COMBINED ESOPHAGOSCOPY, GASTROSCOPY, DUODENOSCOPY (EGD) WITH CO2 INSUFFLATION N/A 8/2/2016    Procedure: COMBINED ESOPHAGOSCOPY, GASTROSCOPY, DUODENOSCOPY (EGD) WITH CO2 INSUFFLATION;  Surgeon: Sydnee Walton MD;  Location: [facility] OR     ESOPHAGOSCOPY, GASTROSCOPY, DUODENOSCOPY (EGD), COMBINED N/A 8/2/2016    Procedure: COMBINED ESOPHAGOSCOPY, GASTROSCOPY, DUODENOSCOPY (EGD), BIOPSY SINGLE OR MULTIPLE;  Surgeon: Sydnee Walton MD;  Location: [facility] OR     HC REMOVAL OF TONSILS,<10 Y/O      Tonsils <11y.o.     HC REPAIR INCISIONAL HERNIA,REDUCIBLE  1970's    Hernia Repair, Incisional, Unilateral     HC UGI ENDOSCOPY DIAG W BIOPSY  02/01/06  "    HC VASECTOMY UNILAT/BILAT W POSTOP SEMEN  1/05    Vasectomy     History back lumbar laminectomy         Family History:    Family History   Problem Relation Age of Onset     Brain Tumor Mother         Benign     Deep Vein Thrombosis Mother         \"neck\"      Heart Disease Father         \"wont tell anyone\"     Neurologic Disorder Paternal Grandmother         Parkinsons      Alcohol/Drug Paternal Grandfather         alcoholic     Cancer Maternal Grandfather         lung     Diabetes Maternal Grandmother      Cerebrovascular Disease Maternal Grandmother         tim age ~70     Arthritis Cousin         cousins x 2 \"RA\"     Musculoskeletal Disorder Maternal Aunt         MS     Family History Negative Other         psoriasis, crohns, UC, SLE       Social History:  Marital Status:   [2]  Social History     Tobacco Use     Smoking status: Never Smoker     Smokeless tobacco: Never Used   Substance Use Topics     Alcohol use: Yes     Comment: rare     Drug use: No        Medications:    albuterol (PROAIR HFA/PROVENTIL HFA/VENTOLIN HFA) 108 (90 Base) MCG/ACT inhaler  ARIPiprazole (ABILIFY) 5 MG tablet  ATORVASTATIN 10 MG PO tablet  DULoxetine (CYMBALTA) 30 MG capsule  finasteride (PROSCAR) 5 MG tablet  hydrOXYzine (ATARAX) 25 MG tablet  order for DME  oxyCODONE-acetaminophen (PERCOCET) 5-325 MG tablet  phentermine (ADIPEX-P) 37.5 MG tablet  sildenafil (REVATIO) 20 MG tablet  traMADol (ULTRAM) 50 MG tablet  tretinoin (RETIN-A) 0.05 % external cream  vitamin D3 (CHOLECALCIFEROL) 2000 units (50 mcg) tablet  warfarin ANTICOAGULANT (COUMADIN) 5 MG tablet  zolpidem (AMBIEN) 5 MG tablet          Review of Systems   All other systems reviewed and are negative.      Physical Exam   BP: (!) 156/103  Pulse: 95  Temp: 99.4  F (37.4  C)  Resp: 16  Weight: 106.6 kg (235 lb)  SpO2: 99 %      Physical Exam  Constitutional:       Appearance: He is obese.   HENT:      Head: Normocephalic.      Mouth/Throat:      Mouth: Mucous " membranes are moist.   Eyes:      Extraocular Movements: Extraocular movements intact.   Neck:      Musculoskeletal: Normal range of motion.   Cardiovascular:      Rate and Rhythm: Normal rate.      Pulses: Normal pulses.   Pulmonary:      Effort: Pulmonary effort is normal.   Musculoskeletal: Normal range of motion.   Skin:     General: Skin is warm.      Capillary Refill: Capillary refill takes less than 2 seconds.   Neurological:      General: No focal deficit present.      Mental Status: He is alert.   Psychiatric:      Comments: Flat affect         ED Course     Procedures    No results found for this or any previous visit (from the past 24 hour(s)).    Medications - No data to display    Assessments & Plan (with Medical Decision Making)  Hollis is a 50-year-old male, history of depression, presents with worsening symptoms discussed his case with Hollis from [facility], discern for suicidal ideation and plan after his discussion with social work, plan to transfer patient to inpatient psychiatry which patient feels is appropriate.  Patient was placed on a transfer hold and is on a watch.  Wife was updated per Hollis from DEC.  Patient was accepted by Dr. Oglesby, [facility], he was staffed in the emergency room with Dr. Rojas.  Patient discharged in stable condition.     This is a shared ER visit.  Doris Arriola discussed the patient's condition and plan of care with me. I reviewed the patient's past medical history, symptoms of present illness, and personally evaluated the patient in the emergency room. I'm in agreement with the assessment and plan of care.  Fercho Rojas  Physician  [facility] Emergency Room       I have reviewed the nursing notes.    I have reviewed the findings, diagnosis, plan and need for transfer with the patient.        Final diagnoses:   Depression, unspecified depression type   Feeling suicidal       6/21/2020   [facility] EMERGENCY DEPARTMENT     [place]ako  Dorothy Stephens, FACUNDO CNP  06/21/20 220       Fercho Rojas,   06/21/20 2207

## 2020-06-23 LAB
CHOLEST SERPL-MCNC: 161 MG/DL
HDLC SERPL-MCNC: 38 MG/DL
INR PPP: 1.62 (ref 0.86–1.14)
LDLC SERPL CALC-MCNC: 105 MG/DL
NONHDLC SERPL-MCNC: 123 MG/DL
POTASSIUM SERPL-SCNC: 3.9 MMOL/L (ref 3.4–5.3)
TRIGL SERPL-MCNC: 92 MG/DL
TSH SERPL DL<=0.005 MIU/L-ACNC: 0.78 MU/L (ref 0.4–4)

## 2020-06-23 PROCEDURE — 84132 ASSAY OF SERUM POTASSIUM: CPT | Performed by: CLINICAL NURSE SPECIALIST

## 2020-06-23 PROCEDURE — 84443 ASSAY THYROID STIM HORMONE: CPT | Performed by: CLINICAL NURSE SPECIALIST

## 2020-06-23 PROCEDURE — 85610 PROTHROMBIN TIME: CPT | Performed by: CLINICAL NURSE SPECIALIST

## 2020-06-23 PROCEDURE — 80061 LIPID PANEL: CPT | Performed by: CLINICAL NURSE SPECIALIST

## 2020-06-23 PROCEDURE — 25000132 ZZH RX MED GY IP 250 OP 250 PS 637: Performed by: PSYCHIATRY & NEUROLOGY

## 2020-06-23 PROCEDURE — 36415 COLL VENOUS BLD VENIPUNCTURE: CPT | Performed by: CLINICAL NURSE SPECIALIST

## 2020-06-23 PROCEDURE — 25000128 H RX IP 250 OP 636: Performed by: PHYSICIAN ASSISTANT

## 2020-06-23 PROCEDURE — 25000132 ZZH RX MED GY IP 250 OP 250 PS 637: Performed by: NURSE PRACTITIONER

## 2020-06-23 PROCEDURE — 99233 SBSQ HOSP IP/OBS HIGH 50: CPT | Mod: GT | Performed by: NURSE PRACTITIONER

## 2020-06-23 PROCEDURE — 12400002 ZZH R&B MH SENIOR/ADOLESCENT

## 2020-06-23 RX ORDER — LITHIUM CARBONATE 450 MG
450 TABLET, EXTENDED RELEASE ORAL AT BEDTIME
Qty: 30 TABLET | Refills: 0 | Status: SHIPPED | OUTPATIENT
Start: 2020-06-23 | End: 2020-11-20

## 2020-06-23 RX ORDER — DULOXETIN HYDROCHLORIDE 60 MG/1
120 CAPSULE, DELAYED RELEASE ORAL DAILY
Qty: 60 CAPSULE | Refills: 0 | Status: SHIPPED | OUTPATIENT
Start: 2020-06-24 | End: 2020-11-20 | Stop reason: DRUGHIGH

## 2020-06-23 RX ORDER — WARFARIN SODIUM 5 MG/1
TABLET ORAL
Qty: 135 TABLET | Refills: 1 | Status: SHIPPED | OUTPATIENT
Start: 2020-06-23 | End: 2020-06-29

## 2020-06-23 RX ORDER — GABAPENTIN 100 MG/1
200-300 CAPSULE ORAL 3 TIMES DAILY PRN
Qty: 180 CAPSULE | Refills: 0 | Status: SHIPPED | OUTPATIENT
Start: 2020-06-23 | End: 2020-11-20

## 2020-06-23 RX ORDER — TRAZODONE HYDROCHLORIDE 50 MG/1
50 TABLET, FILM COATED ORAL
Qty: 30 TABLET | Refills: 0 | Status: SHIPPED | OUTPATIENT
Start: 2020-06-23 | End: 2020-11-20 | Stop reason: DRUGHIGH

## 2020-06-23 RX ORDER — WARFARIN SODIUM 10 MG/1
10 TABLET ORAL
Status: COMPLETED | OUTPATIENT
Start: 2020-06-23 | End: 2020-06-23

## 2020-06-23 RX ADMIN — ACETAMINOPHEN 650 MG: 325 TABLET, FILM COATED ORAL at 08:45

## 2020-06-23 RX ADMIN — GABAPENTIN 300 MG: 100 CAPSULE ORAL at 08:51

## 2020-06-23 RX ADMIN — MELATONIN 50 MCG: at 08:45

## 2020-06-23 RX ADMIN — LITHIUM CARBONATE 450 MG: 450 TABLET, EXTENDED RELEASE ORAL at 20:32

## 2020-06-23 RX ADMIN — DULOXETINE HYDROCHLORIDE 120 MG: 60 CAPSULE, DELAYED RELEASE ORAL at 08:45

## 2020-06-23 RX ADMIN — ENOXAPARIN SODIUM 105 MG: 120 INJECTION SUBCUTANEOUS at 10:00

## 2020-06-23 RX ADMIN — ENOXAPARIN SODIUM 105 MG: 120 INJECTION SUBCUTANEOUS at 20:32

## 2020-06-23 RX ADMIN — TRAZODONE HYDROCHLORIDE 50 MG: 50 TABLET ORAL at 20:32

## 2020-06-23 RX ADMIN — WARFARIN SODIUM 10 MG: 10 TABLET ORAL at 17:15

## 2020-06-23 ASSESSMENT — ACTIVITIES OF DAILY LIVING (ADL)
LAUNDRY: WITH SUPERVISION
HYGIENE/GROOMING: INDEPENDENT
DRESS: INDEPENDENT
ORAL_HYGIENE: INDEPENDENT

## 2020-06-23 NOTE — PROGRESS NOTES
1. What PRN did patient receive?     Gabapentin 300 mg PO    2. What was the patient doing that led to the PRN medication?     Pt complaint of anxiety rated at 8/10    3. Did they require R/S?     Negative    4. Side effects to PRN medication?    None endorsed by pt or observed by the writer.    5. After 1 Hour, patient appeared:     Resting with eyes closed in bed.

## 2020-06-23 NOTE — PLAN OF CARE
RN Assessment: Pt presented with euthymic affect. Pt was calm and cooperative during assessment. Pt's mood was depressed. Pt was alert and oriented x 4. Pt denied having SI, HI, thoughts of SIB, and hallucinations. Pt denied having a wish to be dead. Pt endorsed having headache pain rated at 7/10. Pt was given ordered PRN acetaminophen 650 mg for the pain. Pt endorsed that this medication was effective at reducing the pain to an acceptable level for him. Pt denied having any medical concerns. Pt endorsed that he slept well last evening. When pt was asked about medication efficacy, he stated that he feels the ordered Cymbalta is working well to help with his depression. No medication side effects endorsed by the pt or observed by the writer. Pt was isolative and withdrawn. Pt spent a majority of the shift in his room. Pt was provide an opportunity to ask the writer questions. All questions were answered to the pt's satisfaction per pt report. Continue to monitor for safety and changes in medical condition.

## 2020-06-23 NOTE — PROGRESS NOTES
[facility] [place]   Psychiatric Progress Note        Interim History:   From H&P: Admitted with depression, anxiety, insomnia, SI, no plan. First admission. No previous suicide attempts.     The patient's care was discussed with the treatment team during the daily team meeting and/or staff's chart notes were reviewed.  Staff report patient has been calm, pleasant, cooperative. Patient was isolated to his room. Came out for meals. Did not attend any groups. No socializing with others. C/O anxiety, Gabapentin was helpful. Tearful at times. Patient is eating well and taking medications as prescribed. Slept 10 hours.     Met with patient.  Denies depression and anxiety.  States he is feeling pretty good.  Anxiety has been well controlled with gabapentin.  His slept well.  Did not attend any groups.  Denies suicidal ideation.  Patient requested the letter to his employer.    Spoke with patient's wife Keyla Muniz, phone #[phone].  She does not think that he is suicidal however, worries that he did not have enough time to get better.  She would prefer if he stays another night in the hospital.  Discussed his medications.  She is requesting to schedule a therapy appointment.  States they live about 2 hours away from the [facility] and it will be better for her to pick him up tomorrow between 10 and 11 AM.    Telemedicine Visit: The patient's condition can be safely assessed and treated via synchronous audio and visual telemedicine encounter.       Start time: 1000  Stop time: 1015     Reason for Telemedicine Visit: Covid-19     Originating Site (Patient Location): Station [facility]     Distant Site (Provider Location): home office     Consent:  The patient/guardian has verbally consented to: the potential risks and benefits of telemedicine (video visit) versus in person care; bill my insurance or make self-payment for services provided; and responsibility for payment of non-covered services.       Mode of Communication:  Video Conference via Skype     As the provider I attest to compliance with applicable laws and regulations related to telemedicine.          Medications:       DULoxetine  120 mg Oral Daily     enoxaparin ANTICOAGULANT  1 mg/kg Subcutaneous Q12H     lithium ER  450 mg Oral At Bedtime     vitamin D3  50 mcg Oral Daily          Allergies:     Allergies   Allergen Reactions     No Known Drug Allergies           Labs:     Recent Results (from the past 672 hour(s))   Asymptomatic COVID-19 Virus (Coronavirus) by PCR    Collection Time: 06/21/20  9:17 PM    Specimen: Nasopharyngeal   Result Value Ref Range    COVID-19 Virus PCR to [facility] - Source Nasopharyngeal     COVID-19 Virus PCR to [facility] - Result       Test received-See reflex to IDDL test SARS CoV2 (COVID-19) Virus RT-PCR   SARS-CoV-2 COVID-19 Virus (Coronavirus) RT-PCR Nasopharyngeal    Collection Time: 06/21/20  9:17 PM    Specimen: Nasopharyngeal   Result Value Ref Range    SARS-CoV-2 Virus Specimen Source Nasopharyngeal     SARS-CoV-2 PCR Result NEGATIVE     SARS-CoV-2 PCR Comment       Testing was performed using the Xpert Xpress SARS-CoV-2 Assay on the Cepheid Gene-Xpert   Instrument Systems. Additional information about this Emergency Use Authorization (EUA)   assay can be found via the Lab Guide.     Urine Drugs of Abuse Screen Panel 13    Collection Time: 06/21/20 10:37 PM   Result Value Ref Range    Cannabinoids (62-hqi-6-carboxy-9-THC) Not Detected NDET^Not Detected ng/mL    Phencyclidine (Phencyclidine) Not Detected NDET^Not Detected ng/mL    Cocaine (Benzoylecgonine) Not Detected NDET^Not Detected ng/mL    Methamphetamine (d-Methamphetamine) Not Detected NDET^Not Detected ng/mL    Opiates (Morphine) Not Detected NDET^Not Detected ng/mL    Amphetamine (d-Amphetamine) Detected, Abnormal Result (A) NDET^Not Detected ng/mL    Benzodiazepines (Nordiazepam) Not Detected NDET^Not Detected ng/mL    Tricyclic Antidepressants  (Desipramine) Not Detected NDET^Not Detected ng/mL    Methadone (Methadone) Not Detected NDET^Not Detected ng/mL    Barbiturates (Butalbital) Not Detected NDET^Not Detected ng/mL    Oxycodone (Oxycodone) Not Detected NDET^Not Detected ng/mL    Propoxyphene (Norpropoxyphene) Not Detected NDET^Not Detected ng/mL    Buprenorphine (Buprenorphine) Not Detected NDET^Not Detected ng/mL   INR    Collection Time: 06/22/20  6:58 AM   Result Value Ref Range    INR 1.44 (H) 0.86 - 1.14   Vitamin B12    Collection Time: 06/22/20  8:00 AM   Result Value Ref Range    Vitamin B12 225 193 - 986 pg/mL   Folate    Collection Time: 06/22/20  8:00 AM   Result Value Ref Range    Folate 16.1 >5.4 ng/mL   Vitamin D    Collection Time: 06/22/20  8:00 AM   Result Value Ref Range    Vitamin D Deficiency screening 34 20 - 75 ug/L   CBC with platelets    Collection Time: 06/22/20  8:00 AM   Result Value Ref Range    WBC 6.8 4.0 - 11.0 10e9/L    RBC Count 5.44 4.4 - 5.9 10e12/L    Hemoglobin 14.8 13.3 - 17.7 g/dL    Hematocrit 44.6 40.0 - 53.0 %    MCV 82 78 - 100 fl    MCH 27.2 26.5 - 33.0 pg    MCHC 33.2 31.5 - 36.5 g/dL    RDW 13.4 10.0 - 15.0 %    Platelet Count 254 150 - 450 10e9/L   Comprehensive metabolic panel    Collection Time: 06/22/20  9:14 AM   Result Value Ref Range    Sodium 139 133 - 144 mmol/L    Potassium 3.2 (L) 3.4 - 5.3 mmol/L    Chloride 107 94 - 109 mmol/L    Carbon Dioxide 26 20 - 32 mmol/L    Anion Gap 6 3 - 14 mmol/L    Glucose 124 (H) 70 - 99 mg/dL    Urea Nitrogen 7 7 - 30 mg/dL    Creatinine 1.01 0.66 - 1.25 mg/dL    GFR Estimate 86 >60 mL/min/[1.73_m2]    GFR Estimate If Black >90 >60 mL/min/[1.73_m2]    Calcium 9.0 8.5 - 10.1 mg/dL    Bilirubin Total 0.9 0.2 - 1.3 mg/dL    Albumin 3.4 3.4 - 5.0 g/dL    Protein Total 7.0 6.8 - 8.8 g/dL    Alkaline Phosphatase 93 40 - 150 U/L    ALT 22 0 - 70 U/L    AST 14 0 - 45 U/L            Psychiatric Examination:   Temp: 97.7  F (36.5  C) Temp src: Tympanic BP: 133/88 Pulse: 81    Resp: 16 SpO2: 99 % O2 Device: None (Room air)    Weight is 229 lbs 6.4 oz  Body mass index is 30.27 kg/m .    The patient is a very pleasant,  male who is clean, and dressed in hospital scrubs.  He is calm, pleasant, cooperative.  Eye contact is good, mood is good, affect is sad, speech is clear and coherent, psychomotor behavior is negative for agitation retardation, thought processes logical and goal oriented, no loose associations, thought content is negative for suicidal homicidal ideation, paranoia, delusions, auditory visual hallucinations, insight and judgment are intact, he is oriented to self, date, place, situation, attention span and concentration are intact, recent remote memory are intact.         Precautions:     Behavioral Orders   Procedures     Code 1 - Restrict to Unit     Routine Programming     As clinically indicated     Status 15     Every 15 minutes.     Suicide precautions     Patients on Suicide Precautions should have a Combination Diet ordered that includes a Diet selection(s) AND a Behavioral Tray selection for Safe Tray - with utensils, or Safe Tray - NO utensils            DIagnoses:   1.  Major depressive disorder, recurrent, severe, without psychosis  2.  Generalized anxiety disorder  3.  Medical problems includes arthritis, degenerative disc disease, cardiac illness         Plan:   The patient is a very pleasant,  male who was admitted with increased depression, anxiety, suicidal ideation without a plan.  He is currently denying any suicidal thoughts.  He wants to go home.  He feels uncomfortable on the unit.  The patient was agreeable to stay for few days well with the medication changes.  Discussed various options.  Patient agreed to the following:    --Increase Cymbalta 120 mg every morning.    --Start lithium 450 mg at bedtime as a mood stabilizer.    --PRN medications will include trazodone, gabapentin,  and hydroxyzine.    --Blood work was reviewed.  Potassium is 3.2. Today 3.9.    --Internal medicine follow-up for medical problems.    --The patient was consulted on nature of illness and treatment options.   --Care was coordinated with the treatment team     Disposition Plan   Reason for ongoing admission: is unable to care for self due to severe depression and anxiety  Disposition: home  Estimated length of stay: 3-5 days  Legal Status: voluntary   Discharge will be granted once symptoms improved.    Jose LOREDO CNP  Date: 06/23/20  Time: 12:27 PM    More than 30 minutes were spent for assessment, documentation, and coordination of care.     This note was created with the help of Dragon dictation system. All grammatical/typing errors or context distortion are unintentional and inherent to software.

## 2020-06-23 NOTE — DISCHARGE INSTRUCTIONS
Behavioral Discharge Planning and Instructions      Summary:  You were admitted on 6/22/2020  due to Depression and Anxiety.  You were treated by Dr. Rafa Will MD and discharged on 06/24/2020 from Unit 3BW to Home.      Principal Diagnosis:     1.  Major depressive disorder, recurrent, severe, without psychosis  2.  Generalized anxiety disorder  3.  Medical problems includes arthritis, degenerative disc disease, cardiac problems, obesity among others.    Health Care Follow-up Appointments:   Psychiatrist:  Bella Barrera - Friday, July 10th at 9am. This appointment will be in the clinic. This is a one hour appt.  Allow at least 24 hours for cancellation.  [facility]  [id number]   [place], [place] [address] [phone]    Therapist:  Kristina Gabriel - Tuesday, June 30th at 3pm.  (Your provider will contact you at designated time).  [facility]  [id number]   [place], [place] [address] [phone]    Attend all scheduled appointments with your outpatient providers. Call at least 24 hours in advance if you need to reschedule an appointment to ensure continued access to your outpatient providers.   Major Treatments, Procedures and Findings:  You were provided with: a psychiatric assessment, assessed for medical stability, medication evaluation and/or management, CD evaluation/assessment and milieu management    Symptoms to Report: feeling more aggressive, increased confusion, losing more sleep, mood getting worse or thoughts of suicide    Early warning signs can include: increased depression or anxiety sleep disturbances increased thoughts or behaviors of suicide or self-harm  increased unusual thinking, such as paranoia or hearing voices    Safety and Wellness:  Take all medicines as directed.  Make no changes unless your doctor suggests them.      Follow treatment recommendations.  Refrain from alcohol and non-prescribed drugs.  If there is a concern for safety, call [phone].    Resources:    Crisis Intervention: 821.372.6113 or 309-338-6072 (TTY: 651.384.9142).  Call anytime for help.  National [place] [employer] Mental [employer]ness (www.[place].radha.org): 484.400.4333 or 681-772-0819.  Suicide Awareness Voices of Education (SAVE) (www.save.org): 558-187-WLJE (3404)  National Suicide Prevention Line (www.mentalhealthmn.org): 282-139-NSJX (9832)  Mental Health Consumer/Survivor Network of [place] (www.mhcsn.net): 678.621.4727 or 700-262-7943  Mental [employer] [place] (www.mentalhealth.org): 799.729.9174 or 144-429-7808  [facility] Crisis Line: 999.592.8192        The treatment team has appreciated the opportunity to work with you.     If you have any questions or concerns our unit number is [phone]

## 2020-06-23 NOTE — PROGRESS NOTES
Work Completed: Met with pt to discuss discharge.  Scheduled therapy and psychiatry appointments for pt at [facility] and [facility]    Discharge plan or goal: Discharge home                Barriers to discharge: None identified

## 2020-06-24 VITALS
DIASTOLIC BLOOD PRESSURE: 82 MMHG | WEIGHT: 229.4 LBS | BODY MASS INDEX: 30.4 KG/M2 | RESPIRATION RATE: 16 BRPM | HEART RATE: 94 BPM | SYSTOLIC BLOOD PRESSURE: 129 MMHG | HEIGHT: 73 IN | TEMPERATURE: 97.9 F | OXYGEN SATURATION: 98 %

## 2020-06-24 LAB — INR PPP: 2.06 (ref 0.86–1.14)

## 2020-06-24 PROCEDURE — 85610 PROTHROMBIN TIME: CPT | Performed by: PHYSICIAN ASSISTANT

## 2020-06-24 PROCEDURE — 25000132 ZZH RX MED GY IP 250 OP 250 PS 637: Performed by: NURSE PRACTITIONER

## 2020-06-24 PROCEDURE — 99238 HOSP IP/OBS DSCHRG MGMT 30/<: CPT | Mod: GT | Performed by: NURSE PRACTITIONER

## 2020-06-24 PROCEDURE — 36415 COLL VENOUS BLD VENIPUNCTURE: CPT | Performed by: PHYSICIAN ASSISTANT

## 2020-06-24 RX ORDER — WARFARIN SODIUM 7.5 MG/1
7.5 TABLET ORAL
Status: DISCONTINUED | OUTPATIENT
Start: 2020-06-24 | End: 2020-06-24 | Stop reason: HOSPADM

## 2020-06-24 RX ADMIN — DULOXETINE HYDROCHLORIDE 120 MG: 60 CAPSULE, DELAYED RELEASE ORAL at 09:05

## 2020-06-24 RX ADMIN — MELATONIN 50 MCG: at 09:05

## 2020-06-24 ASSESSMENT — ACTIVITIES OF DAILY LIVING (ADL)
DRESS: INDEPENDENT
HYGIENE/GROOMING: INDEPENDENT
ORAL_HYGIENE: INDEPENDENT

## 2020-06-24 NOTE — PROGRESS NOTES
Brief Medicine Note    Medicine following peripherally for INR levels. INR goal is 2-3 for history of antiphospholipid syndrome. This AM, INR is 2.06.     A/P:  Antiphospholipid syndrome:   On lifelong coumadin. Was started on lovenox on admission given subtherapeutic INR. INR at goal today.   - Pharmacy to dose coumadin   - OK to discontinue Lovenox given therapeutic INR level today.   - Please ensure to discuss with pharmacy prior to discharge to discuss dosing of warfarin at discharge and ensure patient has close follow up with his INR clinic. If any assistance needed, please feel free to call Medicine with any questions at that time, or if INR falls below <2 this admission.     No further medical intervention is required at this time. Medicine signing off.     ADDENDUM:   Psychiatry discharging patient today. Discussed with Pharmacy. Patient is to continue his PTA Warfarin dosing (10mg on Mondays and Thursdays and 7.5mg the other rest of the days) and follow up for INR check on Friday 6/26/20 and further direction per INR clinic. This was discussed with RN who had educated patient on this plan.     Alvina Parra PA-C  Hospitalist Service  Pager [phone]

## 2020-06-24 NOTE — DISCHARGE SUMMARY
Psychiatric Discharge Summary    Hollis Diggs MRN# [id number]   Age: 50 year old YOB: 1970     Date of Admission:  6/22/2020  Date of Discharge:  6/24/2020  Admitting Provider:  Jose Oglesby MD  Discharge Provider:  FACUNDO Mcnair CNP         Event Leading to Hospitalization:   From ED note: Hollis Diggs is a 50 year old male who presents to the emergency room today with complaints of feeling depressed.  Patient has a longstanding history of depression which he is on Cymbalta for, he was on Abilify as well but stopped this about a month ago.  Patient reports that his depression is gotten to the point where he just does not feel like doing anything all day.  Patient reports he has done therapy in the past but did not find this helpful and has not been to a therapist in quite some time.  Patient denies any drug abuse.  Patient does lives with his wife and 2 younger children who he reports are supportive.  Patient does report that over the last couple of days he has had thoughts of dying but denies any actual plan.  Met with the patient.  He is a good historian.  Confirms the information in the previous paragraph.  States that he has been depressed for a long time however, this episode just started on Friday.  He was having suicidal thoughts but did not make a plan.  Denies any stressors contributing to the decompensation.  Does states that he is generally stressed out but nothing out of the usual for him.  Currently rates depression as high.  He feels sad and lacks interest in everyday activities.  He is sleeping about 5 hours a night.  He is not taking naps.  His energy is low, memory is good, concentration is impaired, appetite decreased, he eats about 1 meal a day.  Does not feel suicidal today.  He endorses hopelessness, helplessness, and worthlessness, ruminating thoughts, and irritability.  Reports high anxiety that is constant throughout the day.  Reports history of  panic attacks but not for years, with symptoms such as shortness of breath, and palpitations.  Reports history of what appeared to be hypomanic episodes that manifest with insomnia, and increased energy.  These episodes happen every few weeks but do not last long.  Denies history of psychosis including auditory visual hallucinations, paranoia, delusions.  Denies PTSD, OCD, eating disorders, borderline personality disorder, suicide attempts, self injury behaviors. Denies seizures, head injuries, and loss of consciousness.     See Admission note by FACUNDO Duarte, CNP, on 6/22/200 for additional details.          DIagnoses:   1.  Major depressive disorder, recurrent, severe, without psychosis  2.  Generalized anxiety disorder  3.  Medical problems includes arthritis, degenerative disc disease, cardiac illness         Labs:     Recent Results (from the past 168 hour(s))   Asymptomatic COVID-19 Virus (Coronavirus) by PCR    Collection Time: 06/21/20  9:17 PM    Specimen: Nasopharyngeal   Result Value Ref Range    COVID-19 Virus PCR to [facility] - Source Nasopharyngeal     COVID-19 Virus PCR to [facility] - Result       Test received-See reflex to IDDL test SARS CoV2 (COVID-19) Virus RT-PCR   SARS-CoV-2 COVID-19 Virus (Coronavirus) RT-PCR Nasopharyngeal    Collection Time: 06/21/20  9:17 PM    Specimen: Nasopharyngeal   Result Value Ref Range    SARS-CoV-2 Virus Specimen Source Nasopharyngeal     SARS-CoV-2 PCR Result NEGATIVE     SARS-CoV-2 PCR Comment       Testing was performed using the Xpert Xpress SARS-CoV-2 Assay on the Cepheid Gene-Xpert   Instrument Systems. Additional information about this Emergency Use Authorization (EUA)   assay can be found via the Lab Guide.     Urine Drugs of Abuse Screen Panel 13    Collection Time: 06/21/20 10:37 PM   Result Value Ref Range    Cannabinoids (28-ark-7-carboxy-9-THC) Not Detected NDET^Not Detected ng/mL    Phencyclidine (Phencyclidine) Not Detected NDET^Not Detected  ng/mL    Cocaine (Benzoylecgonine) Not Detected NDET^Not Detected ng/mL    Methamphetamine (d-Methamphetamine) Not Detected NDET^Not Detected ng/mL    Opiates (Morphine) Not Detected NDET^Not Detected ng/mL    Amphetamine (d-Amphetamine) Detected, Abnormal Result (A) NDET^Not Detected ng/mL    Benzodiazepines (Nordiazepam) Not Detected NDET^Not Detected ng/mL    Tricyclic Antidepressants (Desipramine) Not Detected NDET^Not Detected ng/mL    Methadone (Methadone) Not Detected NDET^Not Detected ng/mL    Barbiturates (Butalbital) Not Detected NDET^Not Detected ng/mL    Oxycodone (Oxycodone) Not Detected NDET^Not Detected ng/mL    Propoxyphene (Norpropoxyphene) Not Detected NDET^Not Detected ng/mL    Buprenorphine (Buprenorphine) Not Detected NDET^Not Detected ng/mL   INR    Collection Time: 06/22/20  6:58 AM   Result Value Ref Range    INR 1.44 (H) 0.86 - 1.14   Vitamin B12    Collection Time: 06/22/20  8:00 AM   Result Value Ref Range    Vitamin B12 225 193 - 986 pg/mL   Folate    Collection Time: 06/22/20  8:00 AM   Result Value Ref Range    Folate 16.1 >5.4 ng/mL   Vitamin D    Collection Time: 06/22/20  8:00 AM   Result Value Ref Range    Vitamin D Deficiency screening 34 20 - 75 ug/L   CBC with platelets    Collection Time: 06/22/20  8:00 AM   Result Value Ref Range    WBC 6.8 4.0 - 11.0 10e9/L    RBC Count 5.44 4.4 - 5.9 10e12/L    Hemoglobin 14.8 13.3 - 17.7 g/dL    Hematocrit 44.6 40.0 - 53.0 %    MCV 82 78 - 100 fl    MCH 27.2 26.5 - 33.0 pg    MCHC 33.2 31.5 - 36.5 g/dL    RDW 13.4 10.0 - 15.0 %    Platelet Count 254 150 - 450 10e9/L   Comprehensive metabolic panel    Collection Time: 06/22/20  9:14 AM   Result Value Ref Range    Sodium 139 133 - 144 mmol/L    Potassium 3.2 (L) 3.4 - 5.3 mmol/L    Chloride 107 94 - 109 mmol/L    Carbon Dioxide 26 20 - 32 mmol/L    Anion Gap 6 3 - 14 mmol/L    Glucose 124 (H) 70 - 99 mg/dL    Urea Nitrogen 7 7 - 30 mg/dL    Creatinine 1.01 0.66 - 1.25 mg/dL    GFR Estimate 86  >60 mL/min/[1.73_m2]    GFR Estimate If Black >90 >60 mL/min/[1.73_m2]    Calcium 9.0 8.5 - 10.1 mg/dL    Bilirubin Total 0.9 0.2 - 1.3 mg/dL    Albumin 3.4 3.4 - 5.0 g/dL    Protein Total 7.0 6.8 - 8.8 g/dL    Alkaline Phosphatase 93 40 - 150 U/L    ALT 22 0 - 70 U/L    AST 14 0 - 45 U/L   Lipid panel    Collection Time: 06/23/20  7:09 AM   Result Value Ref Range    Cholesterol 161 <200 mg/dL    Triglycerides 92 <150 mg/dL    HDL Cholesterol 38 (L) >39 mg/dL    LDL Cholesterol Calculated 105 (H) <100 mg/dL    Non HDL Cholesterol 123 <130 mg/dL   TSH with free T4 reflex and/or T3 as indicated    Collection Time: 06/23/20  7:09 AM   Result Value Ref Range    TSH 0.78 0.40 - 4.00 mU/L   INR    Collection Time: 06/23/20  7:09 AM   Result Value Ref Range    INR 1.62 (H) 0.86 - 1.14   Potassium    Collection Time: 06/23/20  7:09 AM   Result Value Ref Range    Potassium 3.9 3.4 - 5.3 mmol/L   INR    Collection Time: 06/24/20  6:55 AM   Result Value Ref Range    INR 2.06 (H) 0.86 - 1.14            Consults:   Consultation during this admission received from internal medicine         Hospital Course:   Hollis Diggs was admitted to [address] with attending Jose LOREDO CNP, as a voluntary patient. The patient was placed under status 15 (15 minute checks) to ensure patient safety.     The following medication changes took place:   --Increase Cymbalta 120 mg every morning.    --Start lithium 450 mg at bedtime as a mood stabilizer.    --PRN medications will include trazodone, gabapentin,  and hydroxyzine.      The patient tolerated medications well. Reported mood symptoms improved.  The patient was active on the unit. The patient did not attend groups.  He was mostly isolated to his room.  He felt very uncomfortable on the unit.  This is his first time on a psych callaway.  He was semi-social with peers and staff.  No overt milly, confusion or psychosis noted. The patient maintained denial of SI, HI and SCOUT.  The patient reported minimal depression and variable anxiety.  On the day of discharge anxiety was minimal.  He has been utilizing gabapentin for anxiety but has not used any this morning or last evening.  States that that he made a significant improvement.  He is excited about going home..Future oriented, feeling hopeful for the future. The patient slept well. Appetite was intact. The patient was compliant with medications and care.     Spoke with patient's wife Keyla Muniz, phone #[phone].  She does not think that he is suicidal however, worries that he did not have enough time to get better.  She would prefer if he stays another night in the hospital.  Discussed his medications.  She is requesting to schedule a therapy appointment, which was done.     Hollis Diggs was released to home. At the time of this encounter, Hollis Diggs was determined to not be a danger to himself or others and symptoms did not meet criteria for involuntary hospitalization.      Safety plan, post discharge recommendations and relapse prevention were discussed with the patient. The patient agreed to call 911 or present to ED if symptoms worsen or developed thoughts of suicide, self harm or homicide.  The patient agreed to continue medications and outpatient care.    Telemedicine Visit: The patient's condition can be safely assessed and treated via synchronous audio and visual telemedicine encounter.       Start time: 0930  Stop time: 0940     Reason for Telemedicine Visit: Covid-19     Originating Site (Patient Location): [facility]     Distant Site (Provider Location): home office     Consent:  The patient/guardian has verbally consented to: the potential risks and benefits of telemedicine (video visit) versus in person care; bill my insurance or make self-payment for services provided; and responsibility for payment of non-covered services.      Mode of Communication:  Video Conference via [employer]     As the provider I attest to  compliance with applicable laws and regulations related to telemedicine.          Discharge Medications:     Discharge Medication List as of 6/24/2020  9:46 AM      START taking these medications    Details   enoxaparin ANTICOAGULANT (LOVENOX) 120 MG/0.8ML syringe Inject 0.7 mLs (105 mg) Subcutaneous every 12 hours, Disp-14 Syringe,R-0, E-Prescribe      gabapentin (NEURONTIN) 100 MG capsule Take 2-3 capsules (200-300 mg) by mouth 3 times daily as needed (anxiety, pain), Disp-180 capsule,R-0, E-Prescribe      lithium (ESKALITH CR/LITHOBID) 450 MG CR tablet Take 1 tablet (450 mg) by mouth At Bedtime, Disp-30 tablet,R-0, E-Prescribe      traZODone (DESYREL) 50 MG tablet Take 1 tablet (50 mg) by mouth nightly as needed for sleep, Disp-30 tablet,R-0, E-Prescribe         CONTINUE these medications which have CHANGED    Details   DULoxetine (CYMBALTA) 60 MG capsule Take 2 capsules (120 mg) by mouth daily, Disp-60 capsule,R-0, E-Prescribe      warfarin ANTICOAGULANT (COUMADIN) 5 MG tablet Take 10 mg on Monday, Thursday and 7.5 mg all other days, or as directed by the [facility]mad[facility], Disp-135 tablet,R-1, E-Prescribe         CONTINUE these medications which have NOT CHANGED    Details   oxyCODONE-acetaminophen (PERCOCET) 5-325 MG tablet Take 1 tablet by mouth every 8 hours as needed for moderate to severe pain Must last 30 days., Disp-90 tablet,R-0, E-Prescribe      vitamin D3 (CHOLECALCIFEROL) 2000 units (50 mcg) tablet Take 1 tablet (2,000 Units) by mouth daily, Disp-90 tablet, R-3, E-Prescribe         STOP taking these medications       order for DME Comments:   Reason for Stopping:         tretinoin (RETIN-A) 0.05 % external cream Comments:   Reason for Stopping:                    Psychiatric and Physical Examinations:   The patient is a very pleasant,  male who appears older than stated age.  He is calm, pleasant, cooperative.  Eye contact is good, mood is minimally depressed and moderately anxious, affect is  full range, speech is clear and coherent, psychomotor behavior is negative for agitation retardation, thought process is logical and goal oriented, no loose associations, thought content is negative for suicidal homicidal ideation, paranoia, delusions, auditory visual hallucinations, insight and judgment are intact, he is oriented to self, date, place, situation, attention span and concentration are intact, recent and remote memory are intact, he has no problems expressing himself, muscle strength and tone are within normal limits, gait and station are within normal limits.      Vitals:    06/23/20 0843 06/23/20 1643 06/23/20 2039 06/24/20 0817   BP: 124/80  105/70 129/82   BP Location:   Left arm Right arm   Pulse: 101  87 94   Resp: 16  16    Temp: 98.2  F (36.8  C) 99  F (37.2  C) 98.9  F (37.2  C) 97.9  F (36.6  C)   TempSrc: Oral Oral Oral Oral   SpO2: 98% 99% 100% 98%   Weight:       Height:                Discharge Plan:     Follow up Appointment:    Psychiatrist:  Bella Barrera - Friday, July 10th at 9am. This appointment will be in the clinic. This is a one hour appt.  Allow at least 24 hours for cancellation.  [facility]  [address] [place] [address] [phone]     Therapist:  Kristina Gabriel - Tuesday, June 30th at 3pm.  (Your provider will contact you at designated time).  [facility]  [address] [place] [address] [phone]        Attestation:  The patient has been seen and evaluated by me,  Jose LOREDO, CNP

## 2020-06-24 NOTE — PROGRESS NOTES
Patient discharged to self with a plan to follow up medication management as well as therapy. Discharge AVS reviewed, as well as discharge medications.     Of note, pt was not discharged on Lovenox as his INR was therapeutic this morning. He stated he will resume his home dosing, and he will be getting his INR rechecked this Friday.     They have no further questions and are aware to call the unit any time after discharge should further questions arise.

## 2020-06-24 NOTE — PROGRESS NOTES
Work Completed: The patient's care was discussed with the treatment team and chart notes were reviewed. Patient is discharging today. Patient requested a proof of hospitalization letter for his employer which was provided.    Discharge plan or goal: Discharing home today with outpatient appointments in place.                Barriers to discharge: None

## 2020-06-25 ENCOUNTER — TELEPHONE (OUTPATIENT)
Dept: FAMILY MEDICINE | Facility: CLINIC | Age: 50
End: 2020-06-25

## 2020-06-25 NOTE — TELEPHONE ENCOUNTER
ED / Discharge Outreach Protocol    Patient Contact    Attempt # 1    Was call answered?  No.  Unable to leave message. [employer] message sent to call.    Arianna Ontiveros RN on 6/25/2020 at 1:05 PM

## 2020-06-25 NOTE — TELEPHONE ENCOUNTER
Patient called to schedule an appointment for a hospital follow-up or appeared on a report showing that they were recently discharged from the hospital.    Patient was admitted to [facility]:  6/22/2020  Discharged date: 6/24/2020  Reason for hospital admission:  Major depressive disorder, recurrent, severe without psychosis, general anxiety, Rheumatoid Arthritis of multiple sites   Does patient have future appointment scheduled with provider? No  Date of future appointment:        This information will be used to help the care team plan for the patients upcoming visit.  The triage RN may determine that a follow up call is necessary and reach out to the patient via the phone number listed in the chart.     Please route this message on routine priority to the Triage RN pool.

## 2020-06-26 NOTE — TELEPHONE ENCOUNTER
RN TRIAGE CALL:    Patient Contact    Attempt # 2    Was call answered?  No.  Unable to leave message.  Amanda Cash RN

## 2020-06-27 DIAGNOSIS — I82.4Y9 DEEP VEIN THROMBOSIS (DVT) OF PROXIMAL LOWER EXTREMITY, UNSPECIFIED CHRONICITY, UNSPECIFIED LATERALITY (H): ICD-10-CM

## 2020-06-29 ENCOUNTER — MYC REFILL (OUTPATIENT)
Dept: FAMILY MEDICINE | Facility: CLINIC | Age: 50
End: 2020-06-29

## 2020-06-29 DIAGNOSIS — M51.369 DDD (DEGENERATIVE DISC DISEASE), LUMBAR: ICD-10-CM

## 2020-06-29 RX ORDER — WARFARIN SODIUM 5 MG/1
TABLET ORAL
Qty: 135 TABLET | Refills: 1 | Status: SHIPPED | OUTPATIENT
Start: 2020-06-29 | End: 2020-12-16

## 2020-06-29 NOTE — TELEPHONE ENCOUNTER
RN TRIAGE CALL:    Patient Contact    Attempt # 3    Was call answered?  No.  Unable to leave message.

## 2020-06-29 NOTE — TELEPHONE ENCOUNTER
Percocet 5-325 mg      Last Written Prescription Date:  5/29/20  Last Fill Quantity: 90,   # refills: 0  Last Office Visit: 5/28/20  Future Office visit:       Routing refill request to provider for review/approval because:  Drug not on the [facility], [facility] or [facility] refill protocol or controlled substance

## 2020-06-30 ENCOUNTER — TELEPHONE (OUTPATIENT)
Dept: RHEUMATOLOGY | Facility: CLINIC | Age: 50
End: 2020-06-30

## 2020-06-30 RX ORDER — OXYCODONE AND ACETAMINOPHEN 5; 325 MG/1; MG/1
1 TABLET ORAL EVERY 8 HOURS PRN
Qty: 90 TABLET | Refills: 0 | Status: SHIPPED | OUTPATIENT
Start: 2020-06-30 | End: 2020-07-29

## 2020-06-30 NOTE — LETTER
[facility]  [address], Mail [address]                        [place] [address]  Ph: [phone]                     Fax: [phone]   Date: June 30, 2020  Name: Hollis Diggs  YOB: 1970  MRN: [id number]   Dear Referring Provider,  Thank you for the referral for Hollis Diggs, 1970. After careful review by the Rheumatologist, your patient does not meet the criteria for an appointment. We encourage you to refer your patient to one of our community sites:    [facility]    Provider of your choice  Thank you for your support and understanding.    Sincerely,  The Division of Arthritis and Autoimmune Disorders

## 2020-06-30 NOTE — TELEPHONE ENCOUNTER
Patient is referred by Radha Wood for RA.    Patient complains of: back pain    Has patient had imaging that pertains to referral? No    Has patient had labs that pertains to referral reason: No    Clinical review of patient's chart has been performed. Patient does not meet the criteria for an appointment.  A letter has been sent the referring encouraging them to send the patient to one of our community sites. Patient needs to follow up with their referring or PCP for further direction.     Tatyana Willingham CMA   6/30/2020 3:42 PM        Tatyana Willingham CMA   6/30/2020 3:38 PM

## 2020-07-02 ENCOUNTER — ALLIED HEALTH/NURSE VISIT (OUTPATIENT)
Dept: PHARMACY | Facility: CLINIC | Age: 50
End: 2020-07-02
Payer: COMMERCIAL

## 2020-07-02 DIAGNOSIS — F41.1 GAD (GENERALIZED ANXIETY DISORDER): ICD-10-CM

## 2020-07-02 DIAGNOSIS — F32.9 MDD (MAJOR DEPRESSIVE DISORDER): Primary | ICD-10-CM

## 2020-07-02 DIAGNOSIS — Z86.718 HISTORY OF DEEP VENOUS THROMBOSIS: ICD-10-CM

## 2020-07-02 DIAGNOSIS — D68.61 ANTIPHOSPHOLIPID SYNDROME (H): ICD-10-CM

## 2020-07-02 DIAGNOSIS — R52 PAIN: ICD-10-CM

## 2020-07-02 PROCEDURE — 99607 MTMS BY PHARM ADDL 15 MIN: CPT | Performed by: PHARMACIST

## 2020-07-02 PROCEDURE — 99605 MTMS BY PHARM NP 15 MIN: CPT | Performed by: PHARMACIST

## 2020-07-02 NOTE — LETTER
7/2/2020        RE: Hollis Diggs  [address]  [facility] [address]        MTM ENCOUNTER  SUBJECTIVE/OBJECTIVE:                           Hollis Diggs is a 50 year old male called for a transitions of care visit.       Patient consented to a telehealth visit: yes  Telemedicine Visit Details  Type of service:  Telephone visit  Start Time: 10:34 AM  End Time: 10:58 AM  Originating Location (pt. Location): Home  Distant Location (provider location):  [facility]  Mode of Communication:  Telephone    Chief Complaint: SHERLEY medication review.    Allergies/ADRs: Reviewed in EHR  Tobacco:  reports that he has never smoked. He has never used smokeless tobacco.  Alcohol: rarely  PMH: Reviewed in EHR    Medication Adherence/Access: no issues reported    MDD, ANKIT:   Lithium  mg at bedtime (new)  Cymbalta 120 mg daily (dose increase)  Gabapentin 200 - 300 mg 3 times daily PRN anxiety (new)  Trazodone 50 mg at bedtime as needed (new)    Hollis is a 50-year-old male who was hospitalized at [facility] 6/22/2020 - 6/24/2020 for worsening depression and suicidal thoughts.  Depression symptoms included: Decreased energy, decreased appetite, impaired concentration, low mood, and feelings of hopelessness, helplessness and worthlessness.  He also reported symptoms of anxiety, irritability and possible hypomanic episodes.  Patient's hypomanic episodes were described as occurring every few weeks and consisting of symptoms of insomnia and increased energy.    Today, patient reports slight improvement in depression and anxiety symptoms.  Feels gabapentin has been helpful for anxiety and he is taking 300 mg 3 times daily.  He also feels trazodone has been helpful for sleep and he is taking it nightly.  He reports sleeping well (no insomnia) and reports his energy is still low, but no more than usual. He overall feels better compared to [facility]. He denies any medication side effects or  medication issues/concerns.    Antiphospholipid syndrome, recurrent DVTs:   Current therapy:  Warfarin 10 mg on Monday and Thursday; 7.5 mg all other days    INR was low on admission and patient was bridged with Lovenox.  Lovenox is still on patient's med list, but was discontinued prior to discharge due to INR within normal limits. Pt follows at [facility] warfarin clinic.    INR   Date Value Ref Range Status   06/24/2020 2.06 (H) 0.86 - 1.14 Final        Pain: Patient takes Percocet 5-325 mg, 1 tablet 3 times daily for pain.  No issues reported.    Today's Vitals: There were no vitals taken for this visit. -Phone visit      ASSESSMENT:                              Medication Adherence: good    MDD, ANKIT: Improving.  Lithium, gabapentin, and trazodone are all new medications for patient.  Writer counseled on indication/target symptoms, time to efficacy, side effects, and monitoring for these medications.  It is somewhat unclear if lithium was initiated for adjunctive depression treatment or if hospitalist suspected bipolar spectrum disorder.  Patient will follow-up with outpatient psychiatry provider for ongoing assessment.  Lithium level was not obtained during hospitalization due to his short hospital stay.  Given low dose of 450 mg daily, it is likely not supratherapeutic, nevertheless recommend obtaining lithium level.  If lithium is being used for adjuvant MDD treatment, do not necessarily need to target therapeutic range of 0.6-1.2 for efficacy.  Patient expressed understanding with information provided and will follow-up with his psychiatry provider on 710 for ongoing psychiatry med management.      Follow up Appointments:     Psychiatrist:  Bella Barrera - Friday, July 10th at 9am. This appointment will be in the clinic. This is a one hour appt.  Allow at least 24 hours for cancellation.  [facility]  [address] [place] [address]   [phone]     Therapist:  Kristina Gabriel - Tuesday,  June 30th at 3pm.  (Your provider will contact you at designated time).  [facility]  [address], [place] [address] [phone]    Antiphospholipid syndrome, recurrent DVTs: Stable.  Removed enoxaparin from med list.  INR at goal of 2-3.  Patient to continue following with warfarin clinic at [facility].    Pain: Stable.  Reminded patient to avoid NSAIDs due to interaction with lithium.      PLAN:                          Post Discharge Medication Reconciliation Status: discharge medications reconciled, continue medications without change.   - Enoxaparin removed from med list (therapy complete)    1.  Recommend lithium level - patient will follow-up with outpatient psychiatry provider (Bella Barrera) on 7/10 regarding this.     2. No medication changes recommended at this time.     I spent 25 min with this patient today. A copy of the visit note was provided to the patient's psychiatry provider.    Will follow up as needed.    The patient declined a summary of these recommendations.     Анна Henry, PharmD, BCPP  Medication Therapy Management Pharmacist  [facility]  [phone]                Sincerely,        Анна Henry, [facility]

## 2020-07-02 NOTE — PROGRESS NOTES
MTM ENCOUNTER  SUBJECTIVE/OBJECTIVE:                           Hollis iDggs is a 50 year old male called for a transitions of care visit.       Patient consented to a telehealth visit: yes  Telemedicine Visit Details  Type of service:  Telephone visit  Start Time: 10:34 AM  End Time: 10:58 AM  Originating Location (pt. Location): Home  Distant Location (provider location):  [facility]  Mode of Communication:  Telephone    Chief Complaint: SHERLEY medication review.    Allergies/ADRs: Reviewed in EHR  Tobacco:  reports that he has never smoked. He has never used smokeless tobacco.  Alcohol: rarely  PMH: Reviewed in EHR    Medication Adherence/Access: no issues reported    MDD, ANKIT:   Lithium  mg at bedtime (new)  Cymbalta 120 mg daily (dose increase)  Gabapentin 200 - 300 mg 3 times daily PRN anxiety (new)  Trazodone 50 mg at bedtime as needed (new)    Hollis is a 50-year-old male who was hospitalized at [facility] 6/22/2020 - 6/24/2020 for worsening depression and suicidal thoughts.  Depression symptoms included: Decreased energy, decreased appetite, impaired concentration, low mood, and feelings of hopelessness, helplessness and worthlessness.  He also reported symptoms of anxiety, irritability and possible hypomanic episodes.  Patient's hypomanic episodes were described as occurring every few weeks and consisting of symptoms of insomnia and increased energy.    Today, patient reports slight improvement in depression and anxiety symptoms.  Feels gabapentin has been helpful for anxiety and he is taking 300 mg 3 times daily.  He also feels trazodone has been helpful for sleep and he is taking it nightly.  He reports sleeping well (no insomnia) and reports his energy is still low, but no more than usual. He overall feels better compared to PTA. He denies any medication side effects or medication issues/concerns.    Antiphospholipid syndrome, recurrent DVTs:   Current therapy:  Warfarin  10 mg on Monday and Thursday; 7.5 mg all other days    INR was low on admission and patient was bridged with Lovenox.  Lovenox is still on patient's med list, but was discontinued prior to discharge due to INR within normal limits. Pt follows at [facility] warfarin clinic.    INR   Date Value Ref Range Status   06/24/2020 2.06 (H) 0.86 - 1.14 Final        Pain: Patient takes Percocet 5-325 mg, 1 tablet 3 times daily for pain.  No issues reported.    Today's Vitals: There were no vitals taken for this visit. -Phone visit      ASSESSMENT:                              Medication Adherence: good    MDD, ANKIT: Improving.  Lithium, gabapentin, and trazodone are all new medications for patient.  Writer counseled on indication/target symptoms, time to efficacy, side effects, and monitoring for these medications.  It is somewhat unclear if lithium was initiated for adjunctive depression treatment or if hospitalist suspected bipolar spectrum disorder.  Patient will follow-up with outpatient psychiatry provider for ongoing assessment.  Lithium level was not obtained during hospitalization due to his short hospital stay.  Given low dose of 450 mg daily, it is likely not supratherapeutic, nevertheless recommend obtaining lithium level.  If lithium is being used for adjuvant MDD treatment, do not necessarily need to target therapeutic range of 0.6-1.2 for efficacy.  Patient expressed understanding with information provided and will follow-up with his psychiatry provider on 710 for ongoing psychiatry med management.      Follow up Appointments:     Psychiatrist:  Bella Barrera - Friday, July 10th at 9am. This appointment will be in the clinic. This is a one hour appt.  Allow at least 24 hours for cancellation.  [facility]  [address] [place] [address]   [phone]     Therapist:  Kristina Gabriel - Tuesday, June 30th at 3pm.  (Your provider will contact you at designated time).  [facility]  [address]  [place] [address]   [phone]    Antiphospholipid syndrome, recurrent DVTs: Stable.  Removed enoxaparin from med list.  INR at goal of 2-3.  Patient to continue following with warfarin clinic at [facility].    Pain: Stable.  Reminded patient to avoid NSAIDs due to interaction with lithium.      PLAN:                          Post Discharge Medication Reconciliation Status: discharge medications reconciled, continue medications without change.   - Enoxaparin removed from med list (therapy complete)    1.  Recommend lithium level - patient will follow-up with outpatient psychiatry provider (Bella Barrera) on 7/10 regarding this.     2. No medication changes recommended at this time.     I spent 25 min with this patient today. A copy of the visit note was provided to the patient's psychiatry provider.    Will follow up as needed.    The patient declined a summary of these recommendations.     Анна Henry, PharmD, BCPP  Medication Therapy Management Pharmacist  [facility]  [phone]

## 2020-07-10 ENCOUNTER — MEDICAL CORRESPONDENCE (OUTPATIENT)
Dept: HEALTH INFORMATION MANAGEMENT | Facility: CLINIC | Age: 50
End: 2020-07-10

## 2020-07-12 DIAGNOSIS — Z79.899 ENCOUNTER FOR LONG-TERM (CURRENT) USE OF OTHER MEDICATIONS: Primary | ICD-10-CM

## 2020-07-15 ENCOUNTER — MYC REFILL (OUTPATIENT)
Dept: FAMILY MEDICINE | Facility: CLINIC | Age: 50
End: 2020-07-15

## 2020-07-15 DIAGNOSIS — E55.9 HYPOVITAMINOSIS D: ICD-10-CM

## 2020-07-16 RX ORDER — CHOLECALCIFEROL (VITAMIN D3) 50 MCG
1 TABLET ORAL DAILY
Qty: 90 TABLET | Refills: 3 | Status: SHIPPED | OUTPATIENT
Start: 2020-07-16 | End: 2021-07-07

## 2020-07-24 ENCOUNTER — ANTICOAGULATION THERAPY VISIT (OUTPATIENT)
Dept: ANTICOAGULATION | Facility: OTHER | Age: 50
End: 2020-07-24
Payer: COMMERCIAL

## 2020-07-24 DIAGNOSIS — Z79.01 LONG TERM CURRENT USE OF ANTICOAGULANT THERAPY: ICD-10-CM

## 2020-07-24 DIAGNOSIS — I82.409 DVT (DEEP VENOUS THROMBOSIS) (H): ICD-10-CM

## 2020-07-24 DIAGNOSIS — Z79.899 ENCOUNTER FOR LONG-TERM (CURRENT) USE OF OTHER MEDICATIONS: ICD-10-CM

## 2020-07-24 LAB
ANION GAP SERPL CALCULATED.3IONS-SCNC: 5 MMOL/L (ref 3–14)
BUN SERPL-MCNC: 7 MG/DL (ref 7–30)
CALCIUM SERPL-MCNC: 8.6 MG/DL (ref 8.5–10.1)
CHLORIDE SERPL-SCNC: 106 MMOL/L (ref 94–109)
CO2 SERPL-SCNC: 26 MMOL/L (ref 20–32)
CREAT SERPL-MCNC: 1.05 MG/DL (ref 0.66–1.25)
GFR SERPL CREATININE-BSD FRML MDRD: 82 ML/MIN/{1.73_M2}
GLUCOSE SERPL-MCNC: 97 MG/DL (ref 70–99)
INR BLD: 3 (ref 0.86–1.14)
LITHIUM SERPL-SCNC: 0.38 MMOL/L (ref 0.6–1.2)
POTASSIUM SERPL-SCNC: 4.1 MMOL/L (ref 3.4–5.3)
SODIUM SERPL-SCNC: 137 MMOL/L (ref 133–144)
TSH SERPL DL<=0.005 MIU/L-ACNC: 3.42 MU/L (ref 0.4–4)

## 2020-07-24 PROCEDURE — 99207 ZZC NO CHARGE NURSE ONLY: CPT | Performed by: FAMILY MEDICINE

## 2020-07-24 PROCEDURE — 85610 PROTHROMBIN TIME: CPT | Mod: QW | Performed by: FAMILY MEDICINE

## 2020-07-24 PROCEDURE — 80178 ASSAY OF LITHIUM: CPT | Performed by: FAMILY MEDICINE

## 2020-07-24 PROCEDURE — 84443 ASSAY THYROID STIM HORMONE: CPT | Performed by: FAMILY MEDICINE

## 2020-07-24 PROCEDURE — 80048 BASIC METABOLIC PNL TOTAL CA: CPT | Performed by: FAMILY MEDICINE

## 2020-07-24 PROCEDURE — 36415 COLL VENOUS BLD VENIPUNCTURE: CPT | Performed by: FAMILY MEDICINE

## 2020-07-24 NOTE — PROGRESS NOTES
ANTICOAGULATION FOLLOW-UP CLINIC VISIT    Patient Name:  Hollis Diggs  Date:  7/24/2020  Contact Type:  Telephone/ LM with dosing instructions    SUBJECTIVE:  Patient Findings     Comments:   I left a detailed voicemail with the orders below. I have also requested a call back if there have been any missed doses, concerns, illness, fever, or if there have been any changes in medications, activity level, or diet          Clinical Outcomes     Negatives:   Major bleeding event, Thromboembolic event, Anticoagulation-related hospital admission, Anticoagulation-related ED visit, Anticoagulation-related fatality    Comments:   I left a detailed voicemail with the orders below. I have also requested a call back if there have been any missed doses, concerns, illness, fever, or if there have been any changes in medications, activity level, or diet             OBJECTIVE    Recent labs: (last 7 days)     07/24/20  1130   INR 3.0*       ASSESSMENT / PLAN  INR assessment THER    Recheck INR In: 6 WEEKS    INR Location Outside lab      Anticoagulation Summary  As of 7/24/2020    INR goal:   2.0-3.0   TTR:   69.3 % (10.1 mo)   Prior goal:   2.0-3.0   INR used for dosing:   3.0 (7/24/2020)   Warfarin maintenance plan:   10 mg (5 mg x 2) every Mon, Fri; 7.5 mg (5 mg x 1.5) all other days   Full warfarin instructions:   10 mg every Mon, Fri; 7.5 mg all other days   Weekly warfarin total:   57.5 mg   No change documented:   Jesse Flores, RN   Plan last modified:   Lilliam Pro, RN (1/10/2020)   Next INR check:   9/4/2020   Target end date:   Indefinite    Indications    DVT (deep venous thrombosis) (H) (Resolved) [I82.409]  Long-term (current) use of anticoagulants [Z79.01] [Z79.01]             Anticoagulation Episode Summary     INR check location:       Preferred lab:       Send INR reminders to:   [facility]    Comments:   5 mg, dosing card, PM dose      Anticoagulation Care Providers     Provider Role Specialty  Phone number    Sylvester Cash MD Referring [facility] [phone]            See the Encounter Report to view Anticoagulation Flowsheet and Dosing Calendar (Go to Encounters tab in chart review, and find the Anticoagulation Therapy Visit)    Dosage adjustment made based on physician directed care plan.    Jesse Flores RN

## 2020-07-29 ENCOUNTER — MYC REFILL (OUTPATIENT)
Dept: FAMILY MEDICINE | Facility: CLINIC | Age: 50
End: 2020-07-29

## 2020-07-29 DIAGNOSIS — M51.369 DDD (DEGENERATIVE DISC DISEASE), LUMBAR: ICD-10-CM

## 2020-07-29 RX ORDER — OXYCODONE AND ACETAMINOPHEN 5; 325 MG/1; MG/1
1 TABLET ORAL EVERY 8 HOURS PRN
Qty: 90 TABLET | Refills: 0 | Status: SHIPPED | OUTPATIENT
Start: 2020-07-29 | End: 2020-08-28

## 2020-07-29 NOTE — TELEPHONE ENCOUNTER
oxyCODONE-acetaminophen (PERCOCET) 5-325 MG tablet       Last Written Prescription Date:  06/30/20  Last Fill Quantity: 90,   # refills: 0  Last Office Visit: 5/28/20  Future Office visit:       Routing refill request to provider for review/approval because:  Drug not on the [facility], [facility] or [facility] refill protocol or controlled substance

## 2020-08-04 DIAGNOSIS — F33.0 MAJOR DEPRESSIVE DISORDER, RECURRENT EPISODE, MILD (H): ICD-10-CM

## 2020-08-04 NOTE — TELEPHONE ENCOUNTER
"Routing refill request to provider for review/approval because:  PHQ9 score out of range  T'd up 1 month for provider review.    Requested Prescriptions   Pending Prescriptions Disp Refills     DULoxetine (CYMBALTA) 30 MG capsule [Pharmacy Med Name: DULOXETINE HCL 30MG CPEP] 90 capsule 1     Sig: TAKE THREE CAPSULES BY MOUTH EVERY DAY   Last Written Prescription Date:  6/24/2020  Last Fill Quantity: 60,  # refills: 0   Last office visit: 5/28/2020 with prescribing provider:     Future Office Visit:        Serotonin-Norepinephrine Reuptake Inhibitors  Failed - 8/4/2020  1:06 AM        Failed - PHQ-9 score of less than 5 in past 6 months     Please review last PHQ-9 score.   PHQ-9 score:    PHQ 3/25/2020   PHQ-9 Total Score 17   Q9: Thoughts of better off dead/self-harm past 2 weeks Not at all           Passed - Blood pressure under 140/90 in past 12 months     BP Readings from Last 3 Encounters:   06/24/20 129/82   06/21/20 (!) 156/103   01/08/20 126/84           Passed - Medication is active on med list        Passed - Patient is age 18 or older        Passed - Recent (6 mo) or future (30 days) visit within the authorizing provider's specialty     Patient had office visit in the last 6 months or has a visit in the next 30 days with authorizing provider or within the authorizing provider's specialty.  See \"Patient Info\" tab in inbasket, or \"Choose Columns\" in Meds & Orders section of the refill encounter.             Delia Morris RN      "

## 2020-08-05 ENCOUNTER — TRANSFERRED RECORDS (OUTPATIENT)
Dept: HEALTH INFORMATION MANAGEMENT | Facility: CLINIC | Age: 50
End: 2020-08-05

## 2020-08-05 ENCOUNTER — TELEPHONE (OUTPATIENT)
Dept: FAMILY MEDICINE | Facility: CLINIC | Age: 50
End: 2020-08-05

## 2020-08-05 RX ORDER — DULOXETIN HYDROCHLORIDE 30 MG/1
CAPSULE, DELAYED RELEASE ORAL
Qty: 90 CAPSULE | Refills: 0 | Status: SHIPPED | OUTPATIENT
Start: 2020-08-05 | End: 2020-08-31

## 2020-08-05 NOTE — TELEPHONE ENCOUNTER
Reason for Call:  Other call back    Detailed comments: Patient calling stating you physiatrist jose rafael camacho lab faxed over to her at [phone] from his last lab draw on Friday the 24 th please advise     Phone Number Patient can be reached at: Home number on file [phone] (home)    Best Time: Anytime     Can we leave a detailed message on this number? YES    Call taken on 8/5/2020 at 3:35 PM by Miranda Seipel Vaccari

## 2020-08-05 NOTE — TELEPHONE ENCOUNTER
Called to ask Hollis which labs Bella needs faxed to her. Please find out when he calls back.   Lyudmila Jenikns on 8/5/2020 at 3:59 PM

## 2020-08-28 ENCOUNTER — MYC REFILL (OUTPATIENT)
Dept: FAMILY MEDICINE | Facility: CLINIC | Age: 50
End: 2020-08-28

## 2020-08-28 DIAGNOSIS — M51.369 DDD (DEGENERATIVE DISC DISEASE), LUMBAR: ICD-10-CM

## 2020-08-30 DIAGNOSIS — F33.0 MAJOR DEPRESSIVE DISORDER, RECURRENT EPISODE, MILD (H): ICD-10-CM

## 2020-08-31 RX ORDER — DULOXETIN HYDROCHLORIDE 30 MG/1
CAPSULE, DELAYED RELEASE ORAL
Qty: 90 CAPSULE | Refills: 0 | Status: SHIPPED | OUTPATIENT
Start: 2020-08-31 | End: 2020-09-24

## 2020-08-31 RX ORDER — OXYCODONE AND ACETAMINOPHEN 5; 325 MG/1; MG/1
1 TABLET ORAL EVERY 8 HOURS PRN
Qty: 90 TABLET | Refills: 0 | Status: SHIPPED | OUTPATIENT
Start: 2020-08-31 | End: 2020-09-29

## 2020-08-31 NOTE — TELEPHONE ENCOUNTER
Routing refill request to provider for review/approval because:  PHQ-9 >4    SURESH LiasN, RN  [facility]

## 2020-08-31 NOTE — TELEPHONE ENCOUNTER
oxyCODONE-acetaminophen (PERCOCET) 5-325 MG tablet       Last Written Prescription Date:  07/29/2020  Last Fill Quantity: 90,   # refills: 0  Last Office Visit: 01/08/2020  Future Office visit:       Routing refill request to provider for review/approval because:  Drug not on the [facility], [facility] or [facility] refill protocol or controlled substance

## 2020-09-04 ENCOUNTER — TRANSFERRED RECORDS (OUTPATIENT)
Dept: HEALTH INFORMATION MANAGEMENT | Facility: CLINIC | Age: 50
End: 2020-09-04

## 2020-09-04 DIAGNOSIS — E66.09 CLASS 1 OBESITY DUE TO EXCESS CALORIES WITHOUT SERIOUS COMORBIDITY WITH BODY MASS INDEX (BMI) OF 33.0 TO 33.9 IN ADULT: ICD-10-CM

## 2020-09-04 DIAGNOSIS — E66.811 CLASS 1 OBESITY DUE TO EXCESS CALORIES WITHOUT SERIOUS COMORBIDITY WITH BODY MASS INDEX (BMI) OF 33.0 TO 33.9 IN ADULT: ICD-10-CM

## 2020-09-04 NOTE — TELEPHONE ENCOUNTER
Routing to covering provider to review. PCP out of clinic.  Routing refill request to provider for review/approval because:  Drug not on the [facility] refill protocol   Drug not active on patient's medication list- medication discontinued on 6/22/20    Phenmtermine  Last Written Prescription Date:  6/16/2020  Last Fill Quantity: 30,  # refills: 0   Last office visit: 5/28/2020 with prescribing provider:  Shailesh   Future Office Visit:      Requested Prescriptions   Pending Prescriptions Disp Refills     phentermine (ADIPEX-P) 37.5 MG tablet [Pharmacy Med Name: PHENTERMINE HCL 37.5MG TABS] 30 tablet 0     Sig: TAKE ONE TABLET BY MOUTH EVERY MORNING BEFORE BREAKFAST       There is no refill protocol information for this order        Dayana Blum RN on 9/4/2020 at 4:26 PM

## 2020-09-08 RX ORDER — PHENTERMINE HYDROCHLORIDE 37.5 MG/1
TABLET ORAL
Qty: 30 TABLET | Refills: 0 | Status: SHIPPED | OUTPATIENT
Start: 2020-09-08 | End: 2020-10-05

## 2020-09-23 ENCOUNTER — TRANSFERRED RECORDS (OUTPATIENT)
Dept: HEALTH INFORMATION MANAGEMENT | Facility: CLINIC | Age: 50
End: 2020-09-23

## 2020-09-24 DIAGNOSIS — F33.0 MAJOR DEPRESSIVE DISORDER, RECURRENT EPISODE, MILD (H): ICD-10-CM

## 2020-09-24 RX ORDER — DULOXETIN HYDROCHLORIDE 30 MG/1
CAPSULE, DELAYED RELEASE ORAL
Qty: 90 CAPSULE | Refills: 0 | Status: SHIPPED | OUTPATIENT
Start: 2020-09-24 | End: 2020-10-02

## 2020-09-24 NOTE — TELEPHONE ENCOUNTER
CYMBALTA 30 mg  Last Written Prescription Date:  8/31/20  Last Fill Quantity: 90 ( take 3 daily) ,  # refills: 0   Last office visit: 1/8/2020 with prescribing provider:  Dr. Cash   Future Office Visit:   Next 5 appointments (look out 90 days)    Sep 25, 2020  8:20 AM CDT  [facility] [address] [place] [id number] [phone]        PHQ 3/25/2020 7/10/2020 8/5/2020   PHQ-9 Total Score 17 - -   Q9: Thoughts of better off dead/self-harm past 2 weeks Not at all - -   PHQ-9 External Data - 16 19     Routing refill request to provider for review/approval because:  PHQ9 > 5  ErasmoRN

## 2020-09-25 ENCOUNTER — OFFICE VISIT (OUTPATIENT)
Dept: FAMILY MEDICINE | Facility: CLINIC | Age: 50
End: 2020-09-25
Payer: COMMERCIAL

## 2020-09-25 ENCOUNTER — ANTICOAGULATION THERAPY VISIT (OUTPATIENT)
Dept: ANTICOAGULATION | Facility: CLINIC | Age: 50
End: 2020-09-25

## 2020-09-25 VITALS
TEMPERATURE: 97.1 F | DIASTOLIC BLOOD PRESSURE: 78 MMHG | HEART RATE: 103 BPM | BODY MASS INDEX: 31.8 KG/M2 | WEIGHT: 241 LBS | OXYGEN SATURATION: 98 % | SYSTOLIC BLOOD PRESSURE: 136 MMHG

## 2020-09-25 DIAGNOSIS — Z79.01 LONG TERM CURRENT USE OF ANTICOAGULANT THERAPY: ICD-10-CM

## 2020-09-25 DIAGNOSIS — E66.09 CLASS 1 OBESITY DUE TO EXCESS CALORIES WITHOUT SERIOUS COMORBIDITY WITH BODY MASS INDEX (BMI) OF 33.0 TO 33.9 IN ADULT: ICD-10-CM

## 2020-09-25 DIAGNOSIS — Z23 NEED FOR PROPHYLACTIC VACCINATION AND INOCULATION AGAINST INFLUENZA: ICD-10-CM

## 2020-09-25 DIAGNOSIS — K42.9 UMBILICAL HERNIA WITHOUT OBSTRUCTION AND WITHOUT GANGRENE: Primary | ICD-10-CM

## 2020-09-25 DIAGNOSIS — E66.811 CLASS 1 OBESITY DUE TO EXCESS CALORIES WITHOUT SERIOUS COMORBIDITY WITH BODY MASS INDEX (BMI) OF 33.0 TO 33.9 IN ADULT: ICD-10-CM

## 2020-09-25 DIAGNOSIS — M54.40 ACUTE MIDLINE LOW BACK PAIN WITH SCIATICA, SCIATICA LATERALITY UNSPECIFIED: ICD-10-CM

## 2020-09-25 LAB
CAPILLARY BLOOD COLLECTION: NORMAL
INR BLD: 3.1 (ref 0.86–1.14)

## 2020-09-25 PROCEDURE — 85610 PROTHROMBIN TIME: CPT | Mod: QW | Performed by: FAMILY MEDICINE

## 2020-09-25 PROCEDURE — 36416 COLLJ CAPILLARY BLOOD SPEC: CPT | Performed by: FAMILY MEDICINE

## 2020-09-25 PROCEDURE — 90471 IMMUNIZATION ADMIN: CPT | Performed by: FAMILY MEDICINE

## 2020-09-25 PROCEDURE — 99207 ZZC NO CHARGE NURSE ONLY: CPT | Performed by: FAMILY MEDICINE

## 2020-09-25 PROCEDURE — 90682 RIV4 VACC RECOMBINANT DNA IM: CPT | Performed by: FAMILY MEDICINE

## 2020-09-25 PROCEDURE — 99214 OFFICE O/P EST MOD 30 MIN: CPT | Mod: 25 | Performed by: FAMILY MEDICINE

## 2020-09-25 NOTE — PROGRESS NOTES
ANTICOAGULATION FOLLOW-UP CLINIC VISIT    Patient Name:  Hollis Diggs  Date:  9/25/2020  Contact Type:  Telephone    SUBJECTIVE:  Patient Findings     Comments:   Pt would like to increase greens and stay on same dose  Lilliam Pro RN          Clinical Outcomes     Comments:   Pt would like to increase greens and stay on same dose  Lilliam Pro RN             OBJECTIVE    Recent labs: (last 7 days)     09/25/20  0851   INR 3.1*       ASSESSMENT / PLAN  INR assessment SUPRA    Recheck INR In: 6 WEEKS    INR Location Outside lab      Anticoagulation Summary  As of 9/25/2020    INR goal:   2.0-3.0   TTR:   53.4 % (10.1 mo)   INR used for dosing:   3.1! (9/25/2020)   Warfarin maintenance plan:   10 mg (5 mg x 2) every Mon, Fri; 7.5 mg (5 mg x 1.5) all other days   Full warfarin instructions:   10 mg every Mon, Fri; 7.5 mg all other days   Weekly warfarin total:   57.5 mg   No change documented:   Lilliam Pro RN   Plan last modified:   Lilliam Pro RN (1/10/2020)   Next INR check:   11/6/2020   Target end date:   Indefinite    Indications    DVT (deep venous thrombosis) (H) (Resolved) [I82.409]  Long-term (current) use of anticoagulants [Z79.01] [Z79.01]             Anticoagulation Episode Summary     INR check location:       Preferred lab:       Send INR reminders to:   [facility] [facility]    Comments:   5 mg, dosing card, PM dose      Anticoagulation Care Providers     Provider Role Specialty Phone number    Sylvester Cash MD Referring [facility] [phone]            See the Encounter Report to view Anticoagulation Flowsheet and Dosing Calendar (Go to Encounters tab in chart review, and find the Anticoagulation Therapy Visit)    Dosage adjustment made based on physician directed care plan.      Lilliam Pro RN

## 2020-09-25 NOTE — PROGRESS NOTES
Subjective     Hollis Diggs is a 50 year old male who presents to clinic today for the following health issues:    HPI       Chief Complaint   Patient presents with     Other     belly button pops out every once in a while over the last few months     Also would like to get a flu shot today, and have INR checked. Dayana Bolivar, [facility]    SUBJECTIVE:  Hollis  is a 50 year old male who presents for: Multiple concerns today.  #1 is he notices some bulging of his umbilicus at times.  Seems a little better lately.  Not really painful.  This is been going on for quite some time.  Concerned about possibility of a hernia and what he needs to do.  Second issue is his back.  He questions about getting on a higher dose of oxycodone.  I do not think this is a really good idea.  Discussed with him that we do keep escalating.  He needs an INR done for his DVT.  He is on several medications for mental health issues and sees [facility].  He questions going up on his phentermine dosing for weight loss.  Finally he complains of clearing his throat all the time.  He actually does have an appointment with ENT for this coming up.    Past Medical History:   Diagnosis Date     Antiphospholipid syndrome (H)      Arthritis      Asthma     Exercise     blood clot in leg      Depressive disorder, not elsewhere classified     Depression (non-psychotic)     ANKIT (generalised anxiety disorder)      HH (hiatus hernia)      Hypercholesteremia     normal with weight loss 3/09     Lumbar disc herniation 1992     Seronegative rheumatoid arthritis (H)      Past Surgical History:   Procedure Laterality Date     APPENDECTOMY       C NONSPECIFIC PROCEDURE  91 or 92    back surgery. lumbar. lamiectomy     COLONOSCOPY N/A 8/2/2016    Procedure: COMBINED COLONOSCOPY, SINGLE OR MULTIPLE BIOPSY/POLYPECTOMY BY BIOPSY;  Surgeon: Sydnee Walton MD;  Location: [facility] OR     COLONOSCOPY WITH CO2 INSUFFLATION N/A 8/2/2016    Procedure:  COLONOSCOPY WITH CO2 INSUFFLATION;  Surgeon: Sydnee Walton MD;  Location: [facility] OR     COMBINED ESOPHAGOSCOPY, GASTROSCOPY, DUODENOSCOPY (EGD) WITH CO2 INSUFFLATION N/A 8/2/2016    Procedure: COMBINED ESOPHAGOSCOPY, GASTROSCOPY, DUODENOSCOPY (EGD) WITH CO2 INSUFFLATION;  Surgeon: Sydnee Walton MD;  Location: [facility] OR     ESOPHAGOSCOPY, GASTROSCOPY, DUODENOSCOPY (EGD), COMBINED N/A 8/2/2016    Procedure: COMBINED ESOPHAGOSCOPY, GASTROSCOPY, DUODENOSCOPY (EGD), BIOPSY SINGLE OR MULTIPLE;  Surgeon: Sydnee Walton MD;  Location: [facility] OR     HC REMOVAL OF TONSILS,<10 Y/O      Tonsils <11y.o.     HC REPAIR INCISIONAL HERNIA,REDUCIBLE  1970's    Hernia Repair, Incisional, Unilateral     HC UGI ENDOSCOPY DIAG W BIOPSY  02/01/06     HC VASECTOMY UNILAT/BILAT W POSTOP SEMEN  1/05    Vasectomy     History back lumbar laminectomy       Social History     Tobacco Use     Smoking status: Never Smoker     Smokeless tobacco: Never Used   Substance Use Topics     Alcohol use: Yes     Comment: rare     Current Outpatient Medications   Medication Sig Dispense Refill     DULoxetine (CYMBALTA) 30 MG capsule TAKE THREE CAPSULES BY MOUTH EVERY DAY 90 capsule 0     oxyCODONE-acetaminophen (PERCOCET) 5-325 MG tablet Take 1 tablet by mouth every 8 hours as needed for moderate to severe pain Must last 30 days. 90 tablet 0     phentermine (ADIPEX-P) 37.5 MG tablet TAKE ONE TABLET BY MOUTH EVERY MORNING BEFORE BREAKFAST 30 tablet 0     vitamin D3 (CHOLECALCIFEROL) 50 mcg (2000 units) tablet Take 1 tablet (50 mcg) by mouth daily 90 tablet 3     warfarin ANTICOAGULANT (COUMADIN) 5 MG tablet TAKE 2 TABLETS ON MONDAY FRI AND 1&1/2 TABLETS ALL OTHER DAYS OF THE WEEK OR AS DIRECTED BY THE COUMADIN CLINIC 135 tablet 1     DULoxetine (CYMBALTA) 60 MG capsule Take 2 capsules (120 mg) by mouth daily 60 capsule 0     gabapentin (NEURONTIN) 100 MG capsule Take 2-3 capsules (200-300 mg) by mouth 3 times daily as needed  (anxiety, pain) 180 capsule 0     gabapentin (NEURONTIN) 300 MG capsule Take 1 capsule (300 mg) by mouth nightly as needed (cough) 30 capsule 1     lithium (ESKALITH CR/LITHOBID) 450 MG CR tablet Take 1 tablet (450 mg) by mouth At Bedtime 30 tablet 0     omeprazole (PRILOSEC) 40 MG DR capsule Take 1 capsule (40 mg) by mouth daily 30 capsule 1     traZODone (DESYREL) 50 MG tablet Take 1 tablet (50 mg) by mouth nightly as needed for sleep 30 tablet 0       REVIEW OF SYSTEMS:   5 point ROS negative except as noted above in HPI, including Gen., Resp, CV, GI &  system review.     OBJECTIVE:  Vitals: /78   Pulse 103   Temp 97.1  F (36.2  C) (Temporal)   Wt 109.3 kg (241 lb)   SpO2 98%   BMI 31.80 kg/m    BMI= Body mass index is 31.8 kg/m .  He is alert appears well.  Somewhat flat affect.  His throat does have some drainage.  Neck supple no adenopathy.  Lungs are clear.  Heart regular rhythm rate was in the 90s.  Abdomen is obese small umbilical hernia noted at this time.  Bowel sounds present.  No pain no masses.  Extremities with no edema.  Some tenderness in the lower lumbar spine.    ASSESSMENT:  #1 umbilical hernia #2 low back pain #3 long-term use of anticoagulants secondary to DVT #4 obesity 5 depression    PLAN:  Discussed the umbilical hernia with him.  He wants to defer a consult with surgery at this time but knows that if it continues and starts bothering him more he will just call and will refer him.  Regarding the back pain as noted above discussed not going up on oxycodone.  I had like him to see neurosurgery in consult if he wants to.  He will decide on this talk it over with his wife.  Told him I do not think we should go up on the phentermine doubling the dose would be beyond the normal range.  Watching his diet is important.  We will get an INR today.  See ENT in the near future.    Weight management plan: Discussed healthy diet and exercise guidelines    Sylvester Cash MD  [place]  [facility]

## 2020-09-28 ENCOUNTER — OFFICE VISIT (OUTPATIENT)
Dept: OTOLARYNGOLOGY | Facility: CLINIC | Age: 50
End: 2020-09-28
Payer: COMMERCIAL

## 2020-09-28 VITALS
SYSTOLIC BLOOD PRESSURE: 130 MMHG | HEIGHT: 73 IN | BODY MASS INDEX: 31.94 KG/M2 | DIASTOLIC BLOOD PRESSURE: 80 MMHG | WEIGHT: 241 LBS

## 2020-09-28 DIAGNOSIS — R05.9 COUGH: Primary | ICD-10-CM

## 2020-09-28 DIAGNOSIS — R09.A2 GLOBUS SENSATION: ICD-10-CM

## 2020-09-28 DIAGNOSIS — K21.9 LPRD (LARYNGOPHARYNGEAL REFLUX DISEASE): ICD-10-CM

## 2020-09-28 PROCEDURE — 31575 DIAGNOSTIC LARYNGOSCOPY: CPT | Performed by: OTOLARYNGOLOGY

## 2020-09-28 PROCEDURE — 99203 OFFICE O/P NEW LOW 30 MIN: CPT | Mod: 25 | Performed by: OTOLARYNGOLOGY

## 2020-09-28 RX ORDER — OMEPRAZOLE 40 MG/1
40 CAPSULE, DELAYED RELEASE ORAL DAILY
Qty: 30 CAPSULE | Refills: 1 | Status: SHIPPED | OUTPATIENT
Start: 2020-09-28 | End: 2020-11-21

## 2020-09-28 RX ORDER — GABAPENTIN 300 MG/1
300 CAPSULE ORAL
Qty: 30 CAPSULE | Refills: 1 | Status: SHIPPED | OUTPATIENT
Start: 2020-09-28 | End: 2020-11-20 | Stop reason: DRUGHIGH

## 2020-09-28 ASSESSMENT — MIFFLIN-ST. JEOR: SCORE: 2002.05

## 2020-09-28 NOTE — LETTER
9/28/2020         RE: Hollis Diggs  [address] [facility] [address]        Dear Colleague,    Thank you for referring your patient, Hollis Diggs, to the [facility]. Please see a copy of my visit note below.    ENT Consultation    Hollis Diggs who is a 50 year old male seen in consultation at the request of self.      History of Present Illness - Hollis Diggs is a 50 year old male presents for evaluation of presents with constant throat clearing for a number of years.  Feeling of globus sensation is present.  He feels it sort of in the lower neck in the midline.  It has been getting more frequent lately.  He denies had typical GERD symptomatology.  Her does endorse raspy voice as he uses it.  At times gets clear rhinorrhea but denies any seasonal perennial allergies and nasal congestion.  He tried Benadryl but did not seem to help him.  Even though there is no dysphagia with sometimes it is difficult to swallow his saliva.      Past Medical History -   Past Medical History:   Diagnosis Date     Antiphospholipid syndrome (H)      Arthritis      Asthma     Exercise     blood clot in leg      Depressive disorder, not elsewhere classified     Depression (non-psychotic)     ANKIT (generalised anxiety disorder)      HH (hiatus hernia)      Hypercholesteremia     normal with weight loss 3/09     Lumbar disc herniation 1992     Seronegative rheumatoid arthritis (H)        Current Medications -   Current Outpatient Medications:      DULoxetine (CYMBALTA) 30 MG capsule, TAKE THREE CAPSULES BY MOUTH EVERY DAY, Disp: 90 capsule, Rfl: 0     DULoxetine (CYMBALTA) 60 MG capsule, Take 2 capsules (120 mg) by mouth daily, Disp: 60 capsule, Rfl: 0     gabapentin (NEURONTIN) 100 MG capsule, Take 2-3 capsules (200-300 mg) by mouth 3 times daily as needed (anxiety, pain), Disp: 180 capsule, Rfl: 0     lithium (ESKALITH CR/LITHOBID) 450 MG CR tablet, Take 1 tablet (450 mg) by mouth At Bedtime,  Disp: 30 tablet, Rfl: 0     oxyCODONE-acetaminophen (PERCOCET) 5-325 MG tablet, Take 1 tablet by mouth every 8 hours as needed for moderate to severe pain Must last 30 days., Disp: 90 tablet, Rfl: 0     phentermine (ADIPEX-P) 37.5 MG tablet, TAKE ONE TABLET BY MOUTH EVERY MORNING BEFORE BREAKFAST, Disp: 30 tablet, Rfl: 0     traZODone (DESYREL) 50 MG tablet, Take 1 tablet (50 mg) by mouth nightly as needed for sleep, Disp: 30 tablet, Rfl: 0     vitamin D3 (CHOLECALCIFEROL) 50 mcg (2000 units) tablet, Take 1 tablet (50 mcg) by mouth daily, Disp: 90 tablet, Rfl: 3     warfarin ANTICOAGULANT (COUMADIN) 5 MG tablet, TAKE 2 TABLETS ON MONDAY FRI AND 1&1/2 TABLETS ALL OTHER DAYS OF THE WEEK OR AS DIRECTED BY THE COUMADIN CLINIC, Disp: 135 tablet, Rfl: 1    Allergies -   Allergies   Allergen Reactions     No Known Drug Allergies        Social History -   Social History     Socioeconomic History     Marital status:      Spouse name: Cassie     Number of children: 2     Years of education: 15     Highest education level: Not on file   Occupational History     Occupation:      Employer: [email or web address]   Social Needs     Financial resource strain: Not on file     Food insecurity     Worry: Not on file     Inability: Not on file     Transportation needs     Medical: Not on file     Non-medical: Not on file   Tobacco Use     Smoking status: Never Smoker     Smokeless tobacco: Never Used   Substance and Sexual Activity     Alcohol use: Yes     Comment: rare     Drug use: No     Sexual activity: Yes   Lifestyle     Physical activity     Days per week: Not on file     Minutes per session: Not on file     Stress: Not on file   Relationships     Social connections     Talks on phone: Not on file     Gets together: Not on file     Attends Worship service: Not on file     Active member of club or organization: Not on file     Attends meetings of clubs or organizations: Not on file     Relationship status:  "Not on file     Intimate partner violence     Fear of current or ex partner: Not on file     Emotionally abused: Not on file     Physically abused: Not on file     Forced sexual activity: Not on file   Other Topics Concern      Service No     Blood Transfusions No     Caffeine Concern Yes     Comment: 64 oz q day     Occupational Exposure Not Asked     Hobby Hazards Not Asked     Sleep Concern Yes     Stress Concern No     Weight Concern No     Special Diet Not Asked     Back Care Not Asked     Exercise Yes     Comment: sometimes     Bike Helmet Not Asked     Seat Belt Yes     Self-Exams No     Parent/sibling w/ CABG, MI or angioplasty before 65F 55M? Not Asked   Social History Narrative    4 children healthy       Family History -   Family History   Problem Relation Age of Onset     Brain Tumor Mother         Benign     Deep Vein Thrombosis Mother         \"neck\"      Heart Disease Father         \"wont tell anyone\"     Neurologic Disorder Paternal Grandmother         Parkinsons      Alcohol/Drug Paternal Grandfather         alcoholic     Cancer Maternal Grandfather         lung     Diabetes Maternal Grandmother      Cerebrovascular Disease Maternal Grandmother         tim age ~70     Arthritis Cousin         cousins x 2 \"RA\"     Musculoskeletal Disorder Maternal Aunt         MS     Family History Negative Other         psoriasis, crohns, UC, SLE       Review of Systems - As per HPI and PMHx, otherwise review of system review of the head and neck negative. Otherwise 10+ review of system is negative    Physical Exam  There were no vitals taken for this visit.  BMI: There is no height or weight on file to calculate BMI.    General - The patient is well nourished and well developed, and appears to have good nutritional status.  Alert and oriented to person and place, answers questions and cooperates with examination appropriately.    SKIN - No suspicious lesions or rashes.  Respiration - No respiratory " distress.  Head and Face - Normocephalic and atraumatic, with no gross asymmetry noted of the contour of the facial features.  The facial nerve is intact, with strong symmetric movements.    Voice and Breathing - The patient was breathing comfortably without the use of accessory muscles. The patients voice was clear and strong, and had appropriate pitch and quality.    Ears - Bilateral pinna and EACs with normal appearing overlying skin. Tympanic membrane intact with good mobility on pneumatic otoscopy bilaterally. Bony landmarks of the ossicular chain are normal. The tympanic membranes are normal in appearance. No retraction, perforation, or masses.  No fluid or purulence was seen in the external canal or the middle ear.     Eyes - Extraocular movements intact.  Sclera were not icteric or injected, conjunctiva were pink and moist.    Mouth - Examination of the oral cavity showed pink, healthy oral mucosa. No lesions or ulcerations noted.  The tongue was mobile and midline, and the dentition were in good condition.      Throat - The walls of the oropharynx were smooth, pink, moist, symmetric, and had no lesions or ulcerations.  The tonsillar pillars and soft palate were symmetric.  The uvula was midline on elevation.    Neck - Normal midline excursion of the laryngotracheal complex during swallowing.  Full range of motion on passive movement.  Palpation of the occipital, submental, submandibular, internal jugular chain, and supraclavicular nodes did not demonstrate any abnormal lymph nodes or masses.  The carotid pulse was palpable bilaterally.  Palpation of the thyroid was soft and smooth, with no nodules or goiter appreciated.  The trachea was mobile and midline.    Nose - External contour is symmetric, no gross deflection or scars.  Nasal mucosa is pink and moist with no abnormal mucus.  The septum was midline and non-obstructive, turbinates of normal size and position.  No polyps, masses, or purulence noted on  examination.    Neuro - Nonfocal neuro exam is normal, CN 2 through 12 intact, normal gait and muscle tone.      Performed in clinic today:  Attempts at mirror laryngoscopy were not possible due to gag reflex.  Therefore I proceeded with a fiberoptic examination.  First I sprayed both sides of the nose with a mixture of lidocaine and neosynephrine.  I then passed the scope through the nasal cavity.  The nasal cavity was unremarkable.  The nasopharynx was mucosally covered and symmetric.  The Eustachian tube openings were unobstructed.  Going further down I had a clear view of the base of tongue which had normal appearing lingual tonsillar tissue.  The base of tongue was free of lesions, and the vallecula was open.  The epiglottis was smooth and mucosally covered.  The supraglottic larynx was then clearly visualized.  The vocal cords moved smoothly and symmetrically, they were pearly white and no lesions were seen.  The pyriform sinuses were open, and the limited view of the postcricoid region did not show any lesions.  Mild erythema is appreciated of the arytenoid cartilages.  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  Still discussed lifestyle modifications for LPR the avoidance of caffeinated products after 4PM lighter meals towards the evening avoidance fatty greasy foods especially during the evening.  Avoidance of eating bad.  Elevation of mid torso as discussed.  We also discussed possibly initiating treatment with omeprazole 40 mg before supper as well as at nighttime trial of gabapentin 300 mg to reduce clearing.  Conscious efforts to avoid clearing is also discussed.  Would like to follow-up with the patient in 6 weeks.      Jitendra Manzo MD      Again, thank you for allowing me to participate in the care of your patient.        Sincerely,        Jitendra  MD Anais, MD

## 2020-09-28 NOTE — PROGRESS NOTES
ENT Consultation    Hollis Diggs who is a 50 year old male seen in consultation at the request of self.      History of Present Illness - Hollis Diggs is a 50 year old male presents for evaluation of presents with constant throat clearing for a number of years.  Feeling of globus sensation is present.  He feels it sort of in the lower neck in the midline.  It has been getting more frequent lately.  He denies had typical GERD symptomatology.  Her does endorse raspy voice as he uses it.  At times gets clear rhinorrhea but denies any seasonal perennial allergies and nasal congestion.  He tried Benadryl but did not seem to help him.  Even though there is no dysphagia with sometimes it is difficult to swallow his saliva.      Past Medical History -   Past Medical History:   Diagnosis Date     Antiphospholipid syndrome (H)      Arthritis      Asthma     Exercise     blood clot in leg      Depressive disorder, not elsewhere classified     Depression (non-psychotic)     ANKIT (generalised anxiety disorder)      HH (hiatus hernia)      Hypercholesteremia     normal with weight loss 3/09     Lumbar disc herniation 1992     Seronegative rheumatoid arthritis (H)        Current Medications -   Current Outpatient Medications:      DULoxetine (CYMBALTA) 30 MG capsule, TAKE THREE CAPSULES BY MOUTH EVERY DAY, Disp: 90 capsule, Rfl: 0     DULoxetine (CYMBALTA) 60 MG capsule, Take 2 capsules (120 mg) by mouth daily, Disp: 60 capsule, Rfl: 0     gabapentin (NEURONTIN) 100 MG capsule, Take 2-3 capsules (200-300 mg) by mouth 3 times daily as needed (anxiety, pain), Disp: 180 capsule, Rfl: 0     lithium (ESKALITH CR/LITHOBID) 450 MG CR tablet, Take 1 tablet (450 mg) by mouth At Bedtime, Disp: 30 tablet, Rfl: 0     oxyCODONE-acetaminophen (PERCOCET) 5-325 MG tablet, Take 1 tablet by mouth every 8 hours as needed for moderate to severe pain Must last 30 days., Disp: 90 tablet, Rfl: 0     phentermine (ADIPEX-P) 37.5 MG tablet, TAKE ONE  TABLET BY MOUTH EVERY MORNING BEFORE BREAKFAST, Disp: 30 tablet, Rfl: 0     traZODone (DESYREL) 50 MG tablet, Take 1 tablet (50 mg) by mouth nightly as needed for sleep, Disp: 30 tablet, Rfl: 0     vitamin D3 (CHOLECALCIFEROL) 50 mcg (2000 units) tablet, Take 1 tablet (50 mcg) by mouth daily, Disp: 90 tablet, Rfl: 3     warfarin ANTICOAGULANT (COUMADIN) 5 MG tablet, TAKE 2 TABLETS ON MONDAY FRI AND 1&1/2 TABLETS ALL OTHER DAYS OF THE WEEK OR AS DIRECTED BY THE COUMADIN CLINIC, Disp: 135 tablet, Rfl: 1    Allergies -   Allergies   Allergen Reactions     No Known Drug Allergies        Social History -   Social History     Socioeconomic History     Marital status:      Spouse name: Cassie     Number of children: 2     Years of education: 15     Highest education level: Not on file   Occupational History     Occupation:      Employer: [employer]   Social Needs     Financial resource strain: Not on file     Food insecurity     Worry: Not on file     Inability: Not on file     Transportation needs     Medical: Not on file     Non-medical: Not on file   Tobacco Use     Smoking status: Never Smoker     Smokeless tobacco: Never Used   Substance and Sexual Activity     Alcohol use: Yes     Comment: rare     Drug use: No     Sexual activity: Yes   Lifestyle     Physical activity     Days per week: Not on file     Minutes per session: Not on file     Stress: Not on file   Relationships     Social connections     Talks on phone: Not on file     Gets together: Not on file     Attends Denominational service: Not on file     Active member of club or organization: Not on file     Attends meetings of clubs or organizations: Not on file     Relationship status: Not on file     Intimate partner violence     Fear of current or ex partner: Not on file     Emotionally abused: Not on file     Physically abused: Not on file     Forced sexual activity: Not on file   Other Topics Concern      Service No      "Blood Transfusions No     Caffeine Concern Yes     Comment: 64 oz q day     Occupational Exposure Not Asked     Hobby Hazards Not Asked     Sleep Concern Yes     Stress Concern No     Weight Concern No     Special Diet Not Asked     Back Care Not Asked     Exercise Yes     Comment: sometimes     Bike Helmet Not Asked     Seat Belt Yes     Self-Exams No     Parent/sibling w/ CABG, MI or angioplasty before 65F 55M? Not Asked   Social History Narrative    4 children healthy       Family History -   Family History   Problem Relation Age of Onset     Brain Tumor Mother         Benign     Deep Vein Thrombosis Mother         \"neck\"      Heart Disease Father         \"wont tell anyone\"     Neurologic Disorder Paternal Grandmother         Parkinsons      Alcohol/Drug Paternal Grandfather         alcoholic     Cancer Maternal Grandfather         lung     Diabetes Maternal Grandmother      Cerebrovascular Disease Maternal Grandmother         tim age ~70     Arthritis Cousin         cousins x 2 \"RA\"     Musculoskeletal Disorder Maternal Aunt         MS     Family History Negative Other         psoriasis, crohns, UC, SLE       Review of Systems - As per HPI and PMHx, otherwise review of system review of the head and neck negative. Otherwise 10+ review of system is negative    Physical Exam  There were no vitals taken for this visit.  BMI: There is no height or weight on file to calculate BMI.    General - The patient is well nourished and well developed, and appears to have good nutritional status.  Alert and oriented to person and place, answers questions and cooperates with examination appropriately.    SKIN - No suspicious lesions or rashes.  Respiration - No respiratory distress.  Head and Face - Normocephalic and atraumatic, with no gross asymmetry noted of the contour of the facial features.  The facial nerve is intact, with strong symmetric movements.    Voice and Breathing - The patient was breathing comfortably without " the use of accessory muscles. The patients voice was clear and strong, and had appropriate pitch and quality.    Ears - Bilateral pinna and EACs with normal appearing overlying skin. Tympanic membrane intact with good mobility on pneumatic otoscopy bilaterally. Bony landmarks of the ossicular chain are normal. The tympanic membranes are normal in appearance. No retraction, perforation, or masses.  No fluid or purulence was seen in the external canal or the middle ear.     Eyes - Extraocular movements intact.  Sclera were not icteric or injected, conjunctiva were pink and moist.    Mouth - Examination of the oral cavity showed pink, healthy oral mucosa. No lesions or ulcerations noted.  The tongue was mobile and midline, and the dentition were in good condition.      Throat - The walls of the oropharynx were smooth, pink, moist, symmetric, and had no lesions or ulcerations.  The tonsillar pillars and soft palate were symmetric.  The uvula was midline on elevation.    Neck - Normal midline excursion of the laryngotracheal complex during swallowing.  Full range of motion on passive movement.  Palpation of the occipital, submental, submandibular, internal jugular chain, and supraclavicular nodes did not demonstrate any abnormal lymph nodes or masses.  The carotid pulse was palpable bilaterally.  Palpation of the thyroid was soft and smooth, with no nodules or goiter appreciated.  The trachea was mobile and midline.    Nose - External contour is symmetric, no gross deflection or scars.  Nasal mucosa is pink and moist with no abnormal mucus.  The septum was midline and non-obstructive, turbinates of normal size and position.  No polyps, masses, or purulence noted on examination.    Neuro - Nonfocal neuro exam is normal, CN 2 through 12 intact, normal gait and muscle tone.      Performed in clinic today:  Attempts at mirror laryngoscopy were not possible due to gag reflex.  Therefore I proceeded with a fiberoptic  examination.  First I sprayed both sides of the nose with a mixture of lidocaine and neosynephrine.  I then passed the scope through the nasal cavity.  The nasal cavity was unremarkable.  The nasopharynx was mucosally covered and symmetric.  The Eustachian tube openings were unobstructed.  Going further down I had a clear view of the base of tongue which had normal appearing lingual tonsillar tissue.  The base of tongue was free of lesions, and the vallecula was open.  The epiglottis was smooth and mucosally covered.  The supraglottic larynx was then clearly visualized.  The vocal cords moved smoothly and symmetrically, they were pearly white and no lesions were seen.  The pyriform sinuses were open, and the limited view of the postcricoid region did not show any lesions.  Mild erythema is appreciated of the arytenoid cartilages.  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  Still discussed lifestyle modifications for LPR the avoidance of caffeinated products after 4PM lighter meals towards the evening avoidance fatty greasy foods especially during the evening.  Avoidance of eating bad.  Elevation of mid torso as discussed.  We also discussed possibly initiating treatment with omeprazole 40 mg before supper as well as at nighttime trial of gabapentin 300 mg to reduce clearing.  Conscious efforts to avoid clearing is also discussed.  Would like to follow-up with the patient in 6 weeks.      Jitendra Manzo MD

## 2020-09-29 ENCOUNTER — MYC REFILL (OUTPATIENT)
Dept: FAMILY MEDICINE | Facility: CLINIC | Age: 50
End: 2020-09-29

## 2020-09-29 DIAGNOSIS — M51.369 DDD (DEGENERATIVE DISC DISEASE), LUMBAR: ICD-10-CM

## 2020-09-29 NOTE — TELEPHONE ENCOUNTER
Oxycodone-acetaminphen      Last Written Prescription Date:  8/31/20  Last Fill Quantity: 90,   # refills: 0  Last Office Visit: 9/25/20  Future Office visit:    Next 5 appointments (look out 90 days)    Nov 23, 2020  4:30 PM CST  Return Visit with Jitendra Manzo MD  [facility] [address] [place] [id number] [phone]           Routing refill request to provider for review/approval because:  Drug not on the [facility], [facility] or [facility] refill protocol or controlled substance

## 2020-10-01 ENCOUNTER — MYC REFILL (OUTPATIENT)
Dept: FAMILY MEDICINE | Facility: CLINIC | Age: 50
End: 2020-10-01

## 2020-10-01 DIAGNOSIS — M51.369 DDD (DEGENERATIVE DISC DISEASE), LUMBAR: ICD-10-CM

## 2020-10-01 DIAGNOSIS — F33.0 MAJOR DEPRESSIVE DISORDER, RECURRENT EPISODE, MILD (H): ICD-10-CM

## 2020-10-01 RX ORDER — OXYCODONE AND ACETAMINOPHEN 5; 325 MG/1; MG/1
1 TABLET ORAL EVERY 8 HOURS PRN
Qty: 90 TABLET | Refills: 0 | Status: CANCELLED | OUTPATIENT
Start: 2020-10-01

## 2020-10-01 RX ORDER — OXYCODONE AND ACETAMINOPHEN 5; 325 MG/1; MG/1
1 TABLET ORAL EVERY 8 HOURS PRN
Qty: 90 TABLET | Refills: 0 | Status: SHIPPED | OUTPATIENT
Start: 2020-10-01 | End: 2020-10-28

## 2020-10-01 NOTE — TELEPHONE ENCOUNTER
Duplicate.       oxyCODONE-acetaminophen (PERCOCET) 5-325 MG tablet       Last Written Prescription Date:    Last Fill Quantity: ,   # refills:   Last Office Visit: 3/25/20  Future Office visit:    Next 5 appointments (look out 90 days)    Nov 23, 2020  4:30 PM  Return Visit with Jitendra Manzo MD  [facility] ([facility]) [address] [place] [id number] [phone]           Routing refill request to provider for review/approval because:  Drug not on the [facility], [facility] or [facility] refill protocol or controlled substance

## 2020-10-01 NOTE — TELEPHONE ENCOUNTER
"Routing refill request to provider for review/approval because:  Labs out of range:  PHQ-9    Cymbalta  Last Written Prescription Date:  6/24/2020  Last Fill Quantity: 60,  # refills: 0   Last office visit: 9/25/2020 with prescribing provider:  Shailesh   Future Office Visit:   Next 5 appointments (look out 90 days)    Nov 23, 2020  4:30 PM  Return Visit with Jitendra Manzo MD  [facility] ([address] [address] [phone]         PHQ 7/10/2020 8/5/2020 9/23/2020   PHQ-9 Total Score - - -   Q9: Thoughts of better off dead/self-harm past 2 weeks - - -   PHQ-9 External Data 16 19 18     Requested Prescriptions   Pending Prescriptions Disp Refills     DULoxetine (CYMBALTA) 30 MG capsule [Pharmacy Med Name: DULOXETINE HCL 30MG CPEP] 90 capsule 0     Sig: TAKE THREE CAPSULES BY MOUTH EVERY DAY       Serotonin-Norepinephrine Reuptake Inhibitors  Failed - 10/1/2020  4:35 PM        Failed - PHQ-9 score of less than 5 in past 6 months     Please review last PHQ-9 score.           Passed - Blood pressure under 140/90 in past 12 months     BP Readings from Last 3 Encounters:   09/28/20 130/80   09/25/20 136/78   06/24/20 129/82                 Passed - Medication is active on med list        Passed - Patient is age 18 or older        Passed - Recent (6 mo) or future (30 days) visit within the authorizing provider's specialty     Patient had office visit in the last 6 months or has a visit in the next 30 days with authorizing provider or within the authorizing provider's specialty.  See \"Patient Info\" tab in inbasket, or \"Choose Columns\" in Meds & Orders section of the refill encounter.               Dayana Blum RN on 10/1/2020 at 5:52 PM    "

## 2020-10-02 RX ORDER — DULOXETIN HYDROCHLORIDE 30 MG/1
CAPSULE, DELAYED RELEASE ORAL
Qty: 90 CAPSULE | Refills: 0 | Status: SHIPPED | OUTPATIENT
Start: 2020-10-02 | End: 2020-11-20 | Stop reason: DRUGHIGH

## 2020-10-05 ENCOUNTER — MYC REFILL (OUTPATIENT)
Dept: FAMILY MEDICINE | Facility: CLINIC | Age: 50
End: 2020-10-05

## 2020-10-05 DIAGNOSIS — E66.09 CLASS 1 OBESITY DUE TO EXCESS CALORIES WITHOUT SERIOUS COMORBIDITY WITH BODY MASS INDEX (BMI) OF 33.0 TO 33.9 IN ADULT: ICD-10-CM

## 2020-10-05 DIAGNOSIS — E66.811 CLASS 1 OBESITY DUE TO EXCESS CALORIES WITHOUT SERIOUS COMORBIDITY WITH BODY MASS INDEX (BMI) OF 33.0 TO 33.9 IN ADULT: ICD-10-CM

## 2020-10-07 RX ORDER — PHENTERMINE HYDROCHLORIDE 37.5 MG/1
37.5 TABLET ORAL
Qty: 30 TABLET | Refills: 0 | Status: SHIPPED | OUTPATIENT
Start: 2020-10-07 | End: 2020-11-10

## 2020-10-09 ENCOUNTER — THERAPY VISIT (OUTPATIENT)
Dept: SLEEP MEDICINE | Facility: CLINIC | Age: 50
End: 2020-10-09
Payer: COMMERCIAL

## 2020-10-09 DIAGNOSIS — R06.83 SNORING: ICD-10-CM

## 2020-10-09 DIAGNOSIS — G47.19 EXCESSIVE DAYTIME SLEEPINESS: ICD-10-CM

## 2020-10-09 PROCEDURE — 95810 POLYSOM 6/> YRS 4/> PARAM: CPT | Performed by: OTOLARYNGOLOGY

## 2020-10-11 ENCOUNTER — MYC REFILL (OUTPATIENT)
Dept: FAMILY MEDICINE | Facility: CLINIC | Age: 50
End: 2020-10-11

## 2020-10-11 DIAGNOSIS — E55.9 HYPOVITAMINOSIS D: ICD-10-CM

## 2020-10-12 RX ORDER — CHOLECALCIFEROL (VITAMIN D3) 50 MCG
1 TABLET ORAL DAILY
Qty: 90 TABLET | Refills: 3 | OUTPATIENT
Start: 2020-10-12

## 2020-10-12 NOTE — PROGRESS NOTES
"Hollis Diggs is a 50 year old male who is being evaluated via a billable telephone visit.      The patient has been notified of following:     \"This telephone visit will be conducted via a call between you and your physician/provider. We have found that certain health care needs can be provided without the need for a physical exam.  This service lets us provide the care you need with a short phone conversation.  If a prescription is necessary we can send it directly to your pharmacy.  If lab work is needed we can place an order for that and you can then stop by our lab to have the test done at a later time.    Telephone visits are billed at different rates depending on your insurance coverage. During this emergency period, for some insurers they may be billed the same as an in-person visit.  Please reach out to your insurance provider with any questions.    If during the course of the call the physician/provider feels a telephone visit is not appropriate, you will not be charged for this service.\"    Patient has given verbal consent for Telephone visit?  Yes    What phone number would you like to be contacted at? mobile    How would you like to obtain your AVS? MyChart    Phone call duration: 15 minutes                  Does Hollis have a CPAP/Bipap?  No           [place] Sleep Scale: 12        Sleep Study Follow-Up Visit:    Date on this visit: 10/13/2020    Hollis Diggs comes in today for follow-up of his sleep study done on 10/09/2020 at the [facility] for loud snoring, excessive daytime sleepiness and possible sleep apnea.    Sleep latency 102 minutes with Ambien.  REM not achieved.     Sleep efficiency 28.3%. Total sleep time 121 minutes.  Barely sufficient for diagnostic purposes    Sleep architecture:  Stage 1, 27.7% (5%), stage 2, 55% (45-55%), stage 3, 17.4% (15-20%), stage REM, 0% (20-25%).  AHI was 31.2, with minimal desaturations. RDI 33.7.  REM RDI NA,   Supine RDI 78.3, " consistent with severe SUPINE DEDE.  Periodic Limb Movement Index 0/hour.       CPAP titration: Was not performed.       These findings were reviewed with patient.     Past medical/surgical history, family history, social history, medications and allergies were reviewed.      Problem List:  Patient Active Problem List    Diagnosis Date Noted     Suicidal behavior 06/22/2020     Priority: Medium     Class 1 obesity due to excess calories without serious comorbidity with body mass index (BMI) of 33.0 to 33.9 in adult 01/08/2020     Priority: Medium     Long-term use of high-risk medication 05/12/2017     Priority: Medium     History of deep venous thrombosis 04/14/2017     Priority: Medium     DDD (degenerative disc disease), lumbar 04/14/2017     Priority: Medium     On prednisone therapy 07/27/2016     Priority: Medium     Seronegative rheumatoid arthritis (H) 06/28/2016     Priority: Medium     Long-term (current) use of anticoagulants [Z79.01] 03/16/2016     Priority: Medium     Rheumatoid arthritis of multiple sites with negative rheumatoid factor (H) 12/07/2015     Priority: Medium     Midline low back pain, with sciatica presence unspecified 10/14/2015     Priority: Medium     Major depressive disorder, recurrent episode, mild (H) 10/07/2015     Priority: Medium     Pain in joint, multiple sites 03/20/2015     Priority: Medium     Anxiety state 02/10/2015     Priority: Medium     Problem list name updated by automated process. Provider to review       CARDIOVASCULAR SCREENING; LDL GOAL LESS THAN 160 01/15/2013     Priority: Medium     Alopecia 02/19/2010     Priority: Medium     Ingrown toenail 08/18/2009     Priority: Medium     Anxiety 07/09/2008     Priority: Medium     Abdominal pain, epigastric 01/25/2006     Priority: Medium        Impression/Plan:    I discussed with the patient the nature of his sleep disordered breathing.  Also discussed the positional nature of it.  He does not feel that he would be  able to use the positioner and exclusively sleep on his sides.  He is not interested in the oral appliance but rather than the trial of CPAP.  He wished to proceed using nasal mask.  Will prescribe CPAP pressure range 5 to 15 cm with heated humidity.  He will follow up with me in about 1 month(s).     Fifteen minutes spent with patient, all of which were spent in virtual counseling, consulting, coordinating plan of care.      Jitendra Manzo MD      CC: Sylvester Cash

## 2020-10-12 NOTE — TELEPHONE ENCOUNTER
"  Requested Prescriptions   Pending Prescriptions Disp Refills     vitamin D3 (CHOLECALCIFEROL) 50 mcg (2000 units) tablet 90 tablet 3     Sig: Take 1 tablet (50 mcg) by mouth daily       Vitamin Supplements (Adult) Protocol Passed - 10/12/2020  2:33 PM        Passed - High dose Vitamin D not ordered        Passed - Recent (12 mo) or future (30 days) visit within the authorizing provider's specialty     Patient has had an office visit with the authorizing provider or a provider within the authorizing providers department within the previous 12 mos or has a future within next 30 days. See \"Patient Info\" tab in inbasket, or \"Choose Columns\" in Meds & Orders section of the refill encounter.              Passed - Medication is active on med list         Denied  sent 7/16/2020 for 3 months and 3 refills to this pharmacy  Delia Morris RN    "

## 2020-10-13 ENCOUNTER — VIRTUAL VISIT (OUTPATIENT)
Dept: SLEEP MEDICINE | Facility: CLINIC | Age: 50
End: 2020-10-13
Payer: COMMERCIAL

## 2020-10-13 DIAGNOSIS — G47.33 OSA (OBSTRUCTIVE SLEEP APNEA): Primary | ICD-10-CM

## 2020-10-13 LAB — SLPCOMP: NORMAL

## 2020-10-13 PROCEDURE — 99213 OFFICE O/P EST LOW 20 MIN: CPT | Mod: TEL | Performed by: OTOLARYNGOLOGY

## 2020-10-29 DIAGNOSIS — M51.369 DDD (DEGENERATIVE DISC DISEASE), LUMBAR: ICD-10-CM

## 2020-10-29 NOTE — TELEPHONE ENCOUNTER
[facility] pharmacy is currently out of Percet. Patient is wanting a prescription to be sent to [facility] today. Pharmacy will call [facility] to cancel the other prescription when they receive new prescription.     Karen Torres MA     10/29/2020

## 2020-10-30 RX ORDER — OXYCODONE AND ACETAMINOPHEN 5; 325 MG/1; MG/1
1 TABLET ORAL EVERY 8 HOURS PRN
Qty: 90 TABLET | Refills: 0 | Status: SHIPPED | OUTPATIENT
Start: 2020-10-30 | End: 2020-11-24

## 2020-11-06 ENCOUNTER — DOCUMENTATION ONLY (OUTPATIENT)
Dept: SLEEP MEDICINE | Facility: CLINIC | Age: 50
End: 2020-11-06
Payer: COMMERCIAL

## 2020-11-06 NOTE — PROGRESS NOTES
Patient was offered choice of vendor and chose [facility].  Patient Hollis Diggs was set up at [place] on November 6, 2020. Patient received a [employer] DreamStation Auto. Pressures were set at 5-15 cm H2O.   Patient s ramp is 5 cm H2O for Off and FLEX/EPR is C Flex.  Patient received a Resmed Mask name: AIRFIT F20  Full Face mask size Large, heated tubing and heated humidifier.  Patient does not need to meet compliance. Patient has a follow up on TBD with Dr. Manzo.    Marisa Pastrana

## 2020-11-09 ENCOUNTER — DOCUMENTATION ONLY (OUTPATIENT)
Dept: SLEEP MEDICINE | Facility: CLINIC | Age: 50
End: 2020-11-09
Payer: COMMERCIAL

## 2020-11-09 NOTE — PROGRESS NOTES
3 DAY STM VISIT    Diagnostic AHI: 31.2  PSG    Patient contacted for 3 day STM visit.    Confirmed with patient at time of call- N/A Patient is still interested in STM service     Message left for patient to return call.    Replacement device: No  STM ordered by provider: Yes     Device type: Auto-CPAP  PAP settings from order::  CPAP min 5 cm  H20       CPAP max 15 cm  H20  Mask type:    Nasal Mask     Device settings from machine      Min CPAP 5            Max CPAP 15      Assessment: No usage reporting but has a profile in Airview/[employer].  Action plan: Patient to have 14 day STM visit. Patient has a follow up visit scheduled:   yes within 31-90 days of set up.     Total time spent on accessing and  interpreting remote patient PAP therapy data  10 minutes  Total time spent counseling, coaching  and reviewing PAP therapy data with patient  1 minutes  [id number] no

## 2020-11-10 ENCOUNTER — MYC REFILL (OUTPATIENT)
Dept: FAMILY MEDICINE | Facility: CLINIC | Age: 50
End: 2020-11-10

## 2020-11-10 DIAGNOSIS — E66.09 CLASS 1 OBESITY DUE TO EXCESS CALORIES WITHOUT SERIOUS COMORBIDITY WITH BODY MASS INDEX (BMI) OF 33.0 TO 33.9 IN ADULT: ICD-10-CM

## 2020-11-10 DIAGNOSIS — E66.811 CLASS 1 OBESITY DUE TO EXCESS CALORIES WITHOUT SERIOUS COMORBIDITY WITH BODY MASS INDEX (BMI) OF 33.0 TO 33.9 IN ADULT: ICD-10-CM

## 2020-11-10 RX ORDER — PHENTERMINE HYDROCHLORIDE 37.5 MG/1
37.5 TABLET ORAL
Qty: 30 TABLET | Refills: 0 | Status: SHIPPED | OUTPATIENT
Start: 2020-11-10 | End: 2020-12-28

## 2020-11-10 NOTE — TELEPHONE ENCOUNTER
phentermine (ADIPEX-P) 37.5 MG tablet       Last Written Prescription Date:  10/7/20  Last Fill Quantity: 30,   # refills: 0  Last Office Visit: 9/25/20  Future Office visit:    Next 5 appointments (look out 90 days)    Nov 23, 2020  4:30 PM  Return Visit with Jitendra Manzo MD  [facility] ([address] [address] [phone]           Routing refill request to provider for review/approval because:  Drug not on the [facility], [facility] or [facility] refill protocol or controlled substance

## 2020-11-11 ENCOUNTER — TRANSFERRED RECORDS (OUTPATIENT)
Dept: HEALTH INFORMATION MANAGEMENT | Facility: CLINIC | Age: 50
End: 2020-11-11

## 2020-11-19 DIAGNOSIS — R05.9 COUGH: ICD-10-CM

## 2020-11-19 DIAGNOSIS — K21.9 LPRD (LARYNGOPHARYNGEAL REFLUX DISEASE): ICD-10-CM

## 2020-11-20 ENCOUNTER — OFFICE VISIT (OUTPATIENT)
Dept: FAMILY MEDICINE | Facility: CLINIC | Age: 50
End: 2020-11-20
Payer: COMMERCIAL

## 2020-11-20 ENCOUNTER — ANTICOAGULATION THERAPY VISIT (OUTPATIENT)
Dept: ANTICOAGULATION | Facility: CLINIC | Age: 50
End: 2020-11-20

## 2020-11-20 VITALS
HEIGHT: 73 IN | WEIGHT: 234.2 LBS | BODY MASS INDEX: 31.04 KG/M2 | DIASTOLIC BLOOD PRESSURE: 70 MMHG | RESPIRATION RATE: 16 BRPM | HEART RATE: 104 BPM | OXYGEN SATURATION: 99 % | SYSTOLIC BLOOD PRESSURE: 124 MMHG | TEMPERATURE: 97.8 F

## 2020-11-20 DIAGNOSIS — Z79.01 LONG TERM CURRENT USE OF ANTICOAGULANT THERAPY: ICD-10-CM

## 2020-11-20 DIAGNOSIS — R25.1 TREMOR: ICD-10-CM

## 2020-11-20 DIAGNOSIS — Z23 NEED FOR VACCINATION: ICD-10-CM

## 2020-11-20 DIAGNOSIS — Z20.822 EXPOSURE TO COVID-19 VIRUS: ICD-10-CM

## 2020-11-20 DIAGNOSIS — Z00.01 ENCOUNTER FOR GENERAL ADULT MEDICAL EXAMINATION WITH ABNORMAL FINDINGS: Primary | ICD-10-CM

## 2020-11-20 LAB
CAPILLARY BLOOD COLLECTION: NORMAL
INR BLD: 3.5 (ref 0.86–1.14)
INR PPP: NORMAL (ref 0.86–1.14)

## 2020-11-20 PROCEDURE — 90471 IMMUNIZATION ADMIN: CPT | Performed by: FAMILY MEDICINE

## 2020-11-20 PROCEDURE — 36416 COLLJ CAPILLARY BLOOD SPEC: CPT | Performed by: FAMILY MEDICINE

## 2020-11-20 PROCEDURE — 99396 PREV VISIT EST AGE 40-64: CPT | Mod: 25 | Performed by: FAMILY MEDICINE

## 2020-11-20 PROCEDURE — 90750 HZV VACC RECOMBINANT IM: CPT | Performed by: FAMILY MEDICINE

## 2020-11-20 PROCEDURE — 99213 OFFICE O/P EST LOW 20 MIN: CPT | Mod: 25 | Performed by: FAMILY MEDICINE

## 2020-11-20 PROCEDURE — 85610 PROTHROMBIN TIME: CPT | Performed by: FAMILY MEDICINE

## 2020-11-20 PROCEDURE — U0003 INFECTIOUS AGENT DETECTION BY NUCLEIC ACID (DNA OR RNA); SEVERE ACUTE RESPIRATORY SYNDROME CORONAVIRUS 2 (SARS-COV-2) (CORONAVIRUS DISEASE [COVID-19]), AMPLIFIED PROBE TECHNIQUE, MAKING USE OF HIGH THROUGHPUT TECHNOLOGIES AS DESCRIBED BY CMS-2020-01-R: HCPCS | Performed by: FAMILY MEDICINE

## 2020-11-20 PROCEDURE — 99207 PR NO CHARGE NURSE ONLY: CPT | Performed by: FAMILY MEDICINE

## 2020-11-20 RX ORDER — LITHIUM CARBONATE 450 MG
450 TABLET, EXTENDED RELEASE ORAL 2 TIMES DAILY
COMMUNITY
End: 2021-08-05 | Stop reason: ALTCHOICE

## 2020-11-20 RX ORDER — ARIPIPRAZOLE 2 MG/1
2 TABLET ORAL DAILY
COMMUNITY
End: 2021-06-15 | Stop reason: DRUGHIGH

## 2020-11-20 RX ORDER — METOPROLOL SUCCINATE 50 MG/1
50 TABLET, EXTENDED RELEASE ORAL DAILY
Qty: 30 TABLET | Refills: 1 | Status: SHIPPED | OUTPATIENT
Start: 2020-11-20 | End: 2021-01-25

## 2020-11-20 RX ORDER — DULOXETIN HYDROCHLORIDE 60 MG/1
120 CAPSULE, DELAYED RELEASE ORAL DAILY
COMMUNITY
End: 2021-12-09

## 2020-11-20 RX ORDER — TRAZODONE HYDROCHLORIDE 50 MG/1
250 TABLET, FILM COATED ORAL AT BEDTIME
COMMUNITY
End: 2021-12-09

## 2020-11-20 RX ORDER — GABAPENTIN 800 MG/1
800 TABLET ORAL 3 TIMES DAILY
COMMUNITY
End: 2021-08-05 | Stop reason: DRUGHIGH

## 2020-11-20 ASSESSMENT — PATIENT HEALTH QUESTIONNAIRE - PHQ9: SUM OF ALL RESPONSES TO PHQ QUESTIONS 1-9: 6

## 2020-11-20 ASSESSMENT — PAIN SCALES - GENERAL: PAINLEVEL: NO PAIN (0)

## 2020-11-20 ASSESSMENT — MIFFLIN-ST. JEOR: SCORE: 1971.2

## 2020-11-20 NOTE — PROGRESS NOTES
ANTICOAGULATION FOLLOW-UP CLINIC VISIT    Patient Name:  Hollis Diggs  Date:  11/20/2020  Contact Type:  Telephone    SUBJECTIVE:  Patient Findings     Comments:  I left a detailed voicemail with the orders reflected in flowsheet. I have also requested a call back if there have been any missed doses, concerns, illness, fever, or if there have been any changes in medications, activity level, or diet  Lilliam Pro RN          Clinical Outcomes     Comments:  I left a detailed voicemail with the orders reflected in flowsheet. I have also requested a call back if there have been any missed doses, concerns, illness, fever, or if there have been any changes in medications, activity level, or diet  Lilliam Pro RN             OBJECTIVE    Recent labs: (last 7 days)     11/20/20  1401   INR 3.5*       ASSESSMENT / PLAN  INR assessment SUPRA    Recheck INR In: 2 WEEKS    INR Location Outside lab      Anticoagulation Summary  As of 11/20/2020    INR goal:  2.0-3.0   TTR:  34.9 % (10.1 mo)   INR used for dosing:  3.5 (11/20/2020)   Warfarin maintenance plan:  10 mg (5 mg x 2) every Mon; 7.5 mg (5 mg x 1.5) all other days   Full warfarin instructions:  11/20: 5 mg; Otherwise 10 mg every Mon; 7.5 mg all other days   Weekly warfarin total:  55 mg   Plan last modified:  Lilliam Pro RN (11/20/2020)   Next INR check:  12/4/2020   Target end date:  Indefinite    Indications    DVT (deep venous thrombosis) (H) (Resolved) [I82.409]  Long-term (current) use of anticoagulants [Z79.01] [Z79.01]             Anticoagulation Episode Summary     INR check location:      Preferred lab:      Send INR reminders to:  [facility]    Comments:  5 mg, dosing card, PM dose      Anticoagulation Care Providers     Provider Role Specialty Phone number    Sylvester Cash MD Referring [facility] [phone]            See the Encounter Report to view Anticoagulation Flowsheet and Dosing Calendar (Go to Encounters tab  in chart review, and find the Anticoagulation Therapy Visit)    Dosage adjustment made based on physician directed care plan.    Lilliam Pro RN

## 2020-11-20 NOTE — PROGRESS NOTES
SUBJECTIVE:   CC: Hollis Diggs is an 50 year old male who presents for preventative health visit.     Patient has been advised of split billing requirements and indicates understanding: Yes  Healthy Habits:   PHQ-2 Total Score: 2      Today's PHQ-2 Score:   PHQ-2 ( 1999 Pfizer) 11/8/2019   Q1: Little interest or pleasure in doing things 1   Q2: Feeling down, depressed or hopeless 1   PHQ-2 Score 2   Q1: Little interest or pleasure in doing things Several days   Q2: Feeling down, depressed or hopeless Several days   PHQ-2 Score 2       Abuse: Current or Past(Physical, Sexual or Emotional)- No  Do you feel safe in your environment? Yes    Here for physical today but has several other concerns.  He is on long-term Coumadin because of DVT.  He takes oxycodone because of chronic back problems.  He has been taking some phentermine lately for weight loss.  He has mental health issues and follows with psychiatry at [facility].  Has made some changes recently.  He has noticed a little bit of an intention tremor.  He does not know if is from the medications.  Also of possible exposure to COVID-19.  His wife is being quarantined right now.  He would like to be tested.  He has no symptoms.        Social History     Tobacco Use     Smoking status: Never Smoker     Smokeless tobacco: Never Used   Substance Use Topics     Alcohol use: Yes     Comment: rare     If you drink alcohol do you typically have >3 drinks per day or >7 drinks per week? No    Alcohol Use 11/8/2019   Prescreen: >3 drinks/day or >7 drinks/week? No   Prescreen: >3 drinks/day or >7 drinks/week? -   No flowsheet data found.    Last PSA:   PSA   Date Value Ref Range Status   11/08/2019 0.97 0 - 4 ug/L Final     Comment:     Assay Method:  Chemiluminescence using Siemens Vista analyzer       Reviewed orders with patient. Reviewed health maintenance and updated orders accordingly - Yes      Reviewed and updated as needed this visit by clinical staff  Tobacco   Allergies  Meds      Soc Hx        Reviewed and updated as needed this visit by Provider                Past Medical History:   Diagnosis Date     Antiphospholipid syndrome (H)      Arthritis      Asthma     Exercise     blood clot in leg      Depressive disorder, not elsewhere classified     Depression (non-psychotic)     ANKIT (generalised anxiety disorder)      HH (hiatus hernia)      Hypercholesteremia     normal with weight loss 3/09     Lumbar disc herniation 1992     Seronegative rheumatoid arthritis (H)       Past Surgical History:   Procedure Laterality Date     APPENDECTOMY       COLONOSCOPY N/A 8/2/2016    Procedure: COMBINED COLONOSCOPY, SINGLE OR MULTIPLE BIOPSY/POLYPECTOMY BY BIOPSY;  Surgeon: Sydnee Walton MD;  Location: [facility] OR     COLONOSCOPY WITH CO2 INSUFFLATION N/A 8/2/2016    Procedure: COLONOSCOPY WITH CO2 INSUFFLATION;  Surgeon: Sydnee Walton MD;  Location: [facility] OR     COMBINED ESOPHAGOSCOPY, GASTROSCOPY, DUODENOSCOPY (EGD) WITH CO2 INSUFFLATION N/A 8/2/2016    Procedure: COMBINED ESOPHAGOSCOPY, GASTROSCOPY, DUODENOSCOPY (EGD) WITH CO2 INSUFFLATION;  Surgeon: Sydnee Walton MD;  Location: [facility] OR     ESOPHAGOSCOPY, GASTROSCOPY, DUODENOSCOPY (EGD), COMBINED N/A 8/2/2016    Procedure: COMBINED ESOPHAGOSCOPY, GASTROSCOPY, DUODENOSCOPY (EGD), BIOPSY SINGLE OR MULTIPLE;  Surgeon: Sydnee Walton MD;  Location: [facility] OR     HC REMOVAL OF TONSILS,<10 Y/O      Tonsils <11y.o.     HC REPAIR INCISIONAL HERNIA,REDUCIBLE  1970's    Hernia Repair, Incisional, Unilateral     HC UGI ENDOSCOPY DIAG W BIOPSY  02/01/06     HC VASECTOMY UNILAT/BILAT W POSTOP SEMEN  1/05    Vasectomy     History back lumbar laminectomy       ZZC NONSPECIFIC PROCEDURE  91 or 92    back surgery. lumbar. lamiectomy       Review of Systems  CONSTITUTIONAL: NEGATIVE for fever, chills, change in weight  INTEGUMENTARY/SKIN: NEGATIVE for worrisome rashes, moles or lesions  EYES:  "NEGATIVE for vision changes or irritation  ENT: NEGATIVE for ear, mouth and throat problems  RESP: NEGATIVE for significant cough or SOB  CV: NEGATIVE for chest pain, palpitations or peripheral edema  GI: NEGATIVE for nausea, abdominal pain, heartburn, or change in bowel habits   male: negative for dysuria, hematuria, decreased urinary stream, erectile dysfunction, urethral discharge  MUSCULOSKELETAL: NEGATIVE for significant arthralgias or myalgia  NEURO: NEGATIVE for weakness, dizziness or paresthesias  PSYCHIATRIC: NEGATIVE for changes in mood or affect    OBJECTIVE:   /70   Pulse 104   Temp 97.8  F (36.6  C) (Temporal)   Resp 16   Ht 1.854 m (6' 1\")   Wt 106.2 kg (234 lb 3.2 oz)   SpO2 99%   BMI 30.90 kg/m      Physical Exam  GENERAL: healthy, no distress and rather disheveled appearing.  EYES: Eyes grossly normal to inspection, PERRL and conjunctivae and sclerae normal  HENT: ear canals and TM's normal, nose and mouth without ulcers or lesions  NECK: no adenopathy, no asymmetry, masses, or scars and thyroid normal to palpation  RESP: lungs clear to auscultation - no rales, rhonchi or wheezes  CV: regular rate and rhythm, normal S1 S2, no S3 or S4, no murmur, click or rub, no peripheral edema and peripheral pulses strong  ABDOMEN: soft, nontender, no hepatosplenomegaly, no masses and bowel sounds normal  MS: no gross musculoskeletal defects noted, no edema  SKIN: no suspicious lesions or rashes  NEURO: Normal strength and tone, mentation intact and speech normal.  Very light intention tremor noted.  PSYCH: mentation appears normal, affect normal/bright    Diagnostic Test Results:  Labs reviewed in Epic  Results for orders placed or performed in visit on 11/20/20 (from the past 24 hour(s))   INR   Result Value Ref Range    INR Canceled, Test credited 0.86 - 1.14   INR point of care   Result Value Ref Range    INR Point of Care 3.5 (H) 0.86 - 1.14   Capillary Blood Collection   Result Value Ref " "Range    Capillary Blood Collection Capillary collection performed        ASSESSMENT/PLAN:   1. Encounter for general adult medical examination with abnormal findings  See discussion below.    2. Long-term (current) use of anticoagulants [Z79.01]  We will follow off.  - INR  - INR point of care    3. Tremor  Could be from medications.  I am not sure if he is taking the phentermine right now.  But his pulse is up a little bit we will try him on some metoprolol.  - metoprolol succinate ER (TOPROL XL) 50 MG 24 hr tablet; Take 1 tablet (50 mg) by mouth daily  Dispense: 30 tablet; Refill: 1    4. Exposure to COVID-19 virus  We will notify with results.  - Asymptomatic COVID-19 Virus (Coronavirus) by PCR; Future  - Capillary Blood Collection    5. Long term current use of anticoagulant therapy  See above  - INR  - INR point of care    6. DVT (deep venous thrombosis) (H)  See above  - INR point of care    7. Need for vaccination    - ADMIN 1st VACCINE    Patient has been advised of split billing requirements and indicates understanding: Yes  COUNSELING:   Reviewed preventive health counseling, as reflected in patient instructions       Regular exercise       Healthy diet/nutrition       Vision screening    Estimated body mass index is 30.9 kg/m  as calculated from the following:    Height as of this encounter: 1.854 m (6' 1\").    Weight as of this encounter: 106.2 kg (234 lb 3.2 oz).     Weight management plan: Discussed healthy diet and exercise guidelines    He reports that he has never smoked. He has never used smokeless tobacco.      Counseling Resources:  ATP IV Guidelines  Pooled Cohorts Equation Calculator  FRAX Risk Assessment  ICSI Preventive Guidelines  Dietary Guidelines for Americans, 2010  USDA's MyPlate  ASA Prophylaxis  Lung CA Screening    Sylvester Cash MD  [facility]  "

## 2020-11-21 LAB
SARS-COV-2 RNA SPEC QL NAA+PROBE: NOT DETECTED
SPECIMEN SOURCE: NORMAL

## 2020-11-21 RX ORDER — OMEPRAZOLE 40 MG/1
40 CAPSULE, DELAYED RELEASE ORAL DAILY
Qty: 30 CAPSULE | Refills: 1 | Status: SHIPPED | OUTPATIENT
Start: 2020-11-21 | End: 2020-11-25

## 2020-11-21 RX ORDER — GABAPENTIN 300 MG/1
300 CAPSULE ORAL
Qty: 30 CAPSULE | Refills: 1 | Status: SHIPPED | OUTPATIENT
Start: 2020-11-21 | End: 2020-11-25

## 2020-11-21 NOTE — TELEPHONE ENCOUNTER
Pending Prescriptions:                       Disp   Refills    gabapentin (NEURONTIN) 300 MG capsule      30 cap*1        Sig: Take 1 capsule (300 mg) by mouth nightly as needed           (cough)    omeprazole (PRILOSEC) 40 MG DR capsule     30 cap*1        Sig: Take 1 capsule (40 mg) by mouth daily      Routing refill request to provider for review/approval because:  Drug not active on patient's medication list    Bita Vazquez RN

## 2020-11-24 ENCOUNTER — DOCUMENTATION ONLY (OUTPATIENT)
Dept: SLEEP MEDICINE | Facility: CLINIC | Age: 50
End: 2020-11-24

## 2020-11-24 NOTE — PROGRESS NOTES
14  DAY STM VISIT    Diagnostic AHI: 31.2    PSG    Subjective measures:   Patient reports no issues with pressure or mask fit.   He reports nightly usage, however device has only called in once.      Assessment:  Patient meeting subjective benchmarks.     Action plan: pt to have 30 day STM visit.  See if device is reporting at 30 day STM if not contact [facility]       Device type: Auto-CPAP         Mask type:  Nasal Mask            Objective measure goal  Compliance   Goal >70%  Leak   Goal < 24 lpm  AHI  Goal < 5  Usage  Goal >240        Total time spent on accessing and interpreting remote patient PAP therapy data  10 minutes    Total time spent counseling, coaching  and reviewing PAP therapy data with patient  3 minutes    [id number]  [id number]  no (3 day STM)

## 2020-11-25 DIAGNOSIS — K21.9 LPRD (LARYNGOPHARYNGEAL REFLUX DISEASE): ICD-10-CM

## 2020-11-25 DIAGNOSIS — R05.9 COUGH: ICD-10-CM

## 2020-11-25 RX ORDER — OMEPRAZOLE 40 MG/1
40 CAPSULE, DELAYED RELEASE ORAL DAILY
Qty: 30 CAPSULE | Refills: 1 | Status: SHIPPED | OUTPATIENT
Start: 2020-11-25 | End: 2021-01-29

## 2020-11-25 RX ORDER — GABAPENTIN 300 MG/1
300 CAPSULE ORAL
Qty: 30 CAPSULE | Refills: 1 | Status: SHIPPED | OUTPATIENT
Start: 2020-11-25 | End: 2020-12-30

## 2020-11-25 NOTE — TELEPHONE ENCOUNTER
GABAPENTIN  Last Written Prescription Date:  11/21/2020  Last Fill Quantity: 30 CAPSULE,  # refills: 1   Last office visit: 9/28/2020   Future Office Visit: NO  Next 5 appointments (look out 90 days)    Dec 04, 2020  2:30 PM  Lab visit with [facility] ([address] [address]  [phone]           Requested Prescriptions   Pending Prescriptions Disp Refills     gabapentin (NEURONTIN) 300 MG capsule 30 capsule 1     Sig: Take 1 capsule (300 mg) by mouth nightly as needed (cough)       There is no refill protocol information for this order        omeprazole (PRILOSEC) 40 MG DR capsule 30 capsule 1     Sig: Take 1 capsule (40 mg) by mouth daily       There is no refill protocol information for this order              Macy Ayoub on 11/25/2020 at 8:35 AM      OMEPRAZOLE  Last Written Prescription Date:  11/21/2020  Last Fill Quantity: 30 CAPSULE,  # refills: 1   Last office visit: 9/28/2020    Future Office Visit: NO  Next 5 appointments (look out 90 days)    Dec 04, 2020  2:30 PM  Lab visit with [facility] ([address] [address]  [phone]           Requested Prescriptions   Pending Prescriptions Disp Refills     gabapentin (NEURONTIN) 300 MG capsule 30 capsule 1     Sig: Take 1 capsule (300 mg) by mouth nightly as needed (cough)       There is no refill protocol information for this order        omeprazole (PRILOSEC) 40 MG DR capsule 30 capsule 1     Sig: Take 1 capsule (40 mg) by mouth daily       There is no refill protocol information for this order               Macy Ayoub on 11/25/2020 at 8:36 AM

## 2020-12-04 ENCOUNTER — ANTICOAGULATION THERAPY VISIT (OUTPATIENT)
Dept: ANTICOAGULATION | Facility: CLINIC | Age: 50
End: 2020-12-04

## 2020-12-04 DIAGNOSIS — Z79.01 LONG TERM CURRENT USE OF ANTICOAGULANT THERAPY: ICD-10-CM

## 2020-12-04 LAB — INR BLD: 3.8 (ref 0.86–1.14)

## 2020-12-04 PROCEDURE — 36416 COLLJ CAPILLARY BLOOD SPEC: CPT | Performed by: FAMILY MEDICINE

## 2020-12-04 PROCEDURE — 85610 PROTHROMBIN TIME: CPT | Performed by: FAMILY MEDICINE

## 2020-12-04 PROCEDURE — 99207 PR NO CHARGE NURSE ONLY: CPT | Performed by: FAMILY MEDICINE

## 2020-12-04 NOTE — PROGRESS NOTES
ANTICOAGULATION FOLLOW-UP CLINIC VISIT    Patient Name:  Hollis Diggs  Date:  12/4/2020  Contact Type:  Telephone    SUBJECTIVE:  Patient Findings     Comments:  Patient denies any identifiable changes that caused the supratherapeutic INR. Lilliam Pro RN              Clinical Outcomes     Comments:  Patient denies any identifiable changes that caused the supratherapeutic INR. Lilliam Pro RN                 OBJECTIVE    Recent labs: (last 7 days)     12/04/20  1436   INR 3.8*       ASSESSMENT / PLAN  INR assessment SUPRA    Recheck INR In: 2 WEEKS    INR Location Outside lab      Anticoagulation Summary  As of 12/4/2020    INR goal:  2.0-3.0   TTR:  31.7 % (10.1 mo)   INR used for dosing:  3.8 (12/4/2020)   Warfarin maintenance plan:  5 mg (5 mg x 1) every Fri; 7.5 mg (5 mg x 1.5) all other days   Full warfarin instructions:  5 mg every Fri; 7.5 mg all other days   Weekly warfarin total:  50 mg   Plan last modified:  Lilliam Pro RN (12/4/2020)   Next INR check:  12/18/2020   Target end date:  Indefinite    Indications    DVT (deep venous thrombosis) (H) (Resolved) [I82.409]  Long-term (current) use of anticoagulants [Z79.01] [Z79.01]             Anticoagulation Episode Summary     INR check location:      Preferred lab:      Send INR reminders to:  [facility] [facility]    Comments:  5 mg, dosing card, PM dose      Anticoagulation Care Providers     Provider Role Specialty Phone number    Sylvester Cash MD Referring [facility] [phone]            See the Encounter Report to view Anticoagulation Flowsheet and Dosing Calendar (Go to Encounters tab in chart review, and find the Anticoagulation Therapy Visit)    Dosage adjustment made based on physician directed care plan.        Lilliam Pro RN

## 2020-12-08 ENCOUNTER — DOCUMENTATION ONLY (OUTPATIENT)
Dept: SLEEP MEDICINE | Facility: CLINIC | Age: 50
End: 2020-12-08
Payer: COMMERCIAL

## 2020-12-08 ENCOUNTER — VIRTUAL VISIT (OUTPATIENT)
Dept: FAMILY MEDICINE | Facility: OTHER | Age: 50
End: 2020-12-08
Payer: COMMERCIAL

## 2020-12-08 PROCEDURE — 99421 OL DIG E/M SVC 5-10 MIN: CPT | Performed by: PREVENTIVE MEDICINE

## 2020-12-08 NOTE — PROGRESS NOTES
30 DAY STM VISIT    Diagnostic AHI: 31.2  PSG      Subjective measures:   Patient has trouble exhaling saying the machine stops for two seconds when he exhales.     Assessment: Pt not meeting objective benchmarks for compliance  Patient meeting subjective benchmarks.   Action plan: pt to have 6 month [facility] visit  Patient has not scheduled a follow up visit.  Device type: Auto-CPAP  PAP settings: CPAP min 5.0 cm  H20     CPAP max 15.0 cm  H20     90th % pressure 7.1  cm  H20    Mask type:  Nasal Mask    Objective measures: 14 day rolling measures         Compliance  80 %     % of night spent in large leak  3801 % last  upload      AHI 8.08   last  upload      Average number of minutes 315          Objective measure goal  Compliance   Goal >70%  Leak   Goal < 10%  AHI  Goal < 5  Usage  Goal >240      Total time spent on accessing and interpreting remote patient PAP therapy data  10 minutes    Total time spent counseling, coaching  and reviewing PAP therapy data with patient  1 minutes    [id number] no this call  [id number]  at 3 or 14 day [facility]

## 2020-12-08 NOTE — PROGRESS NOTES
"Date: 2020 16:53:57  Clinician: Kai Cash  Clinician NPI: [id number]  Patient: Hollis Diggs  Patient : 1970  Patient Address: [address] [place] [id number]  Patient Phone: [phone]  Visit Protocol: URI  Patient Summary:  Hollis is a 50 year old ( : 1970 ) male who initiated a [facility] Visit for COVID-19 (Coronavirus) evaluation and screening. When asked the question \"Please sign me up to receive news, health information and promotions from [facility].\", Hollis responded \"No\".    Hollis states his symptoms started 1-2 days ago.   His symptoms consist of a headache, enlarged lymph nodes, a cough, nausea, myalgia, chills, malaise, a sore throat, and ageusia. Hollis also feels feverish.   Symptom details     Cough: Hollis coughs every 5-10 minutes and his cough is not more bothersome at night. Phlegm comes into his throat when he coughs. He does not believe his cough is caused by post-nasal drip. The color of the phlegm is yellow.     Sore throat: Hollis reports having mild throat pain (1-3 on a 10 point pain scale), does not have exudate on his tonsils, and can swallow liquids. The lymph nodes in his neck are enlarged. A rash has not appeared on the skin since the sore throat started.     Temperature: His current temperature is 100.4 degrees Fahrenheit. Hollis has had a temperature over 100 degrees Fahrenheit for 1-2 days.     Headache: He states the headache is mild (1-3 on a 10 point pain scale).      Hollis denies having ear pain, wheezing, nasal congestion, vomiting, rhinitis, facial pain or pressure, teeth pain, diarrhea, and anosmia. He also denies taking antibiotic medication in the past month, having recent facial or sinus surgery in the past 60 days, and having a sinus infection within the past year. He is not experiencing dyspnea.   Precipitating events  Within the past week, Hollis has not been exposed to someone with strep throat. He has not recently been exposed to " someone with influenza. Hollis has not been in close contact with any high risk individuals.   Pertinent COVID-19 (Coronavirus) information  Hollis does not work or volunteer as healthcare worker or a . In the past 14 days, Hollis has not worked or volunteered at a healthcare facility or group living setting.   In the past 14 days, he also has not lived in a congregate living setting.   Hollis has had a close contact with a laboratory-confirmed COVID-19 patient within 14 days of symptom onset. He was exposed at his work. He does not know when he was exposed to the laboratory-confirmed COVID-19 patient.   Additional information about contact with COVID-19 (Coronavirus) patient as reported by the patient (free text): Six people at work had it    Since December 2019, Hollis has been tested for COVID-19 and has not had upper respiratory infection or influenza-like illness.      Result of COVID-19 test: Negative     Date of his COVID-19 test: 11/13/2020      Pertinent medical history   He reports having immunosuppressive condition(s). 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   Hollis does not have diabetes. He does not have severe COPD and congestive heart failure. He does not have asthma.   Hollis needs a return to work/school note.   Weight: 235 lbs   Hollis does not smoke or use smokeless tobacco.   Additional information as reported by the patient (free text): I can't taste anything   Weight: 235 lbs  Reason for repeat visit for the same protocol within 24 hours:  I had my wife look at the back of my throat no spots  See the History of referred by protocol and completed visits section for details on previous visits (visits currently in queue to be diagnosed will not appear in this section).    MEDICATIONS: warfarin oral, ALLERGIES: NKDA  Clinician Response:  Dear Hollis,   Your  symptoms show that you may have coronavirus (COVID-19). This illness can cause fever, cough and trouble breathing. Many people get a mild case and get better on their own. Some people can get very sick.  What should I do?  We would like to test you for this virus.   1. Please call [phone] to schedule your visit. Explain that you were referred by [facility] to have a COVID-19 test. Be ready to share your [facility] visit ID number.  * If you need to schedule in [facility] please call [phone] or for [employer] employees please call [phone].  * If you need to schedule in the [place] area please call [phone]. [place] employees call [phone].  The following will serve as your written order for this COVID Test, ordered by me, for the indication of suspected COVID [Z20.828]: The test will be ordered in [employer], our electronic health record, after you are scheduled. It will show as ordered and authorized by Santiago Wiggins MD.  Order: COVID-19 (Coronavirus) PCR for SYMPTOMATIC testing from [facility].   2. When it's time for your COVID test:  Stay at least 6 feet away from others. (If someone will drive you to your test, stay in the backseat, as far away from the  as you can.)   Cover your mouth and nose with a mask, tissue or washcloth.  Go straight to the testing site. Don't make any stops on the way there or back.      3.Starting now: Stay home and away from others (self-isolate) until:   You've had no fever---and no medicine that reduces fever---for one full day (24 hours). And...   Your other symptoms have gotten better. For example, your cough or breathing has improved. And...   At least 10 days have passed since your symptoms started.       During this time, don't leave the house except for testing or medical care.   Stay in your own room, even for meals. Use your own bathroom if you can.   Stay away from others in your home. No hugging, kissing or shaking hands. No visitors.  Don't go to work, school or  "anywhere else.    Clean \"high touch\" surfaces often (doorknobs, counters, handles, etc.). Use a household cleaning spray or wipes. You'll find a full list of  on the EPA website: www.epa.gov/pesticide-registration/list-n-disinfectants-use-against-sars-cov-2.   Cover your mouth and nose with a mask, tissue or washcloth to avoid spreading germs.  Wash your hands and face often. Use soap and water.  Caregivers in these groups are at risk for severe illness due to COVID-19:  o People 65 years and older  o People who live in a nursing home or long-term care facility  o People with chronic disease (lung, heart, cancer, diabetes, kidney, liver, immunologic)  o People who have a weakened immune system, including those who:   Are in cancer treatment  Take medicine that weakens the immune system, such as corticosteroids  Had a bone marrow or organ transplant  Have an immune deficiency  Have poorly controlled HIV or AIDS  Are obese (body mass index of 40 or higher)  Smoke regularly   o Caregivers should wear gloves while washing dishes, handling laundry and cleaning bedrooms and bathrooms.  o Use caution when washing and drying laundry: Don't shake dirty laundry, and use the warmest water setting that you can.  o For more tips, go to www.cdc.gov/coronavirus/2019-ncov/downloads/10Things.pdf.    4.Sign up for [employer]. We know it's scary to hear that you might have COVID-19. We want to track your symptoms to make sure you're okay over the next 2 weeks. Please look for an email from [employer]---this is a free, online program that we'll use to keep in touch. To sign up, follow the link in the email. Learn more at [email or web address]  How can I take care of myself?   Get lots of rest. Drink extra fluids (unless a doctor has told you not to).   Take Tylenol (acetaminophen) for fever or pain. If you have liver or kidney problems, ask your family doctor if it's okay to take Tylenol.   Adults can take " either:    650 mg (two 325 mg pills) every 4 to 6 hours, or...   1,000 mg (two 500 mg pills) every 8 hours as needed.    Note: Don't take more than 3,000 mg in one day. Acetaminophen is found in many medicines (both prescribed and over-the-counter medicines). Read all labels to be sure you don't take too much.   For children, check the Tylenol bottle for the right dose. The dose is based on the child's age or weight.    If you have other health problems (like cancer, heart failure, an organ transplant or severe kidney disease): Call your specialty clinic if you don't feel better in the next 2 days.       Know when to call 911. Emergency warning signs include:    Trouble breathing or shortness of breath Pain or pressure in the chest that doesn't go away Feeling confused like you haven't felt before, or not being able to wake up Bluish-colored lips or face.  Where can I get more information?   [facility] -- About COVID-19: [email or web address]   CDC -- What to Do If You're Sick: www.cdc.gov/coronavirus/2019-ncov/about/steps-when-sick.html   CDC -- Ending Home Isolation: www.cdc.gov/coronavirus/2019-ncov/hcp/disposition-in-home-patients.html   CDC -- Caring for Someone: www.cdc.gov/coronavirus/2019-ncov/if-you-are-sick/care-for-someone.html   [facility] -- Interim Guidance for Hospital Discharge to Home: www.health.[facility].mn.us/diseases/coronavirus/hcp/hospdischarge.pdf   [facility] clinical trials (COVID-19 research studies): clinicalaffairs.[facility]/n-clinical-trials    Below are the COVID-19 hotlines at the [place] Department of Health ([facility]). Interpreters are available.    For health questions: Call 037-255-2718 or 1-587.525.4251 (7 a.m. to 7 p.m.) For questions about schools and childcare: Call 492-961-4261 or 1-191.919.9887 (7 a.m. to 7 p.m.)    Diagnosis: Cough  Diagnosis ICD: R05

## 2020-12-15 DIAGNOSIS — Z20.822 SUSPECTED 2019 NOVEL CORONAVIRUS INFECTION: Primary | ICD-10-CM

## 2020-12-15 DIAGNOSIS — Z20.822 SUSPECTED 2019 NOVEL CORONAVIRUS INFECTION: ICD-10-CM

## 2020-12-15 PROCEDURE — U0003 INFECTIOUS AGENT DETECTION BY NUCLEIC ACID (DNA OR RNA); SEVERE ACUTE RESPIRATORY SYNDROME CORONAVIRUS 2 (SARS-COV-2) (CORONAVIRUS DISEASE [COVID-19]), AMPLIFIED PROBE TECHNIQUE, MAKING USE OF HIGH THROUGHPUT TECHNOLOGIES AS DESCRIBED BY CMS-2020-01-R: HCPCS | Performed by: FAMILY MEDICINE

## 2020-12-16 LAB
SARS-COV-2 RNA SPEC QL NAA+PROBE: ABNORMAL
SPECIMEN SOURCE: ABNORMAL

## 2020-12-18 ENCOUNTER — OFFICE VISIT (OUTPATIENT)
Dept: FAMILY MEDICINE | Facility: CLINIC | Age: 50
End: 2020-12-18
Payer: COMMERCIAL

## 2020-12-18 ENCOUNTER — MYC MEDICAL ADVICE (OUTPATIENT)
Dept: FAMILY MEDICINE | Facility: CLINIC | Age: 50
End: 2020-12-18

## 2020-12-18 ENCOUNTER — TELEPHONE (OUTPATIENT)
Dept: FAMILY MEDICINE | Facility: CLINIC | Age: 50
End: 2020-12-18

## 2020-12-18 ENCOUNTER — HOSPITAL ENCOUNTER (OUTPATIENT)
Dept: ULTRASOUND IMAGING | Facility: CLINIC | Age: 50
Discharge: HOME | End: 2020-12-18
Attending: NURSE PRACTITIONER | Admitting: NURSE PRACTITIONER
Payer: COMMERCIAL

## 2020-12-18 ENCOUNTER — ANTICOAGULATION THERAPY VISIT (OUTPATIENT)
Dept: ANTICOAGULATION | Facility: CLINIC | Age: 50
End: 2020-12-18
Payer: COMMERCIAL

## 2020-12-18 ENCOUNTER — TELEPHONE (OUTPATIENT)
Dept: ANTICOAGULATION | Facility: CLINIC | Age: 50
End: 2020-12-18

## 2020-12-18 VITALS
WEIGHT: 238 LBS | BODY MASS INDEX: 31.54 KG/M2 | SYSTOLIC BLOOD PRESSURE: 138 MMHG | RESPIRATION RATE: 14 BRPM | HEIGHT: 73 IN | HEART RATE: 78 BPM | TEMPERATURE: 97.7 F | DIASTOLIC BLOOD PRESSURE: 78 MMHG | OXYGEN SATURATION: 99 %

## 2020-12-18 DIAGNOSIS — R10.32 SEVERE LEFT GROIN PAIN: ICD-10-CM

## 2020-12-18 DIAGNOSIS — Z79.01 LONG TERM CURRENT USE OF ANTICOAGULANT THERAPY: ICD-10-CM

## 2020-12-18 DIAGNOSIS — R10.32 SEVERE LEFT GROIN PAIN: Primary | ICD-10-CM

## 2020-12-18 LAB
CAPILLARY BLOOD COLLECTION: NORMAL
INR BLD: 4.4 (ref 0.86–1.14)

## 2020-12-18 PROCEDURE — 99207 PR NO CHARGE NURSE ONLY: CPT | Performed by: FAMILY MEDICINE

## 2020-12-18 PROCEDURE — 99213 OFFICE O/P EST LOW 20 MIN: CPT | Performed by: NURSE PRACTITIONER

## 2020-12-18 PROCEDURE — 93976 VASCULAR STUDY: CPT

## 2020-12-18 PROCEDURE — 85610 PROTHROMBIN TIME: CPT | Performed by: FAMILY MEDICINE

## 2020-12-18 PROCEDURE — 36416 COLLJ CAPILLARY BLOOD SPEC: CPT | Performed by: FAMILY MEDICINE

## 2020-12-18 RX ORDER — OXYCODONE AND ACETAMINOPHEN 5; 325 MG/1; MG/1
1 TABLET ORAL EVERY 6 HOURS PRN
Qty: 18 TABLET | Refills: 0 | Status: SHIPPED | OUTPATIENT
Start: 2020-12-18 | End: 2020-12-21

## 2020-12-18 ASSESSMENT — PAIN SCALES - GENERAL: PAINLEVEL: EXTREME PAIN (9)

## 2020-12-18 ASSESSMENT — MIFFLIN-ST. JEOR: SCORE: 1985.82

## 2020-12-18 NOTE — TELEPHONE ENCOUNTER
Anticoagulation Management    Unable to reach Hollis today.    Today's INR result of 4.4 is supratherapeutic (goal INR of 2.0-3.0).  Result received from: Clinic Lab    Follow up required to discuss out of range INR     Left message to hold warfarin tonight.      Anticoagulation clinic to follow up    Jesse Flores RN

## 2020-12-18 NOTE — TELEPHONE ENCOUNTER
I have attempted to contact this patient by phone, left message to return call at [phone] or [phone].  Will try again later.  Advised holding his dose today.     SURESH ZavaletaN, RN- Coumadin Clinic RN

## 2020-12-18 NOTE — PROGRESS NOTES
"Subjective     Hollis Diggs is a 50 year old male who presents to clinic today for the following health issues:    HPI         Concern - Left side groin pain   Onset: 2 months  Description: Had dull pain at first, has been getting worse to the point where he is having to bend over.   Intensity: severe, 9/10  Progression of Symptoms:  worsening  Accompanying Signs & Symptoms: none  Previous history of similar problem: yes inguinal and hiatal hernia  Precipitating factors:        Worsened by: movement lifting  Alleviating factors:        Improved by: nothing  Therapies tried and outcome:  none     Pain is uncomfortable.  Two months ago.  He has not checked his private area.  Trying to ignore.  Had surgery on right as 7=8 years old. It feels like it did when he had a hernia in the past.  The pain is sharp and radiates into his testicle.     He is on a blood thinner for history of DVT. .        Review of Systems   Constitutional, HEENT, cardiovascular, pulmonary, gi and gu systems are negative, except as otherwise noted.      Objective    /78   Pulse 78   Temp 97.7  F (36.5  C)   Resp 14   Ht 1.85 m (6' 0.84\")   Wt 108 kg (238 lb)   SpO2 99%   BMI 31.54 kg/m    Body mass index is 31.54 kg/m .  Physical Exam   GENERAL: healthy, alert and no distress  EYES: Eyes grossly normal to inspection, PERRL and conjunctivae and sclerae normal  HENT: ear canals and TM's normal, nose and mouth without ulcers or lesions  NECK: no adenopathy, no asymmetry, masses, or scars and thyroid normal to palpation  RESP: lungs clear to auscultation - no rales, rhonchi or wheezes  CV: regular rate and rhythm, normal S1 S2, no S3 or S4, no murmur, click or rub, no peripheral edema and peripheral pulses strong  ABDOMEN: soft, nontender, no hepatosplenomegaly, no masses and bowel sounds normal   (male): testicles normal without atrophy or masses, hernia left inguinal- unable to reduce.  and penis normal without urethral " discharge  MS: no gross musculoskeletal defects noted, no edema  SKIN: no suspicious lesions or rashes  NEURO: Normal strength and tone, mentation intact and speech normal  PSYCH: mentation appears normal, affect normal/bright    Results for orders placed or performed in visit on 12/18/20 (from the past 24 hour(s))   INR point of care   Result Value Ref Range    INR Point of Care 4.4 (H) 0.86 - 1.14   Capillary Blood Collection   Result Value Ref Range    Capillary Blood Collection Capillary collection performed            Assessment & Plan     Severe left groin pain  Ordered us for evaluation of possible incarcerated hernia.  None was appreciated.  Will have him see General surgery on Monday- pain medications given and discussed symptoms to go to Ed.    - US Abdomen Limited; Future  - GENERAL SURG ADULT REFERRAL; Future  - oxyCODONE-acetaminophen (PERCOCET) 5-325 MG tablet; Take 1 tablet by mouth every 6 hours as needed for pain          Return in about 3 days (around 12/21/2020) for Dr. Hurt.    Wandy Christy NP  [facility]

## 2020-12-18 NOTE — PROGRESS NOTES
ANTICOAGULATION FOLLOW-UP CLINIC VISIT    Patient Name:  Hollis Diggs  Date:  2020  Contact Type:  Telephone/ VM left    SUBJECTIVE:  Patient Findings     Comments:  I left a detailed voicemail with the orders reflected in flowsheet. I have also requested a call back if there have been any missed doses, concerns, illness, fever, or if there have been any changes in medications, activity level, or diet  Lilliam Pro RN          Clinical Outcomes     Comments:  I left a detailed voicemail with the orders reflected in flowsheet. I have also requested a call back if there have been any missed doses, concerns, illness, fever, or if there have been any changes in medications, activity level, or diet  Lilliam Pro RN             OBJECTIVE    Recent labs: (last 7 days)     20  0913   INR 4.4*       ASSESSMENT / PLAN  INR assessment SUPRA    Recheck INR In: 3 DAYS    INR Location Outside lab      Anticoagulation Summary  As of 2020    INR goal:  2.0-3.0   TTR:  31.7 % (10.1 mo)   INR used for dosin.4 (2020)   Warfarin maintenance plan:  5 mg (5 mg x 1) every Fri; 7.5 mg (5 mg x 1.5) all other days   Full warfarin instructions:  : Hold; : Hold; Otherwise 5 mg every Fri; 7.5 mg all other days   Weekly warfarin total:  50 mg   Plan last modified:  Lilliam Pro RN (2020)   Next INR check:  2020   Target end date:  Indefinite    Indications    DVT (deep venous thrombosis) (H) (Resolved) [I82.409]  Long-term (current) use of anticoagulants [Z79.01] [Z79.01]             Anticoagulation Episode Summary     INR check location:      Preferred lab:      Send INR reminders to:  [facility]    Comments:  5 mg, dosing card, PM dose      Anticoagulation Care Providers     Provider Role Specialty Phone number    Sylvester Cash MD Referring [facility] [phone]            See the Encounter Report to view Anticoagulation Flowsheet and Dosing Calendar  (Go to Encounters tab in chart review, and find the Anticoagulation Therapy Visit)    Dosage adjustment made based on physician directed care plan.    Lilliam Pro RN

## 2020-12-18 NOTE — TELEPHONE ENCOUNTER
Anticoagulation Management    Unable to reach pt today.    Today's INR result of 4.4 is supratherapeutic (goal INR of 2.0-3.0).  Result received from: Clinic Lab    Follow up required to discuss out of range INR     Left message to Hold coumadin tonight and tomorrow, take 7.5 mg Sun this weekend.     He has an appt with provider on Monday, I have asked that he have his INR checked that day      Anticoagulation clinic to follow up    See episode  QUENTIN Drew RN

## 2020-12-21 ENCOUNTER — TELEPHONE (OUTPATIENT)
Dept: SLEEP MEDICINE | Facility: CLINIC | Age: 50
End: 2020-12-21

## 2020-12-21 ENCOUNTER — OFFICE VISIT (OUTPATIENT)
Dept: SURGERY | Facility: CLINIC | Age: 50
End: 2020-12-21
Payer: COMMERCIAL

## 2020-12-21 ENCOUNTER — MYC MEDICAL ADVICE (OUTPATIENT)
Dept: SURGERY | Facility: CLINIC | Age: 50
End: 2020-12-21

## 2020-12-21 ENCOUNTER — ANTICOAGULATION THERAPY VISIT (OUTPATIENT)
Dept: ANTICOAGULATION | Facility: CLINIC | Age: 50
End: 2020-12-21

## 2020-12-21 VITALS
TEMPERATURE: 97.1 F | BODY MASS INDEX: 31.53 KG/M2 | DIASTOLIC BLOOD PRESSURE: 70 MMHG | SYSTOLIC BLOOD PRESSURE: 122 MMHG | WEIGHT: 237.9 LBS

## 2020-12-21 DIAGNOSIS — Z79.01 LONG TERM CURRENT USE OF ANTICOAGULANT THERAPY: ICD-10-CM

## 2020-12-21 DIAGNOSIS — R10.32 LEFT GROIN PAIN: Primary | ICD-10-CM

## 2020-12-21 DIAGNOSIS — M54.40 ACUTE MIDLINE LOW BACK PAIN WITH SCIATICA, SCIATICA LATERALITY UNSPECIFIED: ICD-10-CM

## 2020-12-21 LAB — INR BLD: 1.7 (ref 0.86–1.14)

## 2020-12-21 PROCEDURE — 85610 PROTHROMBIN TIME: CPT | Performed by: FAMILY MEDICINE

## 2020-12-21 PROCEDURE — 36416 COLLJ CAPILLARY BLOOD SPEC: CPT | Performed by: FAMILY MEDICINE

## 2020-12-21 PROCEDURE — 99204 OFFICE O/P NEW MOD 45 MIN: CPT | Performed by: SPECIALIST

## 2020-12-21 NOTE — PROGRESS NOTES
ANTICOAGULATION MANAGEMENT     Patient Name:  Hollis Diggs  Date:  2020    ASSESSMENT /SUBJECTIVE:    Today's INR result of 1.7 is subtherapeutic. Goal INR of 2.0-3.0      Warfarin dose taken: Warfarin recently held for 2 days which may be affecting INR    Diet: No new diet changes affecting INR    Medication changes/ interactions: No new medications/supplements affecting INR    Previous INR: Supratherapeutic     S/S of bleeding or thromboembolism: n/a    New injury or illness: Yes: Pt seen for groin pain, inflammation may have caused INR to increase    Upcoming surgery, procedure or cardioversion: CT    Additional findings: None      PLAN:    Detailed message left for Hollis regarding INR result and instructed:     Warfarin Dosing Instructions: Change your warfarin dose to 5 mg Wed, Fri and 7.5 mg all other days    Instructed patient to follow up no later than: 1 week  Left detailed message with recommended recheck date    Education provided: Please call back if any changes to your diet, medications or how you've been taking warfarin    Instructed to call the Anticoagulation Clinic for any changes, questions or concerns. (#[phone])        Анна Hale RN      OBJECTIVE:  Recent labs: (last 7 days)     20  0913 20  1407   INR 4.4* 1.7*         No question data found.  Anticoagulation Summary  As of 2020    INR goal:  2.0-3.0   TTR:  32.1 % (10.1 mo)   INR used for dosin.7 (2020)   Warfarin maintenance plan:  5 mg (5 mg x 1) every Fri; 7.5 mg (5 mg x 1.5) all other days   Full warfarin instructions:  5 mg every Fri; 7.5 mg all other days   Weekly warfarin total:  50 mg   Plan last modified:  Lilliam Pro RN (2020)   Next INR check:     Target end date:  Indefinite    Indications    DVT (deep venous thrombosis) (H) (Resolved) [I82.409]  Long-term (current) use of anticoagulants [Z79.01] [Z79.01]             Anticoagulation Episode Summary     INR check location:       Preferred lab:      Send INR reminders to:  [facility]    Comments:  5 mg, dosing card, PM dose      Anticoagulation Care Providers     Provider Role Specialty Phone number    Sylvester Cash MD Referring [facility] [phone]

## 2020-12-21 NOTE — TELEPHONE ENCOUNTER
CALLED PT D LVM FOR THE PT TO CALL [facility] IF FURTHER ASSISTANCE IS NEEDED REGARDING THE PT CPAP MACHINE [phone].

## 2020-12-21 NOTE — PROGRESS NOTES
Consult requested by Wandy Christy      Reason for consultation: Left groin pain    HPI:  Patient is a 50-year-old white male with a many month history of left groin pain.  He states it occurs in Cerva band extending down to the testicle.  He does have a history of an inguinal hernia repair is a 6-year-old but does not know which side.  On Friday it suddenly worsened.  He denies any masses, fevers, chills, nausea, vomiting or penile discharge.  He did have an ultrasound that was essentially normal.  He is now referred to me for left groin pain.    Past Medical History:   Diagnosis Date     Antiphospholipid syndrome (H)      Arthritis      Asthma     Exercise     blood clot in leg      Depressive disorder, not elsewhere classified     Depression (non-psychotic)     ANKIT (generalised anxiety disorder)      HH (hiatus hernia)      Hypercholesteremia     normal with weight loss 3/09     Lumbar disc herniation 1992     Seronegative rheumatoid arthritis (H)      Past Surgical History:   Procedure Laterality Date     APPENDECTOMY       COLONOSCOPY N/A 8/2/2016    Procedure: COMBINED COLONOSCOPY, SINGLE OR MULTIPLE BIOPSY/POLYPECTOMY BY BIOPSY;  Surgeon: Sydnee Walton MD;  Location: [facility]     COLONOSCOPY WITH CO2 INSUFFLATION N/A 8/2/2016    Procedure: COLONOSCOPY WITH CO2 INSUFFLATION;  Surgeon: ySdnee Walton MD;  Location: [facility]     COMBINED ESOPHAGOSCOPY, GASTROSCOPY, DUODENOSCOPY (EGD) WITH CO2 INSUFFLATION N/A 8/2/2016    Procedure: COMBINED ESOPHAGOSCOPY, GASTROSCOPY, DUODENOSCOPY (EGD) WITH CO2 INSUFFLATION;  Surgeon: Sydnee Walton MD;  Location[facility]     ESOPHAGOSCOPY, GASTROSCOPY, DUODENOSCOPY (EGD), COMBINED N/A 8/2/2016    Procedure: COMBINED ESOPHAGOSCOPY, GASTROSCOPY, DUODENOSCOPY (EGD), BIOPSY SINGLE OR MULTIPLE;  Surgeon: Sydnee Walton MD;  Location: [facility]     HC REMOVAL OF TONSILS,<12 Y/O      Tonsils <11y.o.     HC REPAIR INCISIONAL  HERNIA,REDUCIBLE  1970's    Hernia Repair, Incisional, Unilateral     HC UGI ENDOSCOPY DIAG W BIOPSY  02/01/06     HC VASECTOMY UNILAT/BILAT W POSTOP SEMEN  1/05    Vasectomy     History back lumbar laminectomy       Z NONSPECIFIC PROCEDURE  91 or 92    back surgery. lumbar. lamiectomy     Current Outpatient Medications   Medication     ARIPiprazole (ABILIFY) 2 MG tablet     DULoxetine (CYMBALTA) 60 MG capsule     gabapentin (NEURONTIN) 300 MG capsule     gabapentin (NEURONTIN) 800 MG tablet     lithium (ESKALITH CR/LITHOBID) 450 MG CR tablet     metoprolol succinate ER (TOPROL XL) 50 MG 24 hr tablet     omeprazole (PRILOSEC) 40 MG DR capsule     oxyCODONE-acetaminophen (PERCOCET) 5-325 MG tablet     oxyCODONE-acetaminophen (PERCOCET) 5-325 MG tablet     phentermine (ADIPEX-P) 37.5 MG tablet     traZODone (DESYREL) 50 MG tablet     vitamin D3 (CHOLECALCIFEROL) 50 mcg (2000 units) tablet     warfarin ANTICOAGULANT (COUMADIN) 5 MG tablet     No current facility-administered medications for this visit.         Allergies   Allergen Reactions     No Known Drug Allergies      Social History     Socioeconomic History     Marital status:      Spouse name: Cassie     Number of children: 2     Years of education: 15     Highest education level: Not on file   Occupational History     Occupation:      Employer: [employer]   Social Needs     Financial resource strain: Not on file     Food insecurity     Worry: Not on file     Inability: Not on file     Transportation needs     Medical: Not on file     Non-medical: Not on file   Tobacco Use     Smoking status: Never Smoker     Smokeless tobacco: Never Used   Substance and Sexual Activity     Alcohol use: Yes     Comment: rare     Drug use: No     Sexual activity: Yes   Lifestyle     Physical activity     Days per week: Not on file     Minutes per session: Not on file     Stress: Not on file   Relationships     Social connections     Talks on phone:  "Not on file     Gets together: Not on file     Attends Advent service: Not on file     Active member of club or organization: Not on file     Attends meetings of clubs or organizations: Not on file     Relationship status: Not on file     Intimate partner violence     Fear of current or ex partner: Not on file     Emotionally abused: Not on file     Physically abused: Not on file     Forced sexual activity: Not on file   Other Topics Concern      Service No     Blood Transfusions No     Caffeine Concern Yes     Comment: 64 oz q day     Occupational Exposure Not Asked     Hobby Hazards Not Asked     Sleep Concern Yes     Stress Concern No     Weight Concern No     Special Diet Not Asked     Back Care Not Asked     Exercise Yes     Comment: sometimes     Bike Helmet Not Asked     Seat Belt Yes     Self-Exams No     Parent/sibling w/ CABG, MI or angioplasty before 65F 55M? Not Asked   Social History Narrative    4 children healthy     Family History   Problem Relation Age of Onset     Brain Tumor Mother         Benign     Deep Vein Thrombosis Mother         \"neck\"      Heart Disease Father         \"wont tell anyone\"     Neurologic Disorder Paternal Grandmother         Parkinsons      Alcohol/Drug Paternal Grandfather         alcoholic     Cancer Maternal Grandfather         lung     Diabetes Maternal Grandmother      Cerebrovascular Disease Maternal Grandmother         tim age ~70     Arthritis Cousin         cousins x 2 \"RA\"     Musculoskeletal Disorder Maternal Aunt         MS     Family History Negative Other         psoriasis, crohns, UC, SLE      ROS: 10 point ROS neg other than the symptoms noted above in the HPI.    PE:  B/P: 122/70, T: 97.1, P: Data Unavailable, R: Data Unavailable  General: well developed, well nourished WM who appears his stated age  HEENT: NC/AT, EOMI, (-)icterus, (-)injection  Neck: Supple, No JVD  Chest: CTA  Heart: S1, S2, (-)m/r/g  Abd: Soft, non tender, non distended, non " tender, no masses, no hernias.  Inguinal canals nontender.  Ext; Warm, no edema  Psych: AAOx3  Neuro: No focal deficits      US -  US TESTICULAR AND SCROTUM WITH DOPPLER LIMITED 12/18/2020 3:23 PM     HISTORY: Possible strangulated hernia left groin. Severe left groin  pain. Left groin pain radiates into the left testicle.     COMPARISONS: CT abdomen and pelvis dated 6/28/2013.     FINDINGS: The testicles are of symmetric, homogeneous echotexture and  size measuring 5.0 x 2.3 x 3.8 cm on the right and 4.8 x 2.4 x 3.6 cm  on the left.  There is no intratesticular mass to suggest malignancy.   Spectral Doppler demonstrates normal arterial waveforms within the  bilateral testicles.     Left epididymis is slightly more prominent than the right epididymis.  No evidence for increased flow with Valsalva to suggest varicocele. No  abnormal vascularity to suggest epididymitis or orchitis.     No significant varicocele or hydrocele is identified. No evidence for  left inguinal hernia seen on targeted ultrasound without and with  Valsalva.                                                                      IMPRESSION:  No definite etiology for patient's symptoms is seen. No  evidence for orchitis, epididymitis, intratesticular mass, testicular  torsion, hydrocele, varicocele, or left inguinal hernia is seen.     I discussed these findings with Wandy Christy on 12/18/2020 at the time  of the exam.     ALVARADO LONGO MD    Impression/plan:  This is a 50-year-old gentleman presenting with left groin pain of unclear etiology.  I suspect is most likely musculoskeletal due to his chronic back pain.  He does not have any hernias or masses on exam.  The plan at this time is get a CT scan of the abdomen pelvis.  Should that be negative then consideration be given for referral to spine surgery.  He will call me once the CT is completed.    Jose A Hurt MD, FACS

## 2020-12-21 NOTE — LETTER
12/21/2020         RE: Hollis Diggs  [address] [facility] [address]        Dear Colleague,    Thank you for referring your patient, Hollis Diggs, to the [facility]. Please see a copy of my visit note below.    Consult requested by Wandy Christy      Reason for consultation: Left groin pain    HPI:  Patient is a 50-year-old white male with a many month history of left groin pain.  He states it occurs in Cerva band extending down to the testicle.  He does have a history of an inguinal hernia repair is a 6-year-old but does not know which side.  On Friday it suddenly worsened.  He denies any masses, fevers, chills, nausea, vomiting or penile discharge.  He did have an ultrasound that was essentially normal.  He is now referred to me for left groin pain.    Past Medical History:   Diagnosis Date     Antiphospholipid syndrome (H)      Arthritis      Asthma     Exercise     blood clot in leg      Depressive disorder, not elsewhere classified     Depression (non-psychotic)     ANKIT (generalised anxiety disorder)      HH (hiatus hernia)      Hypercholesteremia     normal with weight loss 3/09     Lumbar disc herniation 1992     Seronegative rheumatoid arthritis (H)      Past Surgical History:   Procedure Laterality Date     APPENDECTOMY       COLONOSCOPY N/A 8/2/2016    Procedure: COMBINED COLONOSCOPY, SINGLE OR MULTIPLE BIOPSY/POLYPECTOMY BY BIOPSY;  Surgeon: Sydnee Walton MD;  [facility]     COLONOSCOPY WITH CO2 INSUFFLATION N/A 8/2/2016    Procedure: COLONOSCOPY WITH CO2 INSUFFLATION;  Surgeon: Sydnee Walton MD;  [facility]     COMBINED ESOPHAGOSCOPY, GASTROSCOPY, DUODENOSCOPY (EGD) WITH CO2 INSUFFLATION N/A 8/2/2016    Procedure: COMBINED ESOPHAGOSCOPY, GASTROSCOPY, DUODENOSCOPY (EGD) WITH CO2 INSUFFLATION;  Surgeon: Sydnee Walton MD;  [facility]     ESOPHAGOSCOPY, GASTROSCOPY, DUODENOSCOPY (EGD), COMBINED N/A  8/2/2016    Procedure: COMBINED ESOPHAGOSCOPY, GASTROSCOPY, DUODENOSCOPY (EGD), BIOPSY SINGLE OR MULTIPLE;  Surgeon: Sydnee Walton MD;  Location: [facility]     HC REMOVAL OF TONSILS,<10 Y/O      Tonsils <11y.o.     HC REPAIR INCISIONAL HERNIA,REDUCIBLE  1970's    Hernia Repair, Incisional, Unilateral     HC UGI ENDOSCOPY DIAG W BIOPSY  02/01/06     HC VASECTOMY UNILAT/BILAT W POSTOP SEMEN  1/05    Vasectomy     History back lumbar laminectomy       ZZC NONSPECIFIC PROCEDURE  91 or 92    back surgery. lumbar. lamiectomy     Current Outpatient Medications   Medication     ARIPiprazole (ABILIFY) 2 MG tablet     DULoxetine (CYMBALTA) 60 MG capsule     gabapentin (NEURONTIN) 300 MG capsule     gabapentin (NEURONTIN) 800 MG tablet     lithium (ESKALITH CR/LITHOBID) 450 MG CR tablet     metoprolol succinate ER (TOPROL XL) 50 MG 24 hr tablet     omeprazole (PRILOSEC) 40 MG DR capsule     oxyCODONE-acetaminophen (PERCOCET) 5-325 MG tablet     oxyCODONE-acetaminophen (PERCOCET) 5-325 MG tablet     phentermine (ADIPEX-P) 37.5 MG tablet     traZODone (DESYREL) 50 MG tablet     vitamin D3 (CHOLECALCIFEROL) 50 mcg (2000 units) tablet     warfarin ANTICOAGULANT (COUMADIN) 5 MG tablet     No current facility-administered medications for this visit.         Allergies   Allergen Reactions     No Known Drug Allergies      Social History     Socioeconomic History     Marital status:      Spouse name: Cassie     Number of children: 2     Years of education: 15     Highest education level: Not on file   Occupational History     Occupation:      Employer: [employer]   Social Needs     Financial resource strain: Not on file     Food insecurity     Worry: Not on file     Inability: Not on file     Transportation needs     Medical: Not on file     Non-medical: Not on file   Tobacco Use     Smoking status: Never Smoker     Smokeless tobacco: Never Used   Substance and Sexual Activity     Alcohol use:  "Yes     Comment: rare     Drug use: No     Sexual activity: Yes   Lifestyle     Physical activity     Days per week: Not on file     Minutes per session: Not on file     Stress: Not on file   Relationships     Social connections     Talks on phone: Not on file     Gets together: Not on file     Attends Orthodox service: Not on file     Active member of club or organization: Not on file     Attends meetings of clubs or organizations: Not on file     Relationship status: Not on file     Intimate partner violence     Fear of current or ex partner: Not on file     Emotionally abused: Not on file     Physically abused: Not on file     Forced sexual activity: Not on file   Other Topics Concern      Service No     Blood Transfusions No     Caffeine Concern Yes     Comment: 64 oz q day     Occupational Exposure Not Asked     Hobby Hazards Not Asked     Sleep Concern Yes     Stress Concern No     Weight Concern No     Special Diet Not Asked     Back Care Not Asked     Exercise Yes     Comment: sometimes     Bike Helmet Not Asked     Seat Belt Yes     Self-Exams No     Parent/sibling w/ CABG, MI or angioplasty before 65F 55M? Not Asked   Social History Narrative    4 children healthy     Family History   Problem Relation Age of Onset     Brain Tumor Mother         Benign     Deep Vein Thrombosis Mother         \"neck\"      Heart Disease Father         \"wont tell anyone\"     Neurologic Disorder Paternal Grandmother         Parkinsons      Alcohol/Drug Paternal Grandfather         alcoholic     Cancer Maternal Grandfather         lung     Diabetes Maternal Grandmother      Cerebrovascular Disease Maternal Grandmother         tim age ~70     Arthritis Cousin         cousins x 2 \"RA\"     Musculoskeletal Disorder Maternal Aunt         MS     Family History Negative Other         psoriasis, crohns, UC, SLE      ROS: 10 point ROS neg other than the symptoms noted above in the HPI.    PE:  B/P: 122/70, T: 97.1, P: Data " Unavailable, R: Data Unavailable  General: well developed, well nourished WM who appears his stated age  HEENT: NC/AT, EOMI, (-)icterus, (-)injection  Neck: Supple, No JVD  Chest: CTA  Heart: S1, S2, (-)m/r/g  Abd: Soft, non tender, non distended, non tender, no masses, no hernias.  Inguinal canals nontender.  Ext; Warm, no edema  Psych: AAOx3  Neuro: No focal deficits      US -  US TESTICULAR AND SCROTUM WITH DOPPLER LIMITED 12/18/2020 3:23 PM     HISTORY: Possible strangulated hernia left groin. Severe left groin  pain. Left groin pain radiates into the left testicle.     COMPARISONS: CT abdomen and pelvis dated 6/28/2013.     FINDINGS: The testicles are of symmetric, homogeneous echotexture and  size measuring 5.0 x 2.3 x 3.8 cm on the right and 4.8 x 2.4 x 3.6 cm  on the left.  There is no intratesticular mass to suggest malignancy.   Spectral Doppler demonstrates normal arterial waveforms within the  bilateral testicles.     Left epididymis is slightly more prominent than the right epididymis.  No evidence for increased flow with Valsalva to suggest varicocele. No  abnormal vascularity to suggest epididymitis or orchitis.     No significant varicocele or hydrocele is identified. No evidence for  left inguinal hernia seen on targeted ultrasound without and with  Valsalva.                                                                      IMPRESSION:  No definite etiology for patient's symptoms is seen. No  evidence for orchitis, epididymitis, intratesticular mass, testicular  torsion, hydrocele, varicocele, or left inguinal hernia is seen.     I discussed these findings with Wandy Christy on 12/18/2020 at the time  of the exam.     ALVARADO LONGO MD    Impression/plan:  This is a 50-year-old gentleman presenting with left groin pain of unclear etiology.  I suspect is most likely musculoskeletal due to his chronic back pain.  He does not have any hernias or masses on exam.  The plan at this time is get a CT scan  of the abdomen pelvis.  Should that be negative then consideration be given for referral to spine surgery.  He will call me once the CT is completed.    Jose A Hurt MD, FACS      Again, thank you for allowing me to participate in the care of your patient.        Sincerely,        Jose A Hurt MD

## 2020-12-21 NOTE — TELEPHONE ENCOUNTER
Call back from patient , patient states he received a call and a [facility] stating he would be free to return to work Dec. 14th as he got exposure on 12-4-2020.  Patient also states he spoke to  with [employer] who also stated he can return on Dec. 14th. Patient states he has been coming to lab work appointments and radiology appointments with no concerns. Patient wants to be seen in clinic.    Per VO Dr. Hurt-patient to bring letter, will see in clinic.  Attempted call to patient, busy, sent Mychart message.

## 2020-12-22 ENCOUNTER — HOSPITAL ENCOUNTER (OUTPATIENT)
Dept: CT IMAGING | Facility: CLINIC | Age: 50
Discharge: HOME | End: 2020-12-22
Attending: SPECIALIST | Admitting: SPECIALIST
Payer: COMMERCIAL

## 2020-12-22 DIAGNOSIS — R10.32 LEFT GROIN PAIN: ICD-10-CM

## 2020-12-22 PROCEDURE — 250N000011 HC RX IP 250 OP 636: Performed by: RADIOLOGY

## 2020-12-22 PROCEDURE — 74177 CT ABD & PELVIS W/CONTRAST: CPT

## 2020-12-22 PROCEDURE — 250N000009 HC RX 250: Performed by: RADIOLOGY

## 2020-12-22 RX ORDER — IOPAMIDOL 755 MG/ML
500 INJECTION, SOLUTION INTRAVASCULAR ONCE
Status: COMPLETED | OUTPATIENT
Start: 2020-12-22 | End: 2020-12-22

## 2020-12-22 RX ADMIN — SODIUM CHLORIDE 60 ML: 9 INJECTION, SOLUTION INTRAVENOUS at 11:21

## 2020-12-22 RX ADMIN — IOPAMIDOL 100 ML: 755 INJECTION, SOLUTION INTRAVENOUS at 11:21

## 2020-12-23 ENCOUNTER — TELEPHONE (OUTPATIENT)
Dept: FAMILY MEDICINE | Facility: CLINIC | Age: 50
End: 2020-12-23

## 2020-12-23 ENCOUNTER — MYC REFILL (OUTPATIENT)
Dept: FAMILY MEDICINE | Facility: CLINIC | Age: 50
End: 2020-12-23

## 2020-12-23 DIAGNOSIS — M51.369 DDD (DEGENERATIVE DISC DISEASE), LUMBAR: ICD-10-CM

## 2020-12-23 NOTE — TELEPHONE ENCOUNTER
Reason for Call:  Other call back    Detailed comments: has seen 2 providers and is in pain on left side of groin area, needs to get some medication    Phone Number Patient can be reached at: Cell number on file:    Telephone Information:   Mobile [phone]       Best Time: anytime    Can we leave a detailed message on this number? YES    Call taken on 12/23/2020 at 3:23 PM by Sonam Joiner

## 2020-12-24 ENCOUNTER — TELEPHONE (OUTPATIENT)
Dept: SURGERY | Facility: CLINIC | Age: 50
End: 2020-12-24

## 2020-12-24 DIAGNOSIS — M54.40 ACUTE MIDLINE LOW BACK PAIN WITH SCIATICA, SCIATICA LATERALITY UNSPECIFIED: Primary | ICD-10-CM

## 2020-12-24 DIAGNOSIS — R10.32 LEFT GROIN PAIN: ICD-10-CM

## 2020-12-24 NOTE — TELEPHONE ENCOUNTER
Per Dr. Cash, patient already has oxycodone. He can salonspas patches. He can get these over the counter.    Brown Paris, CMA

## 2020-12-24 NOTE — TELEPHONE ENCOUNTER
Percocet 5-325      Last Written Prescription Date:  11/27/20  Last Fill Quantity: 90,   # refills: 0  Last Office Visit: 12/18/20   Future Office visit:       Routing refill request to provider for review/approval because:  Drug not on the [facility], [facility] or [facility] refill protocol or controlled substance

## 2020-12-24 NOTE — TELEPHONE ENCOUNTER
Reviewed CT with the patient.  There is no hernia or recurrence or obvious source for his left groin pain.  I suspect it is due to some LS spine issues.  We will give referral to spine surgery.

## 2020-12-24 NOTE — TELEPHONE ENCOUNTER
Per Dr. Hurt last OV note, he discuss results via telephone............Luh Sumner CMA  ([facility])

## 2020-12-28 ENCOUNTER — MYC REFILL (OUTPATIENT)
Dept: FAMILY MEDICINE | Facility: CLINIC | Age: 50
End: 2020-12-28

## 2020-12-28 ENCOUNTER — ANTICOAGULATION THERAPY VISIT (OUTPATIENT)
Dept: ANTICOAGULATION | Facility: CLINIC | Age: 50
End: 2020-12-28

## 2020-12-28 DIAGNOSIS — Z79.01 LONG TERM CURRENT USE OF ANTICOAGULANT THERAPY: ICD-10-CM

## 2020-12-28 DIAGNOSIS — E66.09 CLASS 1 OBESITY DUE TO EXCESS CALORIES WITHOUT SERIOUS COMORBIDITY WITH BODY MASS INDEX (BMI) OF 33.0 TO 33.9 IN ADULT: ICD-10-CM

## 2020-12-28 DIAGNOSIS — E66.811 CLASS 1 OBESITY DUE TO EXCESS CALORIES WITHOUT SERIOUS COMORBIDITY WITH BODY MASS INDEX (BMI) OF 33.0 TO 33.9 IN ADULT: ICD-10-CM

## 2020-12-28 LAB — INR BLD: 2.7 (ref 0.86–1.14)

## 2020-12-28 PROCEDURE — 36416 COLLJ CAPILLARY BLOOD SPEC: CPT | Performed by: FAMILY MEDICINE

## 2020-12-28 PROCEDURE — 85610 PROTHROMBIN TIME: CPT | Performed by: FAMILY MEDICINE

## 2020-12-28 RX ORDER — OXYCODONE AND ACETAMINOPHEN 5; 325 MG/1; MG/1
1 TABLET ORAL EVERY 8 HOURS PRN
Qty: 90 TABLET | Refills: 0 | Status: SHIPPED | OUTPATIENT
Start: 2020-12-28 | End: 2021-01-26

## 2020-12-28 RX ORDER — PHENTERMINE HYDROCHLORIDE 37.5 MG/1
37.5 TABLET ORAL
Qty: 30 TABLET | Refills: 0 | Status: SHIPPED | OUTPATIENT
Start: 2020-12-28 | End: 2021-01-26

## 2020-12-28 NOTE — TELEPHONE ENCOUNTER
phentermine (ADIPEX-P) 37.5 MG tablet       Last Written Prescription Date:  11/10/20  Last Fill Quantity: 30,   # refills: 0  Last Office Visit: 12/18/20  Future Office visit:    Next 5 appointments (look out 90 days)    Dec 28, 2020 10:45 AM  Lab visit with [facility] ([facility] [address] [place] [place] [address] [id number] [phone]           Routing refill request to provider for review/approval because:  Drug not on the [facility], [facility] or [facility] refill protocol or controlled substance

## 2020-12-28 NOTE — PROGRESS NOTES
ANTICOAGULATION MANAGEMENT     Patient Name:  Hollis Diggs  Date:  2020    ASSESSMENT /SUBJECTIVE:    Today's INR result of 2.7 is therapeutic. Goal INR of 2.0-3.0      Warfarin dose taken: Warfarin taken as instructed    Diet: No new diet changes affecting INR    Medication changes/ interactions: No new medications/supplements affecting INR    Previous INR: Subtherapeutic     S/S of bleeding or thromboembolism: No    New injury or illness: No    Upcoming surgery, procedure or cardioversion: No    Additional findings: None      PLAN:    Telephone call with Hollis regarding INR result and instructed:     Warfarin Dosing Instructions: Continue your current warfarin dose 5 mg Wed/Fri and 7.5 mg all other days    Instructed patient to follow up no later than: 2 weeks  Patient offered & declined to schedule next visit    Education provided: None required      Hollis verbalizes understanding and agrees to warfarin dosing plan.    Instructed to call the Anticoagulation Clinic for any changes, questions or concerns. (#[phone])        Jesse Flores RN      OBJECTIVE:  Recent labs: (last 7 days)     20  1407 20  1106   INR 1.7* 2.7*         No question data found.  Anticoagulation Summary  As of 2020    INR goal:  2.0-3.0   TTR:  33.7 % (10.1 mo)   INR used for dosin.7 (2020)   Warfarin maintenance plan:  5 mg (5 mg x 1) every Wed, Fri; 7.5 mg (5 mg x 1.5) all other days   Full warfarin instructions:  5 mg every Wed, Fri; 7.5 mg all other days   Weekly warfarin total:  47.5 mg   No change documented:  Jesse Flores RN   Plan last modified:  Анна Hale RN (2020)   Next INR check:  2021   Target end date:  Indefinite    Indications    DVT (deep venous thrombosis) (H) (Resolved) [I82.409]  Long-term (current) use of anticoagulants [Z79.01] [Z79.01]             Anticoagulation Episode Summary     INR check location:      Preferred lab:      Send INR reminders to:   TIMOTEO JIMENEZ    Comments:  5 mg, dosing card, PM dose      Anticoagulation Care Providers     Provider Role Specialty Phone number    Sylvester Cash MD [facility] [phone]

## 2020-12-30 DIAGNOSIS — R05.9 COUGH: ICD-10-CM

## 2020-12-30 RX ORDER — GABAPENTIN 300 MG/1
CAPSULE ORAL
Qty: 30 CAPSULE | Refills: 0 | Status: SHIPPED | OUTPATIENT
Start: 2020-12-30 | End: 2020-12-31

## 2020-12-30 NOTE — TELEPHONE ENCOUNTER
Requested Prescriptions   Pending Prescriptions Disp Refills     gabapentin (NEURONTIN) 300 MG capsule [Pharmacy Med Name: GABAPENTIN 300MG CAPS] 30 capsule 0     Sig: TAKE ONE CAPSULE BY MOUTH AT BEDTIME AS NEEDED COUGH     Last Written Prescription Date:  11/25/2020  Last Fill Quantity: 30,   # refills: 1  Last Office Visit: 12/18/2020  Future Office visit:       Routing refill request to provider for review/approval because:  Drug not on the [facility], [facility] or [facility] refill protocol or controlled substance    Etta Talley MA

## 2020-12-31 DIAGNOSIS — R05.9 COUGH: ICD-10-CM

## 2020-12-31 RX ORDER — GABAPENTIN 300 MG/1
CAPSULE ORAL
Qty: 30 CAPSULE | Refills: 0 | Status: SHIPPED | OUTPATIENT
Start: 2020-12-31 | End: 2021-01-29

## 2020-12-31 NOTE — TELEPHONE ENCOUNTER
gabapentin (NEURONTIN) 300 MG capsule      Last Written Prescription Date:  12/30/2020  Last Fill Quantity: 30,   # refills: 0  Last Office Visit: 12/18/2020  Future Office visit:       Routing refill request to provider for review/approval because:  Drug not on the [facility], [facility] or [facility] refill protocol or controlled substance

## 2021-01-08 ENCOUNTER — ANTICOAGULATION THERAPY VISIT (OUTPATIENT)
Dept: ANTICOAGULATION | Facility: CLINIC | Age: 51
End: 2021-01-08

## 2021-01-08 DIAGNOSIS — Z79.01 LONG TERM CURRENT USE OF ANTICOAGULANT THERAPY: ICD-10-CM

## 2021-01-08 LAB
CAPILLARY BLOOD COLLECTION: NORMAL
INR BLD: 3 (ref 0.86–1.14)

## 2021-01-08 PROCEDURE — 85610 PROTHROMBIN TIME: CPT | Performed by: FAMILY MEDICINE

## 2021-01-08 PROCEDURE — 36416 COLLJ CAPILLARY BLOOD SPEC: CPT | Performed by: FAMILY MEDICINE

## 2021-01-08 NOTE — PROGRESS NOTES
ANTICOAGULATION MANAGEMENT     Patient Name:  Hollis Diggs  Date:  1/8/2021    ASSESSMENT /SUBJECTIVE:    Today's INR result of 3.0 is therapeutic. Goal INR of 2.0-3.0      Warfarin dose taken: Warfarin taken as instructed    Diet: No new diet changes affecting INR    Medication changes/ interactions: No new medications/supplements affecting INR    Previous INR: Therapeutic     S/S of bleeding or thromboembolism: No    New injury or illness: No    Upcoming surgery, procedure or cardioversion: No    Additional findings: None      PLAN:    Telephone call with Hollis regarding INR result and instructed:     Warfarin Dosing Instructions: Continue your current warfarin dose 5 mg Wed, Fri and 7.5 mg ROW    Instructed patient to follow up no later than: 4 weeks  Lab visit scheduled    Education provided: None required      Pt verbalizes understanding and agrees to warfarin dosing plan.    Instructed to call the Anticoagulation Clinic for any changes, questions or concerns. (#[phone])        Lilliam Pro RN      OBJECTIVE:  Recent labs: (last 7 days)     01/08/21  1325   INR 3.0*         INR assessment THER    Recheck INR In: 4 WEEKS    INR Location Outside lab      Anticoagulation Summary  As of 1/8/2021    INR goal:  2.0-3.0   TTR:  37.4 % (10.1 mo)   INR used for dosing:  3.0 (1/8/2021)   Warfarin maintenance plan:  5 mg (5 mg x 1) every Wed, Fri; 7.5 mg (5 mg x 1.5) all other days   Full warfarin instructions:  5 mg every Wed, Fri; 7.5 mg all other days   Weekly warfarin total:  47.5 mg   No change documented:  Lilliam Pro RN   Plan last modified:  Анна Hale RN (12/21/2020)   Next INR check:  2/5/2021   Priority:  Maintenance   Target end date:  Indefinite    Indications    DVT (deep venous thrombosis) (H) (Resolved) [I82.409]  Long-term (current) use of anticoagulants [Z79.01] [Z79.01]             Anticoagulation Episode Summary     INR check location:      Preferred lab:      Send INR  reminders to:  TIMOTEO JIMENEZ    Comments:  5 mg, dosing card, PM dose      Anticoagulation Care Providers     Provider Role Specialty Phone number    Sylvester Cash MD Referring [facility] [phone]

## 2021-01-13 ENCOUNTER — TRANSFERRED RECORDS (OUTPATIENT)
Dept: HEALTH INFORMATION MANAGEMENT | Facility: CLINIC | Age: 51
End: 2021-01-13

## 2021-01-23 DIAGNOSIS — R25.1 TREMOR: ICD-10-CM

## 2021-01-25 RX ORDER — METOPROLOL SUCCINATE 50 MG/1
TABLET, EXTENDED RELEASE ORAL
Qty: 30 TABLET | Refills: 2 | Status: SHIPPED | OUTPATIENT
Start: 2021-01-25 | End: 2021-03-24

## 2021-01-25 NOTE — TELEPHONE ENCOUNTER
Prescription approved per [facility] Refill Protocol.    Li Eaton RN

## 2021-01-26 ENCOUNTER — MYC REFILL (OUTPATIENT)
Dept: FAMILY MEDICINE | Facility: CLINIC | Age: 51
End: 2021-01-26

## 2021-01-26 DIAGNOSIS — M51.369 DDD (DEGENERATIVE DISC DISEASE), LUMBAR: ICD-10-CM

## 2021-01-26 DIAGNOSIS — E66.811 CLASS 1 OBESITY DUE TO EXCESS CALORIES WITHOUT SERIOUS COMORBIDITY WITH BODY MASS INDEX (BMI) OF 33.0 TO 33.9 IN ADULT: ICD-10-CM

## 2021-01-26 DIAGNOSIS — E66.09 CLASS 1 OBESITY DUE TO EXCESS CALORIES WITHOUT SERIOUS COMORBIDITY WITH BODY MASS INDEX (BMI) OF 33.0 TO 33.9 IN ADULT: ICD-10-CM

## 2021-01-27 RX ORDER — OXYCODONE AND ACETAMINOPHEN 5; 325 MG/1; MG/1
1 TABLET ORAL EVERY 8 HOURS PRN
Qty: 90 TABLET | Refills: 0 | Status: SHIPPED | OUTPATIENT
Start: 2021-01-27 | End: 2021-02-25

## 2021-01-27 RX ORDER — PHENTERMINE HYDROCHLORIDE 37.5 MG/1
37.5 TABLET ORAL
Qty: 30 TABLET | Refills: 0 | Status: SHIPPED | OUTPATIENT
Start: 2021-01-27 | End: 2021-02-25

## 2021-01-27 NOTE — TELEPHONE ENCOUNTER
Phentermine        Last Written Prescription Date:  12/28/2020  Last Fill Quantity: 30,   # refills: 0  Last Office Visit: 12/18/2020 [facility]  Future Office visit:    Next 5 appointments (look out 90 days)    Jan 29, 2021  1:00 PM  Nurse Only with ALYSSA FERNÁNDEZ MA  [facility] ([facility]) [address] [place] [address]  [phone]           Routing refill request to provider for review/approval because:  Drug not on the [facility], P or [facility] refill protocol or controlled substance

## 2021-01-27 NOTE — TELEPHONE ENCOUNTER
Percocet        Last Written Prescription Date:  12/28/2020  Last Fill Quantity: 90,   # refills: 0  Last Office Visit: 11/20/2020  Future Office visit:    Next 5 appointments (look out 90 days)    Jan 29, 2021  1:00 PM  Nurse Only with ALYSSA FERNÁNDEZ MA  [facility] ([facility]) [address] [place] [id number]  [phone]           Routing refill request to provider for review/approval because:  Drug not on the [facility], P or [facility] refill protocol or controlled substance

## 2021-01-29 ENCOUNTER — ALLIED HEALTH/NURSE VISIT (OUTPATIENT)
Dept: FAMILY MEDICINE | Facility: CLINIC | Age: 51
End: 2021-01-29
Payer: COMMERCIAL

## 2021-01-29 ENCOUNTER — TRANSFERRED RECORDS (OUTPATIENT)
Dept: HEALTH INFORMATION MANAGEMENT | Facility: CLINIC | Age: 51
End: 2021-01-29

## 2021-01-29 DIAGNOSIS — R05.9 COUGH: ICD-10-CM

## 2021-01-29 DIAGNOSIS — K21.9 LPRD (LARYNGOPHARYNGEAL REFLUX DISEASE): ICD-10-CM

## 2021-01-29 DIAGNOSIS — Z79.899 DRUG THERAPY: Primary | ICD-10-CM

## 2021-01-29 DIAGNOSIS — Z23 NEED FOR VACCINATION: Primary | ICD-10-CM

## 2021-01-29 LAB
ANION GAP SERPL CALCULATED.3IONS-SCNC: <1 MMOL/L (ref 3–14)
BUN SERPL-MCNC: 10 MG/DL (ref 7–30)
CALCIUM SERPL-MCNC: 9.1 MG/DL (ref 8.5–10.1)
CHLORIDE SERPL-SCNC: 110 MMOL/L (ref 94–109)
CO2 SERPL-SCNC: 33 MMOL/L (ref 20–32)
CREAT SERPL-MCNC: 1.05 MG/DL (ref 0.66–1.25)
GFR SERPL CREATININE-BSD FRML MDRD: 81 ML/MIN/{1.73_M2}
GLUCOSE SERPL-MCNC: 77 MG/DL (ref 70–99)
LITHIUM SERPL-SCNC: 0.61 MMOL/L (ref 0.6–1.2)
POTASSIUM SERPL-SCNC: 3.6 MMOL/L (ref 3.4–5.3)
SODIUM SERPL-SCNC: 142 MMOL/L (ref 133–144)
TSH SERPL DL<=0.005 MIU/L-ACNC: 3.27 MU/L (ref 0.4–4)

## 2021-01-29 PROCEDURE — 80178 ASSAY OF LITHIUM: CPT | Performed by: PSYCHIATRY & NEUROLOGY

## 2021-01-29 PROCEDURE — 84443 ASSAY THYROID STIM HORMONE: CPT | Performed by: PSYCHIATRY & NEUROLOGY

## 2021-01-29 PROCEDURE — 90471 IMMUNIZATION ADMIN: CPT

## 2021-01-29 PROCEDURE — 80048 BASIC METABOLIC PNL TOTAL CA: CPT | Performed by: PSYCHIATRY & NEUROLOGY

## 2021-01-29 PROCEDURE — 36415 COLL VENOUS BLD VENIPUNCTURE: CPT | Performed by: PSYCHIATRY & NEUROLOGY

## 2021-01-29 PROCEDURE — 90750 HZV VACC RECOMBINANT IM: CPT

## 2021-01-29 PROCEDURE — 99207 PR NO CHARGE NURSE ONLY: CPT

## 2021-01-29 RX ORDER — OMEPRAZOLE 40 MG/1
CAPSULE, DELAYED RELEASE ORAL
Qty: 30 CAPSULE | Refills: 5 | Status: SHIPPED | OUTPATIENT
Start: 2021-01-29 | End: 2021-02-01

## 2021-01-29 RX ORDER — GABAPENTIN 300 MG/1
CAPSULE ORAL
Qty: 30 CAPSULE | Refills: 0 | Status: SHIPPED | OUTPATIENT
Start: 2021-01-29 | End: 2021-02-26

## 2021-01-29 NOTE — NURSING NOTE
Prior to immunization administration, verified patients identity using patient s name and date of birth. Please see Immunization Activity for additional information.     Screening Questionnaire for Adult Immunization    Are you sick today?   No   Do you have allergies to medications, food, a vaccine component or latex?   No   Have you ever had a serious reaction after receiving a vaccination?   No   Do you have a long-term health problem with heart, lung, kidney, or metabolic disease (e.g., diabetes), asthma, a blood disorder, no spleen, complement component deficiency, a cochlear implant, or a spinal fluid leak?  Are you on long-term aspirin therapy?   No   Do you have cancer, leukemia, HIV/AIDS, or any other immune system problem?   No   Do you have a parent, brother, or sister with an immune system problem?   No   In the past 3 months, have you taken medications that affect  your immune system, such as prednisone, other steroids, or anticancer drugs; drugs for the treatment of rheumatoid arthritis, Crohn s disease, or psoriasis; or have you had radiation treatments?   No   Have you had a seizure, or a brain or other nervous system problem?   No   During the past year, have you received a transfusion of blood or blood    products, or been given immune (gamma) globulin or antiviral drug?   No   For women: Are you pregnant or is there a chance you could become       pregnant during the next month?   No   Have you received any vaccinations in the past 4 weeks?   No     Immunization questionnaire answers were all negative.        Per orders of Dr. Cash, injection of shingles given by Laura Das MA. Patient instructed to remain in clinic for 15 minutes afterwards, and to report any adverse reaction to me immediately.       Screening performed by Laura Das MA on 1/29/2021 at 12:55 PM.

## 2021-01-29 NOTE — TELEPHONE ENCOUNTER
Gabapentin      Last Written Prescription Date:  12/31/2020  Last Fill Quantity: 30,   # refills: 0  Last Office Visit: 12/28/2020 - [facility]  Future Office visit:    Next 5 appointments (look out 90 days)    Jan 29, 2021  1:00 PM  Nurse Only with ALYSSA FERNÁNDEZ MA  [facility] ([facility]) [address] [place] [address]  [phone]       Routing refill request to provider for review/approval because:  Drug not on the [facility], [facility] or [facility] refill protocol or controlled substance    Li Eaton Rn

## 2021-02-08 ENCOUNTER — ANTICOAGULATION THERAPY VISIT (OUTPATIENT)
Dept: ANTICOAGULATION | Facility: CLINIC | Age: 51
End: 2021-02-08

## 2021-02-08 DIAGNOSIS — Z79.01 LONG TERM CURRENT USE OF ANTICOAGULANT THERAPY: ICD-10-CM

## 2021-02-08 LAB
CAPILLARY BLOOD COLLECTION: NORMAL
INR BLD: 2.9 (ref 0.86–1.14)

## 2021-02-08 PROCEDURE — 36416 COLLJ CAPILLARY BLOOD SPEC: CPT | Performed by: FAMILY MEDICINE

## 2021-02-08 PROCEDURE — 85610 PROTHROMBIN TIME: CPT | Performed by: FAMILY MEDICINE

## 2021-02-08 NOTE — PROGRESS NOTES
ANTICOAGULATION MANAGEMENT     Patient Name:  Hollis Diggs  Date:  2021    ASSESSMENT /SUBJECTIVE:    Today's INR result of 2.9 is therapeutic. Goal INR of 2.0-3.0      Warfarin dose taken: Warfarin taken as instructed    Diet: No new diet changes affecting INR    Medication changes/ interactions: No new medications/supplements affecting INR    Previous INR: Therapeutic     S/S of bleeding or thromboembolism: No    New injury or illness: No    Upcoming surgery, procedure or cardioversion: No    Additional findings: None      PLAN:    Telephone call with Hollis regarding INR result and instructed:     Warfarin Dosing Instructions: Continue your current warfarin dose 5 mg Wed,Fri and 7.5 mg all other days    Instructed patient to follow up no later than: 5 weeks  Patient offered & declined to schedule next visit    Education provided: None required      Pt verbalizes understanding and agrees to warfarin dosing plan.    Instructed to call the Anticoagulation Clinic for any changes, questions or concerns. (#[phone])        Анна Hale RN      OBJECTIVE:  Recent labs: (last 7 days)     21  1612   INR 2.9*         INR assessment THER    Recheck INR In: 5 WEEKS    INR Location Outside lab      Anticoagulation Summary  As of 2021    INR goal:  2.0-3.0   TTR:  42.9 % (10.1 mo)   INR used for dosin.9 (2021)   Warfarin maintenance plan:  5 mg (5 mg x 1) every Wed, Fri; 7.5 mg (5 mg x 1.5) all other days   Full warfarin instructions:  5 mg every Wed, Fri; 7.5 mg all other days   Weekly warfarin total:  47.5 mg   No change documented:  Анна Hale RN   Plan last modified:  Анна Hale RN (2020)   Next INR check:  3/15/2021   Priority:  Maintenance   Target end date:  Indefinite    Indications    DVT (deep venous thrombosis) (H) (Resolved) [I82.409]  Long-term (current) use of anticoagulants [Z79.01] [Z79.01]             Anticoagulation Episode Summary     INR check location:       Preferred lab:      Send INR reminders to:  [facility] [place]    Comments:  5 mg, dosing card, PM dose      Anticoagulation Care Providers     Provider Role Specialty Phone number    Sylvester Cash MD Referring [facility] [phone]

## 2021-02-13 ENCOUNTER — MYC REFILL (OUTPATIENT)
Dept: FAMILY MEDICINE | Facility: CLINIC | Age: 51
End: 2021-02-13

## 2021-02-13 DIAGNOSIS — I82.4Y9 DEEP VEIN THROMBOSIS (DVT) OF PROXIMAL LOWER EXTREMITY, UNSPECIFIED CHRONICITY, UNSPECIFIED LATERALITY (H): ICD-10-CM

## 2021-02-16 ENCOUNTER — HOSPITAL ENCOUNTER (EMERGENCY)
Facility: CLINIC | Age: 51
Discharge: HOME | End: 2021-02-16
Attending: FAMILY MEDICINE | Admitting: FAMILY MEDICINE
Payer: COMMERCIAL

## 2021-02-16 ENCOUNTER — APPOINTMENT (OUTPATIENT)
Dept: CT IMAGING | Facility: CLINIC | Age: 51
End: 2021-02-16
Attending: FAMILY MEDICINE
Payer: COMMERCIAL

## 2021-02-16 VITALS
SYSTOLIC BLOOD PRESSURE: 131 MMHG | HEART RATE: 86 BPM | OXYGEN SATURATION: 99 % | WEIGHT: 240 LBS | BODY MASS INDEX: 31.81 KG/M2 | TEMPERATURE: 98.5 F | RESPIRATION RATE: 14 BRPM | DIASTOLIC BLOOD PRESSURE: 75 MMHG
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
## 2021-02-16 DIAGNOSIS — Z79.01 WARFARIN ANTICOAGULATION: ICD-10-CM

## 2021-02-16 DIAGNOSIS — K13.0 ANGULAR CHEILITIS: ICD-10-CM

## 2021-02-16 DIAGNOSIS — S09.90XA CLOSED HEAD INJURY, INITIAL ENCOUNTER: ICD-10-CM

## 2021-02-16 LAB — INR PPP: 2.1 (ref 0.86–1.14)

## 2021-02-16 PROCEDURE — 99284 EMERGENCY DEPT VISIT MOD MDM: CPT | Performed by: FAMILY MEDICINE

## 2021-02-16 PROCEDURE — 99285 EMERGENCY DEPT VISIT HI MDM: CPT | Mod: 25 | Performed by: FAMILY MEDICINE

## 2021-02-16 PROCEDURE — 72125 CT NECK SPINE W/O DYE: CPT

## 2021-02-16 PROCEDURE — 70450 CT HEAD/BRAIN W/O DYE: CPT

## 2021-02-16 PROCEDURE — 85610 PROTHROMBIN TIME: CPT | Performed by: FAMILY MEDICINE

## 2021-02-16 RX ORDER — MUPIROCIN 20 MG/G
OINTMENT TOPICAL 3 TIMES DAILY
Qty: 15 G | Refills: 0 | Status: SHIPPED | OUTPATIENT
Start: 2021-02-16 | End: 2021-03-02

## 2021-02-16 RX ORDER — CLOTRIMAZOLE 1 %
CREAM (GRAM) TOPICAL 2 TIMES DAILY
Qty: 45 G | Refills: 0 | Status: SHIPPED | OUTPATIENT
Start: 2021-02-16 | End: 2021-03-03

## 2021-02-16 RX ORDER — WARFARIN SODIUM 5 MG/1
TABLET ORAL
Qty: 150 TABLET | Refills: 0 | Status: SHIPPED | OUTPATIENT
Start: 2021-02-16 | End: 2021-05-10

## 2021-02-17 ENCOUNTER — TELEPHONE (OUTPATIENT)
Dept: ANTICOAGULATION | Facility: CLINIC | Age: 51
End: 2021-02-17

## 2021-02-17 NOTE — ED PROVIDER NOTES
[facility] ED Provider Note   Patient: Hollis Diggs  MRN #:  [id number]  Date of Visit: February 16, 2021    CC:     Chief Complaint   Patient presents with     Head Injury     HPI:  Hollis Diggs is a 50 year old male on chronic anticoagulation with warfarin who presented to the emergency department with closed head injury after slipping on the ice and hitting the back of his head.  Patient states that he took his dogs out to have him go to the bathroom.  He slipped on the ice, and fell backward and struck the back of his head.  He had brief LOC of 1-2 minutes, and came to finding himself on the ice.  Patient states that he has a dull headache along with some sharp shooting pains with pain level between 3-4/10.  He also has stiff neck with difficulty moving and rotating his head.  Patient is on warfarin with an INR level of 2.9 when checked 8 days ago.  He has antiphospholipid syndrome with a history of DVTs and also has a history of seronegative rheumatoid arthritis.  He is not on any current steroid therapy.  Patient has a history of degenerative disc disease of the lumbar spine.  Patient denies any visual disturbance, nausea, vomiting and has no numbness or tingling.  He has no difficulty with walking.  Patient is accompanied by his wife.    Problem List:  Patient Active Problem List    Diagnosis Date Noted     Suicidal behavior 06/22/2020     Priority: Medium     Class 1 obesity due to excess calories without serious comorbidity with body mass index (BMI) of 33.0 to 33.9 in adult 01/08/2020     Priority: Medium     Long-term use of high-risk medication 05/12/2017     Priority: Medium     History of deep venous thrombosis 04/14/2017     Priority: Medium     DDD (degenerative disc disease), lumbar 04/14/2017     Priority: Medium     On prednisone therapy 07/27/2016     Priority: Medium     Seronegative rheumatoid arthritis (H) 06/28/2016      Priority: Medium     Long-term (current) use of anticoagulants [Z79.01] 03/16/2016     Priority: Medium     Rheumatoid arthritis of multiple sites with negative rheumatoid factor (H) 12/07/2015     Priority: Medium     Midline low back pain, with sciatica presence unspecified 10/14/2015     Priority: Medium     Major depressive disorder, recurrent episode, mild (H) 10/07/2015     Priority: Medium     Pain in joint, multiple sites 03/20/2015     Priority: Medium     Anxiety state 02/10/2015     Priority: Medium     Problem list name updated by automated process. Provider to review       CARDIOVASCULAR SCREENING; LDL GOAL LESS THAN 160 01/15/2013     Priority: Medium     Alopecia 02/19/2010     Priority: Medium     Ingrown toenail 08/18/2009     Priority: Medium     Anxiety 07/09/2008     Priority: Medium     Abdominal pain, epigastric 01/25/2006     Priority: Medium       Past Medical History:   Diagnosis Date     Antiphospholipid syndrome (H)      Arthritis      Asthma      blood clot in leg      Depressive disorder, not elsewhere classified      ANKIT (generalised anxiety disorder)      HH (hiatus hernia)      Hypercholesteremia      Lumbar disc herniation 1992     Seronegative rheumatoid arthritis (H)        MEDS:      clotrimazole (LOTRIMIN) 1 % external cream       mupirocin (BACTROBAN) 2 % external ointment       ARIPiprazole (ABILIFY) 2 MG tablet       DULoxetine (CYMBALTA) 60 MG capsule       gabapentin (NEURONTIN) 300 MG capsule       gabapentin (NEURONTIN) 800 MG tablet       lithium (ESKALITH CR/LITHOBID) 450 MG CR tablet       metoprolol succinate ER (TOPROL-XL) 50 MG 24 hr tablet       omeprazole (PRILOSEC) 40 MG DR capsule       oxyCODONE-acetaminophen (PERCOCET) 5-325 MG tablet       phentermine (ADIPEX-P) 37.5 MG tablet       traZODone (DESYREL) 50 MG tablet       vitamin D3 (CHOLECALCIFEROL) 50 mcg (2000 units) tablet       warfarin ANTICOAGULANT (COUMADIN) 5 MG tablet        ALLERGIES:    Allergies    Allergen Reactions     No Known Drug Allergies        Past Surgical History:   Procedure Laterality Date     APPENDECTOMY       COLONOSCOPY N/A 8/2/2016    Procedure: COMBINED COLONOSCOPY, SINGLE OR MULTIPLE BIOPSY/POLYPECTOMY BY BIOPSY;  Surgeon: Sydnee Walton MD;  Location: [facility] OR     COLONOSCOPY WITH CO2 INSUFFLATION N/A 8/2/2016    Procedure: COLONOSCOPY WITH CO2 INSUFFLATION;  Surgeon: Sydnee Waltno MD;  Location: [facility] OR     COMBINED ESOPHAGOSCOPY, GASTROSCOPY, DUODENOSCOPY (EGD) WITH CO2 INSUFFLATION N/A 8/2/2016    Procedure: COMBINED ESOPHAGOSCOPY, GASTROSCOPY, DUODENOSCOPY (EGD) WITH CO2 INSUFFLATION;  Surgeon: Sydnee Walton MD;  Location: [facility] OR     ESOPHAGOSCOPY, GASTROSCOPY, DUODENOSCOPY (EGD), COMBINED N/A 8/2/2016    Procedure: COMBINED ESOPHAGOSCOPY, GASTROSCOPY, DUODENOSCOPY (EGD), BIOPSY SINGLE OR MULTIPLE;  Surgeon: Sydnee Walton MD;  Location: [facility] OR     HC REMOVAL OF TONSILS,<12 Y/O      Tonsils <11y.o.     HC REPAIR INCISIONAL HERNIA,REDUCIBLE  1970's    Hernia Repair, Incisional, Unilateral     HC UGI ENDOSCOPY DIAG W BIOPSY  02/01/06     HC VASECTOMY UNILAT/BILAT W POSTOP SEMEN  1/05    Vasectomy     History back lumbar laminectomy       ZZC NONSPECIFIC PROCEDURE  91 or 92    back surgery. lumbar. lamiectomy       Social History     Tobacco Use     Smoking status: Never Smoker     Smokeless tobacco: Never Used   Substance Use Topics     Alcohol use: Yes     Comment: rare     Drug use: No         Review of Systems   Except as noted in HPI, all other systems were reviewed and are negative    Physical Exam     Vitals were reviewed  Patient Vitals for the past 12 hrs:   BP Temp Temp src Pulse Resp SpO2 Weight   02/16/21 2142 131/75 -- -- -- -- -- --   02/16/21 2023 133/73 98.5  F (36.9  C) Oral 86 14 99 % 108.9 kg (240 lb)     GENERAL APPEARANCE: Alert and oriented x3.  GCS 15  HEAD: Atraumatic; no cephalhematoma over the occiput  FACE:  normal facies  EYES: Pupils are equal and reactive to light; no diplopia  HENT: normal external exam; patient has angular cheilitis in the right corner of the mouth  NECK: no adenopathy or asymmetry; patient is some diffuse neck tenderness.  There is difficulty with rotation, flexion and extension due to general discomfort.  No midline tenderness  RESP: normal respiratory effort; clear breath sounds bilaterally  CV: regular rate and rhythm; no significant murmurs, gallops or rubs  ABD: soft, no tenderness; no rebound or guarding; bowel sounds are normal  MS: no gross deformities noted; normal muscle tone.  EXT: No calf tenderness or pitting edema  SKIN: no worrisome rash  NEURO: no facial droop; no focal deficits, speech is normal        Available Lab/Imaging Results     Results for orders placed or performed during the hospital encounter of 02/16/21 (from the past 24 hour(s))   Cervical spine CT w/o contrast    Narrative    CT CERVICAL SPINE WITHOUT CONTRAST   2/16/2021 8:54 PM     HISTORY: Neck trauma, dangerous injury mechanism (Age 16-64y); Fall on  the ice; loss of consciousness; neck pain; patient is on chronic  anticoagulation.     TECHNIQUE: Axial images of the cervical spine were obtained without  intravenous contrast. Multiplanar reformations were performed.  Radiation dose for this scan was reduced using automated exposure  control, adjustment of the mA and/or kV according to patient size, or  iterative reconstruction technique.     COMPARISON: None.    FINDINGS: Sagittal reconstructions demonstrate normal posterior  alignment. There is no evidence for any acute fracture. Mild  degenerative changes are noted most advanced at C4-C5 and C5-C6 where  there are mild to moderate central canal stenosis. Soft tissues  unremarkable as visualized.      Impression    IMPRESSION: Degenerative changes. No evidence for fracture or any  posterior malalignment.    SAHRA HUSTON MD   Head CT w/o contrast    Narrative     CT SCAN OF THE HEAD WITHOUT CONTRAST   2/16/2021 8:55 PM     HISTORY: Head trauma, coagulopathy (Age 19-64y).    TECHNIQUE:  Axial images of the head and coronal reformations without  IV contrast material.  Radiation dose for this scan was reduced using  automated exposure control, adjustment of the mA and/or kV according  to patient size, or iterative reconstruction technique.    COMPARISON: None.    FINDINGS: The ventricles are normal in size, shape, and configuration.  The brain parenchyma and subarachnoid spaces are normal. There is no  evidence for intracranial hemorrhage, mass effect, acute infarct, or  skull fracture. There is an air-fluid level in the left maxillary  sinus.      Impression    IMPRESSION:  1. No evidence for intracranial hemorrhage or any acute brain  pathology.  2. Air-fluid level in left maxillary sinus.    SAHRA HUSTON MD   INR   Result Value Ref Range    INR 2.10 (H) 0.86 - 1.14              Impression     Final diagnoses:   Closed head injury, initial encounter   Fall due to slipping on ice or snow   Warfarin anticoagulation   [facility]         ED Course & Medical Decision Making   Hollis Diggs is a 50 year old male who presented to the emergency department for evaluation of closed head injury that occurred just prior to arrival.  Patient is on chronic anticoagulation with warfarin for his history of DVT from underlying antiphospholipid antibodies.  Patient's last INR level was 2.9 when checked 8 days ago.  Patient was outside to let his dogs out and he slipped on the ice fell backward and struck the back of his head.  He reported brief loss of consciousness and neck pain.  Patient rated his headache 3-4/10.  GCS was 15 and vital signs were stable.  He had no significant cephalhematoma.  He had occipital headache but nothing through the frontal part of the head.  CT scan of the head and cervical spine revealed no evidence for intracranial hemorrhage and no evidence for  fracture or any posterior malalignment.  He has some degenerative changes especially over C4-C5 and C5-C6 where there is some central canal stenosis.  INR level is 2.1.  Patient has no evidence for intracranial hemorrhage or cervical spine bony injury.  He does not have any other neurologic changes.  We discussed the possibility that he could have a concussion.  I recommended sleep monitoring tonight and follow-up with his primary clinic provider in 3 days if he still having any headaches.  Return to the ED with any new or worsening signs or symptoms.  Patient is cleared for discharge home with his wife.        Written after-visit summary and instructions were given at the time of discharge.    Follow up Plan:   Sylvester Cash MD  [address] [facility] [address] [phone]    In 3 days  if not improving    [facility] Emergency Dept  [address] [facility]   [facility] [address] [phone]    If symptoms worsen      Discharge Instructions:   The CT scan of your head and cervical spine were reassuring.  You do have some degenerative changes of the cervical spine.  Your INR level tonight was 2.1.  Continue to monitor for any red flag warning signs to indicate a more serious head/brain injury.  Please see the attached handout.  Take Tylenol as needed for headache pain.  Apply ice/heat to the neck.  Begin Chlortrimazole external cream and Bactroban cream 2-3 times a day to the corner of the mouth for 2 weeks to treat the angular cheilitis.  If this does not improve, please follow-up with your primary clinic provider.  Return to the emergency department at any time if you develop new or worsening symptoms.       Disclaimer: This note consists of words and symbols derived from keyboarding and dictation using voice recognition software.  As a result, there may be errors that have gone undetected.  Please consider this when interpreting information found in this note.       Brian  Sung Graham MD  02/17/21 0152

## 2021-02-17 NOTE — ED TRIAGE NOTES
Trauma Eval Called. Patient slipped and fell in the ice this evening, hit his head. Positive LOC. Complaints of dizziness and feeling unsteady.

## 2021-02-17 NOTE — TELEPHONE ENCOUNTER
ANTICOAGULATION  MANAGEMENT: Discharge Review    Hollis Diggs chart reviewed for anticoagulation continuity of care    Emergency room visit on 2/16/21 for closed head injury.    Discharge disposition: Home    Results:    Recent labs: (last 7 days)     02/16/21  2058   INR 2.10*     Anticoagulation inpatient management:     not applicable     Anticoagulation discharge instructions:     Warfarin dosing: home regimen continued   Bridging: No   INR goal change: No      Medication changes affecting anticoagulation: No    Additional factors affecting anticoagulation: No    Plan     No adjustment to anticoagulation plan needed    Patient not contacted    No adjustment to Anticoagulation Calendar was required    Lilliam Pro RN

## 2021-02-17 NOTE — DISCHARGE INSTRUCTIONS
The CT scan of your head and cervical spine were reassuring.  You do have some degenerative changes of the cervical spine.  Your INR level tonight was 2.1.  Continue to monitor for any red flag warning signs to indicate a more serious head/brain injury.  Please see the attached handout.  Take Tylenol as needed for headache pain.  Apply ice/heat to the neck.  Begin Chlortrimazole external cream and Bactroban cream 2-3 times a day to the corner of the mouth for 2 weeks to treat the angular cheilitis.  If this does not improve, please follow-up with your primary clinic provider.  Return to the emergency department at any time if you develop new or worsening symptoms.

## 2021-02-25 ENCOUNTER — MYC REFILL (OUTPATIENT)
Dept: FAMILY MEDICINE | Facility: CLINIC | Age: 51
End: 2021-02-25

## 2021-02-25 DIAGNOSIS — M51.369 DDD (DEGENERATIVE DISC DISEASE), LUMBAR: ICD-10-CM

## 2021-02-25 DIAGNOSIS — E66.09 CLASS 1 OBESITY DUE TO EXCESS CALORIES WITHOUT SERIOUS COMORBIDITY WITH BODY MASS INDEX (BMI) OF 33.0 TO 33.9 IN ADULT: ICD-10-CM

## 2021-02-25 DIAGNOSIS — E66.811 CLASS 1 OBESITY DUE TO EXCESS CALORIES WITHOUT SERIOUS COMORBIDITY WITH BODY MASS INDEX (BMI) OF 33.0 TO 33.9 IN ADULT: ICD-10-CM

## 2021-02-25 NOTE — TELEPHONE ENCOUNTER
oxyCODONE-acetaminophen (PERCOCET) 5-325 MG tablet   Last Written Prescription Date:  01/27/2021  Last Fill Quantity: 90,   # refills: 0  Last Office Visit: 12/18/2020  Future Office visit:    Next 5 appointments (look out 90 days)    Mar 05, 2021  1:20 PM  SHORT with Sylvester Cash MD  [facility] ([facility] ) [address] [place] [id number] [phone]           Routing refill request to provider for review/approval because:  Drug not on the [facility], [facility] or [facility] refill protocol or controlled substance

## 2021-02-26 ENCOUNTER — MYC REFILL (OUTPATIENT)
Dept: SLEEP MEDICINE | Facility: CLINIC | Age: 51
End: 2021-02-26

## 2021-02-26 DIAGNOSIS — R05.9 COUGH: ICD-10-CM

## 2021-02-26 RX ORDER — GABAPENTIN 300 MG/1
300 CAPSULE ORAL AT BEDTIME
Qty: 30 CAPSULE | Refills: 0 | Status: SHIPPED | OUTPATIENT
Start: 2021-02-26 | End: 2021-03-24 | Stop reason: DRUGHIGH

## 2021-02-26 RX ORDER — PHENTERMINE HYDROCHLORIDE 37.5 MG/1
37.5 TABLET ORAL
Qty: 30 TABLET | Refills: 0 | Status: SHIPPED | OUTPATIENT
Start: 2021-02-26 | End: 2021-04-04

## 2021-02-26 RX ORDER — OXYCODONE AND ACETAMINOPHEN 5; 325 MG/1; MG/1
1 TABLET ORAL EVERY 8 HOURS PRN
Qty: 90 TABLET | Refills: 0 | Status: SHIPPED | OUTPATIENT
Start: 2021-02-26 | End: 2021-03-08

## 2021-02-26 NOTE — TELEPHONE ENCOUNTER
phentermine (ADIPEX-P) 37.5 MG tablet     Last Written Prescription Date:  01/27/2021  Last Fill Quantity: 30,   # refills: 0  Last Office Visit: 12/18/2020  Future Office visit:    Next 5 appointments (look out 90 days)    Mar 05, 2021  1:20 PM  SHORT with Sylvester Cash MD  [facility] ([facility] ) [address] [place] [id number] [phone]           Routing refill request to provider for review/approval because:  Drug not on the [facility], [facility] or [facility] refill protocol or controlled substance

## 2021-02-26 NOTE — TELEPHONE ENCOUNTER
gabapentin (NEURONTIN) 300 MG capsule    Last Written Prescription Date:  01/29/21  Last Fill Quantity: 30,   # refills: 0  Last Office Visit: 12/18/2020  Future Office visit:    Next 5 appointments (look out 90 days)    Mar 05, 2021  1:20 PM  SHORT with Sylvester Cash MD  [facility] ([facility] ) [address] [place] [id number] [phone]           Routing refill request to provider for review/approval because:  Drug not on the [facility], [facility] or [facility] refill protocol or controlled substance

## 2021-03-05 ENCOUNTER — HOSPITAL ENCOUNTER (OUTPATIENT)
Dept: GENERAL RADIOLOGY | Facility: CLINIC | Age: 51
Discharge: HOME | End: 2021-03-05
Attending: FAMILY MEDICINE | Admitting: FAMILY MEDICINE
Payer: COMMERCIAL

## 2021-03-05 ENCOUNTER — OFFICE VISIT (OUTPATIENT)
Dept: FAMILY MEDICINE | Facility: CLINIC | Age: 51
End: 2021-03-05
Payer: COMMERCIAL

## 2021-03-05 VITALS
BODY MASS INDEX: 33.2 KG/M2 | RESPIRATION RATE: 16 BRPM | HEIGHT: 72 IN | TEMPERATURE: 97.8 F | WEIGHT: 245.1 LBS | OXYGEN SATURATION: 99 % | HEART RATE: 78 BPM | SYSTOLIC BLOOD PRESSURE: 118 MMHG | DIASTOLIC BLOOD PRESSURE: 80 MMHG

## 2021-03-05 DIAGNOSIS — M79.604 PAIN OF RIGHT LOWER EXTREMITY: Primary | ICD-10-CM

## 2021-03-05 DIAGNOSIS — D68.61 ANTIPHOSPHOLIPID SYNDROME (H): ICD-10-CM

## 2021-03-05 DIAGNOSIS — M06.09 RHEUMATOID ARTHRITIS OF MULTIPLE SITES WITH NEGATIVE RHEUMATOID FACTOR (H): ICD-10-CM

## 2021-03-05 DIAGNOSIS — R25.1 TREMOR: ICD-10-CM

## 2021-03-05 DIAGNOSIS — F33.0 MAJOR DEPRESSIVE DISORDER, RECURRENT EPISODE, MILD (H): ICD-10-CM

## 2021-03-05 PROCEDURE — 99214 OFFICE O/P EST MOD 30 MIN: CPT | Mod: 25 | Performed by: FAMILY MEDICINE

## 2021-03-05 PROCEDURE — 73502 X-RAY EXAM HIP UNI 2-3 VIEWS: CPT

## 2021-03-05 PROCEDURE — 96372 THER/PROPH/DIAG INJ SC/IM: CPT | Performed by: FAMILY MEDICINE

## 2021-03-05 RX ORDER — KETOROLAC TROMETHAMINE 30 MG/ML
60 INJECTION, SOLUTION INTRAMUSCULAR; INTRAVENOUS ONCE
Status: COMPLETED | OUTPATIENT
Start: 2021-03-05 | End: 2021-03-05

## 2021-03-05 RX ORDER — PREDNISONE 20 MG/1
TABLET ORAL
Qty: 18 TABLET | Refills: 1 | Status: SHIPPED | OUTPATIENT
Start: 2021-03-05 | End: 2021-03-24

## 2021-03-05 RX ADMIN — KETOROLAC TROMETHAMINE 60 MG: 30 INJECTION, SOLUTION INTRAMUSCULAR; INTRAVENOUS at 16:13

## 2021-03-05 ASSESSMENT — MIFFLIN-ST. JEOR: SCORE: 2004.77

## 2021-03-05 ASSESSMENT — PAIN SCALES - GENERAL: PAINLEVEL: WORST PAIN (10)

## 2021-03-05 NOTE — LETTER
[address] [address]  Phone: [phone]  Fax: [phone]    March 5, 2021        Hollis Diggs  [address] [facility] [address]          To whom it may concern:    RE: Hollis Diggs    Patient was seen and treated today at our clinic.  He may return to work Monday, March 8 with no restrictions.    Please contact me for questions or concerns.      Sincerely,        Sylvester Cash MD

## 2021-03-05 NOTE — PROGRESS NOTES
Assessment & Plan     Pain of right lower extremity  I believe his leg discomfort is probably coming from lumbar spine possibly sciatica.  X-ray of hip and pelvis was fairly unremarkable for significant degenerative disease.  He is anticoagulated has had DVTs but he has no swelling in the pain is position related especially sitting and goes down his leg from his buttocks.  We are going to put him on prednisone tapering.  Gave him a Toradol injection today.  He chronically takes oxycodone for arthritis and chronic back pain.  Plan on obtaining MRI of the lumbar spine if the prednisone does not seem to be helping over the next week.  - XR Pelvis w Hip Right 1 View  - predniSONE (DELTASONE) 20 MG tablet; Take 3 tabs daily for 3 days, then 2 tabs daily for 3 days, then 1 tab daily for 3 days  - ketorolac (TORADOL) injection 60 mg  - INJECTION INTRAMUSCULAR OR SUB-Q    Tremor  This is been building up.  Slight resting tremor but more intention tremor.  He is on a beta-blocker.  Will be set up with neurology referral.  - NEUROLOGY ADULT REFERRAL    Rheumatoid arthritis of multiple sites with negative rheumatoid factor (H)  On Percocet and on gabapentin.  Continue on with these.    Major depressive disorder, recurrent episode, mild (H)  Does follow with psychiatry.  Currently on Cymbalta Abilify and takes trazodone at nighttime.    Antiphospholipid syndrome (H)  Chronic warfarin.  History of DVTs.                   No follow-ups on file.    Sylvester Cash MD  [facility]    Zoey Shafer is a 50 year old who presents for the following health issues  accompanied by his partner: Keyla FIELDS     Complains of increasing pain in his right leg over the last week.  Starts in his buttocks area and moves down.  Does not recall any lifting problems.  See other symptoms as noted below.  Was recently in the emergency room with a concussion when he slipped and fell on the ice and hit his head.   He feels this is better.  Other issue is a tremor which has been going on for long time.  Sometimes hard for him to eat as the intention tremor is the worst part.  He has been put on metoprolol but this does not seem to be helping much.  Musculoskeletal problem/pain  Onset/Duration: 1 week  Description  Location: leg - right  Joint Swelling: no  Redness: not sure  Pain: YES  Warmth: not sure  Intensity:  severe  Progression of Symptoms:  worsening  Accompanying signs and symptoms:   Fevers: no  Numbness/tingling/weakness: YES  History  Trauma to the area: no  Recent illness:  no  Previous similar problem: YES  Previous evaluation:  no  Precipitating or alleviating factors:  Aggravating factors include: sitting, standing, walking, climbing stairs, exercise and overuse  Therapies tried and outcome: support wrap, acetaminophen and percocet    Concern - ongoing hands shaking recheck  Onset: ongoing  Description: recheck - the hands are shaking, it is the same. Not getting worse but not improving with the medication.  Intensity: mild  Progression of Symptoms:  constant  Accompanying Signs & Symptoms:   Previous history of similar problem:   Precipitating factors:        Worsened by: Motion.  Alleviating factors:        Improved by:   Therapies tried and outcome: Beta-blocker      Review of Systems   Constitutional, HEENT, cardiovascular, pulmonary, gi and gu systems are negative, except as otherwise noted.      Objective    /80   Pulse 78   Temp 97.8  F (36.6  C) (Temporal)   Resp 16   Ht 1.829 m (6')   Wt 111.2 kg (245 lb 1.6 oz)   SpO2 99%   BMI 33.24 kg/m    Body mass index is 33.24 kg/m .  Physical Exam   GENERAL: healthy, alert, moderate distress and fatigued  EYES: Eyes grossly normal to inspection, PERRL and conjunctivae and sclerae normal  NECK: no adenopathy, no asymmetry, masses, or scars and thyroid normal to palpation  RESP: lungs clear to auscultation - no rales, rhonchi or wheezes  CV: regular  rate and rhythm, normal S1 S2, no S3 or S4, no murmur, click or rub, no peripheral edema and peripheral pulses strong  MS: Right leg shows no swelling good temperature normal color tender posteriorly in the buttocks on the right sitting position causes him pain.  SKIN: no suspicious lesions or rashes  NEURO: tremor slight resting more intention, sensory exam grossly normal, mentation intact and gait abnormal: Limping due to pain of the right leg.  PSYCH: affect flat and fatigued    Xray - Reviewed and interpreted by me.  Right hip and pelvis

## 2021-03-05 NOTE — NURSING NOTE
Clinic Administered Medication Documentation      Injectable Medication Documentation    Patient was given Ketorolac Tromethamine (Toradol). Prior to medication administration, verified patients identity using patient s name and date of birth. Please see MAR and medication order for additional information. Patient instructed to remain in clinic for 15 minutes and report any adverse reaction to staff immediately .      Was entire vial of medication used? Yes  Vial/Syringe: Single dose vial  Expiration Date:  05/21  Was this medication supplied by the patient? No     Brown Paris CMA

## 2021-03-05 NOTE — NURSING NOTE
Health Maintenance Due   Topic Date Due     ADVANCE CARE PLANNING  Never done     HIV SCREENING  Never done     Brown Paris, [facility]

## 2021-03-06 PROBLEM — D68.61 ANTIPHOSPHOLIPID SYNDROME (H): Status: ACTIVE | Noted: 2021-03-06

## 2021-03-08 ENCOUNTER — HOSPITAL ENCOUNTER (EMERGENCY)
Facility: CLINIC | Age: 51
Discharge: HOME | End: 2021-03-08
Attending: EMERGENCY MEDICINE | Admitting: EMERGENCY MEDICINE
Payer: COMMERCIAL

## 2021-03-08 ENCOUNTER — TELEPHONE (OUTPATIENT)
Dept: FAMILY MEDICINE | Facility: CLINIC | Age: 51
End: 2021-03-08

## 2021-03-08 ENCOUNTER — APPOINTMENT (OUTPATIENT)
Dept: MRI IMAGING | Facility: CLINIC | Age: 51
End: 2021-03-08
Attending: EMERGENCY MEDICINE
Payer: COMMERCIAL

## 2021-03-08 VITALS
WEIGHT: 245 LBS | TEMPERATURE: 97.7 F | OXYGEN SATURATION: 98 % | BODY MASS INDEX: 33.18 KG/M2 | DIASTOLIC BLOOD PRESSURE: 94 MMHG | RESPIRATION RATE: 16 BRPM | HEART RATE: 66 BPM | SYSTOLIC BLOOD PRESSURE: 139 MMHG | HEIGHT: 72 IN

## 2021-03-08 DIAGNOSIS — M54.16 LUMBAR RADICULOPATHY: ICD-10-CM

## 2021-03-08 DIAGNOSIS — M79.604 PAIN OF RIGHT LOWER EXTREMITY: Primary | ICD-10-CM

## 2021-03-08 PROCEDURE — 99285 EMERGENCY DEPT VISIT HI MDM: CPT | Performed by: EMERGENCY MEDICINE

## 2021-03-08 PROCEDURE — 99285 EMERGENCY DEPT VISIT HI MDM: CPT | Mod: 25 | Performed by: EMERGENCY MEDICINE

## 2021-03-08 PROCEDURE — 72148 MRI LUMBAR SPINE W/O DYE: CPT

## 2021-03-08 PROCEDURE — 96375 TX/PRO/DX INJ NEW DRUG ADDON: CPT | Performed by: EMERGENCY MEDICINE

## 2021-03-08 PROCEDURE — 96374 THER/PROPH/DIAG INJ IV PUSH: CPT | Performed by: EMERGENCY MEDICINE

## 2021-03-08 PROCEDURE — 96372 THER/PROPH/DIAG INJ SC/IM: CPT | Performed by: EMERGENCY MEDICINE

## 2021-03-08 PROCEDURE — 250N000011 HC RX IP 250 OP 636: Performed by: EMERGENCY MEDICINE

## 2021-03-08 RX ORDER — LORAZEPAM 2 MG/ML
1 INJECTION INTRAMUSCULAR ONCE
Status: COMPLETED | OUTPATIENT
Start: 2021-03-08 | End: 2021-03-08

## 2021-03-08 RX ORDER — FENTANYL CITRATE 50 UG/ML
50 INJECTION, SOLUTION INTRAMUSCULAR; INTRAVENOUS ONCE
Status: COMPLETED | OUTPATIENT
Start: 2021-03-08 | End: 2021-03-08

## 2021-03-08 RX ORDER — FENTANYL CITRATE 50 UG/ML
100 INJECTION, SOLUTION INTRAMUSCULAR; INTRAVENOUS ONCE
Status: DISCONTINUED | OUTPATIENT
Start: 2021-03-08 | End: 2021-03-08 | Stop reason: HOSPADM

## 2021-03-08 RX ORDER — HYDROMORPHONE HYDROCHLORIDE 1 MG/ML
0.5 INJECTION, SOLUTION INTRAMUSCULAR; INTRAVENOUS; SUBCUTANEOUS
Status: DISCONTINUED | OUTPATIENT
Start: 2021-03-08 | End: 2021-03-08 | Stop reason: HOSPADM

## 2021-03-08 RX ORDER — DIAZEPAM 10 MG/2ML
5 INJECTION, SOLUTION INTRAMUSCULAR; INTRAVENOUS ONCE
Status: COMPLETED | OUTPATIENT
Start: 2021-03-08 | End: 2021-03-08

## 2021-03-08 RX ORDER — OXYCODONE AND ACETAMINOPHEN 10; 325 MG/1; MG/1
1 TABLET ORAL EVERY 6 HOURS PRN
Qty: 12 TABLET | Refills: 0 | Status: SHIPPED | OUTPATIENT
Start: 2021-03-08 | End: 2021-03-11

## 2021-03-08 RX ORDER — TIZANIDINE 2 MG/1
2-4 TABLET ORAL 3 TIMES DAILY PRN
Qty: 20 TABLET | Refills: 0 | Status: SHIPPED | OUTPATIENT
Start: 2021-03-08 | End: 2021-03-24

## 2021-03-08 RX ADMIN — HYDROMORPHONE HYDROCHLORIDE 1 MG: 1 INJECTION, SOLUTION INTRAMUSCULAR; INTRAVENOUS; SUBCUTANEOUS at 11:06

## 2021-03-08 RX ADMIN — LORAZEPAM 1 MG: 2 INJECTION INTRAMUSCULAR; INTRAVENOUS at 11:07

## 2021-03-08 RX ADMIN — FENTANYL CITRATE 50 MCG: 50 INJECTION INTRAMUSCULAR; INTRAVENOUS at 12:58

## 2021-03-08 RX ADMIN — HYDROMORPHONE HYDROCHLORIDE 0.5 MG: 1 INJECTION, SOLUTION INTRAMUSCULAR; INTRAVENOUS; SUBCUTANEOUS at 14:38

## 2021-03-08 RX ADMIN — DIAZEPAM 5 MG: 5 INJECTION, SOLUTION INTRAMUSCULAR; INTRAVENOUS at 15:01

## 2021-03-08 ASSESSMENT — MIFFLIN-ST. JEOR: SCORE: 2004.31

## 2021-03-08 NOTE — ED PROVIDER NOTES
"  History     Chief Complaint   Patient presents with     Back Pain     The history is provided by the patient.     Hollis Diggs is a 50 year old male with a PMHx of DDD, rheumatoid arthritis with negative rheumatoid factor, chronic lower back pain, herniated disc in lower back requiring surgery, anxiety who presents to the emergency department secondary to pain down his right leg. The patient reports that his pain is \"not so much in his back\", but is in his right hip/buttock and travels down his posterior right thigh, to the anterior right shin and over the top of his right foot into all 5 toes. All 5 of his toes also have felt numb since last night. His pain is worse when standing, sitting, laying flat. He has been unable to find a comfortable position, but is now laying on his left side and states that that is comfortable. His pain feels different from when he had a herniated disc. No pain or numbness down his left leg. The patient took 2 percocet this morning without relief. He typically takes these for his chronic back pain. He saw Dr. Cash 3 days ago and was give Toradol without relief. He was sent home with prednisone and told if this did not improve an MRI should be obtained. X-rays were obtained at this visit, results below.     X-ray of right hip and pelvis 03/05/2021:    IMPRESSION: Mild right and minimal left hip osteoarthritis. No  fracture identified.     Most recent MRI of lumbar spine 04/14/2009:    IMPRESSION:          1.   Moderate degenerative disk changes at L4-5 and L5-S1   with broadbased disk bulging resulting in moderate bilateral   neural foraminal narrowing at both levels.          2.   No abnormal contrast enhancement throughout.       Allergies:  Allergies   Allergen Reactions     No Known Drug Allergies        Problem List:    Patient Active Problem List    Diagnosis Date Noted     Antiphospholipid syndrome (H) 03/06/2021     Priority: Medium     Suicidal behavior 06/22/2020     " Priority: Medium     Class 1 obesity due to excess calories without serious comorbidity with body mass index (BMI) of 33.0 to 33.9 in adult 01/08/2020     Priority: Medium     Long-term use of high-risk medication 05/12/2017     Priority: Medium     History of deep venous thrombosis 04/14/2017     Priority: Medium     DDD (degenerative disc disease), lumbar 04/14/2017     Priority: Medium     On prednisone therapy 07/27/2016     Priority: Medium     Seronegative rheumatoid arthritis (H) 06/28/2016     Priority: Medium     Long-term (current) use of anticoagulants [Z79.01] 03/16/2016     Priority: Medium     Rheumatoid arthritis of multiple sites with negative rheumatoid factor (H) 12/07/2015     Priority: Medium     Midline low back pain, with sciatica presence unspecified 10/14/2015     Priority: Medium     Major depressive disorder, recurrent episode, mild (H) 10/07/2015     Priority: Medium     Pain in joint, multiple sites 03/20/2015     Priority: Medium     Anxiety state 02/10/2015     Priority: Medium     Problem list name updated by automated process. Provider to review       CARDIOVASCULAR SCREENING; LDL GOAL LESS THAN 160 01/15/2013     Priority: Medium     Alopecia 02/19/2010     Priority: Medium     Ingrown toenail 08/18/2009     Priority: Medium     Anxiety 07/09/2008     Priority: Medium     Abdominal pain, epigastric 01/25/2006     Priority: Medium        Past Medical History:    Past Medical History:   Diagnosis Date     Antiphospholipid syndrome (H)      Arthritis      Asthma      blood clot in leg      Depressive disorder, not elsewhere classified      ANKIT (generalised anxiety disorder)      HH (hiatus hernia)      Hypercholesteremia      Lumbar disc herniation 1992     Seronegative rheumatoid arthritis (H)        Past Surgical History:    Past Surgical History:   Procedure Laterality Date     APPENDECTOMY       COLONOSCOPY N/A 8/2/2016    Procedure: COMBINED COLONOSCOPY, SINGLE OR MULTIPLE  "BIOPSY/POLYPECTOMY BY BIOPSY;  Surgeon: Sydnee Walton MD;  Location: [facility] OR     COLONOSCOPY WITH CO2 INSUFFLATION N/A 8/2/2016    Procedure: COLONOSCOPY WITH CO2 INSUFFLATION;  Surgeon: Sydnee Walton MD;  Location: [facility] OR     COMBINED ESOPHAGOSCOPY, GASTROSCOPY, DUODENOSCOPY (EGD) WITH CO2 INSUFFLATION N/A 8/2/2016    Procedure: COMBINED ESOPHAGOSCOPY, GASTROSCOPY, DUODENOSCOPY (EGD) WITH CO2 INSUFFLATION;  Surgeon: Sydnee Walton MD;  Location: [facility] OR     ESOPHAGOSCOPY, GASTROSCOPY, DUODENOSCOPY (EGD), COMBINED N/A 8/2/2016    Procedure: COMBINED ESOPHAGOSCOPY, GASTROSCOPY, DUODENOSCOPY (EGD), BIOPSY SINGLE OR MULTIPLE;  Surgeon: Sydnee Walton MD;  Location: [facility] OR     HC REMOVAL OF TONSILS,<10 Y/O      Tonsils <11y.o.     HC REPAIR INCISIONAL HERNIA,REDUCIBLE  1970's    Hernia Repair, Incisional, Unilateral     HC UGI ENDOSCOPY DIAG W BIOPSY  02/01/06     HC VASECTOMY UNILAT/BILAT W POSTOP SEMEN  1/05    Vasectomy     History back lumbar laminectomy       ZZC NONSPECIFIC PROCEDURE  91 or 92    back surgery. lumbar. lamiectomy       Family History:    Family History   Problem Relation Age of Onset     Brain Tumor Mother         Benign     Deep Vein Thrombosis Mother         \"neck\"      Heart Disease Father         \"wont tell anyone\"     Neurologic Disorder Paternal Grandmother         Parkinsons      Alcohol/Drug Paternal Grandfather         alcoholic     Cancer Maternal Grandfather         lung     Diabetes Maternal Grandmother      Cerebrovascular Disease Maternal Grandmother         tim age ~70     Arthritis Cousin         cousins x 2 \"RA\"     Musculoskeletal Disorder Maternal Aunt         MS     Family History Negative Other         psoriasis, crohns, UC, SLE       Social History:  Marital Status:   [2]  Social History     Tobacco Use     Smoking status: Never Smoker     Smokeless tobacco: Never Used   Substance Use Topics     Alcohol use: Yes    "  Comment: rare     Drug use: No        Medications:    ARIPiprazole (ABILIFY) 2 MG tablet  DULoxetine (CYMBALTA) 60 MG capsule  gabapentin (NEURONTIN) 300 MG capsule  gabapentin (NEURONTIN) 800 MG tablet  lithium (ESKALITH CR/LITHOBID) 450 MG CR tablet  omeprazole (PRILOSEC) 40 MG DR capsule  oxyCODONE-acetaminophen (PERCOCET)  MG per tablet  phentermine (ADIPEX-P) 37.5 MG tablet  predniSONE (DELTASONE) 20 MG tablet  tiZANidine (ZANAFLEX) 2 MG tablet  traZODone (DESYREL) 50 MG tablet  vitamin D3 (CHOLECALCIFEROL) 50 mcg (2000 units) tablet  warfarin ANTICOAGULANT (COUMADIN) 5 MG tablet  metoprolol succinate ER (TOPROL-XL) 50 MG 24 hr tablet          Review of Systems   All other systems reviewed and are negative.      Physical Exam   BP: (!) 150/85  Pulse: 78  Temp: 97.4  F (36.3  C)  Resp: 20  Height: 182.9 cm (6')  Weight: 111.1 kg (245 lb)  SpO2: 99 %      Physical Exam  Vitals signs and nursing note reviewed.   Constitutional:       General: He is not in acute distress.     Appearance: Normal appearance. He is not diaphoretic.   HENT:      Head: Normocephalic and atraumatic.      Right Ear: External ear normal.      Left Ear: External ear normal.      Nose: Nose normal.      Mouth/Throat:      Mouth: Mucous membranes are moist.      Pharynx: Oropharynx is clear.   Eyes:      Extraocular Movements: Extraocular movements intact.      Conjunctiva/sclera: Conjunctivae normal.      Pupils: Pupils are equal, round, and reactive to light.   Neck:      Musculoskeletal: Normal range of motion and neck supple. No neck rigidity.   Cardiovascular:      Rate and Rhythm: Normal rate.      Heart sounds: Normal heart sounds.   Pulmonary:      Breath sounds: Normal breath sounds.   Abdominal:      Palpations: Abdomen is soft.      Tenderness: There is no abdominal tenderness. There is no right CVA tenderness, left CVA tenderness, guarding or rebound.   Musculoskeletal: Normal range of motion.         General: No deformity  or signs of injury.      Right lower leg: No edema.      Left lower leg: No edema.      Comments: Radicular pain down the right leg with straight leg raise to 20 degrees. Normal straight leg raise on the left.    Skin:     General: Skin is warm and dry.      Coloration: Skin is not pale.      Findings: No rash.   Neurological:      General: No focal deficit present.      Mental Status: He is alert and oriented to person, place, and time. Mental status is at baseline.      Cranial Nerves: No cranial nerve deficit.      Sensory: Sensory deficit present.      Motor: No weakness.      Comments: Subjective decrease in sensation of the right lower extremity below the knee, mostly on the dorsum of the foot.    Psychiatric:         Mood and Affect: Mood normal.         Behavior: Behavior normal.         Thought Content: Thought content normal.         ED Course        Procedures                   Results for orders placed or performed during the hospital encounter of 03/08/21 (from the past 24 hour(s))   Lumbar spine MRI w/o contrast - surgery >10 yrs or none    Narrative    MRI LUMBAR SPINE WITHOUT CONTRAST   3/8/2021 3:29 PM     HISTORY: Lumbar radiculopathy, prior surgery, new symptoms.    TECHNIQUE: Multiplanar multisequence MRI of the lumbar spine without  contrast.     COMPARISON: MRI lumbar spine 4/10/2009.     FINDINGS: Five lumbar vertebral bodies are presumed. Normal vertebral  body heights and sagittal alignment. Modic type II degenerative  endplate changes at multiple levels, most pronounced at L2-L3 and  L4-L5 and to a lesser degree at L5-S1. Normal appearance of the distal  spinal cord with the conus terminating at L1. There appears to be at  least partial fusion across the anterior aspect of the right  sacroiliac joint, as before. Otherwise unremarkable visualized bony  pelvis.    Segmental analysis:  T12-L1: Normal disc height and signal. No herniation. Normal facets.  No spinal canal or neural foraminal  stenosis. No change.    L1-L2: Disc desiccation with mild disc height loss. Small symmetric  disc bulge. Normal facets. No spinal canal or neural foraminal  stenosis. No change.    L2-L3: Disc desiccation with moderate disc height loss, progressed  from prior. Increased size of the disc bulge eccentric to the left  with superimposed small left central disc herniation. Normal facets.  Moderate left and mild right lateral recess stenosis with mild overall  central spinal canal stenosis. The degree of left lateral recess  stenosis in particular has increased significantly compared to the  prior examination and there is at least mild to moderate mass effect  on the traversing left L3 nerve roots. Mild mass effect on the  traversing right L3 nerve roots. Mild posterior/posterolateral  endplate osteophytic spurring. Minimal left neural foraminal  narrowing, new from prior. The right neural foramen is patent.    L3-L4: Mild disc desiccation without significant disc height loss.  Small symmetric disc bulge, new from prior. Mild facet arthropathy.  Mild bilateral lateral recess stenosis without central spinal canal  stenosis. Minimal bilateral neural foraminal narrowing. Degenerative  findings of this level have mildly progressed.    L4-L5: Disc desiccation with moderate disc height loss. There is a  disc bulge with superimposed sizable caudally migrating right central  disc extrusion measuring 13 mm x 6 mm in the axial plane (series 6  image 31). There is significant mass effect upon and posterior  displacement of the traversing right L5 nerve roots. Moderate right  lateral recess stenosis and mild overall spinal canal stenosis and  mild left lateral recess stenosis. Mild bilateral facet arthropathy.  Posterior/posterolateral endplate osteophytic ridging. Mild to  moderate right and mild left neural foraminal stenosis.    L5-S1: Disc desiccation with mild disc height loss. Disc bulge  slightly eccentric to the right with  mild facet arthropathy. Mild  right greater than left lateral recess stenosis with contact of the  ventral aspect of the traversing S1 nerve roots but without nerve root  displacement or overt compression. No central spinal canal stenosis.  Posterior/posterolateral endplate osteophytic ridging, right greater  than left. Moderate right neural foraminal stenosis appears  progressed. Mild-to-moderate left neural foraminal stenosis is similar  to prior.      Impression    IMPRESSION:  1. Interval progression of multilevel degenerative findings from L2-L3  through L5-S1, as described.  2. Prominent caudally migrating right central disc extrusion at L4-L5  placing mass effect upon and posteriorly displacing the traversing  right L5 nerve roots. Correlate clinically for possible right L5  radiculopathy.  3. Increased size of an eccentric disc bulge to the left resulting in  left more than right lateral recess stenosis at L2-L3.  4. Increased (now moderate-appearing) right neural foraminal stenosis  at L5-S1 in the setting of a diffuse disc bulge, posterior lateral  endplate osteophytic spurring and facet arthropathy.       Medications   fentaNYL (PF) (SUBLIMAZE) injection 100 mcg (has no administration in time range)   HYDROmorphone (PF) (DILAUDID) injection 0.5 mg (0.5 mg Intravenous Given 3/8/21 1438)   HYDROmorphone (DILAUDID) injection 1 mg (1 mg Intramuscular Given 3/8/21 1106)   LORazepam (ATIVAN) injection 1 mg (1 mg Intramuscular Given 3/8/21 1107)   fentaNYL (PF) (SUBLIMAZE) injection 50 mcg (50 mcg Intravenous Given 3/8/21 1258)   diazepam (VALIUM) injection 5 mg (5 mg Intravenous Given 3/8/21 1501)       Assessments & Plan (with Medical Decision Making)  Hollis is a 50 year old male who presents to the ED for concerns right hip/buttock pain traveling down his right leg to all 5 toes of the right foot. Numbness present in all 5 toes as well. No back pain noted. History of chronic lower back pain, herniated  lumbar disc, rheumatoid arthritis. Patient has taken prednisone, percocet and toradol without relief. He is vitally stable upon arrival. Physical exam as noted above.  I explained to the patient that stat MRI's typically are not obtained from the ED unless necessary, but his pain can be treated while here. He was in agreement with this and was given a shot of dilaudid and a shot of ativan.  He did not get relief so an iv was established and iv fentanyl given followed by iv dilaudid and valium in order to get him in the MRI.   Mri results reviewed then discussed with Dr. Mckenzie and Neurosurgery - referral placed to neurosurgery and they will see him as an outpatient and may start with an epidural steroid injection.  The patient appeared relaxed in the room but stated he wasn't much better.  He was lying on his side.  He asked for stronger percocet.  I've already sent an rx for tizanidine but will add percocet 10 mg tabs as well.  He was offered observation admission for pain control but declined given it would only be for pain control.  The diagnosis, tx options, risks and follow up discussed.      I have reviewed the nursing notes.    I have reviewed the findings, diagnosis, plan and need for follow up with the patient.      New Prescriptions    OXYCODONE-ACETAMINOPHEN (PERCOCET)  MG PER TABLET    Take 1 tablet by mouth every 6 hours as needed for severe pain    TIZANIDINE (ZANAFLEX) 2 MG TABLET    Take 1-2 tablets (2-4 mg) by mouth 3 times daily as needed for muscle spasms       Final diagnoses:   Lumbar radiculopathy       This document serves as a record of services personally performed by Anmol Vazquez MD. It was created on their behalf by Regina Jacobs, a trained medical scribe. The creation of this record is based on the provider's personal observations and the statements of the patient. This document has been checked and approved by the attending provider.    Note: Chart documentation done in  part with Dragon Voice Recognition software. Although reviewed after completion, some word and grammatical errors may remain.    3/8/2021   [facility] EMERGENCY DEPT     Anmol Vazquez MD  03/08/21 [id number]

## 2021-03-08 NOTE — DISCHARGE INSTRUCTIONS
Take your medications as prescribed. Return to the ER if new or worsening symptoms. I put in a referral to neurosurgery for you and sent a message to your doctor about possibly getting an MRI.  I hope you get better quickly.

## 2021-03-08 NOTE — ED TRIAGE NOTES
Patient presents with continued and worsening R) hip pain radiating down leg to top of foot and toes. He was seen on Friday by Sylvester Cash and started on Prednisone. Amy Ramirez RN

## 2021-03-08 NOTE — TELEPHONE ENCOUNTER
Orders for MRI Lumbar spine Right Sciatica are pending. Please sign orders and route to primary pool so patient can be called and scheduled.    Thank you.    Brown Paris, CMA

## 2021-03-09 ENCOUNTER — MYC MEDICAL ADVICE (OUTPATIENT)
Dept: FAMILY MEDICINE | Facility: CLINIC | Age: 51
End: 2021-03-09

## 2021-03-09 ENCOUNTER — MYC MEDICAL ADVICE (OUTPATIENT)
Dept: NEUROSURGERY | Facility: CLINIC | Age: 51
End: 2021-03-09

## 2021-03-10 NOTE — TELEPHONE ENCOUNTER
Patient is taking percocet 1 at a time and than 2 at a time.  He is wondering what else to take without taking too much percocet.     Patient is looking for a note to return to work but he is not able to lift anything too heavy but also what can he take to help with pain when he goes back to work.    Patient not able to ibuprofen only able to take tylenol which alone does not help with the pain.  He stated that the percocet does not make him goofy and I mentioned to him that taking that during work hours is not recommended.      Please respond to patient in [facility]

## 2021-03-10 NOTE — TELEPHONE ENCOUNTER
Spoke to patient. Ok per Marilyn Donnelly CNP to schedule patient with Dr Campbell. Patient has disc herniation. Patient's appt moved up and is scheduled to see Dr Campbell at  clinic 3/11. Patient was very appreciative .

## 2021-03-11 ENCOUNTER — OFFICE VISIT (OUTPATIENT)
Dept: NEUROSURGERY | Facility: CLINIC | Age: 51
End: 2021-03-11
Payer: COMMERCIAL

## 2021-03-11 VITALS
TEMPERATURE: 98 F | OXYGEN SATURATION: 98 % | SYSTOLIC BLOOD PRESSURE: 152 MMHG | DIASTOLIC BLOOD PRESSURE: 90 MMHG | BODY MASS INDEX: 33.18 KG/M2 | WEIGHT: 245 LBS | HEIGHT: 72 IN | HEART RATE: 115 BPM

## 2021-03-11 DIAGNOSIS — M54.16 LUMBAR RADICULOPATHY: Primary | ICD-10-CM

## 2021-03-11 PROCEDURE — 99203 OFFICE O/P NEW LOW 30 MIN: CPT | Performed by: NEUROLOGICAL SURGERY

## 2021-03-11 RX ORDER — OXYCODONE AND ACETAMINOPHEN 10; 325 MG/1; MG/1
1 TABLET ORAL EVERY 6 HOURS PRN
Qty: 30 TABLET | Refills: 0 | Status: SHIPPED | OUTPATIENT
Start: 2021-03-11 | End: 2021-03-24

## 2021-03-11 ASSESSMENT — PAIN SCALES - GENERAL: PAINLEVEL: EXTREME PAIN (8)

## 2021-03-11 ASSESSMENT — MIFFLIN-ST. JEOR: SCORE: 2004.31

## 2021-03-11 NOTE — PROGRESS NOTES
Faxed [facility] paperwork to [facility] at [phone]. Verified on RightFax.    Work restrictions note provided indicating need for light duty work. Will need these restrictions during the interim of his MARCIO and PT. Need to update according to patient's progress.    Karen Ospina RN on 3/11/2021 at 1:10 PM

## 2021-03-11 NOTE — LETTER
3/11/2021         RE: Hollis Diggs  [address] [facility] [address]        Dear Colleague,    Thank you for referring your patient, Hollis Diggs, to the [facility] [place]. Please see a copy of my visit note below.    Hollis Diggs is a 50 year old male who presents for:  No chief complaint on file.       Initial Vitals:  BP (!) 152/90   Pulse 115   Temp 98  F (36.7  C) (Temporal)   Ht 6' (1.829 m)   Wt 245 lb (111.1 kg)   SpO2 98%   BMI 33.23 kg/m   Estimated body mass index is 33.23 kg/m  as calculated from the following:    Height as of this encounter: 6' (1.829 m).    Weight as of this encounter: 245 lb (111.1 kg).. Body surface area is 2.38 meters squared. BP completed using cuff size: regular  Extreme Pain (8)    Nursing Comments: Hollis to follow up with Primary Care provider regarding elevated blood pressure.    152/90    Yeimy Kingston [facility]        Faxed [place] paperwork to [facility] at [phone]. Verified on RightFax.    Work restrictions note provided indicating need for light duty work. Will need these restrictions during the interim of his MARCIO and PT. Need to update according to patient's progress.    Karen Ospina RN on 3/11/2021 at 1:10 PM    I was asked by Dr. Cash to see this patient in consultation    50M right L4-5 disc herniation, leg pain.  A week and a half of severe leg pain.  Daily, sharp, 8/10 pain radiating to the foot with associated numbness and weakness.  Underwent toradol injection without relief.  Patient had a remote lumbar microdiscectomy for left leg pain, but does not remember the level.  MR with right L4-5 extruded disc herniation.       Past Medical History:   Diagnosis Date     Antiphospholipid syndrome (H)      Arthritis      Asthma     Exercise     blood clot in leg      Depressive disorder, not elsewhere classified     Depression (non-psychotic)     ANKIT (generalised anxiety disorder)      HH (hiatus  hernia)      Hypercholesteremia     normal with weight loss 3/09     Lumbar disc herniation 1992     Seronegative rheumatoid arthritis (H)      Past Surgical History:   Procedure Laterality Date     APPENDECTOMY       COLONOSCOPY N/A 8/2/2016    Procedure: COMBINED COLONOSCOPY, SINGLE OR MULTIPLE BIOPSY/POLYPECTOMY BY BIOPSY;  Surgeon: Sydnee Walton MD;  Location: [facility] OR     COLONOSCOPY WITH CO2 INSUFFLATION N/A 8/2/2016    Procedure: COLONOSCOPY WITH CO2 INSUFFLATION;  Surgeon: Sydnee Walton MD;  Location: [facility] OR     COMBINED ESOPHAGOSCOPY, GASTROSCOPY, DUODENOSCOPY (EGD) WITH CO2 INSUFFLATION N/A 8/2/2016    Procedure: COMBINED ESOPHAGOSCOPY, GASTROSCOPY, DUODENOSCOPY (EGD) WITH CO2 INSUFFLATION;  Surgeon: Sydnee Walton MD;  Location: [facility] OR     ESOPHAGOSCOPY, GASTROSCOPY, DUODENOSCOPY (EGD), COMBINED N/A 8/2/2016    Procedure: COMBINED ESOPHAGOSCOPY, GASTROSCOPY, DUODENOSCOPY (EGD), BIOPSY SINGLE OR MULTIPLE;  Surgeon: Sydnee Walton MD;  Location: [facility] OR     HC REMOVAL OF TONSILS,<11 Y/O      Tonsils <11y.o.     HC REPAIR INCISIONAL HERNIA,REDUCIBLE  1970's    Hernia Repair, Incisional, Unilateral     HC UGI ENDOSCOPY DIAG W BIOPSY  02/01/06     HC VASECTOMY UNILAT/BILAT W POSTOP SEMEN  1/05    Vasectomy     History back lumbar laminectomy       ZZC NONSPECIFIC PROCEDURE  91 or 92    back surgery. lumbar. lamiectomy     Social History     Socioeconomic History     Marital status:      Spouse name: Cassie     Number of children: 2     Years of education: 15     Highest education level: Not on file   Occupational History     Occupation:      Employer: [employer]   Social Needs     Financial resource strain: Not on file     Food insecurity     Worry: Not on file     Inability: Not on file     Transportation needs     Medical: Not on file     Non-medical: Not on file   Tobacco Use     Smoking status: Never Smoker     Smokeless  "tobacco: Never Used   Substance and Sexual Activity     Alcohol use: Yes     Comment: rare     Drug use: No     Sexual activity: Yes     Partners: Female   Lifestyle     Physical activity     Days per week: Not on file     Minutes per session: Not on file     Stress: Not on file   Relationships     Social connections     Talks on phone: Not on file     Gets together: Not on file     Attends Baptism service: Not on file     Active member of club or organization: Not on file     Attends meetings of clubs or organizations: Not on file     Relationship status: Not on file     Intimate partner violence     Fear of current or ex partner: Not on file     Emotionally abused: Not on file     Physically abused: Not on file     Forced sexual activity: Not on file   Other Topics Concern      Service No     Blood Transfusions No     Caffeine Concern Yes     Comment: 64 oz q day     Occupational Exposure Not Asked     Hobby Hazards Not Asked     Sleep Concern Yes     Stress Concern No     Weight Concern No     Special Diet Not Asked     Back Care Not Asked     Exercise Yes     Comment: sometimes     Bike Helmet Not Asked     Seat Belt Yes     Self-Exams No     Parent/sibling w/ CABG, MI or angioplasty before 65F 55M? Not Asked   Social History Narrative    4 children healthy     Family History   Problem Relation Age of Onset     Brain Tumor Mother         Benign     Deep Vein Thrombosis Mother         \"neck\"      Heart Disease Father         \"wont tell anyone\"     Neurologic Disorder Paternal Grandmother         Parkinsons      Alcohol/Drug Paternal Grandfather         alcoholic     Cancer Maternal Grandfather         lung     Diabetes Maternal Grandmother      Cerebrovascular Disease Maternal Grandmother         tim age ~70     Arthritis Cousin         cousins x 2 \"RA\"     Musculoskeletal Disorder Maternal Aunt         MS     Family History Negative Other         psoriasis, crohns, UC, SLE        ROS: 10 point ROS neg " other than the symptoms noted above in the HPI.    Physical Exam  BP (!) 152/90   Pulse 115   Temp 98  F (36.7  C) (Temporal)   Ht 6' (1.829 m)   Wt 245 lb (111.1 kg)   SpO2 98%   BMI 33.23 kg/m    HEENT:  Normocephalic, atraumatic.  PERRLA.  EOM s intact.  Visual fields full to gross exam  Neck:  Supple, non-tender, without lymphadenopathy.  Heart:  No peripheral edema  Lungs:  No SOB  Abdomen:  Non-distended.   Skin:  Warm and dry.  Extremities:  No edema, cyanosis or clubbing.  Psychiatric:  No apparent distress  Musculoskeletal:  Normal bulk and tone    NEUROLOGICAL EXAMINATION:     Mental status:  Alert and Oriented x 3, speech is fluent.  Cranial nerves:  II-XII intact.   Motor:    Shoulder Abduction:  Right:  5/5   Left:  5/5  Biceps:                      Right:  5/5   Left:  5/5  Triceps:                     Right:  5/5   Left:  5/5  Wrist Extensors:       Right:  5/5   Left:  5/5  Wrist Flexors:           Right:  5/5   Left:  5/5  interosseus :            Right:  5/5   Left:  5/5  Hip Flexion:                Right: 5/5  Left:  5/5  Quadriceps:             Right:  5/5  Left:  5/5  Hamstrings:             Right:  5/5  Left:  5/5  Gastroc Soleus:        Right:  5/5  Left:  5/5  Tib/Ant:                      Right:  5/5  Left:  5/5  EHL:                     Right:  5/5  Left:  5/5  Sensation:  Intact  Reflexes:  Negative Babinski.  Negative Clonus.  Negative Boston's.  Coordination:  Smooth finger to nose testing.   Negative pronator drift.  Smooth tandem walking.    A/P:  50M right L4-5 disc herniation, leg pain    I had a discussion with the patient, reviewing the history, symptoms, and imaging  Will start PT and MARCIO         Again, thank you for allowing me to participate in the care of your patient.        Sincerely,        Artis Campbell MD

## 2021-03-11 NOTE — PROGRESS NOTES
I was asked by Dr. Cash to see this patient in consultation    50M right L4-5 disc herniation, leg pain.  A week and a half of severe leg pain.  Daily, sharp, 8/10 pain radiating to the foot with associated numbness and weakness.  Underwent toradol injection without relief.  Patient had a remote lumbar microdiscectomy for left leg pain, but does not remember the level.  MR with right L4-5 extruded disc herniation.       Past Medical History:   Diagnosis Date     Antiphospholipid syndrome (H)      Arthritis      Asthma     Exercise     blood clot in leg      Depressive disorder, not elsewhere classified     Depression (non-psychotic)     ANKIT (generalised anxiety disorder)      HH (hiatus hernia)      Hypercholesteremia     normal with weight loss 3/09     Lumbar disc herniation 1992     Seronegative rheumatoid arthritis (H)      Past Surgical History:   Procedure Laterality Date     APPENDECTOMY       COLONOSCOPY N/A 8/2/2016    Procedure: COMBINED COLONOSCOPY, SINGLE OR MULTIPLE BIOPSY/POLYPECTOMY BY BIOPSY;  Surgeon: Sydnee Walton MD;  Location: [facility]     COLONOSCOPY WITH CO2 INSUFFLATION N/A 8/2/2016    Procedure: COLONOSCOPY WITH CO2 INSUFFLATION;  Surgeon: Sydnee Walton MD;  Location: [facility]     COMBINED ESOPHAGOSCOPY, GASTROSCOPY, DUODENOSCOPY (EGD) WITH CO2 INSUFFLATION N/A 8/2/2016    Procedure: COMBINED ESOPHAGOSCOPY, GASTROSCOPY, DUODENOSCOPY (EGD) WITH CO2 INSUFFLATION;  Surgeon: Sydnee Walton MD;  Location: [facility] OR     ESOPHAGOSCOPY, GASTROSCOPY, DUODENOSCOPY (EGD), COMBINED N/A 8/2/2016    Procedure: COMBINED ESOPHAGOSCOPY, GASTROSCOPY, DUODENOSCOPY (EGD), BIOPSY SINGLE OR MULTIPLE;  Surgeon: Sydnee Walton MD;  Location: [facility] OR     HC REMOVAL OF TONSILS,<11 Y/O      Tonsils <11y.o.     HC REPAIR INCISIONAL HERNIA,REDUCIBLE  1970's    Hernia Repair, Incisional, Unilateral     HC UGI ENDOSCOPY DIAG W BIOPSY  02/01/06     HC VASECTOMY  UNILAT/BILAT W POSTOP SEMEN  1/05    Vasectomy     History back lumbar laminectomy       ZZC NONSPECIFIC PROCEDURE  91 or 92    back surgery. lumbar. lamiectomy     Social History     Socioeconomic History     Marital status:      Spouse name: Cassie     Number of children: 2     Years of education: 15     Highest education level: Not on file   Occupational History     Occupation:      Employer: [employer]   Social Needs     Financial resource strain: Not on file     Food insecurity     Worry: Not on file     Inability: Not on file     Transportation needs     Medical: Not on file     Non-medical: Not on file   Tobacco Use     Smoking status: Never Smoker     Smokeless tobacco: Never Used   Substance and Sexual Activity     Alcohol use: Yes     Comment: rare     Drug use: No     Sexual activity: Yes     Partners: Female   Lifestyle     Physical activity     Days per week: Not on file     Minutes per session: Not on file     Stress: Not on file   Relationships     Social connections     Talks on phone: Not on file     Gets together: Not on file     Attends Cheondoism service: Not on file     Active member of club or organization: Not on file     Attends meetings of clubs or organizations: Not on file     Relationship status: Not on file     Intimate partner violence     Fear of current or ex partner: Not on file     Emotionally abused: Not on file     Physically abused: Not on file     Forced sexual activity: Not on file   Other Topics Concern      Service No     Blood Transfusions No     Caffeine Concern Yes     Comment: 64 oz q day     Occupational Exposure Not Asked     Hobby Hazards Not Asked     Sleep Concern Yes     Stress Concern No     Weight Concern No     Special Diet Not Asked     Back Care Not Asked     Exercise Yes     Comment: sometimes     Bike Helmet Not Asked     Seat Belt Yes     Self-Exams No     Parent/sibling w/ CABG, MI or angioplasty before 65F 55M? Not  "Asked   Social History Narrative    4 children healthy     Family History   Problem Relation Age of Onset     Brain Tumor Mother         Benign     Deep Vein Thrombosis Mother         \"neck\"      Heart Disease Father         \"wont tell anyone\"     Neurologic Disorder Paternal Grandmother         Parkinsons      Alcohol/Drug Paternal Grandfather         alcoholic     Cancer Maternal Grandfather         lung     Diabetes Maternal Grandmother      Cerebrovascular Disease Maternal Grandmother         tim age ~70     Arthritis Cousin         cousins x 2 \"RA\"     Musculoskeletal Disorder Maternal Aunt         MS     Family History Negative Other         psoriasis, crohns, UC, SLE        ROS: 10 point ROS neg other than the symptoms noted above in the HPI.    Physical Exam  BP (!) 152/90   Pulse 115   Temp 98  F (36.7  C) (Temporal)   Ht 6' (1.829 m)   Wt 245 lb (111.1 kg)   SpO2 98%   BMI 33.23 kg/m    HEENT:  Normocephalic, atraumatic.  PERRLA.  EOM s intact.  Visual fields full to gross exam  Neck:  Supple, non-tender, without lymphadenopathy.  Heart:  No peripheral edema  Lungs:  No SOB  Abdomen:  Non-distended.   Skin:  Warm and dry.  Extremities:  No edema, cyanosis or clubbing.  Psychiatric:  No apparent distress  Musculoskeletal:  Normal bulk and tone    NEUROLOGICAL EXAMINATION:     Mental status:  Alert and Oriented x 3, speech is fluent.  Cranial nerves:  II-XII intact.   Motor:    Shoulder Abduction:  Right:  5/5   Left:  5/5  Biceps:                      Right:  5/5   Left:  5/5  Triceps:                     Right:  5/5   Left:  5/5  Wrist Extensors:       Right:  5/5   Left:  5/5  Wrist Flexors:           Right:  5/5   Left:  5/5  interosseus :            Right:  5/5   Left:  5/5  Hip Flexion:                Right: 5/5  Left:  5/5  Quadriceps:             Right:  5/5  Left:  5/5  Hamstrings:             Right:  5/5  Left:  5/5  Gastroc Soleus:        Right:  5/5  Left:  5/5  Tib/Ant:                     "  Right:  5/5  Left:  5/5  EHL:                     Right:  5/5  Left:  5/5  Sensation:  Intact  Reflexes:  Negative Babinski.  Negative Clonus.  Negative Boston's.  Coordination:  Smooth finger to nose testing.   Negative pronator drift.  Smooth tandem walking.    A/P:  50M right L4-5 disc herniation, leg pain    I had a discussion with the patient, reviewing the history, symptoms, and imaging  Will start PT and MARCIO

## 2021-03-11 NOTE — PROGRESS NOTES
Hollis Diggs is a 50 year old male who presents for:  No chief complaint on file.       Initial Vitals:  BP (!) 152/90   Pulse 115   Temp 98  F (36.7  C) (Temporal)   Ht 6' (1.829 m)   Wt 245 lb (111.1 kg)   SpO2 98%   BMI 33.23 kg/m   Estimated body mass index is 33.23 kg/m  as calculated from the following:    Height as of this encounter: 6' (1.829 m).    Weight as of this encounter: 245 lb (111.1 kg).. Body surface area is 2.38 meters squared. BP completed using cuff size: regular  Extreme Pain (8)    Nursing Comments: Hollis to follow up with Primary Care provider regarding elevated blood pressure.    152/90    Yeimy Kingston CMA

## 2021-03-11 NOTE — PROGRESS NOTES
Patient provided a second form from [employer]. Fitness for duty Form. Faxed to [facility] at [phone]. Verified via RightFax.  Karen Ospina RN on 3/11/2021 at 4:00 PM

## 2021-03-11 NOTE — LETTER
March 11, 2021      Hollis Diggs  [address] [facility] [address]        To Whom It May Concern:    Hollis Diggs was seen in our clinic. He may return to work with the following restrictions: light duty work until further notice.      Sincerely,            Artis Campbell MD

## 2021-03-11 NOTE — PATIENT INSTRUCTIONS
1. Ordered a right L4-5 Epidural Steroid Injection. [facility] will call you within 48 hours to schedule this. If you don't here from them, please schedule by calling Operating Room scheduling team s phone number: [phone], option 2. If no decline in symptoms 2 weeks after injections, call our clinic and talk to a nurse.  2. Orders for [facility]. They will call you to schedule within a few days. Phone number: [phone]. If no pain relief within 4 weeks, please call our clinic nurse to come up with the next step.   3. Prescription for percocet  4. Work letter for light duty    QUENTIN Jones  --  [facility]  Phone: [phone]  Fax: [phone]

## 2021-03-12 ENCOUNTER — TRANSFERRED RECORDS (OUTPATIENT)
Dept: HEALTH INFORMATION MANAGEMENT | Facility: CLINIC | Age: 51
End: 2021-03-12

## 2021-03-12 ENCOUNTER — TELEPHONE (OUTPATIENT)
Dept: SURGERY | Facility: CLINIC | Age: 51
End: 2021-03-12

## 2021-03-12 NOTE — TELEPHONE ENCOUNTER
Pt scheduled for LESI   Date:4/2/21  Time:0830am  Dr. Zacarias    Instructed pt to have H&P and  for procedure.  Patient informed of COVID testing process.

## 2021-03-15 ENCOUNTER — TELEPHONE (OUTPATIENT)
Dept: ANTICOAGULATION | Facility: CLINIC | Age: 51
End: 2021-03-15

## 2021-03-15 ENCOUNTER — ANTICOAGULATION THERAPY VISIT (OUTPATIENT)
Dept: ANTICOAGULATION | Facility: CLINIC | Age: 51
End: 2021-03-15

## 2021-03-15 DIAGNOSIS — Z79.01 LONG TERM CURRENT USE OF ANTICOAGULANT THERAPY: ICD-10-CM

## 2021-03-15 DIAGNOSIS — I82.409 DVT (DEEP VENOUS THROMBOSIS) (H): ICD-10-CM

## 2021-03-15 DIAGNOSIS — Z79.01 LONG TERM CURRENT USE OF ANTICOAGULANT THERAPY: Primary | ICD-10-CM

## 2021-03-15 DIAGNOSIS — I82.4Y9 DEEP VEIN THROMBOSIS (DVT) OF PROXIMAL LOWER EXTREMITY, UNSPECIFIED CHRONICITY, UNSPECIFIED LATERALITY (H): ICD-10-CM

## 2021-03-15 DIAGNOSIS — I82.409 DVT (DEEP VENOUS THROMBOSIS) (H): Primary | ICD-10-CM

## 2021-03-15 LAB
CAPILLARY BLOOD COLLECTION: NORMAL
INR BLD: 4.3 (ref 0.86–1.14)

## 2021-03-15 PROCEDURE — 85610 PROTHROMBIN TIME: CPT | Performed by: FAMILY MEDICINE

## 2021-03-15 PROCEDURE — 36416 COLLJ CAPILLARY BLOOD SPEC: CPT | Performed by: FAMILY MEDICINE

## 2021-03-15 NOTE — PROGRESS NOTES
ANTICOAGULATION MANAGEMENT     Patient Name:  Hollis Diggs  Date:  3/15/2021    ASSESSMENT /SUBJECTIVE:    Today's INR result of 4.3 is supratherapeutic. Goal INR of 2.0-3.0      Warfarin dose taken: Warfarin taken as instructed    Diet: No new diet changes affecting INR    Medication changes/ interactions: Pt just finished prednison and is having severe back pain, taking more tylenol and has precocent for pain as well. Likely all are causing supratherapeutic INR    Previous INR: Therapeutic     S/S of bleeding or thromboembolism: No    New injury or illness: Yes: back pain    Upcoming surgery, procedure or cardioversion: Yes: Pt will be getting an injection , they  Mentioned holding warfarin. Will send message to PCP regarding bridging with lovenox as he's needed it in the past.    Additional findings: None      PLAN:    Telephone call with Hollis regarding INR result and instructed:     Warfarin Dosing Instructions: Hold today then change your warfarin dose to 5 mg Mon, Wed, Fri and 7.5 mg all other days  . (5.3 % change)    Instructed patient to follow up no later than: 3 weeks  Will arrange when we figure out procedure inst    Education provided: None required      Pt verbalizes understanding and agrees to warfarin dosing plan.    Instructed to call the Anticoagulation Clinic for any changes, questions or concerns. (#[phone])        Анна Hale RN      OBJECTIVE:  Recent labs: (last 7 days)     03/15/21  0843   INR 4.3*         No question data found.  Anticoagulation Summary  As of 3/15/2021    INR goal:  2.0-3.0   TTR:  40.9 % (10.1 mo)   INR used for dosin.3 (3/15/2021)   Warfarin maintenance plan:  5 mg (5 mg x 1) every Mon, Wed, Fri; 7.5 mg (5 mg x 1.5) all other days   Full warfarin instructions:  3/15: Hold; 3/28: Hold; 3/29: Hold; 3/30: Hold; 3/31: Hold; : Hold; : 7.5 mg; Otherwise 5 mg every Mon, Wed, Fri; 7.5 mg all other days   Weekly warfarin total:  45 mg   Plan last  modified:  Анна Hale, RN (3/15/2021)   Next INR check:  4/8/2021   Priority:  Maintenance   Target end date:  Indefinite    Indications    DVT (deep venous thrombosis) (H) (Resolved) [I82.409]  Long-term (current) use of anticoagulants [Z79.01] [Z79.01]             Anticoagulation Episode Summary     INR check location:      Preferred lab:      Send INR reminders to:  [facility] [place]    Comments:  5 mg, dosing card, PM dose      Anticoagulation Care Providers     Provider Role Specialty Phone number    Sylvester Cash MD Referring [facility] [phone]

## 2021-03-15 NOTE — TELEPHONE ENCOUNTER
Please review the following procedure schedule and sign lovenox pending if this looks accurate. Will then send instructions to pt via [employer]. Thanks!     Sun 3/28- 5 days before surgery: Stop warfarin, no lovenox  Mon 3/29- 4 days before surgery:  No warfarin, no lovenox  Tues 3/30- 3 days before surgery: No warfarin, start lovenox 105 mg twice a day  Wed 3/31- 2 days before surgery:  No warfarin, lovenox 105 mg twice a day  Thur 4/1- 1 day before surgery:  No warfarin, Lovenox shot in AM only (no pm dose)    Fri 4/2- Day of surgery:  No lovenox, warfarin 7.5 mg PM (at least 12 hours after procedure or as directed by surgeon).  Sat 4/3-  Lovenox 105 mg twice a day, Warfarin 7.5 mg   Sun 4/4- Lovenox 105 mg twice a day, Warfarin 7.5 mg   Mon 4/5- Lovenox 105 mg twice a day, Warfarin 7.5 mg   Tues 4/6- Lovenox 105 mg twice a day, warfarin 7.5 mg   Wed 4/7- Lovenox 105 mg twice a day, warfarin 5 mg   Thurs 4/8- Lovenox shot in AM, Check INR at the lab- RN will call with further dosing instructions.

## 2021-03-15 NOTE — TELEPHONE ENCOUNTER
Pt will be having a back injection on 4/2 and he mentioned they want him off his warfarin for this. Do you suggest that he bridge with lovenox while off his warfarin? Please advise.     SURESH ZavaletaN, RN- Coumadin Clinic RN

## 2021-03-16 ENCOUNTER — HOSPITAL ENCOUNTER (OUTPATIENT)
Dept: PHYSICAL THERAPY | Facility: CLINIC | Age: 51
Setting detail: THERAPIES SERIES
End: 2021-03-16
Attending: NEUROLOGICAL SURGERY
Payer: COMMERCIAL

## 2021-03-16 ENCOUNTER — TELEPHONE (OUTPATIENT)
Dept: NEUROSURGERY | Facility: OTHER | Age: 51
End: 2021-03-16

## 2021-03-16 DIAGNOSIS — M54.16 LUMBAR RADICULOPATHY: ICD-10-CM

## 2021-03-16 PROCEDURE — 97110 THERAPEUTIC EXERCISES: CPT | Mod: GP | Performed by: PHYSICAL THERAPIST

## 2021-03-16 PROCEDURE — 97012 MECHANICAL TRACTION THERAPY: CPT | Mod: GP | Performed by: PHYSICAL THERAPIST

## 2021-03-16 PROCEDURE — 97010 HOT OR COLD PACKS THERAPY: CPT | Mod: GP | Performed by: PHYSICAL THERAPIST

## 2021-03-16 PROCEDURE — 97161 PT EVAL LOW COMPLEX 20 MIN: CPT | Mod: GP | Performed by: PHYSICAL THERAPIST

## 2021-03-16 NOTE — TELEPHONE ENCOUNTER
Patient called today wondering when work release will be faxed to employer.  Patient was told that the surgeon will need to sign the completed form before it can be sent.

## 2021-03-16 NOTE — PROGRESS NOTES
03/16/21 1343   General Information   Type of Visit Initial OP Ortho PT Evaluation   Start of Care Date 03/16/21   Referring Physician frantz lynch MD    Patient/Family Goals Statement right leg pain    Orders Evaluate and Treat   Date of Order 03/11/21   Certification Required? No   Medical Diagnosis lumbar radiculopathy M54.16   Surgical/Medical history reviewed Yes   Precautions/Limitations no known precautions/limitations   Body Part(s)   Body Part(s) Lumbar Spine/SI   Presentation and Etiology   Pertinent history of current problem (include personal factors and/or comorbidities that impact the POC) patient reports that about 1 month started to have right leg pain , pain goes posterior thigh to laterial shin to foot . this pain / numb feeling is constant . if he lays flat pain will resolve.   is able to sit and this helps but walking more than 5 minutes leg is worse . PMH 30 years ago had diskectomy and did get better after surgery . 2-3 years ago started to get back pain but only back pain , meds warfin due to blood clot when not supposed to , denies incottenence   Impairments A. Pain;H. Impaired gait   Functional Limitations perform activities of daily living;perform desired leisure / sports activities;perform required work activities  (works at [employer] has been off work x 1 week)   Symptom Location right buttock to foot    Onset date of current episode/exacerbation 02/16/21   Chronicity New   Pain rating (0-10 point scale) Best (/10);Worst (/10)   Best (/10) 6/10    Worst (/10) 10/10   Pain quality A. Sharp   Frequency of pain/symptoms A. Constant   Pain/symptoms exacerbated by B. Walking   Pain/symptoms eased by A. Sitting;F. Certain positions   Progression of symptoms since onset: Improved   Current / Previous Interventions   Diagnostic Tests: MRI   Prior Level of Function   Functional Level Prior Comment none   Current Level of Function   Current Community Support Family/friend caregiver    Patient role/employment history A. Employed   Fall Risk Screen   Fall screen completed by PT   Have you fallen 2 or more times in the past year? No   Have you fallen and had an injury in the past year? No   Is patient a fall risk? No   Fall screen comments 1 month ago slipped on ice on driveway and hit head passed out    Abuse Screen (yes response referral indicated)   Feels Unsafe at Home or Work/School no   Feels Threatened by Someone no   Does Anyone Try to Keep You From Having Contact with Others or Doing Things Outside Your Home? no   Physical Signs of Abuse Present no   Lumbar Spine/SI Objective Findings   Gait/Locomotion antelgic    Flexion ROM full but repeated increase leg pain    Extension ROM full no change in pain    Lumbar ROM Comment full rotation and laterial flexion    Lumbar/Hip/Knee/Foot Strength Comments able to stand on heels and toes , sit to stand independent    SLR neg   Sensation Testing numb in right foot    Patellar Tendon Reflexes  B equal    Achilles Tendon Reflexes B equal    Planned Therapy Interventions   Planned Therapy Interventions ADL retraining;ROM;strengthening;stretching   Planned Modality Interventions   Planned Modality Interventions Comments trial of traction or IFC with hot pack    Clinical Impression   Criteria for Skilled Therapeutic Interventions Met yes, treatment indicated   PT Diagnosis right LE pain denies back pain    Functional limitations due to impairments [place] SOILA 42.22   Clinical Presentation Stable/Uncomplicated   Clinical Presentation Rationale patient seen due to no back but right LE constant pain and numb , responded best to extension exercises and trial of traction or IFC , skilled PT instruction in HEP    Clinical Decision Making (Complexity) Low complexity   Predicted Duration of Therapy Intervention (days/wks) 1x week x 1-3 months    Risk & Benefits of therapy have been explained Yes   Patient, Family & other staff in agreement with plan of care  Yes   Education Assessment   Preferred Learning Style Listening;Reading;Demonstration   Barriers to Learning No barriers   ORTHO GOALS   PT Ortho Eval Goals 1;2;3   Ortho Goal 1   Goal Identifier 1   Goal Description instruction in HEP and compliant with it 5 of 7 days to improve quality of life    Target Date 06/16/21   Ortho Goal 2   Goal Identifier 2   Goal Description patient to have resolved right LE pain currently 6-10 /10 goal is 0-2/10    Target Date 06/16/21   Ortho Goal 3   Goal Identifier 3   Goal Description patient to have improved tolerance to activity currently SOILA 42.22 goal is less than 20    Total Evaluation Time   PT Eval, Low Complexity Minutes (48202) 15

## 2021-03-17 NOTE — TELEPHONE ENCOUNTER
Patient is scheduled for surgery on 4/2/2021. Patient is prescribed percocet and zanaflex.    Brown Paris CMA

## 2021-03-19 NOTE — TELEPHONE ENCOUNTER
Patient is waiting for work release and [facility] paper work. Would like a call when this has been completed and sent to his employer.

## 2021-03-20 DIAGNOSIS — Z11.59 ENCOUNTER FOR SCREENING FOR OTHER VIRAL DISEASES: ICD-10-CM

## 2021-03-22 ENCOUNTER — HOSPITAL ENCOUNTER (OUTPATIENT)
Dept: PHYSICAL THERAPY | Facility: CLINIC | Age: 51
Setting detail: THERAPIES SERIES
End: 2021-03-22
Attending: NEUROLOGICAL SURGERY
Payer: COMMERCIAL

## 2021-03-22 PROCEDURE — 97110 THERAPEUTIC EXERCISES: CPT | Mod: GP | Performed by: PHYSICAL THERAPIST

## 2021-03-22 PROCEDURE — 97010 HOT OR COLD PACKS THERAPY: CPT | Mod: GP | Performed by: PHYSICAL THERAPIST

## 2021-03-22 PROCEDURE — 97012 MECHANICAL TRACTION THERAPY: CPT | Mod: GP | Performed by: PHYSICAL THERAPIST

## 2021-03-24 ENCOUNTER — OFFICE VISIT (OUTPATIENT)
Dept: FAMILY MEDICINE | Facility: CLINIC | Age: 51
End: 2021-03-24
Payer: COMMERCIAL

## 2021-03-24 VITALS
TEMPERATURE: 97.3 F | DIASTOLIC BLOOD PRESSURE: 68 MMHG | BODY MASS INDEX: 33.81 KG/M2 | HEART RATE: 118 BPM | SYSTOLIC BLOOD PRESSURE: 136 MMHG | WEIGHT: 249.3 LBS | RESPIRATION RATE: 16 BRPM | OXYGEN SATURATION: 100 %

## 2021-03-24 DIAGNOSIS — R05.9 COUGH: ICD-10-CM

## 2021-03-24 DIAGNOSIS — Z01.818 PREOP GENERAL PHYSICAL EXAM: Primary | ICD-10-CM

## 2021-03-24 DIAGNOSIS — M54.16 LUMBAR RADICULOPATHY: ICD-10-CM

## 2021-03-24 DIAGNOSIS — Z79.01 LONG TERM CURRENT USE OF ANTICOAGULANT THERAPY: ICD-10-CM

## 2021-03-24 PROCEDURE — 99214 OFFICE O/P EST MOD 30 MIN: CPT | Performed by: FAMILY MEDICINE

## 2021-03-24 RX ORDER — GABAPENTIN 300 MG/1
CAPSULE ORAL
Qty: 30 CAPSULE | Refills: 0 | Status: SHIPPED | OUTPATIENT
Start: 2021-03-24 | End: 2021-04-21

## 2021-03-24 RX ORDER — OXYCODONE AND ACETAMINOPHEN 10; 325 MG/1; MG/1
1 TABLET ORAL EVERY 6 HOURS PRN
Qty: 30 TABLET | Refills: 0 | Status: SHIPPED | OUTPATIENT
Start: 2021-03-24 | End: 2021-04-04

## 2021-03-24 ASSESSMENT — PAIN SCALES - GENERAL: PAINLEVEL: SEVERE PAIN (7)

## 2021-03-24 NOTE — PROGRESS NOTES
[address] [address]  Phone: [phone]  Fax: [phone]  Primary Provider: Adrian Cash  Pre-op Performing Provider: ADRIAN CASH      PREOPERATIVE EVALUATION:  Today's date: 3/24/2021    Hollis Diggs is a 50 year old male who presents for a preoperative evaluation.    Surgical Information:  Surgery/Procedure: Right Lumbar 4-5 Epidural Steroid Injection  Surgery Location: [facility]  Surgeon: Deann  Surgery Date: 4/2/2021  Time of Surgery: TBD  Where patient plans to recover: At home with family  Fax number for surgical facility: Note does not need to be faxed, will be available electronically in Epic.    Type of Anesthesia Anticipated: to be determined    Assessment & Plan     The proposed surgical procedure is considered LOW risk.    Preop general physical exam      Lumbar radiculopathy    - tiZANidine (ZANAFLEX) 4 MG tablet; Take 1 tablet (4 mg) by mouth 3 times daily as needed for muscle spasms  - oxyCODONE-acetaminophen (PERCOCET)  MG per tablet; Take 1 tablet by mouth every 6 hours as needed for severe pain    Long-term (current) use of anticoagulants [Z79.01]  He is set up for bridging with Lovenox through the Coumadin clinic.      Possible Sleep Apnea:               Medication Instructions:   - warfarin: Thromboembolic risk is moderate (4-10%/year). HOLD warfarin 5 days. Thromboembolic risk is moderate (4-10%/year). HOLD warfarin 5 days.   - Bridging therapy ordered     RECOMMENDATION:  APPROVAL GIVEN to proceed with proposed procedure, without further diagnostic evaluation.                      Subjective     HPI related to upcoming procedure: Very symptomatic lumbar disc disease.  Worsened lately.    Preop Questions 3/23/2021   1. Have you ever had a heart attack or stroke? No   2. Have you ever had surgery on your heart or blood vessels, such as a stent placement, a coronary artery bypass, or  surgery on an artery in your head, neck, heart, or legs? No   3. Do you have chest pain with activity? No   4. Do you have a history of  heart failure? No   5. Do you currently have a cold, bronchitis or symptoms of other infection? No   6. Do you have a cough, shortness of breath, or wheezing? YES -    7. Do you or anyone in your family have previous history of blood clots? YES -    8. Do you or does anyone in your family have a serious bleeding problem such as prolonged bleeding following surgeries or cuts? No   9. Have you ever had problems with anemia or been told to take iron pills? No   10. Have you had any abnormal blood loss such as black, tarry or bloody stools? No   11. Have you ever had a blood transfusion? No   12. Are you willing to have a blood transfusion if it is medically needed before, during, or after your surgery? Yes   13. Have you or any of your relatives ever had problems with anesthesia? No   14. Do you have sleep apnea, excessive snoring or daytime drowsiness? YES -    14a. Do you have a CPAP machine? Yes   15. Do you have any artifical heart valves or other implanted medical devices like a pacemaker, defibrillator, or continuous glucose monitor? No   16. Do you have artificial joints? No   17. Are you allergic to latex? No       Health Care Directive:  Patient does not have a Health Care Directive or Living Will: Discussed advance care planning with patient; information given to patient to review.      Review of Systems  Constitutional, neuro, ENT, endocrine, pulmonary, cardiac, gastrointestinal, genitourinary, musculoskeletal, integument and psychiatric systems are negative, except as otherwise noted.    Patient Active Problem List    Diagnosis Date Noted     Antiphospholipid syndrome (H) 03/06/2021     Priority: Medium     Suicidal behavior 06/22/2020     Priority: Medium     Class 1 obesity due to excess calories without serious comorbidity with body mass index (BMI) of 33.0 to 33.9 in  adult 01/08/2020     Priority: Medium     Long-term use of high-risk medication 05/12/2017     Priority: Medium     History of deep venous thrombosis 04/14/2017     Priority: Medium     DDD (degenerative disc disease), lumbar 04/14/2017     Priority: Medium     On prednisone therapy 07/27/2016     Priority: Medium     Seronegative rheumatoid arthritis (H) 06/28/2016     Priority: Medium     Long-term (current) use of anticoagulants [Z79.01] 03/16/2016     Priority: Medium     Rheumatoid arthritis of multiple sites with negative rheumatoid factor (H) 12/07/2015     Priority: Medium     Midline low back pain, with sciatica presence unspecified 10/14/2015     Priority: Medium     Major depressive disorder, recurrent episode, mild (H) 10/07/2015     Priority: Medium     Pain in joint, multiple sites 03/20/2015     Priority: Medium     Anxiety state 02/10/2015     Priority: Medium     Problem list name updated by automated process. Provider to review       CARDIOVASCULAR SCREENING; LDL GOAL LESS THAN 160 01/15/2013     Priority: Medium     Alopecia 02/19/2010     Priority: Medium     Ingrown toenail 08/18/2009     Priority: Medium     Anxiety 07/09/2008     Priority: Medium     Abdominal pain, epigastric 01/25/2006     Priority: Medium      Past Medical History:   Diagnosis Date     Antiphospholipid syndrome (H)      Arthritis      Asthma     Exercise     blood clot in leg      Depressive disorder, not elsewhere classified     Depression (non-psychotic)     ANKIT (generalised anxiety disorder)      HH (hiatus hernia)      Hypercholesteremia     normal with weight loss 3/09     Lumbar disc herniation 1992     Seronegative rheumatoid arthritis (H)      Past Surgical History:   Procedure Laterality Date     APPENDECTOMY       COLONOSCOPY N/A 8/2/2016    Procedure: COMBINED COLONOSCOPY, SINGLE OR MULTIPLE BIOPSY/POLYPECTOMY BY BIOPSY;  Surgeon: Sydnee Walton MD;  Location: [facility] OR     COLONOSCOPY WITH CO2  INSUFFLATION N/A 8/2/2016    Procedure: COLONOSCOPY WITH CO2 INSUFFLATION;  Surgeon: Sydnee Walton MD;  Location: [facility] OR     COMBINED ESOPHAGOSCOPY, GASTROSCOPY, DUODENOSCOPY (EGD) WITH CO2 INSUFFLATION N/A 8/2/2016    Procedure: COMBINED ESOPHAGOSCOPY, GASTROSCOPY, DUODENOSCOPY (EGD) WITH CO2 INSUFFLATION;  Surgeon: Sydnee Walton MD;  Location: [facility] OR     ESOPHAGOSCOPY, GASTROSCOPY, DUODENOSCOPY (EGD), COMBINED N/A 8/2/2016    Procedure: COMBINED ESOPHAGOSCOPY, GASTROSCOPY, DUODENOSCOPY (EGD), BIOPSY SINGLE OR MULTIPLE;  Surgeon: Sydnee Walton MD;  Location: [facility] OR     HC REMOVAL OF TONSILS,<10 Y/O      Tonsils <11y.o.     HC REPAIR INCISIONAL HERNIA,REDUCIBLE  1970's    Hernia Repair, Incisional, Unilateral     HC UGI ENDOSCOPY DIAG W BIOPSY  02/01/06     HC VASECTOMY UNILAT/BILAT W POSTOP SEMEN  1/05    Vasectomy     History back lumbar laminectomy       ZZC NONSPECIFIC PROCEDURE  91 or 92    back surgery. lumbar. lamiectomy     Current Outpatient Medications   Medication Sig Dispense Refill     ARIPiprazole (ABILIFY) 2 MG tablet Take 2 mg by mouth daily       DULoxetine (CYMBALTA) 60 MG capsule Take 60 mg by mouth daily Take 2 capsules daily       enoxaparin ANTICOAGULANT (LOVENOX) 150 MG/ML syringe Inject 0.7 mLs (105 mg) Subcutaneous every 12 hours for 20 doses 20 mL 0     gabapentin (NEURONTIN) 800 MG tablet Take 800 mg by mouth 3 times daily       lithium (ESKALITH CR/LITHOBID) 450 MG CR tablet Take 450 mg by mouth 2 times daily       omeprazole (PRILOSEC) 40 MG DR capsule TAKE ONE CAPSULE BY MOUTH ONCE DAILY 30 capsule 8     oxyCODONE-acetaminophen (PERCOCET)  MG per tablet Take 1 tablet by mouth every 6 hours as needed for severe pain 30 tablet 0     tiZANidine (ZANAFLEX) 2 MG tablet Take 1-2 tablets (2-4 mg) by mouth 3 times daily as needed for muscle spasms 20 tablet 0     traZODone (DESYREL) 50 MG tablet Take 50 mg by mouth At Bedtime Takes 2-3 tablets  "nightly       vitamin D3 (CHOLECALCIFEROL) 50 mcg (2000 units) tablet Take 1 tablet (50 mcg) by mouth daily 90 tablet 3     warfarin ANTICOAGULANT (COUMADIN) 5 MG tablet Take 5 mg Wed and Fri and 7.5 mg all other days, or as directed by the Coumadin Clinic. 150 tablet 0     phentermine (ADIPEX-P) 37.5 MG tablet Take 1 tablet (37.5 mg) by mouth every morning (before breakfast) (Patient not taking: Reported on 3/24/2021) 30 tablet 0       Allergies   Allergen Reactions     No Known Drug Allergies         Social History     Tobacco Use     Smoking status: Never Smoker     Smokeless tobacco: Never Used   Substance Use Topics     Alcohol use: Yes     Comment: rare     Family History   Problem Relation Age of Onset     Brain Tumor Mother         Benign     Deep Vein Thrombosis Mother         \"neck\"      Heart Disease Father         \"wont tell anyone\"     Neurologic Disorder Paternal Grandmother         Parkinsons      Alcohol/Drug Paternal Grandfather         alcoholic     Cancer Maternal Grandfather         lung     Diabetes Maternal Grandmother      Cerebrovascular Disease Maternal Grandmother         tim age ~70     Arthritis Cousin         cousins x 2 \"RA\"     Musculoskeletal Disorder Maternal Aunt         MS     Family History Negative Other         psoriasis, crohns, UC, SLE     History   Drug Use No         Objective     /68   Pulse 118   Temp 97.3  F (36.3  C) (Temporal)   Resp 16   Wt 113.1 kg (249 lb 4.8 oz)   SpO2 100%   BMI 33.81 kg/m      Physical Exam    GENERAL APPEARANCE: healthy, alert and no distress     EYES: EOMI,  PERRL     HENT: ear canals and TM's normal and nose and mouth without ulcers or lesions     NECK: no adenopathy, no asymmetry, masses, or scars and thyroid normal to palpation     RESP: lungs clear to auscultation - no rales, rhonchi or wheezes     CV: regular rates and rhythm, normal S1 S2, no S3 or S4 and no murmur, click or rub     MS: extremities normal- no gross deformities " noted, no evidence of inflammation in joints, FROM in all extremities.     SKIN: no suspicious lesions or rashes     NEURO: Normal strength and tone, sensory exam grossly normal, mentation intact and speech normal     PSYCH: mentation appears normal. and affect normal/bright     LYMPHATICS: No cervical adenopathy    Recent Labs   Lab Test 03/15/21  0843 02/16/21 2058 01/29/21  1619 01/29/21  1619 07/24/20  1130 07/24/20  1130 06/22/20  0800 06/22/20  0800 11/08/19  1432 11/08/19  1432   HGB  --   --   --   --   --   --   --  14.8  --  14.7   PLT  --   --   --   --   --   --   --  254  --  290   INR 4.3* 2.10*   < >  --    < > 3.0*   < >  --    < >  --    NA  --   --   --  142  --  137   < >  --   --  141   POTASSIUM  --   --   --  3.6  --  4.1   < >  --   --  3.6   CR  --   --   --  1.05  --  1.05   < >  --   --  1.06    < > = values in this interval not displayed.        Diagnostics:  No labs were ordered during this visit.   No EKG required for low risk surgery (cataract, skin procedure, breast biopsy, etc).    Revised Cardiac Risk Index (RCRI):  The patient has the following serious cardiovascular risks for perioperative complications:   - No serious cardiac risks = 0 points     RCRI Interpretation: 0 points: Class I (very low risk - 0.4% complication rate)           Signed Electronically by: Sylvester Cash MD  Copy of this evaluation report is provided to requesting physician.

## 2021-03-24 NOTE — NURSING NOTE
Health Maintenance Due   Topic Date Due     ADVANCE CARE PLANNING  Never done     HIV SCREENING  Never done     COVID-19 Vaccine (1) Never done     Brown Paris [facility]

## 2021-03-24 NOTE — PATIENT INSTRUCTIONS

## 2021-03-24 NOTE — H&P (VIEW-ONLY)
[address] [address]  Phone: [phone]  Fax: [phone]  Primary Provider: Adrian Cash  Pre-op Performing Provider: ADRIAN CASH      PREOPERATIVE EVALUATION:  Today's date: 3/24/2021    Hollis Diggs is a 50 year old male who presents for a preoperative evaluation.    Surgical Information:  Surgery/Procedure: Right Lumbar 4-5 Epidural Steroid Injection  Surgery Location: [facility]  Surgeon: Deann  Surgery Date: 4/2/2021  Time of Surgery: TBD  Where patient plans to recover: At home with family  Fax number for surgical facility: Note does not need to be faxed, will be available electronically in Epic.    Type of Anesthesia Anticipated: to be determined    Assessment & Plan     The proposed surgical procedure is considered LOW risk.    Preop general physical exam      Lumbar radiculopathy    - tiZANidine (ZANAFLEX) 4 MG tablet; Take 1 tablet (4 mg) by mouth 3 times daily as needed for muscle spasms  - oxyCODONE-acetaminophen (PERCOCET)  MG per tablet; Take 1 tablet by mouth every 6 hours as needed for severe pain    Long-term (current) use of anticoagulants [Z79.01]  He is set up for bridging with Lovenox through the Coumadin clinic.      Possible Sleep Apnea:               Medication Instructions:   - warfarin: Thromboembolic risk is moderate (4-10%/year). HOLD warfarin 5 days. Thromboembolic risk is moderate (4-10%/year). HOLD warfarin 5 days.   - Bridging therapy ordered     RECOMMENDATION:  APPROVAL GIVEN to proceed with proposed procedure, without further diagnostic evaluation.                      Subjective     HPI related to upcoming procedure: Very symptomatic lumbar disc disease.  Worsened lately.    Preop Questions 3/23/2021   1. Have you ever had a heart attack or stroke? No   2. Have you ever had surgery on your heart or blood vessels, such as a stent placement, a coronary artery bypass, or  surgery on an artery in your head, neck, heart, or legs? No   3. Do you have chest pain with activity? No   4. Do you have a history of  heart failure? No   5. Do you currently have a cold, bronchitis or symptoms of other infection? No   6. Do you have a cough, shortness of breath, or wheezing? YES -    7. Do you or anyone in your family have previous history of blood clots? YES -    8. Do you or does anyone in your family have a serious bleeding problem such as prolonged bleeding following surgeries or cuts? No   9. Have you ever had problems with anemia or been told to take iron pills? No   10. Have you had any abnormal blood loss such as black, tarry or bloody stools? No   11. Have you ever had a blood transfusion? No   12. Are you willing to have a blood transfusion if it is medically needed before, during, or after your surgery? Yes   13. Have you or any of your relatives ever had problems with anesthesia? No   14. Do you have sleep apnea, excessive snoring or daytime drowsiness? YES -    14a. Do you have a CPAP machine? Yes   15. Do you have any artifical heart valves or other implanted medical devices like a pacemaker, defibrillator, or continuous glucose monitor? No   16. Do you have artificial joints? No   17. Are you allergic to latex? No       Health Care Directive:  Patient does not have a Health Care Directive or Living Will: Discussed advance care planning with patient; information given to patient to review.      Review of Systems  Constitutional, neuro, ENT, endocrine, pulmonary, cardiac, gastrointestinal, genitourinary, musculoskeletal, integument and psychiatric systems are negative, except as otherwise noted.    Patient Active Problem List    Diagnosis Date Noted     Antiphospholipid syndrome (H) 03/06/2021     Priority: Medium     Suicidal behavior 06/22/2020     Priority: Medium     Class 1 obesity due to excess calories without serious comorbidity with body mass index (BMI) of 33.0 to 33.9 in  adult 01/08/2020     Priority: Medium     Long-term use of high-risk medication 05/12/2017     Priority: Medium     History of deep venous thrombosis 04/14/2017     Priority: Medium     DDD (degenerative disc disease), lumbar 04/14/2017     Priority: Medium     On prednisone therapy 07/27/2016     Priority: Medium     Seronegative rheumatoid arthritis (H) 06/28/2016     Priority: Medium     Long-term (current) use of anticoagulants [Z79.01] 03/16/2016     Priority: Medium     Rheumatoid arthritis of multiple sites with negative rheumatoid factor (H) 12/07/2015     Priority: Medium     Midline low back pain, with sciatica presence unspecified 10/14/2015     Priority: Medium     Major depressive disorder, recurrent episode, mild (H) 10/07/2015     Priority: Medium     Pain in joint, multiple sites 03/20/2015     Priority: Medium     Anxiety state 02/10/2015     Priority: Medium     Problem list name updated by automated process. Provider to review       CARDIOVASCULAR SCREENING; LDL GOAL LESS THAN 160 01/15/2013     Priority: Medium     Alopecia 02/19/2010     Priority: Medium     Ingrown toenail 08/18/2009     Priority: Medium     Anxiety 07/09/2008     Priority: Medium     Abdominal pain, epigastric 01/25/2006     Priority: Medium      Past Medical History:   Diagnosis Date     Antiphospholipid syndrome (H)      Arthritis      Asthma     Exercise     blood clot in leg      Depressive disorder, not elsewhere classified     Depression (non-psychotic)     ANKIT (generalised anxiety disorder)      HH (hiatus hernia)      Hypercholesteremia     normal with weight loss 3/09     Lumbar disc herniation 1992     Seronegative rheumatoid arthritis (H)      Past Surgical History:   Procedure Laterality Date     APPENDECTOMY       COLONOSCOPY N/A 8/2/2016    Procedure: COMBINED COLONOSCOPY, SINGLE OR MULTIPLE BIOPSY/POLYPECTOMY BY BIOPSY;  Surgeon: Sydnee Walton MD;  Location: [facility]     COLONOSCOPY WITH CO2  INSUFFLATION N/A 8/2/2016    Procedure: COLONOSCOPY WITH CO2 INSUFFLATION;  Surgeon: Sydnee Walton MD;  Location: [facility]     COMBINED ESOPHAGOSCOPY, GASTROSCOPY, DUODENOSCOPY (EGD) WITH CO2 INSUFFLATION N/A 8/2/2016    Procedure: COMBINED ESOPHAGOSCOPY, GASTROSCOPY, DUODENOSCOPY (EGD) WITH CO2 INSUFFLATION;  Surgeon: Sydnee Walton MD;  Location: [facility]     ESOPHAGOSCOPY, GASTROSCOPY, DUODENOSCOPY (EGD), COMBINED N/A 8/2/2016    Procedure: COMBINED ESOPHAGOSCOPY, GASTROSCOPY, DUODENOSCOPY (EGD), BIOPSY SINGLE OR MULTIPLE;  Surgeon: Sydnee Walton MD;  Location: [facility]     HC REMOVAL OF TONSILS,<12 Y/O      Tonsils <11y.o.     HC REPAIR INCISIONAL HERNIA,REDUCIBLE  1970's    Hernia Repair, Incisional, Unilateral     HC UGI ENDOSCOPY DIAG W BIOPSY  02/01/06     HC VASECTOMY UNILAT/BILAT W POSTOP SEMEN  1/05    Vasectomy     History back lumbar laminectomy       ZZC NONSPECIFIC PROCEDURE  91 or 92    back surgery. lumbar. lamiectomy     Current Outpatient Medications   Medication Sig Dispense Refill     ARIPiprazole (ABILIFY) 2 MG tablet Take 2 mg by mouth daily       DULoxetine (CYMBALTA) 60 MG capsule Take 60 mg by mouth daily Take 2 capsules daily       enoxaparin ANTICOAGULANT (LOVENOX) 150 MG/ML syringe Inject 0.7 mLs (105 mg) Subcutaneous every 12 hours for 20 doses 20 mL 0     gabapentin (NEURONTIN) 800 MG tablet Take 800 mg by mouth 3 times daily       lithium (ESKALITH CR/LITHOBID) 450 MG CR tablet Take 450 mg by mouth 2 times daily       omeprazole (PRILOSEC) 40 MG DR capsule TAKE ONE CAPSULE BY MOUTH ONCE DAILY 30 capsule 8     oxyCODONE-acetaminophen (PERCOCET)  MG per tablet Take 1 tablet by mouth every 6 hours as needed for severe pain 30 tablet 0     tiZANidine (ZANAFLEX) 2 MG tablet Take 1-2 tablets (2-4 mg) by mouth 3 times daily as needed for muscle spasms 20 tablet 0     traZODone (DESYREL) 50 MG tablet Take 50 mg by mouth At Bedtime Takes 2-3 tablets  "nightly       vitamin D3 (CHOLECALCIFEROL) 50 mcg (2000 units) tablet Take 1 tablet (50 mcg) by mouth daily 90 tablet 3     warfarin ANTICOAGULANT (COUMADIN) 5 MG tablet Take 5 mg Wed and Fri and 7.5 mg all other days, or as directed by the Coumadin Clinic. 150 tablet 0     phentermine (ADIPEX-P) 37.5 MG tablet Take 1 tablet (37.5 mg) by mouth every morning (before breakfast) (Patient not taking: Reported on 3/24/2021) 30 tablet 0       Allergies   Allergen Reactions     No Known Drug Allergies         Social History     Tobacco Use     Smoking status: Never Smoker     Smokeless tobacco: Never Used   Substance Use Topics     Alcohol use: Yes     Comment: rare     Family History   Problem Relation Age of Onset     Brain Tumor Mother         Benign     Deep Vein Thrombosis Mother         \"neck\"      Heart Disease Father         \"wont tell anyone\"     Neurologic Disorder Paternal Grandmother         Parkinsons      Alcohol/Drug Paternal Grandfather         alcoholic     Cancer Maternal Grandfather         lung     Diabetes Maternal Grandmother      Cerebrovascular Disease Maternal Grandmother         tim age ~70     Arthritis Cousin         cousins x 2 \"RA\"     Musculoskeletal Disorder Maternal Aunt         MS     Family History Negative Other         psoriasis, crohns, UC, SLE     History   Drug Use No         Objective     /68   Pulse 118   Temp 97.3  F (36.3  C) (Temporal)   Resp 16   Wt 113.1 kg (249 lb 4.8 oz)   SpO2 100%   BMI 33.81 kg/m      Physical Exam    GENERAL APPEARANCE: healthy, alert and no distress     EYES: EOMI,  PERRL     HENT: ear canals and TM's normal and nose and mouth without ulcers or lesions     NECK: no adenopathy, no asymmetry, masses, or scars and thyroid normal to palpation     RESP: lungs clear to auscultation - no rales, rhonchi or wheezes     CV: regular rates and rhythm, normal S1 S2, no S3 or S4 and no murmur, click or rub     MS: extremities normal- no gross deformities " noted, no evidence of inflammation in joints, FROM in all extremities.     SKIN: no suspicious lesions or rashes     NEURO: Normal strength and tone, sensory exam grossly normal, mentation intact and speech normal     PSYCH: mentation appears normal. and affect normal/bright     LYMPHATICS: No cervical adenopathy    Recent Labs   Lab Test 03/15/21  0843 02/16/21 2058 01/29/21  1619 01/29/21  1619 07/24/20  1130 07/24/20  1130 06/22/20  0800 06/22/20  0800 11/08/19  1432 11/08/19  1432   HGB  --   --   --   --   --   --   --  14.8  --  14.7   PLT  --   --   --   --   --   --   --  254  --  290   INR 4.3* 2.10*   < >  --    < > 3.0*   < >  --    < >  --    NA  --   --   --  142  --  137   < >  --   --  141   POTASSIUM  --   --   --  3.6  --  4.1   < >  --   --  3.6   CR  --   --   --  1.05  --  1.05   < >  --   --  1.06    < > = values in this interval not displayed.        Diagnostics:  No labs were ordered during this visit.   No EKG required for low risk surgery (cataract, skin procedure, breast biopsy, etc).    Revised Cardiac Risk Index (RCRI):  The patient has the following serious cardiovascular risks for perioperative complications:   - No serious cardiac risks = 0 points     RCRI Interpretation: 0 points: Class I (very low risk - 0.4% complication rate)           Signed Electronically by: Sylvester Cash MD  Copy of this evaluation report is provided to requesting physician.

## 2021-03-24 NOTE — TELEPHONE ENCOUNTER
Requested Prescriptions   Pending Prescriptions Disp Refills     gabapentin (NEURONTIN) 300 MG capsule [Pharmacy Med Name: GABAPENTIN 300MG CAPS] 30 capsule 0     Sig: TAKE ONE CAPSULE BY MOUTH AT BEDTIME AS NEEDED FOR COUGH      Last Written Prescription Date:  N/A  Last Fill Quantity: N/A,   # refills: N/A  Last Office Visit: 03/05/2021  Future Office visit:       Routing refill request to provider for review/approval because:  Drug not on the G, P or [facility] refill protocol or controlled substance    Etta Talley MA

## 2021-03-29 ENCOUNTER — HOSPITAL ENCOUNTER (OUTPATIENT)
Dept: PHYSICAL THERAPY | Facility: CLINIC | Age: 51
Setting detail: THERAPIES SERIES
End: 2021-03-29
Attending: NEUROLOGICAL SURGERY
Payer: COMMERCIAL

## 2021-03-29 PROCEDURE — 97012 MECHANICAL TRACTION THERAPY: CPT | Mod: GP | Performed by: PHYSICAL THERAPIST

## 2021-03-29 PROCEDURE — 97010 HOT OR COLD PACKS THERAPY: CPT | Mod: GP | Performed by: PHYSICAL THERAPIST

## 2021-03-30 DIAGNOSIS — Z11.59 ENCOUNTER FOR SCREENING FOR OTHER VIRAL DISEASES: ICD-10-CM

## 2021-03-30 LAB
SARS-COV-2 RNA RESP QL NAA+PROBE: NORMAL
SPECIMEN SOURCE: NORMAL

## 2021-03-30 PROCEDURE — U0003 INFECTIOUS AGENT DETECTION BY NUCLEIC ACID (DNA OR RNA); SEVERE ACUTE RESPIRATORY SYNDROME CORONAVIRUS 2 (SARS-COV-2) (CORONAVIRUS DISEASE [COVID-19]), AMPLIFIED PROBE TECHNIQUE, MAKING USE OF HIGH THROUGHPUT TECHNOLOGIES AS DESCRIBED BY CMS-2020-01-R: HCPCS | Performed by: ANESTHESIOLOGY

## 2021-03-30 PROCEDURE — U0005 INFEC AGEN DETEC AMPLI PROBE: HCPCS | Performed by: ANESTHESIOLOGY

## 2021-04-01 ENCOUNTER — TELEPHONE (OUTPATIENT)
Dept: LAB | Facility: CLINIC | Age: 51
End: 2021-04-01

## 2021-04-01 ENCOUNTER — TELEPHONE (OUTPATIENT)
Dept: FAMILY MEDICINE | Facility: CLINIC | Age: 51
End: 2021-04-01

## 2021-04-01 LAB
LABORATORY COMMENT REPORT: ABNORMAL
SARS-COV-2 RNA RESP QL NAA+PROBE: POSITIVE
SPECIMEN SOURCE: ABNORMAL

## 2021-04-01 NOTE — TELEPHONE ENCOUNTER
Patient called to reschedule injection, states his COVID test came back positive.     Patient rescheduled for 4/22/21 @11am

## 2021-04-01 NOTE — TELEPHONE ENCOUNTER
Now that Benji is not having his surgery at the end of April he needs to know about going back on coumadin.

## 2021-04-01 NOTE — TELEPHONE ENCOUNTER
Per Dr. Cash, patient was informed that he is to start the coumadin again until 5 days prior to his surgery.    Patient is requesting a letter to dismiss him from work due to quarantine and his other medical conditions. Patient will access his work note through his [email or web address].    Work note written per Dr. Cash.    Brown [place], [facility]

## 2021-04-01 NOTE — TELEPHONE ENCOUNTER
"Coronavirus (COVID-19) Notification    Caller Name (Patient, parent, daughter/son, grandparent, etc)  patient    Reason for call  Notify of Positive Coronavirus (COVID-19) lab results, assess symptoms,  review  [employer] recommendations    Lab Result    Lab test:  2019-nCoV rRt-PCR or SARS-CoV-2 PCR    Oropharyngeal AND/OR nasopharyngeal swabs is POSITIVE for 2019-nCoV RNA/SARS-COV-2 PCR (COVID-19 virus)    RN Recommendations/Instructions per [facility] Coronavirus COVID-19 recommendations    Brief introduction script  Introduce self then review script:  \"I am calling on behalf of [employer].  We were notified that your Coronavirus test (COVID-19) for was POSITIVE for the virus.  I have some information to relay to you but first I wanted to mention that the [place] Dept of Health will be contacting you shortly [it's possible MD already called Patient] to talk to you more about how you are feeling and other people you have had contact with who might now also have the virus.  Also,  [employer] is Partnering with the [facility] for Covid-19 research, you may be contacted directly by research staff.\"    Assessment (Inquire about Patient's current symptoms)   Assessment   Current Symptoms at time of phone call: (if no symptoms, document No symptoms] No symptoms   Symptoms onset (if applicable) n/a     If at time of call, Patients symptoms hare worsened, the Patient should contact 911 or have someone drive them to Emergency Dept promptly:      If Patient calling 911, inform 911 personal that you have tested positive for the Coronavirus (COVID-19).  Place mask on and await 911 to arrive.    If Emergency Dept, If possible, please have another adult drive you to the Emergency Dept but you need to wear mask when in contact with other people.      Patient declines any education @ this time. Reports he had Covid 19 in December of 2020.    A Positive COVID-19 letter will be sent via [employer] or the " mail. (Exception, no letters sent to Presurgerical/Preprocedure Patients)    Alis Curry LPN

## 2021-04-01 NOTE — LETTER
[address] [address]  Phone: [phone]  Fax: [phone]    April 1, 2021        Hollis Diggs  [id number] [facility] [address]          To whom it may concern:    RE: Hollis Diggs    Please excuse patient from work due to positive covid and other medical conditions until after surgery. His surgery is tentatively scheduled on 4/22/2021.    Please contact me for questions or concerns.      Sincerely,        Sylvester Cash MD

## 2021-04-04 ENCOUNTER — MYC REFILL (OUTPATIENT)
Dept: FAMILY MEDICINE | Facility: CLINIC | Age: 51
End: 2021-04-04

## 2021-04-04 DIAGNOSIS — M54.16 LUMBAR RADICULOPATHY: ICD-10-CM

## 2021-04-04 DIAGNOSIS — E66.811 CLASS 1 OBESITY DUE TO EXCESS CALORIES WITHOUT SERIOUS COMORBIDITY WITH BODY MASS INDEX (BMI) OF 33.0 TO 33.9 IN ADULT: ICD-10-CM

## 2021-04-04 DIAGNOSIS — E66.09 CLASS 1 OBESITY DUE TO EXCESS CALORIES WITHOUT SERIOUS COMORBIDITY WITH BODY MASS INDEX (BMI) OF 33.0 TO 33.9 IN ADULT: ICD-10-CM

## 2021-04-05 RX ORDER — PHENTERMINE HYDROCHLORIDE 37.5 MG/1
37.5 TABLET ORAL
Qty: 30 TABLET | Refills: 0 | Status: SHIPPED | OUTPATIENT
Start: 2021-04-05 | End: 2021-04-26

## 2021-04-05 RX ORDER — OXYCODONE AND ACETAMINOPHEN 10; 325 MG/1; MG/1
1 TABLET ORAL EVERY 6 HOURS PRN
Qty: 30 TABLET | Refills: 0 | Status: SHIPPED | OUTPATIENT
Start: 2021-04-05 | End: 2021-04-13

## 2021-04-05 NOTE — TELEPHONE ENCOUNTER
Requested Prescriptions   Pending Prescriptions Disp Refills     phentermine (ADIPEX-P) 37.5 MG tablet 30 tablet 0     Sig: Take 1 tablet (37.5 mg) by mouth every morning (before breakfast)     Last Written Prescription Date:  02/26/2021  Last Fill Quantity: 30,   # refills: 0  Last Office Visit: 03/24/2021  Future Office visit:       Routing refill request to provider for review/approval because:  Drug not on the G, P or [facility] refill protocol or controlled substance    Etta Talley MA

## 2021-04-05 NOTE — TELEPHONE ENCOUNTER
Percocet       Last Written Prescription Date:  3/24/21  Last Fill Quantity: 30,   # refills: 0  Last Office Visit: 3/24/21  Future Office visit:       Routing refill request to provider for review/approval because:  Drug not on the [facility], P or [facility] refill protocol or controlled substance

## 2021-04-13 ENCOUNTER — MYC REFILL (OUTPATIENT)
Dept: FAMILY MEDICINE | Facility: CLINIC | Age: 51
End: 2021-04-13

## 2021-04-13 DIAGNOSIS — M54.16 LUMBAR RADICULOPATHY: ICD-10-CM

## 2021-04-14 RX ORDER — OXYCODONE AND ACETAMINOPHEN 10; 325 MG/1; MG/1
1 TABLET ORAL EVERY 6 HOURS PRN
Qty: 30 TABLET | Refills: 0 | Status: SHIPPED | OUTPATIENT
Start: 2021-04-14 | End: 2021-04-22

## 2021-04-14 NOTE — TELEPHONE ENCOUNTER
oxyCODONE-acetaminophen (PERCOCET)  MG per tablet       Last Written Prescription Date:  4/5/21  Last Fill Quantity: 30,   # refills: 0  Last Office Visit: 3/24/21  Future Office visit:       Routing refill request to provider for review/approval because:  Drug not on the [facility], P or [facility] refill protocol or controlled substance

## 2021-04-16 DIAGNOSIS — Z79.01 LONG TERM CURRENT USE OF ANTICOAGULANT THERAPY: Primary | ICD-10-CM

## 2021-04-16 DIAGNOSIS — Z86.718 HISTORY OF DEEP VENOUS THROMBOSIS: ICD-10-CM

## 2021-04-16 NOTE — TELEPHONE ENCOUNTER
Kali Rodríguez RN Johnson, Steven R, MD             Hollis's procedure was rescheduled due to positive covid results. States he did use 4 doses of his lovenox before this was rescheduled. He will need an additional 4 doses  to cover his bridging plan. He prefers to use [facility] in [place].     Thanks, Kali SAAVEDRA   [facility] [place] [phone]

## 2021-04-21 ENCOUNTER — ANESTHESIA EVENT (OUTPATIENT)
Dept: SURGERY | Facility: CLINIC | Age: 51
End: 2021-04-21
Payer: COMMERCIAL

## 2021-04-21 DIAGNOSIS — R05.9 COUGH: ICD-10-CM

## 2021-04-21 DIAGNOSIS — R25.1 TREMOR: ICD-10-CM

## 2021-04-21 RX ORDER — METOPROLOL SUCCINATE 50 MG/1
TABLET, EXTENDED RELEASE ORAL
Qty: 30 TABLET | Refills: 2 | OUTPATIENT
Start: 2021-04-21

## 2021-04-21 RX ORDER — GABAPENTIN 300 MG/1
CAPSULE ORAL
Qty: 30 CAPSULE | Refills: 0 | Status: SHIPPED | OUTPATIENT
Start: 2021-04-21 | End: 2021-05-26

## 2021-04-21 NOTE — TELEPHONE ENCOUNTER
Gabapentin      Last Written Prescription Date:  3/24/2021  Last Fill Quantity: 30,   # refills: 0  Last Office Visit: 3/24/2021  Future Office visit:       Routing refill request to provider for review/approval because:  Drug not on the G, P or [facility] refill protocol or controlled substance

## 2021-04-21 NOTE — TELEPHONE ENCOUNTER
Metoprolol was discontinued 3/24/2021. Called and spoke to the patient. He said he is no longer taking this medication.   Pharmacy notified via E-Prescribe refusal.     SURESH LisaN, RN  [facility]

## 2021-04-22 ENCOUNTER — HOSPITAL ENCOUNTER (OUTPATIENT)
Facility: CLINIC | Age: 51
Discharge: HOME | End: 2021-04-22
Attending: ANESTHESIOLOGY | Admitting: ANESTHESIOLOGY
Payer: COMMERCIAL

## 2021-04-22 ENCOUNTER — HOSPITAL ENCOUNTER (OUTPATIENT)
Dept: GENERAL RADIOLOGY | Facility: CLINIC | Age: 51
End: 2021-04-22
Attending: ANESTHESIOLOGY | Admitting: ANESTHESIOLOGY
Payer: COMMERCIAL

## 2021-04-22 ENCOUNTER — ANESTHESIA (OUTPATIENT)
Dept: SURGERY | Facility: CLINIC | Age: 51
End: 2021-04-22
Payer: COMMERCIAL

## 2021-04-22 ENCOUNTER — MYC REFILL (OUTPATIENT)
Dept: FAMILY MEDICINE | Facility: CLINIC | Age: 51
End: 2021-04-22

## 2021-04-22 VITALS
RESPIRATION RATE: 16 BRPM | OXYGEN SATURATION: 95 % | SYSTOLIC BLOOD PRESSURE: 130 MMHG | HEART RATE: 80 BPM | DIASTOLIC BLOOD PRESSURE: 78 MMHG | TEMPERATURE: 98.7 F | WEIGHT: 249.3 LBS | BODY MASS INDEX: 33.81 KG/M2

## 2021-04-22 DIAGNOSIS — M54.16 LUMBAR RADICULOPATHY: ICD-10-CM

## 2021-04-22 LAB — INR PPP: 1.1 (ref 0.86–1.14)

## 2021-04-22 PROCEDURE — 64483 NJX AA&/STRD TFRM EPI L/S 1: CPT | Mod: RT | Performed by: ANESTHESIOLOGY

## 2021-04-22 PROCEDURE — 250N000011 HC RX IP 250 OP 636: Performed by: ANESTHESIOLOGY

## 2021-04-22 PROCEDURE — 370N000017 HC ANESTHESIA TECHNICAL FEE, PER MIN: Performed by: ANESTHESIOLOGY

## 2021-04-22 PROCEDURE — 999N000179 XR SURGERY CARM FLUORO LESS THAN 5 MIN W STILLS: Mod: TC

## 2021-04-22 PROCEDURE — 64484 NJX AA&/STRD TFRM EPI L/S EA: CPT | Mod: RT | Performed by: ANESTHESIOLOGY

## 2021-04-22 PROCEDURE — 250N000009 HC RX 250: Performed by: NURSE ANESTHETIST, CERTIFIED REGISTERED

## 2021-04-22 PROCEDURE — 250N000011 HC RX IP 250 OP 636: Performed by: NURSE ANESTHETIST, CERTIFIED REGISTERED

## 2021-04-22 PROCEDURE — 85610 PROTHROMBIN TIME: CPT | Performed by: NURSE ANESTHETIST, CERTIFIED REGISTERED

## 2021-04-22 PROCEDURE — 62323 NJX INTERLAMINAR LMBR/SAC: CPT | Performed by: ANESTHESIOLOGY

## 2021-04-22 RX ORDER — PROPOFOL 10 MG/ML
INJECTION, EMULSION INTRAVENOUS PRN
Status: DISCONTINUED | OUTPATIENT
Start: 2021-04-22 | End: 2021-04-22

## 2021-04-22 RX ORDER — IOPAMIDOL 612 MG/ML
INJECTION, SOLUTION INTRATHECAL PRN
Status: DISCONTINUED | OUTPATIENT
Start: 2021-04-22 | End: 2021-04-22 | Stop reason: HOSPADM

## 2021-04-22 RX ORDER — LIDOCAINE 40 MG/G
CREAM TOPICAL
Status: DISCONTINUED | OUTPATIENT
Start: 2021-04-22 | End: 2021-04-22 | Stop reason: HOSPADM

## 2021-04-22 RX ORDER — OXYCODONE AND ACETAMINOPHEN 10; 325 MG/1; MG/1
1 TABLET ORAL EVERY 6 HOURS PRN
Qty: 30 TABLET | Refills: 0 | Status: SHIPPED | OUTPATIENT
Start: 2021-04-22 | End: 2021-04-28

## 2021-04-22 RX ORDER — LIDOCAINE HYDROCHLORIDE 20 MG/ML
INJECTION, SOLUTION INFILTRATION; PERINEURAL PRN
Status: DISCONTINUED | OUTPATIENT
Start: 2021-04-22 | End: 2021-04-22

## 2021-04-22 RX ORDER — METHYLPREDNISOLONE ACETATE 40 MG/ML
INJECTION, SUSPENSION INTRA-ARTICULAR; INTRALESIONAL; INTRAMUSCULAR; SOFT TISSUE PRN
Status: DISCONTINUED | OUTPATIENT
Start: 2021-04-22 | End: 2021-04-22 | Stop reason: HOSPADM

## 2021-04-22 RX ADMIN — PROPOFOL 120 MG: 10 INJECTION, EMULSION INTRAVENOUS at 11:07

## 2021-04-22 RX ADMIN — LIDOCAINE HYDROCHLORIDE 1 ML: 10 INJECTION, SOLUTION EPIDURAL; INFILTRATION; INTRACAUDAL; PERINEURAL at 10:35

## 2021-04-22 RX ADMIN — LIDOCAINE HYDROCHLORIDE 50 MG: 20 INJECTION, SOLUTION INFILTRATION; PERINEURAL at 11:07

## 2021-04-22 NOTE — ANESTHESIA PREPROCEDURE EVALUATION
Anesthesia Pre-Procedure Evaluation    Patient: Hollis Diggs   MRN: [id number] : 1970        Preoperative Diagnosis: Lumbar radiculopathy [M54.16]   Procedure : Procedure(s):  Right Lumbar 4-5 Epidural Steroid Injection     Past Medical History:   Diagnosis Date     Antiphospholipid syndrome (H)      Arthritis      Asthma     Exercise     blood clot in leg      Depressive disorder, not elsewhere classified     Depression (non-psychotic)     ANKIT (generalised anxiety disorder)      HH (hiatus hernia)      Hypercholesteremia     normal with weight loss 3/09     Lumbar disc herniation      Seronegative rheumatoid arthritis (H)       Past Surgical History:   Procedure Laterality Date     APPENDECTOMY       COLONOSCOPY N/A 2016    Procedure: COMBINED COLONOSCOPY, SINGLE OR MULTIPLE BIOPSY/POLYPECTOMY BY BIOPSY;  Surgeon: Sydnee Walton MD;  Location: [facility]     COLONOSCOPY WITH CO2 INSUFFLATION N/A 2016    Procedure: COLONOSCOPY WITH CO2 INSUFFLATION;  Surgeon: Sydnee Walton MD;  Location: [facility]     COMBINED ESOPHAGOSCOPY, GASTROSCOPY, DUODENOSCOPY (EGD) WITH CO2 INSUFFLATION N/A 2016    Procedure: COMBINED ESOPHAGOSCOPY, GASTROSCOPY, DUODENOSCOPY (EGD) WITH CO2 INSUFFLATION;  Surgeon: Sydnee Walton MD;  Location: [facility]     ESOPHAGOSCOPY, GASTROSCOPY, DUODENOSCOPY (EGD), COMBINED N/A 2016    Procedure: COMBINED ESOPHAGOSCOPY, GASTROSCOPY, DUODENOSCOPY (EGD), BIOPSY SINGLE OR MULTIPLE;  Surgeon: Sydnee Waltno MD;  Location: [facility] OR     HC REMOVAL OF TONSILS,<10 Y/O      Tonsils <11y.o.     HC REPAIR INCISIONAL HERNIA,REDUCIBLE      Hernia Repair, Incisional, Unilateral     HC UGI ENDOSCOPY DIAG W BIOPSY  06     HC VASECTOMY UNILAT/BILAT W POSTOP SEMEN      Vasectomy     History back lumbar laminectomy       ZZC NONSPECIFIC PROCEDURE  91 or 92    back surgery. lumbar. lamiectomy      Allergies   Allergen Reactions      No Known Drug Allergies       Social History     Tobacco Use     Smoking status: Never Smoker     Smokeless tobacco: Never Used   Substance Use Topics     Alcohol use: Yes     Comment: rare      Wt Readings from Last 1 Encounters:   03/24/21 113.1 kg (249 lb 4.8 oz)        Anesthesia Evaluation            ROS/MED HX  ENT/Pulmonary:     (+) sleep apnea, uses CPAP, asthma     Neurologic:  - neg neurologic ROS     Cardiovascular:     (+) -----Taking blood thinners     METS/Exercise Tolerance:     Hematologic:  - neg hematologic  ROS     Musculoskeletal: Comment: DDD  (+) arthritis,     GI/Hepatic:     (+) appendicitis,     Renal/Genitourinary:  - neg Renal ROS     Endo:     (+) Obesity,     Psychiatric/Substance Use:     (+) psychiatric history anxiety and depression     Infectious Disease:  - neg infectious disease ROS     Malignancy:  - neg malignancy ROS     Other:      (+) , H/O Chronic Pain,        Physical Exam    Airway        Mallampati: II   TM distance: > 3 FB   Neck ROM: full   Mouth opening: > 3 cm    Respiratory Devices and Support         Dental           Cardiovascular   cardiovascular exam normal          Pulmonary   pulmonary exam normal                OUTSIDE LABS:  CBC:   Lab Results   Component Value Date    WBC 6.8 06/22/2020    WBC 6.1 11/08/2019    HGB 14.8 06/22/2020    HGB 14.7 11/08/2019    HCT 44.6 06/22/2020    HCT 45.4 11/08/2019     06/22/2020     11/08/2019     BMP:   Lab Results   Component Value Date     01/29/2021     07/24/2020    POTASSIUM 3.6 01/29/2021    POTASSIUM 4.1 07/24/2020    CHLORIDE 110 (H) 01/29/2021    CHLORIDE 106 07/24/2020    CO2 33 (H) 01/29/2021    CO2 26 07/24/2020    BUN 10 01/29/2021    BUN 7 07/24/2020    CR 1.05 01/29/2021    CR 1.05 07/24/2020    GLC 77 01/29/2021    GLC 97 07/24/2020     COAGS:   Lab Results   Component Value Date    INR 4.3 (H) 03/15/2021     POC: No results found for: BGM, HCG, HCGS  HEPATIC:   Lab Results    Component Value Date    ALBUMIN 3.4 06/22/2020    PROTTOTAL 7.0 06/22/2020    ALT 22 06/22/2020    AST 14 06/22/2020    ALKPHOS 93 06/22/2020    BILITOTAL 0.9 06/22/2020     OTHER:   Lab Results   Component Value Date    A1C 5.3 10/06/2017    JANEE 9.1 01/29/2021    MAG 2.1 02/04/2008    TSH 3.27 01/29/2021    T4 0.89 12/09/2016    CRP <2.9 07/14/2017    SED 7 07/14/2017       Anesthesia Plan    ASA Status:  3   NPO Status:  NPO Appropriate    Anesthesia Type: MAC.   Induction: Intravenous, Propofol.   Maintenance: TIVA.        Consents    Anesthesia Plan(s) and associated risks, benefits, and realistic alternatives discussed. Questions answered and patient/representative(s) expressed understanding.     - Discussed with:  Patient      - Extended Intubation/Ventilatory Support Discussed: No.      - Patient is DNR/DNI Status: No         Postoperative Care            Comments:    The risks and benefits of anesthesia, and the alternatives where applicable, have been discussed with the patient, and they wish to proceed.              Ezio Sweeney, APRN CRNA

## 2021-04-22 NOTE — ANESTHESIA CARE TRANSFER NOTE
Patient: Hollis Diggs    Procedure(s):  Right Lumbar 4-5 and Lumbar 5 - Sacral 1 Epidural Steroid Injection    Diagnosis: Lumbar radiculopathy [M54.16]  Diagnosis Additional Information: No value filed.    Anesthesia Type:   MAC     Note:    Oropharynx: oropharynx clear of all foreign objects and spontaneously breathing  Level of Consciousness: drowsy  Oxygen Supplementation: face mask    Independent Airway: airway patency satisfactory and stable  Dentition: dentition unchanged  Vital Signs Stable: post-procedure vital signs reviewed and stable  Report to RN Given: handoff report given  Patient transferred to: Phase II    Handoff Report: Identifed the Patient, Identified the Reponsible Provider, Reviewed the pertinent medical history, Discussed the surgical course, Reviewed Intra-OP anesthesia mangement and issues during anesthesia, Set expectations for post-procedure period and Allowed opportunity for questions and acknowledgement of understanding      Vitals: (Last set prior to Anesthesia Care Transfer)  CRNA VITALS  4/22/2021 1044 - 4/22/2021 1115      4/22/2021             Pulse:  88    SpO2:  99 %    Resp Rate (observed):  15        Electronically Signed By: FACUNDO Cantrell CRNA  April 22, 2021  11:15 AM

## 2021-04-22 NOTE — TELEPHONE ENCOUNTER
Percocet       Last Written Prescription Date:  4/14/2021  Last Fill Quantity: 30,   # refills: 0  Last Office Visit: 3/24/2021  Future Office visit:       Routing refill request to provider for review/approval because:  Drug not on the [facility], P or [facility] refill protocol or controlled substance

## 2021-04-22 NOTE — ANESTHESIA POSTPROCEDURE EVALUATION
Patient: Hollis Diggs    Procedure(s):  Right Lumbar 4-5 and Lumbar 5 - Sacral 1 Epidural Steroid Injection    Diagnosis:Lumbar radiculopathy [M54.16]  Diagnosis Additional Information: No value filed.    Anesthesia Type:  MAC    Note:  Disposition: Outpatient   Postop Pain Control: Uneventful            Sign Out: Well controlled pain   PONV: No   Neuro/Psych: Uneventful            Sign Out: Acceptable/Baseline neuro status   Airway/Respiratory: Uneventful            Sign Out: Acceptable/Baseline resp. status   CV/Hemodynamics: Uneventful            Sign Out: Acceptable CV status   Other NRE: NONE   DID A NON-ROUTINE EVENT OCCUR? No    Event details/Postop Comments:  Pt was happy with anesthesia care.  No complications.  I will follow up with the pt if needed.           Last vitals:  Vitals:    04/22/21 1032 04/22/21 1120 04/22/21 1130   BP: (!) 139/92 129/79 130/78   Pulse:  82 80   Resp: 16     Temp: 98.7  F (37.1  C)     SpO2:  97% 95%       Last vitals prior to Anesthesia Care Transfer:  CRNA VITALS  4/22/2021 1044 - 4/22/2021 1144      4/22/2021             Pulse:  88    SpO2:  99 %    Resp Rate (observed):  15          Electronically Signed By: FACUNDO Cantrell CRNA  April 22, 2021  11:50 AM

## 2021-04-22 NOTE — DISCHARGE INSTRUCTIONS
Home Care Instructions                Procedure: Epidural injection or joint injection    Activity:    Rest today    Do not work today    Resume normal activity tomorrow    Pain:    You may experience soreness at the injection site for 1 to 3 days.    You may use an ice pack for 20 minutes every 2 hours for the first 24 hours    You may use a heating pad after the first 24 hours    You may use Tylenol  (acetaminophen) every 4 hours or other pain medicines as directed by your physician    Safety  Sedation medicine, if given may remain active for many hours.    It is important for the next 24 hours that you do not:    Drive a car    Operate machines or power tools    Consume alcohol, including beer    Sign any important papers or legal documents    You may experience numbness radiating into your legs or arms, (depending on the procedure location)  This numbness may last several hours.  Until the numb sensation returns to normal please use caution in walking, climbing stairs, stepping out of your vehicle, etc.    Common side effects of steroids:  Not everyone will experience corticosteroid side effects. If side effects are experienced they will gradually subside in the 7-10 day period following an injection.    Most common side effects include:    Flushed face and/or chest    Feeling of warmth, particularly in face but could be overall feeling of warmth    Increased blood sugar in diabetic patients    Menstrual irregularities may occur.  If taking hormone based birth control an alternate method of birth control is recommended    Sleep disturbances and/or mood swings are possible    Leg cramps    Please contact us if you have:  Severe pain   Fever more than 101.5 degrees Fahrenheit  Signs of infection (redness, swelling or drainage)      If you have questions during normal business hours (8am-5pm Monday-Friday) contact the [facility] at [phone]. If you need help after hours, we recommend that you go to a  hospital emergency room or dial 911.

## 2021-04-22 NOTE — OP NOTE
PRIMARY PROBLEM: Right leg pain    PROCEDURE: Right L4-5 and L5-S1  Transforaminal Epidural Steroid Injections with fluoroscopic guidance and contrast.     PROCEDURE DETAILS: After written informed consent was obtained from the patient, the patient was escorted to the procedure room.  The patient was placed in the prone position.  A  time out  was conducted to verify patient identity, procedure to be performed, side, site, allergies and any special requirements.  The skin over the thoracolumbar region was prepped and draped in normal sterile fashion. Fluoroscopy was used to identify the neural foramen in AP view and the skin was anesthetized with 2 mL of 1% lidocaine with bicarbonate buffer. 2 separate 22-gauge Quincke needles were advanced in towards his L4-5 and L5-S1 foramens and walked into the very posterior aspect of the foramen in the lateral fluoroscopic image.  Omnipaque contrast was injected which showed outline of the L4 and L5 nerve roots but some lack of flow into the central epidural space.  It it did appear that there was some scar tissue in the epidural space.  He has had previous decompression surgery which the levels were unknown but correlates to the scar in his back with the L4-5 level.  There was no evidence of any intravascular, intrathecal, or intraneural injection.  I then injected 20 mg of Depo-Medrol with 1 cc of preservative-free normal saline into each level with good dispersion over the L4 and L5 nerve roots.    The patient was monitored with blood pressure and pulse oximetry machines with the assistance of an RN throughout the procedure.  The patient was alert and responsive to questions throughout the procedure.   The patient tolerated the procedure well and was observed in the post-procedural area.  The patient was dismissed without apparent complications.     BLOOD LOSS: < 5 cc    DIAGNOSIS:  1.  Right L5 radiculopathy secondary to an L4-5 disc extrusion    PLAN:  1. Performed right  L4-5 and L5-S1  transforaminal epidural steroid injections.  2. The patient was instructed to follow-up per Dr. Zacarias's instructions.  I think most likely he will need a surgery to have this decompressed.  He has a very large disc extrusion.  Nonetheless, I recommended that he waits for at least 2 weeks to determine if the injection is helpful or not effective.  If it is not helpful I would recommend consultation at the spine clinic.    Maxwell Zacarias MD  Diplomate of the American Board of Anesthesiology, Pain Medicine

## 2021-04-26 ENCOUNTER — OFFICE VISIT (OUTPATIENT)
Dept: FAMILY MEDICINE | Facility: CLINIC | Age: 51
End: 2021-04-26
Payer: COMMERCIAL

## 2021-04-26 VITALS
DIASTOLIC BLOOD PRESSURE: 78 MMHG | WEIGHT: 253.3 LBS | HEART RATE: 102 BPM | RESPIRATION RATE: 16 BRPM | BODY MASS INDEX: 34.35 KG/M2 | SYSTOLIC BLOOD PRESSURE: 122 MMHG | TEMPERATURE: 96.3 F | OXYGEN SATURATION: 96 %

## 2021-04-26 DIAGNOSIS — K21.9 GASTROESOPHAGEAL REFLUX DISEASE WITHOUT ESOPHAGITIS: Primary | ICD-10-CM

## 2021-04-26 DIAGNOSIS — M54.40 ACUTE MIDLINE LOW BACK PAIN WITH SCIATICA, SCIATICA LATERALITY UNSPECIFIED: ICD-10-CM

## 2021-04-26 PROCEDURE — 99214 OFFICE O/P EST MOD 30 MIN: CPT | Performed by: FAMILY MEDICINE

## 2021-04-26 RX ORDER — PANTOPRAZOLE SODIUM 40 MG/1
40 TABLET, DELAYED RELEASE ORAL DAILY
Qty: 30 TABLET | Refills: 1 | Status: SHIPPED | OUTPATIENT
Start: 2021-04-26 | End: 2021-07-20

## 2021-04-26 ASSESSMENT — PAIN SCALES - GENERAL: PAINLEVEL: SEVERE PAIN (7)

## 2021-04-26 NOTE — PROGRESS NOTES
Assessment & Plan     Gastroesophageal reflux disease without esophagitis  Sounds like some possibility of esophageal narrowing.  May need an EGD but he is on anticoagulant he may have to stop him again and bridge him.  We will get a try Protonix give it 2 to 4 weeks if not improving will just have to proceed.  - pantoprazole (PROTONIX) 40 MG EC tablet; Take 1 tablet (40 mg) by mouth daily    Acute midline low back pain with sciatica, sciatica laterality unspecified  5 days ago had an MARCIO.  A little better but he does not think it is at its peak effect yet.  But he feels he can go back to work.  Form was filled out to return to work with some restrictions see copy on chart.  Restrictions are through May 15.  They need to return to clinic prior to this.                     No follow-ups on file.    Sylvester Cash MD  [facility]    Zoey Shafer is a 50 year old who presents for the following health issues: Had an MARCIO last week still having back pain but wants to be able to return to work with some restrictions coming up here.  He brings in a form mostly filled out allowing him to return to work.  Also he is had some trouble swallowing food feels to get stuck at times.  He has a known hiatal hernia.  Because he is on a anticoagulant I do want to stop them again now and put him on Lovenox to get an EGD .  He has not tried an antacid yet.    HPI     Concern - return back to work - patient was thinking possibly April 28, 2021    Concern - Hiatal Hernia   States he is having a hard time swallowing food for about a month.      Concern   Fatigue, weakness, frustrated. States that he has fallen asleep while driving multiple times.          Review of Systems   Constitutional, HEENT, cardiovascular, pulmonary, gi and gu systems are negative, except as otherwise noted.      Objective    /78   Pulse 102   Temp 96.3  F (35.7  C) (Temporal)   Resp 16   Wt 114.9 kg (253 lb 4.8 oz)    SpO2 96%   BMI 34.35 kg/m    Body mass index is 34.35 kg/m .  Physical Exam   GENERAL: healthy, alert and no distress  EYES: Eyes grossly normal to inspection, PERRL and conjunctivae and sclerae normal  NEURO: Normal strength and tone, mentation intact and speech normal  PSYCH: affect flat

## 2021-04-27 ENCOUNTER — IMMUNIZATION (OUTPATIENT)
Dept: FAMILY MEDICINE | Facility: CLINIC | Age: 51
End: 2021-04-27
Payer: COMMERCIAL

## 2021-04-27 PROCEDURE — 91301 PR COVID VAC MODERNA 100 MCG/0.5 ML IM: CPT

## 2021-04-27 PROCEDURE — 0011A PR COVID VAC MODERNA 100 MCG/0.5 ML IM: CPT

## 2021-04-27 NOTE — PROGRESS NOTES
Outpatient Physical Therapy Discharge Note     Patient: Hollis Diggs  : 1970    Beginning/End Dates of Reporting Period:  3/16/2021 to 2021    Referring Provider: Dr Campbell     Therapy Diagnosis: back and right leg pain      Client Self Report: back and leg are overall feeling much better    Objective Measurements:      patient seen for 3 Rx sessions for use of lumbar traction and hot pack in clinic and was instructed in HEP for avoid flexion posture and prone extension every 2 hours   Patient cancelled his last 2 Rx sessions due to covid  and 21 and as of 21 he has made no further contact with PT             Goals:  Goal Identifier 1   Goal Description instruction in HEP and compliant with it 5 of 7 days to improve quality of life    Target Date 21   Date Met      Progress:     Goal Identifier 2   Goal Description patient to have resolved right LE pain currently 6-10 /10 goal is 0-2/10    Target Date 21   Date Met      Progress:     Goal Identifier 3   Goal Description patient to have improved tolerance to activity currently SOILA 42.22 goal is less than 20    Target Date 21   Date Met      Progress:     Goal Identifier     Goal Description     Target Date     Date Met      Progress:     Goal Identifier     Goal Description     Target Date     Date Met      Progress:     Goal Identifier     Goal Description     Target Date     Date Met      Progress:     Goal Identifier     Goal Description     Target Date     Date Met      Progress:     Goal Identifier     Goal Description     Target Date     Date Met      Progress:     Progress Toward Goals:   Progress this reporting period: patient has not followed up with PT due to covid           Plan:  Discharge from therapy.    Discharge:    Reason for Discharge: No further expectation of progress.  Patient chooses to discontinue therapy.  Patient has failed to schedule further appointments.    Equipment Issued: none    Discharge  Plan: Patient to continue home program.

## 2021-04-28 ENCOUNTER — ANTICOAGULATION THERAPY VISIT (OUTPATIENT)
Dept: ANTICOAGULATION | Facility: CLINIC | Age: 51
End: 2021-04-28

## 2021-04-28 ENCOUNTER — TELEPHONE (OUTPATIENT)
Dept: ANTICOAGULATION | Facility: CLINIC | Age: 51
End: 2021-04-28

## 2021-04-28 DIAGNOSIS — Z79.01 LONG TERM CURRENT USE OF ANTICOAGULANT THERAPY: ICD-10-CM

## 2021-04-28 DIAGNOSIS — Z79.01 LONG TERM CURRENT USE OF ANTICOAGULANT THERAPY: Primary | ICD-10-CM

## 2021-04-28 DIAGNOSIS — I82.4Y9 DEEP VEIN THROMBOSIS (DVT) OF PROXIMAL LOWER EXTREMITY, UNSPECIFIED CHRONICITY, UNSPECIFIED LATERALITY (H): ICD-10-CM

## 2021-04-28 DIAGNOSIS — I82.409 DVT (DEEP VENOUS THROMBOSIS) (H): ICD-10-CM

## 2021-04-28 LAB — INR BLD: 1.8 (ref 0.86–1.14)

## 2021-04-28 PROCEDURE — 36416 COLLJ CAPILLARY BLOOD SPEC: CPT | Performed by: FAMILY MEDICINE

## 2021-04-28 PROCEDURE — 85610 PROTHROMBIN TIME: CPT | Performed by: FAMILY MEDICINE

## 2021-04-28 NOTE — TELEPHONE ENCOUNTER
ANTICOAGULATION MANAGEMENT      Hollis Diggs due for annual renewal of referral to anticoagulation monitoring. Order pended for your review and signature.      ANTICOAGULATION SUMMARY      Warfarin indication(s)     DVT    Heart valve present?  NO       Current goal range   INR: 2.0-3.0     Goal appropriate for indication? Yes, INR 2-3 appropriate for hx of DVT, PE, hypercoagulable state, Afib, LVAD, or bileaflet AVR without risk factors     Current duration of therapy Indefinite/long term therapy   Time in Therapeutic Range (TTR)  (Goal > 60%) 40.9 %       Office visit with referring provider's group within last year yes on 4/26/21       Jesse Flores RN

## 2021-04-28 NOTE — PROGRESS NOTES
ANTICOAGULATION MANAGEMENT     Patient Name:  Hollis Diggs  Date:  2021    ASSESSMENT /SUBJECTIVE:    Today's INR result of 1.8 is subtherapeutic. Goal INR of 2.0-3.0      Warfarin dose taken: Warfarin recently held for Procedure  - see lovenox instructions from  procedure that was rescheduled which may be affecting INR    Diet: No new diet changes affecting INR    Medication changes/ interactions: No new medications/supplements affecting INR    Previous INR: Subtherapeutic     S/S of bleeding or thromboembolism: No    New injury or illness: No    Upcoming surgery, procedure or cardioversion: No    Additional findings: Pt will continue lovenox and recheck INR prior to the weekend      PLAN:    Telephone call with Hollis regarding INR result and instructed:     Warfarin Dosing Instructions: Take 7.5 mg today then continue your current warfarin dose of 5 mg MWF and 7.5 mg all other days    Instructed patient to follow up no later than: 2 days  Lab visit scheduled    Education provided: Please call back if any changes to your diet, medications or how you've been taking warfarin      Hollis verbalizes understanding and agrees to warfarin dosing plan.    Instructed to call the Anticoagulation Clinic for any changes, questions or concerns. (#[phone])        Jesse Flores RN      OBJECTIVE:  Recent labs: (last 7 days)     21  1024 21  1125   INR 1.10 1.8*         INR assessment SUB    Recheck INR In: 2 DAYS    INR Location Outside lab      Anticoagulation Summary  As of 2021    INR goal:  2.0-3.0   TTR:  34.2 % (10.1 mo)   INR used for dosin.8 (2021)   Warfarin maintenance plan:  5 mg (5 mg x 1) every Mon, Wed, Fri; 7.5 mg (5 mg x 1.5) all other days   Full warfarin instructions:  : 7.5 mg; Otherwise 5 mg every Mon, Wed, Fri; 7.5 mg all other days   Weekly warfarin total:  45 mg   Plan last modified:  Анна Hale, RN (3/15/2021)   Next INR check:  2021    Priority:  Maintenance   Target end date:  Indefinite    Indications    DVT (deep venous thrombosis) (H) (Resolved) [I82.409]  Long-term (current) use of anticoagulants [Z79.01] [Z79.01]             Anticoagulation Episode Summary     INR check location:      Preferred lab:      Send INR reminders to:  [facility]    Comments:  5 mg, dosing card, PM dose      Anticoagulation Care Providers     Provider Role Specialty Phone number    Sylvester Cash MD [facility] [phone]

## 2021-04-30 ENCOUNTER — MYC MEDICAL ADVICE (OUTPATIENT)
Dept: FAMILY MEDICINE | Facility: CLINIC | Age: 51
End: 2021-04-30

## 2021-04-30 ENCOUNTER — ANTICOAGULATION THERAPY VISIT (OUTPATIENT)
Dept: ANTICOAGULATION | Facility: CLINIC | Age: 51
End: 2021-04-30

## 2021-04-30 DIAGNOSIS — M79.604 PAIN OF RIGHT LOWER EXTREMITY: ICD-10-CM

## 2021-04-30 DIAGNOSIS — I82.409 DVT (DEEP VENOUS THROMBOSIS) (H): ICD-10-CM

## 2021-04-30 DIAGNOSIS — I82.4Y9 DEEP VEIN THROMBOSIS (DVT) OF PROXIMAL LOWER EXTREMITY, UNSPECIFIED CHRONICITY, UNSPECIFIED LATERALITY (H): ICD-10-CM

## 2021-04-30 DIAGNOSIS — Z79.01 LONG TERM CURRENT USE OF ANTICOAGULANT THERAPY: ICD-10-CM

## 2021-04-30 LAB
CAPILLARY BLOOD COLLECTION: NORMAL
INR BLD: 1.8 (ref 0.86–1.14)

## 2021-04-30 PROCEDURE — 36416 COLLJ CAPILLARY BLOOD SPEC: CPT | Performed by: FAMILY MEDICINE

## 2021-04-30 PROCEDURE — 85610 PROTHROMBIN TIME: CPT | Performed by: FAMILY MEDICINE

## 2021-04-30 RX ORDER — PREDNISONE 20 MG/1
TABLET ORAL
Qty: 18 TABLET | Refills: 1 | Status: SHIPPED | OUTPATIENT
Start: 2021-04-30 | End: 2021-05-27

## 2021-04-30 NOTE — PROGRESS NOTES
ANTICOAGULATION MANAGEMENT     Patient Name:  Hollis Diggs  Date:  2021    ASSESSMENT /SUBJECTIVE:    Today's INR result of 1.8 is subtherapeutic. Goal INR of 2.0-3.0      Warfarin dose taken: Warfarin taken as instructed    Diet: No new diet changes affecting INR    Medication changes/ interactions: No new medications/supplements affecting INR    Previous INR: Subtherapeutic     S/S of bleeding or thromboembolism: No    New injury or illness: No    Upcoming surgery, procedure or cardioversion: No    Additional findings: None      PLAN:    Telephone call with Hollis regarding INR result and instructed:     Warfarin Dosing Instructions: Take 10 mg today, 7.5 mg Sat, Sun Continue with lovenox    Instructed patient to follow up no later than: 3 days  Lab visit scheduled    Education provided: None required      Pt verbalizes understanding and agrees to warfarin dosing plan.    Instructed to call the Anticoagulation Clinic for any changes, questions or concerns. (#[phone])        Анна Hale RN      OBJECTIVE:  Recent labs: (last 7 days)     21  1125 21  0747   INR 1.8* 1.8*         INR assessment SUB    Recheck INR In: 3 DAYS    INR Location Outside lab      Anticoagulation Summary  As of 2021    INR goal:  2.0-3.0   TTR:  33.6 % (10.1 mo)   INR used for dosin.8 (2021)   Warfarin maintenance plan:  5 mg (5 mg x 1) every Mon, Fri; 7.5 mg (5 mg x 1.5) all other days   Full warfarin instructions:  : 10 mg; Otherwise 5 mg every Mon, Fri; 7.5 mg all other days   Weekly warfarin total:  47.5 mg   Plan last modified:  Анна Hale, RN (2021)   Next INR check:  5/3/2021   Priority:  Maintenance   Target end date:  Indefinite    Indications    DVT (deep venous thrombosis) (H) (Resolved) [I82.409]  Long-term (current) use of anticoagulants [Z79.01] [Z79.01]  Deep vein thrombosis (DVT) of proximal lower extremity  unspecified chronicity  unspecified laterality (H)  [I82.4Y9]             Anticoagulation Episode Summary     INR check location:      Preferred lab:      Send INR reminders to:  [facility]    Comments:  5 mg, dosing card, PM dose      Anticoagulation Care Providers     Provider Role Specialty Phone number    Sylvester Cash MD Referring [facility] [phone]

## 2021-05-03 ENCOUNTER — ANTICOAGULATION THERAPY VISIT (OUTPATIENT)
Dept: ANTICOAGULATION | Facility: CLINIC | Age: 51
End: 2021-05-03

## 2021-05-03 DIAGNOSIS — Z79.01 LONG TERM CURRENT USE OF ANTICOAGULANT THERAPY: ICD-10-CM

## 2021-05-03 DIAGNOSIS — I82.4Y9 DEEP VEIN THROMBOSIS (DVT) OF PROXIMAL LOWER EXTREMITY, UNSPECIFIED CHRONICITY, UNSPECIFIED LATERALITY (H): ICD-10-CM

## 2021-05-03 DIAGNOSIS — I82.409 DVT (DEEP VENOUS THROMBOSIS) (H): ICD-10-CM

## 2021-05-03 LAB
CAPILLARY BLOOD COLLECTION: NORMAL
INR BLD: 2.7 (ref 0.86–1.14)

## 2021-05-03 PROCEDURE — 36416 COLLJ CAPILLARY BLOOD SPEC: CPT | Performed by: FAMILY MEDICINE

## 2021-05-03 PROCEDURE — 85610 PROTHROMBIN TIME: CPT | Performed by: FAMILY MEDICINE

## 2021-05-03 NOTE — PROGRESS NOTES
ANTICOAGULATION MANAGEMENT     Patient Name:  Hollis Diggs  Date:  5/3/2021    ASSESSMENT /SUBJECTIVE:    Today's INR result of 2.7 is therapeutic. Goal INR of 2.0-3.0      Warfarin dose taken: Warfarin taken as instructed    Diet: No new diet changes affecting INR    Medication changes/ interactions: No new medications/supplements affecting INR    Previous INR: Subtherapeutic     S/S of bleeding or thromboembolism: No    New injury or illness: No    Upcoming surgery, procedure or cardioversion: No    Additional findings: None      PLAN:    Telephone call with Hollis regarding INR result and instructed:     Warfarin Dosing Instructions: Continue your current warfarin dose 5 mg Mon, Fri and 7.5 mg all other days- Ok to stop lovenox    Instructed patient to follow up no later than: 4 days  Lab visit scheduled    Education provided: None required      Pt verbalizes understanding and agrees to warfarin dosing plan.    Instructed to call the Anticoagulation Clinic for any changes, questions or concerns. (#[phone])        Анна Hale RN      OBJECTIVE:  Recent labs: (last 7 days)     21  1125 21  0747 21  1136   INR 1.8* 1.8* 2.7*         INR assessment THER    Recheck INR In: 4 DAYS    INR Location Outside lab      Anticoagulation Summary  As of 5/3/2021    INR goal:  2.0-3.0   TTR:  33.4 % (10.1 mo)   INR used for dosin.7 (5/3/2021)   Warfarin maintenance plan:  5 mg (5 mg x 1) every Mon, Fri; 7.5 mg (5 mg x 1.5) all other days   Full warfarin instructions:  5 mg every Mon, Fri; 7.5 mg all other days   Weekly warfarin total:  47.5 mg   No change documented:  Анна Hale, RN   Plan last modified:  Анна Hale, RN (2021)   Next INR check:  2021   Priority:  Maintenance   Target end date:  Indefinite    Indications    DVT (deep venous thrombosis) (H) (Resolved) [I82.409]  Long-term (current) use of anticoagulants [Z79.01] [Z79.01]  Deep vein thrombosis (DVT) of proximal  lower extremity  unspecified chronicity  unspecified laterality (H) [I82.4Y9]             Anticoagulation Episode Summary     INR check location:      Preferred lab:      Send INR reminders to:  [facility]    Comments:  5 mg, dosing card, PM dose      Anticoagulation Care Providers     Provider Role Specialty Phone number    Sylvester Cash MD [facility] [phone]

## 2021-05-06 ENCOUNTER — MYC REFILL (OUTPATIENT)
Dept: FAMILY MEDICINE | Facility: CLINIC | Age: 51
End: 2021-05-06

## 2021-05-06 DIAGNOSIS — M54.16 LUMBAR RADICULOPATHY: ICD-10-CM

## 2021-05-06 RX ORDER — OXYCODONE AND ACETAMINOPHEN 10; 325 MG/1; MG/1
1 TABLET ORAL EVERY 6 HOURS PRN
Qty: 30 TABLET | Refills: 0 | Status: SHIPPED | OUTPATIENT
Start: 2021-05-06 | End: 2021-05-14

## 2021-05-06 NOTE — TELEPHONE ENCOUNTER
Percocet        Last Written Prescription Date:  4/29/2021  Last Fill Quantity: 30,   # refills: 0  Last Office Visit: 4/26/2021  Future Office visit:    Next 5 appointments (look out 90 days)    May 10, 2021  4:45 PM  Christopher Austin with Ozzy Long MD  [facility] ) [address] [place] [id number] [phone]           Routing refill request to provider for review/approval because:  Drug not on the [facility], [facility] or [facility] refill protocol or controlled substance

## 2021-05-07 ENCOUNTER — ANTICOAGULATION THERAPY VISIT (OUTPATIENT)
Dept: ANTICOAGULATION | Facility: CLINIC | Age: 51
End: 2021-05-07

## 2021-05-07 DIAGNOSIS — I82.4Y9 DEEP VEIN THROMBOSIS (DVT) OF PROXIMAL LOWER EXTREMITY, UNSPECIFIED CHRONICITY, UNSPECIFIED LATERALITY (H): ICD-10-CM

## 2021-05-07 DIAGNOSIS — Z79.01 LONG TERM CURRENT USE OF ANTICOAGULANT THERAPY: ICD-10-CM

## 2021-05-07 DIAGNOSIS — I82.409 DVT (DEEP VENOUS THROMBOSIS) (H): ICD-10-CM

## 2021-05-07 LAB — INR BLD: 3.5 (ref 0.86–1.14)

## 2021-05-07 PROCEDURE — 36416 COLLJ CAPILLARY BLOOD SPEC: CPT | Performed by: FAMILY MEDICINE

## 2021-05-07 PROCEDURE — 85610 PROTHROMBIN TIME: CPT | Performed by: FAMILY MEDICINE

## 2021-05-07 NOTE — PROGRESS NOTES
ANTICOAGULATION MANAGEMENT     Patient Name:  Hollis Diggs  Date:  5/7/2021    ASSESSMENT /SUBJECTIVE:    Today's INR result of 3.5 is supratherapeutic. Goal INR of 2.0-3.0      Warfarin dose taken: More warfarin taken than planned which may be affecting INR - bump dose given last week due to lovenox bridging    Diet: No new diet changes affecting INR    Medication changes/ interactions: No new medications/supplements affecting INR    Previous INR: Therapeutic     S/S of bleeding or thromboembolism: No    New injury or illness: No    Upcoming surgery, procedure or cardioversion: No    Additional findings: None      PLAN:    Telephone call with Hollis regarding INR result and instructed:     Warfarin Dosing Instructions: Continue your current warfarin dose 5 mg MF and 7.5 mg ROW    Instructed patient to follow up no later than: 2 weeks  Lab visit scheduled    Education provided: None required      Hollis verbalizes understanding and agrees to warfarin dosing plan.    Instructed to call the Anticoagulation Clinic for any changes, questions or concerns. (#[phone])        Jesse Flores RN      OBJECTIVE:  Recent labs: (last 7 days)     05/03/21  1136 05/07/21  0751   INR 2.7* 3.5*         INR assessment SUPRA    Recheck INR In: 2 WEEKS    INR Location Outside lab      Anticoagulation Summary  As of 5/7/2021    INR goal:  2.0-3.0   TTR:  32.6 % (10.1 mo)   INR used for dosing:  3.5 (5/7/2021)   Warfarin maintenance plan:  5 mg (5 mg x 1) every Mon, Fri; 7.5 mg (5 mg x 1.5) all other days   Full warfarin instructions:  5 mg every Mon, Fri; 7.5 mg all other days   Weekly warfarin total:  47.5 mg   No change documented:  Jesse Flores RN   Plan last modified:  Анна Hale, RN (4/30/2021)   Next INR check:  5/25/2021   Priority:  Maintenance   Target end date:  Indefinite    Indications    DVT (deep venous thrombosis) (H) (Resolved) [I82.409]  Long-term (current) use of anticoagulants [Z79.01]  [Z79.01]  Deep vein thrombosis (DVT) of proximal lower extremity  unspecified chronicity  unspecified laterality (H) [I82.4Y9]             Anticoagulation Episode Summary     INR check location:      Preferred lab:      Send INR reminders to:  [facility]    Comments:  5 mg, dosing card, PM dose      Anticoagulation Care Providers     Provider Role Specialty Phone number    Sylvester Cash MD Referring [facility] [phone]

## 2021-05-10 ENCOUNTER — OFFICE VISIT (OUTPATIENT)
Dept: INTERNAL MEDICINE | Facility: CLINIC | Age: 51
End: 2021-05-10
Payer: COMMERCIAL

## 2021-05-10 VITALS
TEMPERATURE: 97.3 F | DIASTOLIC BLOOD PRESSURE: 84 MMHG | HEART RATE: 96 BPM | SYSTOLIC BLOOD PRESSURE: 128 MMHG | OXYGEN SATURATION: 98 % | WEIGHT: 253 LBS | RESPIRATION RATE: 16 BRPM | BODY MASS INDEX: 34.31 KG/M2

## 2021-05-10 DIAGNOSIS — R13.10 FOOD STICKS ON SWALLOWING: ICD-10-CM

## 2021-05-10 DIAGNOSIS — K44.9 HIATAL HERNIA: Primary | ICD-10-CM

## 2021-05-10 DIAGNOSIS — R10.84 ABDOMINAL PAIN, GENERALIZED: ICD-10-CM

## 2021-05-10 DIAGNOSIS — I82.4Y9 DEEP VEIN THROMBOSIS (DVT) OF PROXIMAL LOWER EXTREMITY, UNSPECIFIED CHRONICITY, UNSPECIFIED LATERALITY (H): ICD-10-CM

## 2021-05-10 PROCEDURE — 99214 OFFICE O/P EST MOD 30 MIN: CPT | Performed by: INTERNAL MEDICINE

## 2021-05-10 RX ORDER — WARFARIN SODIUM 5 MG/1
TABLET ORAL
Qty: 120 TABLET | Refills: 0 | Status: SHIPPED | OUTPATIENT
Start: 2021-05-10 | End: 2021-08-05 | Stop reason: DRUGHIGH

## 2021-05-10 ASSESSMENT — PAIN SCALES - GENERAL: PAINLEVEL: SEVERE PAIN (6)

## 2021-05-10 NOTE — PROGRESS NOTES
Assessment & Plan   Problem List Items Addressed This Visit     None      Visit Diagnoses     Hiatal hernia    -  Primary    Relevant Orders    GASTROENTEROLOGY ADULT REF PROCEDURE ONLY    Food sticks on swallowing        Relevant Orders    GASTROENTEROLOGY ADULT REF PROCEDURE ONLY    Abdominal pain, generalized             This patient is seeing me today as his primary is out of the clinic for the next few weeks.  The patient has a history of hiatal hernia been seen on his EGD and his CAT scans.  He is having more issues with food sticking particularly different meats in his esophagus.  He has never had a dilatation before.  With history of a hiatal hernia and the food sticking I went on refer him to GI at [facility] to get an EGD and dilatation and possible discussion on treatment for hiatal hernia.  Possibly a surgical referral in the future.    Patient has little shortness of breath but thinks is from lack of exercise and endurance.  He denies any chest pains.  Certainly the hernia could fill up some of his chest but on the other make him truly short of breath.  He can follow-up with me or somebody else after the procedure is performed.      Review of prior external note(s) from - egd  31 minutes spent on the date of the encounter doing chart review, history and exam, documentation and further activities per the note       BMI:   Estimated body mass index is 34.31 kg/m  as calculated from the following:    Height as of 3/11/21: 1.829 m (6').    Weight as of this encounter: 114.8 kg (253 lb).   Weight management plan: Discussed healthy diet and exercise guidelines        No follow-ups on file.    Ozzy Long MD  [facility] ANTONIO Shafer is a 51 year old who presents for the following health issues  accompanied by his spouse:    HPI     Chief Complaint   Patient presents with     Hernia     Patient has hiatal hernia and has hard time breathing when bending over and  stomach gets hard at times     Throat Problem     food getting stuck     Has had some stomach irritation and feels hard at times.      Food sticking as well, meat especially.      Sob can be positional with leaning forward.     Taking protonix,     Getting off prednisone for his back, weaned.     Past Medical History:   Diagnosis Date     Antiphospholipid syndrome (H)      Arthritis      Asthma     Exercise     blood clot in leg      Depressive disorder, not elsewhere classified     Depression (non-psychotic)     ANKIT (generalised anxiety disorder)      HH (hiatus hernia)      Hypercholesteremia     normal with weight loss 3/09     Lumbar disc herniation 1992     Seronegative rheumatoid arthritis (H)      Current Outpatient Medications   Medication     ARIPiprazole (ABILIFY) 2 MG tablet     DULoxetine (CYMBALTA) 60 MG capsule     gabapentin (NEURONTIN) 300 MG capsule     gabapentin (NEURONTIN) 800 MG tablet     lithium (ESKALITH CR/LITHOBID) 450 MG CR tablet     oxyCODONE-acetaminophen (PERCOCET)  MG per tablet     pantoprazole (PROTONIX) 40 MG EC tablet     predniSONE (DELTASONE) 20 MG tablet     traZODone (DESYREL) 50 MG tablet     vitamin D3 (CHOLECALCIFEROL) 50 mcg (2000 units) tablet     warfarin ANTICOAGULANT (COUMADIN) 5 MG tablet     No current facility-administered medications for this visit.      Social History     Tobacco Use     Smoking status: Never Smoker     Smokeless tobacco: Never Used   Substance Use Topics     Alcohol use: Yes     Comment: rare     Drug use: No         Review of Systems         Objective    /84   Pulse 96   Temp 97.3  F (36.3  C) (Temporal)   Resp 16   Wt 114.8 kg (253 lb)   SpO2 98%   BMI 34.31 kg/m    Body mass index is 34.31 kg/m .  Physical Exam   No acute distress sitting on exam table  Heart is regular  Lungs are clear  Abdomen is soft benign nontender I do not feel any hepatosplenomegaly he has a slight ventral hernia when he sits up    Reviewed his CT scan  showing the hiatal hernia, reviewed his EGD showing hiatal hernia

## 2021-05-12 ENCOUNTER — APPOINTMENT (OUTPATIENT)
Dept: CT IMAGING | Facility: CLINIC | Age: 51
End: 2021-05-12
Attending: FAMILY MEDICINE
Payer: COMMERCIAL

## 2021-05-12 ENCOUNTER — HOSPITAL ENCOUNTER (EMERGENCY)
Facility: CLINIC | Age: 51
Discharge: HOME | End: 2021-05-12
Attending: FAMILY MEDICINE | Admitting: FAMILY MEDICINE
Payer: COMMERCIAL

## 2021-05-12 ENCOUNTER — NURSE TRIAGE (OUTPATIENT)
Dept: NURSING | Facility: CLINIC | Age: 51
End: 2021-05-12

## 2021-05-12 VITALS
TEMPERATURE: 98.6 F | HEART RATE: 75 BPM | SYSTOLIC BLOOD PRESSURE: 126 MMHG | OXYGEN SATURATION: 96 % | DIASTOLIC BLOOD PRESSURE: 73 MMHG | RESPIRATION RATE: 18 BRPM

## 2021-05-12 DIAGNOSIS — G44.319 ACUTE POST-TRAUMATIC HEADACHE, NOT INTRACTABLE: ICD-10-CM

## 2021-05-12 DIAGNOSIS — S16.1XXA STRAIN OF NECK MUSCLE, INITIAL ENCOUNTER: ICD-10-CM
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
## 2021-05-12 LAB
ANION GAP SERPL CALCULATED.3IONS-SCNC: 4 MMOL/L (ref 3–14)
BASOPHILS # BLD AUTO: 0 10E9/L (ref 0–0.2)
BASOPHILS NFR BLD AUTO: 0.3 %
BUN SERPL-MCNC: 11 MG/DL (ref 7–30)
CALCIUM SERPL-MCNC: 8.3 MG/DL (ref 8.5–10.1)
CHLORIDE SERPL-SCNC: 109 MMOL/L (ref 94–109)
CO2 SERPL-SCNC: 30 MMOL/L (ref 20–32)
CREAT SERPL-MCNC: 1.18 MG/DL (ref 0.66–1.25)
DIFFERENTIAL METHOD BLD: ABNORMAL
EOSINOPHIL # BLD AUTO: 0.5 10E9/L (ref 0–0.7)
EOSINOPHIL NFR BLD AUTO: 4.4 %
ERYTHROCYTE [DISTWIDTH] IN BLOOD BY AUTOMATED COUNT: 18 % (ref 10–15)
ETHANOL SERPL-MCNC: <0.01 G/DL
GFR SERPL CREATININE-BSD FRML MDRD: 71 ML/MIN/{1.73_M2}
GLUCOSE SERPL-MCNC: 82 MG/DL (ref 70–99)
HCT VFR BLD AUTO: 38.7 % (ref 40–53)
HGB BLD-MCNC: 11.5 G/DL (ref 13.3–17.7)
IMM GRANULOCYTES # BLD: 0.1 10E9/L (ref 0–0.4)
IMM GRANULOCYTES NFR BLD: 0.5 %
INR PPP: 2.7 (ref 0.86–1.14)
LYMPHOCYTES # BLD AUTO: 2.9 10E9/L (ref 0.8–5.3)
LYMPHOCYTES NFR BLD AUTO: 28.5 %
MCH RBC QN AUTO: 22.8 PG (ref 26.5–33)
MCHC RBC AUTO-ENTMCNC: 29.7 G/DL (ref 31.5–36.5)
MCV RBC AUTO: 77 FL (ref 78–100)
MONOCYTES # BLD AUTO: 0.8 10E9/L (ref 0–1.3)
MONOCYTES NFR BLD AUTO: 7.5 %
NEUTROPHILS # BLD AUTO: 6 10E9/L (ref 1.6–8.3)
NEUTROPHILS NFR BLD AUTO: 58.8 %
NRBC # BLD AUTO: 0 10*3/UL
NRBC BLD AUTO-RTO: 0 /100
PLATELET # BLD AUTO: 361 10E9/L (ref 150–450)
POTASSIUM SERPL-SCNC: 3.4 MMOL/L (ref 3.4–5.3)
RBC # BLD AUTO: 5.04 10E12/L (ref 4.4–5.9)
SODIUM SERPL-SCNC: 143 MMOL/L (ref 133–144)
WBC # BLD AUTO: 10.1 10E9/L (ref 4–11)

## 2021-05-12 PROCEDURE — 70486 CT MAXILLOFACIAL W/O DYE: CPT

## 2021-05-12 PROCEDURE — 85025 COMPLETE CBC W/AUTO DIFF WBC: CPT | Performed by: FAMILY MEDICINE

## 2021-05-12 PROCEDURE — 99285 EMERGENCY DEPT VISIT HI MDM: CPT | Performed by: FAMILY MEDICINE

## 2021-05-12 PROCEDURE — 80048 BASIC METABOLIC PNL TOTAL CA: CPT | Performed by: FAMILY MEDICINE

## 2021-05-12 PROCEDURE — 82077 ASSAY SPEC XCP UR&BREATH IA: CPT | Performed by: FAMILY MEDICINE

## 2021-05-12 PROCEDURE — 85610 PROTHROMBIN TIME: CPT | Performed by: FAMILY MEDICINE

## 2021-05-12 PROCEDURE — 96374 THER/PROPH/DIAG INJ IV PUSH: CPT | Performed by: FAMILY MEDICINE

## 2021-05-12 PROCEDURE — 99285 EMERGENCY DEPT VISIT HI MDM: CPT | Mod: 25 | Performed by: FAMILY MEDICINE

## 2021-05-12 PROCEDURE — 250N000011 HC RX IP 250 OP 636: Performed by: FAMILY MEDICINE

## 2021-05-12 PROCEDURE — 72125 CT NECK SPINE W/O DYE: CPT

## 2021-05-12 PROCEDURE — 70450 CT HEAD/BRAIN W/O DYE: CPT

## 2021-05-12 RX ORDER — WARFARIN SODIUM 5 MG/1
5 TABLET ORAL
COMMUNITY
End: 2021-08-05 | Stop reason: DRUGHIGH

## 2021-05-12 RX ORDER — HYDROMORPHONE HYDROCHLORIDE 1 MG/ML
0.5 INJECTION, SOLUTION INTRAMUSCULAR; INTRAVENOUS; SUBCUTANEOUS
Status: DISCONTINUED | OUTPATIENT
Start: 2021-05-12 | End: 2021-05-12 | Stop reason: HOSPADM

## 2021-05-12 RX ORDER — ACETAMINOPHEN 500 MG
500-1000 TABLET ORAL EVERY 6 HOURS PRN
Refills: 0 | COMMUNITY
Start: 2021-05-12 | End: 2021-05-16

## 2021-05-12 RX ORDER — ARIPIPRAZOLE 5 MG/1
5 TABLET ORAL DAILY
COMMUNITY
Start: 2021-05-10 | End: 2021-06-15 | Stop reason: DRUGHIGH

## 2021-05-12 RX ADMIN — HYDROMORPHONE HYDROCHLORIDE 0.5 MG: 1 INJECTION, SOLUTION INTRAMUSCULAR; INTRAVENOUS; SUBCUTANEOUS at 19:46

## 2021-05-12 ASSESSMENT — ENCOUNTER SYMPTOMS
WOUND: 0
VOMITING: 0
EYE DISCHARGE: 0
PHOTOPHOBIA: 0
HEADACHES: 1
WEAKNESS: 0
NUMBNESS: 0
PSYCHIATRIC NEGATIVE: 1
DIZZINESS: 0
CARDIOVASCULAR NEGATIVE: 1
ALLERGIC/IMMUNOLOGIC NEGATIVE: 1
EYE PAIN: 0
TREMORS: 0
GASTROINTESTINAL NEGATIVE: 1
RESPIRATORY NEGATIVE: 1
VOICE CHANGE: 0
NECK PAIN: 1
EYE REDNESS: 0
RHINORRHEA: 0
ENDOCRINE NEGATIVE: 1
TROUBLE SWALLOWING: 0
NAUSEA: 0
NECK STIFFNESS: 1
SPEECH DIFFICULTY: 0
FACIAL SWELLING: 1
LIGHT-HEADEDNESS: 0
BRUISES/BLEEDS EASILY: 1
SEIZURES: 0

## 2021-05-12 NOTE — TELEPHONE ENCOUNTER
Patient fell and hit his head in the barn. Redness/bruising near eyebrow area about 2-3 inches. Denies swelling, vision changes, vomiting, unsteady gait, weakness. Reports the mouth is bleeding (unsure where from?), and headache. Patient takes Warfarin. Advised per RN triage guidelines to be seen in the emergency department. Patient plans to go into [facility].     Veda Eldridge RN/[facility]             COVID 19 Nurse Triage Plan/Patient Instructions    Please be aware that novel coronavirus (COVID-19) may be circulating in the community. If you develop symptoms such as fever, cough, or SOB or if you have concerns about the presence of another infection including coronavirus (COVID-19), please contact your health care provider or visit [email or web address].     Disposition/Instructions    ED Visit recommended. Follow protocol based instructions.     Bring Your Own Device:  Please also bring your smart device(s) (smart phones, tablets, laptops) and their charging cables for your personal use and to communicate with your care team during your visit.    Thank you for taking steps to prevent the spread of this virus.  o Limit your contact with others.  o Wear a simple mask to cover your cough.  o Wash your hands well and often.    Resources    [facility]: About COVID-19: [email or web address]    CDC: What to Do If You're Sick: www.cdc.gov/coronavirus/2019-ncov/about/steps-when-sick.html    CDC: Ending Home Isolation: www.cdc.gov/coronavirus/2019-ncov/hcp/disposition-in-home-patients.html     CDC: Caring for Someone: www.cdc.gov/coronavirus/2019-ncov/if-you-are-sick/care-for-someone.html     [facility]: Interim Guidance for Hospital Discharge to Home: [email or web address]/hcp/hospdischarge.pdf    [facility] clinical trials (COVID-19 research studies): [email or web address]     Below are the COVID-19 hotlines at  "the [place] Department of Health ([facility]). Interpreters are available.   o For health questions: Call 344-371-9284 or 1-555.371.4085 (7 a.m. to 7 p.m.)  o For questions about schools and childcare: Call 201-680-3499 or 1-365.839.3461 (7 a.m. to 7 p.m.)     Reason for Disposition    Sounds like a serious injury to the triager    Large swelling or bruise > 2 inches (5 cm)    Additional Information    Negative: Knocked out (unconscious) > 1 minute    Negative: Seizure (convulsion) occurred  (Exception: prior history of seizures and now alert and without Acute Neuro Symptoms)    Negative: Penetrating head injury (e.g., knife, gun shot wound, metal object)    Negative: [1] Major bleeding (e.g., actively dripping or spurting) AND [2] can't be stopped    Negative: [1] ACUTE NEURO SYMPTOM AND [2] present now  (DEFINITION: difficult to awaken OR confused thinking and talking OR slurred speech OR weakness of arms OR unsteady walking)    Negative: [1] Dangerous mechanism of injury (e.g., MVA, diving, trampoline, contact sports, fall > 10 feet or 3 meters) AND [2] NECK pain AND [3] began < 1 hour after injury    Negative: Sounds like a life-threatening emergency to the triager    Negative: [1] Diagnosed with concussion AND [2] within last 14 days    Negative: [1] Traumatic brain injury (mTBI; concussion) AND [2] more than 14 days since head injury    Negative: Can't remember what happened (amnesia)    Negative: Vomiting once or more    Negative: [1] Loss of vision or double vision AND [2] present now    Negative: Watery or blood-tinged fluid dripping from the NOSE or EARS now  (Exception: tears from crying or nosebleed from nasal trauma)    Negative: [1] One or two \"black eyes\" (bruising, purple color of eyelids) AND [2] onset within 24 hours of head injury    Negative: Skin is split open or gaping  (or length > 1/2 inch or 12 mm)    Negative: [1] Bleeding AND [2] won't stop after 10 minutes of direct pressure (using correct " technique)    Protocols used: HEAD INJURY-A-AH

## 2021-05-13 ENCOUNTER — MYC REFILL (OUTPATIENT)
Dept: FAMILY MEDICINE | Facility: CLINIC | Age: 51
End: 2021-05-13

## 2021-05-13 DIAGNOSIS — M54.16 LUMBAR RADICULOPATHY: ICD-10-CM

## 2021-05-13 RX ORDER — OXYCODONE AND ACETAMINOPHEN 10; 325 MG/1; MG/1
1 TABLET ORAL EVERY 6 HOURS PRN
Qty: 30 TABLET | Refills: 0 | OUTPATIENT
Start: 2021-05-13

## 2021-05-13 NOTE — ED PROVIDER NOTES
History     Chief Complaint   Patient presents with     Fall     HPI  Hollis Diggs is a 51 year old male who presented to the emergency room via private car secondary to concerns of headache following a fall onto his face that occurred about 1-1/2 hours ago.  Patient states that he was in his barn when he tripped causing him to fall face first onto a wooden ragini of the barn.  He denies loss of consciousness but has had increasingly severe headache to the forehead into the right of his face.  Patient states that he landed on his nose and face.  Noted a small amount of blood from his mouth.  He does complain of some midline neck pain posterior as well.  He denies any injury to the body below his neck.  Patient states that he did not lose consciousness nor did he faint prior to the fall but just tripped causing the fall.  Patient is on warfarin therapy because of a DVT in his leg.  Patient denies any allergies to medications.  He denies vision change or nausea.  He complains of pain to the bridge of his nose especially in the right side of the nasal bridge area.  He denies any difficulty moving his arms or legs or weakness to his arms or legs.        Component      Latest Ref Rng & Units 5/7/2021   INR Point of Care      0.86 - 1.14 3.5 (H)         Allergies:  Allergies   Allergen Reactions     No Known Drug Allergies        Problem List:    Patient Active Problem List    Diagnosis Date Noted     Deep vein thrombosis (DVT) of proximal lower extremity, unspecified chronicity, unspecified laterality (H) 04/28/2021     Priority: Medium     Gastroesophageal reflux disease without esophagitis 04/26/2021     Priority: Medium     Antiphospholipid syndrome (H) 03/06/2021     Priority: Medium     Suicidal behavior 06/22/2020     Priority: Medium     Class 1 obesity due to excess calories without serious comorbidity with body mass index (BMI) of 33.0 to 33.9 in adult 01/08/2020     Priority: Medium     Long-term use of  high-risk medication 05/12/2017     Priority: Medium     History of deep venous thrombosis 04/14/2017     Priority: Medium     DDD (degenerative disc disease), lumbar 04/14/2017     Priority: Medium     On prednisone therapy 07/27/2016     Priority: Medium     Seronegative rheumatoid arthritis (H) 06/28/2016     Priority: Medium     Long-term (current) use of anticoagulants [Z79.01] 03/16/2016     Priority: Medium     Rheumatoid arthritis of multiple sites with negative rheumatoid factor (H) 12/07/2015     Priority: Medium     Midline low back pain, with sciatica presence unspecified 10/14/2015     Priority: Medium     Major depressive disorder, recurrent episode, mild (H) 10/07/2015     Priority: Medium     Pain in joint, multiple sites 03/20/2015     Priority: Medium     Anxiety state 02/10/2015     Priority: Medium     Problem list name updated by automated process. Provider to review       CARDIOVASCULAR SCREENING; LDL GOAL LESS THAN 160 01/15/2013     Priority: Medium     Alopecia 02/19/2010     Priority: Medium     Ingrown toenail 08/18/2009     Priority: Medium     Anxiety 07/09/2008     Priority: Medium     Abdominal pain, epigastric 01/25/2006     Priority: Medium        Past Medical History:    Past Medical History:   Diagnosis Date     Antiphospholipid syndrome (H)      Arthritis      Asthma      blood clot in leg      Depressive disorder, not elsewhere classified      ANKIT (generalised anxiety disorder)      HH (hiatus hernia)      Hypercholesteremia      Lumbar disc herniation 1992     Seronegative rheumatoid arthritis (H)        Past Surgical History:    Past Surgical History:   Procedure Laterality Date     APPENDECTOMY       COLONOSCOPY N/A 8/2/2016    Procedure: COMBINED COLONOSCOPY, SINGLE OR MULTIPLE BIOPSY/POLYPECTOMY BY BIOPSY;  Surgeon: Sydnee Walton MD;  Location: [facility]     COLONOSCOPY WITH CO2 INSUFFLATION N/A 8/2/2016    Procedure: COLONOSCOPY WITH CO2 INSUFFLATION;  Surgeon:  "Sydnee Walton MD;  Location: [facility] OR     COMBINED ESOPHAGOSCOPY, GASTROSCOPY, DUODENOSCOPY (EGD) WITH CO2 INSUFFLATION N/A 8/2/2016    Procedure: COMBINED ESOPHAGOSCOPY, GASTROSCOPY, DUODENOSCOPY (EGD) WITH CO2 INSUFFLATION;  Surgeon: Sydnee Walton MD;  Location: [facility]     ESOPHAGOSCOPY, GASTROSCOPY, DUODENOSCOPY (EGD), COMBINED N/A 8/2/2016    Procedure: COMBINED ESOPHAGOSCOPY, GASTROSCOPY, DUODENOSCOPY (EGD), BIOPSY SINGLE OR MULTIPLE;  Surgeon: Sydnee Walton MD;  Location: [facility] OR     HC REMOVAL OF TONSILS,<10 Y/O      Tonsils <11y.o.     HC REPAIR INCISIONAL HERNIA,REDUCIBLE  1970's    Hernia Repair, Incisional, Unilateral     HC UGI ENDOSCOPY DIAG W BIOPSY  02/01/06     HC VASECTOMY UNILAT/BILAT W POSTOP SEMEN  1/05    Vasectomy     History back lumbar laminectomy       INJECT EPIDURAL LUMBAR Right 4/22/2021    Procedure: Right Lumbar 4-5 and Lumbar 5 - Sacral 1 Epidural Steroid Injection;  Surgeon: Maxwell Zacarias MD;  Location: [facility] OR     ZZC NONSPECIFIC PROCEDURE  91 or 92    back surgery. lumbar. lamiectomy       Family History:    Family History   Problem Relation Age of Onset     Brain Tumor Mother         Benign     Deep Vein Thrombosis Mother         \"neck\"      Heart Disease Father         \"wont tell anyone\"     Neurologic Disorder Paternal Grandmother         Parkinsons      Alcohol/Drug Paternal Grandfather         alcoholic     Cancer Maternal Grandfather         lung     Diabetes Maternal Grandmother      Cerebrovascular Disease Maternal Grandmother         tim age ~70     Arthritis Cousin         cousins x 2 \"RA\"     Musculoskeletal Disorder Maternal Aunt         MS     Family History Negative Other         psoriasis, crohns, UC, SLE       Social History:  Marital Status:   [2]  Social History     Tobacco Use     Smoking status: Never Smoker     Smokeless tobacco: Never Used   Substance Use Topics     Alcohol use: Yes     Comment: rare     Drug " use: No        Medications:    acetaminophen (TYLENOL) 500 MG tablet  ARIPiprazole (ABILIFY) 5 MG tablet  DULoxetine (CYMBALTA) 60 MG capsule  gabapentin (NEURONTIN) 800 MG tablet  lithium (ESKALITH CR/LITHOBID) 450 MG CR tablet  oxyCODONE-acetaminophen (PERCOCET)  MG per tablet  pantoprazole (PROTONIX) 40 MG EC tablet  traZODone (DESYREL) 50 MG tablet  vitamin D3 (CHOLECALCIFEROL) 50 mcg (2000 units) tablet  warfarin ANTICOAGULANT (COUMADIN) 5 MG tablet  warfarin ANTICOAGULANT (COUMADIN) 5 MG tablet  ARIPiprazole (ABILIFY) 2 MG tablet  gabapentin (NEURONTIN) 300 MG capsule  predniSONE (DELTASONE) 20 MG tablet          Review of Systems   HENT: Positive for facial swelling and mouth sores (Patient noted a small amount of blood from his mouth but is not sure where he injured his mouth.). Negative for dental problem, drooling, ear pain, hearing loss, nosebleeds, rhinorrhea, trouble swallowing and voice change.    Eyes: Negative for photophobia, pain, discharge, redness and visual disturbance.   Respiratory: Negative.    Cardiovascular: Negative.    Gastrointestinal: Negative.  Negative for nausea and vomiting.   Endocrine: Negative.    Genitourinary: Negative.    Musculoskeletal: Positive for neck pain (Midline to the posterior aspect of the cervical spine.) and neck stiffness.   Skin: Negative for rash and wound.   Allergic/Immunologic: Negative.    Neurological: Positive for headaches (Patient complains of increasingly severe right frontal headache that started after the fall today.). Negative for dizziness, tremors, seizures, syncope, speech difficulty, weakness, light-headedness and numbness.   Hematological: Bruises/bleeds easily (Is on warfarin therapy.).   Psychiatric/Behavioral: Negative.    All other systems reviewed and are negative.      Physical Exam   BP: 137/84  Pulse: 87  Temp: 98.6  F (37  C)  Resp: 18  SpO2: 97 %      Physical Exam  Vitals signs and nursing note reviewed. Exam conducted with a  chaperone present (Patient's significant other was present at his bedside at the time of exam.).   Constitutional:       General: He is in acute distress.      Appearance: He is not diaphoretic.   HENT:      Right Ear: Ear canal and external ear normal.      Left Ear: Ear canal and external ear normal.      Nose: No congestion or rhinorrhea.      Comments: Some tenderness to the nasal bridge area especially on the right but without deformity noted.  No evidence of nasal bleeding.  No significant rhinorrhea noted.     Mouth/Throat:      Mouth: Mucous membranes are moist.      Comments: Patient noted to have an abrasion to the right upper lip without active bleeding.  There was no other evidence of significant trauma to the oral cavity or dentition or tongue.  Eyes:      Extraocular Movements: Extraocular movements intact.      Conjunctiva/sclera: Conjunctivae normal.      Pupils: Pupils are equal, round, and reactive to light.   Neck:      Musculoskeletal: Muscular tenderness present. No neck rigidity.   Cardiovascular:      Rate and Rhythm: Normal rate.      Pulses: Normal pulses.   Pulmonary:      Effort: Pulmonary effort is normal. No respiratory distress.      Breath sounds: Normal breath sounds.   Abdominal:      Tenderness: There is no abdominal tenderness.   Musculoskeletal:         General: No deformity.   Skin:     Coloration: Skin is not jaundiced or pale.      Findings: No rash.      Comments: No significant bruising noted to the face at the time of exam.   Neurological:      General: No focal deficit present.      Mental Status: He is alert and oriented to person, place, and time.   Psychiatric:         Mood and Affect: Mood normal.         Behavior: Behavior normal.         ED Course        Procedures               Critical Care time:  none               Results for orders placed or performed during the hospital encounter of 05/12/21 (from the past 24 hour(s))   INR   Result Value Ref Range    INR 2.70  (H) 0.86 - 1.14   CBC with platelets differential   Result Value Ref Range    WBC 10.1 4.0 - 11.0 10e9/L    RBC Count 5.04 4.4 - 5.9 10e12/L    Hemoglobin 11.5 (L) 13.3 - 17.7 g/dL    Hematocrit 38.7 (L) 40.0 - 53.0 %    MCV 77 (L) 78 - 100 fl    MCH 22.8 (L) 26.5 - 33.0 pg    MCHC 29.7 (L) 31.5 - 36.5 g/dL    RDW 18.0 (H) 10.0 - 15.0 %    Platelet Count 361 150 - 450 10e9/L    Diff Method Automated Method     % Neutrophils 58.8 %    % Lymphocytes 28.5 %    % Monocytes 7.5 %    % Eosinophils 4.4 %    % Basophils 0.3 %    % Immature Granulocytes 0.5 %    Nucleated RBCs 0 0 /100    Absolute Neutrophil 6.0 1.6 - 8.3 10e9/L    Absolute Lymphocytes 2.9 0.8 - 5.3 10e9/L    Absolute Monocytes 0.8 0.0 - 1.3 10e9/L    Absolute Eosinophils 0.5 0.0 - 0.7 10e9/L    Absolute Basophils 0.0 0.0 - 0.2 10e9/L    Abs Immature Granulocytes 0.1 0 - 0.4 10e9/L    Absolute Nucleated RBC 0.0    Alcohol ethyl   Result Value Ref Range    Ethanol g/dL <0.01 <0.01 g/dL   Basic metabolic panel   Result Value Ref Range    Sodium 143 133 - 144 mmol/L    Potassium 3.4 3.4 - 5.3 mmol/L    Chloride 109 94 - 109 mmol/L    Carbon Dioxide 30 20 - 32 mmol/L    Anion Gap 4 3 - 14 mmol/L    Glucose 82 70 - 99 mg/dL    Urea Nitrogen 11 7 - 30 mg/dL    Creatinine 1.18 0.66 - 1.25 mg/dL    GFR Estimate 71 >60 mL/min/[1.73_m2]    GFR Estimate If Black 82 >60 mL/min/[1.73_m2]    Calcium 8.3 (L) 8.5 - 10.1 mg/dL   Head CT w/o contrast    Narrative    CT SCAN OF THE HEAD WITHOUT CONTRAST   5/12/2021 8:14 PM     HISTORY: Facial trauma; Head trauma, minor, normal mental status (Age  19-64y); Fall onto oracio, Headache, On warfarin therapy.    TECHNIQUE:  Axial images of the head and coronal reformations without  IV contrast material.  Radiation dose for this scan was reduced using  automated exposure control, adjustment of the mA and/or kV according  to patient size, or iterative reconstruction technique.    COMPARISON: 2/16/2021.    FINDINGS: The ventricles are  normal in size, shape, and configuration.  The brain parenchyma and subarachnoid spaces are normal. There is no  evidence for intracranial hemorrhage, mass effect, acute infarct, or  skull fracture. Visualized paranasal sinuses and mastoid air cells are  clear except for some minimal mucosal thickening in the maxillary  sinuses.      Impression    IMPRESSION: Negative head CT without contrast.    SAHRA HUSTON MD   CT Facial Bones without Contrast    Narrative    CT FACIAL BONES WITHOUT CONTRAST 5/12/2021 8:14 PM     HISTORY: Facial trauma.    TECHNIQUE: Axial images were obtained through the facial bones without  contrast. Coronal and sagittal reconstructions were also acquired.  Radiation dose for this scan was reduced using automated exposure  control, adjustment of the mA and/or kV according to patient size, or  iterative reconstruction technique.    FINDINGS: The facial bones are intact. Specifically, the bony  mandible, pterygoid plates, zygomatic arches, nasal bones, and bony  orbits are intact. There is some mucosal thickening in the right  sphenoid sinus and both maxillary sinuses. No air-fluid levels are  present. Degenerative changes are seen in the temporomandibular  joints.      Impression    IMPRESSION: No evidence for any facial fractures.    SAHRA HUSTON MD   Cervical spine CT w/o contrast    Narrative    CT CERVICAL SPINE WITHOUT CONTRAST   5/12/2021 8:14 PM     HISTORY: Neck trauma, midline tenderness (Age 16-64y); fall.     TECHNIQUE: Axial images of the cervical spine were obtained without  intravenous contrast. Multiplanar reformations were performed.  Radiation dose for this scan was reduced using automated exposure  control, adjustment of the mA and/or kV according to patient size, or  iterative reconstruction technique.     COMPARISON: None.    FINDINGS: Sagittal reconstructions demonstrate normal posterior  alignment. There is no evidence for any acute fracture. Mild to  moderate multilevel  degenerative disc and facet disease is noted.  There is no high-grade stenosis. Soft tissues unremarkable as  visualized.      Impression    IMPRESSION: Degenerative changes. No evidence for fracture or  posterior malalignment.    SAHRA HUSTON MD       Medications - No data to display    Assessments & Plan (with Medical Decision Making)  51-year-old to the ER secondary concerns of tripping in his barn causing him to fall landing on his face and head.  Patient concerned about head and neck pain following his fall.  Patient without loss of consciousness associated with the fall.  Patient with exam findings of a small abrasion to his right upper lip and tenderness to his nasal bridge as well as headache symptoms.  Patient is on warfarin therapy so concern for possible intracranial bleed entertained.  Patient's INR was therapeutic at 2.7.  Imaging studies as noted above.  These are reassuring.  Patient symptoms improved during ER stay.  Patient without worsening of symptoms with period of observation during the ER setting.  Patient discharged home in the care of his significant other.  Discussed possibility of concussion.  Patient was given information both verbally and in written form/handout form on head injury and concussion and his family was asked to read and follow the instructions and to return to the ER for increase or worsening symptoms as directed on the handouts.  We also discussed follow-up in the clinic.  We also discussed use of ice therapy gentle stretching for the neck strain.  We also discussed workability and patient states he currently has an issue associated with his low back pain and is on light work duties.     I have reviewed the nursing notes.    I have reviewed the findings, diagnosis, plan and need for follow up with the patient.       Discharge Medication List as of 5/12/2021  8:58 PM      START taking these medications    Details   acetaminophen (TYLENOL) 500 MG tablet Take 1-2 tablets  (500-1,000 mg) by mouth every 6 hours as needed for pain, R-0, OTC                  I verbally discussed the findings of the evaluation today in the ER. I have verbally discussed with Hollis the suggested treatment(s) as described in the discharge instructions and handouts. I have prescribed the above listed medications and instructed him on appropriate use of these medications.      I have verbally suggested he follow-up in his clinic or return to the ER for increased symptoms. See the follow-up recommendations documented  in the after visit summary in this visit's EPIC chart.      Final diagnoses:   Fall, initial encounter   Acute post-traumatic headache, not intractable   Strain of neck muscle, initial encounter       5/12/2021   [facility] EMERGENCY DEPT     Fercho Rojas,   05/13/21 [id number]

## 2021-05-13 NOTE — TELEPHONE ENCOUNTER
oxyCODONE-acetaminophen (PERCOCET)  MG per tablet       Last Written Prescription Date:  5/6/21  Last Fill Quantity: 30,   # refills: 0  Last Office Visit: 4/26/21  Future Office visit:       Routing refill request to provider for review/approval because:  Drug not on the [facility], P or [facility] refill protocol or controlled substance

## 2021-05-13 NOTE — TELEPHONE ENCOUNTER
Patient has seen Dr. Long in place of Dr. Cash. Please forward request to him.     Prosper Hudson PA-C on 5/13/2021 at 1:59 PM

## 2021-05-13 NOTE — ED TRIAGE NOTES
Patient was walking and tripped on a step hitting head. Patient had no LOC ,but head is hurting at this time.

## 2021-05-14 ENCOUNTER — MYC REFILL (OUTPATIENT)
Dept: FAMILY MEDICINE | Facility: CLINIC | Age: 51
End: 2021-05-14

## 2021-05-14 DIAGNOSIS — M54.16 LUMBAR RADICULOPATHY: ICD-10-CM

## 2021-05-14 DIAGNOSIS — F11.90 CHRONIC, CONTINUOUS USE OF OPIOIDS: ICD-10-CM

## 2021-05-14 RX ORDER — OXYCODONE AND ACETAMINOPHEN 10; 325 MG/1; MG/1
1 TABLET ORAL EVERY 6 HOURS PRN
Qty: 30 TABLET | Refills: 0 | Status: SHIPPED | OUTPATIENT
Start: 2021-05-14 | End: 2021-05-20

## 2021-05-14 NOTE — TELEPHONE ENCOUNTER
Requested Prescriptions   Pending Prescriptions Disp Refills     oxyCODONE-acetaminophen (PERCOCET)  MG per tablet 30 tablet 0     Sig: Take 1 tablet by mouth every 6 hours as needed for severe pain     Last Written Prescription Date:  05/06/2021  Last Fill Quantity: 30,   # refills: 0  Last Office Visit: 05/10/2021  Future Office visit:       Routing refill request to provider for review/approval because:  Drug not on the [facility], P or [facility] refill protocol or controlled substance    Etta Talley MA

## 2021-05-19 ENCOUNTER — TRANSFERRED RECORDS (OUTPATIENT)
Dept: HEALTH INFORMATION MANAGEMENT | Facility: CLINIC | Age: 51
End: 2021-05-19

## 2021-05-19 LAB — PHQ9 SCORE: 16

## 2021-05-20 ENCOUNTER — MYC REFILL (OUTPATIENT)
Dept: FAMILY MEDICINE | Facility: CLINIC | Age: 51
End: 2021-05-20

## 2021-05-20 DIAGNOSIS — F11.90 CHRONIC, CONTINUOUS USE OF OPIOIDS: ICD-10-CM

## 2021-05-20 DIAGNOSIS — M54.16 LUMBAR RADICULOPATHY: ICD-10-CM

## 2021-05-20 NOTE — TELEPHONE ENCOUNTER
Percocet       Last Written Prescription Date:  5/14/2021  Last Fill Quantity: 30,   # refills: 0  Last Office Visit: 4/26/2021  Future Office visit:    Next 5 appointments (look out 90 days)    Jose Luis 15, 2021  8:30 AM  Christopher Austin with Ozzy Long MD  [facility] ([facility] ) [address] [place] [id number] [phone]           Routing refill request to provider for review/approval because:  Drug not on the [facility], [facility] or [facility] refill protocol or controlled substance

## 2021-05-21 ENCOUNTER — TELEPHONE (OUTPATIENT)
Dept: FAMILY MEDICINE | Facility: CLINIC | Age: 51
End: 2021-05-21

## 2021-05-21 DIAGNOSIS — Z79.01 LONG TERM CURRENT USE OF ANTICOAGULANT THERAPY: Primary | ICD-10-CM

## 2021-05-21 DIAGNOSIS — Z86.718 HISTORY OF DEEP VENOUS THROMBOSIS: ICD-10-CM

## 2021-05-21 DIAGNOSIS — D68.61 ANTIPHOSPHOLIPID SYNDROME (H): ICD-10-CM

## 2021-05-21 RX ORDER — OXYCODONE AND ACETAMINOPHEN 10; 325 MG/1; MG/1
1 TABLET ORAL EVERY 6 HOURS PRN
Qty: 30 TABLET | Refills: 0 | Status: SHIPPED | OUTPATIENT
Start: 2021-05-21 | End: 2021-05-28

## 2021-05-21 NOTE — TELEPHONE ENCOUNTER
[facility]-PROCEDURAL ANTICOAGULATION  MANAGEMENT    Assessment     Warfarin interruption plan for colonoscopy on 2021.    DVT  Lupus anticoagulant       Risk stratification for thromboembolism: high (2012 Chest guidelines)      LAC + 2015 & 2016    VTE: 2018 American Society of Hematology recommends against bridging in low and moderate risk VTE patients holding warfarin    Plan       Pre-Procedure:    Hold warfarin until after procedure startin2021     Lovenox 120 mg subq Q 12 hrs (1 mg/kg Q 12 hr for CrCl 95ml/min)     Start Lovenox: 2021    Last dose of Lovenox prior to procedure: 2021 AM       Post-Procedure:    Resume home warfarin dose if okay with provider doing procedure on night of procedure, 15mg PM: 2021    Resume Lovenox ~ 24 hrs post procedure when okay with provider doing procedure. Continue until INR >= 2.3    Recheck INR ~5 days after resuming warfarin   ?   Teresita Cash [facility]    Subjective/Objective:      Hollis Diggs, a 51 year old male    Reason for Anticoagulation: DVT and Lupus anticoagulant    Goal INR Range: 2.0-3.0    Patient bridged in past: Yes: 2021 for proceure    Pertinent History: N/A    Wt Readings from Last 3 Encounters:   05/10/21 114.8 kg (253 lb)   21 114.9 kg (253 lb 4.8 oz)   21 113.1 kg (249 lb 4.8 oz)        Ideal body weight: 77.6 kg (171 lb 1.2 oz)  Adjusted ideal body weight: 92.5 kg (203 lb 13.5 oz)     Lab Results   Component Value Date    INR 2.70 (H) 2021    INR 3.5 (H) 2021    INR 2.7 (H) 2021     Lab Results   Component Value Date    HGB 11.5 2021    HCT 38.7 2021     2021     Lab Results   Component Value Date    CR 1.18 2021    CR 1.05 2021    CR 1.05 2020     Estimated Creatinine Clearance: 95.8 mL/min (based on SCr of 1.18 mg/dL).

## 2021-05-21 NOTE — TELEPHONE ENCOUNTER
Pt notified of bridging plan below. Patient verbalized understanding and agrees with plan of care. Pt had no further questions or concerns at this time. He will set up his next INR visit for 6/1. Jesse Flores RN, BSN  [facility] Anticoagulation Team

## 2021-05-21 NOTE — TELEPHONE ENCOUNTER
Reason for Call:  Bridging for colonoscopy on 5/27/21    Who is calling?  Patient    Phone number:  [phone]    Name of caller: [facility] in [place]     Can we leave a detailed message on this number? YES    Phone number patient can be reached at: Home number on file [phone] (home)      Call taken on 5/21/2021 at 2:00 PM by Liane Mann

## 2021-05-25 ENCOUNTER — IMMUNIZATION (OUTPATIENT)
Dept: FAMILY MEDICINE | Facility: CLINIC | Age: 51
End: 2021-05-25
Attending: FAMILY MEDICINE
Payer: COMMERCIAL

## 2021-05-25 ENCOUNTER — TELEPHONE (OUTPATIENT)
Dept: FAMILY MEDICINE | Facility: CLINIC | Age: 51
End: 2021-05-25

## 2021-05-25 DIAGNOSIS — R05.9 COUGH: ICD-10-CM

## 2021-05-25 PROCEDURE — 0012A PR COVID VAC MODERNA 100 MCG/0.5 ML IM: CPT

## 2021-05-25 PROCEDURE — 91301 PR COVID VAC MODERNA 100 MCG/0.5 ML IM: CPT

## 2021-05-25 NOTE — TELEPHONE ENCOUNTER
Central Prior Authorization Team   Phone: [phone]      PA NOT NEEDED    Medication: oxyCODONE-acetaminophen (PERCOCET)  MG per tablet-PA NOT NEEDED  Insurance Company: [employer] - Phone [phone] Fax [phone]  Pharmacy Filling the Rx: [facility] - [place], [place] - [address]  Filling Pharmacy Phone: [phone]  Filling Pharmacy Fax:    Start Date: 5/25/2021    Called pharmacy to see what rejection they are getting since [employer] no longer does PA's for short acting opioids.  Per pharmacy, PA is not needed, medication was processed.

## 2021-05-25 NOTE — TELEPHONE ENCOUNTER
Requested Prescriptions   Pending Prescriptions Disp Refills     gabapentin (NEURONTIN) 300 MG capsule [Pharmacy Med Name: GABAPENTIN 300MG CAPS] 30 capsule 0     Sig: TAKE ONE CAPSULE BY MOUTH AT BEDTIME AS NEEDED FOR COUGH       Last Written Prescription Date:  04/21/2021  Last Fill Quantity: 30,   # refills: 0  Last Office Visit: 05/10/2021  Future Office visit:    Next 5 appointments (look out 90 days)    Jose Luis 15, 2021  8:30 AM  Christopher Austin with Ozzy Long MD  [facility] ([facility] ) [address] [place] [id number] [phone]           Routing refill request to provider for review/approval because:  Drug not on the [facility], P or [facility] refill protocol or controlled substance    Routing to covering provider to sign.     Etta Talley MA

## 2021-05-25 NOTE — TELEPHONE ENCOUNTER
Prior Authorization Retail Medication Request- [id number]    Medication/Dose: oxyCODONE-acetaminophen (PERCOCET)  MG per tablet  ICD code (if different than what is on RX):    Previously Tried and Failed:    Rationale:      Insurance Name:  [employer]  Insurance ID:  [id number]       Pharmacy Information (if different than what is on RX)  Name:    Phone:

## 2021-05-26 RX ORDER — GABAPENTIN 300 MG/1
CAPSULE ORAL
Qty: 30 CAPSULE | Refills: 0 | Status: SHIPPED | OUTPATIENT
Start: 2021-05-26 | End: 2021-06-15

## 2021-05-27 ENCOUNTER — HOSPITAL ENCOUNTER (OUTPATIENT)
Facility: AMBULATORY SURGERY CENTER | Age: 51
Discharge: HOME | End: 2021-05-27
Attending: INTERNAL MEDICINE | Admitting: INTERNAL MEDICINE
Payer: COMMERCIAL

## 2021-05-27 VITALS
DIASTOLIC BLOOD PRESSURE: 94 MMHG | HEART RATE: 67 BPM | RESPIRATION RATE: 16 BRPM | TEMPERATURE: 97.6 F | OXYGEN SATURATION: 97 % | SYSTOLIC BLOOD PRESSURE: 132 MMHG

## 2021-05-27 DIAGNOSIS — K44.9 HIATAL HERNIA: Primary | ICD-10-CM

## 2021-05-27 LAB — UPPER GI ENDOSCOPY: NORMAL

## 2021-05-27 PROCEDURE — G8907 PT DOC NO EVENTS ON DISCHARG: HCPCS

## 2021-05-27 PROCEDURE — 88305 TISSUE EXAM BY PATHOLOGIST: CPT | Performed by: PATHOLOGY

## 2021-05-27 PROCEDURE — 43239 EGD BIOPSY SINGLE/MULTIPLE: CPT

## 2021-05-27 PROCEDURE — G8918 PT W/O PREOP ORDER IV AB PRO: HCPCS

## 2021-05-27 RX ORDER — NALOXONE HYDROCHLORIDE 0.4 MG/ML
0.2 INJECTION, SOLUTION INTRAMUSCULAR; INTRAVENOUS; SUBCUTANEOUS
Status: DISCONTINUED | OUTPATIENT
Start: 2021-05-27 | End: 2021-05-28 | Stop reason: HOSPADM

## 2021-05-27 RX ORDER — ONDANSETRON 4 MG/1
4 TABLET, ORALLY DISINTEGRATING ORAL EVERY 6 HOURS PRN
Status: DISCONTINUED | OUTPATIENT
Start: 2021-05-27 | End: 2021-05-28 | Stop reason: HOSPADM

## 2021-05-27 RX ORDER — LIDOCAINE 40 MG/G
CREAM TOPICAL
Status: DISCONTINUED | OUTPATIENT
Start: 2021-05-27 | End: 2021-05-28 | Stop reason: HOSPADM

## 2021-05-27 RX ORDER — ONDANSETRON 2 MG/ML
4 INJECTION INTRAMUSCULAR; INTRAVENOUS EVERY 6 HOURS PRN
Status: DISCONTINUED | OUTPATIENT
Start: 2021-05-27 | End: 2021-05-28 | Stop reason: HOSPADM

## 2021-05-27 RX ORDER — ONDANSETRON 2 MG/ML
4 INJECTION INTRAMUSCULAR; INTRAVENOUS
Status: DISCONTINUED | OUTPATIENT
Start: 2021-05-27 | End: 2021-05-28 | Stop reason: HOSPADM

## 2021-05-27 RX ORDER — NALOXONE HYDROCHLORIDE 0.4 MG/ML
0.4 INJECTION, SOLUTION INTRAMUSCULAR; INTRAVENOUS; SUBCUTANEOUS
Status: DISCONTINUED | OUTPATIENT
Start: 2021-05-27 | End: 2021-05-28 | Stop reason: HOSPADM

## 2021-05-27 RX ORDER — FENTANYL CITRATE 50 UG/ML
INJECTION, SOLUTION INTRAMUSCULAR; INTRAVENOUS PRN
Status: DISCONTINUED | OUTPATIENT
Start: 2021-05-27 | End: 2021-05-27 | Stop reason: HOSPADM

## 2021-05-27 RX ORDER — PROCHLORPERAZINE MALEATE 10 MG
10 TABLET ORAL EVERY 6 HOURS PRN
Status: DISCONTINUED | OUTPATIENT
Start: 2021-05-27 | End: 2021-05-28 | Stop reason: HOSPADM

## 2021-05-27 RX ORDER — FLUMAZENIL 0.1 MG/ML
0.2 INJECTION, SOLUTION INTRAVENOUS
Status: ACTIVE | OUTPATIENT
Start: 2021-05-27 | End: 2021-05-27

## 2021-05-28 ENCOUNTER — MYC REFILL (OUTPATIENT)
Dept: FAMILY MEDICINE | Facility: CLINIC | Age: 51
End: 2021-05-28

## 2021-05-28 DIAGNOSIS — F11.90 CHRONIC, CONTINUOUS USE OF OPIOIDS: ICD-10-CM

## 2021-05-28 DIAGNOSIS — M54.16 LUMBAR RADICULOPATHY: ICD-10-CM

## 2021-05-28 RX ORDER — OXYCODONE AND ACETAMINOPHEN 10; 325 MG/1; MG/1
1 TABLET ORAL EVERY 6 HOURS PRN
Qty: 15 TABLET | Refills: 0 | Status: SHIPPED | OUTPATIENT
Start: 2021-05-28 | End: 2021-06-04

## 2021-05-28 NOTE — TELEPHONE ENCOUNTER
Percocet       Last Written Prescription Date:  5/21/2021  Last Fill Quantity: 30,   # refills: 0  Last Office Visit: 4/26/2021  Future Office visit:    Next 5 appointments (look out 90 days)    Jose Luis 15, 2021  8:30 AM  Christopher Austin with Ozzy Long MD  [facility] ([facility] ) [address] [place] [address] [id number] [phone]           Routing refill request to provider for review/approval because:  Drug not on the [facility], [facility] or [facility] refill protocol or controlled substance

## 2021-05-31 ENCOUNTER — ANTICOAGULATION THERAPY VISIT (OUTPATIENT)
Dept: ANTICOAGULATION | Facility: CLINIC | Age: 51
End: 2021-05-31

## 2021-05-31 DIAGNOSIS — I82.4Y9 DEEP VEIN THROMBOSIS (DVT) OF PROXIMAL LOWER EXTREMITY, UNSPECIFIED CHRONICITY, UNSPECIFIED LATERALITY (H): ICD-10-CM

## 2021-05-31 DIAGNOSIS — D68.61 ANTIPHOSPHOLIPID SYNDROME (H): ICD-10-CM

## 2021-05-31 DIAGNOSIS — Z79.01 LONG TERM CURRENT USE OF ANTICOAGULANT THERAPY: ICD-10-CM

## 2021-05-31 DIAGNOSIS — I82.409 DVT (DEEP VENOUS THROMBOSIS) (H): ICD-10-CM

## 2021-05-31 DIAGNOSIS — Z86.718 HISTORY OF DEEP VENOUS THROMBOSIS: ICD-10-CM

## 2021-05-31 LAB
CAPILLARY BLOOD COLLECTION: NORMAL
INR BLD: 1.6 (ref 0.86–1.14)

## 2021-05-31 PROCEDURE — 85610 PROTHROMBIN TIME: CPT | Performed by: FAMILY MEDICINE

## 2021-05-31 PROCEDURE — 36416 COLLJ CAPILLARY BLOOD SPEC: CPT | Performed by: FAMILY MEDICINE

## 2021-06-01 LAB — COPATH REPORT: NORMAL

## 2021-06-01 NOTE — PROGRESS NOTES
Anticoagulation Management    Unable to reach pt today.    Today's INR result of 1.6 is subtherapeutic (goal INR of 2.0-3.0).  Result received from: Clinic Lab    Follow up required to discuss dosing instructions and confirm understanding of instructions    LM I have attempted to contact this patient by phone, left message to return call to the coumadin clinic.  Will try again later.  See calendar- on lovenox.      Anticoagulation clinic to follow up    Анна Hale RN

## 2021-06-01 NOTE — PROGRESS NOTES
ANTICOAGULATION MANAGEMENT     Patient Name:  Hollis Diggs  Date:  2021    ASSESSMENT /SUBJECTIVE:    Today's INR result of 1.6 is subtherapeutic. Goal INR of 2.0-3.0      Warfarin dose taken: Warfarin taken as instructed and Warfarin recently held for procedure  which may be affecting INR Bridging with lovenox    Diet: No new diet changes affecting INR    Medication changes/ interactions: No new medications/supplements affecting INR    Previous INR: Therapeutic     S/S of bleeding or thromboembolism: No    New injury or illness: No    Upcoming surgery, procedure or cardioversion: No    Additional findings: None      PLAN:    Warfarin Dosing Instructions: Continue your current warfarin dose 5 mg Mon, Fri and 7.5 mg all other days, continue with lovenox (additional sent to pharm)    Instructed patient to follow up no later than: 3 days  Lab visit scheduled    Education provided: None required    Telephone call with Hollis whom verbalizes understanding and agrees to plan    Instructed to call the Anticoagulation Clinic for any changes, questions or concerns. (#[phone])        Анна Hale RN      OBJECTIVE:  Recent labs: (last 7 days)     21  0918   INR 1.6*         No question data found.  Anticoagulation Summary  As of 2021    INR goal:  2.0-3.0   TTR:  31.2 % (10.3 mo)   INR used for dosin.6 (2021)   Warfarin maintenance plan:  5 mg (5 mg x 1) every Mon, Fri; 7.5 mg (5 mg x 1.5) all other days   Full warfarin instructions:  5 mg every Mon, Fri; 7.5 mg all other days   Weekly warfarin total:  47.5 mg   Plan last modified:  Анна Hale RN (2021)   Next INR check:  6/3/2021   Priority:  Maintenance   Target end date:  Indefinite    Indications    DVT (deep venous thrombosis) (H) (Resolved) [I82.409]  Long-term (current) use of anticoagulants [Z79.01] [Z79.01]  Deep vein thrombosis (DVT) of proximal lower extremity  unspecified chronicity  unspecified laterality (H)  [I82.4Y9]             Anticoagulation Episode Summary     INR check location:      Preferred lab:      Send INR reminders to:  [facility]    Comments:  5 mg, dosing card, PM dose      Anticoagulation Care Providers     Provider Role Specialty Phone number    Sylvester Cash MD Referring [facility] [phone]

## 2021-06-02 ENCOUNTER — MYC REFILL (OUTPATIENT)
Dept: FAMILY MEDICINE | Facility: CLINIC | Age: 51
End: 2021-06-02

## 2021-06-02 DIAGNOSIS — M54.16 LUMBAR RADICULOPATHY: ICD-10-CM

## 2021-06-02 DIAGNOSIS — F11.90 CHRONIC, CONTINUOUS USE OF OPIOIDS: ICD-10-CM

## 2021-06-02 NOTE — TELEPHONE ENCOUNTER
Requested Prescriptions   Pending Prescriptions Disp Refills     oxyCODONE-acetaminophen (PERCOCET)  MG per tablet 15 tablet 0     Sig: Take 1 tablet by mouth every 6 hours as needed for severe pain     Last Written Prescription Date:  05/28/2021  Last Fill Quantity: 15,   # refills: 0  Last Office Visit: 05/10/2021  Future Office visit:    Next 5 appointments (look out 90 days)    Jose Luis 15, 2021  8:30 AM  Christopher Austin with Ozzy Long MD  [facility] ([facility] ) [address] [place] [address] [id number] [phone]           Routing refill request to provider for review/approval because:  Drug not on the [facility], [facility] or [facility] refill protocol or controlled substance    Routing to covering provider to sign.   Etta Talley MA

## 2021-06-03 ENCOUNTER — ANTICOAGULATION THERAPY VISIT (OUTPATIENT)
Dept: ANTICOAGULATION | Facility: CLINIC | Age: 51
End: 2021-06-03

## 2021-06-03 ENCOUNTER — MYC MEDICAL ADVICE (OUTPATIENT)
Dept: INTERNAL MEDICINE | Facility: CLINIC | Age: 51
End: 2021-06-03

## 2021-06-03 DIAGNOSIS — Z79.01 LONG TERM CURRENT USE OF ANTICOAGULANT THERAPY: ICD-10-CM

## 2021-06-03 DIAGNOSIS — I82.409 DVT (DEEP VENOUS THROMBOSIS) (H): ICD-10-CM

## 2021-06-03 DIAGNOSIS — I82.4Y9 DEEP VEIN THROMBOSIS (DVT) OF PROXIMAL LOWER EXTREMITY, UNSPECIFIED CHRONICITY, UNSPECIFIED LATERALITY (H): ICD-10-CM

## 2021-06-03 DIAGNOSIS — M54.16 LUMBAR RADICULOPATHY: ICD-10-CM

## 2021-06-03 DIAGNOSIS — F11.90 CHRONIC, CONTINUOUS USE OF OPIOIDS: ICD-10-CM

## 2021-06-03 LAB
CAPILLARY BLOOD COLLECTION: NORMAL
INR BLD: 1.7 (ref 0.86–1.14)

## 2021-06-03 PROCEDURE — 85610 PROTHROMBIN TIME: CPT | Performed by: FAMILY MEDICINE

## 2021-06-03 PROCEDURE — 36416 COLLJ CAPILLARY BLOOD SPEC: CPT | Performed by: FAMILY MEDICINE

## 2021-06-03 RX ORDER — OXYCODONE AND ACETAMINOPHEN 10; 325 MG/1; MG/1
1 TABLET ORAL EVERY 6 HOURS PRN
Qty: 15 TABLET | Refills: 0 | OUTPATIENT
Start: 2021-06-03

## 2021-06-03 NOTE — PROGRESS NOTES
ANTICOAGULATION MANAGEMENT     Patient Name:  Hollis Diggs  Date:  6/3/2021    ASSESSMENT /SUBJECTIVE:    Today's INR result of 1.7 is subtherapeutic. Goal INR of 2.0-3.0      Warfarin dose taken: Pt held for a procedure last week- will continue lovenox    Diet: No new diet changes affecting INR    Medication changes/ interactions: No new medications/supplements affecting INR    Previous INR: Subtherapeutic     S/S of bleeding or thromboembolism: No    New injury or illness: No    Upcoming surgery, procedure or cardioversion: No    Additional findings: None      PLAN:    Warfarin Dosing Instructions: Take 10 mg today then continue your current warfarin dose of 5 mg MF and 7.5 mg all other days    Instructed patient to follow up no later than: 4 days    Lab visit scheduled    Education provided: Target INR goal and significance of current INR result and Importance of therapeutic range    Telephone call with Hollis whom verbalizes understanding and agrees to plan    Instructed to call the Anticoagulation Clinic for any changes, questions or concerns. (#[phone])        Jesse Flores RN      OBJECTIVE:  Recent labs: (last 7 days)     21  0918 21  1612   INR 1.6* 1.7*         No question data found.  Anticoagulation Summary  As of 6/3/2021    INR goal:  2.0-3.0   TTR:  30.8 % (10.5 mo)   INR used for dosin.7 (6/3/2021)   Warfarin maintenance plan:  5 mg (5 mg x 1) every Mon, Fri; 7.5 mg (5 mg x 1.5) all other days   Full warfarin instructions:  6/3: 10 mg; Otherwise 5 mg every Mon, Fri; 7.5 mg all other days   Weekly warfarin total:  47.5 mg   Plan last modified:  Анна Hale, RN (2021)   Next INR check:  2021   Priority:  Maintenance   Target end date:  Indefinite    Indications    DVT (deep venous thrombosis) (H) (Resolved) [I82.409]  Long-term (current) use of anticoagulants [Z79.01] [Z79.01]  Deep vein thrombosis (DVT) of proximal lower extremity  unspecified  chronicity  unspecified laterality (H) [I82.4Y9]             Anticoagulation Episode Summary     INR check location:      Preferred lab:      Send INR reminders to:  [facility]    Comments:  5 mg, dosing card, PM dose      Anticoagulation Care Providers     Provider Role Specialty Phone number    Sylvester Cash MD [facility] [phone]

## 2021-06-04 RX ORDER — OXYCODONE AND ACETAMINOPHEN 10; 325 MG/1; MG/1
1 TABLET ORAL EVERY 6 HOURS PRN
Qty: 30 TABLET | Refills: 0 | Status: SHIPPED | OUTPATIENT
Start: 2021-06-04 | End: 2021-06-15

## 2021-06-04 NOTE — TELEPHONE ENCOUNTER
Patient states he takes 3 percocet a day and needs a refill. See [employer] messages below.     Percocet      Last Written Prescription Date:  5/28/2021  Last Fill Quantity: 15,   # refills: 0  Last Office Visit: 5/10/2021  Future Office visit:    Next 5 appointments (look out 90 days)    Jose Luis 15, 2021  8:30 AM  Christopher Austin with Ozzy Long MD  [facility] ([facility] ) [address] [place] [address] [phone]           Routing refill request to provider for review/approval because:  Drug not on the [facility], P or [facility] refill protocol or controlled substance

## 2021-06-04 NOTE — TELEPHONE ENCOUNTER
Tried to reach patient, left message for patient to call the back. If patient calls back, please relay providers message below.    Brown Paris, CMA

## 2021-06-04 NOTE — TELEPHONE ENCOUNTER
Please call him and let him know I did give him 30 pills he can take a max of 3 a day.  I would like him to try to reduce down to 2 a day.  He may have back pain but should be reducing the narcotic use.  To continue this he may have to have a visit and discuss a plan for his back care.

## 2021-06-07 ENCOUNTER — ANTICOAGULATION THERAPY VISIT (OUTPATIENT)
Dept: ANTICOAGULATION | Facility: CLINIC | Age: 51
End: 2021-06-07

## 2021-06-07 DIAGNOSIS — I82.4Y9 DEEP VEIN THROMBOSIS (DVT) OF PROXIMAL LOWER EXTREMITY, UNSPECIFIED CHRONICITY, UNSPECIFIED LATERALITY (H): ICD-10-CM

## 2021-06-07 DIAGNOSIS — I82.409 DVT (DEEP VENOUS THROMBOSIS) (H): ICD-10-CM

## 2021-06-07 DIAGNOSIS — Z79.01 LONG TERM CURRENT USE OF ANTICOAGULANT THERAPY: ICD-10-CM

## 2021-06-07 LAB
CAPILLARY BLOOD COLLECTION: NORMAL
INR BLD: 2.2 (ref 0.86–1.14)

## 2021-06-07 PROCEDURE — 85610 PROTHROMBIN TIME: CPT | Performed by: FAMILY MEDICINE

## 2021-06-07 PROCEDURE — 36416 COLLJ CAPILLARY BLOOD SPEC: CPT | Performed by: FAMILY MEDICINE

## 2021-06-07 NOTE — PROGRESS NOTES
ANTICOAGULATION MANAGEMENT     Patient Name:  Hollis Diggs  Date:  2021    ASSESSMENT /SUBJECTIVE:    Today's INR result of 2.2 is therapeutic. Goal INR of 2.0-3.0      Warfarin dose taken: LM    Diet: LM    Medication changes/ interactions: LM    Previous INR: Subtherapeutic     S/S of bleeding or thromboembolism: No    New injury or illness: No    Upcoming surgery, procedure or cardioversion: No    Additional findings: None      PLAN:    Warfarin Dosing Instructions: Change your warfarin dose to 5 mg Fri and 7.5 mg all other days  . (5.3 % change) ok to stop lovenox    Instructed patient to follow up no later than: 1 week  Left detailed message with recommended recheck date    Education provided: Please call back if any changes to your diet, medications or how you've been taking warfarin    Detailed voice message left for Hollis with dosing instructions and follow up date.     Instructed to call the Anticoagulation Clinic for any changes, questions or concerns. (#[phone])        Анна Hale RN      OBJECTIVE:  Recent labs: (last 7 days)     21  1612 21  1634   INR 1.7* 2.2*         INR assessment THER    Recheck INR In: 1 WEEK    INR Location Outside lab      Anticoagulation Summary  As of 2021    INR goal:  2.0-3.0   TTR:  31.0 % (10.6 mo)   INR used for dosin.2 (2021)   Warfarin maintenance plan:  5 mg (5 mg x 1) every Mon; 7.5 mg (5 mg x 1.5) all other days   Full warfarin instructions:  5 mg every Mon; 7.5 mg all other days   Weekly warfarin total:  50 mg   Plan last modified:  Анна Hale, RN (2021)   Next INR check:  2021   Priority:  Maintenance   Target end date:  Indefinite    Indications    DVT (deep venous thrombosis) (H) (Resolved) [I82.409]  Long-term (current) use of anticoagulants [Z79.01] [Z79.01]  Deep vein thrombosis (DVT) of proximal lower extremity  unspecified chronicity  unspecified laterality (H) [I82.4Y9]             Anticoagulation  Episode Summary     INR check location:      Preferred lab:      Send INR reminders to:  [facility]    Comments:  5 mg, dosing card, PM dose      Anticoagulation Care Providers     Provider Role Specialty Phone number    Sylvester Cash MD Referring [facility] [phone]

## 2021-06-15 ENCOUNTER — OFFICE VISIT (OUTPATIENT)
Dept: INTERNAL MEDICINE | Facility: CLINIC | Age: 51
End: 2021-06-15
Payer: COMMERCIAL

## 2021-06-15 ENCOUNTER — ANTICOAGULATION THERAPY VISIT (OUTPATIENT)
Dept: ANTICOAGULATION | Facility: CLINIC | Age: 51
End: 2021-06-15

## 2021-06-15 VITALS
OXYGEN SATURATION: 98 % | HEIGHT: 72 IN | SYSTOLIC BLOOD PRESSURE: 124 MMHG | DIASTOLIC BLOOD PRESSURE: 74 MMHG | BODY MASS INDEX: 33.59 KG/M2 | RESPIRATION RATE: 16 BRPM | HEART RATE: 102 BPM | TEMPERATURE: 97.7 F | WEIGHT: 248 LBS

## 2021-06-15 DIAGNOSIS — F11.90 CHRONIC, CONTINUOUS USE OF OPIOIDS: ICD-10-CM

## 2021-06-15 DIAGNOSIS — M54.16 LUMBAR RADICULOPATHY: ICD-10-CM

## 2021-06-15 DIAGNOSIS — Z79.01 LONG TERM CURRENT USE OF ANTICOAGULANT THERAPY: ICD-10-CM

## 2021-06-15 DIAGNOSIS — I82.4Y9 DEEP VEIN THROMBOSIS (DVT) OF PROXIMAL LOWER EXTREMITY, UNSPECIFIED CHRONICITY, UNSPECIFIED LATERALITY (H): ICD-10-CM

## 2021-06-15 DIAGNOSIS — R00.0 TACHYCARDIA: ICD-10-CM

## 2021-06-15 DIAGNOSIS — D68.61 ANTIPHOSPHOLIPID SYNDROME (H): ICD-10-CM

## 2021-06-15 DIAGNOSIS — F41.1 ANXIETY STATE: ICD-10-CM

## 2021-06-15 DIAGNOSIS — M06.00 SERONEGATIVE RHEUMATOID ARTHRITIS (H): Primary | ICD-10-CM

## 2021-06-15 LAB
CAPILLARY BLOOD COLLECTION: NORMAL
INR BLD: 1.9 (ref 0.86–1.14)

## 2021-06-15 PROCEDURE — 99214 OFFICE O/P EST MOD 30 MIN: CPT | Performed by: INTERNAL MEDICINE

## 2021-06-15 PROCEDURE — 36416 COLLJ CAPILLARY BLOOD SPEC: CPT | Performed by: INTERNAL MEDICINE

## 2021-06-15 PROCEDURE — 85610 PROTHROMBIN TIME: CPT | Performed by: INTERNAL MEDICINE

## 2021-06-15 PROCEDURE — 93000 ELECTROCARDIOGRAM COMPLETE: CPT | Performed by: INTERNAL MEDICINE

## 2021-06-15 RX ORDER — ARIPIPRAZOLE 10 MG/1
10 TABLET ORAL DAILY
COMMUNITY
End: 2021-11-15

## 2021-06-15 RX ORDER — OXYCODONE AND ACETAMINOPHEN 10; 325 MG/1; MG/1
1 TABLET ORAL EVERY 6 HOURS PRN
Qty: 30 TABLET | Refills: 0 | Status: SHIPPED | OUTPATIENT
Start: 2021-06-15 | End: 2021-06-29

## 2021-06-15 ASSESSMENT — ANXIETY QUESTIONNAIRES
1. FEELING NERVOUS, ANXIOUS, OR ON EDGE: NEARLY EVERY DAY
3. WORRYING TOO MUCH ABOUT DIFFERENT THINGS: NEARLY EVERY DAY
7. FEELING AFRAID AS IF SOMETHING AWFUL MIGHT HAPPEN: NEARLY EVERY DAY
6. BECOMING EASILY ANNOYED OR IRRITABLE: NEARLY EVERY DAY
5. BEING SO RESTLESS THAT IT IS HARD TO SIT STILL: NEARLY EVERY DAY
GAD7 TOTAL SCORE: 21
IF YOU CHECKED OFF ANY PROBLEMS ON THIS QUESTIONNAIRE, HOW DIFFICULT HAVE THESE PROBLEMS MADE IT FOR YOU TO DO YOUR WORK, TAKE CARE OF THINGS AT HOME, OR GET ALONG WITH OTHER PEOPLE: EXTREMELY DIFFICULT
2. NOT BEING ABLE TO STOP OR CONTROL WORRYING: NEARLY EVERY DAY

## 2021-06-15 ASSESSMENT — PATIENT HEALTH QUESTIONNAIRE - PHQ9: 5. POOR APPETITE OR OVEREATING: NEARLY EVERY DAY

## 2021-06-15 ASSESSMENT — MIFFLIN-ST. JEOR: SCORE: 2012.92

## 2021-06-15 ASSESSMENT — PAIN SCALES - GENERAL: PAINLEVEL: SEVERE PAIN (7)

## 2021-06-15 NOTE — PROGRESS NOTES
Assessment & Plan     Tachycardia  Patient with some tachycardia episodes at home palpitations about once a week.  He does drink 6-8 Mountain Dew's a day we discussed that caffeine will make him have tachycardia.  His rhythm appears normal when I listen to him in the 80s.  We will check an EKG.  - EKG 12-lead complete w/read - Clinics    Seronegative rheumatoid arthritis (H)  History of seronegative rheumatoid arthritis she is to see rheumatology will refer him back there.  - Rheumatology Referral; Future    Lumbar radiculopathy  Chronic lumbar back pain he has had injections.  He certainly has 2 disc seen on his MRI in March.  Recommended cutting back on his narcotics and if he needs to have surgery to have surgery to help with back.  - oxyCODONE-acetaminophen (PERCOCET)  MG per tablet; Take 1 tablet by mouth every 6 hours as needed for severe pain Max of 2 a day, needs to last 15 days.    Chronic, continuous use of opioids  Chronic narcotic use he was taking 3 of the 10 mg Percocet daily.  Or can reduce this down to 2 daily and I have given him 30 pills the last 15 days.  - oxyCODONE-acetaminophen (PERCOCET)  MG per tablet; Take 1 tablet by mouth every 6 hours as needed for severe pain Max of 2 a day, needs to last 15 days.    Long-term (current) use of anticoagulants [Z79.01]  Long-term use of anticoagulants we will check his INR last week was 2.2.  He has a history of antiphospholipid syndrome.  - INR point of care    Antiphospholipid syndrome (H)  Checking an INR today.    Anxiety state  Anxiety he is followed with psychiatry at [facility].  He is on Abilify, Cymbalta, gabapentin.  They are reducing his gabapentin some.                     Return in about 4 weeks (around 7/13/2021) for with primary doctor, Routine Visit.    Ozzy Long MD  [facility]    Zoey Shafer is a 51 year old who presents for the following health issues  accompanied by his spouse:    HPI      Chief Complaint   Patient presents with     Recheck Medication     discuss medication for back pain     Anxiety     Has had his pulse being high at home.  120 range at rest. Feels some palpitations, had them for years.  Maybe happens weekly.    No albuterol, no decongestants.  Drinks a lot of Mt Dew, 6-8 a day.      Seeing psychiatry at [facility] and getting medications there.      Back pain, disc disease and has had two injections.  Helped, tolerable but still there. Lifting cases of liquor at work.  Taking oxycodone 3 a day of 10mg pills.       Past Medical History:   Diagnosis Date     Antiphospholipid syndrome (H)      Arthritis      Asthma     Exercise     blood clot in leg      Depressive disorder, not elsewhere classified     Depression (non-psychotic)     ANKIT (generalised anxiety disorder)      HH (hiatus hernia)      Hypercholesteremia     normal with weight loss 3/09     Lumbar disc herniation 1992     Seronegative rheumatoid arthritis (H)      Current Outpatient Medications   Medication     ARIPiprazole (ABILIFY) 10 MG tablet     DULoxetine (CYMBALTA) 60 MG capsule     gabapentin (NEURONTIN) 800 MG tablet     oxyCODONE-acetaminophen (PERCOCET)  MG per tablet     traZODone (DESYREL) 50 MG tablet     vitamin D3 (CHOLECALCIFEROL) 50 mcg (2000 units) tablet     warfarin ANTICOAGULANT (COUMADIN) 5 MG tablet     warfarin ANTICOAGULANT (COUMADIN) 5 MG tablet     lithium (ESKALITH CR/LITHOBID) 450 MG CR tablet     pantoprazole (PROTONIX) 40 MG EC tablet     No current facility-administered medications for this visit.      Social History     Tobacco Use     Smoking status: Never Smoker     Smokeless tobacco: Never Used   Substance Use Topics     Alcohol use: Yes     Comment: rare     Drug use: No         Review of Systems         Objective    /74   Pulse 102   Temp 97.7  F (36.5  C) (Temporal)   Resp 16   Ht 1.829 m (6')   Wt 112.5 kg (248 lb)   SpO2 98%   BMI 33.63 kg/m    Body mass index is  33.63 kg/m .  Physical Exam   No acute distress  Heart is regular with no extra beats    EKG shows normal sinus rhythm with a right bundle branch block no ST changes

## 2021-06-15 NOTE — PROGRESS NOTES
ANTICOAGULATION MANAGEMENT     Patient Name:  Hollis Diggs  Date:  6/15/2021    ASSESSMENT /SUBJECTIVE:    Today's INR result of 1.9 is subtherapeutic. Goal INR of 2.0-3.0      Warfarin dose taken: Missed dose(s) may be affecting INR    Diet: No new diet changes affecting INR    Medication changes/ interactions: No new medications/supplements affecting INR    Previous INR: Therapeutic     S/S of bleeding or thromboembolism: No    New injury or illness: No    Upcoming surgery, procedure or cardioversion: No    Additional findings: None      PLAN:    Warfarin Dosing Instructions: 10 mg today then continue your current warfarin dose of 5 mg Mon and 7.5 mg all other days    Instructed patient to follow up no later than: 2 weeks  Lab visit scheduled    Education provided: None required    Telephone call with Hollis whom verbalizes understanding and agrees to plan    Instructed to call the Anticoagulation Clinic for any changes, questions or concerns. (#[phone])        Анна Hale RN      OBJECTIVE:  Recent labs: (last 7 days)     06/15/21  0936   INR 1.9*         INR assessment THER    Recheck INR In: 2 WEEKS    INR Location Outside lab      Anticoagulation Summary  As of 6/15/2021    INR goal:  2.0-3.0   TTR:  31.8 % (10.8 mo)   INR used for dosin.9 (6/15/2021)   Warfarin maintenance plan:  5 mg (5 mg x 1) every Mon; 7.5 mg (5 mg x 1.5) all other days   Full warfarin instructions:  6/15: 10 mg; Otherwise 5 mg every Mon; 7.5 mg all other days   Weekly warfarin total:  50 mg   Plan last modified:  Анна Hale RN (2021)   Next INR check:  2021   Priority:  Maintenance   Target end date:  Indefinite    Indications    DVT (deep venous thrombosis) (H) (Resolved) [I82.409]  Long-term (current) use of anticoagulants [Z79.01] [Z79.01]  Deep vein thrombosis (DVT) of proximal lower extremity  unspecified chronicity  unspecified laterality (H) [I82.4Y9]             Anticoagulation Episode Summary      INR check location:      Preferred lab:      Send INR reminders to:  [facility]    Comments:  5 mg, dosing card, PM dose      Anticoagulation Care Providers     Provider Role Specialty Phone number    Sylvester Cash MD Referring [facility] [phone]

## 2021-06-16 ASSESSMENT — ANXIETY QUESTIONNAIRES: GAD7 TOTAL SCORE: 21

## 2021-06-29 ENCOUNTER — MYC REFILL (OUTPATIENT)
Dept: INTERNAL MEDICINE | Facility: CLINIC | Age: 51
End: 2021-06-29

## 2021-06-29 ENCOUNTER — ANTICOAGULATION THERAPY VISIT (OUTPATIENT)
Dept: ANTICOAGULATION | Facility: CLINIC | Age: 51
End: 2021-06-29

## 2021-06-29 DIAGNOSIS — I82.4Y9 DEEP VEIN THROMBOSIS (DVT) OF PROXIMAL LOWER EXTREMITY, UNSPECIFIED CHRONICITY, UNSPECIFIED LATERALITY (H): ICD-10-CM

## 2021-06-29 DIAGNOSIS — Z79.01 LONG TERM CURRENT USE OF ANTICOAGULANT THERAPY: ICD-10-CM

## 2021-06-29 DIAGNOSIS — F11.90 CHRONIC, CONTINUOUS USE OF OPIOIDS: ICD-10-CM

## 2021-06-29 DIAGNOSIS — I82.409 DVT (DEEP VENOUS THROMBOSIS) (H): ICD-10-CM

## 2021-06-29 DIAGNOSIS — M54.16 LUMBAR RADICULOPATHY: ICD-10-CM

## 2021-06-29 LAB
CAPILLARY BLOOD COLLECTION: NORMAL
INR BLD: 2.1 (ref 0.86–1.14)

## 2021-06-29 PROCEDURE — 85610 PROTHROMBIN TIME: CPT | Performed by: FAMILY MEDICINE

## 2021-06-29 PROCEDURE — 36416 COLLJ CAPILLARY BLOOD SPEC: CPT | Performed by: FAMILY MEDICINE

## 2021-06-29 RX ORDER — OXYCODONE AND ACETAMINOPHEN 10; 325 MG/1; MG/1
1 TABLET ORAL EVERY 6 HOURS PRN
Qty: 30 TABLET | Refills: 0 | Status: SHIPPED | OUTPATIENT
Start: 2021-06-29 | End: 2021-07-13

## 2021-06-29 NOTE — TELEPHONE ENCOUNTER
Requested Prescriptions   Pending Prescriptions Disp Refills     oxyCODONE-acetaminophen (PERCOCET)  MG per tablet 30 tablet 0     Sig: Take 1 tablet by mouth every 6 hours as needed for severe pain Max of 2 a day, needs to last 15 days.     Last Written Prescription Date:  06/15/2021  Last Fill Quantity: 30,   # refills: 0  Last Office Visit: 06/15/2021  Future Office visit:       Routing refill request to provider for review/approval because:  Drug not on the [facility], [facility] or [facility] refill protocol or controlled substance

## 2021-06-29 NOTE — PROGRESS NOTES
ANTICOAGULATION MANAGEMENT     Hollis Diggs 51 year old male is on warfarin with therapeutic INR result. (Goal INR 2.0-3.0)    Recent labs: (last 7 days)     06/29/21  1623   INR 2.1*       ASSESSMENT     Source(s): Chart Review       Warfarin doses taken: Reviewed in chart    Diet: No new diet changes identified    New illness, injury, or hospitalization: No    Medication/supplement changes: None noted    Signs or symptoms of bleeding or clotting: No    Previous INR: Subtherapeutic    Additional findings: None     PLAN     Recommended plan for no diet, medication or health factor changes affecting INR     Dosing Instructions: Continue your current warfarin dose with next INR in 4 weeks       Summary  As of 6/29/2021    Full warfarin instructions:  5 mg every Mon; 7.5 mg all other days   Next INR check:  7/27/2021             Detailed voice message left for Hollis with dosing instructions and follow up date.     Contact [phone]  to schedule and with any changes, questions or concerns.     Education provided: Please call back if any changes to your diet, medications or how you've been taking warfarin    Plan made per [facility] anticoagulation protocol    Анна Hale RN  Anticoagulation Clinic  6/29/2021    _______________________________________________________________________     Anticoagulation Episode Summary     Current INR goal:  2.0-3.0   TTR:  32.6 % (11.3 mo)   Target end date:  Indefinite   Send INR reminders to:  [facility]    Indications    DVT (deep venous thrombosis) (H) (Resolved) [I82.409]  Long-term (current) use of anticoagulants [Z79.01] [Z79.01]  Deep vein thrombosis (DVT) of proximal lower extremity  unspecified chronicity  unspecified laterality (H) [I82.4Y9]           Comments:  5 mg, dosing card, PM dose         Anticoagulation Care Providers     Provider Role Specialty Phone number    Sylvester Cash MD Referring [facility] [phone]

## 2021-06-30 ENCOUNTER — TRANSFERRED RECORDS (OUTPATIENT)
Dept: HEALTH INFORMATION MANAGEMENT | Facility: CLINIC | Age: 51
End: 2021-06-30

## 2021-07-07 DIAGNOSIS — E55.9 HYPOVITAMINOSIS D: ICD-10-CM

## 2021-07-07 RX ORDER — CHOLECALCIFEROL (VITAMIN D3) 50 MCG
TABLET ORAL
Qty: 90 TABLET | Refills: 1 | Status: SHIPPED | OUTPATIENT
Start: 2021-07-07 | End: 2021-11-15

## 2021-07-13 ENCOUNTER — MYC REFILL (OUTPATIENT)
Dept: INTERNAL MEDICINE | Facility: CLINIC | Age: 51
End: 2021-07-13

## 2021-07-13 DIAGNOSIS — F11.90 CHRONIC, CONTINUOUS USE OF OPIOIDS: ICD-10-CM

## 2021-07-13 DIAGNOSIS — M54.16 LUMBAR RADICULOPATHY: ICD-10-CM

## 2021-07-13 NOTE — TELEPHONE ENCOUNTER
Percocet      Last Written Prescription Date:  6-  Last Fill Quantity: 30,   # refills: 0  Last Office Visit: 6-  Future Office visit:    Next 5 appointments (look out 90 days)    Aug 11, 2021  4:40 PM  Christopher Austin with Sylvester Cash MD  [facility] ([facility] ) [address] [place] [id number] [phone]           Routing refill request to provider for review/approval because:  Drug not on the [facility], [facility] or [facility] refill protocol or controlled substance

## 2021-07-14 RX ORDER — OXYCODONE AND ACETAMINOPHEN 10; 325 MG/1; MG/1
1 TABLET ORAL EVERY 6 HOURS PRN
Qty: 30 TABLET | Refills: 0 | Status: SHIPPED | OUTPATIENT
Start: 2021-07-14 | End: 2021-07-28

## 2021-07-19 ENCOUNTER — APPOINTMENT (OUTPATIENT)
Dept: LAB | Facility: CLINIC | Age: 51
End: 2021-07-19
Payer: COMMERCIAL

## 2021-07-19 DIAGNOSIS — Z79.899 NEED FOR PROPHYLACTIC CHEMOTHERAPY: Primary | ICD-10-CM

## 2021-07-19 LAB — LITHIUM SERPL-SCNC: <0.2 MMOL/L

## 2021-07-19 PROCEDURE — 36415 COLL VENOUS BLD VENIPUNCTURE: CPT | Performed by: PSYCHIATRY & NEUROLOGY

## 2021-07-19 PROCEDURE — 80178 ASSAY OF LITHIUM: CPT | Performed by: PSYCHIATRY & NEUROLOGY

## 2021-07-20 ENCOUNTER — HOSPITAL ENCOUNTER (EMERGENCY)
Facility: CLINIC | Age: 51
Discharge: HOME | End: 2021-07-20
Attending: EMERGENCY MEDICINE | Admitting: EMERGENCY MEDICINE

## 2021-07-20 ENCOUNTER — APPOINTMENT (OUTPATIENT)
Dept: CT IMAGING | Facility: CLINIC | Age: 51
End: 2021-07-20
Attending: EMERGENCY MEDICINE

## 2021-07-20 VITALS
OXYGEN SATURATION: 98 % | DIASTOLIC BLOOD PRESSURE: 77 MMHG | SYSTOLIC BLOOD PRESSURE: 145 MMHG | HEART RATE: 74 BPM | TEMPERATURE: 98.2 F | RESPIRATION RATE: 18 BRPM

## 2021-07-20 DIAGNOSIS — S06.0X0A CONCUSSION WITHOUT LOSS OF CONSCIOUSNESS, INITIAL ENCOUNTER: ICD-10-CM

## 2021-07-20 DIAGNOSIS — S09.90XA CLOSED HEAD INJURY, INITIAL ENCOUNTER: ICD-10-CM

## 2021-07-20 PROCEDURE — 99284 EMERGENCY DEPT VISIT MOD MDM: CPT | Mod: 25 | Performed by: EMERGENCY MEDICINE

## 2021-07-20 PROCEDURE — 99284 EMERGENCY DEPT VISIT MOD MDM: CPT | Performed by: EMERGENCY MEDICINE

## 2021-07-20 PROCEDURE — 250N000013 HC RX MED GY IP 250 OP 250 PS 637: Performed by: EMERGENCY MEDICINE

## 2021-07-20 PROCEDURE — 70450 CT HEAD/BRAIN W/O DYE: CPT

## 2021-07-20 RX ORDER — TRAZODONE HYDROCHLORIDE 150 MG/1
150-300 TABLET ORAL AT BEDTIME
COMMUNITY
Start: 2021-07-02 | End: 2021-08-05 | Stop reason: DRUGHIGH

## 2021-07-20 RX ORDER — GABAPENTIN 600 MG/1
600 TABLET ORAL 3 TIMES DAILY
COMMUNITY
Start: 2021-07-19 | End: 2021-08-11 | Stop reason: DRUGHIGH

## 2021-07-20 RX ORDER — ARIPIPRAZOLE 5 MG/1
5 TABLET ORAL DAILY
COMMUNITY
Start: 2021-07-07 | End: 2021-08-05 | Stop reason: DRUGHIGH

## 2021-07-20 RX ORDER — OXYCODONE AND ACETAMINOPHEN 5; 325 MG/1; MG/1
1 TABLET ORAL ONCE
Status: COMPLETED | OUTPATIENT
Start: 2021-07-20 | End: 2021-07-20

## 2021-07-20 RX ORDER — OMEPRAZOLE 40 MG/1
40 CAPSULE, DELAYED RELEASE ORAL DAILY
COMMUNITY
Start: 2021-06-03 | End: 2021-07-20

## 2021-07-20 RX ADMIN — OXYCODONE HYDROCHLORIDE AND ACETAMINOPHEN 1 TABLET: 5; 325 TABLET ORAL at 16:34

## 2021-07-20 NOTE — LETTER
July 20, 2021      To Whom It May Concern:      Hollis Diggs was seen in our Emergency Department today, 07/20/21.  I expect his condition to improve over the next 7 days.  He may return to work when his headache and other symptoms resolve.  He should be resting at home until that time and return to full duty once his symptoms resolve.     Sincerely,        Anmol Vazquez MD

## 2021-07-20 NOTE — DISCHARGE INSTRUCTIONS
Your head CT scan did not show any bleeding fortunately.  You do have some symptoms of concussion.  Use brain rest and follow-up with your primary care provider if symptoms do not resolve.  Brain rest means do not do anything that requires concentration for more than 30 minutes at a time.  Get plenty of sleep.  Do not look at computer or phone screens for extended periods of time.  Return to the ER if new or worsening symptoms.  It was a pleasure meeting you.  You may take ibuprofen and Tylenol for your pain.

## 2021-07-20 NOTE — ED PROVIDER NOTES
History     Chief Complaint   Patient presents with     Head Injury     HPI  Hollis Diggs is a 51 year old male who with a history of deep vein thrombosis currently anticoagulated on coumadin who presents to the ER secondary to a head injury.  Yesterday hit it on a lever on a machine. No LOC.  It is a relatively small hard plastic lever that is inside the doors of the machine that he was using at work at [id number].  He open the doors and moves his head quickly striking the right side of his forehead.  He has pain and tenderness that area.  Currently the headache is 7 out of 10 and located in the right side of the forehead.  Its not diffuse.  He has some light sensitivity and has had some dizziness without nausea or vomiting or phonophobia.  No confusion.  He says he did drop a few bottles at work today and does not like him.  He has not had any ataxia.  He is on Coumadin.  Last INR was therapeutic.    Allergies:  Allergies   Allergen Reactions     No Known Drug Allergies        Problem List:    Patient Active Problem List    Diagnosis Date Noted     Chronic, continuous use of opioids 05/14/2021     Priority: Medium     Deep vein thrombosis (DVT) of proximal lower extremity, unspecified chronicity, unspecified laterality (H) 04/28/2021     Priority: Medium     Gastroesophageal reflux disease without esophagitis 04/26/2021     Priority: Medium     Antiphospholipid syndrome (H) 03/06/2021     Priority: Medium     Suicidal behavior 06/22/2020     Priority: Medium     Class 1 obesity due to excess calories without serious comorbidity with body mass index (BMI) of 33.0 to 33.9 in adult 01/08/2020     Priority: Medium     Long-term use of high-risk medication 05/12/2017     Priority: Medium     History of deep venous thrombosis 04/14/2017     Priority: Medium     DDD (degenerative disc disease), lumbar 04/14/2017     Priority: Medium     On prednisone therapy 07/27/2016     Priority: Medium     Seronegative  rheumatoid arthritis (H) 06/28/2016     Priority: Medium     Long-term (current) use of anticoagulants [Z79.01] 03/16/2016     Priority: Medium     Rheumatoid arthritis of multiple sites with negative rheumatoid factor (H) 12/07/2015     Priority: Medium     Midline low back pain, with sciatica presence unspecified 10/14/2015     Priority: Medium     Major depressive disorder, recurrent episode, mild (H) 10/07/2015     Priority: Medium     Pain in joint, multiple sites 03/20/2015     Priority: Medium     Anxiety state 02/10/2015     Priority: Medium     Problem list name updated by automated process. Provider to review       CARDIOVASCULAR SCREENING; LDL GOAL LESS THAN 160 01/15/2013     Priority: Medium     Alopecia 02/19/2010     Priority: Medium     Ingrown toenail 08/18/2009     Priority: Medium     Anxiety 07/09/2008     Priority: Medium     Abdominal pain, epigastric 01/25/2006     Priority: Medium        Past Medical History:    Past Medical History:   Diagnosis Date     Antiphospholipid syndrome (H)      Arthritis      Asthma      blood clot in leg      Depressive disorder, not elsewhere classified      ANKIT (generalised anxiety disorder)      HH (hiatus hernia)      Hypercholesteremia      Lumbar disc herniation 1992     Seronegative rheumatoid arthritis (H)        Past Surgical History:    Past Surgical History:   Procedure Laterality Date     APPENDECTOMY       COLONOSCOPY N/A 8/2/2016    Procedure: COMBINED COLONOSCOPY, SINGLE OR MULTIPLE BIOPSY/POLYPECTOMY BY BIOPSY;  Surgeon: Sydnee Walton MD;  Location: [facility]     COLONOSCOPY WITH CO2 INSUFFLATION N/A 8/2/2016    Procedure: COLONOSCOPY WITH CO2 INSUFFLATION;  Surgeon: Sydnee Walton MD;  Location: [facility]     COMBINED ESOPHAGOSCOPY, GASTROSCOPY, DUODENOSCOPY (EGD) WITH CO2 INSUFFLATION N/A 8/2/2016    Procedure: COMBINED ESOPHAGOSCOPY, GASTROSCOPY, DUODENOSCOPY (EGD) WITH CO2 INSUFFLATION;  Surgeon: Sydnee Walton  "MD Bety;  Location: [facility]     COMBINED ESOPHAGOSCOPY, GASTROSCOPY, DUODENOSCOPY (EGD) WITH CO2 INSUFFLATION N/A 5/27/2021    Procedure: ESOPHAGOGASTRODUODENOSCOPY, WITH CO2 INSUFFLATION;  Surgeon: Alis Cotton DO;  Location: [facility]     ESOPHAGOSCOPY, GASTROSCOPY, DUODENOSCOPY (EGD), COMBINED N/A 8/2/2016    Procedure: COMBINED ESOPHAGOSCOPY, GASTROSCOPY, DUODENOSCOPY (EGD), BIOPSY SINGLE OR MULTIPLE;  Surgeon: Sydnee Walton MD;  Location: [facility]     ESOPHAGOSCOPY, GASTROSCOPY, DUODENOSCOPY (EGD), COMBINED N/A 5/27/2021    Procedure: Esophagogastroduodenoscopy, With Biopsy;  Surgeon: Alis Cotton DO;  Location: [facility] OR     HC REMOVAL OF TONSILS,<10 Y/O      Tonsils <11y.o.     HC REPAIR INCISIONAL HERNIA,REDUCIBLE  1970's    Hernia Repair, Incisional, Unilateral     HC UGI ENDOSCOPY DIAG W BIOPSY  02/01/06     HC VASECTOMY UNILAT/BILAT W POSTOP SEMEN  1/05    Vasectomy     History back lumbar laminectomy       INJECT EPIDURAL LUMBAR Right 4/22/2021    Procedure: Right Lumbar 4-5 and Lumbar 5 - Sacral 1 Epidural Steroid Injection;  Surgeon: Maxwell Zacarias MD;  Location:  OR     ZZC NONSPECIFIC PROCEDURE  91 or 92    back surgery. lumbar. lamiectomy       Family History:    Family History   Problem Relation Age of Onset     Brain Tumor Mother         Benign     Deep Vein Thrombosis Mother         \"neck\"      Heart Disease Father         \"wont tell anyone\"     Neurologic Disorder Paternal Grandmother         Parkinsons      Alcohol/Drug Paternal Grandfather         alcoholic     Cancer Maternal Grandfather         lung     Diabetes Maternal Grandmother      Cerebrovascular Disease Maternal Grandmother         tim age ~70     Arthritis Cousin         cousins x 2 \"RA\"     Musculoskeletal Disorder Maternal Aunt         MS     Family History Negative Other         psoriasis, crohns, UC, SLE       Social History:  Marital Status:   [2]  Social History     Tobacco Use     Smoking status: " Never Smoker     Smokeless tobacco: Never Used   Substance Use Topics     Alcohol use: Yes     Comment: rare     Drug use: No        Medications:    ARIPiprazole (ABILIFY) 10 MG tablet  Cholecalciferol (VITAMIN D3) 50 MCG (2000 UT) TABS  DULoxetine (CYMBALTA) 60 MG capsule  gabapentin (NEURONTIN) 600 MG tablet  oxyCODONE-acetaminophen (PERCOCET)  MG per tablet  traZODone (DESYREL) 150 MG tablet  traZODone (DESYREL) 50 MG tablet  warfarin ANTICOAGULANT (COUMADIN) 5 MG tablet  ARIPiprazole (ABILIFY) 5 MG tablet  gabapentin (NEURONTIN) 800 MG tablet  lithium (ESKALITH CR/LITHOBID) 450 MG CR tablet  warfarin ANTICOAGULANT (COUMADIN) 5 MG tablet          Review of Systems   All other systems reviewed and are negative.      Physical Exam   BP: (!) 147/79  Pulse: 86  Temp: 98.2  F (36.8  C)  Resp: 18  SpO2: 99 %      Physical Exam  Vitals and nursing note reviewed.   Constitutional:       General: He is not in acute distress.     Appearance: Normal appearance. He is well-developed. He is not diaphoretic.   HENT:      Head: Atraumatic.      Comments: Small abrasion over the right frontal scalp.  No step-off or fluctuance.  Is mildly tender.  No cephalohematoma.     Nose: Nose normal.   Eyes:      General: No scleral icterus.        Right eye: No discharge.         Left eye: No discharge.      Extraocular Movements: Extraocular movements intact.      Conjunctiva/sclera: Conjunctivae normal.      Pupils: Pupils are equal, round, and reactive to light.      Comments: Mildly photophobic   Cardiovascular:      Rate and Rhythm: Normal rate.   Pulmonary:      Effort: Pulmonary effort is normal.   Musculoskeletal:      Cervical back: Normal range of motion and neck supple.   Skin:     General: Skin is warm and dry.      Findings: No rash.   Neurological:      Mental Status: He is alert and oriented to person, place, and time.   Psychiatric:         Mood and Affect: Mood normal.         Thought Content: Thought content normal.          ED Course        Procedures         Results for orders placed or performed during the hospital encounter of 07/20/21 (from the past 24 hour(s))   Head CT w/o contrast    Narrative    CT OF THE HEAD WITHOUT CONTRAST 7/20/2021 4:49 PM     COMPARISON: Head CT 5/12/2021.    HISTORY: Head trauma, mod-severe.    TECHNIQUE: 5 mm thick axial CT images of the head were acquired  without IV contrast material.    FINDINGS: There is mild diffuse cerebral volume loss. There are subtle  patchy areas of decreased density in the cerebral white matter  bilaterally that are consistent with sequela of chronic small vessel  ischemic disease.    The ventricles and basal cisterns are within normal limits in  configuration given the degree of cerebral volume loss.  There is no  midline shift. There are no extra-axial fluid collections.    No intracranial hemorrhage, mass or recent infarct.    The visualized paranasal sinuses are well-aerated. There is no  mastoiditis. There are no fractures of the visualized bones.      Impression    IMPRESSION: Diffuse cerebral volume loss and cerebral white matter  changes consistent with chronic small vessel ischemic disease. No  evidence for acute intracranial pathology.        Radiation dose for this scan was reduced using automated exposure  control, adjustment of the mA and/or kV according to patient size, or  iterative reconstruction technique         Medications   oxyCODONE-acetaminophen (PERCOCET) 5-325 MG per tablet 1 tablet (1 tablet Oral Given 7/20/21 1634)       Assessments & Plan (with Medical Decision Making)  51-year-old male on Coumadin who presented with a head injury.  The mechanism is not particularly concerning but the patient has had some trouble holding onto bottles at work and has had some photophobia and given the fact that he is on Coumadin we decided to proceed with a CT scan of the head.  I discussed the risks and benefits of that with the patient and his significant  other who wanted to proceed with the testing.  He is not particularly symptomatic.  He was given 1 p.o. Percocet here in the emergency department.  That it helped his headache go away earlier today.     I have reviewed the nursing notes.    I have reviewed the findings, diagnosis, plan and need for follow up with the patient.      New Prescriptions    No medications on file       Final diagnoses:   Closed head injury, initial encounter   Concussion without loss of consciousness, initial encounter       7/20/2021   [facility] EMERGENCY DEPT     Anmol Vazquez MD  07/20/21 [id number]

## 2021-07-21 ENCOUNTER — TELEPHONE (OUTPATIENT)
Dept: ANTICOAGULATION | Facility: CLINIC | Age: 51
End: 2021-07-21

## 2021-07-21 NOTE — TELEPHONE ENCOUNTER
ANTICOAGULATION  MANAGEMENT: Discharge Review    Hollis Diggs chart reviewed for anticoagulation continuity of care    Emergency room visit on 7/20 for closed head injury.    Discharge disposition: Home    Results:    No results for input(s): INR, [id number], AXA in the last 168 hours.  Anticoagulation inpatient management:     not applicable     Anticoagulation discharge instructions:     Warfarin dosing: home regimen continued   Bridging: No   INR goal change: No      Medication changes affecting anticoagulation: No    Additional factors affecting anticoagulation: No    Plan     No adjustment to anticoagulation plan needed    Spoke with Benji - recheck INR as planned on 7/27    No adjustment to Anticoagulation Calendar was required    Jesse Flores RN

## 2021-07-28 ENCOUNTER — MYC REFILL (OUTPATIENT)
Dept: INTERNAL MEDICINE | Facility: CLINIC | Age: 51
End: 2021-07-28

## 2021-07-28 DIAGNOSIS — M54.16 LUMBAR RADICULOPATHY: ICD-10-CM

## 2021-07-28 DIAGNOSIS — F11.90 CHRONIC, CONTINUOUS USE OF OPIOIDS: ICD-10-CM

## 2021-07-28 RX ORDER — OXYCODONE AND ACETAMINOPHEN 10; 325 MG/1; MG/1
1 TABLET ORAL EVERY 6 HOURS PRN
Qty: 30 TABLET | Refills: 0 | Status: SHIPPED | OUTPATIENT
Start: 2021-07-28 | End: 2021-08-11

## 2021-07-28 NOTE — TELEPHONE ENCOUNTER
Requested Prescriptions   Pending Prescriptions Disp Refills     oxyCODONE-acetaminophen (PERCOCET)  MG per tablet 30 tablet 0     Sig: Take 1 tablet by mouth every 6 hours as needed for severe pain Max of 2 a day, needs to last 15 days.     Last Written Prescription Date:  07/14/2021  Last Fill Quantity: 30,   # refills: 0  Last Office Visit: 06/15/2021  Future Office visit:    Next 5 appointments (look out 90 days)    Aug 11, 2021  4:40 PM  Christopher Austin with Sylvester Cash MD  [facility] ([facility] ) [address] [place] [id number] [phone]           Routing refill request to provider for review/approval because:  Drug not on the [facility], [facility] or [facility] refill protocol or controlled substance

## 2021-07-29 DIAGNOSIS — Z79.01 LONG TERM CURRENT USE OF ANTICOAGULANT THERAPY: Primary | ICD-10-CM

## 2021-07-29 DIAGNOSIS — I82.4Y9 DEEP VEIN THROMBOSIS (DVT) OF PROXIMAL LOWER EXTREMITY, UNSPECIFIED CHRONICITY, UNSPECIFIED LATERALITY (H): ICD-10-CM

## 2021-07-29 RX ORDER — WARFARIN SODIUM 5 MG/1
TABLET ORAL
Qty: 120 TABLET | Refills: 0 | Status: SHIPPED | OUTPATIENT
Start: 2021-07-29 | End: 2021-10-25

## 2021-08-03 ENCOUNTER — TELEPHONE (OUTPATIENT)
Dept: FAMILY MEDICINE | Facility: CLINIC | Age: 51
End: 2021-08-03

## 2021-08-03 NOTE — TELEPHONE ENCOUNTER
Scheduling transferred patient to have triage schedule patient for ankle injury.  Scheduled patient.  Delia Morris RN

## 2021-08-05 ENCOUNTER — HOSPITAL ENCOUNTER (OUTPATIENT)
Dept: GENERAL RADIOLOGY | Facility: CLINIC | Age: 51
Discharge: HOME | End: 2021-08-05
Attending: PHYSICIAN ASSISTANT | Admitting: PHYSICIAN ASSISTANT
Payer: COMMERCIAL

## 2021-08-05 ENCOUNTER — OFFICE VISIT (OUTPATIENT)
Dept: FAMILY MEDICINE | Facility: CLINIC | Age: 51
End: 2021-08-05
Payer: COMMERCIAL

## 2021-08-05 VITALS
HEART RATE: 110 BPM | WEIGHT: 248.7 LBS | RESPIRATION RATE: 14 BRPM | BODY MASS INDEX: 33.73 KG/M2 | OXYGEN SATURATION: 100 % | TEMPERATURE: 98.2 F | SYSTOLIC BLOOD PRESSURE: 109 MMHG | DIASTOLIC BLOOD PRESSURE: 65 MMHG

## 2021-08-05 DIAGNOSIS — M25.571 PAIN IN JOINT INVOLVING ANKLE AND FOOT, RIGHT: ICD-10-CM

## 2021-08-05 DIAGNOSIS — M25.571 PAIN IN JOINT INVOLVING ANKLE AND FOOT, RIGHT: Primary | ICD-10-CM

## 2021-08-05 PROCEDURE — 99214 OFFICE O/P EST MOD 30 MIN: CPT | Performed by: PHYSICIAN ASSISTANT

## 2021-08-05 PROCEDURE — 73610 X-RAY EXAM OF ANKLE: CPT | Mod: RT

## 2021-08-05 NOTE — PROGRESS NOTES
Assessment & Plan     Pain in joint involving ankle and foot, right  - Orthopedic  Referral; Future  - XR Ankle Right G/E 3 Views; Future    Calcaneofibular ligament very likely sprained. Discussed home cares. Wear ACE wrap, ice and elevated. Because it has been 2 months since the injury, referred to ortho for further evaluation. CT or MRI may be indicated if conservative measures are ineffective.    30 minutes spent on the date of the encounter doing chart review, patient visit and documentation     Return in about 2 weeks (around 8/19/2021) for consult with orthopedics.    Candelaria Malhotra PA-C  [facility]    Zoey Shafer is a 51 year old who presents for the following health issues     HPI     Musculoskeletal problem/pain  Onset/Duration: 2 weeks  Description  Location: ankle - right  Joint Swelling: no  Redness: no  Pain: YES- gets worse at the day goes on at the end of day about 7-8/10  Warmth: no  Intensity:  moderate  Progression of Symptoms:  worsening  Accompanying signs and symptoms:   Fevers: no  Numbness/tingling/weakness: YES- on foot and to toes  History  Trauma to the area: YES- fell through a pallet at work  Recent illness:  no  Previous similar problem: no  Previous evaluation:  no  Precipitating or alleviating factors:  Aggravating factors include: standing, walking, climbing stairs and overuse  Therapies tried and outcome: ice and massage    Hollis presents to clinic for evaluation of right ankle pain. He notes that he was at work and stepped on a pallet and his foot slipped between the gaps of the palate and he inverted his ankle. This happened approximately 2 months ago. He notes that at the time of the injury he had very minimal pain. He has been tolerating this well and was not having issues until approximately 10 days ago. He notes that recently his ankles been hurting quite a bit more. He does not note instability when standing or walking but notes  "that his ankle wants to invert while he is standing or walking. If he uses his hand to extremely invert his foot he has pain but otherwise his pain is fairly tolerable. Pain is located distal to the lateral malleolus over the calcaneofibular ligament. He notes in the morning his pain is rated as a 4/10 and describes this rating as \"I can feel it but it is not terribly uncomfortable.\" He rates his pain as pretty substantial by the end of the day as a 7-8/10. He does describe some loss of sensation in the tips of all of his toes and along the lateral aspect of his foot as well. He works at a [employer] and is on his feet for his whole shift.    Review of Systems   ROS negative except as stated above.      Objective    /65 (BP Location: Left arm)   Pulse 110   Temp 98.2  F (36.8  C) (Temporal)   Resp 14   Wt 112.8 kg (248 lb 11.2 oz)   SpO2 100%   BMI 33.73 kg/m    Body mass index is 33.73 kg/m .  Physical Exam   GENERAL: healthy, alert and no distress  MS: Bilateral feet normal to inspection. No erythema, swelling, edema, ecchymosis, gross deformity. No increased warmth, masses or tenderness to palpation. ROM full including dorsiflexion, plantar flexion, inversion and eversion. Pain with forced inversion of right ankle, not with passive movement. Pain is reported along calcaneofibular ligament. No pain with palpation over medial or lateral malleolus.  No pain with palpation over navicular bone or the base of the fifth metatarsal. Good capillary refill, neurovascularly intact.  SKIN: no suspicious lesions or rashes  NEURO: Normal strength and tone, mentation intact and speech normal    Results for orders placed or performed during the hospital encounter of 08/05/21   XR Ankle Right G/E 3 Views     Status: None (Preliminary result)    Narrative    ANKLE RIGHT THREE OR MORE VIEWS August 5, 2021 7:53 AM     HISTORY: Pain in joint involving ankle and foot, right.    COMPARISON: None.      Impression    " IMPRESSION: Tiny ossicle adjacent to the tip of the lateral malleolus  appears chronic. No evidence of acute fracture. Mortise and talar dome  are intact. Moderate posterior and small plantar calcaneal spur.

## 2021-08-05 NOTE — LETTER
[address] [address]  Phone: [phone]  Fax: [phone]        2021    Hollis Diggs  [address] [facility] [address] [phone] (home) [phone] (work)    :     1970      To Whom it May Concern:    Hollis presents to the clinic for evaluation of right ankle pain. He may return to work as scheduled.    Please contact me for questions or concerns.    Sincerely,        Candelaria Malhotra PA-C

## 2021-08-05 NOTE — PATIENT INSTRUCTIONS
Patient Education     Understanding Ankle Sprain    The ankle is the joint where the leg and foot meet. Bones are held in place by connective tissue called ligaments. When ankle ligaments are stretched to the point of pain and injury, it's called an ankle sprain. A sprain can tear the ligaments. These tears can be very small but still cause pain. Ankle sprains are graded by the amount of ligament damage.     Grade 1 (mild). There is slight stretching and tiny tears to the ligament fibers. You may have mild ankle swelling and tenderness.    Grade 2 (moderate). This is a partial ligament tear and causes moderate ankle swelling and tenderness. There may be abnormal looseness when the healthcare provider moves your joint.    Grade 3 (severe). There is a complete tear to the ligament and a great deal of ankle swelling and tenderness. The ankle joint may be very unstable.  What causes an ankle sprain?  A sprain may occur when you twist your ankle or bend it too far. This can happen when you stumble or fall. Things that can make an ankle sprain more likely include:     Having had an ankle sprain before    Playing sports that involve running and jumping. Or playing contact sports such as football or hockey.    Wearing shoes that don t support your feet and ankles well    Having ankles with poor strength and flexibility  Symptoms of an ankle sprain  Symptoms may include:    Pain or soreness in the ankle    Swelling    Redness or bruising    Not being able to walk or put weight on the affected foot    Reduced range of motion in the ankle    A popping or tearing feeling at the time the sprain occurs    An abnormal or dislocated look to the ankle    Instability or too much range of motion in the ankle  Treatment for an ankle sprain  Treatment focuses on reducing pain and swelling, and preventing further injury. Treatments may include:     Resting the ankle. Avoid putting weight on it. This may mean using crutches until the  sprain heals.    Prescription or over-the-counter medicines. These help reduce swelling and pain.    Cold packs. These help reduce pain and swelling.    Raising your ankle above your heart. This helps reduce swelling.    Wrapping the ankle with an elastic bandage or ankle brace. This helps reduce swelling and gives some support to the ankle. In rare cases, you may need a cast or boot.    Stretching and other exercises. These improve flexibility and strength.    Heat packs. These may be recommended before doing ankle exercises.  Possible complications of an ankle sprain  An ankle that has been weakened by a sprain can be more likely to have repeated sprains afterward. Doing exercises to strengthen your ankle and improve balance can reduce your risk for repeated sprains. Other possible complications are long-term (chronic) pain or an ankle that remains unstable.   When to call your healthcare provider  Call your healthcare provider right away if you have any of these:    Fever of 100.4 F (38 C) or higher, or as directed by your provider    Chills    Pain, numbness, discoloration, or coldness in the foot or toes    Pain that gets worse    Symptoms that don t get better, or get worse    New symptoms  Rick last reviewed this educational content on 6/1/2019 2000-2021 [employer]. All rights reserved. This information is not intended as a substitute for professional medical care. Always follow your healthcare professional's instructions.

## 2021-08-11 ENCOUNTER — ANTICOAGULATION THERAPY VISIT (OUTPATIENT)
Dept: FAMILY MEDICINE | Facility: CLINIC | Age: 51
End: 2021-08-11

## 2021-08-11 ENCOUNTER — OFFICE VISIT (OUTPATIENT)
Dept: FAMILY MEDICINE | Facility: CLINIC | Age: 51
End: 2021-08-11
Payer: COMMERCIAL

## 2021-08-11 VITALS
WEIGHT: 244 LBS | OXYGEN SATURATION: 97 % | HEART RATE: 120 BPM | DIASTOLIC BLOOD PRESSURE: 64 MMHG | BODY MASS INDEX: 33.09 KG/M2 | TEMPERATURE: 98.2 F | SYSTOLIC BLOOD PRESSURE: 138 MMHG | RESPIRATION RATE: 16 BRPM

## 2021-08-11 DIAGNOSIS — F41.9 ANXIETY: ICD-10-CM

## 2021-08-11 DIAGNOSIS — F11.90 CHRONIC, CONTINUOUS USE OF OPIOIDS: ICD-10-CM

## 2021-08-11 DIAGNOSIS — I82.4Y9 DEEP VEIN THROMBOSIS (DVT) OF PROXIMAL LOWER EXTREMITY, UNSPECIFIED CHRONICITY, UNSPECIFIED LATERALITY (H): ICD-10-CM

## 2021-08-11 DIAGNOSIS — Z79.01 LONG TERM CURRENT USE OF ANTICOAGULANT THERAPY: ICD-10-CM

## 2021-08-11 DIAGNOSIS — Z79.01 LONG TERM CURRENT USE OF ANTICOAGULANT THERAPY: Primary | ICD-10-CM

## 2021-08-11 DIAGNOSIS — M54.16 LUMBAR RADICULOPATHY: Primary | ICD-10-CM

## 2021-08-11 LAB — INR BLD: 1.6 (ref 0.9–1.1)

## 2021-08-11 PROCEDURE — 36416 COLLJ CAPILLARY BLOOD SPEC: CPT | Performed by: FAMILY MEDICINE

## 2021-08-11 PROCEDURE — 99214 OFFICE O/P EST MOD 30 MIN: CPT | Performed by: FAMILY MEDICINE

## 2021-08-11 PROCEDURE — 85610 PROTHROMBIN TIME: CPT | Performed by: FAMILY MEDICINE

## 2021-08-11 RX ORDER — GABAPENTIN 800 MG/1
800 TABLET ORAL 3 TIMES DAILY
Qty: 90 TABLET | Refills: 3 | Status: SHIPPED | OUTPATIENT
Start: 2021-08-11 | End: 2021-11-29

## 2021-08-11 RX ORDER — OXYCODONE AND ACETAMINOPHEN 10; 325 MG/1; MG/1
1 TABLET ORAL EVERY 6 HOURS PRN
Qty: 60 TABLET | Refills: 0 | Status: SHIPPED | OUTPATIENT
Start: 2021-08-11 | End: 2021-09-08

## 2021-08-11 ASSESSMENT — PAIN SCALES - GENERAL: PAINLEVEL: SEVERE PAIN (6)

## 2021-08-11 NOTE — PROGRESS NOTES
Assessment & Plan     Lumbar radiculopathy  Fairly stable at this time we will renew his Percocet he takes a maximum 2 days some days none.  Also increasing his Neurontin per his request which he is actually on through psychiatry for anxiety but this may be more beneficial also at a higher dose for his lumbar back pain.  - oxyCODONE-acetaminophen (PERCOCET)  MG per tablet; Take 1 tablet by mouth every 6 hours as needed for severe pain Max of 2 a day    Chronic, continuous use of opioids  I believe he needs an narcotics agreement because of staffing today I was not able to get 1 for him he will be coming in for physical in the next 3 months and will take care of this then.  - oxyCODONE-acetaminophen (PERCOCET)  MG per tablet; Take 1 tablet by mouth every 6 hours as needed for severe pain Max of 2 a day    Long-term (current) use of anticoagulants [Z79.01]  He will act accordingly to the results of this.  - INR point of care    Anxiety  He asked me to take over his medications as he does not see a point in seeing a psychiatrist anymore.  Wants to go up on the gabapentin to 803 times a day from 600 he has been at this level before.  He also takes Abilify and Cymbalta.  He was on lithium but stopped this.  He is feeling mentally well now.  - gabapentin (NEURONTIN) 800 MG tablet; Take 1 tablet (800 mg) by mouth 3 times daily    Long term current use of anticoagulant therapy  See above discussion.  - INR point of care    DVT (deep venous thrombosis) (H)  See above discussion this is duplicate diagnosis.  - INR point of care    Deep vein thrombosis (DVT) of proximal lower extremity, unspecified chronicity, unspecified laterality (H)  See above discussion is a duplicate diagnosis he has no problems now.  - INR point of care      35 minutes spent on the date of the encounter doing chart review, review of outside records, patient visit and documentation            No follow-ups on file.    Sylvester Cash,  MD PERDUE [facility] ANTONIO Shafer is a 51 year old who presents for the following health issues     HPI     Chief Complaint   Patient presents with     Recheck Medication     discuss medications           Review of Systems   Constitutional, HEENT, cardiovascular, pulmonary, gi and gu systems are negative, except as otherwise noted.      Objective    /64   Pulse 120   Temp 98.2  F (36.8  C) (Temporal)   Resp 16   Wt 110.7 kg (244 lb)   SpO2 97%   BMI 33.09 kg/m    Body mass index is 33.09 kg/m .  Physical Exam   GENERAL: healthy, alert and no distress  EYES: Eyes grossly normal to inspection, PERRL and conjunctivae and sclerae normal  NECK: no adenopathy, no asymmetry, masses, or scars and thyroid normal to palpation  RESP: lungs clear to auscultation - no rales, rhonchi or wheezes  CV: regular rate and rhythm, normal S1 S2, no S3 or S4, no murmur, click or rub, no peripheral edema and peripheral pulses strong  MS: no gross musculoskeletal defects noted, no edema  NEURO: Normal strength and tone, mentation intact and speech normal  PSYCH: mentation appears normal, affect normal/bright

## 2021-08-11 NOTE — PROGRESS NOTES
Anticoagulation Management    Unable to reach Hollis today.    Today's INR result of 1.6 is supratherapeutic (goal INR of 2.0-3.0).  Result received from: Clinic Lab    Follow up required to confirm warfarin dose taken and assess for changes    Left message to take a booster dose of warfarin,  10 mg tonight.      Anticoagulation clinic to follow up    Re Nielson RN

## 2021-08-12 NOTE — PROGRESS NOTES
ANTICOAGULATION MANAGEMENT     Hollis Diggs 51 year old male is on warfarin with subtherapeutic INR result. (Goal INR 2.0-3.0)    Recent labs: (last 7 days)     08/11/21  1709   INR 1.6*       ASSESSMENT     Source(s): Chart Review I have attempted to contact this patient by phone with the following results: no answer. VM left asking her to call the [facility] back to discuss.       Warfarin doses taken: Reviewed in chart    Diet: No new diet changes identified    New illness, injury, or hospitalization: No    Medication/supplement changes: None noted    Signs or symptoms of bleeding or clotting: No    Previous INR: Therapeutic last visit; previously outside of goal range    Additional findings: None     PLAN     Recommended plan for no diet, medication or health factor changes affecting INR     Dosing Instructions: Booster dose then continue your current warfarin dose with next INR in 2 weeks       Summary  As of 8/11/2021    Full warfarin instructions:  8/11: 10 mg; Otherwise 5 mg every Mon; 7.5 mg all other days   Next INR check:  8/25/2021             Detailed voice message left for Hollis with dosing instructions and follow up date.     Contact [phone]  to schedule and with any changes, questions or concerns.     Education provided: Please call back if any changes to your diet, medications or how you've been taking warfarin    Plan made per [facility] anticoagulation protocol    Jesse Flores RN  Anticoagulation Clinic  8/12/2021    _______________________________________________________________________     Anticoagulation Episode Summary     Current INR goal:  2.0-3.0   TTR:  32.7 % (1 y)   Target end date:  Indefinite   Send INR reminders to:  [facility]    Indications    DVT (deep venous thrombosis) (H) (Resolved) [I82.409]  Long-term (current) use of anticoagulants [Z79.01] [Z79.01]  Deep vein thrombosis (DVT) of proximal lower extremity  unspecified chronicity  unspecified laterality (H)  [I82.4Y9]           Comments:  5 mg, dosing card, PM dose         Anticoagulation Care Providers     Provider Role Specialty Phone number    Sylvester Cash MD Referring [facility] [phone]

## 2021-08-31 ENCOUNTER — ANTICOAGULATION THERAPY VISIT (OUTPATIENT)
Dept: ANTICOAGULATION | Facility: CLINIC | Age: 51
End: 2021-08-31

## 2021-08-31 ENCOUNTER — LAB (OUTPATIENT)
Dept: LAB | Facility: CLINIC | Age: 51
End: 2021-08-31
Payer: COMMERCIAL

## 2021-08-31 DIAGNOSIS — I82.4Y9 DEEP VEIN THROMBOSIS (DVT) OF PROXIMAL LOWER EXTREMITY, UNSPECIFIED CHRONICITY, UNSPECIFIED LATERALITY (H): ICD-10-CM

## 2021-08-31 DIAGNOSIS — Z79.01 LONG TERM CURRENT USE OF ANTICOAGULANT THERAPY: Primary | ICD-10-CM

## 2021-08-31 DIAGNOSIS — Z79.01 LONG TERM CURRENT USE OF ANTICOAGULANT THERAPY: ICD-10-CM

## 2021-08-31 LAB — INR BLD: 1.6 (ref 0.9–1.1)

## 2021-08-31 PROCEDURE — 85610 PROTHROMBIN TIME: CPT

## 2021-08-31 PROCEDURE — 36416 COLLJ CAPILLARY BLOOD SPEC: CPT

## 2021-08-31 NOTE — PROGRESS NOTES
ANTICOAGULATION MANAGEMENT     Hollis Diggs 51 year old male is on warfarin with subtherapeutic INR result. (Goal INR 2.0-3.0)    Recent labs: (last 7 days)     08/31/21  1615   INR 1.6*       ASSESSMENT     Source(s): Chart Review and Patient/Caregiver Call       Warfarin doses taken: Warfarin taken as instructed    Diet: No new diet changes identified    New illness, injury, or hospitalization: No    Medication/supplement changes: None noted    Signs or symptoms of bleeding or clotting: No    Previous INR: Subtherapeutic    Additional findings: None     PLAN     Recommended plan for no diet, medication or health factor changes affecting INR     Dosing Instructions:  Increase your warfarin dose (10% change) with next INR in 2 weeks       Summary  As of 8/31/2021    Full warfarin instructions:  10 mg every Tue; 7.5 mg all other days   Next INR check:  9/14/2021             Telephone call with Hollis who verbalizes understanding and agrees to plan    Lab visit scheduled    Education provided: None required    Plan made per [facility] anticoagulation protocol    Анна Hale RN  Anticoagulation Clinic  8/31/2021    _______________________________________________________________________     Anticoagulation Episode Summary     Current INR goal:  2.0-3.0   TTR:  32.7 % (1 y)   Target end date:  Indefinite   [facility]    Indications    DVT (deep venous thrombosis) (H) (Resolved) [I82.409]  Long-term (current) use of anticoagulants [Z79.01] [Z79.01]  Deep vein thrombosis (DVT) of proximal lower extremity  unspecified chronicity  unspecified laterality (H) [I82.4Y9]           Comments:  5 mg, PM dose         Anticoagulation Care Providers     Provider Role Specialty Phone number    Sylvester Cash MD [facility] [phone]

## 2021-09-08 ENCOUNTER — MYC REFILL (OUTPATIENT)
Dept: FAMILY MEDICINE | Facility: CLINIC | Age: 51
End: 2021-09-08

## 2021-09-08 DIAGNOSIS — F11.90 CHRONIC, CONTINUOUS USE OF OPIOIDS: ICD-10-CM

## 2021-09-08 DIAGNOSIS — M54.16 LUMBAR RADICULOPATHY: ICD-10-CM

## 2021-09-08 NOTE — TELEPHONE ENCOUNTER
oxycodone      Last Written Prescription Date:  08/11/21  Last Fill Quantity: 60,   # refills: 0  Last Office Visit: 08/11/21  Future Office visit:       Routing refill request to provider for review/approval because:  Drug not on the [facility], [facility] or [facility] refill protocol or controlled substance

## 2021-09-09 RX ORDER — OXYCODONE AND ACETAMINOPHEN 10; 325 MG/1; MG/1
1 TABLET ORAL EVERY 6 HOURS PRN
Qty: 60 TABLET | Refills: 0 | Status: SHIPPED | OUTPATIENT
Start: 2021-09-09 | End: 2021-10-07

## 2021-09-12 ENCOUNTER — APPOINTMENT (OUTPATIENT)
Dept: GENERAL RADIOLOGY | Facility: CLINIC | Age: 51
End: 2021-09-12
Attending: FAMILY MEDICINE
Payer: COMMERCIAL

## 2021-09-12 ENCOUNTER — HOSPITAL ENCOUNTER (EMERGENCY)
Facility: CLINIC | Age: 51
Discharge: HOME | End: 2021-09-12
Attending: FAMILY MEDICINE | Admitting: FAMILY MEDICINE
Payer: COMMERCIAL

## 2021-09-12 VITALS
SYSTOLIC BLOOD PRESSURE: 134 MMHG | RESPIRATION RATE: 18 BRPM | WEIGHT: 246 LBS | BODY MASS INDEX: 33.36 KG/M2 | HEART RATE: 80 BPM | TEMPERATURE: 98.2 F | DIASTOLIC BLOOD PRESSURE: 64 MMHG

## 2021-09-12 DIAGNOSIS — S63.259A DISLOCATION, FINGER CLOSED, INITIAL ENCOUNTER: ICD-10-CM

## 2021-09-12 PROCEDURE — 999N000065 XR FINGER RT G/E 2 VW: Mod: RT

## 2021-09-12 PROCEDURE — 99284 EMERGENCY DEPT VISIT MOD MDM: CPT | Mod: 25 | Performed by: FAMILY MEDICINE

## 2021-09-12 PROCEDURE — 26770 TREAT FINGER DISLOCATION: CPT | Mod: F8 | Performed by: FAMILY MEDICINE

## 2021-09-12 PROCEDURE — 73140 X-RAY EXAM OF FINGER(S): CPT | Mod: RT

## 2021-09-12 ASSESSMENT — ENCOUNTER SYMPTOMS
JOINT SWELLING: 1
ARTHRALGIAS: 1

## 2021-09-12 NOTE — LETTER
[facility]  Emergency Room  [address] [place]   [address]  Tel: [phone]   Fax: [phone]  2021    Hollis Diggs  [address] [place] [facility] [id number] [phone] (home) [phone] (work)    : 1970          To Whom it May Concern:    Hollis Diggs was seen in our ER today 2021. I expect his condition to improve over the next 10 days.  He may return to work but please allow use of the splinting to his right fingers and to avoid lifting more than 10 pounds for the next 10 days. If not improved during the above expected time period,  then I have encouraged him to be rechecked in his clinic for further evaluation.      Please contact me for questions or concerns.    Sincerely,      Fercho Rojas, DO  Physician  [facility] Emergency Room

## 2021-09-12 NOTE — ED TRIAGE NOTES
Pt presents after falling and landing the right hand.  Pt's right fourth finger appears deformed.

## 2021-09-12 NOTE — ED PROVIDER NOTES
History     Chief Complaint   Patient presents with     Hand Injury     HPI  Hollis Diggs is a 51 year old male who presented emergency room today secondary to concerns of injury to his right fourth finger.  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  He denies any other injury associated with the fall and tripping today.  He notes that the finger appears deformed.  He denies any prior injury to this finger.  He has had no open wound or skin injury associated with fall.    Allergies:  Allergies   Allergen Reactions     No Known Drug Allergies        Problem List:    Patient Active Problem List    Diagnosis Date Noted     Chronic, continuous use of opioids 05/14/2021     Priority: Medium     Deep vein thrombosis (DVT) of proximal lower extremity, unspecified chronicity, unspecified laterality (H) 04/28/2021     Priority: Medium     Gastroesophageal reflux disease without esophagitis 04/26/2021     Priority: Medium     Antiphospholipid syndrome (H) 03/06/2021     Priority: Medium     Suicidal behavior 06/22/2020     Priority: Medium     Class 1 obesity due to excess calories without serious comorbidity with body mass index (BMI) of 33.0 to 33.9 in adult 01/08/2020     Priority: Medium     Long-term use of high-risk medication 05/12/2017     Priority: Medium     History of deep venous thrombosis 04/14/2017     Priority: Medium     DDD (degenerative disc disease), lumbar 04/14/2017     Priority: Medium     On prednisone therapy 07/27/2016     Priority: Medium     Seronegative rheumatoid arthritis (H) 06/28/2016     Priority: Medium     Long-term (current) use of anticoagulants [Z79.01] 03/16/2016     Priority: Medium     Rheumatoid arthritis of multiple sites with negative rheumatoid factor (H) 12/07/2015     Priority: Medium     Midline low back pain, with sciatica presence unspecified 10/14/2015     Priority: Medium      Major depressive disorder, recurrent episode, mild (H) 10/07/2015     Priority: Medium     Pain in joint, multiple sites 03/20/2015     Priority: Medium     Anxiety state 02/10/2015     Priority: Medium     Problem list name updated by automated process. Provider to review       CARDIOVASCULAR SCREENING; LDL GOAL LESS THAN 160 01/15/2013     Priority: Medium     Alopecia 02/19/2010     Priority: Medium     Ingrown toenail 08/18/2009     Priority: Medium     Anxiety 07/09/2008     Priority: Medium     Abdominal pain, epigastric 01/25/2006     Priority: Medium        Past Medical History:    Past Medical History:   Diagnosis Date     Antiphospholipid syndrome (H)      Arthritis      Asthma      blood clot in leg      Depressive disorder, not elsewhere classified      ANKIT (generalised anxiety disorder)      HH (hiatus hernia)      Hypercholesteremia      Lumbar disc herniation 1992     Seronegative rheumatoid arthritis (H)        Past Surgical History:    Past Surgical History:   Procedure Laterality Date     APPENDECTOMY       COLONOSCOPY N/A 8/2/2016    Procedure: COMBINED COLONOSCOPY, SINGLE OR MULTIPLE BIOPSY/POLYPECTOMY BY BIOPSY;  Surgeon: Sydnee Walton MD;  Location: [facility] OR     COLONOSCOPY WITH CO2 INSUFFLATION N/A 8/2/2016    Procedure: COLONOSCOPY WITH CO2 INSUFFLATION;  Surgeon: Sydnee Walton MD;  Location: [facility] OR     COMBINED ESOPHAGOSCOPY, GASTROSCOPY, DUODENOSCOPY (EGD) WITH CO2 INSUFFLATION N/A 8/2/2016    Procedure: COMBINED ESOPHAGOSCOPY, GASTROSCOPY, DUODENOSCOPY (EGD) WITH CO2 INSUFFLATION;  Surgeon: Sydnee Walton MD;  Location: [facility] OR     COMBINED ESOPHAGOSCOPY, GASTROSCOPY, DUODENOSCOPY (EGD) WITH CO2 INSUFFLATION N/A 5/27/2021    Procedure: ESOPHAGOGASTRODUODENOSCOPY, WITH CO2 INSUFFLATION;  Surgeon: Alis Cotton DO;  Location: [facility] OR     ESOPHAGOSCOPY, GASTROSCOPY, DUODENOSCOPY (EGD), COMBINED N/A 8/2/2016    Procedure: COMBINED ESOPHAGOSCOPY,  "GASTROSCOPY, DUODENOSCOPY (EGD), BIOPSY SINGLE OR MULTIPLE;  Surgeon: Sydnee Walton MD;  Location: [facility] OR     ESOPHAGOSCOPY, GASTROSCOPY, DUODENOSCOPY (EGD), COMBINED N/A 5/27/2021    Procedure: Esophagogastroduodenoscopy, With Biopsy;  Surgeon: Alis Cotton DO;  Location: [facility] OR     HC REMOVAL OF TONSILS,<10 Y/O      Tonsils <11y.o.     HC REPAIR INCISIONAL HERNIA,REDUCIBLE  1970's    Hernia Repair, Incisional, Unilateral     HC UGI ENDOSCOPY DIAG W BIOPSY  02/01/06     HC VASECTOMY UNILAT/BILAT W POSTOP SEMEN  1/05    Vasectomy     History back lumbar laminectomy       INJECT EPIDURAL LUMBAR Right 4/22/2021    Procedure: Right Lumbar 4-5 and Lumbar 5 - Sacral 1 Epidural Steroid Injection;  Surgeon: Maxwell Zacarias MD;  Location: [facility] OR     ZZC NONSPECIFIC PROCEDURE  91 or 92    back surgery. lumbar. lamiectomy       Family History:    Family History   Problem Relation Age of Onset     Brain Tumor Mother         Benign     Deep Vein Thrombosis Mother         \"neck\"      Heart Disease Father         \"wont tell anyone\"     Neurologic Disorder Paternal Grandmother         Parkinsons      Alcohol/Drug Paternal Grandfather         alcoholic     Cancer Maternal Grandfather         lung     Diabetes Maternal Grandmother      Cerebrovascular Disease Maternal Grandmother         tim age ~70     Arthritis Cousin         cousins x 2 \"RA\"     Musculoskeletal Disorder Maternal Aunt         MS     Family History Negative Other         psoriasis, crohns, UC, SLE       Social History:  Marital Status:   [2]  Social History     Tobacco Use     Smoking status: Never Smoker     Smokeless tobacco: Never Used   Substance Use Topics     Alcohol use: Yes     Comment: rare     Drug use: No        Medications:    ARIPiprazole (ABILIFY) 10 MG tablet  Cholecalciferol (VITAMIN D3) 50 MCG (2000 UT) TABS  DULoxetine (CYMBALTA) 60 MG capsule  gabapentin (NEURONTIN) 800 MG tablet  oxyCODONE-acetaminophen (PERCOCET)  " MG per tablet  traZODone (DESYREL) 50 MG tablet  warfarin ANTICOAGULANT (COUMADIN) 5 MG tablet          Review of Systems   Musculoskeletal: Positive for arthralgias (right 4th finger deformity) and joint swelling.   All other systems reviewed and are negative.      Physical Exam   BP: 134/64  Pulse: 80  Temp: 98.2  F (36.8  C)  Resp: 18  Weight: 111.6 kg (246 lb)      Physical Exam  Vitals and nursing note reviewed. Exam conducted with a chaperone present.   Constitutional:       General: He is in acute distress (finger pain).   Musculoskeletal:      Right hand: Swelling, deformity, tenderness and bony tenderness present. No lacerations. Decreased range of motion. Normal capillary refill. Normal pulse.        Hands:    Neurological:      Mental Status: He is alert.         ED Course        [facility]    -Dislocation - Upper Extremity    Date/Time: 9/12/2021 1:51 PM  Performed by: Fercho Rojas DO  Authorized by: Fercho Rojas DO       LOCATION     Location:  Finger    Finger location:  R ring finger    Finger dislocation type: PIP      PRE PROCEDURE ASSESSMENT      Pre-procedure imaging:  X-ray    Imaging findings: dislocation present      Distal perfusion: normal      ANESTHESIA (see MAR for exact dosages)      Anesthesia method:  Nerve block    Block needle gauge:  27 G    Block anesthetic:  Bupivacaine 0.5% w/o epi    Block injection procedure:  Negative aspiration for blood, incremental injection, anatomic landmarks identified, anatomic landmarks palpated and introduced needle    Block outcome:  Anesthesia achieved    PROCEDURE DETAILS      Manipulation performed: yes      Finger reduction method:  Direct traction    Reduction successful: yes      Reduction confirmed with imaging: yes      Immobilization:  Tape    POST PROCEDURE DETAILS     Neurological function: normal      Distal perfusion: normal      Range of motion: improved      PROCEDURE   Patient  Tolerance:  Patient tolerated the procedure well with no immediate complications              Digital Block: Marcaine 0.5% without Epi was used. The affected digit was cleansed. The patient gave consent after we dicussed the procedure and possible side affects. 4ml of the anesthesia was drawn into a syringe. Using a 27g needle 1 injection were made to the base of the digit. Good anesthesia to the digit was had following the injection.         Critical Care time:  none               Results for orders placed or performed during the hospital encounter of 09/12/21 (from the past 24 hour(s))   XR Finger Right G/E 2 Views    Narrative    EXAM: XR FINGER RIGHT G/E 2 VIEW  LOCATION: [facility]  DATE/TIME: 09/12/2021, 12:53 PM    INDICATION: Trauma, pain.  COMPARISON: None.      Impression    IMPRESSION: There is dislocation of the PIP joint of the right ring finger with the middle phalanx dorsal and ulnar to the proximal phalanx. No fractures are identified but repeat films following reduction recommended. Degenerative change at the IP   joints of the adjacent fingers.       XR Finger Right G/E 2 Views    Narrative    EXAM: XR FINGER RT G/E 2 VIEW  LOCATION: [facility]  DATE/TIME: 09/12/2021, 1:18 PM    INDICATION: Post reduction.  COMPARISON: 09/12/2021.      Impression    IMPRESSION: Interval reduction of the dislocation at the level of the PIP joint of the right ring finger. There is a tiny fracture of the distal margin of the proximal phalanx seen on the oblique view along its ulnar side. There is some additional   irregularity along the radial side of the distal margin of the proximal phalanx but this could represent a small accessory sesamoid. There is no significant cortical step-off or disruption of the joint margins at the PIP joint.               Assessments & Plan (with Medical Decision Making)  51-year-old to the ER secondary to concerns of injury  to his right fourth finger.  Evidence of gross deformity suggestive of closed dislocation of the PIP joint noted on exam.  X-ray examination as noted above.  Digital block performed.  Manual reduction performed.  Patient tolerated well.  Post x-ray showed improved positioning.  Tiny fracture noted and patient notified of this.  Finger hudson taped to the third finger.  Note for work given to the patient for work restrictions.  To follow-up with his primary care provider.     I have reviewed the nursing notes.    I have reviewed the findings, diagnosis, plan and need for follow up with the patient.            I verbally discussed the findings of the evaluation today in the ER. I have verbally discussed with Hollis the suggested treatment(s) as described in the discharge instructions and handouts.    I have verbally suggested he follow-up in his clinic or return to the ER for increased symptoms. See the follow-up recommendations documented  in the after visit summary in this visit's EPIC chart.      Final diagnoses:   Dislocation, finger closed, initial encounter - right forth proximal PIP       9/12/2021   [facility] EMERGENCY DEPT     Fercho Rojas,   09/12/21 [id number]

## 2021-09-13 ENCOUNTER — TELEPHONE (OUTPATIENT)
Dept: ANTICOAGULATION | Facility: CLINIC | Age: 51
End: 2021-09-13

## 2021-09-13 NOTE — TELEPHONE ENCOUNTER
ANTICOAGULATION  MANAGEMENT: Discharge Review    Hollis Diggs chart reviewed for anticoagulation continuity of care    Emergency room visit on 9/12/21 for Finger dislocation.    Discharge disposition: Home    Results:    No results for input(s): INR, [id number], F2, ALMWH, AAUFH in the last 168 hours.  Anticoagulation inpatient management:     not applicable     Anticoagulation discharge instructions:     Warfarin dosing: home regimen continued   Bridging: No   INR goal change: No      Medication changes affecting anticoagulation: No    Additional factors affecting anticoagulation: No    Plan     No adjustment to anticoagulation plan needed    Patient not contacted    No adjustment to Anticoagulation Calendar was required    Jesse Flores RN

## 2021-09-17 ENCOUNTER — ANTICOAGULATION THERAPY VISIT (OUTPATIENT)
Dept: ANTICOAGULATION | Facility: CLINIC | Age: 51
End: 2021-09-17

## 2021-09-17 ENCOUNTER — LAB (OUTPATIENT)
Dept: LAB | Facility: CLINIC | Age: 51
End: 2021-09-17
Payer: COMMERCIAL

## 2021-09-17 DIAGNOSIS — Z79.01 LONG TERM CURRENT USE OF ANTICOAGULANT THERAPY: Primary | ICD-10-CM

## 2021-09-17 DIAGNOSIS — I82.4Y9 DEEP VEIN THROMBOSIS (DVT) OF PROXIMAL LOWER EXTREMITY, UNSPECIFIED CHRONICITY, UNSPECIFIED LATERALITY (H): ICD-10-CM

## 2021-09-17 DIAGNOSIS — Z79.01 LONG TERM CURRENT USE OF ANTICOAGULANT THERAPY: ICD-10-CM

## 2021-09-17 LAB — INR BLD: 2.5 (ref 0.9–1.1)

## 2021-09-17 PROCEDURE — 85610 PROTHROMBIN TIME: CPT

## 2021-09-17 PROCEDURE — 36416 COLLJ CAPILLARY BLOOD SPEC: CPT

## 2021-09-17 NOTE — PROGRESS NOTES
ANTICOAGULATION MANAGEMENT     Hollis Diggs 51 year old male is on warfarin with therapeutic INR result. (Goal INR 2.0-3.0)    Recent labs: (last 7 days)     09/17/21  1416   INR 2.5*       ASSESSMENT     Source(s): Chart Review and Patient/Caregiver Call       Warfarin doses taken: Warfarin taken as instructed    Diet: No new diet changes identified    New illness, injury, or hospitalization: No    Medication/supplement changes: None noted    Signs or symptoms of bleeding or clotting: No    Previous INR: Subtherapeutic    Additional findings: None     PLAN     Recommended plan for no diet, medication or health factor changes affecting INR     Dosing Instructions: Continue your current warfarin dose with next INR in 4 weeks       Summary  As of 9/17/2021    Full warfarin instructions:  10 mg every Tue; 7.5 mg all other days   Next INR check:  10/15/2021             Telephone call with Hollis who verbalizes understanding and agrees to plan    Patient offered & declined to schedule next visit    Education provided: None required    Plan made per [facility] anticoagulation protocol    Анна Hale RN  Anticoagulation Clinic  9/17/2021    _______________________________________________________________________     Anticoagulation Episode Summary     Current INR goal:  2.0-3.0   TTR:  35.2 % (1 y)   Target end date:  Indefinite   Send INR reminders to:  [facility]    Indications    DVT (deep venous thrombosis) (H) (Resolved) [I82.409]  Long-term (current) use of anticoagulants [Z79.01] [Z79.01]  Deep vein thrombosis (DVT) of proximal lower extremity  unspecified chronicity  unspecified laterality (H) [I82.4Y9]           Comments:  5 mg, PM dose         Anticoagulation Care Providers     Provider Role Specialty Phone number    Sylvester Cash MD [facility] [phone]

## 2021-10-07 ENCOUNTER — MYC REFILL (OUTPATIENT)
Dept: FAMILY MEDICINE | Facility: CLINIC | Age: 51
End: 2021-10-07

## 2021-10-07 DIAGNOSIS — F11.90 CHRONIC, CONTINUOUS USE OF OPIOIDS: ICD-10-CM

## 2021-10-07 DIAGNOSIS — M54.16 LUMBAR RADICULOPATHY: ICD-10-CM

## 2021-10-07 RX ORDER — OXYCODONE AND ACETAMINOPHEN 10; 325 MG/1; MG/1
1 TABLET ORAL EVERY 6 HOURS PRN
Qty: 60 TABLET | Refills: 0 | Status: SHIPPED | OUTPATIENT
Start: 2021-10-07 | End: 2021-11-04

## 2021-10-07 NOTE — TELEPHONE ENCOUNTER
Pending Prescriptions:                       Disp   Refills    oxyCODONE-acetaminophen (PERCOCET)  *60 tab*0        Sig: Take 1 tablet by mouth every 6 hours as needed for           severe pain Max of 2 a day    Routing refill request to provider for review/approval because:  Drug not on the [facility] refill protocol

## 2021-10-14 ENCOUNTER — LAB (OUTPATIENT)
Dept: LAB | Facility: CLINIC | Age: 51
End: 2021-10-14
Payer: COMMERCIAL

## 2021-10-14 ENCOUNTER — ANTICOAGULATION THERAPY VISIT (OUTPATIENT)
Dept: ANTICOAGULATION | Facility: CLINIC | Age: 51
End: 2021-10-14

## 2021-10-14 DIAGNOSIS — I82.4Y9 DEEP VEIN THROMBOSIS (DVT) OF PROXIMAL LOWER EXTREMITY, UNSPECIFIED CHRONICITY, UNSPECIFIED LATERALITY (H): ICD-10-CM

## 2021-10-14 DIAGNOSIS — Z79.01 LONG TERM CURRENT USE OF ANTICOAGULANT THERAPY: ICD-10-CM

## 2021-10-14 DIAGNOSIS — Z79.01 LONG TERM CURRENT USE OF ANTICOAGULANT THERAPY: Primary | ICD-10-CM

## 2021-10-14 LAB — INR BLD: 2.1 (ref 0.9–1.1)

## 2021-10-14 PROCEDURE — 85610 PROTHROMBIN TIME: CPT

## 2021-10-14 PROCEDURE — 36416 COLLJ CAPILLARY BLOOD SPEC: CPT

## 2021-10-14 NOTE — PROGRESS NOTES
ANTICOAGULATION MANAGEMENT     Hollis Diggs 51 year old male is on warfarin with therapeutic INR result. (Goal INR 2.0-3.0)    Recent labs: (last 7 days)     10/14/21  1127   INR 2.1*       ASSESSMENT     Source(s): I left a detailed voicemail with the orders reflected in flowsheet. I have also requested a call back if there have been any missed doses, concerns, illness, fever, or if there have been any changes in medications, activity level, or diet        Previous INR: Therapeutic last visit; previously outside of goal range    Additional findings: VM left     PLAN     Recommended plan for no diet, medication or health factor changes affecting INR     Dosing Instructions: Continue your current warfarin dose with next INR in 5 weeks       Summary  As of 10/14/2021    Full warfarin instructions:  10 mg every Tue; 7.5 mg all other days   Next INR check:  11/18/2021             Detailed voice message left for Hollis with dosing instructions and follow up date.     Contact [phone]  to schedule and with any changes, questions or concerns.     Education provided: Please call back if any changes to your diet, medications or how you've been taking warfarin    Plan made per [facility] anticoagulation protocol    Lilliam Pro, RN  Anticoagulation Clinic  10/14/2021    _______________________________________________________________________     Anticoagulation Episode Summary     Current INR goal:  2.0-3.0   TTR:  42.6 % (1 y)   Target end date:  Indefinite   Send INR reminders to:  [facility]    Indications    DVT (deep venous thrombosis) (H) (Resolved) [I82.409]  Long-term (current) use of anticoagulants [Z79.01] [Z79.01]  Deep vein thrombosis (DVT) of proximal lower extremity  unspecified chronicity  unspecified laterality (H) [I82.4Y9]           Comments:  5 mg, PM dose         Anticoagulation Care Providers     Provider Role Specialty Phone number    Sylvester Cahs MD Referring [facility]  [phone]

## 2021-10-23 ENCOUNTER — HEALTH MAINTENANCE LETTER (OUTPATIENT)
Age: 51
End: 2021-10-23

## 2021-10-25 DIAGNOSIS — I82.4Y9 DEEP VEIN THROMBOSIS (DVT) OF PROXIMAL LOWER EXTREMITY, UNSPECIFIED CHRONICITY, UNSPECIFIED LATERALITY (H): ICD-10-CM

## 2021-10-25 DIAGNOSIS — Z79.01 LONG TERM CURRENT USE OF ANTICOAGULANT THERAPY: ICD-10-CM

## 2021-10-25 RX ORDER — WARFARIN SODIUM 5 MG/1
TABLET ORAL
Qty: 140 TABLET | Refills: 0 | Status: SHIPPED | OUTPATIENT
Start: 2021-10-25 | End: 2022-01-24

## 2021-11-04 ENCOUNTER — MYC REFILL (OUTPATIENT)
Dept: FAMILY MEDICINE | Facility: CLINIC | Age: 51
End: 2021-11-04

## 2021-11-04 DIAGNOSIS — M54.16 LUMBAR RADICULOPATHY: ICD-10-CM

## 2021-11-04 DIAGNOSIS — F11.90 CHRONIC, CONTINUOUS USE OF OPIOIDS: ICD-10-CM

## 2021-11-04 RX ORDER — OXYCODONE AND ACETAMINOPHEN 10; 325 MG/1; MG/1
1 TABLET ORAL EVERY 6 HOURS PRN
Qty: 60 TABLET | Refills: 0 | Status: SHIPPED | OUTPATIENT
Start: 2021-11-04 | End: 2021-12-02

## 2021-11-15 ENCOUNTER — OFFICE VISIT (OUTPATIENT)
Dept: FAMILY MEDICINE | Facility: CLINIC | Age: 51
End: 2021-11-15
Payer: COMMERCIAL

## 2021-11-15 VITALS
RESPIRATION RATE: 10 BRPM | WEIGHT: 249 LBS | TEMPERATURE: 98.4 F | HEIGHT: 72 IN | BODY MASS INDEX: 33.72 KG/M2 | HEART RATE: 110 BPM | SYSTOLIC BLOOD PRESSURE: 124 MMHG | DIASTOLIC BLOOD PRESSURE: 70 MMHG | OXYGEN SATURATION: 100 %

## 2021-11-15 DIAGNOSIS — Z12.5 SCREENING FOR PROSTATE CANCER: ICD-10-CM

## 2021-11-15 DIAGNOSIS — F33.0 MAJOR DEPRESSIVE DISORDER, RECURRENT EPISODE, MILD (H): ICD-10-CM

## 2021-11-15 DIAGNOSIS — Z00.00 ROUTINE GENERAL MEDICAL EXAMINATION AT A HEALTH CARE FACILITY: Primary | ICD-10-CM

## 2021-11-15 DIAGNOSIS — M19.90 ARTHRITIS: ICD-10-CM

## 2021-11-15 DIAGNOSIS — Z23 HIGH PRIORITY FOR 2019-NCOV VACCINE: ICD-10-CM

## 2021-11-15 DIAGNOSIS — Z13.6 CARDIOVASCULAR SCREENING; LDL GOAL LESS THAN 160: ICD-10-CM

## 2021-11-15 DIAGNOSIS — E55.9 HYPOVITAMINOSIS D: ICD-10-CM

## 2021-11-15 DIAGNOSIS — B07.0 PLANTAR WARTS: ICD-10-CM

## 2021-11-15 LAB
CHOLEST SERPL-MCNC: 151 MG/DL
FASTING STATUS PATIENT QL REPORTED: YES
HDLC SERPL-MCNC: 45 MG/DL
LDLC SERPL CALC-MCNC: 84 MG/DL
NONHDLC SERPL-MCNC: 106 MG/DL
PSA SERPL-MCNC: 0.9 UG/L (ref 0–4)
TRIGL SERPL-MCNC: 110 MG/DL

## 2021-11-15 PROCEDURE — 0064A COVID-19,PF,MODERNA (18+ YRS BOOSTER .25ML): CPT | Performed by: FAMILY MEDICINE

## 2021-11-15 PROCEDURE — 99213 OFFICE O/P EST LOW 20 MIN: CPT | Mod: 25 | Performed by: FAMILY MEDICINE

## 2021-11-15 PROCEDURE — 90471 IMMUNIZATION ADMIN: CPT | Performed by: FAMILY MEDICINE

## 2021-11-15 PROCEDURE — G0103 PSA SCREENING: HCPCS | Performed by: FAMILY MEDICINE

## 2021-11-15 PROCEDURE — 82306 VITAMIN D 25 HYDROXY: CPT | Performed by: FAMILY MEDICINE

## 2021-11-15 PROCEDURE — 91306 COVID-19,PF,MODERNA (18+ YRS BOOSTER .25ML): CPT | Performed by: FAMILY MEDICINE

## 2021-11-15 PROCEDURE — 86038 ANTINUCLEAR ANTIBODIES: CPT | Performed by: FAMILY MEDICINE

## 2021-11-15 PROCEDURE — 99396 PREV VISIT EST AGE 40-64: CPT | Mod: 25 | Performed by: FAMILY MEDICINE

## 2021-11-15 PROCEDURE — 80061 LIPID PANEL: CPT | Performed by: FAMILY MEDICINE

## 2021-11-15 PROCEDURE — 90682 RIV4 VACC RECOMBINANT DNA IM: CPT | Performed by: FAMILY MEDICINE

## 2021-11-15 PROCEDURE — 86431 RHEUMATOID FACTOR QUANT: CPT | Performed by: FAMILY MEDICINE

## 2021-11-15 PROCEDURE — 36415 COLL VENOUS BLD VENIPUNCTURE: CPT | Performed by: FAMILY MEDICINE

## 2021-11-15 PROCEDURE — 17110 DESTRUCTION B9 LES UP TO 14: CPT | Performed by: FAMILY MEDICINE

## 2021-11-15 RX ORDER — ARIPIPRAZOLE 10 MG/1
10 TABLET ORAL DAILY
Qty: 90 TABLET | Refills: 1 | Status: SHIPPED | OUTPATIENT
Start: 2021-11-15 | End: 2021-12-09

## 2021-11-15 RX ORDER — CHOLECALCIFEROL (VITAMIN D3) 50 MCG
1 TABLET ORAL DAILY
Qty: 90 TABLET | Refills: 1 | Status: SHIPPED | OUTPATIENT
Start: 2021-11-15 | End: 2022-02-14

## 2021-11-15 RX ORDER — GABAPENTIN 600 MG/1
TABLET ORAL
COMMUNITY
Start: 2021-10-18 | End: 2021-12-09

## 2021-11-15 ASSESSMENT — MIFFLIN-ST. JEOR: SCORE: 2024.46

## 2021-11-15 NOTE — PROGRESS NOTES
SUBJECTIVE:   CC: Hollis Diggs is an 51 year old male who presents for preventative health visit.       Patient has been advised of split billing requirements and indicates understanding: Yes  Healthy Habits:    Getting at least 3 servings of Calcium per day:  NO    Bi-annual eye exam:  Yes    Dental care twice a year:  Yes    Sleep apnea or symptoms of sleep apnea:  Sleep apnea    Diet:  Regular (no restrictions)    Frequency of exercise:  None    Duration of exercise:  N/A    Taking medications regularly:  Yes    Barriers to taking medications:  None    Medication side effects:  Not applicable    PHQ-2 Total Score:    Additional concerns today:  Yes (Joint pain, wart on left foot)              Today's PHQ-2 Score:   PHQ-2 ( 1999 [employer]) 11/15/2021   Q1: Little interest or pleasure in doing things -   Q2: Feeling down, depressed or hopeless -   PHQ-2 Score -   Q1: Little interest or pleasure in doing things More than half the days   Q2: Feeling down, depressed or hopeless More than half the days   PHQ-2 Score 4       Abuse: Current or Past(Physical, Sexual or Emotional)- No  Do you feel safe in your environment? Yes    Have you ever done Advance Care Planning? (For example, a Health Directive, POLST, or a discussion with a medical provider or your loved ones about your wishes): No, advance care planning information given to patient to review.  Patient declined advance care planning discussion at this time.    Social History     Tobacco Use     Smoking status: Never Smoker     Smokeless tobacco: Never Used   Substance Use Topics     Alcohol use: Yes     Comment: rare     If you drink alcohol do you typically have >3 drinks per day or >7 drinks per week? No      Last PSA:   PSA   Date Value Ref Range Status   11/08/2019 0.97 0 - 4 ug/L Final     Comment:     Assay Method:  Chemiluminescence using Siemens Vista analyzer       Reviewed orders with patient. Reviewed health maintenance and updated orders accordingly -  Yes  Labs reviewed in EPIC    Reviewed and updated as needed this visit by clinical staff                Reviewed and updated as needed this visit by Provider               Past Medical History:   Diagnosis Date     Antiphospholipid syndrome (H)      Arthritis      Asthma     Exercise     blood clot in leg      Depressive disorder, not elsewhere classified     Depression (non-psychotic)     ANKIT (generalised anxiety disorder)      HH (hiatus hernia)      Hypercholesteremia     normal with weight loss 3/09     Lumbar disc herniation 1992     Seronegative rheumatoid arthritis (H)       Past Surgical History:   Procedure Laterality Date     APPENDECTOMY       COLONOSCOPY N/A 8/2/2016    Procedure: COMBINED COLONOSCOPY, SINGLE OR MULTIPLE BIOPSY/POLYPECTOMY BY BIOPSY;  Surgeon: Sydnee Walton MD;  Location: [facility]     COLONOSCOPY WITH CO2 INSUFFLATION N/A 8/2/2016    Procedure: COLONOSCOPY WITH CO2 INSUFFLATION;  Surgeon: Sydnee Walton MD;  Location: [facility]     COMBINED ESOPHAGOSCOPY, GASTROSCOPY, DUODENOSCOPY (EGD) WITH CO2 INSUFFLATION N/A 8/2/2016    Procedure: COMBINED ESOPHAGOSCOPY, GASTROSCOPY, DUODENOSCOPY (EGD) WITH CO2 INSUFFLATION;  Surgeon: Sydnee Walton MD;  Location: [facility] OR     COMBINED ESOPHAGOSCOPY, GASTROSCOPY, DUODENOSCOPY (EGD) WITH CO2 INSUFFLATION N/A 5/27/2021    Procedure: ESOPHAGOGASTRODUODENOSCOPY, WITH CO2 INSUFFLATION;  Surgeon: Alis Cotton DO;  Location: [facility]     ESOPHAGOSCOPY, GASTROSCOPY, DUODENOSCOPY (EGD), COMBINED N/A 8/2/2016    Procedure: COMBINED ESOPHAGOSCOPY, GASTROSCOPY, DUODENOSCOPY (EGD), BIOPSY SINGLE OR MULTIPLE;  Surgeon: Sydnee Walton MD;  Location: [facility]     ESOPHAGOSCOPY, GASTROSCOPY, DUODENOSCOPY (EGD), COMBINED N/A 5/27/2021    Procedure: Esophagogastroduodenoscopy, With Biopsy;  Surgeon: Alis Cotton DO;  Location: [facility] OR     HC REMOVAL OF TONSILS,<10 Y/O      Tonsils <11y.o.     HC REPAIR INCISIONAL  HERNIA,REDUCIBLE  1970's    Hernia Repair, Incisional, Unilateral     HC UGI ENDOSCOPY DIAG W BIOPSY  02/01/06     HC VASECTOMY UNILAT/BILAT W POSTOP SEMEN  1/05    Vasectomy     History back lumbar laminectomy       INJECT EPIDURAL LUMBAR Right 4/22/2021    Procedure: Right Lumbar 4-5 and Lumbar 5 - Sacral 1 Epidural Steroid Injection;  Surgeon: Maxwell Zacarias MD;  Location:  OR     [facility] NONSPECIFIC PROCEDURE  91 or 92    back surgery. lumbar. lamiectomy       Review of Systems  CONSTITUTIONAL: NEGATIVE for fever, chills, change in weight  INTEGUMENTARY/SKIN: Plantar wart on his left foot laterally at the base of the little toe.  It bothers him in his shoes.  EYES: NEGATIVE for vision changes or irritation  ENT: NEGATIVE for ear, mouth and throat problems  RESP: NEGATIVE for significant cough or SOB  CV: NEGATIVE for chest pain, palpitations or peripheral edema  GI: NEGATIVE for nausea, abdominal pain, heartburn, or change in bowel habits   male: negative for dysuria, hematuria, decreased urinary stream, erectile dysfunction, urethral discharge  MUSCULOSKELETAL: Again complaining of multiple joint pains and arthritis.  This is coming down over the years.  He was once under the care of a rheumatologist.  May be considering this again.  NEURO: NEGATIVE for weakness, dizziness or paresthesias  PSYCHIATRIC: NEGATIVE for changes in mood or affect    OBJECTIVE:   There were no vitals taken for this visit.    Physical Exam  GENERAL: healthy, alert and no distress  EYES: Eyes grossly normal to inspection, PERRL and conjunctivae and sclerae normal  HENT: ear canals and TM's normal, nose and mouth without ulcers or lesions  NECK: no adenopathy, no asymmetry, masses, or scars and thyroid normal to palpation  RESP: lungs clear to auscultation - no rales, rhonchi or wheezes  CV: regular rate and rhythm, normal S1 S2, no S3 or S4, no murmur, click or rub, no peripheral edema and peripheral pulses strong  ABDOMEN: soft,  nontender, no hepatosplenomegaly, no masses and bowel sounds normal  MS: no gross musculoskeletal defects noted, no edema  SKIN: About a 3 mm plantar wart lateral side of the distal fifth metatarsal.  This was prepped with alcohol shaved down with a sterile blade frozen solidly twice for about 40 seconds each with liquid nitrogen.  NEURO: Normal strength and tone, mentation intact and speech normal  PSYCH: mentation appears normal, affect normal/bright    Diagnostic Test Results:  Labs reviewed in Epic  Results for orders placed or performed in visit on 11/15/21   Lipid panel reflex to direct LDL Fasting     Status: None   Result Value Ref Range    Cholesterol 151 <200 mg/dL    Triglycerides 110 <150 mg/dL    Direct Measure HDL 45 >=40 mg/dL    LDL Cholesterol Calculated 84 <=100 mg/dL    Non HDL Cholesterol 106 <130 mg/dL    Patient Fasting > 8hrs? Yes     Narrative    Cholesterol  Desirable:  <200 mg/dL    Triglycerides  Normal:  Less than 150 mg/dL  Borderline High:  150-199 mg/dL  High:  200-499 mg/dL  Very High:  Greater than or equal to 500 mg/dL    Direct Measure HDL  Female:  Greater than or equal to 50 mg/dL   Male:  Greater than or equal to 40 mg/dL    LDL Cholesterol  Desirable:  <100mg/dL  Above Desirable:  100-129 mg/dL   Borderline High:  130-159 mg/dL   High:  160-189 mg/dL   Very High:  >= 190 mg/dL    Non HDL Cholesterol  Desirable:  130 mg/dL  Above Desirable:  130-159 mg/dL  Borderline High:  160-189 mg/dL  High:  190-219 mg/dL  Very High:  Greater than or equal to 220 mg/dL   Vitamin D Deficiency     Status: Normal   Result Value Ref Range    Vitamin D, Total (25-Hydroxy) 51 20 - 75 ug/L    Narrative    Season, race, dietary intake, and treatment affect the concentration of 25-hydroxy-Vitamin D. Values may decrease during winter months and increase during summer months. Values 20-29 ug/L may indicate Vitamin D insufficiency and values <20 ug/L may indicate Vitamin D deficiency.    Vitamin D  determination is routinely performed by an immunoassay specific for 25 hydroxyvitamin D3.  If an individual is on vitamin D2(ergocalciferol) supplementation, please specify 25 OH vitamin D2 and D3 level determination by LCMSMS test VITD23.     Rheumatoid factor     Status: Normal   Result Value Ref Range    Rheumatoid Factor 8 <12 IU/mL   PSA, screen     Status: Normal   Result Value Ref Range    Prostate Specific Antigen Screen 0.90 0.00 - 4.00 ug/L    Narrative    Assay Method:  Chemiluminescence using Siemens   Vista analyzer.       ASSESSMENT/PLAN:   (Z00.00) Routine general medical examination at a health care facility  (primary encounter diagnosis)  Comment: See discussions below.  Plan:       (E55.9) Hypovitaminosis D  Comment: His vitamin D level is in the normal range we will continue have him take the same amount this was discussed with him we wanted to check his level to make sure he is not overdoing it.  Plan: vitamin D3 (VITAMIN D3) 50 mcg (2000 units)         tablet, Vitamin D Deficiency, OFFICE/OUTPT         VISIT,EST,LEVL III            (M19.90) Arthritis  Comment: He was once on something possibly methotrexate.  We discussed getting rheumatoid factor and MARCUS first.  May request a referral to rheumatology again in the future after we see these results.  Plan: Rheumatoid factor, Anti Nuclear Kim IgG by IFA         with Reflex, OFFICE/OUTPT VISIT,EST,LEVL III            (B07.0) Plantar warts  Comment: As noted above one wart was treated with liquid nitrogen.  Warned to watch for blistering.  Follow-up in a couple of weeks if not improving.  Plan: DESTRUCT BENIGN LESION, UP TO 14            (F33.0) Major depressive disorder, recurrent episode, mild (H)  Comment: He is stable right now the Abilify was added and he seems to be doing really well on that along with Cymbalta and takes trazodone for sleep.  Plan: ARIPiprazole (ABILIFY) 10 MG tablet,         OFFICE/OUTPT VISIT,EST,LEVL III            (Z13.6)  CARDIOVASCULAR SCREENING; LDL GOAL LESS THAN 160  Comment: Notify with results  Plan: Lipid panel reflex to direct LDL Fasting            (Z12.5) Screening for prostate cancer  Comment: We will notify with results.  Plan: PSA, screen            (Z23) High priority for 2019-nCoV vaccine  Comment:   Plan: COVID-19,PF,MODERNA (18+ Yrs BOOSTER .25mL)              Patient has been advised of split billing requirements and indicates understanding: Yes  COUNSELING:   Reviewed preventive health counseling, as reflected in patient instructions       Regular exercise       Healthy diet/nutrition       Vision screening    Estimated body mass index is 33.36 kg/m  as calculated from the following:    Height as of 6/15/21: 1.829 m (6').    Weight as of 9/12/21: 111.6 kg (246 lb).     Weight management plan: Discussed healthy diet and exercise guidelines    He reports that he has never smoked. He has never used smokeless tobacco.      Counseling Resources:  ATP IV Guidelines  Pooled Cohorts Equation Calculator  FRAX Risk Assessment  ICSI Preventive Guidelines  Dietary Guidelines for Americans, 2010  USDA's MyPlate  ASA Prophylaxis  Lung CA Screening    Sylvester Cash MD  [facility]

## 2021-11-16 LAB
DEPRECATED CALCIDIOL+CALCIFEROL SERPL-MC: 51 UG/L (ref 20–75)
RHEUMATOID FACT SER NEPH-ACNC: 8 IU/ML

## 2021-11-17 LAB — ANA SER QL IF: NEGATIVE

## 2021-11-19 ENCOUNTER — TELEPHONE (OUTPATIENT)
Dept: FAMILY MEDICINE | Facility: CLINIC | Age: 51
End: 2021-11-19
Payer: COMMERCIAL

## 2021-11-19 NOTE — LETTER
November 22, 2021      Hollis Diggs  [address] [place] [facility] [address]        Dear ,    We are writing to inform you of your test results.    Test for rheumatoid disease and diseases were all normal. Vitamin D level was normal    Resulted Orders   Lipid panel reflex to direct LDL Fasting   Result Value Ref Range    Cholesterol 151 <200 mg/dL    Triglycerides 110 <150 mg/dL    Direct Measure HDL 45 >=40 mg/dL    LDL Cholesterol Calculated 84 <=100 mg/dL    Non HDL Cholesterol 106 <130 mg/dL    Patient Fasting > 8hrs? Yes     Narrative    Cholesterol  Desirable:  <200 mg/dL    Triglycerides  Normal:  Less than 150 mg/dL  Borderline High:  150-199 mg/dL  High:  200-499 mg/dL  Very High:  Greater than or equal to 500 mg/dL    Direct Measure HDL  Female:  Greater than or equal to 50 mg/dL   Male:  Greater than or equal to 40 mg/dL    LDL Cholesterol  Desirable:  <100mg/dL  Above Desirable:  100-129 mg/dL   Borderline High:  130-159 mg/dL   High:  160-189 mg/dL   Very High:  >= 190 mg/dL    Non HDL Cholesterol  Desirable:  130 mg/dL  Above Desirable:  130-159 mg/dL  Borderline High:  160-189 mg/dL  High:  190-219 mg/dL  Very High:  Greater than or equal to 220 mg/dL   Vitamin D Deficiency   Result Value Ref Range    Vitamin D, Total (25-Hydroxy) 51 20 - 75 ug/L    Narrative    Season, race, dietary intake, and treatment affect the concentration of 25-hydroxy-Vitamin D. Values may decrease during winter months and increase during summer months. Values 20-29 ug/L may indicate Vitamin D insufficiency and values <20 ug/L may indicate Vitamin D deficiency.    Vitamin D determination is routinely performed by an immunoassay specific for 25 hydroxyvitamin D3.  If an individual is on vitamin D2(ergocalciferol) supplementation, please specify 25 OH vitamin D2 and D3 level determination by LCMSMS test VITD23.     Rheumatoid factor   Result Value Ref Range    Rheumatoid Factor 8 <12 IU/mL   Anti Nuclear Kim IgG  by IFA with Reflex   Result Value Ref Range    MARCUS interpretation Negative Negative      Comment:        Negative:              <1:40  Borderline Positive:   1:40 - 1:80  Positive:              >1:80   PSA, screen   Result Value Ref Range    Prostate Specific Antigen Screen 0.90 0.00 - 4.00 ug/L    Narrative    Assay Method:  Chemiluminescence using Siemens   Vista analyzer.       If you have any questions or concerns, please call the clinic at the number listed above.       Sincerely,    Dr. Cash

## 2021-11-19 NOTE — TELEPHONE ENCOUNTER
----- Message from Sylvester Cash MD sent at 11/19/2021 12:22 PM CST -----  Test for rheumatoid disease and diseases were all normal vitamin D level was normal

## 2021-11-22 NOTE — TELEPHONE ENCOUNTER
I have written and printed a letter to the pt with the information below. Ekta Marroquin CMA ([facility])

## 2021-11-26 DIAGNOSIS — F41.9 ANXIETY: ICD-10-CM

## 2021-11-29 ENCOUNTER — TELEPHONE (OUTPATIENT)
Dept: FAMILY MEDICINE | Facility: CLINIC | Age: 51
End: 2021-11-29
Payer: COMMERCIAL

## 2021-11-29 RX ORDER — GABAPENTIN 800 MG/1
800 TABLET ORAL 3 TIMES DAILY
Qty: 90 TABLET | Refills: 3 | Status: SHIPPED | OUTPATIENT
Start: 2021-11-29 | End: 2021-12-09

## 2021-11-29 NOTE — TELEPHONE ENCOUNTER
Neurontin      Last Written Prescription Date:  8/11/21  Last Fill Quantity: 90,   # refills: 3  Last Office Visit: 11/15/21  Future Office visit:    Next 5 appointments (look out 90 days)      Dec 09, 2021  1:20 PM  (Arrive by 1:00 PM)  Office Visit with Sylvester Cash MD  [facility] ([facility] ) [address] [place] [id number] [phone]             Routing refill request to provider for review/approval because:  Drug not on the [facility], [facility] or [facility] refill protocol or controlled substance    Mere Khoury RN

## 2021-11-29 NOTE — TELEPHONE ENCOUNTER
ANTICOAGULATION     Hollis Diggs is overdue for INR check.      Left message for patient to call and schedule lab appointment as soon as possible. If returning call, please schedule.     Monica Contreras RN

## 2021-12-02 ENCOUNTER — MYC REFILL (OUTPATIENT)
Dept: FAMILY MEDICINE | Facility: CLINIC | Age: 51
End: 2021-12-02
Payer: COMMERCIAL

## 2021-12-02 DIAGNOSIS — M54.16 LUMBAR RADICULOPATHY: ICD-10-CM

## 2021-12-02 DIAGNOSIS — F11.90 CHRONIC, CONTINUOUS USE OF OPIOIDS: ICD-10-CM

## 2021-12-02 RX ORDER — OXYCODONE AND ACETAMINOPHEN 10; 325 MG/1; MG/1
1 TABLET ORAL EVERY 6 HOURS PRN
Qty: 60 TABLET | Refills: 0 | Status: SHIPPED | OUTPATIENT
Start: 2021-12-02 | End: 2021-12-30

## 2021-12-02 NOTE — TELEPHONE ENCOUNTER
Requested Prescriptions   Pending Prescriptions Disp Refills     oxyCODONE-acetaminophen (PERCOCET)  MG per tablet 60 tablet 0     Sig: Take 1 tablet by mouth every 6 hours as needed for severe pain Max of 2 a day     Last Written Prescription Date:  11/04/2021  Last Fill Quantity: 60,   # refills: 0  Last Office Visit: 11/15/2021  Future Office visit:    Next 5 appointments (look out 90 days)    Dec 09, 2021  1:20 PM  (Arrive by 1:00 PM)  Office Visit with Sylvester Cash MD  [facility] ([facility] ) [address] [place] [id number] [phone]           Routing refill request to provider for review/approval because:  Drug not on the [facility], [facility] or [facility] refill protocol or controlled substance

## 2021-12-09 ENCOUNTER — OFFICE VISIT (OUTPATIENT)
Dept: FAMILY MEDICINE | Facility: CLINIC | Age: 51
End: 2021-12-09
Payer: COMMERCIAL

## 2021-12-09 ENCOUNTER — ANTICOAGULATION THERAPY VISIT (OUTPATIENT)
Dept: ANTICOAGULATION | Facility: CLINIC | Age: 51
End: 2021-12-09

## 2021-12-09 VITALS
DIASTOLIC BLOOD PRESSURE: 66 MMHG | TEMPERATURE: 97.5 F | SYSTOLIC BLOOD PRESSURE: 112 MMHG | RESPIRATION RATE: 18 BRPM | OXYGEN SATURATION: 99 % | WEIGHT: 251 LBS | HEART RATE: 98 BPM | BODY MASS INDEX: 33.63 KG/M2

## 2021-12-09 DIAGNOSIS — I82.4Y9 DEEP VEIN THROMBOSIS (DVT) OF PROXIMAL LOWER EXTREMITY, UNSPECIFIED CHRONICITY, UNSPECIFIED LATERALITY (H): ICD-10-CM

## 2021-12-09 DIAGNOSIS — M25.50 PAIN IN JOINT, MULTIPLE SITES: ICD-10-CM

## 2021-12-09 DIAGNOSIS — G47.419 PRIMARY NARCOLEPSY WITHOUT CATAPLEXY: ICD-10-CM

## 2021-12-09 DIAGNOSIS — F41.9 ANXIETY: ICD-10-CM

## 2021-12-09 DIAGNOSIS — M06.00 SERONEGATIVE RHEUMATOID ARTHRITIS (H): ICD-10-CM

## 2021-12-09 DIAGNOSIS — Z79.01 LONG TERM CURRENT USE OF ANTICOAGULANT THERAPY: ICD-10-CM

## 2021-12-09 DIAGNOSIS — F33.0 MAJOR DEPRESSIVE DISORDER, RECURRENT EPISODE, MILD (H): Primary | ICD-10-CM

## 2021-12-09 DIAGNOSIS — Z79.01 LONG TERM CURRENT USE OF ANTICOAGULANT THERAPY: Primary | ICD-10-CM

## 2021-12-09 DIAGNOSIS — B07.0 PLANTAR WARTS: ICD-10-CM

## 2021-12-09 LAB — INR BLD: 2.1 (ref 0.9–1.1)

## 2021-12-09 PROCEDURE — 99214 OFFICE O/P EST MOD 30 MIN: CPT | Mod: 25 | Performed by: FAMILY MEDICINE

## 2021-12-09 PROCEDURE — 17110 DESTRUCTION B9 LES UP TO 14: CPT | Performed by: FAMILY MEDICINE

## 2021-12-09 PROCEDURE — 86618 LYME DISEASE ANTIBODY: CPT | Performed by: FAMILY MEDICINE

## 2021-12-09 PROCEDURE — 36415 COLL VENOUS BLD VENIPUNCTURE: CPT | Performed by: FAMILY MEDICINE

## 2021-12-09 PROCEDURE — 85610 PROTHROMBIN TIME: CPT | Performed by: FAMILY MEDICINE

## 2021-12-09 RX ORDER — DULOXETIN HYDROCHLORIDE 60 MG/1
120 CAPSULE, DELAYED RELEASE ORAL DAILY
Qty: 180 CAPSULE | Refills: 1 | Status: SHIPPED | OUTPATIENT
Start: 2021-12-09 | End: 2022-06-22

## 2021-12-09 RX ORDER — GABAPENTIN 800 MG/1
800 TABLET ORAL 3 TIMES DAILY
Qty: 90 TABLET | Refills: 3 | Status: SHIPPED | OUTPATIENT
Start: 2021-12-09 | End: 2022-05-02

## 2021-12-09 RX ORDER — TRAZODONE HYDROCHLORIDE 150 MG/1
225 TABLET ORAL AT BEDTIME
Qty: 135 TABLET | Refills: 1 | Status: SHIPPED | OUTPATIENT
Start: 2021-12-09 | End: 2022-08-26

## 2021-12-09 RX ORDER — ARIPIPRAZOLE 10 MG/1
10 TABLET ORAL DAILY
Qty: 90 TABLET | Refills: 1 | Status: ON HOLD | OUTPATIENT
Start: 2021-12-09 | End: 2022-10-11

## 2021-12-09 ASSESSMENT — PAIN SCALES - GENERAL: PAINLEVEL: SEVERE PAIN (7)

## 2021-12-09 NOTE — PROGRESS NOTES
Assessment & Plan     Major depressive disorder, recurrent episode, mild (H)  Continue on his Abilify Cymbalta and trazodone.  He has been to counseling.  - ARIPiprazole (ABILIFY) 10 MG tablet; Take 1 tablet (10 mg) by mouth daily  - DULoxetine (CYMBALTA) 60 MG capsule; Take 2 capsules (120 mg) by mouth daily Take 2 capsules daily  - traZODone (DESYREL) 150 MG tablet; Take 1.5 tablets (225 mg) by mouth At Bedtime    Anxiety  As above combination of medicines seems to be doing some help.  - gabapentin (NEURONTIN) 800 MG tablet; Take 1 tablet (800 mg) by mouth 3 times daily    Long-term (current) use of anticoagulants [Z79.01]  Follows with the Coumadin clinic.  - INR point of care    Seronegative rheumatoid arthritis (H)  We need to reset him up with rheumatology.  Has seen them in the past.  Needs reevaluation we will put in a referral.    Pain in joint, multiple sites  Going to check a Lyme's titer this was not done last time.  We will notify him of results.  - Lyme Disease Kim with reflex to WB Serum; Future  - Lyme Disease Kim with reflex to WB Serum  - Rheumatology Referral; Future    Primary narcolepsy without cataplexy  Set him up for sleep study.  - SLEEP EVALUATION & MANAGEMENT REFERRAL - ADULT -; Future    Plantar warts  Watch for blistering or infection follow-up if not clearing.  - DESTRUCT BENIGN LESION, UP TO 14               No follow-ups on file.    Sylvester Cash MD  [facility]    Zoey Shafer is a 51 year old who presents for the following health issues: Multiple issues today.  He has been seen for this before with multiple aches and pains in his joints and muscles.  Has been worked up.  Just 3 weeks ago I did antinuclear antibody testing rheumatoid factor.  He does have rather severe depression.  He is on Cymbalta.  Also on gabapentin for his chronic back issues.   Next issue is sleep issues and what sounds like narcolepsy.  It is getting bad.  He is very  afraid of falling asleep at the wheel.  Has had a sleep eval in the past.   Family wants his wart retreated to his foot.  On the lateral side of the left foot.  Not much happened since I froze it last time.  He wants to try it again no.        HPI     Muscle and joint pain has been getting worse over the last few months.     Also needs wart on left foot re-froze        Review of Systems   Constitutional, HEENT, cardiovascular, pulmonary, gi and gu systems are negative, except as otherwise noted.      Objective    There were no vitals taken for this visit.  There is no height or weight on file to calculate BMI.  Physical Exam   GENERAL: healthy, alert and no distress  EYES: Eyes grossly normal to inspection, PERRL and conjunctivae and sclerae normal  NECK: no adenopathy, no asymmetry, masses, or scars and thyroid normal to palpation  MS: no gross musculoskeletal defects noted, no edema  SKIN: 3 mm wart on the lateral left foot distal fifth metatarsal area was frozen citrate with liquid nitrogen for 40 seconds each.  NEURO: Normal strength and tone, mentation intact and speech normal  PSYCH: mentation appears normal and affect flat    Results for orders placed or performed in visit on 12/09/21   INR point of care     Status: Abnormal   Result Value Ref Range    INR 2.1 (H) 0.9 - 1.1    Narrative    This test is intended for monitoring Coumadin therapy. Results are not accurate in patients with prolonged INR due to factor deficiency.   Lyme Disease Kim with reflex to WB Serum     Status: Normal   Result Value Ref Range    Lyme Disease Antibodies Total 0.57 <0.90

## 2021-12-09 NOTE — PROGRESS NOTES
ANTICOAGULATION MANAGEMENT     Hollis Diggs 51 year old male is on warfarin with therapeutic INR result. (Goal INR 2.0-3.0)    Recent labs: (last 7 days)     12/09/21  1408   INR 2.1*     ASSESSMENT     Source(s): Chart Review I left a detailed voicemail with the orders reflected in flowsheet. I have also requested a call back if there have been any missed doses, concerns, illness, fever, or if there have been any changes in medications, activity level, or diet          Previous INR: Therapeutic last 2(+) visits    Additional findings: VM left     PLAN     Recommended plan for no diet, medication or health factor changes affecting INR     Dosing Instructions: Continue your current warfarin dose with next INR in 6 weeks       Summary  As of 12/9/2021    Full warfarin instructions:  10 mg every Tue; 7.5 mg all other days   Next INR check:  1/20/2022             Detailed voice message left for Hollis with dosing instructions and follow up date.     Contact [phone]  to schedule and with any changes, questions or concerns.     Education provided: Contact [phone]  with any changes, questions or concerns.     Plan made per [facility] anticoagulation protocol    Lilliam Pro, RN  Anticoagulation Clinic  12/9/2021    _______________________________________________________________________     Anticoagulation Episode Summary     Current INR goal:  2.0-3.0   TTR:  57.9 % (1 y)   Target end date:  Indefinite   Send INR reminders to:  TIMOTEO ALVAREZ    Indications    DVT (deep venous thrombosis) (H) (Resolved) [I82.409]  Long-term (current) use of anticoagulants [Z79.01] [Z79.01]  Deep vein thrombosis (DVT) of proximal lower extremity  unspecified chronicity  unspecified laterality (H) [I82.4Y9]           Comments:  5 mg, PM dose         Anticoagulation Care Providers     Provider Role Specialty Phone number    Sylvester Cash MD Referring [facility] [phone]

## 2021-12-10 LAB — B BURGDOR IGG+IGM SER QL: 0.57

## 2021-12-30 ENCOUNTER — MYC REFILL (OUTPATIENT)
Dept: FAMILY MEDICINE | Facility: CLINIC | Age: 51
End: 2021-12-30
Payer: COMMERCIAL

## 2021-12-30 DIAGNOSIS — M54.16 LUMBAR RADICULOPATHY: ICD-10-CM

## 2021-12-30 DIAGNOSIS — F11.90 CHRONIC, CONTINUOUS USE OF OPIOIDS: ICD-10-CM

## 2021-12-30 RX ORDER — OXYCODONE AND ACETAMINOPHEN 10; 325 MG/1; MG/1
1 TABLET ORAL EVERY 6 HOURS PRN
Qty: 60 TABLET | Refills: 0 | Status: SHIPPED | OUTPATIENT
Start: 2021-12-30 | End: 2022-01-27

## 2021-12-30 NOTE — TELEPHONE ENCOUNTER
Percocet      Last Written Prescription Date:  12/02/2021  Last Fill Quantity: 60,   # refills: 0  Last Office Visit: 12/09/2021  Future Office visit:       Routing refill request to provider for review/approval because:  Drug not on the [facility], [facility] or [facility] refill protocol or controlled substance

## 2022-01-23 DIAGNOSIS — Z79.01 LONG TERM CURRENT USE OF ANTICOAGULANT THERAPY: ICD-10-CM

## 2022-01-23 DIAGNOSIS — I82.4Y9 DEEP VEIN THROMBOSIS (DVT) OF PROXIMAL LOWER EXTREMITY, UNSPECIFIED CHRONICITY, UNSPECIFIED LATERALITY (H): ICD-10-CM

## 2022-01-24 RX ORDER — WARFARIN SODIUM 5 MG/1
TABLET ORAL
Qty: 44 TABLET | Refills: 0 | Status: SHIPPED | OUTPATIENT
Start: 2022-01-24 | End: 2022-02-21

## 2022-01-27 ENCOUNTER — TELEPHONE (OUTPATIENT)
Dept: ANTICOAGULATION | Facility: CLINIC | Age: 52
End: 2022-01-27
Payer: COMMERCIAL

## 2022-01-27 ENCOUNTER — MYC REFILL (OUTPATIENT)
Dept: FAMILY MEDICINE | Facility: CLINIC | Age: 52
End: 2022-01-27
Payer: COMMERCIAL

## 2022-01-27 DIAGNOSIS — F11.90 CHRONIC, CONTINUOUS USE OF OPIOIDS: ICD-10-CM

## 2022-01-27 DIAGNOSIS — M54.16 LUMBAR RADICULOPATHY: ICD-10-CM

## 2022-01-27 RX ORDER — OXYCODONE AND ACETAMINOPHEN 10; 325 MG/1; MG/1
1 TABLET ORAL EVERY 6 HOURS PRN
Qty: 30 TABLET | Refills: 0 | Status: SHIPPED | OUTPATIENT
Start: 2022-01-29 | End: 2022-02-14

## 2022-01-27 NOTE — TELEPHONE ENCOUNTER
Requested Prescriptions   Pending Prescriptions Disp Refills     oxyCODONE-acetaminophen (PERCOCET)  MG per tablet 60 tablet 0     Sig: Take 1 tablet by mouth every 6 hours as needed for severe pain Max of 2 a day        Last Written Prescription Date:  12/30/2021  Last Fill Quantity: 60,   # refills: 0  Last Office Visit: 12/09/2021  Future Office visit:       Routing refill request to provider for review/approval because:  Drug not on the [facility], [facility] or [facility] refill protocol or controlled substance

## 2022-01-27 NOTE — TELEPHONE ENCOUNTER
ANTICOAGULATION     Hollis Diggs is overdue for INR check.      Left message for patient to call and schedule lab appointment as soon as possible. If returning call, please schedule.     Jesse Flores RN

## 2022-01-28 NOTE — TELEPHONE ENCOUNTER
Please call patient to inform of limited refills. Patient needs follow up appointment  With Sylvester Cash prior to additional refills.  Electronically signed by Christiano Dobbs MD

## 2022-02-04 ENCOUNTER — TELEPHONE (OUTPATIENT)
Dept: ANTICOAGULATION | Facility: CLINIC | Age: 52
End: 2022-02-04
Payer: COMMERCIAL

## 2022-02-04 NOTE — TELEPHONE ENCOUNTER
ANTICOAGULATION     Hollis Diggs is overdue for INR check.      Left message for patient to call and schedule lab appointment as soon as possible. If returning call, please schedule.     Анна Hale RN

## 2022-02-14 ENCOUNTER — OFFICE VISIT (OUTPATIENT)
Dept: FAMILY MEDICINE | Facility: CLINIC | Age: 52
End: 2022-02-14
Payer: COMMERCIAL

## 2022-02-14 ENCOUNTER — LAB (OUTPATIENT)
Dept: LAB | Facility: CLINIC | Age: 52
End: 2022-02-14
Payer: COMMERCIAL

## 2022-02-14 ENCOUNTER — ANTICOAGULATION THERAPY VISIT (OUTPATIENT)
Dept: ANTICOAGULATION | Facility: CLINIC | Age: 52
End: 2022-02-14

## 2022-02-14 VITALS
BODY MASS INDEX: 33.59 KG/M2 | HEIGHT: 72 IN | SYSTOLIC BLOOD PRESSURE: 143 MMHG | HEART RATE: 104 BPM | WEIGHT: 248 LBS | TEMPERATURE: 99.1 F | DIASTOLIC BLOOD PRESSURE: 79 MMHG | RESPIRATION RATE: 16 BRPM | OXYGEN SATURATION: 99 %

## 2022-02-14 DIAGNOSIS — I82.4Y9 DEEP VEIN THROMBOSIS (DVT) OF PROXIMAL LOWER EXTREMITY, UNSPECIFIED CHRONICITY, UNSPECIFIED LATERALITY (H): ICD-10-CM

## 2022-02-14 DIAGNOSIS — Z79.01 LONG TERM CURRENT USE OF ANTICOAGULANT THERAPY: ICD-10-CM

## 2022-02-14 DIAGNOSIS — F11.90 CHRONIC, CONTINUOUS USE OF OPIOIDS: ICD-10-CM

## 2022-02-14 DIAGNOSIS — I10 HYPERTENSION, UNSPECIFIED TYPE: ICD-10-CM

## 2022-02-14 DIAGNOSIS — M54.16 LUMBAR RADICULOPATHY: ICD-10-CM

## 2022-02-14 DIAGNOSIS — Z79.01 LONG TERM CURRENT USE OF ANTICOAGULANT THERAPY: Primary | ICD-10-CM

## 2022-02-14 DIAGNOSIS — R06.09 DOE (DYSPNEA ON EXERTION): Primary | ICD-10-CM

## 2022-02-14 LAB — INR BLD: 1.5 (ref 0.9–1.1)

## 2022-02-14 PROCEDURE — 99214 OFFICE O/P EST MOD 30 MIN: CPT | Performed by: FAMILY MEDICINE

## 2022-02-14 PROCEDURE — 36416 COLLJ CAPILLARY BLOOD SPEC: CPT

## 2022-02-14 PROCEDURE — 85610 PROTHROMBIN TIME: CPT

## 2022-02-14 RX ORDER — LISINOPRIL 5 MG/1
5 TABLET ORAL DAILY
Qty: 30 TABLET | Refills: 1 | Status: SHIPPED | OUTPATIENT
Start: 2022-02-14 | End: 2022-04-08

## 2022-02-14 RX ORDER — OXYCODONE AND ACETAMINOPHEN 10; 325 MG/1; MG/1
1 TABLET ORAL EVERY 6 HOURS PRN
Qty: 30 TABLET | Refills: 0 | Status: SHIPPED | OUTPATIENT
Start: 2022-02-14 | End: 2022-02-28

## 2022-02-14 ASSESSMENT — ANXIETY QUESTIONNAIRES
IF YOU CHECKED OFF ANY PROBLEMS ON THIS QUESTIONNAIRE, HOW DIFFICULT HAVE THESE PROBLEMS MADE IT FOR YOU TO DO YOUR WORK, TAKE CARE OF THINGS AT HOME, OR GET ALONG WITH OTHER PEOPLE: SOMEWHAT DIFFICULT
2. NOT BEING ABLE TO STOP OR CONTROL WORRYING: NEARLY EVERY DAY
GAD7 TOTAL SCORE: 21
5. BEING SO RESTLESS THAT IT IS HARD TO SIT STILL: NEARLY EVERY DAY
4. TROUBLE RELAXING: NEARLY EVERY DAY
GAD7 TOTAL SCORE: 21
IF YOU CHECKED OFF ANY PROBLEMS ON THIS QUESTIONNAIRE, HOW DIFFICULT HAVE THESE PROBLEMS MADE IT FOR YOU TO DO YOUR WORK, TAKE CARE OF THINGS AT HOME, OR GET ALONG WITH OTHER PEOPLE: SOMEWHAT DIFFICULT
1. FEELING NERVOUS, ANXIOUS, OR ON EDGE: NEARLY EVERY DAY
7. FEELING AFRAID AS IF SOMETHING AWFUL MIGHT HAPPEN: NEARLY EVERY DAY
5. BEING SO RESTLESS THAT IT IS HARD TO SIT STILL: NEARLY EVERY DAY
3. WORRYING TOO MUCH ABOUT DIFFERENT THINGS: NEARLY EVERY DAY
6. BECOMING EASILY ANNOYED OR IRRITABLE: NEARLY EVERY DAY
7. FEELING AFRAID AS IF SOMETHING AWFUL MIGHT HAPPEN: NEARLY EVERY DAY
3. WORRYING TOO MUCH ABOUT DIFFERENT THINGS: NEARLY EVERY DAY
4. TROUBLE RELAXING: NEARLY EVERY DAY
2. NOT BEING ABLE TO STOP OR CONTROL WORRYING: NEARLY EVERY DAY
1. FEELING NERVOUS, ANXIOUS, OR ON EDGE: NEARLY EVERY DAY
6. BECOMING EASILY ANNOYED OR IRRITABLE: NEARLY EVERY DAY

## 2022-02-14 ASSESSMENT — MIFFLIN-ST. JEOR: SCORE: 2012.92

## 2022-02-14 ASSESSMENT — PATIENT HEALTH QUESTIONNAIRE - PHQ9: SUM OF ALL RESPONSES TO PHQ QUESTIONS 1-9: 8

## 2022-02-14 ASSESSMENT — PAIN SCALES - GENERAL: PAINLEVEL: SEVERE PAIN (6)

## 2022-02-14 NOTE — PROGRESS NOTES
ANTICOAGULATION MANAGEMENT     Hollis Diggs 51 year old male is on warfarin with subtherapeutic INR result. (Goal INR 2.0-3.0)    Recent labs: (last 7 days)     02/14/22  1343   INR 1.5*       ASSESSMENT     Source(s): Chart Review and Patient/Caregiver Call       Warfarin doses taken: Missed dose(s) may be affecting INR    Diet: No new diet changes identified    New illness, injury, or hospitalization: No    Medication/supplement changes: None noted    Signs or symptoms of bleeding or clotting: No    Previous INR: Therapeutic last 2(+) visits    Additional findings: None     PLAN     Recommended plan for temporary change(s) affecting INR     Dosing Instructions:  Increase your warfarin dose (4.5% change) with next INR in 2 weeks       Summary  As of 2/14/2022    Full warfarin instructions:  2/14: 10 mg; Otherwise 10 mg every Tue, Sat; 7.5 mg all other days   Next INR check:  2/28/2022             Telephone call with Hollis who verbalizes understanding and agrees to plan and who agrees to plan and repeated back plan correctly    Lab visit scheduled    Education provided: Please call back if any changes to your diet, medications or how you've been taking warfarin    Plan made per [facility] anticoagulation protocol    Jesse Flores, RN  Anticoagulation Clinic  2/14/2022    _______________________________________________________________________     Anticoagulation Episode Summary     Current INR goal:  2.0-3.0   TTR:  46.3 % (1 y)   Target end date:  Indefinite   Send INR reminders to:  [facility]    Indications    DVT (deep venous thrombosis) (H) (Resolved) [I82.409]  Long-term (current) use of anticoagulants [Z79.01] [Z79.01]  Deep vein thrombosis (DVT) of proximal lower extremity  unspecified chronicity  unspecified laterality (H) [I82.4Y9]           Comments:  5 mg, PM dose         Anticoagulation Care Providers     Provider Role Specialty Phone number    Sylvester Cash MD Referring [facility]  [phone]

## 2022-02-14 NOTE — PROGRESS NOTES
Assessment & Plan     HILTON (dyspnea on exertion)  This is the most concerning thing this is been a change.  He says just walking out to his mailbox exhausts him at times.  He noted coming up the parking lot from his car here today to.  This is not typical for him.  We will order a cardiac stress test.  - NM MPI Treadmill; Future    Hypertension, unspecified type  This is a new finding.  He is usually been fine with blood pressure we will start him on some lisinopril we will follow-up with this along with the  situation above.  - lisinopril (ZESTRIL) 5 MG tablet; Take 1 tablet (5 mg) by mouth daily    Lumbar radiculopathy  Condition is stable.  Gets worse at times if he overdoes it.  He is careful about what he uses for pain medication.  He needs a refill on this he does not abuse it we have watched him closely.  - oxyCODONE-acetaminophen (PERCOCET)  MG per tablet; Take 1 tablet by mouth every 6 hours as needed for severe pain Max of 2 a day    Chronic, continuous use of opioids  This was refilled.  - oxyCODONE-acetaminophen (PERCOCET)  MG per tablet; Take 1 tablet by mouth every 6 hours as needed for severe pain Max of 2 a day                 No follow-ups on file.    Sylvester Cash MD  [facility]    Zoey Shafer is a 51 year old who presents for the following health issues : Was told to come in to have medications refilled not quite clear why just saw him 2 months ago.  His main concern is dyspnea on exertion which has been more so in the last months.  Even small things.  Denies really any chest pain.  No cardiac history.  His blood pressure has been little high on a couple of recent readings and never was this concerns him.    HPI     Chief Complaint   Patient presents with     Recheck Medication     go over medications     Shortness of Breath     having some sob with exertion           Review of Systems   Constitutional, HEENT, cardiovascular, pulmonary, gi and gu  systems are negative, except as otherwise noted.      Objective    BP (!) 146/78   Pulse 104   Temp 99.1  F (37.3  C) (Temporal)   Resp 16   Ht 1.829 m (6')   Wt 112.5 kg (248 lb)   SpO2 99%   BMI 33.63 kg/m    Body mass index is 33.63 kg/m .  Physical Exam   GENERAL: healthy, alert and no distress  EYES: Eyes grossly normal to inspection, PERRL and conjunctivae and sclerae normal  NECK: no adenopathy, no asymmetry, masses, or scars and thyroid normal to palpation  RESP: lungs clear to auscultation - no rales, rhonchi or wheezes  CV: regular rate and rhythm, normal S1 S2, no S3 or S4, no murmur, click or rub, no peripheral edema and peripheral pulses strong  MS: no gross musculoskeletal defects noted, no edema  SKIN: no suspicious lesions or rashes  NEURO: Normal strength and tone, mentation intact and speech normal  PSYCH: mentation appears normal, affect normal/bright

## 2022-02-14 NOTE — PROGRESS NOTES
Anticoagulation Management    Unable to reach Hollis today.    Today's INR result of 1.5 is subtherapeutic (goal INR of 2.0-3.0).  Result received from: Clinic Lab    Follow up required to confirm warfarin dose taken and assess for changes and discuss out of range INR     Left message to take a booster dose of warfarin,  10 mg tonight. Request call back for assessment.      Anticoagulation clinic to follow up    Jesse Flores RN

## 2022-02-15 ASSESSMENT — ANXIETY QUESTIONNAIRES: GAD7 TOTAL SCORE: 21

## 2022-02-17 ENCOUNTER — HOSPITAL ENCOUNTER (OUTPATIENT)
Dept: NUCLEAR MEDICINE | Facility: CLINIC | Age: 52
Setting detail: NUCLEAR MEDICINE
End: 2022-02-17
Attending: FAMILY MEDICINE
Payer: COMMERCIAL

## 2022-02-17 ENCOUNTER — HOSPITAL ENCOUNTER (OUTPATIENT)
Dept: CARDIOLOGY | Facility: CLINIC | Age: 52
Setting detail: NUCLEAR MEDICINE
End: 2022-02-17
Attending: FAMILY MEDICINE
Payer: COMMERCIAL

## 2022-02-17 DIAGNOSIS — R06.09 DOE (DYSPNEA ON EXERTION): ICD-10-CM

## 2022-02-17 LAB
CV STRESS MAX HR HE: 142
NUC STRESS EJECTION FRACTION: 68 %
RATE PRESSURE PRODUCT: NORMAL
STRESS ANGINA INDEX: 0
STRESS ECHO BASELINE DIASTOLIC HE: 72
STRESS ECHO BASELINE HR: 72 BPM
STRESS ECHO BASELINE SYSTOLIC BP: 118
STRESS ECHO CALCULATED PERCENT HR: 85 %
STRESS ECHO LAST STRESS DIASTOLIC BP: 80
STRESS ECHO LAST STRESS SYSTOLIC BP: 150
STRESS ECHO POST ESTIMATED WORKLOAD: 12 METS
STRESS ECHO POST EXERCISE DUR MIN: 8 MIN
STRESS ECHO POST EXERCISE DUR SEC: 11 SEC
STRESS ECHO TARGET HR: 168

## 2022-02-17 PROCEDURE — 343N000001 HC RX 343: Performed by: FAMILY MEDICINE

## 2022-02-17 PROCEDURE — 93017 CV STRESS TEST TRACING ONLY: CPT

## 2022-02-17 PROCEDURE — 78452 HT MUSCLE IMAGE SPECT MULT: CPT | Mod: 26 | Performed by: INTERNAL MEDICINE

## 2022-02-17 PROCEDURE — 93018 CV STRESS TEST I&R ONLY: CPT | Performed by: INTERNAL MEDICINE

## 2022-02-17 PROCEDURE — 93016 CV STRESS TEST SUPVJ ONLY: CPT | Performed by: INTERNAL MEDICINE

## 2022-02-17 PROCEDURE — A9502 TC99M TETROFOSMIN: HCPCS | Performed by: FAMILY MEDICINE

## 2022-02-17 PROCEDURE — 78452 HT MUSCLE IMAGE SPECT MULT: CPT

## 2022-02-17 RX ADMIN — TETROFOSMIN 32 MCI.: 1.38 INJECTION, POWDER, LYOPHILIZED, FOR SOLUTION INTRAVENOUS at 09:50

## 2022-02-17 RX ADMIN — TETROFOSMIN 10.7 MCI.: 1.38 INJECTION, POWDER, LYOPHILIZED, FOR SOLUTION INTRAVENOUS at 07:50

## 2022-02-20 DIAGNOSIS — Z79.01 LONG TERM CURRENT USE OF ANTICOAGULANT THERAPY: ICD-10-CM

## 2022-02-20 DIAGNOSIS — I82.4Y9 DEEP VEIN THROMBOSIS (DVT) OF PROXIMAL LOWER EXTREMITY, UNSPECIFIED CHRONICITY, UNSPECIFIED LATERALITY (H): ICD-10-CM

## 2022-02-21 RX ORDER — WARFARIN SODIUM 5 MG/1
TABLET ORAL
Qty: 44 TABLET | Refills: 0 | Status: SHIPPED | OUTPATIENT
Start: 2022-02-21 | End: 2022-03-25

## 2022-02-28 ENCOUNTER — LAB (OUTPATIENT)
Dept: LAB | Facility: CLINIC | Age: 52
End: 2022-02-28
Payer: COMMERCIAL

## 2022-02-28 ENCOUNTER — ANTICOAGULATION THERAPY VISIT (OUTPATIENT)
Dept: ANTICOAGULATION | Facility: CLINIC | Age: 52
End: 2022-02-28

## 2022-02-28 ENCOUNTER — MYC REFILL (OUTPATIENT)
Dept: FAMILY MEDICINE | Facility: CLINIC | Age: 52
End: 2022-02-28

## 2022-02-28 DIAGNOSIS — Z79.01 LONG TERM CURRENT USE OF ANTICOAGULANT THERAPY: ICD-10-CM

## 2022-02-28 DIAGNOSIS — I82.4Y9 DEEP VEIN THROMBOSIS (DVT) OF PROXIMAL LOWER EXTREMITY, UNSPECIFIED CHRONICITY, UNSPECIFIED LATERALITY (H): ICD-10-CM

## 2022-02-28 DIAGNOSIS — M54.16 LUMBAR RADICULOPATHY: ICD-10-CM

## 2022-02-28 DIAGNOSIS — F11.90 CHRONIC, CONTINUOUS USE OF OPIOIDS: ICD-10-CM

## 2022-02-28 DIAGNOSIS — Z79.01 LONG TERM CURRENT USE OF ANTICOAGULANT THERAPY: Primary | ICD-10-CM

## 2022-02-28 LAB — INR BLD: 2.1 (ref 0.9–1.1)

## 2022-02-28 PROCEDURE — 85610 PROTHROMBIN TIME: CPT

## 2022-02-28 PROCEDURE — 36416 COLLJ CAPILLARY BLOOD SPEC: CPT

## 2022-02-28 RX ORDER — OXYCODONE AND ACETAMINOPHEN 10; 325 MG/1; MG/1
1 TABLET ORAL EVERY 6 HOURS PRN
Qty: 30 TABLET | Refills: 0 | Status: SHIPPED | OUTPATIENT
Start: 2022-02-28 | End: 2022-03-14

## 2022-02-28 NOTE — TELEPHONE ENCOUNTER
Percocet      Last Written Prescription Date:  2/14/2022  Last Fill Quantity: 30,   # refills: 0  Last Office Visit: 2/14/2022  Future Office visit:       Routing refill request to provider for review/approval because:  Drug not on the [facility], [facility] or [facility] refill protocol or controlled substance

## 2022-02-28 NOTE — PROGRESS NOTES
ANTICOAGULATION MANAGEMENT     Hollis Diggs 51 year old male is on warfarin with therapeutic INR result. (Goal INR 2.0-3.0)    Recent labs: (last 7 days)     02/28/22  1601   INR 2.1*       ASSESSMENT       Source(s): Chart Review and Patient/Caregiver Call       Warfarin doses taken: Warfarin taken as instructed    Diet: No new diet changes identified    New illness, injury, or hospitalization: No    Medication/supplement changes: None noted    Signs or symptoms of bleeding or clotting: No    Previous INR: Subtherapeutic    Additional findings: None       PLAN     Recommended plan for no diet, medication or health factor changes affecting INR     Dosing Instructions: Continue your current warfarin dose with next INR in 3 weeks       Summary  As of 2/28/2022    Full warfarin instructions:  10 mg every Tue, Sat; 7.5 mg all other days   Next INR check:  3/21/2022             Telephone call with Hollis who verbalizes understanding and agrees to plan    Lab visit scheduled    Education provided: None required    Plan made per [facility] anticoagulation protocol    Анна Hale RN  Anticoagulation Clinic  2/28/2022    _______________________________________________________________________     Anticoagulation Episode Summary     Current INR goal:  2.0-3.0   TTR:  43.2 % (1 y)   Target end date:  Indefinite   Send INR reminders to:  [facility]    Indications    DVT (deep venous thrombosis) (H) (Resolved) [I82.409]  Long-term (current) use of anticoagulants [Z79.01] [Z79.01]  Deep vein thrombosis (DVT) of proximal lower extremity  unspecified chronicity  unspecified laterality (H) [I82.4Y9]           Comments:  5 mg, PM dose         Anticoagulation Care Providers     Provider Role Specialty Phone number    Sylvester Cash MD Referring [facility] [phone]

## 2022-03-14 ENCOUNTER — MYC REFILL (OUTPATIENT)
Dept: FAMILY MEDICINE | Facility: CLINIC | Age: 52
End: 2022-03-14
Payer: COMMERCIAL

## 2022-03-14 DIAGNOSIS — M54.16 LUMBAR RADICULOPATHY: ICD-10-CM

## 2022-03-14 DIAGNOSIS — F11.90 CHRONIC, CONTINUOUS USE OF OPIOIDS: ICD-10-CM

## 2022-03-14 RX ORDER — OXYCODONE AND ACETAMINOPHEN 10; 325 MG/1; MG/1
1 TABLET ORAL EVERY 6 HOURS PRN
Qty: 30 TABLET | Refills: 0 | Status: SHIPPED | OUTPATIENT
Start: 2022-03-14 | End: 2022-03-28

## 2022-03-14 NOTE — TELEPHONE ENCOUNTER
Pending Prescriptions:                       Disp   Refills    oxyCODONE-acetaminophen (PERCOCET)  *30 tab*0        Sig: Take 1 tablet by mouth every 6 hours as needed for           severe pain Max of 2 a day    Routing refill request to provider for review/approval because:  Drug not on the [facility] refill protocol     Delia Morris RN

## 2022-03-21 ENCOUNTER — ANTICOAGULATION THERAPY VISIT (OUTPATIENT)
Dept: ANTICOAGULATION | Facility: CLINIC | Age: 52
End: 2022-03-21

## 2022-03-21 ENCOUNTER — LAB (OUTPATIENT)
Dept: LAB | Facility: CLINIC | Age: 52
End: 2022-03-21
Payer: COMMERCIAL

## 2022-03-21 DIAGNOSIS — I82.4Y9 DEEP VEIN THROMBOSIS (DVT) OF PROXIMAL LOWER EXTREMITY, UNSPECIFIED CHRONICITY, UNSPECIFIED LATERALITY (H): ICD-10-CM

## 2022-03-21 DIAGNOSIS — Z79.01 LONG TERM CURRENT USE OF ANTICOAGULANT THERAPY: Primary | ICD-10-CM

## 2022-03-21 DIAGNOSIS — Z79.01 LONG TERM CURRENT USE OF ANTICOAGULANT THERAPY: ICD-10-CM

## 2022-03-21 LAB — INR BLD: 2.6 (ref 0.9–1.1)

## 2022-03-21 PROCEDURE — 36416 COLLJ CAPILLARY BLOOD SPEC: CPT

## 2022-03-21 PROCEDURE — 85610 PROTHROMBIN TIME: CPT

## 2022-03-21 NOTE — PROGRESS NOTES
ANTICOAGULATION MANAGEMENT     Hollis Diggs 51 year old male is on warfarin with therapeutic INR result. (Goal INR 2.0-3.0)    Recent labs: (last 7 days)     03/21/22  1616   INR 2.6*       ASSESSMENT       Source(s): Chart Review and Patient/Caregiver Call       Warfarin doses taken: Warfarin taken as instructed    Diet: No new diet changes identified    New illness, injury, or hospitalization: No    Medication/supplement changes: None noted    Signs or symptoms of bleeding or clotting: No    Previous INR: Therapeutic last visit; previously outside of goal range    Additional findings: None       PLAN     Recommended plan for no diet, medication or health factor changes affecting INR     Dosing Instructions: Continue your current warfarin dose with next INR in 4 weeks       Summary  As of 3/21/2022    Full warfarin instructions:  10 mg every Tue, Sat; 7.5 mg all other days   Next INR check:  4/18/2022             Telephone call with Hollis who verbalizes understanding and agrees to plan and who agrees to plan and repeated back plan correctly    Lab visit scheduled    Education provided: Please call back if any changes to your diet, medications or how you've been taking warfarin    Plan made per [facility] anticoagulation protocol    Jesse Flores RN  Anticoagulation Clinic  3/21/2022    _______________________________________________________________________     Anticoagulation Episode Summary     Current INR goal:  2.0-3.0   TTR:  49.0 % (1 y)   Target end date:  Indefinite   Send INR reminders to:  [facility]    Indications    DVT (deep venous thrombosis) (H) (Resolved) [I82.409]  Long-term (current) use of anticoagulants [Z79.01] [Z79.01]  Deep vein thrombosis (DVT) of proximal lower extremity  unspecified chronicity  unspecified laterality (H) [I82.4Y9]           Comments:  5 mg, PM dose         Anticoagulation Care Providers     Provider Role Specialty Phone number    Sylvester Cash MD Referring  Family Practice [phone]

## 2022-03-23 DIAGNOSIS — I82.4Y9 DEEP VEIN THROMBOSIS (DVT) OF PROXIMAL LOWER EXTREMITY, UNSPECIFIED CHRONICITY, UNSPECIFIED LATERALITY (H): ICD-10-CM

## 2022-03-23 DIAGNOSIS — Z79.01 LONG TERM CURRENT USE OF ANTICOAGULANT THERAPY: Primary | ICD-10-CM

## 2022-03-25 DIAGNOSIS — I82.4Y9 DEEP VEIN THROMBOSIS (DVT) OF PROXIMAL LOWER EXTREMITY, UNSPECIFIED CHRONICITY, UNSPECIFIED LATERALITY (H): ICD-10-CM

## 2022-03-25 DIAGNOSIS — Z79.01 LONG TERM CURRENT USE OF ANTICOAGULANT THERAPY: ICD-10-CM

## 2022-03-25 RX ORDER — WARFARIN SODIUM 5 MG/1
TABLET ORAL
Qty: 44 TABLET | Refills: 0 | Status: SHIPPED | OUTPATIENT
Start: 2022-03-25 | End: 2022-04-22

## 2022-03-28 ENCOUNTER — MYC REFILL (OUTPATIENT)
Dept: FAMILY MEDICINE | Facility: CLINIC | Age: 52
End: 2022-03-28

## 2022-03-28 ENCOUNTER — HOSPITAL ENCOUNTER (EMERGENCY)
Facility: CLINIC | Age: 52
Discharge: HOME | End: 2022-03-28
Attending: EMERGENCY MEDICINE | Admitting: EMERGENCY MEDICINE
Payer: COMMERCIAL

## 2022-03-28 ENCOUNTER — APPOINTMENT (OUTPATIENT)
Dept: CT IMAGING | Facility: CLINIC | Age: 52
End: 2022-03-28
Attending: EMERGENCY MEDICINE
Payer: COMMERCIAL

## 2022-03-28 ENCOUNTER — TELEPHONE (OUTPATIENT)
Dept: ANTICOAGULATION | Facility: CLINIC | Age: 52
End: 2022-03-28

## 2022-03-28 VITALS
WEIGHT: 249 LBS | DIASTOLIC BLOOD PRESSURE: 84 MMHG | HEART RATE: 77 BPM | TEMPERATURE: 97.6 F | BODY MASS INDEX: 33.77 KG/M2 | OXYGEN SATURATION: 98 % | RESPIRATION RATE: 18 BRPM | SYSTOLIC BLOOD PRESSURE: 111 MMHG

## 2022-03-28 DIAGNOSIS — R53.1 WEAKNESS: ICD-10-CM

## 2022-03-28 DIAGNOSIS — M54.16 LUMBAR RADICULOPATHY: ICD-10-CM

## 2022-03-28 DIAGNOSIS — D64.9 ANEMIA, UNSPECIFIED TYPE: ICD-10-CM

## 2022-03-28 DIAGNOSIS — F11.90 CHRONIC, CONTINUOUS USE OF OPIOIDS: ICD-10-CM

## 2022-03-28 LAB
ALBUMIN SERPL-MCNC: 3.6 G/DL (ref 3.4–5)
ALBUMIN UR-MCNC: NEGATIVE MG/DL
ALP SERPL-CCNC: 67 U/L (ref 40–150)
ALT SERPL W P-5'-P-CCNC: 15 U/L (ref 0–70)
AMPHETAMINES UR QL: NOT DETECTED
ANION GAP SERPL CALCULATED.3IONS-SCNC: 3 MMOL/L (ref 3–14)
APPEARANCE UR: CLEAR
AST SERPL W P-5'-P-CCNC: 12 U/L (ref 0–45)
BARBITURATES UR QL SCN: NOT DETECTED
BASOPHILS # BLD AUTO: 0 10E3/UL (ref 0–0.2)
BASOPHILS NFR BLD AUTO: 0 %
BENZODIAZ UR QL SCN: DETECTED
BILIRUB SERPL-MCNC: 0.3 MG/DL (ref 0.2–1.3)
BILIRUB UR QL STRIP: NEGATIVE
BUN SERPL-MCNC: 10 MG/DL (ref 7–30)
BUPRENORPHINE UR QL: NOT DETECTED
CALCIUM SERPL-MCNC: 8.7 MG/DL (ref 8.5–10.1)
CANNABINOIDS UR QL: NOT DETECTED
CHLORIDE BLD-SCNC: 110 MMOL/L (ref 94–109)
CO2 SERPL-SCNC: 26 MMOL/L (ref 20–32)
COCAINE UR QL SCN: NOT DETECTED
COLOR UR AUTO: ABNORMAL
CREAT SERPL-MCNC: 1.05 MG/DL (ref 0.66–1.25)
D-METHAMPHET UR QL: NOT DETECTED
EOSINOPHIL # BLD AUTO: 0.1 10E3/UL (ref 0–0.7)
EOSINOPHIL NFR BLD AUTO: 2 %
ERYTHROCYTE [DISTWIDTH] IN BLOOD BY AUTOMATED COUNT: 18.3 % (ref 10–15)
ETHANOL SERPL-MCNC: <0.01 G/DL
FLUAV RNA SPEC QL NAA+PROBE: NEGATIVE
FLUBV RNA RESP QL NAA+PROBE: NEGATIVE
GFR SERPL CREATININE-BSD FRML MDRD: 85 ML/MIN/1.73M2
GLUCOSE BLD-MCNC: 118 MG/DL (ref 70–99)
GLUCOSE BLDC GLUCOMTR-MCNC: 137 MG/DL (ref 70–99)
GLUCOSE UR STRIP-MCNC: NEGATIVE MG/DL
HCT VFR BLD AUTO: 34.9 % (ref 40–53)
HEMOCCULT STL QL: NEGATIVE
HGB BLD-MCNC: 9.6 G/DL (ref 13.3–17.7)
HGB UR QL STRIP: NEGATIVE
HOLD SPECIMEN: NORMAL
IMM GRANULOCYTES # BLD: 0 10E3/UL
IMM GRANULOCYTES NFR BLD: 0 %
INR PPP: 2.2 (ref 0.85–1.15)
KETONES UR STRIP-MCNC: NEGATIVE MG/DL
LEUKOCYTE ESTERASE UR QL STRIP: NEGATIVE
LYMPHOCYTES # BLD AUTO: 1.6 10E3/UL (ref 0.8–5.3)
LYMPHOCYTES NFR BLD AUTO: 23 %
MCH RBC QN AUTO: 18.3 PG (ref 26.5–33)
MCHC RBC AUTO-ENTMCNC: 27.5 G/DL (ref 31.5–36.5)
MCV RBC AUTO: 67 FL (ref 78–100)
METHADONE UR QL SCN: NOT DETECTED
MONOCYTES # BLD AUTO: 0.4 10E3/UL (ref 0–1.3)
MONOCYTES NFR BLD AUTO: 6 %
MUCOUS THREADS #/AREA URNS LPF: PRESENT /LPF
NEUTROPHILS # BLD AUTO: 4.9 10E3/UL (ref 1.6–8.3)
NEUTROPHILS NFR BLD AUTO: 69 %
NITRATE UR QL: NEGATIVE
NRBC # BLD AUTO: 0 10E3/UL
NRBC BLD AUTO-RTO: 0 /100
OPIATES UR QL SCN: NOT DETECTED
OXYCODONE UR QL SCN: NOT DETECTED
PCP UR QL SCN: NOT DETECTED
PH UR STRIP: 6 [PH] (ref 5–7)
PLATELET # BLD AUTO: 334 10E3/UL (ref 150–450)
POTASSIUM BLD-SCNC: 3.7 MMOL/L (ref 3.4–5.3)
PROPOXYPH UR QL: NOT DETECTED
PROT SERPL-MCNC: 6.9 G/DL (ref 6.8–8.8)
RBC # BLD AUTO: 5.24 10E6/UL (ref 4.4–5.9)
RBC URINE: 0 /HPF
SARS-COV-2 RNA RESP QL NAA+PROBE: NEGATIVE
SODIUM SERPL-SCNC: 139 MMOL/L (ref 133–144)
SP GR UR STRIP: 1.01 (ref 1–1.03)
TRICYCLICS UR QL SCN: NOT DETECTED
TROPONIN I SERPL HS-MCNC: 6 NG/L
TSH SERPL DL<=0.005 MIU/L-ACNC: 0.79 MU/L (ref 0.4–4)
UROBILINOGEN UR STRIP-MCNC: NORMAL MG/DL
WBC # BLD AUTO: 7.2 10E3/UL (ref 4–11)
WBC URINE: 0 /HPF

## 2022-03-28 PROCEDURE — 82077 ASSAY SPEC XCP UR&BREATH IA: CPT | Performed by: EMERGENCY MEDICINE

## 2022-03-28 PROCEDURE — 93010 ELECTROCARDIOGRAM REPORT: CPT | Mod: 59 | Performed by: EMERGENCY MEDICINE

## 2022-03-28 PROCEDURE — 81001 URINALYSIS AUTO W/SCOPE: CPT | Mod: 59 | Performed by: EMERGENCY MEDICINE

## 2022-03-28 PROCEDURE — 93005 ELECTROCARDIOGRAM TRACING: CPT | Performed by: EMERGENCY MEDICINE

## 2022-03-28 PROCEDURE — 82040 ASSAY OF SERUM ALBUMIN: CPT | Performed by: EMERGENCY MEDICINE

## 2022-03-28 PROCEDURE — C9803 HOPD COVID-19 SPEC COLLECT: HCPCS | Performed by: EMERGENCY MEDICINE

## 2022-03-28 PROCEDURE — 36415 COLL VENOUS BLD VENIPUNCTURE: CPT | Performed by: EMERGENCY MEDICINE

## 2022-03-28 PROCEDURE — 85610 PROTHROMBIN TIME: CPT | Performed by: EMERGENCY MEDICINE

## 2022-03-28 PROCEDURE — 84443 ASSAY THYROID STIM HORMONE: CPT | Performed by: EMERGENCY MEDICINE

## 2022-03-28 PROCEDURE — 84484 ASSAY OF TROPONIN QUANT: CPT | Performed by: EMERGENCY MEDICINE

## 2022-03-28 PROCEDURE — 96360 HYDRATION IV INFUSION INIT: CPT | Performed by: EMERGENCY MEDICINE

## 2022-03-28 PROCEDURE — 85014 HEMATOCRIT: CPT | Performed by: EMERGENCY MEDICINE

## 2022-03-28 PROCEDURE — 70450 CT HEAD/BRAIN W/O DYE: CPT

## 2022-03-28 PROCEDURE — 82272 OCCULT BLD FECES 1-3 TESTS: CPT | Performed by: EMERGENCY MEDICINE

## 2022-03-28 PROCEDURE — 96361 HYDRATE IV INFUSION ADD-ON: CPT | Performed by: EMERGENCY MEDICINE

## 2022-03-28 PROCEDURE — 258N000003 HC RX IP 258 OP 636: Performed by: EMERGENCY MEDICINE

## 2022-03-28 PROCEDURE — 99285 EMERGENCY DEPT VISIT HI MDM: CPT | Mod: 25 | Performed by: EMERGENCY MEDICINE

## 2022-03-28 PROCEDURE — 80053 COMPREHEN METABOLIC PANEL: CPT | Performed by: EMERGENCY MEDICINE

## 2022-03-28 PROCEDURE — 87636 SARSCOV2 & INF A&B AMP PRB: CPT | Performed by: EMERGENCY MEDICINE

## 2022-03-28 PROCEDURE — 80306 DRUG TEST PRSMV INSTRMNT: CPT | Performed by: EMERGENCY MEDICINE

## 2022-03-28 RX ORDER — SODIUM CHLORIDE 9 MG/ML
INJECTION, SOLUTION INTRAVENOUS CONTINUOUS
Status: DISCONTINUED | OUTPATIENT
Start: 2022-03-28 | End: 2022-03-28 | Stop reason: HOSPADM

## 2022-03-28 RX ORDER — OXYCODONE AND ACETAMINOPHEN 10; 325 MG/1; MG/1
1 TABLET ORAL EVERY 6 HOURS PRN
Qty: 30 TABLET | Refills: 0 | Status: SHIPPED | OUTPATIENT
Start: 2022-03-28 | End: 2022-04-11

## 2022-03-28 RX ADMIN — SODIUM CHLORIDE 1000 ML: 9 INJECTION, SOLUTION INTRAVENOUS at 09:01

## 2022-03-28 RX ADMIN — SODIUM CHLORIDE: 9 INJECTION, SOLUTION INTRAVENOUS at 09:45

## 2022-03-28 NOTE — TELEPHONE ENCOUNTER
Percocet      Last Written Prescription Date:  3/14/2022  Last Fill Quantity: 30,   # refills: 0  Last Office Visit: 2/14/2022  Future Office visit:       Routing refill request to provider for review/approval because:  Drug not on the [facility], [facility] or [facility] refill protocol or controlled substance

## 2022-03-28 NOTE — TELEPHONE ENCOUNTER
ANTICOAGULATION  MANAGEMENT: Discharge Review    Hollis Diggs chart reviewed for anticoagulation continuity of care    Emergency room visit on 3/28 for weakness, anemia.    Discharge disposition: Home    Results:    Recent labs: (last 7 days)     03/21/22  1616 03/28/22  0805   INR 2.6* 2.20*     Anticoagulation inpatient management:     not applicable     Anticoagulation discharge instructions:     Warfarin dosing: home regimen continued   Bridging: No   INR goal change: No      Medication changes affecting anticoagulation: No    Additional factors affecting anticoagulation: No    Plan     No adjustment to anticoagulation plan needed    Patient not contacted    No adjustment to Anticoagulation Calendar was required    Анна Hale RN

## 2022-03-28 NOTE — ED PROVIDER NOTES
History     Chief Complaint   Patient presents with     Altered Mental Status     HPI  Hollis Diggs is a 51 year old male who presents with weakness.  Symptoms began early yesterday evening.  He was tired and weak according to him and his wife.  He went to sleep around 730.  He works early.  His alarm went off at 415 and he did not wake up for this per his wife.  She eventually was able to help get him up at 5 AM.  He did drive to work.  However, his boss sent him home as he was stumbling.  Patient has had a cough for 2 months but denies any other infectious symptoms.  No chest pain.  No focal weakness.  Does have a history of depression.  He states he did not feel great mentally or physically over the weekend.    Allergies:  Allergies   Allergen Reactions     No Known Drug Allergies        Problem List:    Patient Active Problem List    Diagnosis Date Noted     Hypertension, unspecified type 02/14/2022     Priority: Medium     HILTON (dyspnea on exertion) 02/14/2022     Priority: Medium     Chronic, continuous use of opioids 05/14/2021     Priority: Medium     Deep vein thrombosis (DVT) of proximal lower extremity, unspecified chronicity, unspecified laterality (H) 04/28/2021     Priority: Medium     Gastroesophageal reflux disease without esophagitis 04/26/2021     Priority: Medium     Antiphospholipid syndrome (H) 03/06/2021     Priority: Medium     Suicidal behavior 06/22/2020     Priority: Medium     Class 1 obesity due to excess calories without serious comorbidity with body mass index (BMI) of 33.0 to 33.9 in adult 01/08/2020     Priority: Medium     Long-term use of high-risk medication 05/12/2017     Priority: Medium     History of deep venous thrombosis 04/14/2017     Priority: Medium     DDD (degenerative disc disease), lumbar 04/14/2017     Priority: Medium     On prednisone therapy 07/27/2016     Priority: Medium     Seronegative rheumatoid arthritis (H) 06/28/2016     Priority: Medium     Long-term  (current) use of anticoagulants [Z79.01] 03/16/2016     Priority: Medium     Rheumatoid arthritis of multiple sites with negative rheumatoid factor (H) 12/07/2015     Priority: Medium     Midline low back pain, with sciatica presence unspecified 10/14/2015     Priority: Medium     Major depressive disorder, recurrent episode, mild (H) 10/07/2015     Priority: Medium     Pain in joint, multiple sites 03/20/2015     Priority: Medium     Anxiety state 02/10/2015     Priority: Medium     Problem list name updated by automated process. Provider to review       CARDIOVASCULAR SCREENING; LDL GOAL LESS THAN 160 01/15/2013     Priority: Medium     Alopecia 02/19/2010     Priority: Medium     Ingrown toenail 08/18/2009     Priority: Medium     Anxiety 07/09/2008     Priority: Medium     Abdominal pain, epigastric 01/25/2006     Priority: Medium        Past Medical History:    Past Medical History:   Diagnosis Date     Antiphospholipid syndrome (H)      Arthritis      Asthma      blood clot in leg      Depressive disorder, not elsewhere classified      ANKIT (generalised anxiety disorder)      HH (hiatus hernia)      Hypercholesteremia      Lumbar disc herniation 1992     Seronegative rheumatoid arthritis (H)        Past Surgical History:    Past Surgical History:   Procedure Laterality Date     APPENDECTOMY       COLONOSCOPY N/A 8/2/2016    Procedure: COMBINED COLONOSCOPY, SINGLE OR MULTIPLE BIOPSY/POLYPECTOMY BY BIOPSY;  Surgeon: Sydnee Walton MD;  Location: [facility]     COLONOSCOPY WITH CO2 INSUFFLATION N/A 8/2/2016    Procedure: COLONOSCOPY WITH CO2 INSUFFLATION;  Surgeon: Sydnee Walton MD;  Location: [facility]     COMBINED ESOPHAGOSCOPY, GASTROSCOPY, DUODENOSCOPY (EGD) WITH CO2 INSUFFLATION N/A 8/2/2016    Procedure: COMBINED ESOPHAGOSCOPY, GASTROSCOPY, DUODENOSCOPY (EGD) WITH CO2 INSUFFLATION;  Surgeon: Sydnee Walton MD;  Location: [facility]     COMBINED ESOPHAGOSCOPY, GASTROSCOPY,  "DUODENOSCOPY (EGD) WITH CO2 INSUFFLATION N/A 5/27/2021    Procedure: ESOPHAGOGASTRODUODENOSCOPY, WITH CO2 INSUFFLATION;  Surgeon: Alis Cotton DO;  Location: [facility]     ESOPHAGOSCOPY, GASTROSCOPY, DUODENOSCOPY (EGD), COMBINED N/A 8/2/2016    Procedure: COMBINED ESOPHAGOSCOPY, GASTROSCOPY, DUODENOSCOPY (EGD), BIOPSY SINGLE OR MULTIPLE;  Surgeon: Sydnee Walton MD;  Location: [facility]     ESOPHAGOSCOPY, GASTROSCOPY, DUODENOSCOPY (EGD), COMBINED N/A 5/27/2021    Procedure: Esophagogastroduodenoscopy, With Biopsy;  Surgeon: Alis Cotton DO;  Location: [facility] OR     HC REMOVAL OF TONSILS,<10 Y/O      Tonsils <11y.o.     HC REPAIR INCISIONAL HERNIA,REDUCIBLE  1970's    Hernia Repair, Incisional, Unilateral     HC UGI ENDOSCOPY DIAG W BIOPSY  02/01/06     HC VASECTOMY UNILAT/BILAT W POSTOP SEMEN  1/05    Vasectomy     History back lumbar laminectomy       INJECT EPIDURAL LUMBAR Right 4/22/2021    Procedure: Right Lumbar 4-5 and Lumbar 5 - Sacral 1 Epidural Steroid Injection;  Surgeon: Maxwell Zacarias MD;  Location: [facility] OR     ZZC NONSPECIFIC PROCEDURE  91 or 92    back surgery. lumbar. lamiectomy       Family History:    Family History   Problem Relation Age of Onset     Brain Tumor Mother         Benign     Deep Vein Thrombosis Mother         \"neck\"      Heart Disease Father         \"wont tell anyone\"     Neurologic Disorder Paternal Grandmother         Parkinsons      Alcohol/Drug Paternal Grandfather         alcoholic     Cancer Maternal Grandfather         lung     Diabetes Maternal Grandmother      Cerebrovascular Disease Maternal Grandmother         tim age ~70     Arthritis Cousin         cousins x 2 \"RA\"     Musculoskeletal Disorder Maternal Aunt         MS     Family History Negative Other         psoriasis, crohns, UC, SLE       Social History:  Marital Status:   [2]  Social History     Tobacco Use     Smoking status: Never Smoker     Smokeless tobacco: Never Used   Substance Use Topics     " Alcohol use: Yes     Comment: rare     Drug use: No        Medications:    ARIPiprazole (ABILIFY) 10 MG tablet  DULoxetine (CYMBALTA) 60 MG capsule  gabapentin (NEURONTIN) 800 MG tablet  lisinopril (ZESTRIL) 5 MG tablet  oxyCODONE-acetaminophen (PERCOCET)  MG per tablet  traZODone (DESYREL) 150 MG tablet  warfarin ANTICOAGULANT (COUMADIN) 5 MG tablet          Review of Systems  Patient fell this morning.  All other systems are reviewed and are negative    Physical Exam   BP: (!) 140/84  Pulse: 81  Temp: 97.6  F (36.4  C)  Resp: 18  Weight: 112.9 kg (249 lb)  SpO2: 98 %      Physical Exam  Vitals and nursing note reviewed.   Constitutional:       General: He is not in acute distress.     Appearance: He is well-developed. He is not toxic-appearing or diaphoretic.   HENT:      Head: Normocephalic and atraumatic.   Eyes:      General: No scleral icterus.        Right eye: No discharge.         Left eye: No discharge.      Conjunctiva/sclera: Conjunctivae normal.   Cardiovascular:      Rate and Rhythm: Normal rate and regular rhythm.      Heart sounds: Normal heart sounds. No murmur heard.  Pulmonary:      Effort: Pulmonary effort is normal. No respiratory distress.      Breath sounds: Normal breath sounds. No stridor.   Abdominal:      Palpations: Abdomen is soft.      Tenderness: There is no abdominal tenderness.   Musculoskeletal:         General: Normal range of motion.      Cervical back: Normal range of motion and neck supple.   Skin:     General: Skin is warm and dry.      Coloration: Skin is pale.      Findings: No erythema or rash.   Neurological:      Mental Status: He is lethargic.      GCS: GCS eye subscore is 4. GCS verbal subscore is 5. GCS motor subscore is 6.      Cranial Nerves: Cranial nerves are intact. No cranial nerve deficit.      Motor: Motor function is intact. No abnormal muscle tone.      Coordination: Coordination is intact.         ED Course          EKG: Normal sinus rhythm, RBBB.  Rate  of 70 beats per minute.  No acute ST or T wave changes.  Interpreted by myself.         Procedures              Critical Care time:  none               Results for orders placed or performed during the hospital encounter of 03/28/22 (from the past 24 hour(s))   Glucose by meter   Result Value Ref Range    GLUCOSE BY METER POCT 137 (H) 70 - 99 mg/dL   [place] Draw    Narrative    The following orders were created for panel order [place] Draw.  Procedure                               Abnormality         Status                     ---------                               -----------         ------                     Extra Blue Top Tube[[id number]]                              Final result               Extra Green Top (Lithium...[[id number]]                      Final result               Extra Purple Top Tube[[id number]]                            Final result                 Please view results for these tests on the individual orders.   Extra Blue Top Tube   Result Value Ref Range    Hold Specimen JIC    Extra Green Top (Lithium Heparin) Tube   Result Value Ref Range    Hold Specimen JIC    Extra Purple Top Tube   Result Value Ref Range    Hold Specimen JIC    CBC with platelets differential    Narrative    The following orders were created for panel order CBC with platelets differential.  Procedure                               Abnormality         Status                     ---------                               -----------         ------                     CBC with platelets and d...[[id number]]  Abnormal            Final result                 Please view results for these tests on the individual orders.   Comprehensive metabolic panel   Result Value Ref Range    Sodium 139 133 - 144 mmol/L    Potassium 3.7 3.4 - 5.3 mmol/L    Chloride 110 (H) 94 - 109 mmol/L    Carbon Dioxide (CO2) 26 20 - 32 mmol/L    Anion Gap 3 3 - 14 mmol/L    Urea Nitrogen 10 7 - 30 mg/dL    Creatinine 1.05 0.66 - 1.25 mg/dL    Calcium 8.7  8.5 - 10.1 mg/dL    Glucose 118 (H) 70 - 99 mg/dL    Alkaline Phosphatase 67 40 - 150 U/L    AST 12 0 - 45 U/L    ALT 15 0 - 70 U/L    Protein Total 6.9 6.8 - 8.8 g/dL    Albumin 3.6 3.4 - 5.0 g/dL    Bilirubin Total 0.3 0.2 - 1.3 mg/dL    GFR Estimate 85 >60 mL/min/1.73m2   Troponin I   Result Value Ref Range    Troponin I High Sensitivity 6 <79 ng/L   Ethyl Alcohol Level   Result Value Ref Range    Alcohol ethyl <0.01 <=0.01 g/dL   TSH with free T4 reflex   Result Value Ref Range    TSH 0.79 0.40 - 4.00 mU/L   INR   Result Value Ref Range    INR 2.20 (H) 0.85 - 1.15   CBC with platelets and differential   Result Value Ref Range    WBC Count 7.2 4.0 - 11.0 10e3/uL    RBC Count 5.24 4.40 - 5.90 10e6/uL    Hemoglobin 9.6 (L) 13.3 - 17.7 g/dL    Hematocrit 34.9 (L) 40.0 - 53.0 %    MCV 67 (L) 78 - 100 fL    MCH 18.3 (L) 26.5 - 33.0 pg    MCHC 27.5 (L) 31.5 - 36.5 g/dL    RDW 18.3 (H) 10.0 - 15.0 %    Platelet Count 334 150 - 450 10e3/uL    % Neutrophils 69 %    % Lymphocytes 23 %    % Monocytes 6 %    % Eosinophils 2 %    % Basophils 0 %    % Immature Granulocytes 0 %    NRBCs per 100 WBC 0 <1 /100    Absolute Neutrophils 4.9 1.6 - 8.3 10e3/uL    Absolute Lymphocytes 1.6 0.8 - 5.3 10e3/uL    Absolute Monocytes 0.4 0.0 - 1.3 10e3/uL    Absolute Eosinophils 0.1 0.0 - 0.7 10e3/uL    Absolute Basophils 0.0 0.0 - 0.2 10e3/uL    Absolute Immature Granulocytes 0.0 <=0.4 10e3/uL    Absolute NRBCs 0.0 10e3/uL   Symptomatic; Unknown Influenza A/B & SARS-CoV2 (COVID-19) Virus PCR Multiplex Nasopharyngeal    Specimen: Nasopharyngeal; Swab   Result Value Ref Range    Influenza A PCR Negative Negative    Influenza B PCR Negative Negative    SARS CoV2 PCR Negative Negative    Narrative    Testing was performed using the rupal SARS-CoV-2 & Influenza A/B Assay on the rupal Debby System. This test should be ordered for the detection of SARS-CoV-2 and influenza viruses in individuals who meet clinical and/or epidemiological criteria.  Test performance is unknown in asymptomatic patients. This test is for in vitro diagnostic use under the FDA EUA for laboratories certified under CLIA to perform moderate and/or high complexity testing. This test has not been FDA cleared or approved. A negative result does not rule out the presence of PCR inhibitors in the specimen or target RNA in concentration below the limit of detection for the assay. If only one viral target is positive but coinfection with multiple targets is suspected, the sample should be re-tested with another FDA cleared, approved or authorized test, if coinfection would change clinical management. [facility] are certified under the Clinical Laboratory Improvement Amendments of 1988 (CLIA-88) as  qualified to perform moderate and/or high complexity laboratory testing.   CT Head w/o Contrast    Narrative    CT HEAD WITHOUT CONTRAST   3/28/2022 8:27 AM     HISTORY: Neuro deficit, acute, stroke suspected. Lethargic, hit  forehead.    COMPARISON: None.    TECHNIQUE: Axial images through the brain obtained without intravenous  contrast, reviewed in bone, brain, and subdural windows.  Radiation dose for this scan was reduced using automated exposure  control, adjustment of the mA and/or kV according to patient size, or  iterative reconstruction technique.    FINDINGS: Attenuation of the brain parenchyma is grossly within normal  limits without mass or acute hemorrhage. There is no mass-effect or  midline shift. Gray/white matter differentiation is well maintained.   No abnormal intra- or extra-axial fluid collections. The ventricles do  not appear enlarged out of proportion to the cerebral sulci.    Small mucous retention cysts versus less likely polyps are seen in the  dependent aspects of the bilateral maxillary sinuses. Visualized  portions the paranasal sinuses, mastoid air spaces, orbits, and  calvarium are otherwise unremarkable.      Impression    IMPRESSION:  1. No  acute intracranial pathology.   2. Chronic bilateral maxillary sinus disease.    I called the essentially negative findings TO Dr. Arriola on 3/28/2022  at 8:31 AM.    UA with Microscopic reflex to Culture    Specimen: Urine, Clean Catch   Result Value Ref Range    Color Urine Straw Colorless, Straw, Light Yellow, Yellow    Appearance Urine Clear Clear    Glucose Urine Negative Negative mg/dL    Bilirubin Urine Negative Negative    Ketones Urine Negative Negative mg/dL    Specific Gravity Urine 1.008 1.003 - 1.035    Blood Urine Negative Negative    pH Urine 6.0 5.0 - 7.0    Protein Albumin Urine Negative Negative mg/dL    Urobilinogen Urine Normal Normal, 2.0 mg/dL    Nitrite Urine Negative Negative    Leukocyte Esterase Urine Negative Negative    Mucus Urine Present (A) None Seen /LPF    RBC Urine 0 <=2 /HPF    WBC Urine 0 <=5 /HPF    Narrative    Urine Culture not indicated   Urine Drugs of Abuse Screen    Narrative    The following orders were created for panel order Urine Drugs of Abuse Screen.  Procedure                               Abnormality         Status                     ---------                               -----------         ------                     Urine Drugs of Abuse Scr...[[id number]]  Abnormal            Final result                 Please view results for these tests on the individual orders.   Urine Drugs of Abuse Screen Panel 13   Result Value Ref Range    Cannabinoids (12-aau-3-carboxy-9-THC) Not Detected Not Detected, Indeterminate    Phencyclidine Not Detected Not Detected, Indeterminate    Cocaine (Benzoylecgonine) Not Detected Not Detected, Indeterminate    Methamphetamine (d-Methamphetamine) Not Detected Not Detected, Indeterminate    Opiates (Morphine) Not Detected Not Detected, Indeterminate    Amphetamine (d-Amphetamine) Not Detected Not Detected, Indeterminate    Benzodiazepines (Nordiazepam) Detected (A) Not Detected, Indeterminate    Tricyclic Antidepressants (Desipramine) Not  Detected Not Detected, Indeterminate    Methadone Not Detected Not Detected, Indeterminate    Barbiturates (Butalbital) Not Detected Not Detected, Indeterminate    Oxycodone Not Detected Not Detected, Indeterminate    Propoxyphene (Norpropoxyphene) Not Detected Not Detected, Indeterminate    Buprenorphine Not Detected Not Detected, Indeterminate   Occult blood stool   Result Value Ref Range    Occult Blood Negative Negative       Medications   0.9% sodium chloride BOLUS (0 mLs Intravenous Stopped 3/28/22 0945)     Followed by   sodium chloride 0.9% infusion (0 mLs Intravenous ED Infusing on Admission/transfer 3/28/22 1203)       Assessments & Plan (with Medical Decision Making)  51-year-old male who presented with fatigue.  No specific neurological deficits.  Work-up as above without acute finding.  He does have some chronic appearing anemia that is microcytic.  Hemoccult is negative.  He was able to ambulate here.  He appears stable for discharge home at this point after IV fluids, he and his wife agree.  Have recommended some rest.  Follow-up in clinic later this week.  Return anytime sooner if condition worsens or other concern.     I have reviewed the nursing notes.    I have reviewed the findings, diagnosis, plan and need for follow up with the patient.       Discharge Medication List as of 3/28/2022  1:56 PM          Final diagnoses:   Weakness   Anemia, unspecified type       3/28/2022   [facility] EMERGENCY DEPT     Foster Arriola MD  03/28/22 [id number]       Foster Arriola MD  04/01/22 [id number]

## 2022-03-28 NOTE — LETTER
March 28, 2022      To Whom It May Concern:      Hollis Diggs was seen in our Emergency Department today, 03/28/22.  I expect his condition to improve over the next 3 days.  He may return to work when improved.    Sincerely,        Foster Arriola MD

## 2022-03-28 NOTE — ED NOTES
Reviewed discharge instruction, verbalized understanding. No questions or concerns. IV removed.      never

## 2022-03-31 ENCOUNTER — OFFICE VISIT (OUTPATIENT)
Dept: FAMILY MEDICINE | Facility: CLINIC | Age: 52
End: 2022-03-31
Payer: COMMERCIAL

## 2022-03-31 VITALS
HEIGHT: 72 IN | SYSTOLIC BLOOD PRESSURE: 112 MMHG | BODY MASS INDEX: 33.24 KG/M2 | DIASTOLIC BLOOD PRESSURE: 68 MMHG | TEMPERATURE: 98.6 F | RESPIRATION RATE: 14 BRPM | OXYGEN SATURATION: 100 % | WEIGHT: 245.44 LBS | HEART RATE: 103 BPM

## 2022-03-31 DIAGNOSIS — K44.9 HIATAL HERNIA: ICD-10-CM

## 2022-03-31 DIAGNOSIS — F41.9 ANXIETY: ICD-10-CM

## 2022-03-31 DIAGNOSIS — D50.9 MICROCYTIC ANEMIA: Primary | ICD-10-CM

## 2022-03-31 PROCEDURE — 99213 OFFICE O/P EST LOW 20 MIN: CPT | Performed by: FAMILY MEDICINE

## 2022-03-31 ASSESSMENT — PAIN SCALES - GENERAL: PAINLEVEL: SEVERE PAIN (6)

## 2022-03-31 NOTE — PROGRESS NOTES
Assessment & Plan     Microcytic anemia  Hemoglobin is 9.6.  Probably not acute.  Needs to be followed up.  This visit was for follow-up from his emergency room visit for profound weakness and stumbling around at work.  Nothing could be found except for the low hemoglobin.  See discussion below regular do upper and lower GI scoping if nothing is found there probably needs a hematology consult.  - Adult Gastro Ref - Procedure Only; Future    Anxiety  He has significant anxiety.  On multiple medications for this.  He needs some [facility] paperwork filled out for missing work from time to time for this.    Hiatal hernia  We will going to have him do colonoscopy and upper endoscopy look for possible source of bleeding for his microcytic anemia. - Adult Gastro Ref - Procedure Only; Future                 No follow-ups on file.    Sylvester Cash MD  [facility] ANTONIO Shafer is a 51 year old who presents for the following health issues: Comes in for follow-up from his emergency room visit.  He was dismissed from work after stumbling around profound weakness.  Thing was found regarding this.  He actually fell and hit his head.  He is feeling better but not much now.  Accompanied by his spouse.    HPI     ED/UC Followup:    Facility:  [facility]   Date of visit: 03/28/2022  Reason for visit: Weakness, fell and hit his head, diagnosed with anemia  Current Status:  Feels ok           Review of Systems   Constitutional, HEENT, cardiovascular, pulmonary, gi and gu systems are negative, except as otherwise noted.      Objective    There were no vitals taken for this visit.  There is no height or weight on file to calculate BMI.  Physical Exam   GENERAL: no distress and fatigued  NECK: no adenopathy, no asymmetry, masses, or scars and thyroid normal to palpation  RESP: lungs clear to auscultation - no rales, rhonchi or wheezes  CV: regular rate and rhythm, normal S1 S2, no S3 or  S4, no murmur, click or rub, no peripheral edema and peripheral pulses strong  ABDOMEN: soft, nontender, no hepatosplenomegaly, no masses and bowel sounds normal  MS: no gross musculoskeletal defects noted, no edema  NEURO: Normal strength and tone, mentation intact and speech normal  PSYCH: mentation appears normal and affect flat

## 2022-04-01 ENCOUNTER — TELEPHONE (OUTPATIENT)
Dept: GASTROENTEROLOGY | Facility: CLINIC | Age: 52
End: 2022-04-01
Payer: COMMERCIAL

## 2022-04-01 NOTE — TELEPHONE ENCOUNTER
Screening Questions  BlueKIND OF PREP RedLOCATION [review exclusion criteria] GreenSEDATION TYPE  1. Have you had a positive covid test in the last 90 days? N     2. Are you active on mychart? Y    3. What insurance is in the chart? PO     3.   Ordering/Referring Provider: Sylvester Cash MD     4. BMI 33.2 [BMI OVER 40-EXTENDED PREP]  If greater than 40 review exclusion criteria [PAC APPT IF @ [facility]]        5.  Respiratory Screening :  [If yes to any of the following HOSPITAL setting only]     Do you use daily home oxygen? N    Do you have mod to severe Obstructive Sleep Apnea? N MILD PER PT  [OKAY @ [facility] SH PH RI]   Do you have Pulmonary Hypertension? N     Do you have UNCONTROLLED asthma? N        6.   Have you had a heart or lung transplant? N      7.   Are you currently on dialysis? N [ If yes, G-PREP & HOSPITAL setting only]     8.   Do you have chronic kidney disease? N [ If yes, G-PREP ]    9.   Have you had a stroke or Transient ischemic attack (TIA - aka  mini stroke ) within 6 months?  N (If yes, please review exclusion criteria)    10.   In the past 6 months, have you had any heart related issues including cardiomyopathy or heart attack? N           If yes, did it require cardiac stenting or other implantable device?       11.   Do you have any implantable devices in your body (pacemaker, defib, LVAD)? N (If yes, please review exclusion criteria)    12.   Do you take nitroglycerin? N           If yes, how often?   (if yes, HOSPITAL setting ONLY)    13.   Are you currently taking any blood thinners? Y          [IF YES, INFORM PATIENT TO FOLLOW UP W/ ORDERING PROVIDER FOR BRIDGING INSTRUCTIONS]     14.   Do you have a diagnosis of diabetes? N   [ If yes, G-PREP ]    15.   [FEMALES] Are you currently pregnant?     If yes, how many weeks?      16.   Are you taking any prescription pain medications on a routine schedule?  Y PERC 1-2XS DAY  [ If yes, EXTENDED PREP.] [If yes, MAC]    17.   Do you  have any chemical dependencies such as alcohol, street drugs, or methadone?  N [If yes, MAC]    18.   Do you have any history of post-traumatic stress syndrome, severe anxiety or history of psychosis?  N  [If yes, MAC]    19.   Do you have a significant intellectual disability?  N [If yes, MAC]    20.   Do you transfer independently?  Y    21.  On a regular basis do you go 3-5 days between bowel movements? N   [ If yes, EXTENDED PREP.]    22.   Preferred LOCAL Pharmacy for Pre Prescription        [facility] - [place], [place] - [facility] [address]      Scheduling Details      Caller :   (Please ask for phone number if not scheduled by patient)    Type of Procedure Scheduled: DOUBLE  Which Colonoscopy Prep was Sent?: EXTENDED  KHORUTS CF PATIENTS & GROEN'S PATIENTS NEEDS EXTENDED PREP  Surgeon: ARLYN  Date of Procedure: 5/3  Location:       Sedation Type: MAC  Conscious Sedation- Needs  for 6 hours after the procedure  MAC/General-Needs  for 24 hours after procedure    Pre-op Required at [facility], [place], [place] and OR for MAC sedation: N  (advise patient they will need a pre-op prior to procedure -)      Informed patient they will need an adult  Y  Cannot take any type of public or medical transportation alone    Pre-Procedure Covid test to be completed at [facility] or Externally: 4/29    Confirmed Nurse will call to complete assessment Y    Additional comments:

## 2022-04-02 DIAGNOSIS — Z11.59 ENCOUNTER FOR SCREENING FOR OTHER VIRAL DISEASES: Primary | ICD-10-CM

## 2022-04-08 DIAGNOSIS — I10 HYPERTENSION, UNSPECIFIED TYPE: ICD-10-CM

## 2022-04-08 RX ORDER — LISINOPRIL 5 MG/1
TABLET ORAL
Qty: 30 TABLET | Refills: 1 | Status: SHIPPED | OUTPATIENT
Start: 2022-04-08 | End: 2022-06-07

## 2022-04-08 NOTE — TELEPHONE ENCOUNTER
Prescription approved per [facility] Refill Protocol.  Gomez Ramon, BSN, RN, PHN  [facility] RN for [facility] [place] [place]  April 8, 2022

## 2022-04-11 ENCOUNTER — MYC REFILL (OUTPATIENT)
Dept: FAMILY MEDICINE | Facility: CLINIC | Age: 52
End: 2022-04-11
Payer: COMMERCIAL

## 2022-04-11 ENCOUNTER — ANTICOAGULATION THERAPY VISIT (OUTPATIENT)
Dept: ANTICOAGULATION | Facility: CLINIC | Age: 52
End: 2022-04-11

## 2022-04-11 ENCOUNTER — LAB (OUTPATIENT)
Dept: LAB | Facility: CLINIC | Age: 52
End: 2022-04-11
Payer: COMMERCIAL

## 2022-04-11 ENCOUNTER — TELEPHONE (OUTPATIENT)
Dept: ANTICOAGULATION | Facility: CLINIC | Age: 52
End: 2022-04-11

## 2022-04-11 DIAGNOSIS — M54.16 LUMBAR RADICULOPATHY: ICD-10-CM

## 2022-04-11 DIAGNOSIS — I82.4Y9 DEEP VEIN THROMBOSIS (DVT) OF PROXIMAL LOWER EXTREMITY, UNSPECIFIED CHRONICITY, UNSPECIFIED LATERALITY (H): ICD-10-CM

## 2022-04-11 DIAGNOSIS — Z79.01 LONG TERM CURRENT USE OF ANTICOAGULANT THERAPY: ICD-10-CM

## 2022-04-11 DIAGNOSIS — Z79.01 LONG TERM CURRENT USE OF ANTICOAGULANT THERAPY: Primary | ICD-10-CM

## 2022-04-11 DIAGNOSIS — F11.90 CHRONIC, CONTINUOUS USE OF OPIOIDS: ICD-10-CM

## 2022-04-11 DIAGNOSIS — D68.61 ANTIPHOSPHOLIPID SYNDROME (H): ICD-10-CM

## 2022-04-11 LAB — INR BLD: 2.2 (ref 0.9–1.1)

## 2022-04-11 PROCEDURE — 85610 PROTHROMBIN TIME: CPT

## 2022-04-11 PROCEDURE — 36416 COLLJ CAPILLARY BLOOD SPEC: CPT

## 2022-04-11 RX ORDER — OXYCODONE AND ACETAMINOPHEN 10; 325 MG/1; MG/1
1 TABLET ORAL EVERY 6 HOURS PRN
Qty: 30 TABLET | Refills: 0 | Status: SHIPPED | OUTPATIENT
Start: 2022-04-11 | End: 2022-04-25

## 2022-04-11 NOTE — TELEPHONE ENCOUNTER
Requested Prescriptions   Pending Prescriptions Disp Refills     oxyCODONE-acetaminophen (PERCOCET)  MG per tablet 30 tablet 0     Sig: Take 1 tablet by mouth as needed in the morning and 1 tablet as needed at noon and 1 tablet as needed in the evening and 1 tablet as needed before bedtime for severe pain. Max of 2 a day       Last Written Prescription Date:  03/28/2022  Last Fill Quantity: 30,   # refills: 0  Last Office Visit: 03/31/2022  Future Office visit:    Next 5 appointments (look out 90 days)    Apr 29, 2022  9:20 AM  Pre-procedure Covid with [facility] COVID LAB  [facility] ([facility] ) [address] [place] [id number] [phone]           Routing refill request to provider for review/approval because:  Drug not on the [facility], [facility] or [facility] refill protocol or controlled substance

## 2022-04-11 NOTE — PROGRESS NOTES
ANTICOAGULATION MANAGEMENT     Hollis Diggs 51 year old male is on warfarin with therapeutic INR result. (Goal INR 2.0-3.0)    Recent labs: (last 7 days)     04/11/22  1618   INR 2.2*       ASSESSMENT       Source(s): Chart Review and Patient/Caregiver Call       Warfarin doses taken: Warfarin taken as instructed    Diet: No new diet changes identified    New illness, injury, or hospitalization: No    Medication/supplement changes: None noted    Signs or symptoms of bleeding or clotting: No    Previous INR: Therapeutic last 2(+) visits    Additional findings: Upcoming surgery/procedure 5/3/22 - see TE       PLAN     Recommended plan for no diet, medication or health factor changes affecting INR     Dosing Instructions: continue your current warfarin dose with next INR in 4 weeks       Summary  As of 4/11/2022    Full warfarin instructions:  10 mg every Tue, Sat; 7.5 mg all other days   Next INR check:  5/9/2022             Telephone call with Holils who verbalizes understanding and agrees to plan and who agrees to plan and repeated back plan correctly    Lab visit scheduled    Education provided: Please call back if any changes to your diet, medications or how you've been taking warfarin    Plan made per [facility] anticoagulation protocol    Jesse Flores, RN  Anticoagulation Clinic  4/11/2022    _______________________________________________________________________     Anticoagulation Episode Summary     Current INR goal:  2.0-3.0   TTR:  51.6 % (1 y)   Target end date:  Indefinite   Send INR reminders to:  TIMOTEO ALVAREZ    Indications    DVT (deep venous thrombosis) (H) (Resolved) [I82.409]  Long-term (current) use of anticoagulants [Z79.01] [Z79.01]  Deep vein thrombosis (DVT) of proximal lower extremity  unspecified chronicity  unspecified laterality (H) [I82.4Y9]           Comments:  5 mg, PM dose         Anticoagulation Care Providers     Provider Role Specialty Phone number    Sylvester Cash MD  [facility] [phone]

## 2022-04-13 ENCOUNTER — TELEPHONE (OUTPATIENT)
Dept: FAMILY MEDICINE | Facility: CLINIC | Age: 52
End: 2022-04-13
Payer: COMMERCIAL

## 2022-04-13 NOTE — TELEPHONE ENCOUNTER
Prior Authorization Retail Medication Request    Medication/Dose: Oxycodone-acetaminophen  ICD code (if different than what is on RX):   Lumbar radiculopathy [M54.16]      Chronic, continuous use of opioids [F11.90]         Previously Tried and Failed:    Rationale:      Insurance Name:  PRE[employer]    Insurance ID:  [id number]    Pharmacy Information (if different than what is on RX)  Name: Rajni Arredondo    Phone:  [phone]

## 2022-04-13 NOTE — TELEPHONE ENCOUNTER
[facility]-PROCEDURAL ANTICOAGULATION  MANAGEMENT    ASSESSMENT     Warfarin interruption plan for colonoscopy on 2022.    Indication for Anticoagulation: DVT and Antiphospholipid Antibodies      DVT 10/2015     LAC+ 2015 & 2016      [facility]-Procedure Risk stratification for thromboembolism: high (2018 American Society of Hematology guideline)    VTE: 2018 American Society of Hematology recommends against bridging in low and moderate risk VTE patients holding warfarin     RECOMMENDATION       Pre-Procedure:  o Hold warfarin for 5 days, until after procedure startin2022   o Enoxaparin (Lovenox) 105 mg subq Q 12 hrs (1 mg/kg Q 12 hrs for CrCl >= 60 ml/min and BMI <= 40 kg/m2)   - Start enoxaparin: 2022  - Last dose of enoxaparin prior to procedure: 2022 AM  (~24 hours prior to procedure)      Post-Procedure:  o Resume warfarin dose if okay with provider doing procedure on night of procedure, 2022 PM: 10mg  o Resume  enoxaparin (Lovenox) ~ 24-48 hrs post procedure when okay with provider doing procedure. Continue until INR >= 2.3 or INR >= 2.0 x 2 ([facility] protocol goal INR 2-3)  o Recheck INR ~5-6 days after resuming warfarin   ?       Plan routed to referring provider for approval  ?   Teresita Cash [facility]    SUBJECTIVE/OBJECTIVE     Hollis Diggs, a 51 year old male    Goal INR Range: 2.0-3.0     Patient bridged in past: Yes: 2021 with surgery    Pertinent History: N/A    Wt Readings from Last 3 Encounters:   22 111.3 kg (245 lb 7 oz)   22 112.9 kg (249 lb)   22 112.5 kg (248 lb)      Ideal body weight: 77.6 kg (171 lb 1.2 oz)  Adjusted ideal body weight: 91.1 kg (200 lb 13.1 oz)     Estimated body mass index is 33.29 kg/m  as calculated from the following:    Height as of 3/31/22: 1.829 m (6').    Weight as of 3/31/22: 111.3 kg (245 lb 7 oz).    Lab Results   Component Value Date    INR 2.2 (H) 2022    INR 2.20 (H) 2022    INR 2.6 (H) 2022      Lab Results   Component Value Date    HGB 9.6 03/28/2022    HGB 11.5 05/12/2021    HCT 34.9 03/28/2022    HCT 38.7 05/12/2021     03/28/2022     05/12/2021     Lab Results   Component Value Date    CR 1.05 03/28/2022    CR 1.18 05/12/2021    CR 1.05 01/29/2021     Estimated Creatinine Clearance: 106 mL/min (based on SCr of 1.05 mg/dL).

## 2022-04-14 RX ORDER — ENOXAPARIN SODIUM 150 MG/ML
1 INJECTION SUBCUTANEOUS EVERY 12 HOURS
Qty: 12.8 ML | Refills: 1 | Status: SHIPPED | OUTPATIENT
Start: 2022-04-14 | End: 2022-11-17

## 2022-04-14 NOTE — TELEPHONE ENCOUNTER
"Sylvester Cash MD Johnson, Michelle A, [facility]  Caller: Unspecified (3 days ago,  5:06 PM)  Okay to proceed as outlined.  Sylvester Cash MD     Hollis Diggs  [address] [place] [id number]      Procedure Instructions for Anticoagulation     For your upcoming colonoscopy on 05/03/2022 your healthcare provider wants you to stop warfarin as directed below. To protect you from a clot while your off your warfarin, your provider wants you to inject a short-acting medication called enoxaparin (Lovenox), this is called \"bridging.\"      Enoxaparin (Lovenox) is an injection that you or a caregiver can give at home. It is injected just underneath the skin in your stomach.     Bring these instructions with you to your procedure to show the provider doing the procedure. Please discuss with the provider doing the procedure if it is okay to restart warfarin and enoxaparin as we have planned.      You will take enoxaparin 105mg mg every 12 hours (0.7 mL -partial syringe) as outlined below. A prescription was sent to [facility].    04/27/2022, Take last dose of warfarin  04/28/2022, NO warfarin  04/29/2022, NO warfarin  04/30/2022, NO warfarin, enoxaparin every 12 hours (AM and PM)  05/01/2022, NO warfarin, enoxaparin every 12 hours (AM and PM)  05/02/2022, NO warfarin, enoxaparin AM only (no enoxaparin 24 hours prior to surgery)    05/03/2022, DAY OF PROCEDURE, NO enoxaparin. Restart warfarin 10mg in the evening, if okay with provider doing the procedure.    Make sure to ask the provider doing your procedure if it is okay to begin your warfarin and enoxaparin as planned     05/04/2022, Restart enoxaparin every 12 hours (AM and /PM), unless instructed otherwise by the physician, and 7.5 mg warfarin in the evening.   05/05/2022, Enoxaparin every 12 hours (AM and PM) and 7.5 mg warfarin in the evening.  05/06/2022, Enoxaparin every 12 hours (AM and PM) and 7.5 mg warfarin in the evening.  05/07/2022, " Enoxaparin every 12 hours (AM and PM) and 10 mg warfarin in the evening.  05/08/2022, Enoxaparin every 12 hours (AM and PM) and 7.5 mg warfarin in the evening.  05/09/2022, Enoxaparin in the AM. Recheck INR. Anticoagulation clinic to call with further dosing instructions.     Please watch for symptoms of both bleeding and clotting while on warfarin and enoxaparin:      Call 911 or go to the emergency room if you are experiencing any of the following:     Coughing or throwing up blood, can't control bleeding from a cut or injury after putting pressure on it, have a sudden severe headache, have red or black stools, or have red or orange urine.    Chest pain or shortness of breath    Any symptoms of a stroke including: sudden numbness or weakness in your face, arm or leg; sudden confusion or trouble speaking, reading or understanding; sudden blurred or decreased vision; sudden trouble walking or moving a part of the body; sudden severe headache for no reason.      Call your care team if you have:     Bleeding gums, large bruises, pale skin.    Sudden pain, tenderness or swelling in your leg or arm    Please contact the Anticoagulation Clinic at [phone]  if you have any questions or concerns.     Teresita Cash RPH

## 2022-04-18 NOTE — TELEPHONE ENCOUNTER
Central Prior Authorization Team   Phone: [phone]    PA Initiation    Medication: Oxycodone-acetaminophen  Insurance Company: [employer] - Phone [phone] Fax [phone]  Pharmacy Filling the Rx: [facility] - [place] - [address]  Filling Pharmacy Phone: [phone]  Filling Pharmacy Fax:    Start Date: 4/18/2022

## 2022-04-18 NOTE — TELEPHONE ENCOUNTER
Prior Authorization Not Needed per Insurance    Medication: Oxycodone-acetaminophen  Insurance Company: [employer] - Phone [phone] Fax [phone]  Expected CoPay:      Pharmacy Filling the Rx: [facility] - [place] - [address]  Pharmacy Notified: Yes  Patient Notified: Yes

## 2022-04-22 DIAGNOSIS — I82.4Y9 DEEP VEIN THROMBOSIS (DVT) OF PROXIMAL LOWER EXTREMITY, UNSPECIFIED CHRONICITY, UNSPECIFIED LATERALITY (H): ICD-10-CM

## 2022-04-22 DIAGNOSIS — Z79.01 LONG TERM CURRENT USE OF ANTICOAGULANT THERAPY: ICD-10-CM

## 2022-04-22 RX ORDER — WARFARIN SODIUM 5 MG/1
TABLET ORAL
Qty: 140 TABLET | Refills: 1 | Status: SHIPPED | OUTPATIENT
Start: 2022-04-22 | End: 2022-10-19

## 2022-04-25 ENCOUNTER — MYC REFILL (OUTPATIENT)
Dept: FAMILY MEDICINE | Facility: CLINIC | Age: 52
End: 2022-04-25
Payer: COMMERCIAL

## 2022-04-25 DIAGNOSIS — M54.16 LUMBAR RADICULOPATHY: ICD-10-CM

## 2022-04-25 DIAGNOSIS — F11.90 CHRONIC, CONTINUOUS USE OF OPIOIDS: ICD-10-CM

## 2022-04-25 RX ORDER — OXYCODONE AND ACETAMINOPHEN 10; 325 MG/1; MG/1
1 TABLET ORAL EVERY 6 HOURS PRN
Qty: 30 TABLET | Refills: 0 | Status: SHIPPED | OUTPATIENT
Start: 2022-04-25 | End: 2022-05-09

## 2022-04-25 NOTE — TELEPHONE ENCOUNTER
oxyCODONE-acetaminophen (PERCOCET)  MG per tablet      Last Written Prescription Date:  04/11/22  Last Fill Quantity: 30,   # refills: 0  Last Office Visit: 03/31/22  Future Office visit:    Next 5 appointments (look out 90 days)    Apr 29, 2022  9:20 AM  Pre-procedure Covid with [facility] COVID LAB  [facility] ([facility] ) [address] [place] [id number] [phone]           Routing refill request to provider for review/approval because:  Drug not on the [facility], [facility] or [facility] refill protocol or controlled substance

## 2022-04-29 ENCOUNTER — LAB (OUTPATIENT)
Dept: LAB | Facility: CLINIC | Age: 52
End: 2022-04-29
Payer: COMMERCIAL

## 2022-04-29 DIAGNOSIS — Z11.59 ENCOUNTER FOR SCREENING FOR OTHER VIRAL DISEASES: ICD-10-CM

## 2022-04-29 PROCEDURE — U0003 INFECTIOUS AGENT DETECTION BY NUCLEIC ACID (DNA OR RNA); SEVERE ACUTE RESPIRATORY SYNDROME CORONAVIRUS 2 (SARS-COV-2) (CORONAVIRUS DISEASE [COVID-19]), AMPLIFIED PROBE TECHNIQUE, MAKING USE OF HIGH THROUGHPUT TECHNOLOGIES AS DESCRIBED BY CMS-2020-01-R: HCPCS

## 2022-04-29 PROCEDURE — U0005 INFEC AGEN DETEC AMPLI PROBE: HCPCS

## 2022-04-30 LAB — SARS-COV-2 RNA RESP QL NAA+PROBE: NEGATIVE

## 2022-05-01 DIAGNOSIS — F41.9 ANXIETY: ICD-10-CM

## 2022-05-02 ENCOUNTER — ANESTHESIA EVENT (OUTPATIENT)
Dept: GASTROENTEROLOGY | Facility: CLINIC | Age: 52
End: 2022-05-02
Payer: COMMERCIAL

## 2022-05-02 RX ORDER — GABAPENTIN 800 MG/1
TABLET ORAL
Qty: 90 TABLET | Refills: 3 | Status: SHIPPED | OUTPATIENT
Start: 2022-05-02 | End: 2022-08-23

## 2022-05-02 NOTE — H&P
[facility] History and Physical    Hollis Diggs MRN# [id number]   Age: 51 year old YOB: 1970     Date of Admission:  (Not on file)    Home clinic: [facility]  Primary care provider: Sylvester Cash          Impression and Plan:   Impression:   Microcytic anemia [D50.9]  Last EGD-May 2021.  Tortuous esophagus with hiatal hernia otherwise normal  Last colonoscopy-2016.  Multiple polyps        Plan:   Proceed to EGD and Colonoscopy as planned.  The procedure, risks(bleeding, perforation), benefits and alternatives were discussed and the patient agrees to proceed. Cleared for Anesthesia             Chief Complaint:   Microcytic anemia [D50.9]    History is obtained from the patient          History of Present Illness:   This 51 year old male is being seen at this time for evaluation EGD and colonoscopy due to anemia. No complaints or family hx of colon CA.             Past Medical History:     Past Medical History:   Diagnosis Date     Antiphospholipid syndrome (H)      Arthritis      Asthma     Exercise     blood clot in leg      Depressive disorder, not elsewhere classified     Depression (non-psychotic)     ANKIT (generalised anxiety disorder)      HH (hiatus hernia)      Hypercholesteremia     normal with weight loss 3/09     Lumbar disc herniation 1992     Seronegative rheumatoid arthritis (H)             Past Surgical History:     Past Surgical History:   Procedure Laterality Date     APPENDECTOMY       COLONOSCOPY N/A 8/2/2016    Procedure: COMBINED COLONOSCOPY, SINGLE OR MULTIPLE BIOPSY/POLYPECTOMY BY BIOPSY;  Surgeon: Sydnee Walton MD;  Location: [facility] OR     COLONOSCOPY WITH CO2 INSUFFLATION N/A 8/2/2016    Procedure: COLONOSCOPY WITH CO2 INSUFFLATION;  Surgeon: Sydnee Walton MD;  Location: [facility] OR     COMBINED ESOPHAGOSCOPY, GASTROSCOPY, DUODENOSCOPY (EGD) WITH CO2 INSUFFLATION N/A 8/2/2016    Procedure: COMBINED ESOPHAGOSCOPY,  "GASTROSCOPY, DUODENOSCOPY (EGD) WITH CO2 INSUFFLATION;  Surgeon: Sydnee Walton MD;  Location: [facility] OR     COMBINED ESOPHAGOSCOPY, GASTROSCOPY, DUODENOSCOPY (EGD) WITH CO2 INSUFFLATION N/A 5/27/2021    Procedure: ESOPHAGOGASTRODUODENOSCOPY, WITH CO2 INSUFFLATION;  Surgeon: Alis Cotton DO;  Location: [facility] OR     ESOPHAGOSCOPY, GASTROSCOPY, DUODENOSCOPY (EGD), COMBINED N/A 8/2/2016    Procedure: COMBINED ESOPHAGOSCOPY, GASTROSCOPY, DUODENOSCOPY (EGD), BIOPSY SINGLE OR MULTIPLE;  Surgeon: Sydnee Walton MD;  Location: [facility] OR     ESOPHAGOSCOPY, GASTROSCOPY, DUODENOSCOPY (EGD), COMBINED N/A 5/27/2021    Procedure: Esophagogastroduodenoscopy, With Biopsy;  Surgeon: Alis Cotton DO;  Location: [facility] OR     HC REMOVAL OF TONSILS,<13 Y/O      Tonsils <11y.o.     HC REPAIR INCISIONAL HERNIA,REDUCIBLE  1970's    Hernia Repair, Incisional, Unilateral     HC UGI ENDOSCOPY DIAG W BIOPSY  02/01/06     HC VASECTOMY UNILAT/BILAT W POSTOP SEMEN  1/05    Vasectomy     History back lumbar laminectomy       INJECT EPIDURAL LUMBAR Right 4/22/2021    Procedure: Right Lumbar 4-5 and Lumbar 5 - Sacral 1 Epidural Steroid Injection;  Surgeon: Maxwell Zacarias MD;  Location: [facility] OR     ZZC NONSPECIFIC PROCEDURE  91 or 92    back surgery. lumbar. lamiectomy            Social History:     Social History     Tobacco Use     Smoking status: Never Smoker     Smokeless tobacco: Never Used   Substance Use Topics     Alcohol use: Yes     Comment: rare            Family History:     Family History   Problem Relation Age of Onset     Brain Tumor Mother         Benign     Deep Vein Thrombosis Mother         \"neck\"      Heart Disease Father         \"wont tell anyone\"     Neurologic Disorder Paternal Grandmother         Parkinsons      Alcohol/Drug Paternal Grandfather         alcoholic     Cancer Maternal Grandfather         lung     Diabetes Maternal Grandmother      Cerebrovascular Disease Maternal Grandmother         tim " "age ~70     Arthritis Cousin         cousins x 2 \"RA\"     Musculoskeletal Disorder Maternal Aunt         MS     Family History Negative Other         psoriasis, crohns, UC, SLE            Immunizations:     VACCINE/DOSE   Diptheria   DPT   DTAP   HBIG   Hepatitis A   Hepatitis B   HIB   Influenza   Measles   Meningococcal   MMR   Mumps   Pneumococcal   Polio   Rubella   Small Pox   TDAP   Varicella   Zoster            Allergies:     Allergies   Allergen Reactions     No Known Drug Allergies             Medications:     No current facility-administered medications for this encounter.     Current Outpatient Medications   Medication Sig     lisinopril (ZESTRIL) 5 MG tablet TAKE ONE TABLET BY MOUTH ONCE DAILY     ARIPiprazole (ABILIFY) 10 MG tablet Take 1 tablet (10 mg) by mouth daily (Patient taking differently: Take 10 mg by mouth At Bedtime )     DULoxetine (CYMBALTA) 60 MG capsule Take 2 capsules (120 mg) by mouth daily Take 2 capsules daily (Patient taking differently: Take 120 mg by mouth At Bedtime )     enoxaparin ANTICOAGULANT (LOVENOX) 120 MG/0.8ML syringe Inject 0.7 mLs (105 mg) Subcutaneous every 12 hours     gabapentin (NEURONTIN) 800 MG tablet Take 1 tablet (800 mg) by mouth 3 times daily     oxyCODONE-acetaminophen (PERCOCET)  MG per tablet Take 1 tablet by mouth every 6 hours as needed for severe pain Max of 2 a day     traZODone (DESYREL) 150 MG tablet Take 1.5 tablets (225 mg) by mouth At Bedtime     warfarin ANTICOAGULANT (COUMADIN) 5 MG tablet Take 10 mg Tues, Sat and 7.5 mg all other days, or as directed by the Coumadin Clinic             Review of Systems:   The review of systems was positive for the following findings.  .  The remainder of the review of systems was unremarkable.          Physical Exam:   All vitals have been reviewed  There were no vitals taken for this visit.  No intake or output data in the 24 hours ending 05/02/22 0924  SHEENT examination revealed NC/AT, " EOMI.  Examination of the chest revealed CTA.  Examination of the heart revealed RRR.  Examination of the abdomen revealed soft, non tender.  The neuromuscular examination was NL.          Data:   All laboratory data reviewed  No results found for any visits on 05/03/22.  -     Jose A Hurt MD, FACS

## 2022-05-02 NOTE — TELEPHONE ENCOUNTER
Gabapentin      Last Written Prescription Date:  12/9/2021  Last Fill Quantity: 90,   # refills: 3  Last Office Visit: 3/31/2022  Future Office visit:       Routing refill request to provider for review/approval because:  Drug not on the [facility], [facility] or [facility] refill protocol or controlled substance

## 2022-05-03 ENCOUNTER — HOSPITAL ENCOUNTER (OUTPATIENT)
Facility: CLINIC | Age: 52
Discharge: HOME | End: 2022-05-03
Attending: SPECIALIST | Admitting: SPECIALIST
Payer: COMMERCIAL

## 2022-05-03 ENCOUNTER — ANESTHESIA (OUTPATIENT)
Dept: GASTROENTEROLOGY | Facility: CLINIC | Age: 52
End: 2022-05-03
Payer: COMMERCIAL

## 2022-05-03 ENCOUNTER — TELEPHONE (OUTPATIENT)
Dept: ANTICOAGULATION | Facility: CLINIC | Age: 52
End: 2022-05-03

## 2022-05-03 VITALS — DIASTOLIC BLOOD PRESSURE: 87 MMHG | HEART RATE: 79 BPM | SYSTOLIC BLOOD PRESSURE: 120 MMHG | OXYGEN SATURATION: 100 %

## 2022-05-03 DIAGNOSIS — Z79.01 LONG TERM CURRENT USE OF ANTICOAGULANT THERAPY: Primary | ICD-10-CM

## 2022-05-03 DIAGNOSIS — D68.61 ANTIPHOSPHOLIPID SYNDROME (H): ICD-10-CM

## 2022-05-03 DIAGNOSIS — I82.4Y9 DEEP VEIN THROMBOSIS (DVT) OF PROXIMAL LOWER EXTREMITY, UNSPECIFIED CHRONICITY, UNSPECIFIED LATERALITY (H): ICD-10-CM

## 2022-05-03 DIAGNOSIS — Z12.11 COLON CANCER SCREENING: Primary | ICD-10-CM

## 2022-05-03 LAB
COLONOSCOPY: NORMAL
HGB BLD-MCNC: 9.6 G/DL (ref 13.3–17.7)
INR PPP: 1.14 (ref 0.85–1.15)
UPPER GI ENDOSCOPY: NORMAL

## 2022-05-03 PROCEDURE — 85610 PROTHROMBIN TIME: CPT | Performed by: SPECIALIST

## 2022-05-03 PROCEDURE — 45378 DIAGNOSTIC COLONOSCOPY: CPT | Performed by: SPECIALIST

## 2022-05-03 PROCEDURE — 250N000009 HC RX 250: Performed by: SPECIALIST

## 2022-05-03 PROCEDURE — 250N000011 HC RX IP 250 OP 636: Performed by: NURSE ANESTHETIST, CERTIFIED REGISTERED

## 2022-05-03 PROCEDURE — 370N000017 HC ANESTHESIA TECHNICAL FEE, PER MIN: Performed by: SPECIALIST

## 2022-05-03 PROCEDURE — 43239 EGD BIOPSY SINGLE/MULTIPLE: CPT | Mod: 51 | Performed by: SPECIALIST

## 2022-05-03 PROCEDURE — 85018 HEMOGLOBIN: CPT | Performed by: SPECIALIST

## 2022-05-03 PROCEDURE — 258N000003 HC RX IP 258 OP 636: Performed by: NURSE ANESTHETIST, CERTIFIED REGISTERED

## 2022-05-03 PROCEDURE — 250N000009 HC RX 250: Performed by: NURSE ANESTHETIST, CERTIFIED REGISTERED

## 2022-05-03 PROCEDURE — 88305 TISSUE EXAM BY PATHOLOGIST: CPT | Mod: 26 | Performed by: PATHOLOGY

## 2022-05-03 PROCEDURE — 43239 EGD BIOPSY SINGLE/MULTIPLE: CPT | Performed by: SPECIALIST

## 2022-05-03 PROCEDURE — 36415 COLL VENOUS BLD VENIPUNCTURE: CPT | Performed by: SPECIALIST

## 2022-05-03 PROCEDURE — 88305 TISSUE EXAM BY PATHOLOGIST: CPT | Mod: TC | Performed by: SPECIALIST

## 2022-05-03 RX ORDER — LIDOCAINE HYDROCHLORIDE 20 MG/ML
INJECTION, SOLUTION INFILTRATION; PERINEURAL PRN
Status: DISCONTINUED | OUTPATIENT
Start: 2022-05-03 | End: 2022-05-03

## 2022-05-03 RX ORDER — PROPOFOL 10 MG/ML
INJECTION, EMULSION INTRAVENOUS CONTINUOUS PRN
Status: DISCONTINUED | OUTPATIENT
Start: 2022-05-03 | End: 2022-05-03

## 2022-05-03 RX ORDER — LIDOCAINE 40 MG/G
CREAM TOPICAL
Status: DISCONTINUED | OUTPATIENT
Start: 2022-05-03 | End: 2022-05-03 | Stop reason: HOSPADM

## 2022-05-03 RX ORDER — PROPOFOL 10 MG/ML
INJECTION, EMULSION INTRAVENOUS PRN
Status: DISCONTINUED | OUTPATIENT
Start: 2022-05-03 | End: 2022-05-03

## 2022-05-03 RX ORDER — SODIUM CHLORIDE, SODIUM LACTATE, POTASSIUM CHLORIDE, CALCIUM CHLORIDE 600; 310; 30; 20 MG/100ML; MG/100ML; MG/100ML; MG/100ML
INJECTION, SOLUTION INTRAVENOUS CONTINUOUS PRN
Status: DISCONTINUED | OUTPATIENT
Start: 2022-05-03 | End: 2022-05-03

## 2022-05-03 RX ADMIN — SODIUM CHLORIDE, POTASSIUM CHLORIDE, SODIUM LACTATE AND CALCIUM CHLORIDE: 600; 310; 30; 20 INJECTION, SOLUTION INTRAVENOUS at 12:50

## 2022-05-03 RX ADMIN — PROPOFOL 50 MG: 10 INJECTION, EMULSION INTRAVENOUS at 12:54

## 2022-05-03 RX ADMIN — PROPOFOL 50 MG: 10 INJECTION, EMULSION INTRAVENOUS at 12:53

## 2022-05-03 RX ADMIN — PROPOFOL 150 MCG/KG/MIN: 10 INJECTION, EMULSION INTRAVENOUS at 13:03

## 2022-05-03 RX ADMIN — PROPOFOL 100 MG: 10 INJECTION, EMULSION INTRAVENOUS at 12:52

## 2022-05-03 RX ADMIN — PROPOFOL 50 MG: 10 INJECTION, EMULSION INTRAVENOUS at 13:00

## 2022-05-03 RX ADMIN — LIDOCAINE HYDROCHLORIDE 100 MG: 20 INJECTION, SOLUTION INFILTRATION; PERINEURAL at 12:52

## 2022-05-03 RX ADMIN — LIDOCAINE HYDROCHLORIDE 1 ML: 10 INJECTION, SOLUTION EPIDURAL; INFILTRATION; INTRACAUDAL; PERINEURAL at 11:58

## 2022-05-03 RX ADMIN — PROPOFOL 50 MG: 10 INJECTION, EMULSION INTRAVENOUS at 12:57

## 2022-05-03 NOTE — TELEPHONE ENCOUNTER
ANTICOAGULATION MANAGEMENT      Hollis Diggs due for annual renewal of referral to anticoagulation monitoring. Order pended for your review and signature.      ANTICOAGULATION SUMMARY      Warfarin indication(s)     DVT    Heart valve present?  NO       Current goal range   INR: 2.0-3.0     Goal appropriate for indication? Yes, INR 2-3 appropriate for hx of DVT, PE, hypercoagulable state, Afib, LVAD, or bileaflet AVR without risk factors     Current duration of therapy Indefinite/long term therapy   Time in Therapeutic Range (TTR)  (Goal > 60%) 51.6%       Office visit with referring provider's group within last year yes on 3/31/22       Анна Hale RN

## 2022-05-03 NOTE — ANESTHESIA CARE TRANSFER NOTE
Patient: Hollis Diggs    Procedure: Procedure(s):  COLONOSCOPY  ESOPHAGOGASTRODUODENOSCOPY, WITH BIOPSY       Diagnosis: Microcytic anemia [D50.9]  Diagnosis Additional Information: No value filed.    Anesthesia Type:   MAC     Note:    Oropharynx: oropharynx clear of all foreign objects and spontaneously breathing  Level of Consciousness: drowsy  Oxygen Supplementation: face mask    Independent Airway: airway patency satisfactory and stable  Dentition: dentition unchanged  Vital Signs Stable: post-procedure vital signs reviewed and stable  Report to RN Given: handoff report given  Patient transferred to: Phase II    Handoff Report: Identifed the Patient, Identified the Reponsible Provider, Reviewed the pertinent medical history, Discussed the surgical course, Reviewed Intra-OP anesthesia mangement and issues during anesthesia, Set expectations for post-procedure period and Allowed opportunity for questions and acknowledgement of understanding      Vitals:  Vitals Value Taken Time   BP     Temp     Pulse     Resp     SpO2 100 % 05/03/22 1335   Vitals shown include unvalidated device data.    Electronically Signed By: FACUNDO Mccormack CRNA  May 3, 2022  1:36 PM

## 2022-05-03 NOTE — ANESTHESIA POSTPROCEDURE EVALUATION
Patient: Hollis Diggs    Procedure: Procedure(s):  COLONOSCOPY  ESOPHAGOGASTRODUODENOSCOPY, WITH BIOPSY       Anesthesia Type:  MAC    Note:  Disposition: Outpatient   Postop Pain Control: Uneventful            Sign Out: Well controlled pain   PONV: No   Neuro/Psych: Uneventful            Sign Out: Acceptable/Baseline neuro status   Airway/Respiratory: Uneventful            Sign Out: Acceptable/Baseline resp. status   CV/Hemodynamics: Uneventful            Sign Out: Acceptable CV status   Other NRE: NONE   DID A NON-ROUTINE EVENT OCCUR? No    Event details/Postop Comments:  Pt was happy with anesthesia care.  No complications.  I will follow up with the pt if needed.           Last vitals:  Vitals Value Taken Time   /87 05/03/22 1410   Temp     Pulse 79 05/03/22 1410   Resp     SpO2 100 % 05/03/22 1351   Vitals shown include unvalidated device data.    Electronically Signed By: FACUNDO Bergman CRNA  May 3, 2022  2:43 PM

## 2022-05-03 NOTE — ANESTHESIA PREPROCEDURE EVALUATION
Anesthesia Pre-Procedure Evaluation    Patient: Hollis Diggs   MRN: [id number] : 1970        Procedure : Procedure(s):  COLONOSCOPY  ESOPHAGOGASTRODUODENOSCOPY (EGD)          Past Medical History:   Diagnosis Date     Antiphospholipid syndrome (H)      Arthritis      Asthma     Exercise     blood clot in leg      Depressive disorder, not elsewhere classified     Depression (non-psychotic)     ANKIT (generalised anxiety disorder)      HH (hiatus hernia)      Hypercholesteremia     normal with weight loss 3/09     Lumbar disc herniation      Seronegative rheumatoid arthritis (H)       Past Surgical History:   Procedure Laterality Date     APPENDECTOMY       COLONOSCOPY N/A 2016    Procedure: COMBINED COLONOSCOPY, SINGLE OR MULTIPLE BIOPSY/POLYPECTOMY BY BIOPSY;  Surgeon: Sydnee Walton MD;  Location: [facility]     COLONOSCOPY WITH CO2 INSUFFLATION N/A 2016    Procedure: COLONOSCOPY WITH CO2 INSUFFLATION;  Surgeon: Sydnee aWlton MD;  Location: [facility]     COMBINED ESOPHAGOSCOPY, GASTROSCOPY, DUODENOSCOPY (EGD) WITH CO2 INSUFFLATION N/A 2016    Procedure: COMBINED ESOPHAGOSCOPY, GASTROSCOPY, DUODENOSCOPY (EGD) WITH CO2 INSUFFLATION;  Surgeon: Sydnee Walton MD;  Location: [facility]     COMBINED ESOPHAGOSCOPY, GASTROSCOPY, DUODENOSCOPY (EGD) WITH CO2 INSUFFLATION N/A 2021    Procedure: ESOPHAGOGASTRODUODENOSCOPY, WITH CO2 INSUFFLATION;  Surgeon: Alis Cotton DO;  Location: [facility]     ESOPHAGOSCOPY, GASTROSCOPY, DUODENOSCOPY (EGD), COMBINED N/A 2016    Procedure: COMBINED ESOPHAGOSCOPY, GASTROSCOPY, DUODENOSCOPY (EGD), BIOPSY SINGLE OR MULTIPLE;  Surgeon: Sydnee Walton MD;  Location: [facility]     ESOPHAGOSCOPY, GASTROSCOPY, DUODENOSCOPY (EGD), COMBINED N/A 2021    Procedure: Esophagogastroduodenoscopy, With Biopsy;  Surgeon: Alis Cotton DO;  Location: [facility]     HC REMOVAL OF TONSILS,<12 Y/O      Tonsils <11y.o.     HC REPAIR  INCISIONAL HERNIA,REDUCIBLE  1970's    Hernia Repair, Incisional, Unilateral     HC UGI ENDOSCOPY DIAG W BIOPSY  02/01/06     HC VASECTOMY UNILAT/BILAT W POSTOP SEMEN  1/05    Vasectomy     History back lumbar laminectomy       INJECT EPIDURAL LUMBAR Right 4/22/2021    Procedure: Right Lumbar 4-5 and Lumbar 5 - Sacral 1 Epidural Steroid Injection;  Surgeon: Maxwell Zacarias MD;  Location:  OR     ZC NONSPECIFIC PROCEDURE  91 or 92    back surgery. lumbar. lamiectomy      Allergies   Allergen Reactions     No Known Drug Allergies       Social History     Tobacco Use     Smoking status: Never Smoker     Smokeless tobacco: Never Used   Substance Use Topics     Alcohol use: Yes     Comment: rare      Wt Readings from Last 1 Encounters:   03/31/22 111.3 kg (245 lb 7 oz)        Anesthesia Evaluation   Pt has had prior anesthetic. Type: General and MAC.        ROS/MED HX  ENT/Pulmonary:     (+) sleep apnea, moderate, uses CPAP, Intermittent, asthma Treatment: Inhaler prn,      Neurologic:  - neg neurologic ROS     Cardiovascular:     (+) hypertension-----Taking blood thinners Previous cardiac testing   Echo: Date: Results:    Stress Test: Date: 2/14/22 Results:  Result Text       The nuclear stress test is negative for inducible myocardial ischemia or infarction. There is fixed inferior wall defect most likely consistent with attenuation artifact.    Left ventricular function is normal.     The left ventricular ejection fraction at stress is 68%.     There is no prior study for comparison.     ECG Reviewed: Date: 3/28/22 Results:  SR with RBBB  Cath:  Date: Results:      METS/Exercise Tolerance:     Hematologic:  - neg hematologic  ROS     Musculoskeletal: Comment: DDD    (+) arthritis,     GI/Hepatic:     (+) GERD, Asymptomatic on medication,     Renal/Genitourinary:  - neg Renal ROS     Endo:     (+) Obesity,     Psychiatric/Substance Use:     (+) psychiatric history anxiety and depression H/O chronic opiod use .      Infectious Disease:  - neg infectious disease ROS     Malignancy:  - neg malignancy ROS     Other:  - neg other ROS    (+) , H/O Chronic Pain,        Physical Exam    Airway  airway exam normal      Mallampati: II   TM distance: > 3 FB   Neck ROM: full   Mouth opening: > 3 cm    Respiratory Devices and Support         Dental  no notable dental history         Cardiovascular   cardiovascular exam normal       Rhythm and rate: regular and normal     Pulmonary   pulmonary exam normal        breath sounds clear to auscultation           OUTSIDE LABS:  CBC:   Lab Results   Component Value Date    WBC 7.2 03/28/2022    WBC 10.1 05/12/2021    HGB 9.6 (L) 03/28/2022    HGB 11.5 (L) 05/12/2021    HCT 34.9 (L) 03/28/2022    HCT 38.7 (L) 05/12/2021     03/28/2022     05/12/2021     BMP:   Lab Results   Component Value Date     03/28/2022     05/12/2021    POTASSIUM 3.7 03/28/2022    POTASSIUM 3.4 05/12/2021    CHLORIDE 110 (H) 03/28/2022    CHLORIDE 109 05/12/2021    CO2 26 03/28/2022    CO2 30 05/12/2021    BUN 10 03/28/2022    BUN 11 05/12/2021    CR 1.05 03/28/2022    CR 1.18 05/12/2021     (H) 03/28/2022     (H) 03/28/2022     COAGS:   Lab Results   Component Value Date    INR 2.2 (H) 04/11/2022     POC: No results found for: BGM, HCG, HCGS  HEPATIC:   Lab Results   Component Value Date    ALBUMIN 3.6 03/28/2022    PROTTOTAL 6.9 03/28/2022    ALT 15 03/28/2022    AST 12 03/28/2022    ALKPHOS 67 03/28/2022    BILITOTAL 0.3 03/28/2022     OTHER:   Lab Results   Component Value Date    A1C 5.3 10/06/2017    JANEE 8.7 03/28/2022    MAG 2.1 02/04/2008    TSH 0.79 03/28/2022    T4 0.89 12/09/2016    CRP <2.9 07/14/2017    SED 7 07/14/2017       Anesthesia Plan    ASA Status:  2   NPO Status:  NPO Appropriate    Anesthesia Type: MAC.      Maintenance: TIVA.        Consents    Anesthesia Plan(s) and associated risks, benefits, and realistic alternatives discussed. Questions answered and  patient/representative(s) expressed understanding.    - Discussed:     - Discussed with:  Patient    Use of blood products discussed: No .     Postoperative Care            Comments:    Other Comments: The risks and benefits of anesthesia, and the alternatives where applicable, have been discussed with the patient, and they wish to proceed.            FACUNDO Mccormack CRNA

## 2022-05-03 NOTE — DISCHARGE INSTRUCTIONS
[facility]    Home Care Following Endoscopy          Activity:  You have just undergone an endoscopic procedure usually performed with conscious sedation.  Do not work or operate machinery (including a car) for at least 12 hours.    I encourage you to walk and attempt to pass this air as soon as possible.    Diet:  Return to the diet you were on before your procedure but eat lightly for the first 12-24 hours.  Drink plenty of water.  Resume any regular medications unless otherwise advised by your physician.  Please begin any new medication prescribed as a result of your procedure as directed by your physician.   If you had any biopsy or polyp removed please refrain from aspirin or aspirin products for 2 days.  If on Coumadin please restart as instructed by your physician.   Pain:  You may take Tylenol as needed for pain.  Expected Recovery:  You can expect some mild abdominal fullness and/or discomfort due to the air used to inflate your intestinal tract. It is also normal to have a mild sore throat after upper endoscopy.    Call Your Physician if You Have:  After Upper Endoscopy:  Shoulder, back or chest pain.  Difficulty breathing or swallowing.  Vomiting blood.  After Colonoscopy:  Worsening persisting abdominal pain which is worse with activity.  Fevers (>101 degrees F), chills or shakes.  Passage of continued blood with bowel movements.   Any questions or concerns about your recovery, please call [phone] or after hours [phone] Nurse Advice Line.    Follow-up Care:  You did have polyps/biopsy tissue sample(s) removed.  The polyps/biopsy tissue sample(s) will be sent to pathology.  You should receive letter in your My Chart from Dr. Hurt with your results within 1-2 weeks. If you do not participate in My Chart a physical letter will come in the mail in 2-3 weeks.  Please call if you have not received a notification of your results.              Call [phone].

## 2022-05-03 NOTE — TELEPHONE ENCOUNTER
ANTICOAGULATION  MANAGEMENT: Discharge Review    Hollis Diggs chart reviewed for anticoagulation continuity of care    Outpatient surgery/procedure on 5/3 for colonoscopy .    Discharge disposition: Home    Results:    Recent labs: (last 7 days)     05/03/22  1126   INR 1.14     Anticoagulation inpatient management:     not applicable     Anticoagulation discharge instructions:     Warfarin dosing: held coumadin 4/28 - 5/3, 10 mg 5/3, then resume coumadin + Lovenox BID   Bridging: bridging with enoxaparin (Lovenox)   INR goal change: No                  Medication changes affecting anticoagulation: No    Additional factors affecting anticoagulation: No    Plan     No adjustment to anticoagulation plan needed    Patient not contacted    Anticoagulation Calendar updated    Lilliam Pro RN

## 2022-05-05 LAB
PATH REPORT.COMMENTS IMP SPEC: NORMAL
PATH REPORT.COMMENTS IMP SPEC: NORMAL
PATH REPORT.FINAL DX SPEC: NORMAL
PATH REPORT.GROSS SPEC: NORMAL
PATH REPORT.MICROSCOPIC SPEC OTHER STN: NORMAL
PATH REPORT.RELEVANT HX SPEC: NORMAL
PHOTO IMAGE: NORMAL

## 2022-05-06 ENCOUNTER — LAB (OUTPATIENT)
Dept: LAB | Facility: CLINIC | Age: 52
End: 2022-05-06
Payer: COMMERCIAL

## 2022-05-06 ENCOUNTER — ANTICOAGULATION THERAPY VISIT (OUTPATIENT)
Dept: ANTICOAGULATION | Facility: CLINIC | Age: 52
End: 2022-05-06

## 2022-05-06 DIAGNOSIS — I82.4Y9 DEEP VEIN THROMBOSIS (DVT) OF PROXIMAL LOWER EXTREMITY, UNSPECIFIED CHRONICITY, UNSPECIFIED LATERALITY (H): ICD-10-CM

## 2022-05-06 DIAGNOSIS — Z11.4 SCREENING FOR HIV (HUMAN IMMUNODEFICIENCY VIRUS): Primary | ICD-10-CM

## 2022-05-06 DIAGNOSIS — D68.61 ANTIPHOSPHOLIPID SYNDROME (H): ICD-10-CM

## 2022-05-06 DIAGNOSIS — Z79.01 LONG TERM CURRENT USE OF ANTICOAGULANT THERAPY: ICD-10-CM

## 2022-05-06 DIAGNOSIS — Z79.01 LONG TERM CURRENT USE OF ANTICOAGULANT THERAPY: Primary | ICD-10-CM

## 2022-05-06 LAB — INR BLD: 1.2 (ref 0.9–1.1)

## 2022-05-06 PROCEDURE — 85610 PROTHROMBIN TIME: CPT

## 2022-05-06 PROCEDURE — 36416 COLLJ CAPILLARY BLOOD SPEC: CPT

## 2022-05-06 NOTE — PROGRESS NOTES
ANTICOAGULATION MANAGEMENT     Hollis Diggs 51 year old male is on warfarin with subtherapeutic INR result. (Goal INR 2.0-3.0)    Recent labs: (last 7 days)     05/06/22  1152   INR 1.2*       ASSESSMENT       Source(s): Chart Review and Patient/Caregiver Call       Warfarin doses taken: Warfarin recently held for colonoscopy which may be affecting INR    Diet: No new diet changes identified    New illness, injury, or hospitalization: Yes: Procedure    Medication/supplement changes: None noted    Signs or symptoms of bleeding or clotting: No    Previous INR: Subtherapeutic    Additional findings: Bridging with Enoxaparin until INR >= 2.3 or INR >= 2.0 x 2 ([facility] protocol goal INR 2-3)       PLAN     Recommended plan for temporary change(s) affecting INR     Dosing Instructions: booster dose then continue your current warfarin dose with next INR in 3 days       Summary  As of 5/6/2022    Full warfarin instructions:  5/6: 10 mg; Otherwise 10 mg every Tue, Sat; 7.5 mg all other days   Next INR check:  5/9/2022             Telephone call with Hollis who verbalizes understanding and agrees to plan and who agrees to plan and repeated back plan correctly    Lab visit scheduled    Education provided: Please call back if any changes to your diet, medications or how you've been taking warfarin    Plan made per [facility] anticoagulation protocol    Jesse Flores, RN  Anticoagulation Clinic  5/6/2022    _______________________________________________________________________     Anticoagulation Episode Summary     Current INR goal:  2.0-3.0   TTR:  51.6 % (1 y)   Target end date:  Indefinite   Send INR reminders to:  TIMOTEO ALVAREZ    Indications    DVT (deep venous thrombosis) (H) (Resolved) [I82.409]  Long-term (current) use of anticoagulants [Z79.01] [Z79.01]  Deep vein thrombosis (DVT) of proximal lower extremity  unspecified chronicity  unspecified laterality (H) [I82.4Y9]  Antiphospholipid syndrome (H) [D68.61]            Comments:  5 mg, PM dose         Anticoagulation Care Providers     Provider Role Specialty Phone number    Sylvester Cash MD Referring [facility] [phone]

## 2022-05-09 ENCOUNTER — ANTICOAGULATION THERAPY VISIT (OUTPATIENT)
Dept: ANTICOAGULATION | Facility: CLINIC | Age: 52
End: 2022-05-09

## 2022-05-09 ENCOUNTER — LAB (OUTPATIENT)
Dept: LAB | Facility: CLINIC | Age: 52
End: 2022-05-09
Payer: COMMERCIAL

## 2022-05-09 ENCOUNTER — MYC REFILL (OUTPATIENT)
Dept: FAMILY MEDICINE | Facility: CLINIC | Age: 52
End: 2022-05-09
Payer: COMMERCIAL

## 2022-05-09 DIAGNOSIS — Z79.01 LONG TERM CURRENT USE OF ANTICOAGULANT THERAPY: ICD-10-CM

## 2022-05-09 DIAGNOSIS — F11.90 CHRONIC, CONTINUOUS USE OF OPIOIDS: ICD-10-CM

## 2022-05-09 DIAGNOSIS — Z79.01 LONG TERM CURRENT USE OF ANTICOAGULANT THERAPY: Primary | ICD-10-CM

## 2022-05-09 DIAGNOSIS — D68.61 ANTIPHOSPHOLIPID SYNDROME (H): ICD-10-CM

## 2022-05-09 DIAGNOSIS — M54.16 LUMBAR RADICULOPATHY: ICD-10-CM

## 2022-05-09 DIAGNOSIS — I82.4Y9 DEEP VEIN THROMBOSIS (DVT) OF PROXIMAL LOWER EXTREMITY, UNSPECIFIED CHRONICITY, UNSPECIFIED LATERALITY (H): ICD-10-CM

## 2022-05-09 LAB — INR BLD: 1.7 (ref 0.9–1.1)

## 2022-05-09 PROCEDURE — 36416 COLLJ CAPILLARY BLOOD SPEC: CPT

## 2022-05-09 PROCEDURE — 85610 PROTHROMBIN TIME: CPT

## 2022-05-09 RX ORDER — OXYCODONE AND ACETAMINOPHEN 10; 325 MG/1; MG/1
1 TABLET ORAL EVERY 6 HOURS PRN
Qty: 30 TABLET | Refills: 0 | Status: SHIPPED | OUTPATIENT
Start: 2022-05-09 | End: 2022-05-23

## 2022-05-09 NOTE — TELEPHONE ENCOUNTER
Percocet      Last Written Prescription Date:  4/25/2022  Last Fill Quantity: 30,   # refills: 0  Last Office Visit: 3/31/2022  Future Office visit:       Routing refill request to provider for review/approval because:  Drug not on the [facility], [facility] or [facility] refill protocol or controlled substance

## 2022-05-09 NOTE — PROGRESS NOTES
ANTICOAGULATION MANAGEMENT     Hollis Diggs 51 year old male is on warfarin with subtherapeutic INR result. (Goal INR 2.0-3.0)    Recent labs: (last 7 days)     05/09/22  1616   INR 1.7*       ASSESSMENT       Source(s): Chart Review and Patient/Caregiver Call       Warfarin doses taken: Warfarin recently held for procedure which may be affecting INR    Diet: No new diet changes identified    New illness, injury, or hospitalization: No    Medication/supplement changes: None noted    Signs or symptoms of bleeding or clotting: No    Previous INR: Subtherapeutic    Additional findings: Bridging with Enoxaparin until INR >= 2.3 or INR >= 2.0 x 2 ([facility] protocol goal INR 2-3)       PLAN     Recommended plan for temporary change(s) affecting INR     Dosing Instructions: booster dose then continue your current warfarin dose Continue bridging with Enoxaparin with next INR in 2 days       Summary  As of 5/9/2022    Full warfarin instructions:  5/9: 10 mg; Otherwise 10 mg every Tue, Sat; 7.5 mg all other days   Next INR check:  5/11/2022             Telephone call with Hollis who verbalizes understanding and agrees to plan    Lab visit scheduled    Education provided: None required    Plan made per [facility] anticoagulation protocol    Анна Hale, QUENTIN  Anticoagulation Clinic  5/9/2022    _______________________________________________________________________     Anticoagulation Episode Summary     Current INR goal:  2.0-3.0   TTR:  51.5 % (1 y)   Target end date:  Indefinite   Send INR reminders to:  [facility]    Indications    DVT (deep venous thrombosis) (H) (Resolved) [I82.409]  Long-term (current) use of anticoagulants [Z79.01] [Z79.01]  Deep vein thrombosis (DVT) of proximal lower extremity  unspecified chronicity  unspecified laterality (H) [I82.4Y9]  Antiphospholipid syndrome (H) [D68.61]           Comments:  5 mg, PM dose         Anticoagulation Care Providers     Provider Role Specialty Phone number     Sylvester Cash MD [facility] [phone]

## 2022-05-11 ENCOUNTER — LAB (OUTPATIENT)
Dept: LAB | Facility: CLINIC | Age: 52
End: 2022-05-11
Payer: COMMERCIAL

## 2022-05-11 ENCOUNTER — ANTICOAGULATION THERAPY VISIT (OUTPATIENT)
Dept: ANTICOAGULATION | Facility: CLINIC | Age: 52
End: 2022-05-11

## 2022-05-11 DIAGNOSIS — D68.61 ANTIPHOSPHOLIPID SYNDROME (H): ICD-10-CM

## 2022-05-11 DIAGNOSIS — Z79.01 LONG TERM CURRENT USE OF ANTICOAGULANT THERAPY: ICD-10-CM

## 2022-05-11 DIAGNOSIS — I82.4Y9 DEEP VEIN THROMBOSIS (DVT) OF PROXIMAL LOWER EXTREMITY, UNSPECIFIED CHRONICITY, UNSPECIFIED LATERALITY (H): ICD-10-CM

## 2022-05-11 DIAGNOSIS — Z79.01 LONG TERM CURRENT USE OF ANTICOAGULANT THERAPY: Primary | ICD-10-CM

## 2022-05-11 LAB — INR BLD: 1.9 (ref 0.9–1.1)

## 2022-05-11 PROCEDURE — 85610 PROTHROMBIN TIME: CPT

## 2022-05-11 PROCEDURE — 36416 COLLJ CAPILLARY BLOOD SPEC: CPT

## 2022-05-11 NOTE — PROGRESS NOTES
ANTICOAGULATION MANAGEMENT     Hollis Diggs 52 year old male is on warfarin with subtherapeutic INR result. (Goal INR 2.0-3.0)    Recent labs: (last 7 days)     05/11/22  1559   INR 1.9*       ASSESSMENT       Source(s): Chart Review and Patient/Caregiver Call       Warfarin doses taken: Warfarin taken as instructed    Diet: No new diet changes identified    New illness, injury, or hospitalization: Yes: Colonoscopy on 5/3 - held for this    Medication/supplement changes: None noted    Signs or symptoms of bleeding or clotting: No    Previous INR: Subtherapeutic    Additional findings: Bridging with Enoxaparin until INR >= 2.3 or INR >= 2.0 x 2 ([facility] protocol goal INR 2-3)       PLAN     Recommended plan for temporary change(s) affecting INR     Dosing Instructions: booster dose then continue your current warfarin dose with next INR in 2 days       Summary  As of 5/11/2022    Full warfarin instructions:  5/11: 10 mg; Otherwise 10 mg every Tue, Sat; 7.5 mg all other days   Next INR check:  5/13/2022             Telephone call with Hollis who verbalizes understanding and agrees to plan and who agrees to plan and repeated back plan correctly    Lab visit scheduled    Education provided: Please call back if any changes to your diet, medications or how you've been taking warfarin, Goal range and significance of current result, Importance of therapeutic range and Importance of following up at instructed interval    Plan made per [facility] anticoagulation protocol    Jesse Flores, RN  Anticoagulation Clinic  5/11/2022    _______________________________________________________________________     Anticoagulation Episode Summary     Current INR goal:  2.0-3.0   TTR:  51.4 % (1 y)   Target end date:  Indefinite   Send INR reminders to:  TIMOTE[facility] ALVAREZ    Indications    DVT (deep venous thrombosis) (H) (Resolved) [I82.409]  Long-term (current) use of anticoagulants [Z79.01] [Z79.01]  Deep vein thrombosis (DVT) of proximal  lower extremity  unspecified chronicity  unspecified laterality (H) [I82.4Y9]  Antiphospholipid syndrome (H) [D68.61]           Comments:  5 mg, PM dose         Anticoagulation Care Providers     Provider Role Specialty Phone number    Sylvester Cash MD [facility] [phone]

## 2022-05-13 ENCOUNTER — ANTICOAGULATION THERAPY VISIT (OUTPATIENT)
Dept: ANTICOAGULATION | Facility: CLINIC | Age: 52
End: 2022-05-13

## 2022-05-13 ENCOUNTER — LAB (OUTPATIENT)
Dept: LAB | Facility: CLINIC | Age: 52
End: 2022-05-13
Payer: COMMERCIAL

## 2022-05-13 DIAGNOSIS — D68.61 ANTIPHOSPHOLIPID SYNDROME (H): ICD-10-CM

## 2022-05-13 DIAGNOSIS — I82.4Y9 DEEP VEIN THROMBOSIS (DVT) OF PROXIMAL LOWER EXTREMITY, UNSPECIFIED CHRONICITY, UNSPECIFIED LATERALITY (H): ICD-10-CM

## 2022-05-13 DIAGNOSIS — Z79.01 LONG TERM CURRENT USE OF ANTICOAGULANT THERAPY: ICD-10-CM

## 2022-05-13 DIAGNOSIS — Z79.01 LONG TERM CURRENT USE OF ANTICOAGULANT THERAPY: Primary | ICD-10-CM

## 2022-05-13 LAB — INR BLD: 1.8 (ref 0.9–1.1)

## 2022-05-13 PROCEDURE — 85610 PROTHROMBIN TIME: CPT

## 2022-05-13 PROCEDURE — 36416 COLLJ CAPILLARY BLOOD SPEC: CPT

## 2022-05-13 NOTE — PROGRESS NOTES
ANTICOAGULATION MANAGEMENT     Hollis Diggs 52 year old male is on warfarin with subtherapeutic INR result. (Goal INR 2.0-3.0)    Recent labs: (last 7 days)     05/13/22  0807   INR 1.8*       ASSESSMENT       Source(s): Chart Review and Patient/Caregiver Call       Warfarin doses taken: Warfarin recently held for procedure which may be affecting INR    Diet: No new diet changes identified    New illness, injury, or hospitalization: No    Medication/supplement changes: None noted    Signs or symptoms of bleeding or clotting: No    Previous INR: Subtherapeutic    Additional findings: Bridging with Enoxaparin until INR >= 2.3 or INR >= 2.0 x 2 ([facility] protocol goal INR 2-3)       PLAN     Recommended plan for temporary change(s) affecting INR     Dosing Instructions: Continue bridging with Enoxaparin 10 mg daily with next INR in 3 days       Summary  As of 5/13/2022    Full warfarin instructions:  5/13: 10 mg; 5/15: 10 mg; Otherwise 10 mg every Tue, Sat; 7.5 mg all other days   Next INR check:  5/16/2022             Telephone call with Hollis who verbalizes understanding and agrees to plan and who agrees to plan and repeated back plan correctly    Lab visit scheduled    Education provided: None required    Plan made per [facility] anticoagulation protocol    Анна Hale RN  Anticoagulation Clinic  5/13/2022    _______________________________________________________________________     Anticoagulation Episode Summary     Current INR goal:  2.0-3.0   TTR:  50.9 % (1 y)   Target end date:  Indefinite   Send INR reminders to:  TIMOTEO ALVAREZ    Indications    DVT (deep venous thrombosis) (H) (Resolved) [I82.409]  Long-term (current) use of anticoagulants [Z79.01] [Z79.01]  Deep vein thrombosis (DVT) of proximal lower extremity  unspecified chronicity  unspecified laterality (H) [I82.4Y9]  Antiphospholipid syndrome (H) [D68.61]           Comments:           Anticoagulation Care Providers     Provider Role Specialty Phone  number    Sylvester Cash MD [facility] [phone]

## 2022-05-16 ENCOUNTER — ANTICOAGULATION THERAPY VISIT (OUTPATIENT)
Dept: ANTICOAGULATION | Facility: CLINIC | Age: 52
End: 2022-05-16

## 2022-05-16 ENCOUNTER — LAB (OUTPATIENT)
Dept: LAB | Facility: CLINIC | Age: 52
End: 2022-05-16
Payer: COMMERCIAL

## 2022-05-16 DIAGNOSIS — I82.4Y9 DEEP VEIN THROMBOSIS (DVT) OF PROXIMAL LOWER EXTREMITY, UNSPECIFIED CHRONICITY, UNSPECIFIED LATERALITY (H): ICD-10-CM

## 2022-05-16 DIAGNOSIS — Z79.01 LONG TERM CURRENT USE OF ANTICOAGULANT THERAPY: Primary | ICD-10-CM

## 2022-05-16 DIAGNOSIS — Z79.01 LONG TERM CURRENT USE OF ANTICOAGULANT THERAPY: ICD-10-CM

## 2022-05-16 DIAGNOSIS — D68.61 ANTIPHOSPHOLIPID SYNDROME (H): ICD-10-CM

## 2022-05-16 LAB — INR BLD: 2.2 (ref 0.9–1.1)

## 2022-05-16 PROCEDURE — 36416 COLLJ CAPILLARY BLOOD SPEC: CPT

## 2022-05-16 PROCEDURE — 85610 PROTHROMBIN TIME: CPT

## 2022-05-16 NOTE — PROGRESS NOTES
ANTICOAGULATION MANAGEMENT     Hollis Diggs 52 year old male is on warfarin with therapeutic INR result. (Goal INR 2.0-3.0)    Recent labs: (last 7 days)     05/16/22  1630   INR 2.2*       ASSESSMENT       Source(s): Chart Review and Patient/Caregiver Call       Warfarin doses taken: Warfarin taken as instructed    Diet: No new diet changes identified    New illness, injury, or hospitalization: No    Medication/supplement changes: None noted    Signs or symptoms of bleeding or clotting: No    Previous INR: Subtherapeutic    Additional findings: pt declining cont. lovenox at this time. He is aware of the riskd.        PLAN     Recommended plan for no diet, medication or health factor changes affecting INR     Dosing Instructions: booster dose then continue your current warfarin dose with next INR in 4 days       Summary  As of 5/16/2022    Full warfarin instructions:  5/16: 10 mg; Otherwise 10 mg every Tue, Sat; 7.5 mg all other days   Next INR check:  6/20/2022             Telephone call with Hollis who verbalizes understanding and agrees to plan    Lab visit scheduled    Education provided: Please call back if any changes to your diet, medications or how you've been taking warfarin, Goal range and significance of current result and Monitoring for clotting signs and symptoms    Plan made per [facility] anticoagulation protocol    Lilliam Pro, RN  Anticoagulation Clinic  5/16/2022    _______________________________________________________________________     Anticoagulation Episode Summary     Current INR goal:  2.0-3.0   TTR:  50.5 % (1 y)   Target end date:  Indefinite   Send INR reminders to:  TIMOTEO ALVAREZ    Indications    DVT (deep venous thrombosis) (H) (Resolved) [I82.409]  Long-term (current) use of anticoagulants [Z79.01] [Z79.01]  Deep vein thrombosis (DVT) of proximal lower extremity  unspecified chronicity  unspecified laterality (H) [I82.4Y9]  Antiphospholipid syndrome (H) [D68.61]            Comments:           Anticoagulation Care Providers     Provider Role Specialty Phone number    Sylvester Cash MD Referring [facility] [phone]

## 2022-05-20 ENCOUNTER — ANTICOAGULATION THERAPY VISIT (OUTPATIENT)
Dept: ANTICOAGULATION | Facility: CLINIC | Age: 52
End: 2022-05-20

## 2022-05-20 ENCOUNTER — LAB (OUTPATIENT)
Dept: LAB | Facility: CLINIC | Age: 52
End: 2022-05-20
Payer: COMMERCIAL

## 2022-05-20 DIAGNOSIS — I82.4Y9 DEEP VEIN THROMBOSIS (DVT) OF PROXIMAL LOWER EXTREMITY, UNSPECIFIED CHRONICITY, UNSPECIFIED LATERALITY (H): ICD-10-CM

## 2022-05-20 DIAGNOSIS — Z79.01 LONG TERM CURRENT USE OF ANTICOAGULANT THERAPY: Primary | ICD-10-CM

## 2022-05-20 DIAGNOSIS — Z79.01 LONG TERM CURRENT USE OF ANTICOAGULANT THERAPY: ICD-10-CM

## 2022-05-20 DIAGNOSIS — D68.61 ANTIPHOSPHOLIPID SYNDROME (H): ICD-10-CM

## 2022-05-20 LAB — INR BLD: 1.9 (ref 0.9–1.1)

## 2022-05-20 PROCEDURE — 85610 PROTHROMBIN TIME: CPT

## 2022-05-20 PROCEDURE — 36416 COLLJ CAPILLARY BLOOD SPEC: CPT

## 2022-05-20 NOTE — PROGRESS NOTES
ANTICOAGULATION MANAGEMENT     Hollis Diggs 52 year old male is on warfarin with subtherapeutic INR result. (Goal INR 2.0-3.0)    Recent labs: (last 7 days)     05/20/22  0827   INR 1.9*       ASSESSMENT       Source(s): Chart Review    Previous INR was Therapeutic last visit; previously outside of goal range    Medication, diet, health changes since last INR chart reviewed; none identified           PLAN     Recommended plan for temporary change(s) affecting INR - held for procedure but as of Monday quit bridging with lovenox    Dosing Instructions: booster dose then Increase your warfarin dose (4.3% change) with next INR in 2 weeks       Summary  As of 5/20/2022    Full warfarin instructions:  5/20: 10 mg; Otherwise 10 mg every Tue, Thu, Sat; 7.5 mg all other days   Next INR check:  6/3/2022             Detailed voice message left for Hollis with dosing instructions and follow up date.     Contact [phone]  to schedule and with any changes, questions or concerns.     Education provided: Please call back if any changes to your diet, medications or how you've been taking warfarin    Plan made per [facility] anticoagulation protocol    Jesse Flores, RN  Anticoagulation Clinic  5/20/2022    _______________________________________________________________________     Anticoagulation Episode Summary     Current INR goal:  2.0-3.0   TTR:  50.1 % (1 y)   Target end date:  Indefinite   [facility]    Indications    DVT (deep venous thrombosis) (H) (Resolved) [I82.409]  Long-term (current) use of anticoagulants [Z79.01] [Z79.01]  Deep vein thrombosis (DVT) of proximal lower extremity  unspecified chronicity  unspecified laterality (H) [I82.4Y9]  Antiphospholipid syndrome (H) [D68.61]           Comments:           Anticoagulation Care Providers     Provider Role Specialty Phone number    Sylvester Cash MD [facility] [phone]

## 2022-05-23 ENCOUNTER — MYC REFILL (OUTPATIENT)
Dept: FAMILY MEDICINE | Facility: CLINIC | Age: 52
End: 2022-05-23
Payer: COMMERCIAL

## 2022-05-23 DIAGNOSIS — F11.90 CHRONIC, CONTINUOUS USE OF OPIOIDS: ICD-10-CM

## 2022-05-23 DIAGNOSIS — M54.16 LUMBAR RADICULOPATHY: ICD-10-CM

## 2022-05-23 RX ORDER — OXYCODONE AND ACETAMINOPHEN 10; 325 MG/1; MG/1
1 TABLET ORAL EVERY 6 HOURS PRN
Qty: 30 TABLET | Refills: 0 | Status: SHIPPED | OUTPATIENT
Start: 2022-05-23 | End: 2022-06-06

## 2022-05-26 ENCOUNTER — OFFICE VISIT (OUTPATIENT)
Dept: FAMILY MEDICINE | Facility: CLINIC | Age: 52
End: 2022-05-26
Payer: COMMERCIAL

## 2022-05-26 VITALS
RESPIRATION RATE: 14 BRPM | SYSTOLIC BLOOD PRESSURE: 102 MMHG | HEART RATE: 94 BPM | OXYGEN SATURATION: 99 % | DIASTOLIC BLOOD PRESSURE: 62 MMHG | TEMPERATURE: 98.2 F | WEIGHT: 239.2 LBS | BODY MASS INDEX: 32.4 KG/M2 | HEIGHT: 72 IN

## 2022-05-26 DIAGNOSIS — K29.00 OTHER ACUTE GASTRITIS, PRESENCE OF BLEEDING UNSPECIFIED: ICD-10-CM

## 2022-05-26 DIAGNOSIS — D50.8 OTHER IRON DEFICIENCY ANEMIA: Primary | ICD-10-CM

## 2022-05-26 LAB
ERYTHROCYTE [DISTWIDTH] IN BLOOD BY AUTOMATED COUNT: 18.6 % (ref 10–15)
FERRITIN SERPL-MCNC: 2 NG/ML (ref 26–388)
HCT VFR BLD AUTO: 33.7 % (ref 40–53)
HGB BLD-MCNC: 9.4 G/DL (ref 13.3–17.7)
HIV 1+2 AB+HIV1 P24 AG SERPL QL IA: NONREACTIVE
IRON SATN MFR SERPL: 2 % (ref 15–46)
IRON SERPL-MCNC: 9 UG/DL (ref 35–180)
MCH RBC QN AUTO: 18.5 PG (ref 26.5–33)
MCHC RBC AUTO-ENTMCNC: 27.9 G/DL (ref 31.5–36.5)
MCV RBC AUTO: 66 FL (ref 78–100)
PLATELET # BLD AUTO: 361 10E3/UL (ref 150–450)
RBC # BLD AUTO: 5.09 10E6/UL (ref 4.4–5.9)
TIBC SERPL-MCNC: 368 UG/DL (ref 240–430)
WBC # BLD AUTO: 5.1 10E3/UL (ref 4–11)

## 2022-05-26 PROCEDURE — 82728 ASSAY OF FERRITIN: CPT | Performed by: FAMILY MEDICINE

## 2022-05-26 PROCEDURE — 83550 IRON BINDING TEST: CPT | Performed by: FAMILY MEDICINE

## 2022-05-26 PROCEDURE — 99214 OFFICE O/P EST MOD 30 MIN: CPT | Performed by: FAMILY MEDICINE

## 2022-05-26 PROCEDURE — 87389 HIV-1 AG W/HIV-1&-2 AB AG IA: CPT | Performed by: FAMILY MEDICINE

## 2022-05-26 PROCEDURE — 85027 COMPLETE CBC AUTOMATED: CPT | Performed by: FAMILY MEDICINE

## 2022-05-26 PROCEDURE — 36415 COLL VENOUS BLD VENIPUNCTURE: CPT | Performed by: FAMILY MEDICINE

## 2022-05-26 RX ORDER — PANTOPRAZOLE SODIUM 40 MG/1
40 TABLET, DELAYED RELEASE ORAL DAILY
Qty: 30 TABLET | Refills: 1 | Status: SHIPPED | OUTPATIENT
Start: 2022-05-26 | End: 2023-01-03

## 2022-05-26 ASSESSMENT — ENCOUNTER SYMPTOMS: FATIGUE: 1

## 2022-05-26 ASSESSMENT — ANXIETY QUESTIONNAIRES
7. FEELING AFRAID AS IF SOMETHING AWFUL MIGHT HAPPEN: SEVERAL DAYS
GAD7 TOTAL SCORE: 6
4. TROUBLE RELAXING: SEVERAL DAYS
8. IF YOU CHECKED OFF ANY PROBLEMS, HOW DIFFICULT HAVE THESE MADE IT FOR YOU TO DO YOUR WORK, TAKE CARE OF THINGS AT HOME, OR GET ALONG WITH OTHER PEOPLE?: SOMEWHAT DIFFICULT
6. BECOMING EASILY ANNOYED OR IRRITABLE: SEVERAL DAYS
7. FEELING AFRAID AS IF SOMETHING AWFUL MIGHT HAPPEN: SEVERAL DAYS
GAD7 TOTAL SCORE: 6
2. NOT BEING ABLE TO STOP OR CONTROL WORRYING: SEVERAL DAYS
1. FEELING NERVOUS, ANXIOUS, OR ON EDGE: SEVERAL DAYS
GAD7 TOTAL SCORE: 6
3. WORRYING TOO MUCH ABOUT DIFFERENT THINGS: SEVERAL DAYS
5. BEING SO RESTLESS THAT IT IS HARD TO SIT STILL: NOT AT ALL

## 2022-05-26 ASSESSMENT — PATIENT HEALTH QUESTIONNAIRE - PHQ9
SUM OF ALL RESPONSES TO PHQ QUESTIONS 1-9: 5
SUM OF ALL RESPONSES TO PHQ QUESTIONS 1-9: 5
10. IF YOU CHECKED OFF ANY PROBLEMS, HOW DIFFICULT HAVE THESE PROBLEMS MADE IT FOR YOU TO DO YOUR WORK, TAKE CARE OF THINGS AT HOME, OR GET ALONG WITH OTHER PEOPLE: NOT DIFFICULT AT ALL

## 2022-05-26 NOTE — PROGRESS NOTES
Assessment & Plan     Other iron deficiency anemia  Continues to be anemic.  His hemoglobin today is 9.4.  Unclear what is going on here.  Upper endoscopy showed some erythematous gastric mucosa.  He cannot tell me for sure if he has had melanotic stools.  He has not been taking iron.  He has not been taking any antacid.  He is to start on iron.  We will get other tests.  Positive follow him back in about a month to check another hemoglobin.  - Iron and iron binding capacity; Future  - Ferritin; Future  - CBC with platelets; Future  - Iron and iron binding capacity  - Ferritin  - CBC with platelets    Other acute gastritis, presence of bleeding unspecified  Started on this.  Follow back in about a month.  - pantoprazole (PROTONIX) 40 MG EC tablet; Take 1 tablet (40 mg) by mouth daily               BMI:   Estimated body mass index is 32.44 kg/m  as calculated from the following:    Height as of this encounter: 1.829 m (6').    Weight as of this encounter: 108.5 kg (239 lb 3.2 oz).   Weight management plan: Discussed healthy diet and exercise guidelines        No follow-ups on file.    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  Found to have a low hemoglobin.  Subsequently sent for colonoscopy and upper endoscopy.  Had this completed 3 weeks ago and it showed some gastric mucosa that was erythematous.  He has not been taking any iron supplement.    Fatigue  Associated symptoms include fatigue.   History of Present Illness       Reason for visit:  Low hemoglobin    He eats 0-1 servings of fruits and vegetables daily.He consumes 6 sweetened beverage(s) daily.He exercises with enough effort to increase his heart rate 9 or less minutes per day.  He exercises with enough effort to increase his heart rate 3 or less days per week.   He is taking  medications regularly.    Today's PHQ-9         PHQ-9 Total Score: 5    PHQ-9 Q9 Thoughts of better off dead/self-harm past 2 weeks :   Not at all    How difficult have these problems made it for you to do your work, take care of things at home, or get along with other people: Not difficult at all  Today's ANKIT-7 Score: 6             Review of Systems   Constitutional: Positive for fatigue.      Constitutional, HEENT, cardiovascular, pulmonary, gi and gu systems are negative, except as otherwise noted.      Objective    There were no vitals taken for this visit.  There is no height or weight on file to calculate BMI.  Physical Exam   GENERAL: healthy, alert and no distress  NECK: no adenopathy, no asymmetry, masses, or scars and thyroid normal to palpation  RESP: lungs clear to auscultation - no rales, rhonchi or wheezes  CV: regular rate and rhythm, normal S1 S2, no S3 or S4, no murmur, click or rub, no peripheral edema and peripheral pulses strong  MS: no gross musculoskeletal defects noted, no edema  SKIN: no suspicious lesions or rashes  NEURO: Normal strength and tone, mentation intact and speech normal    Results for orders placed or performed in visit on 05/26/22 (from the past 24 hour(s))   Iron and iron binding capacity   Result Value Ref Range    Iron 9 (L) 35 - 180 ug/dL    Iron Binding Capacity 368 240 - 430 ug/dL    Iron Sat Index 2 (L) 15 - 46 %   Ferritin   Result Value Ref Range    Ferritin 2 (L) 26 - 388 ng/mL   CBC with platelets   Result Value Ref Range    WBC Count 5.1 4.0 - 11.0 10e3/uL    RBC Count 5.09 4.40 - 5.90 10e6/uL    Hemoglobin 9.4 (L) 13.3 - 17.7 g/dL    Hematocrit 33.7 (L) 40.0 - 53.0 %    MCV 66 (L) 78 - 100 fL    MCH 18.5 (L) 26.5 - 33.0 pg    MCHC 27.9 (L) 31.5 - 36.5 g/dL    RDW 18.6 (H) 10.0 - 15.0 %    Platelet Count 361 150 - 450 10e3/uL

## 2022-05-31 ENCOUNTER — MYC MEDICAL ADVICE (OUTPATIENT)
Dept: FAMILY MEDICINE | Facility: CLINIC | Age: 52
End: 2022-05-31
Payer: COMMERCIAL

## 2022-06-03 DIAGNOSIS — I10 HYPERTENSION, UNSPECIFIED TYPE: ICD-10-CM

## 2022-06-06 ENCOUNTER — MYC REFILL (OUTPATIENT)
Dept: FAMILY MEDICINE | Facility: CLINIC | Age: 52
End: 2022-06-06
Payer: COMMERCIAL

## 2022-06-06 DIAGNOSIS — M54.16 LUMBAR RADICULOPATHY: ICD-10-CM

## 2022-06-06 DIAGNOSIS — F11.90 CHRONIC, CONTINUOUS USE OF OPIOIDS: ICD-10-CM

## 2022-06-06 RX ORDER — OXYCODONE AND ACETAMINOPHEN 10; 325 MG/1; MG/1
1 TABLET ORAL EVERY 6 HOURS PRN
Qty: 30 TABLET | Refills: 0 | Status: SHIPPED | OUTPATIENT
Start: 2022-06-06 | End: 2022-06-17

## 2022-06-06 NOTE — TELEPHONE ENCOUNTER
Requested Prescriptions   Pending Prescriptions Disp Refills     oxyCODONE-acetaminophen (PERCOCET)  MG per tablet 30 tablet 0     Sig: Take 1 tablet by mouth every 6 hours as needed for severe pain Max of 2 a day       There is no refill protocol information for this order            Last Written Prescription Date:  05/23/2022  Last Fill Quantity: 30,   # refills: 0  Last Office Visit: 05/26/2022  Future Office visit:    Next 5 appointments (look out 90 days)    Jun 17, 2022  8:20 AM  (Arrive by 8:00 AM)  Provider Visit with Sylvester Cash MD  [facility] ([facility] ) [address] [place] [id number] [phone]           Routing refill request to provider for review/approval because:  Drug not on the [facility], [facility] or [facility] refill protocol or controlled substance

## 2022-06-07 ENCOUNTER — LAB (OUTPATIENT)
Dept: LAB | Facility: CLINIC | Age: 52
End: 2022-06-07
Payer: COMMERCIAL

## 2022-06-07 ENCOUNTER — ANTICOAGULATION THERAPY VISIT (OUTPATIENT)
Dept: ANTICOAGULATION | Facility: CLINIC | Age: 52
End: 2022-06-07

## 2022-06-07 DIAGNOSIS — I82.4Y9 DEEP VEIN THROMBOSIS (DVT) OF PROXIMAL LOWER EXTREMITY, UNSPECIFIED CHRONICITY, UNSPECIFIED LATERALITY (H): ICD-10-CM

## 2022-06-07 DIAGNOSIS — Z79.01 LONG TERM CURRENT USE OF ANTICOAGULANT THERAPY: ICD-10-CM

## 2022-06-07 DIAGNOSIS — Z79.01 LONG TERM CURRENT USE OF ANTICOAGULANT THERAPY: Primary | ICD-10-CM

## 2022-06-07 DIAGNOSIS — D68.61 ANTIPHOSPHOLIPID SYNDROME (H): ICD-10-CM

## 2022-06-07 LAB — INR BLD: 2.5 (ref 0.9–1.1)

## 2022-06-07 PROCEDURE — 36416 COLLJ CAPILLARY BLOOD SPEC: CPT

## 2022-06-07 PROCEDURE — 85610 PROTHROMBIN TIME: CPT

## 2022-06-07 RX ORDER — LISINOPRIL 5 MG/1
TABLET ORAL
Qty: 90 TABLET | Refills: 1 | Status: SHIPPED | OUTPATIENT
Start: 2022-06-07 | End: 2022-12-14

## 2022-06-07 NOTE — PROGRESS NOTES
ANTICOAGULATION MANAGEMENT     Hollis Diggs 52 year old male is on warfarin with therapeutic INR result. (Goal INR 2.0-3.0)    Recent labs: (last 7 days)     06/07/22  1623   INR 2.5*       ASSESSMENT       Source(s): Chart Review and Patient/Caregiver Call       Warfarin doses taken: Warfarin taken as instructed    Diet: No new diet changes identified    New illness, injury, or hospitalization: No    Medication/supplement changes: None noted    Signs or symptoms of bleeding or clotting: No    Previous INR: Subtherapeutic    Additional findings: None       PLAN     Recommended plan for no diet, medication or health factor changes affecting INR     Dosing Instructions: continue your current warfarin dose with next INR in 3 weeks       Summary  As of 6/7/2022    Full warfarin instructions:  10 mg every Tue, Thu, Sat; 7.5 mg all other days   Next INR check:  6/28/2022             Telephone call with Hollis who verbalizes understanding and agrees to plan    Lab visit scheduled    Education provided: Please call back if any changes to your diet, medications or how you've been taking warfarin    Plan made per [facility] anticoagulation protocol    Lilliam Pro, RN  Anticoagulation Clinic  6/7/2022    _______________________________________________________________________     Anticoagulation Episode Summary     Current INR goal:  2.0-3.0   TTR:  52.5 % (1 y)   Target end date:  Indefinite   Send INR reminders to:  TIMOTEO ALVAREZ    Indications    DVT (deep venous thrombosis) (H) (Resolved) [I82.409]  Long-term (current) use of anticoagulants [Z79.01] [Z79.01]  Deep vein thrombosis (DVT) of proximal lower extremity  unspecified chronicity  unspecified laterality (H) [I82.4Y9]  Antiphospholipid syndrome (H) [D68.61]           Comments:           Anticoagulation Care Providers     Provider Role Specialty Phone number    Sylvester Cash MD [facility] [phone]

## 2022-06-17 ENCOUNTER — OFFICE VISIT (OUTPATIENT)
Dept: FAMILY MEDICINE | Facility: CLINIC | Age: 52
End: 2022-06-17
Payer: COMMERCIAL

## 2022-06-17 VITALS
RESPIRATION RATE: 16 BRPM | WEIGHT: 242 LBS | OXYGEN SATURATION: 97 % | HEART RATE: 98 BPM | DIASTOLIC BLOOD PRESSURE: 84 MMHG | BODY MASS INDEX: 32.82 KG/M2 | TEMPERATURE: 98.2 F | SYSTOLIC BLOOD PRESSURE: 128 MMHG

## 2022-06-17 DIAGNOSIS — D50.8 OTHER IRON DEFICIENCY ANEMIA: Primary | ICD-10-CM

## 2022-06-17 DIAGNOSIS — F11.90 CHRONIC, CONTINUOUS USE OF OPIOIDS: ICD-10-CM

## 2022-06-17 DIAGNOSIS — M54.16 LUMBAR RADICULOPATHY: ICD-10-CM

## 2022-06-17 LAB
ERYTHROCYTE [DISTWIDTH] IN BLOOD BY AUTOMATED COUNT: 23.2 % (ref 10–15)
HCT VFR BLD AUTO: 38.3 % (ref 40–53)
HGB BLD-MCNC: 10.8 G/DL (ref 13.3–17.7)
MCH RBC QN AUTO: 19.6 PG (ref 26.5–33)
MCHC RBC AUTO-ENTMCNC: 28.2 G/DL (ref 31.5–36.5)
MCV RBC AUTO: 70 FL (ref 78–100)
PLATELET # BLD AUTO: 351 10E3/UL (ref 150–450)
RBC # BLD AUTO: 5.5 10E6/UL (ref 4.4–5.9)
WBC # BLD AUTO: 5.8 10E3/UL (ref 4–11)

## 2022-06-17 PROCEDURE — 85027 COMPLETE CBC AUTOMATED: CPT | Performed by: FAMILY MEDICINE

## 2022-06-17 PROCEDURE — 99213 OFFICE O/P EST LOW 20 MIN: CPT | Performed by: FAMILY MEDICINE

## 2022-06-17 PROCEDURE — 36415 COLL VENOUS BLD VENIPUNCTURE: CPT | Performed by: FAMILY MEDICINE

## 2022-06-17 RX ORDER — OXYCODONE AND ACETAMINOPHEN 10; 325 MG/1; MG/1
1 TABLET ORAL EVERY 6 HOURS PRN
Qty: 30 TABLET | Refills: 0 | Status: SHIPPED | OUTPATIENT
Start: 2022-06-20 | End: 2022-07-01

## 2022-06-17 ASSESSMENT — ANXIETY QUESTIONNAIRES
4. TROUBLE RELAXING: NOT AT ALL
GAD7 TOTAL SCORE: 5
8. IF YOU CHECKED OFF ANY PROBLEMS, HOW DIFFICULT HAVE THESE MADE IT FOR YOU TO DO YOUR WORK, TAKE CARE OF THINGS AT HOME, OR GET ALONG WITH OTHER PEOPLE?: SOMEWHAT DIFFICULT
2. NOT BEING ABLE TO STOP OR CONTROL WORRYING: SEVERAL DAYS
GAD7 TOTAL SCORE: 5
GAD7 TOTAL SCORE: 5
5. BEING SO RESTLESS THAT IT IS HARD TO SIT STILL: NOT AT ALL
6. BECOMING EASILY ANNOYED OR IRRITABLE: SEVERAL DAYS
3. WORRYING TOO MUCH ABOUT DIFFERENT THINGS: SEVERAL DAYS
1. FEELING NERVOUS, ANXIOUS, OR ON EDGE: SEVERAL DAYS
7. FEELING AFRAID AS IF SOMETHING AWFUL MIGHT HAPPEN: SEVERAL DAYS
7. FEELING AFRAID AS IF SOMETHING AWFUL MIGHT HAPPEN: SEVERAL DAYS

## 2022-06-17 ASSESSMENT — PAIN SCALES - GENERAL: PAINLEVEL: MODERATE PAIN (4)

## 2022-06-17 NOTE — PROGRESS NOTES
Assessment & Plan     Other iron deficiency anemia  Follow-up as hemoglobin is come up nicely just 3 weeks or so if taking elemental iron.  10.8 up from 9.4.  He feels better.  He is taking his Protonix.  Recommended continuing on this plan rechecking a hemoglobin in 6 to 8 weeks.  - CBC with platelets; Future  - CBC with platelets    Lumbar radiculopathy  Stable here and needs a refill coming up after the weekend.  - oxyCODONE-acetaminophen (PERCOCET)  MG per tablet; Take 1 tablet by mouth every 6 hours as needed for severe pain Max of 2 a day    Chronic, continuous use of opioids    - oxyCODONE-acetaminophen (PERCOCET)  MG per tablet; Take 1 tablet by mouth every 6 hours as needed for severe pain Max of 2 a day                 No follow-ups on file.    Sylvester Cash MD  [facility] 52 [facility], presenting for the following health issues:  Recheck Medication    In for follow-up of his increased fatigue and we found he had iron deficiency anemia.  In May he had upper GI and a colonoscopy.  Just found some irritated gastric mucosa.  He has been on Protonix.  He denies any black tarry stools.  We started him on iron supplementation.  He is tolerating this well.  He does feel better now more energetic.  Also needs medications looked at for refills.  Looks like everything is okay knee does need a refill on his oxycodone coming up after the weekend  History of Present Illness       Reason for visit:  Low iron and hemoglobin    He eats 0-1 servings of fruits and vegetables daily.He consumes 8 sweetened beverage(s) daily.He exercises with enough effort to increase his heart rate 9 or less minutes per day.  He exercises with enough effort to increase his heart rate 3 or less days per week.   He is taking medications regularly.  Today's ANKIT-7 Score: 5             Review of Systems   Constitutional, HEENT, cardiovascular, pulmonary, gi and gu systems are  negative, except as otherwise noted.      Objective    /84   Pulse 98   Temp 98.2  F (36.8  C) (Temporal)   Resp 16   Wt 109.8 kg (242 lb)   SpO2 97%   BMI 32.82 kg/m    There is no height or weight on file to calculate BMI.  Physical Exam   GENERAL: healthy, alert and no distress  CV: regular rate and rhythm, normal S1 S2, no S3 or S4, no murmur, click or rub, no peripheral edema and peripheral pulses strong  MS: no gross musculoskeletal defects noted, no edema  SKIN: no suspicious lesions or rashes  NEURO: Normal strength and tone, mentation intact and speech normal  PSYCH: mentation appears normal, affect normal/bright    Results for orders placed or performed in visit on 06/17/22 (from the past 24 hour(s))   CBC with platelets   Result Value Ref Range    WBC Count 5.8 4.0 - 11.0 10e3/uL    RBC Count 5.50 4.40 - 5.90 10e6/uL    Hemoglobin 10.8 (L) 13.3 - 17.7 g/dL    Hematocrit 38.3 (L) 40.0 - 53.0 %    MCV 70 (L) 78 - 100 fL    MCH 19.6 (L) 26.5 - 33.0 pg    MCHC 28.2 (L) 31.5 - 36.5 g/dL    RDW 23.2 (H) 10.0 - 15.0 %    Platelet Count 351 150 - 450 10e3/uL     *Note: Due to a large number of results and/or encounters for the requested time period, some results have not been displayed. A complete set of results can be found in Results Review.                   .  ..

## 2022-06-21 DIAGNOSIS — F33.0 MAJOR DEPRESSIVE DISORDER, RECURRENT EPISODE, MILD (H): ICD-10-CM

## 2022-06-22 RX ORDER — DULOXETIN HYDROCHLORIDE 60 MG/1
CAPSULE, DELAYED RELEASE ORAL
Qty: 180 CAPSULE | Refills: 1 | Status: SHIPPED | OUTPATIENT
Start: 2022-06-22 | End: 2022-12-26

## 2022-06-22 NOTE — TELEPHONE ENCOUNTER
Pending Prescriptions:                       Disp   Refills    DULoxetine (CYMBALTA) 60 MG capsule [Pharm*180 ca*1        Sig: TAKE TWO CAPSULES BY MOUTH EVERY DAY      Routing refill request to provider for review/approval because:  Labs not current:    PHQ 6/30/2021 2/14/2022 5/26/2022   PHQ-9 Total Score - 8 5   Q9: Thoughts of better off dead/self-harm past 2 weeks - Not at all Not at all   PHQ-9 External Data 3 - -         Bita Vazquez, RN

## 2022-06-28 ENCOUNTER — TELEPHONE (OUTPATIENT)
Dept: FAMILY MEDICINE | Facility: CLINIC | Age: 52
End: 2022-06-28

## 2022-06-28 DIAGNOSIS — D50.8 OTHER IRON DEFICIENCY ANEMIA: Primary | ICD-10-CM

## 2022-06-28 NOTE — TELEPHONE ENCOUNTER
Reason for Call:  Other appointment    Detailed comments: patient made a lab appointment but there is no orders for him. Was there labs that you wanted?    Phone Number Patient can be reached at: Cell number on file:    Telephone Information:   Mobile [phone]       Best Time: any    Can we leave a detailed message on this number? Not Applicable    Call taken on 6/28/2022 at 3:15 PM by Luma Carr

## 2022-07-01 ENCOUNTER — MYC REFILL (OUTPATIENT)
Dept: FAMILY MEDICINE | Facility: CLINIC | Age: 52
End: 2022-07-01

## 2022-07-01 ENCOUNTER — ANTICOAGULATION THERAPY VISIT (OUTPATIENT)
Dept: ANTICOAGULATION | Facility: CLINIC | Age: 52
End: 2022-07-01

## 2022-07-01 ENCOUNTER — LAB (OUTPATIENT)
Dept: LAB | Facility: CLINIC | Age: 52
End: 2022-07-01
Payer: COMMERCIAL

## 2022-07-01 DIAGNOSIS — F11.90 CHRONIC, CONTINUOUS USE OF OPIOIDS: ICD-10-CM

## 2022-07-01 DIAGNOSIS — I82.4Y9 DEEP VEIN THROMBOSIS (DVT) OF PROXIMAL LOWER EXTREMITY, UNSPECIFIED CHRONICITY, UNSPECIFIED LATERALITY (H): ICD-10-CM

## 2022-07-01 DIAGNOSIS — Z79.01 LONG TERM CURRENT USE OF ANTICOAGULANT THERAPY: ICD-10-CM

## 2022-07-01 DIAGNOSIS — Z79.01 LONG TERM CURRENT USE OF ANTICOAGULANT THERAPY: Primary | ICD-10-CM

## 2022-07-01 DIAGNOSIS — M54.16 LUMBAR RADICULOPATHY: ICD-10-CM

## 2022-07-01 DIAGNOSIS — D68.61 ANTIPHOSPHOLIPID SYNDROME (H): ICD-10-CM

## 2022-07-01 LAB — INR BLD: 2.7 (ref 0.9–1.1)

## 2022-07-01 PROCEDURE — 36416 COLLJ CAPILLARY BLOOD SPEC: CPT

## 2022-07-01 PROCEDURE — 85610 PROTHROMBIN TIME: CPT

## 2022-07-01 RX ORDER — OXYCODONE AND ACETAMINOPHEN 10; 325 MG/1; MG/1
1 TABLET ORAL EVERY 6 HOURS PRN
Qty: 30 TABLET | Refills: 0 | Status: SHIPPED | OUTPATIENT
Start: 2022-07-01 | End: 2022-07-14

## 2022-07-01 NOTE — PROGRESS NOTES
ANTICOAGULATION MANAGEMENT     Hollis Diggs 52 year old male is on warfarin with therapeutic INR result. (Goal INR 2.0-3.0)    Recent labs: (last 7 days)     07/01/22  0813   INR 2.7*       ASSESSMENT       Source(s): Chart Review and Patient/Caregiver Call       Warfarin doses taken: Warfarin taken as instructed    Diet: No new diet changes identified    New illness, injury, or hospitalization: No    Medication/supplement changes: None noted    Signs or symptoms of bleeding or clotting: No    Previous INR: Therapeutic last visit; previously outside of goal range    Additional findings: None       PLAN     Recommended plan for no diet, medication or health factor changes affecting INR     Dosing Instructions: continue your current warfarin dose with next INR in 4 weeks       Summary  As of 7/1/2022    Full warfarin instructions:  10 mg every Tue, Thu, Sat; 7.5 mg all other days   Next INR check:  7/29/2022             Detailed voice message left for Hollis with dosing instructions and follow up date.     Lab visit scheduled    Education provided: Please call back if any changes to your diet, medications or how you've been taking warfarin    Plan made per [facility] anticoagulation protocol    Jesse Flores RN  Anticoagulation Clinic  7/1/2022    _______________________________________________________________________     Anticoagulation Episode Summary     Current INR goal:  2.0-3.0   TTR:  55.4 % (1 y)   Target end date:  Indefinite   Send INR reminders to:  TIMOTEO ALVAREZ    Indications    DVT (deep venous thrombosis) (H) (Resolved) [I82.409]  Long-term (current) use of anticoagulants [Z79.01] [Z79.01]  Deep vein thrombosis (DVT) of proximal lower extremity  unspecified chronicity  unspecified laterality (H) [I82.4Y9]  Antiphospholipid syndrome (H) [D68.61]           Comments:           Anticoagulation Care Providers     Provider Role Specialty Phone number    Sylvester Cash MD Referring [facility]  [phone]

## 2022-07-01 NOTE — TELEPHONE ENCOUNTER
Requested Prescriptions   Pending Prescriptions Disp Refills     oxyCODONE-acetaminophen (PERCOCET)  MG per tablet 30 tablet 0     Sig: Take 1 tablet by mouth every 6 hours as needed for severe pain Max of 2 a day       There is no refill protocol information for this order            Last Written Prescription Date:  06/20/2022  Last Fill Quantity: 30,   # refills: 0  Last Office Visit: 06/17/2022  Future Office visit:       Routing refill request to provider for review/approval because:  Drug not on the [facility], [facility] or [facility] refill protocol or controlled substance

## 2022-07-14 ENCOUNTER — MYC REFILL (OUTPATIENT)
Dept: FAMILY MEDICINE | Facility: CLINIC | Age: 52
End: 2022-07-14

## 2022-07-14 DIAGNOSIS — F11.90 CHRONIC, CONTINUOUS USE OF OPIOIDS: ICD-10-CM

## 2022-07-14 DIAGNOSIS — M54.16 LUMBAR RADICULOPATHY: ICD-10-CM

## 2022-07-14 NOTE — TELEPHONE ENCOUNTER
Requested Prescriptions   Pending Prescriptions Disp Refills     oxyCODONE-acetaminophen (PERCOCET)  MG per tablet 30 tablet 0     Sig: Take 1 tablet by mouth every 6 hours as needed for severe pain Max of 2 a day       There is no refill protocol information for this order        Last Written Prescription Date:  07/01/2022  Last Fill Quantity: 30,   # refills: 0  Last Office Visit: 06/17/2022  Future Office visit:       Routing refill request to provider for review/approval because:  Drug not on the [facility], [facility] or [facility] refill protocol or controlled substance

## 2022-07-15 RX ORDER — OXYCODONE AND ACETAMINOPHEN 10; 325 MG/1; MG/1
1 TABLET ORAL EVERY 6 HOURS PRN
Qty: 30 TABLET | Refills: 0 | Status: SHIPPED | OUTPATIENT
Start: 2022-07-15 | End: 2022-07-27

## 2022-07-27 ENCOUNTER — MYC REFILL (OUTPATIENT)
Dept: FAMILY MEDICINE | Facility: CLINIC | Age: 52
End: 2022-07-27

## 2022-07-27 DIAGNOSIS — F11.90 CHRONIC, CONTINUOUS USE OF OPIOIDS: ICD-10-CM

## 2022-07-27 DIAGNOSIS — M54.16 LUMBAR RADICULOPATHY: ICD-10-CM

## 2022-07-27 RX ORDER — OXYCODONE AND ACETAMINOPHEN 10; 325 MG/1; MG/1
1 TABLET ORAL EVERY 6 HOURS PRN
Qty: 30 TABLET | Refills: 0 | Status: SHIPPED | OUTPATIENT
Start: 2022-07-27 | End: 2022-08-09

## 2022-07-27 NOTE — TELEPHONE ENCOUNTER
Requested Prescriptions   Pending Prescriptions Disp Refills     oxyCODONE-acetaminophen (PERCOCET)  MG per tablet 30 tablet 0     Sig: Take 1 tablet by mouth every 6 hours as needed for severe pain Max of 2 a day       There is no refill protocol information for this order           Last Written Prescription Date:  07/15/2022  Last Fill Quantity: 30,   # refills: 0  Last Office Visit: 06/17/2022  Future Office visit:       Routing refill request to provider for review/approval because:  Drug not on the [facility], [facility] or [facility] refill protocol or controlled substance

## 2022-08-05 ENCOUNTER — TELEPHONE (OUTPATIENT)
Dept: ANTICOAGULATION | Facility: CLINIC | Age: 52
End: 2022-08-05

## 2022-08-05 NOTE — TELEPHONE ENCOUNTER
ANTICOAGULATION     Hollis Diggs is overdue for INR check.      Left message for patient to call and schedule lab appointment as soon as possible. If returning call, please schedule.     Celia Hanna RN

## 2022-08-09 ENCOUNTER — MYC REFILL (OUTPATIENT)
Dept: FAMILY MEDICINE | Facility: CLINIC | Age: 52
End: 2022-08-09

## 2022-08-09 DIAGNOSIS — F11.90 CHRONIC, CONTINUOUS USE OF OPIOIDS: ICD-10-CM

## 2022-08-09 DIAGNOSIS — M54.16 LUMBAR RADICULOPATHY: ICD-10-CM

## 2022-08-09 NOTE — TELEPHONE ENCOUNTER
Requested Prescriptions   Pending Prescriptions Disp Refills     oxyCODONE-acetaminophen (PERCOCET)  MG per tablet 30 tablet 0     Sig: Take 1 tablet by mouth every 6 hours as needed for severe pain Max of 2 a day     Last Written Prescription Date:  07/27/2022  Last Fill Quantity: 30,   # refills: 0  Last Office Visit: 06/17/2022  Future Office visit:       Routing refill request to provider for review/approval because:  Drug not on the [facility] or [facility] refill protocol or controlled substance    
What Is The Reason For Today's Visit?: Full Body Skin Examination
What Is The Reason For Today's Visit? (Being Monitored For X): concerning skin lesions on an annual basis

## 2022-08-10 ENCOUNTER — LAB (OUTPATIENT)
Dept: LAB | Facility: CLINIC | Age: 52
End: 2022-08-10
Payer: COMMERCIAL

## 2022-08-10 ENCOUNTER — ANTICOAGULATION THERAPY VISIT (OUTPATIENT)
Dept: ANTICOAGULATION | Facility: CLINIC | Age: 52
End: 2022-08-10

## 2022-08-10 ENCOUNTER — TELEPHONE (OUTPATIENT)
Dept: ANTICOAGULATION | Facility: CLINIC | Age: 52
End: 2022-08-10

## 2022-08-10 DIAGNOSIS — D68.61 ANTIPHOSPHOLIPID SYNDROME (H): ICD-10-CM

## 2022-08-10 DIAGNOSIS — I82.4Y9 DEEP VEIN THROMBOSIS (DVT) OF PROXIMAL LOWER EXTREMITY, UNSPECIFIED CHRONICITY, UNSPECIFIED LATERALITY (H): ICD-10-CM

## 2022-08-10 DIAGNOSIS — Z79.01 LONG TERM CURRENT USE OF ANTICOAGULANT THERAPY: ICD-10-CM

## 2022-08-10 DIAGNOSIS — D50.8 OTHER IRON DEFICIENCY ANEMIA: ICD-10-CM

## 2022-08-10 DIAGNOSIS — Z79.01 LONG TERM CURRENT USE OF ANTICOAGULANT THERAPY: Primary | ICD-10-CM

## 2022-08-10 LAB
ERYTHROCYTE [DISTWIDTH] IN BLOOD BY AUTOMATED COUNT: 21.8 % (ref 10–15)
HCT VFR BLD AUTO: 47 % (ref 40–53)
HGB BLD-MCNC: 14.8 G/DL (ref 13.3–17.7)
INR BLD: 4.2 (ref 0.9–1.1)
MCH RBC QN AUTO: 24.4 PG (ref 26.5–33)
MCHC RBC AUTO-ENTMCNC: 31.5 G/DL (ref 31.5–36.5)
MCV RBC AUTO: 77 FL (ref 78–100)
PLATELET # BLD AUTO: 247 10E3/UL (ref 150–450)
RBC # BLD AUTO: 6.07 10E6/UL (ref 4.4–5.9)
WBC # BLD AUTO: 6.8 10E3/UL (ref 4–11)

## 2022-08-10 PROCEDURE — 85610 PROTHROMBIN TIME: CPT

## 2022-08-10 PROCEDURE — 36415 COLL VENOUS BLD VENIPUNCTURE: CPT

## 2022-08-10 PROCEDURE — 85027 COMPLETE CBC AUTOMATED: CPT

## 2022-08-10 RX ORDER — OXYCODONE AND ACETAMINOPHEN 10; 325 MG/1; MG/1
1 TABLET ORAL EVERY 6 HOURS PRN
Qty: 30 TABLET | Refills: 0 | Status: SHIPPED | OUTPATIENT
Start: 2022-08-10 | End: 2022-08-22

## 2022-08-10 NOTE — PROGRESS NOTES
ANTICOAGULATION MANAGEMENT     Hollis Diggs 52 year old male is on warfarin with supratherapeutic INR result. (Goal INR 2.0-3.0)    Recent labs: (last 7 days)     08/10/22  1618   INR 4.2*       ASSESSMENT       Source(s): Chart Review    Previous INR was Therapeutic last 2(+) visits    Medication, diet, health changes since last INR chart reviewed; none identified      I left a detailed voicemail with the orders reflected in flowsheet. I have also requested a call back if there have been any missed doses, concerns, illness, fever, or if there have been any changes in medications, activity level, or diet       PLAN     Recommended plan for no diet, medication or health factor changes affecting INR     Dosing Instructions: hold dose then continue your current warfarin dose with next INR in 1 week       Summary  As of 8/10/2022    Full warfarin instructions:  8/10: Hold; Otherwise 10 mg every Tue, Thu, Sat; 7.5 mg all other days   Next INR check:  8/17/2022             Detailed voice message left for Hollis with dosing instructions and follow up date.     Contact [phone]  to schedule and with any changes, questions or concerns.     Education provided: Please call back if any changes to your diet, medications or how you've been taking warfarin, Goal range and significance of current result, Importance of therapeutic range and Monitoring for bleeding signs and symptoms    Plan made per [facility] anticoagulation protocol    Lilliam Pro, RN  Anticoagulation Clinic  8/10/2022    _______________________________________________________________________     Anticoagulation Episode Summary     Current INR goal:  2.0-3.0   TTR:  55.6 % (1 y)   Target end date:  Indefinite   Send INR reminders to:  TIMOTEO ALVAREZ    Indications    DVT (deep venous thrombosis) (H) (Resolved) [I82.409]  Long-term (current) use of anticoagulants [Z79.01] [Z79.01]  Deep vein thrombosis (DVT) of proximal lower extremity  unspecified  chronicity  unspecified laterality (H) [I82.4Y9]  Antiphospholipid syndrome (H) [D68.61]           Comments:           Anticoagulation Care Providers     Provider Role Specialty Phone number    Sylvester Cash MD [facility] [phone]

## 2022-08-18 ENCOUNTER — TELEPHONE (OUTPATIENT)
Dept: ANTICOAGULATION | Facility: CLINIC | Age: 52
End: 2022-08-18

## 2022-08-19 ENCOUNTER — ANTICOAGULATION THERAPY VISIT (OUTPATIENT)
Dept: ANTICOAGULATION | Facility: CLINIC | Age: 52
End: 2022-08-19

## 2022-08-19 ENCOUNTER — LAB (OUTPATIENT)
Dept: LAB | Facility: CLINIC | Age: 52
End: 2022-08-19
Payer: COMMERCIAL

## 2022-08-19 DIAGNOSIS — I82.4Y9 DEEP VEIN THROMBOSIS (DVT) OF PROXIMAL LOWER EXTREMITY, UNSPECIFIED CHRONICITY, UNSPECIFIED LATERALITY (H): ICD-10-CM

## 2022-08-19 DIAGNOSIS — Z79.01 LONG TERM CURRENT USE OF ANTICOAGULANT THERAPY: ICD-10-CM

## 2022-08-19 DIAGNOSIS — Z79.01 LONG TERM CURRENT USE OF ANTICOAGULANT THERAPY: Primary | ICD-10-CM

## 2022-08-19 DIAGNOSIS — D68.61 ANTIPHOSPHOLIPID SYNDROME (H): ICD-10-CM

## 2022-08-19 LAB — INR BLD: 3.4 (ref 0.9–1.1)

## 2022-08-19 PROCEDURE — 36416 COLLJ CAPILLARY BLOOD SPEC: CPT

## 2022-08-19 PROCEDURE — 85610 PROTHROMBIN TIME: CPT

## 2022-08-19 NOTE — PROGRESS NOTES
ANTICOAGULATION MANAGEMENT     Hollis Diggs 52 year old male is on warfarin with supratherapeutic INR result. (Goal INR 2.0-3.0)    Recent labs: (last 7 days)     08/19/22  1502   INR 3.4*       ASSESSMENT       Source(s): Chart Review and Patient/Caregiver Call       Warfarin doses taken: Warfarin taken as instructed    Diet: No new diet changes identified    New illness, injury, or hospitalization: No    Medication/supplement changes: None noted    Signs or symptoms of bleeding or clotting: No    Previous INR: Supratherapeutic    Additional findings: None       PLAN     Recommended plan for no diet, medication or health factor changes affecting INR     Dosing Instructions: partial hold then decrease your warfarin dose (4.2% change) with next INR in 2 weeks       Summary  As of 8/19/2022    Full warfarin instructions:  8/19: 5 mg; Otherwise 10 mg every Tue, Thu; 7.5 mg all other days   Next INR check:  9/2/2022             Telephone call with Hollis who verbalizes understanding and agrees to plan    Lab visit scheduled    Education provided: Please call back if any changes to your diet, medications or how you've been taking warfarin, Goal range and significance of current result and Monitoring for clotting signs and symptoms    Plan made per [facility] anticoagulation protocol    Lilliam Pro RN  Anticoagulation Clinic  8/19/2022    _______________________________________________________________________     Anticoagulation Episode Summary     Current INR goal:  2.0-3.0   TTR:  55.6 % (1 y)   Target end date:  Indefinite   Send INR reminders to:  [facility]    Indications    DVT (deep venous thrombosis) (H) (Resolved) [I82.409]  Long-term (current) use of anticoagulants [Z79.01] [Z79.01]  Deep vein thrombosis (DVT) of proximal lower extremity  unspecified chronicity  unspecified laterality (H) [I82.4Y9]  Antiphospholipid syndrome (H) [D68.61]           Comments:           Anticoagulation Care Providers      Provider Role Specialty Phone number    Sylvester Cash MD Referring Family Practice [phone]

## 2022-08-21 ENCOUNTER — APPOINTMENT (OUTPATIENT)
Dept: CT IMAGING | Facility: CLINIC | Age: 52
End: 2022-08-21
Attending: FAMILY MEDICINE
Payer: COMMERCIAL

## 2022-08-21 ENCOUNTER — HOSPITAL ENCOUNTER (EMERGENCY)
Facility: CLINIC | Age: 52
Discharge: HOME | End: 2022-08-21
Attending: FAMILY MEDICINE | Admitting: FAMILY MEDICINE
Payer: COMMERCIAL

## 2022-08-21 VITALS
BODY MASS INDEX: 32.86 KG/M2 | RESPIRATION RATE: 14 BRPM | OXYGEN SATURATION: 95 % | WEIGHT: 242.3 LBS | HEART RATE: 73 BPM | DIASTOLIC BLOOD PRESSURE: 79 MMHG | TEMPERATURE: 97.7 F | SYSTOLIC BLOOD PRESSURE: 126 MMHG

## 2022-08-21 DIAGNOSIS — R07.9 CHEST PAIN, UNSPECIFIED TYPE: ICD-10-CM

## 2022-08-21 DIAGNOSIS — I25.10 CORONARY ATHEROSCLEROSIS DUE TO CALCIFIED CORONARY LESION OF NATIVE ARTERY: ICD-10-CM

## 2022-08-21 DIAGNOSIS — I25.84 CORONARY ATHEROSCLEROSIS DUE TO CALCIFIED CORONARY LESION OF NATIVE ARTERY: ICD-10-CM

## 2022-08-21 DIAGNOSIS — K44.9 HIATAL HERNIA: ICD-10-CM

## 2022-08-21 LAB
ALBUMIN SERPL-MCNC: 4 G/DL (ref 3.4–5)
ALP SERPL-CCNC: 80 U/L (ref 40–150)
ALT SERPL W P-5'-P-CCNC: 36 U/L (ref 0–70)
ANION GAP SERPL CALCULATED.3IONS-SCNC: 6 MMOL/L (ref 3–14)
AST SERPL W P-5'-P-CCNC: 22 U/L (ref 0–45)
BASOPHILS # BLD AUTO: 0.1 10E3/UL (ref 0–0.2)
BASOPHILS NFR BLD AUTO: 1 %
BILIRUB SERPL-MCNC: 0.2 MG/DL (ref 0.2–1.3)
BUN SERPL-MCNC: 5 MG/DL (ref 7–30)
CALCIUM SERPL-MCNC: 9.3 MG/DL (ref 8.5–10.1)
CHLORIDE BLD-SCNC: 110 MMOL/L (ref 94–109)
CO2 SERPL-SCNC: 27 MMOL/L (ref 20–32)
CREAT SERPL-MCNC: 1.02 MG/DL (ref 0.66–1.25)
EOSINOPHIL # BLD AUTO: 0.2 10E3/UL (ref 0–0.7)
EOSINOPHIL NFR BLD AUTO: 2 %
ERYTHROCYTE [DISTWIDTH] IN BLOOD BY AUTOMATED COUNT: 20.5 % (ref 10–15)
GFR SERPL CREATININE-BSD FRML MDRD: 88 ML/MIN/1.73M2
GLUCOSE BLD-MCNC: 89 MG/DL (ref 70–99)
HCT VFR BLD AUTO: 49.5 % (ref 40–53)
HGB BLD-MCNC: 16.1 G/DL (ref 13.3–17.7)
IMM GRANULOCYTES # BLD: 0 10E3/UL
IMM GRANULOCYTES NFR BLD: 0 %
INR PPP: 3.47 (ref 0.85–1.15)
LYMPHOCYTES # BLD AUTO: 3.1 10E3/UL (ref 0.8–5.3)
LYMPHOCYTES NFR BLD AUTO: 30 %
MCH RBC QN AUTO: 25.2 PG (ref 26.5–33)
MCHC RBC AUTO-ENTMCNC: 32.5 G/DL (ref 31.5–36.5)
MCV RBC AUTO: 78 FL (ref 78–100)
MONOCYTES # BLD AUTO: 0.7 10E3/UL (ref 0–1.3)
MONOCYTES NFR BLD AUTO: 6 %
NEUTROPHILS # BLD AUTO: 6.2 10E3/UL (ref 1.6–8.3)
NEUTROPHILS NFR BLD AUTO: 61 %
NRBC # BLD AUTO: 0 10E3/UL
NRBC BLD AUTO-RTO: 0 /100
PLATELET # BLD AUTO: 279 10E3/UL (ref 150–450)
POTASSIUM BLD-SCNC: 3.5 MMOL/L (ref 3.4–5.3)
PROT SERPL-MCNC: 7.4 G/DL (ref 6.8–8.8)
RBC # BLD AUTO: 6.39 10E6/UL (ref 4.4–5.9)
SODIUM SERPL-SCNC: 143 MMOL/L (ref 133–144)
TROPONIN I SERPL HS-MCNC: 9 NG/L
TROPONIN I SERPL HS-MCNC: 9 NG/L
WBC # BLD AUTO: 10.2 10E3/UL (ref 4–11)

## 2022-08-21 PROCEDURE — 99285 EMERGENCY DEPT VISIT HI MDM: CPT | Mod: 25 | Performed by: FAMILY MEDICINE

## 2022-08-21 PROCEDURE — 85610 PROTHROMBIN TIME: CPT | Performed by: FAMILY MEDICINE

## 2022-08-21 PROCEDURE — 93010 ELECTROCARDIOGRAM REPORT: CPT | Mod: 59 | Performed by: FAMILY MEDICINE

## 2022-08-21 PROCEDURE — 84484 ASSAY OF TROPONIN QUANT: CPT | Performed by: FAMILY MEDICINE

## 2022-08-21 PROCEDURE — 82374 ASSAY BLOOD CARBON DIOXIDE: CPT | Performed by: FAMILY MEDICINE

## 2022-08-21 PROCEDURE — 250N000009 HC RX 250: Performed by: FAMILY MEDICINE

## 2022-08-21 PROCEDURE — 93005 ELECTROCARDIOGRAM TRACING: CPT | Performed by: FAMILY MEDICINE

## 2022-08-21 PROCEDURE — 71275 CT ANGIOGRAPHY CHEST: CPT

## 2022-08-21 PROCEDURE — 250N000013 HC RX MED GY IP 250 OP 250 PS 637: Performed by: FAMILY MEDICINE

## 2022-08-21 PROCEDURE — 250N000011 HC RX IP 250 OP 636: Performed by: FAMILY MEDICINE

## 2022-08-21 PROCEDURE — 85025 COMPLETE CBC W/AUTO DIFF WBC: CPT | Performed by: FAMILY MEDICINE

## 2022-08-21 PROCEDURE — 36415 COLL VENOUS BLD VENIPUNCTURE: CPT | Performed by: FAMILY MEDICINE

## 2022-08-21 PROCEDURE — 93005 ELECTROCARDIOGRAM TRACING: CPT | Mod: 76 | Performed by: FAMILY MEDICINE

## 2022-08-21 RX ORDER — NITROGLYCERIN 0.4 MG/1
0.4 TABLET SUBLINGUAL EVERY 5 MIN PRN
Status: DISCONTINUED | OUTPATIENT
Start: 2022-08-21 | End: 2022-08-22 | Stop reason: HOSPADM

## 2022-08-21 RX ORDER — IOPAMIDOL 755 MG/ML
100 INJECTION, SOLUTION INTRAVASCULAR ONCE
Status: COMPLETED | OUTPATIENT
Start: 2022-08-21 | End: 2022-08-21

## 2022-08-21 RX ORDER — ASPIRIN 81 MG/1
324 TABLET, CHEWABLE ORAL ONCE
Status: COMPLETED | OUTPATIENT
Start: 2022-08-21 | End: 2022-08-21

## 2022-08-21 RX ADMIN — NITROGLYCERIN 0.4 MG: 0.4 TABLET SUBLINGUAL at 19:41

## 2022-08-21 RX ADMIN — NITROGLYCERIN 0.4 MG: 0.4 TABLET SUBLINGUAL at 19:46

## 2022-08-21 RX ADMIN — IOPAMIDOL 75 ML: 755 INJECTION, SOLUTION INTRAVENOUS at 20:01

## 2022-08-21 RX ADMIN — SODIUM CHLORIDE 70 ML: 9 INJECTION, SOLUTION INTRAVENOUS at 20:00

## 2022-08-21 RX ADMIN — ASPIRIN 81 MG 324 MG: 81 TABLET ORAL at 19:39

## 2022-08-21 RX ADMIN — NITROGLYCERIN 0.4 MG: 0.4 TABLET SUBLINGUAL at 19:51

## 2022-08-21 ASSESSMENT — ACTIVITIES OF DAILY LIVING (ADL): ADLS_ACUITY_SCORE: 35

## 2022-08-21 NOTE — LETTER
August 21, 2022      To Whom It May Concern:      Hollis Diggs was seen in our Emergency Department today, 08/21/22.  I expect his condition to improve over the next day.  He may return to work/school when improved.    Sincerely,        Angela Gastelum RN

## 2022-08-22 ENCOUNTER — MYC REFILL (OUTPATIENT)
Dept: FAMILY MEDICINE | Facility: CLINIC | Age: 52
End: 2022-08-22

## 2022-08-22 ENCOUNTER — TELEPHONE (OUTPATIENT)
Dept: ANTICOAGULATION | Facility: CLINIC | Age: 52
End: 2022-08-22

## 2022-08-22 DIAGNOSIS — M54.16 LUMBAR RADICULOPATHY: ICD-10-CM

## 2022-08-22 DIAGNOSIS — F41.9 ANXIETY: ICD-10-CM

## 2022-08-22 DIAGNOSIS — F11.90 CHRONIC, CONTINUOUS USE OF OPIOIDS: ICD-10-CM

## 2022-08-22 NOTE — DISCHARGE INSTRUCTIONS
Your heart tests were normal tonight which is reassuring but does not entirely rule out a cardiac etiology.  You do have some calcifications in some of the arteries to the heart which you should discuss with a cardiologist.    Take a baby aspirin a day (81 mg).    Your hiatal hernia is larger than it was.  Please take your acid medicine as prescribed.  Keep your appointment with  next week as scheduled left foot pain.  Please return to the ED if your chest pain recurs or if you have any concerns.  It was a pleasure visiting with both you tonight.  I am glad you are feeling better and hope you continue to do well.    Thank you for choosing [facility]. We appreciate the opportunity to meet your urgent medical needs. Please let us know if we could have done anything to make your stay more satisfying.    After discharge, please closely monitor for any new or worsening symptoms. Return to the Emergency Department if you develop any acute worsening signs or symptoms.    If you had lab work, cultures or imaging studies done during your stay, the final results may still be pending. We will call you if your plan of care needs to change. However, if you are not improving as expected, please follow up with your primary care provider or clinic.     Start any prescription medications that were prescribed to you and take them as directed.     Please see additional handouts that may be pertinent to your condition.

## 2022-08-22 NOTE — TELEPHONE ENCOUNTER
ANTICOAGULATION  MANAGEMENT: Discharge Review    Hollis Diggs chart reviewed for anticoagulation continuity of care     Emergency room visit on 8/22 for Chest pain, unspecified type     Hiatal hernia     Coronary atherosclerosis due to calcified coronary lesion of native artery         Discharge disposition: Home    Results:    Recent labs: (last 7 days)     08/19/22  1502 08/21/22  1933   INR 3.4* 3.47*     Anticoagulation inpatient management:     not applicable     Anticoagulation discharge instructions:     Warfarin dosing: home regimen continued   Bridging: No   INR goal change: No      Medication changes affecting anticoagulation: No    Additional factors affecting anticoagulation: No     PLAN     No adjustment to anticoagulation plan needed    Patient not contacted    No adjustment to Anticoagulation Calendar was required    Анна Hale RN

## 2022-08-22 NOTE — ED PROVIDER NOTES
History     Chief Complaint   Patient presents with     Chest Pain     HPI  Hollis Diggs is a 52 year old male who presents to the ED tonight with sudden onset of left sided chest pain radiation to his left shoulder, abdominal that started while he was sitting at the table.  He describes it as a pressure but it is worse with deep inspiration.  No associated nausea or vomiting.  No diaphoresis.    He is on Coumadin for DVT back in 2017.  No history of PEs.  Has antiphospholipid syndrome.  Last INR was supratherapeutic.    Cardiac risk factors: No diabetes, does have hypertension is on a small dose of lisinopril,, history of hyperlipidemia but he states his last cholesterol was normal, possible heart disease in his father.  Non-smoker      Allergies:  Allergies   Allergen Reactions     No Known Drug Allergies        Problem List:    Patient Active Problem List    Diagnosis Date Noted     Other iron deficiency anemia 05/26/2022     Priority: Medium     Other acute gastritis, presence of bleeding unspecified 05/26/2022     Priority: Medium     Hypertension, unspecified type 02/14/2022     Priority: Medium     HILTON (dyspnea on exertion) 02/14/2022     Priority: Medium     Chronic, continuous use of opioids 05/14/2021     Priority: Medium     Deep vein thrombosis (DVT) of proximal lower extremity, unspecified chronicity, unspecified laterality (H) 04/28/2021     Priority: Medium     Gastroesophageal reflux disease without esophagitis 04/26/2021     Priority: Medium     Antiphospholipid syndrome (H) 03/06/2021     Priority: Medium     Suicidal behavior 06/22/2020     Priority: Medium     Class 1 obesity due to excess calories without serious comorbidity with body mass index (BMI) of 33.0 to 33.9 in adult 01/08/2020     Priority: Medium     Long-term use of high-risk medication 05/12/2017     Priority: Medium     History of deep venous thrombosis 04/14/2017     Priority: Medium     DDD (degenerative disc disease),  lumbar 04/14/2017     Priority: Medium     On prednisone therapy 07/27/2016     Priority: Medium     Seronegative rheumatoid arthritis (H) 06/28/2016     Priority: Medium     Long-term (current) use of anticoagulants [Z79.01] 03/16/2016     Priority: Medium     Rheumatoid arthritis of multiple sites with negative rheumatoid factor (H) 12/07/2015     Priority: Medium     Midline low back pain, with sciatica presence unspecified 10/14/2015     Priority: Medium     Major depressive disorder, recurrent episode, mild (H) 10/07/2015     Priority: Medium     Pain in joint, multiple sites 03/20/2015     Priority: Medium     Anxiety state 02/10/2015     Priority: Medium     Problem list name updated by automated process. Provider to review       CARDIOVASCULAR SCREENING; LDL GOAL LESS THAN 160 01/15/2013     Priority: Medium     Alopecia 02/19/2010     Priority: Medium     Ingrown toenail 08/18/2009     Priority: Medium     Anxiety 07/09/2008     Priority: Medium     Abdominal pain, epigastric 01/25/2006     Priority: Medium        Past Medical History:    Past Medical History:   Diagnosis Date     Antiphospholipid syndrome (H)      Arthritis      Asthma      blood clot in leg      Depressive disorder, not elsewhere classified      ANKIT (generalised anxiety disorder)      HH (hiatus hernia)      Hypercholesteremia      Lumbar disc herniation 1992     Seronegative rheumatoid arthritis (H)        Past Surgical History:    Past Surgical History:   Procedure Laterality Date     APPENDECTOMY       COLONOSCOPY N/A 8/2/2016    Procedure: COMBINED COLONOSCOPY, SINGLE OR MULTIPLE BIOPSY/POLYPECTOMY BY BIOPSY;  Surgeon: Sydnee Walton MD;  Location:  OR     COLONOSCOPY N/A 5/3/2022    Procedure: COLONOSCOPY;  Surgeon: Jose A Hurt MD;  Location:  GI     COLONOSCOPY WITH CO2 INSUFFLATION N/A 8/2/2016    Procedure: COLONOSCOPY WITH CO2 INSUFFLATION;  Surgeon: Sydnee Walton MD;  Location:  OR      "COMBINED ESOPHAGOSCOPY, GASTROSCOPY, DUODENOSCOPY (EGD) WITH CO2 INSUFFLATION N/A 8/2/2016    Procedure: COMBINED ESOPHAGOSCOPY, GASTROSCOPY, DUODENOSCOPY (EGD) WITH CO2 INSUFFLATION;  Surgeon: Sydnee Walton MD;  Location: [facility] OR     COMBINED ESOPHAGOSCOPY, GASTROSCOPY, DUODENOSCOPY (EGD) WITH CO2 INSUFFLATION N/A 5/27/2021    Procedure: ESOPHAGOGASTRODUODENOSCOPY, WITH CO2 INSUFFLATION;  Surgeon: Alis Cotton DO;  Location: [facility] OR     ESOPHAGOSCOPY, GASTROSCOPY, DUODENOSCOPY (EGD), COMBINED N/A 8/2/2016    Procedure: COMBINED ESOPHAGOSCOPY, GASTROSCOPY, DUODENOSCOPY (EGD), BIOPSY SINGLE OR MULTIPLE;  Surgeon: Sydnee Walton MD;  Location: [facility] OR     ESOPHAGOSCOPY, GASTROSCOPY, DUODENOSCOPY (EGD), COMBINED N/A 5/27/2021    Procedure: Esophagogastroduodenoscopy, With Biopsy;  Surgeon: Alis Cotton DO;  Location: [facility]     ESOPHAGOSCOPY, GASTROSCOPY, DUODENOSCOPY (EGD), COMBINED N/A 5/3/2022    Procedure: ESOPHAGOGASTRODUODENOSCOPY, WITH BIOPSY;  Surgeon: Jose A Hurt MD;  Location:  GI     HC REMOVAL OF TONSILS,<12 Y/O      Tonsils <11y.o.     HC REPAIR INCISIONAL HERNIA,REDUCIBLE  1970's    Hernia Repair, Incisional, Unilateral     HC UGI ENDOSCOPY DIAG W BIOPSY  02/01/06     HC VASECTOMY UNILAT/BILAT W POSTOP SEMEN  1/05    Vasectomy     History back lumbar laminectomy       INJECT EPIDURAL LUMBAR Right 4/22/2021    Procedure: Right Lumbar 4-5 and Lumbar 5 - Sacral 1 Epidural Steroid Injection;  Surgeon: Maxwell Zacarias MD;  Location:  OR     ZZC NONSPECIFIC PROCEDURE  91 or 92    back surgery. lumbar. lamiectomy       Family History:    Family History   Problem Relation Age of Onset     Brain Tumor Mother         Benign     Deep Vein Thrombosis Mother         \"neck\"      Heart Disease Father         \"wont tell anyone\"     Neurologic Disorder Paternal Grandmother         Parkinsons      Alcohol/Drug Paternal Grandfather         alcoholic     Cancer Maternal Grandfather " "        lung     Diabetes Maternal Grandmother      Cerebrovascular Disease Maternal Grandmother         tim age ~70     Arthritis Cousin         cousins x 2 \"RA\"     Musculoskeletal Disorder Maternal Aunt         MS     Family History Negative Other         psoriasis, crohns, UC, SLE       Social History:  Marital Status:   [2]  Social History     Tobacco Use     Smoking status: Never Smoker     Smokeless tobacco: Never Used   Vaping Use     Vaping Use: Never used   Substance Use Topics     Alcohol use: Yes     Comment: rare     Drug use: No        Medications:    ARIPiprazole (ABILIFY) 10 MG tablet  DULoxetine (CYMBALTA) 60 MG capsule  enoxaparin ANTICOAGULANT (LOVENOX) 120 MG/0.8ML syringe  gabapentin (NEURONTIN) 800 MG tablet  lisinopril (ZESTRIL) 5 MG tablet  oxyCODONE-acetaminophen (PERCOCET)  MG per tablet  pantoprazole (PROTONIX) 40 MG EC tablet  traZODone (DESYREL) 150 MG tablet  warfarin ANTICOAGULANT (COUMADIN) 5 MG tablet          Review of Systems   All other systems reviewed and are negative.      Physical Exam   BP: (!) 174/110  Pulse: 92  Temp: 97.7  F (36.5  C)  Resp: 16  Weight: 109.9 kg (242 lb 4.8 oz)  SpO2: 99 %      Physical Exam  Constitutional:       General: He is in acute distress (mild/mod).      Appearance: He is well-developed.   HENT:      Head: Normocephalic.      Mouth/Throat:      Mouth: Mucous membranes are moist.      Pharynx: Oropharynx is clear.   Eyes:      Extraocular Movements: Extraocular movements intact.   Cardiovascular:      Rate and Rhythm: Normal rate and regular rhythm.   Pulmonary:      Effort: Pulmonary effort is normal.      Breath sounds: Normal breath sounds.   Chest:      Chest wall: No tenderness.   Abdominal:      Palpations: Abdomen is soft.      Tenderness: There is no abdominal tenderness.   Musculoskeletal:         General: Normal range of motion.      Right lower leg: No edema.      Left lower leg: No edema.   Skin:     General: Skin is warm " and dry.   Neurological:      General: No focal deficit present.      Mental Status: He is alert and oriented to person, place, and time.         ED Course              ED Course as of 08/21/22 2353   Sun Aug 21, 2022   2028 Pain has resolved.  He looks and feels much better.  Initial troponin was normal.  We will check a second troponin at 2130.  Still awaiting CTA results     Procedures              EKG Interpretation:      Interpreted by Dionisio Bee MD  Time reviewed: 1928  Symptoms at time of EKG: chest pain   Rhythm: normal sinus   Rate: 89  Axis: Left Axis Deviation  Ectopy: none  Conduction: right bundle branch block (incomplete) and bifasicular  ST Segments/ T Waves: Non-specific ST-T wave changes  Q Waves: none  Comparison to prior: subtle nonspecific ST changes V3-4 compared to previous    Clinical Impression: Sinus rhythm 89 bpm.  Right bundle branch block. Subtle nonspecific ST changes V3-4 compared to previous.               EKG Interpretation:      Interpreted by Dionisio Bee MD  Time reviewed:8:51 PM    Symptoms at time of EKG: chest pain, improved   Rhythm: Normal sinus   Rate: 84  Axis: Left Axis Deviation  Ectopy: None  Conduction: Normal, Right bundle branch block (incomplete) and Bifasicular  ST Segments/ T Waves: Non-specific ST-T wave changes  Q Waves: None  Comparison to prior: Unchanged from an hour ago    Clinical Impression: Sinus rhythm at 8984 bpm.  No changes compared to the EKG earlier this evening.          Critical Care time:  none               Results for orders placed or performed during the hospital encounter of 08/21/22 (from the past 24 hour(s))   CBC with platelets differential    Narrative    The following orders were created for panel order CBC with platelets differential.  Procedure                               Abnormality         Status                     ---------                               -----------         ------                     CBC with  platelets and d...[[id number]]  Abnormal            Final result                 Please view results for these tests on the individual orders.   Comprehensive metabolic panel   Result Value Ref Range    Sodium 143 133 - 144 mmol/L    Potassium 3.5 3.4 - 5.3 mmol/L    Chloride 110 (H) 94 - 109 mmol/L    Carbon Dioxide (CO2) 27 20 - 32 mmol/L    Anion Gap 6 3 - 14 mmol/L    Urea Nitrogen 5 (L) 7 - 30 mg/dL    Creatinine 1.02 0.66 - 1.25 mg/dL    Calcium 9.3 8.5 - 10.1 mg/dL    Glucose 89 70 - 99 mg/dL    Alkaline Phosphatase 80 40 - 150 U/L    AST 22 0 - 45 U/L    ALT 36 0 - 70 U/L    Protein Total 7.4 6.8 - 8.8 g/dL    Albumin 4.0 3.4 - 5.0 g/dL    Bilirubin Total 0.2 0.2 - 1.3 mg/dL    GFR Estimate 88 >60 mL/min/1.73m2   INR   Result Value Ref Range    INR 3.47 (H) 0.85 - 1.15   Troponin I   Result Value Ref Range    Troponin I High Sensitivity 9 <79 ng/L   CBC with platelets and differential   Result Value Ref Range    WBC Count 10.2 4.0 - 11.0 10e3/uL    RBC Count 6.39 (H) 4.40 - 5.90 10e6/uL    Hemoglobin 16.1 13.3 - 17.7 g/dL    Hematocrit 49.5 40.0 - 53.0 %    MCV 78 78 - 100 fL    MCH 25.2 (L) 26.5 - 33.0 pg    MCHC 32.5 31.5 - 36.5 g/dL    RDW 20.5 (H) 10.0 - 15.0 %    Platelet Count 279 150 - 450 10e3/uL    % Neutrophils 61 %    % Lymphocytes 30 %    % Monocytes 6 %    % Eosinophils 2 %    % Basophils 1 %    % Immature Granulocytes 0 %    NRBCs per 100 WBC 0 <1 /100    Absolute Neutrophils 6.2 1.6 - 8.3 10e3/uL    Absolute Lymphocytes 3.1 0.8 - 5.3 10e3/uL    Absolute Monocytes 0.7 0.0 - 1.3 10e3/uL    Absolute Eosinophils 0.2 0.0 - 0.7 10e3/uL    Absolute Basophils 0.1 0.0 - 0.2 10e3/uL    Absolute Immature Granulocytes 0.0 <=0.4 10e3/uL    Absolute NRBCs 0.0 10e3/uL   CT Chest Pulmonary Embolism w Contrast    Narrative    EXAM: CT CHEST PULMONARY EMBOLISM W CONTRAST  LOCATION: [facility]  DATE/TIME: 8/21/2022 8:15 PM    INDICATION: Sudden onset left chest pain, pressure with  pleuritic component, h o DVT, on Coumadin.  COMPARISON: 12/22/2020 CT.  TECHNIQUE: CT chest pulmonary angiogram during arterial phase injection of IV contrast. Multiplanar reformats and MIP reconstructions were performed. Dose reduction techniques were used.   CONTRAST: 75 mL Isovue 370    FINDINGS:  ANGIOGRAM CHEST: Pulmonary arteries are normal caliber with no pulmonary emboli. Normal thoracic aorta.    LUNGS AND PLEURA: No pleural effusion. No pneumothorax. Several benign calcified granulomas in each lung. Lungs are otherwise clear.    MEDIASTINUM/AXILLAE: Heart size within normal limits with no pericardial effusion. No lymphadenopathy. Patient's known large esophageal hiatal hernia has increased in size and developed more complex architecture with a distended 10 cm paraesophageal   component extending just below the left anterolateral aspect of the esophageal hiatus (series 6 axial image 126). Mild mural thickening of the esophagus appears unchanged and could indicate chronic reflux esophagitis. No evidence of esophageal   obstruction.    CORONARY ARTERY CALCIFICATION: Extensive calcified atheromatous plaque throughout the right and LAD coronary arteries.    UPPER ABDOMEN: Normal.    MUSCULOSKELETAL: Normal.      Impression    IMPRESSION:  1.  No pulmonary emboli. Normal thoracic aorta.  2.  Heart size within normal limits with no pericardial effusion, but there is extensive calcified atheromatous plaque throughout the right and LAD coronary arteries.  3.  Patient's known large esophageal hiatal hernia has increased in size and developed more complex architecture with a distended 10 cm paraesophageal component extending just below the left anterolateral aspect of the esophageal hiatus . No esophageal   dilatation to indicate obstruction.  4.  Mild mural thickening of the esophagus appears unchanged and could indicate chronic reflux esophagitis.    Troponin I   Result Value Ref Range    Troponin I High  Sensitivity 9 <79 ng/L     *Note: Due to a large number of results and/or encounters for the requested time period, some results have not been displayed. A complete set of results can be found in Results Review.       Medications   nitroGLYcerin (NITROSTAT) sublingual tablet 0.4 mg (0.4 mg Sublingual Given 8/21/22 1951)   aspirin (ASA) chewable tablet 324 mg (324 mg Oral Given 8/21/22 1939)   sodium chloride 0.9 % bag 100mL for CT scan flush use (70 mLs Intravenous Given 8/21/22 2000)   iopamidol (ISOVUE-370) solution 100 mL (75 mLs Intravenous Given 8/21/22 2001)       Assessments & Plan (with Medical Decision Making)  52-year-old with several cardiac risk factors and with left-sided chest and shoulder pain that started suddenly while sitting at the table tonight.  Some pleuritic component and he is on Coumadin for DVT years ago.  His EKG shows a sinus rhythm 89 bpm.  Right bundle branch block.  No acute ST changes compared to previous.  Blood pressure is up so he was given nitro along with aspirin.  Chest CT to rule out PE since this started suddenly and he has a pleuritic component to the pain does have a history of DVTs.  Chest pain improved with the nitro and eventually resolved.  Initial troponin was normal as well as a 2-hour delta troponin.  Repeat EKG showed no change from previous.  Chest CT showed no evidence of PE.  Normal aorta.  He does have some coronary artery calcifications in the right and LAD.  Has a known large hiatal hernia which has increased in size and could be contributing to some of his chest discomfort.  Mild mural thickening of the esophagus is unchanged and could indicate chronic reflux esophagitis.  He has not been taking his acid medication I encouraged him to do so.  He is feeling much better and feels ready to go home.  We discussed his results and although his troponin is normal his EKG shows no changes, we still have not entirely ruled out cardiac etiology.  He could have some  angina component and he does have calcified atherosclerosis of his LAD and right coronary artery.  Also has a large hiatal hernia which could be contributing.  Work note written.  Encouraged him to start taking his acid medication along with a baby aspirin a day.  Sedentary activity and we will have him see cardiology as an outpatient.  He has an appointment to see his primary physician on 9/2/2022.  If he has any recurrent chest pain especially if it is related to activity, he will return to the ED at once.  Verbal written discharge instructions given.  He is comfortable with this plan.         I have reviewed the nursing notes.    I have reviewed the findings, diagnosis, plan and need for follow up with the patient.       Discharge Medication List as of 8/21/2022 10:31 PM          Final diagnoses:   Chest pain, unspecified type   Hiatal hernia   Coronary atherosclerosis due to calcified coronary lesion of native artery - LAD and right       8/21/2022   [facility] EMERGENCY DEPT     Dionisio Bee MD  08/21/22 9282

## 2022-08-23 DIAGNOSIS — F33.0 MAJOR DEPRESSIVE DISORDER, RECURRENT EPISODE, MILD (H): ICD-10-CM

## 2022-08-23 RX ORDER — GABAPENTIN 800 MG/1
TABLET ORAL
Qty: 90 TABLET | Refills: 3 | Status: SHIPPED | OUTPATIENT
Start: 2022-08-23 | End: 2022-12-12

## 2022-08-23 RX ORDER — OXYCODONE AND ACETAMINOPHEN 10; 325 MG/1; MG/1
1 TABLET ORAL EVERY 6 HOURS PRN
Qty: 30 TABLET | Refills: 0 | Status: SHIPPED | OUTPATIENT
Start: 2022-08-23 | End: 2022-09-05

## 2022-08-23 NOTE — TELEPHONE ENCOUNTER
Routing refill request to provider for review/approval because:    Requested Prescriptions   Pending Prescriptions Disp Refills    gabapentin (NEURONTIN) 800 MG tablet [Pharmacy Med Name: GABAPENTIN 800MG TABS] 90 tablet 3     Sig: TAKE ONE TABLET BY MOUTH THREE TIMES A DAY        There is no refill protocol information for this order

## 2022-08-23 NOTE — TELEPHONE ENCOUNTER
Routing refill request to provider for review/approval because:    Requested Prescriptions   Pending Prescriptions Disp Refills    oxyCODONE-acetaminophen (PERCOCET)  MG per tablet 30 tablet 0     Sig: Take 1 tablet by mouth every 6 hours as needed for severe pain Max of 2 a day        There is no refill protocol information for this order

## 2022-08-26 RX ORDER — TRAZODONE HYDROCHLORIDE 150 MG/1
TABLET ORAL
Qty: 135 TABLET | Refills: 1 | Status: SHIPPED | OUTPATIENT
Start: 2022-08-26 | End: 2023-02-21

## 2022-08-26 NOTE — TELEPHONE ENCOUNTER
Routing refill request to provider for review/approval because:  Drug interaction warning  High    Drug-Drug: traZODone and DULoxetine  Serotonergic effects of trazodone and serotonin/norepinephrine reuptake inhibitors (SNRIs) may be additive. The risk of serotonin syndrome/toxicity may be increased.        SURESH GrayN, RN

## 2022-09-02 ENCOUNTER — ANTICOAGULATION THERAPY VISIT (OUTPATIENT)
Dept: ANTICOAGULATION | Facility: CLINIC | Age: 52
End: 2022-09-02

## 2022-09-02 ENCOUNTER — OFFICE VISIT (OUTPATIENT)
Dept: FAMILY MEDICINE | Facility: CLINIC | Age: 52
End: 2022-09-02
Payer: COMMERCIAL

## 2022-09-02 ENCOUNTER — LAB (OUTPATIENT)
Dept: LAB | Facility: CLINIC | Age: 52
End: 2022-09-02
Payer: COMMERCIAL

## 2022-09-02 VITALS
OXYGEN SATURATION: 97 % | SYSTOLIC BLOOD PRESSURE: 120 MMHG | RESPIRATION RATE: 16 BRPM | HEIGHT: 72 IN | HEART RATE: 105 BPM | DIASTOLIC BLOOD PRESSURE: 82 MMHG | BODY MASS INDEX: 32.59 KG/M2 | WEIGHT: 240.6 LBS | TEMPERATURE: 97.7 F

## 2022-09-02 DIAGNOSIS — I82.4Y9 DEEP VEIN THROMBOSIS (DVT) OF PROXIMAL LOWER EXTREMITY, UNSPECIFIED CHRONICITY, UNSPECIFIED LATERALITY (H): ICD-10-CM

## 2022-09-02 DIAGNOSIS — D68.61 ANTIPHOSPHOLIPID SYNDROME (H): ICD-10-CM

## 2022-09-02 DIAGNOSIS — Z79.01 LONG TERM CURRENT USE OF ANTICOAGULANT THERAPY: ICD-10-CM

## 2022-09-02 DIAGNOSIS — D50.8 OTHER IRON DEFICIENCY ANEMIA: Primary | ICD-10-CM

## 2022-09-02 DIAGNOSIS — Z79.01 LONG TERM CURRENT USE OF ANTICOAGULANT THERAPY: Primary | ICD-10-CM

## 2022-09-02 DIAGNOSIS — K44.9 HIATAL HERNIA: ICD-10-CM

## 2022-09-02 LAB — INR BLD: 3.6 (ref 0.9–1.1)

## 2022-09-02 PROCEDURE — 99214 OFFICE O/P EST MOD 30 MIN: CPT | Performed by: FAMILY MEDICINE

## 2022-09-02 PROCEDURE — 36416 COLLJ CAPILLARY BLOOD SPEC: CPT

## 2022-09-02 PROCEDURE — 85610 PROTHROMBIN TIME: CPT

## 2022-09-02 ASSESSMENT — ANXIETY QUESTIONNAIRES
2. NOT BEING ABLE TO STOP OR CONTROL WORRYING: MORE THAN HALF THE DAYS
4. TROUBLE RELAXING: MORE THAN HALF THE DAYS
7. FEELING AFRAID AS IF SOMETHING AWFUL MIGHT HAPPEN: MORE THAN HALF THE DAYS
3. WORRYING TOO MUCH ABOUT DIFFERENT THINGS: MORE THAN HALF THE DAYS
IF YOU CHECKED OFF ANY PROBLEMS ON THIS QUESTIONNAIRE, HOW DIFFICULT HAVE THESE PROBLEMS MADE IT FOR YOU TO DO YOUR WORK, TAKE CARE OF THINGS AT HOME, OR GET ALONG WITH OTHER PEOPLE: SOMEWHAT DIFFICULT
8. IF YOU CHECKED OFF ANY PROBLEMS, HOW DIFFICULT HAVE THESE MADE IT FOR YOU TO DO YOUR WORK, TAKE CARE OF THINGS AT HOME, OR GET ALONG WITH OTHER PEOPLE?: SOMEWHAT DIFFICULT
GAD7 TOTAL SCORE: 14
1. FEELING NERVOUS, ANXIOUS, OR ON EDGE: MORE THAN HALF THE DAYS
6. BECOMING EASILY ANNOYED OR IRRITABLE: MORE THAN HALF THE DAYS
7. FEELING AFRAID AS IF SOMETHING AWFUL MIGHT HAPPEN: MORE THAN HALF THE DAYS
5. BEING SO RESTLESS THAT IT IS HARD TO SIT STILL: MORE THAN HALF THE DAYS
GAD7 TOTAL SCORE: 14

## 2022-09-02 ASSESSMENT — PAIN SCALES - GENERAL: PAINLEVEL: MODERATE PAIN (4)

## 2022-09-02 NOTE — PROGRESS NOTES
ANTICOAGULATION MANAGEMENT     Hollis Diggs 52 year old male is on warfarin with supratherapeutic INR result. (Goal INR 2.0-3.0)    Recent labs: (last 7 days)     09/02/22  0728   INR 3.6*       ASSESSMENT       Source(s): Chart Review    Previous INR was Supratherapeutic    Medication, diet, health changes since last INR chart reviewed; none identified     Reviewed OV note today and ED discharge review note 8/22/22, no significant changes noted that would affect INR     Was unable to reach Hollis after several attempts today. Due to upcoming long holiday weekend a DVM was left, also sending a IBN Med[employer] message.            PLAN     Recommended plan for no diet, medication or health factor changes affecting INR     Dosing Instructions: partial hold then decrease your warfarin dose (8.7 % change) with next INR in 2 weeks       Summary  As of 9/2/2022    Full warfarin instructions:  9/2: 5 mg; Otherwise 7.5 mg every day   Next INR check:  9/16/2022             Detailed voice message left for Hollis with dosing instructions and follow up date.   Sent CREAT message with dosing and follow up instructions    Contact [phone]  to schedule and with any changes, questions or concerns.     Education provided: Please call back if any changes to your diet, medications or how you've been taking warfarin, Goal range and significance of current result, Monitoring for bleeding signs and symptoms and Contact [phone]  with any changes, questions or concerns.     Plan made per [facility] anticoagulation protocol    Deirdre Alberto RN  Anticoagulation Clinic  9/2/2022    _______________________________________________________________________     Anticoagulation Episode Summary     Current INR goal:  2.0-3.0   TTR:  55.6 % (1 y)   Target end date:  Indefinite   Send INR reminders to:  TIMOTEO ALVAREZ    Indications    DVT (deep venous thrombosis) (H) (Resolved) [I82.409]  Long-term (current) use of anticoagulants [Z79.01]  [Z79.01]  Deep vein thrombosis (DVT) of proximal lower extremity  unspecified chronicity  unspecified laterality (H) [I82.4Y9]  Antiphospholipid syndrome (H) [D68.61]           Comments:           Anticoagulation Care Providers     Provider Role Specialty Phone number    Sylvester Cash MD [facility] [phone]

## 2022-09-02 NOTE — PROGRESS NOTES
Assessment & Plan     Other iron deficiency anemia  Reviewed his ER visit a week ago for chest pain.  For his ER visit a week ago his hemoglobin is now 16.  His MCH is coming around and his RDW is getting a little better.  Would have him stop his iron is for now.  Would recheck a CBC in about 3 months.    Hiatal hernia  According to the CT scan is getting larger.  He had an EGD in the spring.  But this may be contributing to his symptoms.  He really has trouble after eating.  They have set up a cardiology consult with them because they are concerned that there may be a component of angina here.                   No follow-ups on file.    Sylvester Cash MD  [facility] is a 52 year old accompanied by his spouse, presenting for the following health issues:  RECHECK (Low Hgb)      History of Present Illness       Reason for visit:  Hemoglobin    He eats 0-1 servings of fruits and vegetables daily.He consumes 8 sweetened beverage(s) daily.He exercises with enough effort to increase his heart rate 20 to 29 minutes per day.  He exercises with enough effort to increase his heart rate 4 days per week.   He is taking medications regularly.  Today's ANKIT-7 Score: 14             Review of Systems   Constitutional, HEENT, cardiovascular, pulmonary, gi and gu systems are negative, except as otherwise noted.      Objective    /82 (BP Location: Right arm, Patient Position: Sitting, Cuff Size: Adult Large)   Pulse 105   Temp 97.7  F (36.5  C) (Temporal)   Resp 16   Ht 1.829 m (6')   Wt 109.1 kg (240 lb 9.6 oz)   SpO2 97%   BMI 32.63 kg/m    Body mass index is 32.63 kg/m .  Physical Exam   GENERAL: healthy, alert and no distress  EYES: Eyes grossly normal to inspection, PERRL and conjunctivae and sclerae normal  RESP: lungs clear to auscultation - no rales, rhonchi or wheezes  CV: regular rate and rhythm, normal S1 S2, no S3 or S4, no murmur, click or rub, no  peripheral edema and peripheral pulses strong  ABDOMEN: soft, nontender, no hepatosplenomegaly, no masses and bowel sounds normal  MS: no gross musculoskeletal defects noted, no edema  NEURO: Normal strength and tone, mentation intact and speech normal  PSYCH: mentation appears normal, affect normal/bright                    [unfilled]  [unfilled]

## 2022-09-05 ENCOUNTER — MYC REFILL (OUTPATIENT)
Dept: FAMILY MEDICINE | Facility: CLINIC | Age: 52
End: 2022-09-05

## 2022-09-05 DIAGNOSIS — M54.16 LUMBAR RADICULOPATHY: ICD-10-CM

## 2022-09-05 DIAGNOSIS — F11.90 CHRONIC, CONTINUOUS USE OF OPIOIDS: ICD-10-CM

## 2022-09-06 RX ORDER — OXYCODONE AND ACETAMINOPHEN 10; 325 MG/1; MG/1
1 TABLET ORAL EVERY 6 HOURS PRN
Qty: 30 TABLET | Refills: 0 | Status: SHIPPED | OUTPATIENT
Start: 2022-09-06 | End: 2022-09-19

## 2022-09-19 ENCOUNTER — MYC REFILL (OUTPATIENT)
Dept: FAMILY MEDICINE | Facility: CLINIC | Age: 52
End: 2022-09-19

## 2022-09-19 DIAGNOSIS — F11.90 CHRONIC, CONTINUOUS USE OF OPIOIDS: ICD-10-CM

## 2022-09-19 DIAGNOSIS — M54.16 LUMBAR RADICULOPATHY: ICD-10-CM

## 2022-09-20 RX ORDER — OXYCODONE AND ACETAMINOPHEN 10; 325 MG/1; MG/1
1 TABLET ORAL EVERY 6 HOURS PRN
Qty: 30 TABLET | Refills: 0 | Status: SHIPPED | OUTPATIENT
Start: 2022-09-20 | End: 2022-10-03

## 2022-09-29 ENCOUNTER — ANTICOAGULATION THERAPY VISIT (OUTPATIENT)
Dept: ANTICOAGULATION | Facility: CLINIC | Age: 52
End: 2022-09-29

## 2022-09-29 ENCOUNTER — LAB (OUTPATIENT)
Dept: LAB | Facility: CLINIC | Age: 52
End: 2022-09-29
Payer: COMMERCIAL

## 2022-09-29 ENCOUNTER — OFFICE VISIT (OUTPATIENT)
Dept: CARDIOLOGY | Facility: CLINIC | Age: 52
End: 2022-09-29
Attending: FAMILY MEDICINE
Payer: COMMERCIAL

## 2022-09-29 ENCOUNTER — TELEPHONE (OUTPATIENT)
Dept: ANTICOAGULATION | Facility: CLINIC | Age: 52
End: 2022-09-29

## 2022-09-29 VITALS
OXYGEN SATURATION: 96 % | DIASTOLIC BLOOD PRESSURE: 72 MMHG | SYSTOLIC BLOOD PRESSURE: 112 MMHG | WEIGHT: 247 LBS | HEIGHT: 72 IN | BODY MASS INDEX: 33.46 KG/M2 | HEART RATE: 78 BPM

## 2022-09-29 DIAGNOSIS — Z79.01 LONG TERM CURRENT USE OF ANTICOAGULANT THERAPY: ICD-10-CM

## 2022-09-29 DIAGNOSIS — I82.4Y9 DEEP VEIN THROMBOSIS (DVT) OF PROXIMAL LOWER EXTREMITY, UNSPECIFIED CHRONICITY, UNSPECIFIED LATERALITY (H): ICD-10-CM

## 2022-09-29 DIAGNOSIS — Z79.01 LONG TERM CURRENT USE OF ANTICOAGULANT THERAPY: Primary | ICD-10-CM

## 2022-09-29 DIAGNOSIS — D68.61 ANTIPHOSPHOLIPID SYNDROME (H): ICD-10-CM

## 2022-09-29 DIAGNOSIS — I82.4Y9 DEEP VEIN THROMBOSIS (DVT) OF PROXIMAL LOWER EXTREMITY, UNSPECIFIED CHRONICITY, UNSPECIFIED LATERALITY (H): Primary | ICD-10-CM

## 2022-09-29 DIAGNOSIS — I25.84 CORONARY ATHEROSCLEROSIS DUE TO CALCIFIED CORONARY LESION OF NATIVE ARTERY: ICD-10-CM

## 2022-09-29 DIAGNOSIS — I25.10 CORONARY ATHEROSCLEROSIS DUE TO CALCIFIED CORONARY LESION OF NATIVE ARTERY: ICD-10-CM

## 2022-09-29 LAB — INR BLD: 3 (ref 0.9–1.1)

## 2022-09-29 PROCEDURE — 99205 OFFICE O/P NEW HI 60 MIN: CPT | Performed by: INTERNAL MEDICINE

## 2022-09-29 PROCEDURE — 85610 PROTHROMBIN TIME: CPT

## 2022-09-29 PROCEDURE — 36416 COLLJ CAPILLARY BLOOD SPEC: CPT

## 2022-09-29 RX ORDER — NITROGLYCERIN 0.4 MG/1
TABLET SUBLINGUAL
Qty: 30 TABLET | Refills: 0 | Status: SHIPPED | OUTPATIENT
Start: 2022-09-29 | End: 2024-07-11

## 2022-09-29 NOTE — NURSING NOTE
Coronary Angiogram    Scheduled: TBD - message sent to [facility] schedulers to call patient.  Location: [facility]  Check-in time: TBD  Procedure time: TBD      Prep instructions were reviewed with patient in clinic. Patient had no questions.    See instructions below    If procedure is before 12 noon  NPO after midnight, the night before the procedure.     If procedure is after 12 noon   Clear liquid breakfast before 8:00 am. NPO after 8:00 am.     Per Dr. Bell instructions but could be subject to changed based on patient' INR and coumadin nurses:    1. Stop warfarin 5 days prior to the angiogram   2. Lovenox bridge 48 hours prior to angiogram and 48 hours after the angiogram     Patient will take 4 tabs of 81 mg of Aspirin on the day before the procedure, and 4 tabs of 81 mg of Aspirin on the morning of the procedure.     All other medications can be taken on the morning of the procedure (with small sips of water).     Patient is aware they will need a ride home, and a person to stay with him for 24 hours after the procedure.     Patient is aware he will need to take a home COVID test 1-2 days prior to procedure.       Hilda Flower RN

## 2022-09-29 NOTE — TELEPHONE ENCOUNTER
Pt will be having an angiogram - date TBD.     Please review chart and advise on procedure plan/Lovenox dosing.     Thank you  Lilliam Pro RN              Per Dr. Bell instructions but could be subject to changed based on patient' INR and coumadin nurses:     1. Stop warfarin 5 days prior to the angiogram   2. Lovenox bridge 48 hours prior to angiogram and 48 hours after the angiogram

## 2022-09-29 NOTE — LETTER
9/29/2022    Sylvester Cash MD  [address] [facility] [id number]    RE: Julius Olson       Dear Colleague,     I had the pleasure of seeing Hollis Diggs in the [facility].        HPI:     This is a 52 year old with PMH COVID x 2, antiphospholipid antibody syndrome on chronic warfarin, DVT, here for chest pain. He is s/p ER visit recently at [facility]. This was an incident that occurred for about 2 hours with radiation to arm. Relieved with sl nitroglycerin x 2 in the ER. Prior to this he was not very active to endorse exertional chest pain or pressure.     Seen in ER in August and had CTPE negative for PE. Tn negative. EKG neg. Large esophageal hiatal hernia. Had extensive coronary disease of LAD and RCA noted on CT scan.     Family history reviewed. No known family history of MI, CAD.  Non smoker.     COVID history: had COVID twice, last bout over a year ago. Uncomplicated but was quite sick the first time.     ROS is negative for fevers, chills, ns, abd pain, n/v/d/, leg edema.     ASSESSMENT/PLAN:     1. Chest pain: given risk factors of COVID, antiphospholipid antibody syndrome, classic resting angina as well as CT scan demonstrating extensive coronary artery disease we discussed pursuing coronary angiogram to evaluate any unstable plaque and reducing his risk of acute coronary syndrome. In the setting of multiple arteries with blockages demonstrated on CT scan there is a higher risk of false negative if we were to obtain a stress test beforehand and his symptoms are so classic it is best not to delay angiography. I discussed risks and benefits of procedure with Hollis and he wishes to proceed. He carries [employer] insurance and we will make sure his procedure gets approved for his unstable angina.  -start sl nitroglycerin as needed  -start aspirin 81 mg daily with load prior to coronary angiogram  -warfarin hold 5 days prior to procedure with lovenox bridge, will coordinate  with coumadin clinic   -echocardiogram before angiogram preferable   -will address statin and beta blocker at follow up visit, lipids are due in November. Exercise encouraged to raise HDL. LDL goal < 70 mg/dL. Likely start statin.     Follow up with EL in one month.    Total time spent on patient evaluation, records, and documentation more than 45 minutes.    Gloria Bell MD MSC  [facility]    PAST MEDICAL HISTORY  Past Medical History:   Diagnosis Date     Antiphospholipid syndrome (H)      Arthritis      Asthma     Exercise     blood clot in leg      Depressive disorder, not elsewhere classified     Depression (non-psychotic)     ANKIT (generalised anxiety disorder)      HH (hiatus hernia)      Hypercholesteremia     normal with weight loss 3/09     Lumbar disc herniation 1992     Seronegative rheumatoid arthritis (H)        CURRENT MEDICATIONS  Current Outpatient Medications   Medication Sig Dispense Refill     ARIPiprazole (ABILIFY) 10 MG tablet Take 1 tablet (10 mg) by mouth daily (Patient taking differently: Take 10 mg by mouth At Bedtime) 90 tablet 1     DULoxetine (CYMBALTA) 60 MG capsule TAKE TWO CAPSULES BY MOUTH EVERY  capsule 1     gabapentin (NEURONTIN) 800 MG tablet TAKE ONE TABLET BY MOUTH THREE TIMES A DAY 90 tablet 3     lisinopril (ZESTRIL) 5 MG tablet TAKE ONE TABLET BY MOUTH ONCE DAILY 90 tablet 1     pantoprazole (PROTONIX) 40 MG EC tablet Take 1 tablet (40 mg) by mouth daily 30 tablet 1     traZODone (DESYREL) 150 MG tablet TAKE 1 & 1/2 TABLET BY MOUTH AT BEDTIME 135 tablet 1     warfarin ANTICOAGULANT (COUMADIN) 5 MG tablet Take 10 mg Tues, Sat and 7.5 mg all other days, or as directed by the Coumadin Clinic 140 tablet 1     enoxaparin ANTICOAGULANT (LOVENOX) 120 MG/0.8ML syringe Inject 0.7 mLs (105 mg) Subcutaneous every 12 hours (Patient not taking: No sig reported) 12.8 mL 1     oxyCODONE-acetaminophen (PERCOCET)  MG per tablet Take 1 tablet by mouth every 6 hours  as needed for severe pain Max of 2 a day (Patient not taking: Reported on 9/29/2022) 30 tablet 0       PAST SURGICAL HISTORY:  Past Surgical History:   Procedure Laterality Date     APPENDECTOMY       COLONOSCOPY N/A 8/2/2016    Procedure: COMBINED COLONOSCOPY, SINGLE OR MULTIPLE BIOPSY/POLYPECTOMY BY BIOPSY;  Surgeon: Sydnee Walton MD;  [facility]     COLONOSCOPY N/A 5/3/2022    Procedure: COLONOSCOPY;  Surgeon: Jose A Hurt MD;  [facility]: [facility]     COLONOSCOPY WITH CO2 INSUFFLATION N/A 8/2/2016    Procedure: COLONOSCOPY WITH CO2 INSUFFLATION;  Surgeon: Sydnee Walton MD;  [facility]: [facility]     COMBINED ESOPHAGOSCOPY, GASTROSCOPY, DUODENOSCOPY (EGD) WITH CO2 INSUFFLATION N/A 8/2/2016    Procedure: COMBINED ESOPHAGOSCOPY, GASTROSCOPY, DUODENOSCOPY (EGD) WITH CO2 INSUFFLATION;  Surgeon: Sydnee Walton MD;  [facility]     COMBINED ESOPHAGOSCOPY, GASTROSCOPY, DUODENOSCOPY (EGD) WITH CO2 INSUFFLATION N/A 5/27/2021    Procedure: ESOPHAGOGASTRODUODENOSCOPY, WITH CO2 INSUFFLATION;  Surgeon: Alis Cotton DO;  [facility]     ESOPHAGOSCOPY, GASTROSCOPY, DUODENOSCOPY (EGD), COMBINED N/A 8/2/2016    Procedure: COMBINED ESOPHAGOSCOPY, GASTROSCOPY, DUODENOSCOPY (EGD), BIOPSY SINGLE OR MULTIPLE;  Surgeon: Sydnee Walton MD;  [facility]     ESOPHAGOSCOPY, GASTROSCOPY, DUODENOSCOPY (EGD), COMBINED N/A 5/27/2021    Procedure: Esophagogastroduodenoscopy, With Biopsy;  Surgeon: Alis Cotton DO;  [facility]     ESOPHAGOSCOPY, GASTROSCOPY, DUODENOSCOPY (EGD), COMBINED N/A 5/3/2022    Procedure: ESOPHAGOGASTRODUODENOSCOPY, WITH BIOPSY;  Surgeon: Jose A Hurt MD;  Location: [facility]     HC REMOVAL OF TONSILS,<12 Y/O      Tonsils <11y.o.     HC REPAIR INCISIONAL HERNIA,REDUCIBLE  1970's    Hernia Repair, Incisional, Unilateral     HC UGI ENDOSCOPY DIAG W BIOPSY  02/01/06     HC VASECTOMY UNILAT/BILAT W POSTOP SEMEN  1/05    Vasectomy      "History back lumbar laminectomy       INJECT EPIDURAL LUMBAR Right 4/22/2021    Procedure: Right Lumbar 4-5 and Lumbar 5 - Sacral 1 Epidural Steroid Injection;  Surgeon: Maxwell Zacarias MD;  Location:  OR     [facility] NONSPECIFIC PROCEDURE  91 or 92    back surgery. lumbar. lamiectomy       ALLERGIES     Allergies   Allergen Reactions     No Known Drug Allergies        FAMILY HISTORY  Family History   Problem Relation Age of Onset     Brain Tumor Mother         Benign     Deep Vein Thrombosis Mother         \"neck\"      Heart Disease Father         \"wont tell anyone\"     Neurologic Disorder Paternal Grandmother         Parkinsons      Alcohol/Drug Paternal Grandfather         alcoholic     Cancer Maternal Grandfather         lung     Diabetes Maternal Grandmother      Cerebrovascular Disease Maternal Grandmother         tim age ~70     Arthritis Cousin         cousins x 2 \"RA\"     Musculoskeletal Disorder Maternal Aunt         MS     Family History Negative Other         psoriasis, crohns, UC, SLE           SOCIAL HISTORY  Social History     Socioeconomic History     Marital status:      Spouse name: Cassie     Number of children: 2     Years of education: 15     Highest education level: Not on file   Occupational History     Occupation:      Employer: [employer]   Tobacco Use     Smoking status: Never Smoker     Smokeless tobacco: Never Used   Vaping Use     Vaping Use: Never used   Substance and Sexual Activity     Alcohol use: Yes     Comment: rare     Drug use: No     Sexual activity: Yes     Partners: Female   Other Topics Concern      Service No     Blood Transfusions No     Caffeine Concern Yes     Comment: 64 oz q day     Occupational Exposure Not Asked     Hobby Hazards Not Asked     Sleep Concern Yes     Stress Concern No     Weight Concern No     Special Diet Not Asked     Back Care Not Asked     Exercise Yes     Comment: sometimes     Bike Helmet Not Asked     Seat Belt " Yes     Self-Exams No     Parent/sibling w/ CABG, MI or angioplasty before 65F 55M? Not Asked   Social History Narrative    4 children healthy     Social Determinants of Health     Financial Resource Strain: Not on file   Food Insecurity: Not on file   Transportation Needs: Not on file   Physical Activity: Not on file   Stress: Not on file   Social Connections: Not on file   Intimate Partner Violence: Not on file   Housing Stability: Not on file       ROS:   Constitutional: No fever, chills, or sweats. No weight gain/loss   ENT: No visual disturbance, ear ache, epistaxis, sore throat  Allergies/Immunologic: Negative  Respiratory: No cough, hemoptysia  Cardiovascular: As per HPI  GI: No nausea, vomiting, hematemesis, melena, or hematochezia  : No urinary frequency, dysuria, or hematuria  Integument: Negative  Psychiatric: Negative  Neuro: Negative  Endocrinology: Negative   Musculoskeletal: Negative  Vascular: No walking impairment, claudication, ischemic rest pain or nonhealing wounds    EXAM:  /72 (BP Location: Left arm, Patient Position: Sitting, Cuff Size: Adult Large)   Pulse 78   Ht 1.829 m (6')   Wt 112 kg (247 lb)   SpO2 96%   BMI 33.50 kg/m    In general, the patient is a pleasant male in no apparent distress.    HEENT: NC/AT.  PERRLA.  EOMI.  Sclerae white, not injected.  Nares clear.  Pharynx without erythema or exudate.  Dentition intact.    Neck: No adenopathy.  No thyromegaly. Carotids +2/2 bilaterally without bruits.  No jugular venous distension.   Heart: RRR. Normal S1, S2 splits physiologically. No murmur, rub, click, or gallop. The PMI is in the 5th ICS in the midclavicular line. There is no heave.    Lungs: CTA.  No ronchi, wheezes, rales.  No dullness to percussion.   Abdomen: Soft, nontender, nondistended. No organomegaly. No AAA.  No bruits.   Extremities: No clubbing, cyanosis, or edema.  No wounds. No varicose veins signs of chronic venous insufficiency.   Vascular: No bruits are  noted.    Labs:  LIPID RESULTS:  Lab Results   Component Value Date    CHOL 151 11/15/2021    CHOL 161 06/23/2020    HDL 45 11/15/2021    HDL 38 (L) 06/23/2020    LDL 84 11/15/2021     (H) 06/23/2020    TRIG 110 11/15/2021    TRIG 92 06/23/2020    CHOLHDLRATIO 5.0 07/11/2014    NHDL 106 11/15/2021    NHDL 123 06/23/2020       LIVER ENZYME RESULTS:  Lab Results   Component Value Date    AST 22 08/21/2022    AST 14 06/22/2020    ALT 36 08/21/2022    ALT 22 06/22/2020       CBC RESULTS:  Lab Results   Component Value Date    WBC 10.2 08/21/2022    WBC 10.1 05/12/2021    RBC 6.39 (H) 08/21/2022    RBC 5.04 05/12/2021    HGB 16.1 08/21/2022    HGB 11.5 (L) 05/12/2021    HCT 49.5 08/21/2022    HCT 38.7 (L) 05/12/2021    MCV 78 08/21/2022    MCV 77 (L) 05/12/2021    MCH 25.2 (L) 08/21/2022    MCH 22.8 (L) 05/12/2021    MCHC 32.5 08/21/2022    MCHC 29.7 (L) 05/12/2021    RDW 20.5 (H) 08/21/2022    RDW 18.0 (H) 05/12/2021     08/21/2022     05/12/2021       BMP RESULTS:  Lab Results   Component Value Date     08/21/2022     05/12/2021    POTASSIUM 3.5 08/21/2022    POTASSIUM 3.4 05/12/2021    CHLORIDE 110 (H) 08/21/2022    CHLORIDE 109 05/12/2021    CO2 27 08/21/2022    CO2 30 05/12/2021    ANIONGAP 6 08/21/2022    ANIONGAP 4 05/12/2021    GLC 89 08/21/2022    GLC 82 05/12/2021    BUN 5 (L) 08/21/2022    BUN 11 05/12/2021    CR 1.02 08/21/2022    CR 1.18 05/12/2021    GFRESTIMATED 88 08/21/2022    GFRESTIMATED 71 05/12/2021    GFRESTBLACK 82 05/12/2021    JANEE 9.3 08/21/2022    JANEE 8.3 (L) 05/12/2021        A1C RESULTS:  Lab Results   Component Value Date    A1C 5.3 10/06/2017       Thank you for allowing me to participate in the care of your patient.      Sincerely,     Gloria Bell MD     [facility]  cc:   Dionisio Bee MD  [address] [place] [address] [place] [id number]

## 2022-09-29 NOTE — PATIENT INSTRUCTIONS
Coronary angiogram  Start aspirin 81 mg daily  Stop warfarin 5 days prior to the angiogram   Lovenox bridge 48 hours prior to angiogram and 48 hours after the angiogram   Will notify coumadin nurse for additional instructions   Echocardiogram   One month follow up in [place]

## 2022-09-29 NOTE — PROGRESS NOTES
HPI:     This is a 52 year old with PMH COVID x 2, antiphospholipid antibody syndrome on chronic warfarin, DVT, here for chest pain. He is s/p ER visit recently at [facility]. This was an incident that occurred for about 2 hours with radiation to arm. Relieved with sl nitroglycerin x 2 in the ER. Prior to this he was not very active to endorse exertional chest pain or pressure.     Seen in ER in August and had CTPE negative for PE. Tn negative. EKG neg. Large esophageal hiatal hernia. Had extensive coronary disease of LAD and RCA noted on CT scan.     Family history reviewed. No known family history of MI, CAD.  Non smoker.     COVID history: had COVID twice, last bout over a year ago. Uncomplicated but was quite sick the first time.     ROS is negative for fevers, chills, ns, abd pain, n/v/d/, leg edema.     ASSESSMENT/PLAN:     1. Chest pain: given risk factors of COVID, antiphospholipid antibody syndrome, classic resting angina as well as CT scan demonstrating extensive coronary artery disease we discussed pursuing coronary angiogram to evaluate any unstable plaque and reducing his risk of acute coronary syndrome. In the setting of multiple arteries with blockages demonstrated on CT scan there is a higher risk of false negative if we were to obtain a stress test beforehand and his symptoms are so classic it is best not to delay angiography. I discussed risks and benefits of procedure with Hollis and he wishes to proceed. He carries [employer] insurance and we will make sure his procedure gets approved for his unstable angina.  -start sl nitroglycerin as needed  -start aspirin 81 mg daily with load prior to coronary angiogram  -warfarin hold 5 days prior to procedure with lovenox bridge, will coordinate with coumadin clinic   -echocardiogram before angiogram preferable   -will address statin and beta blocker at follow up visit, lipids are due in November. Exercise encouraged to raise HDL. LDL goal < 70 mg/dL.  Likely start statin.     Follow up with EL in one month.    Total time spent on patient evaluation, records, and documentation more than 45 minutes.    Gloria Bell MD MSC  [facility]    PAST MEDICAL HISTORY  Past Medical History:   Diagnosis Date     Antiphospholipid syndrome (H)      Arthritis      Asthma     Exercise     blood clot in leg      Depressive disorder, not elsewhere classified     Depression (non-psychotic)     ANKIT (generalised anxiety disorder)      HH (hiatus hernia)      Hypercholesteremia     normal with weight loss 3/09     Lumbar disc herniation 1992     Seronegative rheumatoid arthritis (H)        CURRENT MEDICATIONS  Current Outpatient Medications   Medication Sig Dispense Refill     ARIPiprazole (ABILIFY) 10 MG tablet Take 1 tablet (10 mg) by mouth daily (Patient taking differently: Take 10 mg by mouth At Bedtime) 90 tablet 1     DULoxetine (CYMBALTA) 60 MG capsule TAKE TWO CAPSULES BY MOUTH EVERY  capsule 1     gabapentin (NEURONTIN) 800 MG tablet TAKE ONE TABLET BY MOUTH THREE TIMES A DAY 90 tablet 3     lisinopril (ZESTRIL) 5 MG tablet TAKE ONE TABLET BY MOUTH ONCE DAILY 90 tablet 1     pantoprazole (PROTONIX) 40 MG EC tablet Take 1 tablet (40 mg) by mouth daily 30 tablet 1     traZODone (DESYREL) 150 MG tablet TAKE 1 & 1/2 TABLET BY MOUTH AT BEDTIME 135 tablet 1     warfarin ANTICOAGULANT (COUMADIN) 5 MG tablet Take 10 mg Tues, Sat and 7.5 mg all other days, or as directed by the Coumadin Clinic 140 tablet 1     enoxaparin ANTICOAGULANT (LOVENOX) 120 MG/0.8ML syringe Inject 0.7 mLs (105 mg) Subcutaneous every 12 hours (Patient not taking: No sig reported) 12.8 mL 1     oxyCODONE-acetaminophen (PERCOCET)  MG per tablet Take 1 tablet by mouth every 6 hours as needed for severe pain Max of 2 a day (Patient not taking: Reported on 9/29/2022) 30 tablet 0       PAST SURGICAL HISTORY:  Past Surgical History:   Procedure Laterality Date     APPENDECTOMY        COLONOSCOPY N/A 8/2/2016    Procedure: COMBINED COLONOSCOPY, SINGLE OR MULTIPLE BIOPSY/POLYPECTOMY BY BIOPSY;  Surgeon: Sydnee Walton MD;  [facility]     COLONOSCOPY N/A 5/3/2022    Procedure: COLONOSCOPY;  Surgeon: Jose A Hurt MD;  [facility]     COLONOSCOPY WITH CO2 INSUFFLATION N/A 8/2/2016    Procedure: COLONOSCOPY WITH CO2 INSUFFLATION;  Surgeon: Sydnee Walton MD;  [facility]     COMBINED ESOPHAGOSCOPY, GASTROSCOPY, DUODENOSCOPY (EGD) WITH CO2 INSUFFLATION N/A 8/2/2016    Procedure: COMBINED ESOPHAGOSCOPY, GASTROSCOPY, DUODENOSCOPY (EGD) WITH CO2 INSUFFLATION;  Surgeon: Sydnee Walton MD;  [facility]     COMBINED ESOPHAGOSCOPY, GASTROSCOPY, DUODENOSCOPY (EGD) WITH CO2 INSUFFLATION N/A 5/27/2021    Procedure: ESOPHAGOGASTRODUODENOSCOPY, WITH CO2 INSUFFLATION;  Surgeon: Alis Cotton DO;  [facility]: [facility]     ESOPHAGOSCOPY, GASTROSCOPY, DUODENOSCOPY (EGD), COMBINED N/A 8/2/2016    Procedure: COMBINED ESOPHAGOSCOPY, GASTROSCOPY, DUODENOSCOPY (EGD), BIOPSY SINGLE OR MULTIPLE;  Surgeon: Sydnee Walton MD;  [facility]: [facility]     ESOPHAGOSCOPY, GASTROSCOPY, DUODENOSCOPY (EGD), COMBINED N/A 5/27/2021    Procedure: Esophagogastroduodenoscopy, With Biopsy;  Surgeon: Alis Cotton DO;  [facility]: [facility]     ESOPHAGOSCOPY, GASTROSCOPY, DUODENOSCOPY (EGD), COMBINED N/A 5/3/2022    Procedure: ESOPHAGOGASTRODUODENOSCOPY, WITH BIOPSY;  Surgeon: Jose A Hurt MD;  Location: [facility]     HC REMOVAL OF TONSILS,<12 Y/O      Tonsils <11y.o.     HC REPAIR INCISIONAL HERNIA,REDUCIBLE  1970's    Hernia Repair, Incisional, Unilateral     HC UGI ENDOSCOPY DIAG W BIOPSY  02/01/06     HC VASECTOMY UNILAT/BILAT W POSTOP SEMEN  1/05    Vasectomy     History back lumbar laminectomy       INJECT EPIDURAL LUMBAR Right 4/22/2021    Procedure: Right Lumbar 4-5 and Lumbar 5 - Sacral 1 Epidural Steroid Injection;  Surgeon: Maxwell Zacarias MD;  Location: [facility]  "OR     [facility] NONSPECIFIC PROCEDURE  91 or 92    back surgery. lumbar. lamiectomy       ALLERGIES     Allergies   Allergen Reactions     No Known Drug Allergies        FAMILY HISTORY  Family History   Problem Relation Age of Onset     Brain Tumor Mother         Benign     Deep Vein Thrombosis Mother         \"neck\"      Heart Disease Father         \"wont tell anyone\"     Neurologic Disorder Paternal Grandmother         Parkinsons      Alcohol/Drug Paternal Grandfather         alcoholic     Cancer Maternal Grandfather         lung     Diabetes Maternal Grandmother      Cerebrovascular Disease Maternal Grandmother         tim age ~70     Arthritis Cousin         cousins x 2 \"RA\"     Musculoskeletal Disorder Maternal Aunt         MS     Family History Negative Other         psoriasis, crohns, UC, SLE           SOCIAL HISTORY  Social History     Socioeconomic History     Marital status:      Spouse name: Cassie     Number of children: 2     Years of education: 15     Highest education level: Not on file   Occupational History     Occupation: Car detailer     Employer: [employer]   Tobacco Use     Smoking status: Never Smoker     Smokeless tobacco: Never Used   Vaping Use     Vaping Use: Never used   Substance and Sexual Activity     Alcohol use: Yes     Comment: rare     Drug use: No     Sexual activity: Yes     Partners: Female   Other Topics Concern      Service No     Blood Transfusions No     Caffeine Concern Yes     Comment: 64 oz q day     Occupational Exposure Not Asked     Hobby Hazards Not Asked     Sleep Concern Yes     Stress Concern No     Weight Concern No     Special Diet Not Asked     Back Care Not Asked     Exercise Yes     Comment: sometimes     Bike Helmet Not Asked     Seat Belt Yes     Self-Exams No     Parent/sibling w/ CABG, MI or angioplasty before 65F 55M? Not Asked   Social History Narrative    4 children healthy     Social Determinants of Health     Financial Resource " Strain: Not on file   Food Insecurity: Not on file   Transportation Needs: Not on file   Physical Activity: Not on file   Stress: Not on file   Social Connections: Not on file   Intimate Partner Violence: Not on file   Housing Stability: Not on file       ROS:   Constitutional: No fever, chills, or sweats. No weight gain/loss   ENT: No visual disturbance, ear ache, epistaxis, sore throat  Allergies/Immunologic: Negative  Respiratory: No cough, hemoptysia  Cardiovascular: As per HPI  GI: No nausea, vomiting, hematemesis, melena, or hematochezia  : No urinary frequency, dysuria, or hematuria  Integument: Negative  Psychiatric: Negative  Neuro: Negative  Endocrinology: Negative   Musculoskeletal: Negative  Vascular: No walking impairment, claudication, ischemic rest pain or nonhealing wounds    EXAM:  /72 (BP Location: Left arm, Patient Position: Sitting, Cuff Size: Adult Large)   Pulse 78   Ht 1.829 m (6')   Wt 112 kg (247 lb)   SpO2 96%   BMI 33.50 kg/m    In general, the patient is a pleasant male in no apparent distress.    HEENT: NC/AT.  PERRLA.  EOMI.  Sclerae white, not injected.  Nares clear.  Pharynx without erythema or exudate.  Dentition intact.    Neck: No adenopathy.  No thyromegaly. Carotids +2/2 bilaterally without bruits.  No jugular venous distension.   Heart: RRR. Normal S1, S2 splits physiologically. No murmur, rub, click, or gallop. The PMI is in the 5th ICS in the midclavicular line. There is no heave.    Lungs: CTA.  No ronchi, wheezes, rales.  No dullness to percussion.   Abdomen: Soft, nontender, nondistended. No organomegaly. No AAA.  No bruits.   Extremities: No clubbing, cyanosis, or edema.  No wounds. No varicose veins signs of chronic venous insufficiency.   Vascular: No bruits are noted.    Labs:  LIPID RESULTS:  Lab Results   Component Value Date    CHOL 151 11/15/2021    CHOL 161 06/23/2020    HDL 45 11/15/2021    HDL 38 (L) 06/23/2020    LDL 84 11/15/2021     (H)  06/23/2020    TRIG 110 11/15/2021    TRIG 92 06/23/2020    CHOLHDLRATIO 5.0 07/11/2014    NHDL 106 11/15/2021    NHDL 123 06/23/2020       LIVER ENZYME RESULTS:  Lab Results   Component Value Date    AST 22 08/21/2022    AST 14 06/22/2020    ALT 36 08/21/2022    ALT 22 06/22/2020       CBC RESULTS:  Lab Results   Component Value Date    WBC 10.2 08/21/2022    WBC 10.1 05/12/2021    RBC 6.39 (H) 08/21/2022    RBC 5.04 05/12/2021    HGB 16.1 08/21/2022    HGB 11.5 (L) 05/12/2021    HCT 49.5 08/21/2022    HCT 38.7 (L) 05/12/2021    MCV 78 08/21/2022    MCV 77 (L) 05/12/2021    MCH 25.2 (L) 08/21/2022    MCH 22.8 (L) 05/12/2021    MCHC 32.5 08/21/2022    MCHC 29.7 (L) 05/12/2021    RDW 20.5 (H) 08/21/2022    RDW 18.0 (H) 05/12/2021     08/21/2022     05/12/2021       BMP RESULTS:  Lab Results   Component Value Date     08/21/2022     05/12/2021    POTASSIUM 3.5 08/21/2022    POTASSIUM 3.4 05/12/2021    CHLORIDE 110 (H) 08/21/2022    CHLORIDE 109 05/12/2021    CO2 27 08/21/2022    CO2 30 05/12/2021    ANIONGAP 6 08/21/2022    ANIONGAP 4 05/12/2021    GLC 89 08/21/2022    GLC 82 05/12/2021    BUN 5 (L) 08/21/2022    BUN 11 05/12/2021    CR 1.02 08/21/2022    CR 1.18 05/12/2021    GFRESTIMATED 88 08/21/2022    GFRESTIMATED 71 05/12/2021    GFRESTBLACK 82 05/12/2021    JANEE 9.3 08/21/2022    JANEE 8.3 (L) 05/12/2021        A1C RESULTS:  Lab Results   Component Value Date    A1C 5.3 10/06/2017

## 2022-09-29 NOTE — PROGRESS NOTES
ANTICOAGULATION MANAGEMENT     Hollis Diggs 52 year old male is on warfarin with therapeutic INR result. (Goal INR 2.0-3.0)    Recent labs: (last 7 days)     09/29/22  1139   INR 3.0*       ASSESSMENT       Source(s): Chart Review    Previous INR was Supratherapeutic    Medication, diet, health changes since last INR chart reviewed; none identified           PLAN     Recommended plan for no diet, medication or health factor changes affecting INR     Dosing Instructions: Continue your current warfarin dose with next INR in 4 weeks       Summary  As of 9/29/2022    Full warfarin instructions:  7.5 mg every day   Next INR check:  10/27/2022             Detailed voice message left for Hollis with dosing instructions and follow up date.     Contact [phone]  to schedule and with any changes, questions or concerns.     Education provided: Please call back if any changes to your diet, medications or how you've been taking warfarin    Plan made per [facility] anticoagulation protocol    Jesse Flores RN  Anticoagulation Clinic  9/29/2022    _______________________________________________________________________     Anticoagulation Episode Summary     Current INR goal:  2.0-3.0   TTR:  49.9 % (1 y)   Target end date:  Indefinite   Send INR reminders to:  [facility]    Indications    DVT (deep venous thrombosis) (H) (Resolved) [I82.409]  Long-term (current) use of anticoagulants [Z79.01] [Z79.01]  Deep vein thrombosis (DVT) of proximal lower extremity  unspecified chronicity  unspecified laterality (H) [I82.4Y9]  Antiphospholipid syndrome (H) [D68.61]           Comments:           Anticoagulation Care Providers     Provider Role Specialty Phone number    Sylvester Cash MD [facility] [phone]

## 2022-09-30 NOTE — TELEPHONE ENCOUNTER
AIRAM-PROCEDURAL ANTICOAGULATION  MANAGEMENT    ASSESSMENT     Warfarin interruption plan for angiogram on date TBD.    Indication for Anticoagulation: DVT and Lupus Anticoagulant      DVT 10/2015     LAC+ 12/2015 & 03/2016      Airam-Procedure Risk stratification for thromboembolism: high (2022 Chest guidelines)    HIGH RISK: 2022 CHEST Perioperative Management guidelines suggests bridging patients at high risk for thromboembolism when interrupting warfarin for a elective surgery/procedure     RECOMMENDATION       Pre-Procedure:  o Hold warfarin for 5 days, until after procedure   o Enoxaparin (Lovenox) 105 mg subq Q 12 hrs (1 mg/kg Q 12 hrs for CrCl >= 60 ml/min and BMI <= 40 kg/m2)   - Start enoxaparin: day 3 of warfarin hold  - Last dose of enoxaparin prior to procedure: AM day before procedure (day 5 of warfarin hold) (~24 hours prior to procedure)      Post-Procedure:  o Resume warfarin dose if okay with provider doing procedure on night of procedure,  PM: 15mg  o Resume enoxaparin (Lovenox) ~ 48 hrs post procedure when okay with provider doing procedure. Continue until INR >= 2.0  o Recheck INR ~5-7 days after resuming warfarin   ?       Plan routed to referring provider for approval  ?   Teresita Cash [facility]    SUBJECTIVE/OBJECTIVE     Julius Diggs, a 52 year old male    Goal INR Range: 2.0-3.0     Patient bridged in past: Yes: Most recently for colonoscopy 05/2022        Wt Readings from Last 3 Encounters:   09/29/22 112 kg (247 lb)   09/02/22 109.1 kg (240 lb 9.6 oz)   08/21/22 109.9 kg (242 lb 4.8 oz)      Ideal body weight: 77.6 kg (171 lb 1.2 oz)  Adjusted ideal body weight: 91.4 kg (201 lb 7.1 oz)     Estimated body mass index is 33.5 kg/m  as calculated from the following:    Height as of an earlier encounter on 9/29/22: 1.829 m (6').    Weight as of an earlier encounter on 9/29/22: 112 kg (247 lb).    Lab Results   Component Value Date    INR 3.0 (H) 09/29/2022    INR 3.6 (H) 09/02/2022    INR  3.47 (H) 08/21/2022     Lab Results   Component Value Date    HGB 16.1 08/21/2022    HGB 11.5 05/12/2021    HCT 49.5 08/21/2022    HCT 38.7 05/12/2021     08/21/2022     05/12/2021     Lab Results   Component Value Date    CR 1.02 08/21/2022    CR 1.05 03/28/2022    CR 1.18 05/12/2021     Calc CrCl 108/ml/min

## 2022-10-03 ENCOUNTER — MYC REFILL (OUTPATIENT)
Dept: FAMILY MEDICINE | Facility: CLINIC | Age: 52
End: 2022-10-03

## 2022-10-03 DIAGNOSIS — M54.16 LUMBAR RADICULOPATHY: ICD-10-CM

## 2022-10-03 DIAGNOSIS — F11.90 CHRONIC, CONTINUOUS USE OF OPIOIDS: ICD-10-CM

## 2022-10-04 RX ORDER — OXYCODONE AND ACETAMINOPHEN 10; 325 MG/1; MG/1
1 TABLET ORAL EVERY 6 HOURS PRN
Qty: 30 TABLET | Refills: 0 | Status: SHIPPED | OUTPATIENT
Start: 2022-10-04 | End: 2022-10-17

## 2022-10-04 NOTE — TELEPHONE ENCOUNTER
Pending Prescriptions:                       Disp   Refills    oxyCODONE-acetaminophen (PERCOCET)  *30 tab*0        Sig: Take 1 tablet by mouth every 6 hours as needed for           severe pain Max of 2 a day      Routing refill request to provider for review/approval because:  Drug not on the [facility] refill protocol     Bita Vazquez RN

## 2022-10-05 ENCOUNTER — HOSPITAL ENCOUNTER (OUTPATIENT)
Dept: CARDIOLOGY | Facility: CLINIC | Age: 52
Discharge: HOME | End: 2022-10-05
Attending: INTERNAL MEDICINE | Admitting: INTERNAL MEDICINE
Payer: COMMERCIAL

## 2022-10-05 DIAGNOSIS — I25.10 CORONARY ATHEROSCLEROSIS DUE TO CALCIFIED CORONARY LESION OF NATIVE ARTERY: ICD-10-CM

## 2022-10-05 DIAGNOSIS — I25.84 CORONARY ATHEROSCLEROSIS DUE TO CALCIFIED CORONARY LESION OF NATIVE ARTERY: ICD-10-CM

## 2022-10-05 LAB — LVEF ECHO: NORMAL

## 2022-10-05 PROCEDURE — 255N000002 HC RX 255 OP 636: Performed by: INTERNAL MEDICINE

## 2022-10-05 PROCEDURE — 999N000208 ECHOCARDIOGRAM COMPLETE

## 2022-10-05 PROCEDURE — 93306 TTE W/DOPPLER COMPLETE: CPT | Mod: 26 | Performed by: INTERNAL MEDICINE

## 2022-10-05 RX ADMIN — PERFLUTREN 4 ML: 6.52 INJECTION, SUSPENSION INTRAVENOUS at 14:42

## 2022-10-06 ENCOUNTER — TELEPHONE (OUTPATIENT)
Dept: CARDIOLOGY | Facility: CLINIC | Age: 52
End: 2022-10-06

## 2022-10-06 RX ORDER — ENOXAPARIN SODIUM 150 MG/ML
1 INJECTION SUBCUTANEOUS EVERY 12 HOURS
Qty: 14 ML | Refills: 1 | Status: ON HOLD | OUTPATIENT
Start: 2022-10-06 | End: 2022-10-11

## 2022-10-06 NOTE — TELEPHONE ENCOUNTER
Dr Cash, please review the message below and let us know if you approve the plan. Thanks!     Jesse Flores RN, BSN  [facility] Anticoagulation Team

## 2022-10-06 NOTE — TELEPHONE ENCOUNTER
Results noted. Patient has visit on 11/9/22 with Dr. Bell. EF 45-50%. RN will send to Dr. Bell to inquire if any further recommendations prior to office visit.                   10/5/22 ECHO  Interpretation Summary     1. The left ventricle is normal in size. The visual ejection fraction is 45-  50%. Basal to mid lateral hypokinesis.  2. The right ventricle is normal in structure, function and size.  3. No valve disease.     No previous echo for comparison.

## 2022-10-06 NOTE — TELEPHONE ENCOUNTER
Pt notified and Lovenox prescription sent to pharmacy. [employer] message sent with procedure instructions and follow up INR appt was made. Patient verbalized understanding and agrees with plan of care. Pt had no further questions or concerns at this time.     Jesse Flores RN, BSN  [facility] Anticoagulation Team

## 2022-10-09 ENCOUNTER — HEALTH MAINTENANCE LETTER (OUTPATIENT)
Age: 52
End: 2022-10-09

## 2022-10-10 DIAGNOSIS — I25.10 CORONARY ATHEROSCLEROSIS DUE TO CALCIFIED CORONARY LESION OF NATIVE ARTERY: Primary | ICD-10-CM

## 2022-10-10 DIAGNOSIS — I25.84 CORONARY ATHEROSCLEROSIS DUE TO CALCIFIED CORONARY LESION OF NATIVE ARTERY: Primary | ICD-10-CM

## 2022-10-10 RX ORDER — ASPIRIN 81 MG/1
243 TABLET, CHEWABLE ORAL ONCE
Status: CANCELLED | OUTPATIENT
Start: 2022-10-10

## 2022-10-10 RX ORDER — ASPIRIN 325 MG
325 TABLET ORAL ONCE
Status: CANCELLED | OUTPATIENT
Start: 2022-10-10 | End: 2022-10-10

## 2022-10-10 RX ORDER — LIDOCAINE 40 MG/G
CREAM TOPICAL
Status: CANCELLED | OUTPATIENT
Start: 2022-10-10

## 2022-10-10 RX ORDER — SODIUM CHLORIDE 9 MG/ML
INJECTION, SOLUTION INTRAVENOUS CONTINUOUS
Status: CANCELLED | OUTPATIENT
Start: 2022-10-10

## 2022-10-11 ENCOUNTER — DOCUMENTATION ONLY (OUTPATIENT)
Dept: ANTICOAGULATION | Facility: CLINIC | Age: 52
End: 2022-10-11

## 2022-10-11 ENCOUNTER — HOSPITAL ENCOUNTER (OUTPATIENT)
Facility: CLINIC | Age: 52
Discharge: HOME | End: 2022-10-11
Admitting: INTERNAL MEDICINE
Payer: COMMERCIAL

## 2022-10-11 ENCOUNTER — TELEPHONE (OUTPATIENT)
Dept: FAMILY MEDICINE | Facility: CLINIC | Age: 52
End: 2022-10-11

## 2022-10-11 VITALS
HEIGHT: 72 IN | TEMPERATURE: 97.8 F | WEIGHT: 240 LBS | BODY MASS INDEX: 32.51 KG/M2 | SYSTOLIC BLOOD PRESSURE: 136 MMHG | DIASTOLIC BLOOD PRESSURE: 84 MMHG | RESPIRATION RATE: 16 BRPM | OXYGEN SATURATION: 98 % | HEART RATE: 59 BPM

## 2022-10-11 DIAGNOSIS — I25.84 CORONARY ATHEROSCLEROSIS DUE TO CALCIFIED CORONARY LESION OF NATIVE ARTERY: ICD-10-CM

## 2022-10-11 DIAGNOSIS — I25.10 CORONARY ATHEROSCLEROSIS DUE TO CALCIFIED CORONARY LESION OF NATIVE ARTERY: ICD-10-CM

## 2022-10-11 LAB
ACT BLD: 254 SECONDS (ref 74–150)
ACT BLD: 292 SECONDS (ref 74–150)
ACT BLD: 326 SECONDS (ref 74–150)
ANION GAP SERPL CALCULATED.3IONS-SCNC: 4 MMOL/L (ref 3–14)
BUN SERPL-MCNC: 9 MG/DL (ref 7–30)
CALCIUM SERPL-MCNC: 9 MG/DL (ref 8.5–10.1)
CHLORIDE BLD-SCNC: 108 MMOL/L (ref 94–109)
CO2 SERPL-SCNC: 29 MMOL/L (ref 20–32)
CREAT SERPL-MCNC: 1.01 MG/DL (ref 0.66–1.25)
ERYTHROCYTE [DISTWIDTH] IN BLOOD BY AUTOMATED COUNT: 14.1 % (ref 10–15)
GFR SERPL CREATININE-BSD FRML MDRD: 89 ML/MIN/1.73M2
GLUCOSE BLD-MCNC: 103 MG/DL (ref 70–99)
HCT VFR BLD AUTO: 42.8 % (ref 40–53)
HGB BLD-MCNC: 14.1 G/DL (ref 13.3–17.7)
INR PPP: 1.08 (ref 0.85–1.15)
MCH RBC QN AUTO: 26.4 PG (ref 26.5–33)
MCHC RBC AUTO-ENTMCNC: 32.9 G/DL (ref 31.5–36.5)
MCV RBC AUTO: 80 FL (ref 78–100)
PLATELET # BLD AUTO: 229 10E3/UL (ref 150–450)
POTASSIUM BLD-SCNC: 3.8 MMOL/L (ref 3.4–5.3)
RBC # BLD AUTO: 5.34 10E6/UL (ref 4.4–5.9)
SODIUM SERPL-SCNC: 141 MMOL/L (ref 133–144)
WBC # BLD AUTO: 4.1 10E3/UL (ref 4–11)

## 2022-10-11 PROCEDURE — 250N000009 HC RX 250: Performed by: INTERNAL MEDICINE

## 2022-10-11 PROCEDURE — 85610 PROTHROMBIN TIME: CPT | Performed by: INTERNAL MEDICINE

## 2022-10-11 PROCEDURE — 250N000013 HC RX MED GY IP 250 OP 250 PS 637: Performed by: INTERNAL MEDICINE

## 2022-10-11 PROCEDURE — 92978 ENDOLUMINL IVUS OCT C 1ST: CPT | Mod: 26 | Performed by: INTERNAL MEDICINE

## 2022-10-11 PROCEDURE — 99152 MOD SED SAME PHYS/QHP 5/>YRS: CPT | Mod: GC | Performed by: INTERNAL MEDICINE

## 2022-10-11 PROCEDURE — C1887 CATHETER, GUIDING: HCPCS | Performed by: INTERNAL MEDICINE

## 2022-10-11 PROCEDURE — 999N000054 HC STATISTIC EKG NON-CHARGEABLE

## 2022-10-11 PROCEDURE — 36591 DRAW BLOOD OFF VENOUS DEVICE: CPT

## 2022-10-11 PROCEDURE — 93571 IV DOP VEL&/PRESS C FLO 1ST: CPT | Mod: 26 | Performed by: INTERNAL MEDICINE

## 2022-10-11 PROCEDURE — 250N000013 HC RX MED GY IP 250 OP 250 PS 637: Performed by: STUDENT IN AN ORGANIZED HEALTH CARE EDUCATION/TRAINING PROGRAM

## 2022-10-11 PROCEDURE — 92928 PRQ TCAT PLMT NTRAC ST 1 LES: CPT | Mod: RC | Performed by: INTERNAL MEDICINE

## 2022-10-11 PROCEDURE — 82310 ASSAY OF CALCIUM: CPT | Performed by: INTERNAL MEDICINE

## 2022-10-11 PROCEDURE — C1769 GUIDE WIRE: HCPCS | Performed by: INTERNAL MEDICINE

## 2022-10-11 PROCEDURE — 92978 ENDOLUMINL IVUS OCT C 1ST: CPT | Performed by: INTERNAL MEDICINE

## 2022-10-11 PROCEDURE — 93454 CORONARY ARTERY ANGIO S&I: CPT

## 2022-10-11 PROCEDURE — 258N000003 HC RX IP 258 OP 636: Performed by: INTERNAL MEDICINE

## 2022-10-11 PROCEDURE — 36415 COLL VENOUS BLD VENIPUNCTURE: CPT | Performed by: INTERNAL MEDICINE

## 2022-10-11 PROCEDURE — C1753 CATH, INTRAVAS ULTRASOUND: HCPCS | Performed by: INTERNAL MEDICINE

## 2022-10-11 PROCEDURE — C9600 PERC DRUG-EL COR STENT SING: HCPCS | Performed by: INTERNAL MEDICINE

## 2022-10-11 PROCEDURE — 93005 ELECTROCARDIOGRAM TRACING: CPT

## 2022-10-11 PROCEDURE — C1894 INTRO/SHEATH, NON-LASER: HCPCS | Performed by: INTERNAL MEDICINE

## 2022-10-11 PROCEDURE — C1725 CATH, TRANSLUMIN NON-LASER: HCPCS | Performed by: INTERNAL MEDICINE

## 2022-10-11 PROCEDURE — 93010 ELECTROCARDIOGRAM REPORT: CPT | Mod: 76 | Performed by: INTERNAL MEDICINE

## 2022-10-11 PROCEDURE — 99153 MOD SED SAME PHYS/QHP EA: CPT | Performed by: INTERNAL MEDICINE

## 2022-10-11 PROCEDURE — 85347 COAGULATION TIME ACTIVATED: CPT

## 2022-10-11 PROCEDURE — 999N000184 HC STATISTIC TELEMETRY

## 2022-10-11 PROCEDURE — C1874 STENT, COATED/COV W/DEL SYS: HCPCS | Performed by: INTERNAL MEDICINE

## 2022-10-11 PROCEDURE — 85014 HEMATOCRIT: CPT | Performed by: INTERNAL MEDICINE

## 2022-10-11 PROCEDURE — 99152 MOD SED SAME PHYS/QHP 5/>YRS: CPT | Performed by: INTERNAL MEDICINE

## 2022-10-11 PROCEDURE — 93571 IV DOP VEL&/PRESS C FLO 1ST: CPT | Performed by: INTERNAL MEDICINE

## 2022-10-11 PROCEDURE — 93454 CORONARY ARTERY ANGIO S&I: CPT | Mod: 59 | Performed by: INTERNAL MEDICINE

## 2022-10-11 PROCEDURE — 272N000001 HC OR GENERAL SUPPLY STERILE: Performed by: INTERNAL MEDICINE

## 2022-10-11 PROCEDURE — 999N000071 HC STATISTIC HEART CATH LAB OR EP LAB

## 2022-10-11 PROCEDURE — 250N000011 HC RX IP 250 OP 636: Performed by: INTERNAL MEDICINE

## 2022-10-11 DEVICE — IMPLANTABLE DEVICE: Type: IMPLANTABLE DEVICE | Status: FUNCTIONAL

## 2022-10-11 RX ORDER — HYDRALAZINE HYDROCHLORIDE 20 MG/ML
10 INJECTION INTRAMUSCULAR; INTRAVENOUS EVERY 4 HOURS PRN
Status: DISCONTINUED | OUTPATIENT
Start: 2022-10-11 | End: 2022-10-11 | Stop reason: HOSPADM

## 2022-10-11 RX ORDER — ROSUVASTATIN CALCIUM 20 MG/1
20 TABLET, COATED ORAL DAILY
Qty: 90 TABLET | Refills: 3 | Status: SHIPPED | OUTPATIENT
Start: 2022-10-11 | End: 2023-01-05

## 2022-10-11 RX ORDER — CLOPIDOGREL BISULFATE 75 MG/1
TABLET ORAL
Status: DISCONTINUED | OUTPATIENT
Start: 2022-10-11 | End: 2022-10-11 | Stop reason: HOSPADM

## 2022-10-11 RX ORDER — SODIUM CHLORIDE 9 MG/ML
INJECTION, SOLUTION INTRAVENOUS CONTINUOUS
Status: DISCONTINUED | OUTPATIENT
Start: 2022-10-11 | End: 2022-10-11 | Stop reason: HOSPADM

## 2022-10-11 RX ORDER — NALOXONE HYDROCHLORIDE 0.4 MG/ML
0.2 INJECTION, SOLUTION INTRAMUSCULAR; INTRAVENOUS; SUBCUTANEOUS
Status: DISCONTINUED | OUTPATIENT
Start: 2022-10-11 | End: 2022-10-11 | Stop reason: HOSPADM

## 2022-10-11 RX ORDER — ASPIRIN 81 MG/1
81 TABLET, CHEWABLE ORAL ONCE
Status: DISCONTINUED | OUTPATIENT
Start: 2022-10-11 | End: 2022-10-11 | Stop reason: HOSPADM

## 2022-10-11 RX ORDER — ARIPIPRAZOLE 10 MG/1
10 TABLET ORAL AT BEDTIME
COMMUNITY
End: 2022-12-20

## 2022-10-11 RX ORDER — ACETAMINOPHEN 325 MG/1
650 TABLET ORAL EVERY 4 HOURS PRN
Status: DISCONTINUED | OUTPATIENT
Start: 2022-10-11 | End: 2022-10-11 | Stop reason: HOSPADM

## 2022-10-11 RX ORDER — VERAPAMIL HYDROCHLORIDE 2.5 MG/ML
INJECTION, SOLUTION INTRAVENOUS
Status: DISCONTINUED | OUTPATIENT
Start: 2022-10-11 | End: 2022-10-11 | Stop reason: HOSPADM

## 2022-10-11 RX ORDER — NITROGLYCERIN 0.4 MG/1
0.4 TABLET SUBLINGUAL EVERY 5 MIN PRN
Status: DISCONTINUED | OUTPATIENT
Start: 2022-10-11 | End: 2022-10-11 | Stop reason: HOSPADM

## 2022-10-11 RX ORDER — FENTANYL CITRATE 50 UG/ML
25 INJECTION, SOLUTION INTRAMUSCULAR; INTRAVENOUS
Status: DISCONTINUED | OUTPATIENT
Start: 2022-10-11 | End: 2022-10-11 | Stop reason: HOSPADM

## 2022-10-11 RX ORDER — POTASSIUM CHLORIDE 750 MG/1
10 TABLET, EXTENDED RELEASE ORAL ONCE
Status: COMPLETED | OUTPATIENT
Start: 2022-10-11 | End: 2022-10-11

## 2022-10-11 RX ORDER — ASPIRIN 325 MG
325 TABLET ORAL ONCE
Status: COMPLETED | OUTPATIENT
Start: 2022-10-11 | End: 2022-10-11

## 2022-10-11 RX ORDER — OXYCODONE HYDROCHLORIDE 5 MG/1
5 TABLET ORAL EVERY 4 HOURS PRN
Status: DISCONTINUED | OUTPATIENT
Start: 2022-10-11 | End: 2022-10-11 | Stop reason: HOSPADM

## 2022-10-11 RX ORDER — FLUMAZENIL 0.1 MG/ML
0.2 INJECTION, SOLUTION INTRAVENOUS
Status: DISCONTINUED | OUTPATIENT
Start: 2022-10-11 | End: 2022-10-11 | Stop reason: HOSPADM

## 2022-10-11 RX ORDER — NITROGLYCERIN 5 MG/ML
VIAL (ML) INTRAVENOUS
Status: DISCONTINUED | OUTPATIENT
Start: 2022-10-11 | End: 2022-10-11 | Stop reason: HOSPADM

## 2022-10-11 RX ORDER — ASPIRIN 81 MG/1
243 TABLET, CHEWABLE ORAL ONCE
Status: COMPLETED | OUTPATIENT
Start: 2022-10-11 | End: 2022-10-11

## 2022-10-11 RX ORDER — NALOXONE HYDROCHLORIDE 0.4 MG/ML
0.4 INJECTION, SOLUTION INTRAMUSCULAR; INTRAVENOUS; SUBCUTANEOUS
Status: DISCONTINUED | OUTPATIENT
Start: 2022-10-11 | End: 2022-10-11 | Stop reason: HOSPADM

## 2022-10-11 RX ORDER — CLOPIDOGREL BISULFATE 75 MG/1
75 TABLET ORAL 2 TIMES DAILY
Qty: 180 TABLET | Refills: 3 | Status: SHIPPED | OUTPATIENT
Start: 2022-10-12 | End: 2022-10-11

## 2022-10-11 RX ORDER — ROSUVASTATIN CALCIUM 20 MG/1
20 TABLET, COATED ORAL DAILY
Status: DISCONTINUED | OUTPATIENT
Start: 2022-10-11 | End: 2022-10-11 | Stop reason: HOSPADM

## 2022-10-11 RX ORDER — METOPROLOL TARTRATE 1 MG/ML
5 INJECTION, SOLUTION INTRAVENOUS
Status: DISCONTINUED | OUTPATIENT
Start: 2022-10-11 | End: 2022-10-11 | Stop reason: HOSPADM

## 2022-10-11 RX ORDER — OXYCODONE HYDROCHLORIDE 5 MG/1
10 TABLET ORAL EVERY 4 HOURS PRN
Status: DISCONTINUED | OUTPATIENT
Start: 2022-10-11 | End: 2022-10-11 | Stop reason: HOSPADM

## 2022-10-11 RX ORDER — LIDOCAINE 40 MG/G
CREAM TOPICAL
Status: DISCONTINUED | OUTPATIENT
Start: 2022-10-11 | End: 2022-10-11 | Stop reason: HOSPADM

## 2022-10-11 RX ORDER — ASPIRIN 81 MG/1
81 TABLET, CHEWABLE ORAL DAILY
Qty: 21 TABLET | Refills: 0 | Status: SHIPPED | OUTPATIENT
Start: 2022-10-12 | End: 2023-01-05

## 2022-10-11 RX ORDER — FENTANYL CITRATE 50 UG/ML
INJECTION, SOLUTION INTRAMUSCULAR; INTRAVENOUS
Status: DISCONTINUED | OUTPATIENT
Start: 2022-10-11 | End: 2022-10-11 | Stop reason: HOSPADM

## 2022-10-11 RX ORDER — CLOPIDOGREL BISULFATE 75 MG/1
75 TABLET ORAL DAILY
Status: DISCONTINUED | OUTPATIENT
Start: 2022-10-12 | End: 2022-10-11 | Stop reason: HOSPADM

## 2022-10-11 RX ORDER — ATROPINE SULFATE 0.1 MG/ML
0.5 INJECTION INTRAVENOUS
Status: DISCONTINUED | OUTPATIENT
Start: 2022-10-11 | End: 2022-10-11 | Stop reason: HOSPADM

## 2022-10-11 RX ORDER — ONDANSETRON 2 MG/ML
4 INJECTION INTRAMUSCULAR; INTRAVENOUS EVERY 6 HOURS PRN
Status: DISCONTINUED | OUTPATIENT
Start: 2022-10-11 | End: 2022-10-11 | Stop reason: HOSPADM

## 2022-10-11 RX ORDER — ONDANSETRON 4 MG/1
4 TABLET, ORALLY DISINTEGRATING ORAL EVERY 6 HOURS PRN
Status: DISCONTINUED | OUTPATIENT
Start: 2022-10-11 | End: 2022-10-11 | Stop reason: HOSPADM

## 2022-10-11 RX ORDER — CLOPIDOGREL BISULFATE 75 MG/1
75 TABLET ORAL DAILY
Qty: 180 TABLET | Refills: 3 | Status: SHIPPED | OUTPATIENT
Start: 2022-10-11 | End: 2023-01-05

## 2022-10-11 RX ORDER — HEPARIN SODIUM 1000 [USP'U]/ML
INJECTION, SOLUTION INTRAVENOUS; SUBCUTANEOUS
Status: DISCONTINUED | OUTPATIENT
Start: 2022-10-11 | End: 2022-10-11 | Stop reason: HOSPADM

## 2022-10-11 RX ORDER — ASPIRIN 81 MG/1
81 TABLET ORAL DAILY
Status: DISCONTINUED | OUTPATIENT
Start: 2022-10-12 | End: 2022-10-11 | Stop reason: HOSPADM

## 2022-10-11 RX ADMIN — SODIUM CHLORIDE: 9 INJECTION, SOLUTION INTRAVENOUS at 07:26

## 2022-10-11 RX ADMIN — POTASSIUM CHLORIDE 10 MEQ: 750 TABLET, EXTENDED RELEASE ORAL at 08:04

## 2022-10-11 RX ADMIN — ASPIRIN 325 MG ORAL TABLET 325 MG: 325 PILL ORAL at 07:33

## 2022-10-11 ASSESSMENT — ACTIVITIES OF DAILY LIVING (ADL)
ADLS_ACUITY_SCORE: 35

## 2022-10-11 NOTE — PROGRESS NOTES
1015: Pt returned from Cath Lab. TR band intact to right wrist.  No oozing or hematoma noted. Area soft & flat. Right arm elevated on pillows. Pt denies pain. Pt instructed on activity restrictions with right wrist. Verbal understanding received from pt.

## 2022-10-11 NOTE — Clinical Note
The first balloon was inserted into the right coronary artery.Max pressure = 6 latia. Total duration = 10 seconds.     Max pressure = 12 latia. Total duration = 25 seconds.    Balloon reinflated a second time: Max pressure = 12 latia. Total duration = 25 seconds.

## 2022-10-11 NOTE — Clinical Note
The first balloon was inserted into the right coronary artery.Max pressure = 18 latia. Total duration = 25 seconds.     Max pressure = 16 latia. Total duration = 25 seconds.    Balloon reinflated a second time: Max pressure = 16 latia. Total duration = 25 seconds.  Balloon reinflated a third time: Max pressure = 19 latia. Total duration = 22 seconds.

## 2022-10-11 NOTE — Clinical Note
Sheath exchanged in the right radial artery. as per parents pt ingested a "taste" of fathers liquid methadone  pt alert & oriented in triage, no acute distress

## 2022-10-11 NOTE — TELEPHONE ENCOUNTER
Reason for Call:  Other prescription    Detailed comments: Angiogram providers gave new instructions for how to take warfarin. Please advise.    Phone Number Patient can be reached at: Cell number on file:    Telephone Information:   Mobile [phone]       Best Time: Any time    Can we leave a detailed message on this number? YES    Call taken on 10/11/2022 at 3:21 PM by Hoang Quintana

## 2022-10-11 NOTE — PROGRESS NOTES
ANTICOAGULATION  MANAGEMENT: Discharge Review    Hollis Diggs chart reviewed for anticoagulation continuity of care    Outpatient surgery/procedure on 10/11/22 for Coronary Angiogram.    Discharge disposition: Home    Results:    Recent labs: (last 7 days)     10/11/22  0642   INR 1.08     Anticoagulation inpatient management:     not applicable     Anticoagulation discharge instructions:     Warfarin dosing: home regimen continued   Bridging: bridging with enoxaparin (Lovenox)   INR goal change: No      Medication changes affecting anticoagulation: No    Additional factors affecting anticoagulation: No     PLAN     No adjustment to anticoagulation plan needed    Patient not contacted    No adjustment to Anticoagulation Calendar was required    Re Nielson RN

## 2022-10-11 NOTE — Clinical Note
R-Band utilized for closure of site.  Mechanical pressure applied applied by scrub person; hemostasis achieved.

## 2022-10-11 NOTE — Clinical Note
Potential access sites were evaluated for patency using ultrasound.   The right radial artery was selected. Access was obtained under with Sonosite guidance using a micropuncture 21 gauge needle with direct visualization of needle entry.

## 2022-10-11 NOTE — DISCHARGE INSTRUCTIONS
Cardiac Angioplasty/Stent Discharge Instructions - Radial    After you go home:    Have an adult stay with you until tomorrow.  Drink extra fluids for 2 days.  You may resume your normal diet.  No smoking       For 24 hours - due to the sedation you received:  Relax and take it easy.  Do NOT make any important or legal decisions.  Do NOT drive or operate machines at home or at work.  Do NOT drink alcohol.    Care of Wrist Puncture Site:    For the first 24 hrs - check the puncture site every 1-2 hours while awake.  It is normal to have soreness at the puncture site and mild tingling in your hand for up to 3 days.  Remove the bandaid after 24 hours. If there is minor oozing, apply another bandaid and remove it after 12 hours.  You may shower tomorrow.  Do NOT take a bath, or use a hot tub or pool for at least 3 days. Do NOT scrub the site. Do not use lotion or powder near the puncture site.           Activity:          For 2 days:   do not use your hand or arm to support your weight (such as rising from a chair)   do not bend your wrist (such as lifting a garage door).  do not lift more than 5 pounds or exercise your arm (such as tennis, golf or bowling).  Do NOT do any heavy activity such as exercise, lifting, or straining.     Bleeding:    If you start bleeding from the site in your wrist, sit down and press firmly on/above the site for 10 minutes.   Once bleeding stops, keep arm still for 2 hours.   Call [facility] as soon as you can.       Call 911 right away if you have heavy bleeding or bleeding that does not stop.      Medicines:    You are now taking an antiplatelet medication - Plavix - do not stop taking it until you talk to your cardiologist.      Take your medications, including blood thinners, unless your provider tells you not to.    If you take Coumadin (Warfarin), have your INR checked by your provider in  3-5 days. Call your clinic to schedule this.  If you have stopped any medicines, check with your  provider about when to restart them.    Follow Up Appointments:    Follow up with [facility] Nurse Practitioner at [facility] of patient preference in 7-10 days.  Cardiac Rehab will contact you for follow up care.    Call the clinic if:    You have a large or growing hard lump around the site.  The site is red, swollen, hot or tender.  Blood or fluid is draining from the site.  You have chills or a fever greater than 101 F (38 C).  Your arm feels numb, cool or changes color.  You have hives, a rash or unusual itching.  Any questions or concerns.    Other Instructions:    If you received a stent - carry your stent card with you at all times.      [facility] [place]:    [phone] [facility] (7 days a week)

## 2022-10-11 NOTE — PRE-PROCEDURE
GENERAL PRE-PROCEDURE:   Procedure:  Coronary angiogram possible PCI  Date/Time:  10/11/2022 8:29 AM    Verbal consent obtained?: Yes    Written consent obtained?: Yes    Risks and benefits: Risks, benefits and alternatives were discussed    Consent given by:  Patient  Patient states understanding of procedure being performed: Yes    Patient's understanding of procedure matches consent: Yes    Procedure consent matches procedure scheduled: Yes    Expected level of sedation:  Moderate  Appropriately NPO:  Yes  Mallampati  :  Grade 2- soft palate, base of uvula, tonsillar pillars, and portion of posterior pharyngeal wall visible  Lungs:  Lungs clear with good breath sounds bilaterally  History & Physical reviewed:  History and physical reviewed and no updates needed  Statement of review:  I have reviewed the lab findings, diagnostic data, medications, and the plan for sedation  I have examined the patient, reviewed the history, medications and pre procedural tests. He has documented CAD with a recent ED evaluation for chest pain, concerning for possible unstable angina.  I have explained to the patient the risks of death, MI, stroke, hematoma, possible urgent bypass surgery for failed PCI, use of stents, thienopyridine agents, possible peripheral vascular complications, arrhythmia, the use of FFR in clinical decision-making and alternative of medical therapy alone in regards to left heart catheterization, left ventriculography, coronary angiography, and possible percutaneous coronary intervention. The patient voiced understanding and wishes to proceed. The patient has a good right radial pulse, normal ulnar pulse and a normal Paddy's sign.

## 2022-10-11 NOTE — PROGRESS NOTES
Care Suites Admission Nursing Note    Patient Information  Name: Hollis Diggs  Age: 52 year old  Reason for admission: Heart cath  Care Suites arrival time: 0615    Visitor Information  Name: Prachi-spouse       Informed of visitor restrictions: Yes  1 visitor allowed per patient   Visitor must screen negative for COVID symptoms   Visitor must wear a mask  Waiting rooms closed to visitors    Patient Admission/Assessment   Pre-procedure assessment complete: Yes  If abnormal assessment/labs, provider notified: N/A  NPO: Yes  Medications held per instructions/orders: Yes  Consent: obtained  If applicable, pregnancy test status: n.a  Patient oriented to room: Yes  Education/questions answered: Yes  Plan/other: proceed    Discharge Planning  Discharge name/phone number: Prachi-spouse     Overnight post sedation caregiver: spouse  Discharge location: home    Jack Montana RN

## 2022-10-11 NOTE — PROGRESS NOTES
Care Suites Discharge Summary    Discharge Criteria:   Discharge Criteria met per MD orders: Yes.   Vital signs stable.     Pt demonstrates ability to ambulate safely: Yes.  (See discharge questionnaire for additional information)    Discharge instructions & education:   Discharge instructions reviewed with patient and spouse. Patient verbalizes  understanding.   Additional patient education provided:  Cardiac angiogram w stent    Medications:   Patient will be discharging on new medications- Yes. Patient verbalizes reason for use, start date, and side effects NA.    Items returned to patient:   Home and hospital acquired medications returned to patient NA   Listed belongings gathered and returned to patient: Yes    Patient discharged to home with .     Jack Montana, QUENTIN

## 2022-10-11 NOTE — TELEPHONE ENCOUNTER
Per patient provider advised him to stop lovenox and restart warfarin and plavix post angiogram. Will keep follow up scheduled for Monday    Re Nielson RN - [facility] Anticoagulation Clinic

## 2022-10-11 NOTE — PROGRESS NOTES
"1108 Report received from Jack Montana RN.  1115 Pt A/O. TR band intact to left wrist.  No oozing or hematoma noted. Area soft & flat. Armboard intact. Left arm elevated on pillows. Pt instructed on activity restrictions with left wrist/arm. Verbal understanding received from pt. Pt's wife at bedside. Detailed update given. Pt taking diet & flds well. No complaints.mid chest after eating. States \"I have a hiatal hernia & this happens sometimes after eating\". Pt denies that this is the same pain that brought him to the dr.   1140 Pt resting quietly. No further complaints.  1200 Pt states that mid chest pressure is gone.  1211 Report given to Jack Montana RN.          "

## 2022-10-12 ENCOUNTER — TELEPHONE (OUTPATIENT)
Dept: CARDIOLOGY | Facility: CLINIC | Age: 52
End: 2022-10-12

## 2022-10-12 NOTE — TELEPHONE ENCOUNTER
Patient was admitted to [facility] on 10/11/22 for diagnostic LHC for evaluation of intermittent chest pain. On CT he has calcification on RCA and LAD.    PMH: antiphospholipid syndrome, DVT, COVID.    10/11/22: Coronary angiogram via RRA resulted in MILDRED x 1 to mRCA.    Pt was started on ASA and Plavix at time of discharge.    Called patient to discuss any post hospital d/c questions, review medication changes, and confirm f/u appts. Patient denied any questions regarding new medications or changes to PTA medications.     RN confirmed with patient that he was d/c with an adequate supply of the antiplatelet Plavix, and reminded of importance of taking without interruption.     Pt has an Rx for PRN SL Nitroglycerin.     Patient denied any SOB, chest pain, fever or light headedness.     RRA cardiac cath site is without bleeding, swelling, redness or signs of infection.     RN confirmed with patient that he has an OV scheduled on 11/9/22 at 1425 with Dr. Bell at our [facility].     Cardiac rehab is scheduled on 10/25/22 at 1215 in [place].     Patient advised to call clinic with any cardiac related questions or concerns prior to this beatriz't. Patient verbalized understanding and agreed with plan. SHU Casanova RN.

## 2022-10-14 LAB
ATRIAL RATE - MUSE: 61 BPM
ATRIAL RATE - MUSE: 64 BPM
DIASTOLIC BLOOD PRESSURE - MUSE: NORMAL MMHG
DIASTOLIC BLOOD PRESSURE - MUSE: NORMAL MMHG
INTERPRETATION ECG - MUSE: NORMAL
INTERPRETATION ECG - MUSE: NORMAL
P AXIS - MUSE: -19 DEGREES
P AXIS - MUSE: 32 DEGREES
PR INTERVAL - MUSE: 176 MS
PR INTERVAL - MUSE: 194 MS
QRS DURATION - MUSE: 144 MS
QRS DURATION - MUSE: 152 MS
QT - MUSE: 432 MS
QT - MUSE: 436 MS
QTC - MUSE: 438 MS
QTC - MUSE: 445 MS
R AXIS - MUSE: -29 DEGREES
R AXIS - MUSE: -3 DEGREES
SYSTOLIC BLOOD PRESSURE - MUSE: NORMAL MMHG
SYSTOLIC BLOOD PRESSURE - MUSE: NORMAL MMHG
T AXIS - MUSE: -14 DEGREES
T AXIS - MUSE: 33 DEGREES
VENTRICULAR RATE- MUSE: 61 BPM
VENTRICULAR RATE- MUSE: 64 BPM

## 2022-10-14 NOTE — TELEPHONE ENCOUNTER
Gloria Bell MD Graf, Christiano Martinez, RN 17 hours ago (3:45 PM)     CF  Thanks! Had PCI to the RCA which explains the results on echocardiogram. Will discuss at follow up and likely start goal directed medical therapy for reduced ejection fraction. I predict it will improve in time.     Best,   Dr. Bell      Patient has OV 11/9/22 with Dr. Bell to review.

## 2022-10-17 ENCOUNTER — MYC REFILL (OUTPATIENT)
Dept: FAMILY MEDICINE | Facility: CLINIC | Age: 52
End: 2022-10-17

## 2022-10-17 ENCOUNTER — LAB (OUTPATIENT)
Dept: LAB | Facility: CLINIC | Age: 52
End: 2022-10-17
Payer: COMMERCIAL

## 2022-10-17 ENCOUNTER — ANTICOAGULATION THERAPY VISIT (OUTPATIENT)
Dept: ANTICOAGULATION | Facility: CLINIC | Age: 52
End: 2022-10-17

## 2022-10-17 ENCOUNTER — TELEPHONE (OUTPATIENT)
Dept: CARDIOLOGY | Facility: CLINIC | Age: 52
End: 2022-10-17

## 2022-10-17 DIAGNOSIS — F11.90 CHRONIC, CONTINUOUS USE OF OPIOIDS: ICD-10-CM

## 2022-10-17 DIAGNOSIS — D68.61 ANTIPHOSPHOLIPID SYNDROME (H): ICD-10-CM

## 2022-10-17 DIAGNOSIS — M54.16 LUMBAR RADICULOPATHY: ICD-10-CM

## 2022-10-17 DIAGNOSIS — I82.4Y9 DEEP VEIN THROMBOSIS (DVT) OF PROXIMAL LOWER EXTREMITY, UNSPECIFIED CHRONICITY, UNSPECIFIED LATERALITY (H): ICD-10-CM

## 2022-10-17 DIAGNOSIS — Z79.01 LONG TERM CURRENT USE OF ANTICOAGULANT THERAPY: ICD-10-CM

## 2022-10-17 DIAGNOSIS — Z79.01 LONG TERM CURRENT USE OF ANTICOAGULANT THERAPY: Primary | ICD-10-CM

## 2022-10-17 LAB — INR BLD: 1.8 (ref 0.9–1.1)

## 2022-10-17 PROCEDURE — 85610 PROTHROMBIN TIME: CPT

## 2022-10-17 PROCEDURE — 36416 COLLJ CAPILLARY BLOOD SPEC: CPT

## 2022-10-17 NOTE — PROGRESS NOTES
ANTICOAGULATION MANAGEMENT     Hollis Diggs 52 year old male is on warfarin with subtherapeutic INR result. (Goal INR 2.0-3.0)    Recent labs: (last 7 days)     10/17/22  1613   INR 1.8*       ASSESSMENT       Source(s): Chart Review    Previous INR was Subtherapeutic    Medication, diet, health changes since last INR chart reviewed; none identified           PLAN     Recommended plan for temporary change(s) affecting INR - recent angiogram. Stopped Lovenox per cardiology. See TE from 10/11    Dosing Instructions: booster dose then continue your current warfarin dose with next INR in 1 week       Summary  As of 10/17/2022    Full warfarin instructions:  10/17: 10 mg; Otherwise 7.5 mg every day   Next INR check:  10/25/2022             Detailed voice message left for Hollis with dosing instructions and follow up date.     Check at provider office visit    Education provided: Please call back if any changes to your diet, medications or how you've been taking warfarin    Plan made per [facility] anticoagulation protocol    Jesse Flores RN  Anticoagulation Clinic  10/17/2022    _______________________________________________________________________     Anticoagulation Episode Summary     Current INR goal:  2.0-3.0   TTR:  46.6 % (1 y)   Target end date:  Indefinite   Send INR reminders to:  [facility]    Indications    DVT (deep venous thrombosis) (H) (Resolved) [I82.409]  Long-term (current) use of anticoagulants [Z79.01] [Z79.01]  Deep vein thrombosis (DVT) of proximal lower extremity  unspecified chronicity  unspecified laterality (H) [I82.4Y9]  Antiphospholipid syndrome (H) [D68.61]           Comments:           Anticoagulation Care Providers     Provider Role Specialty Phone number    Sylvester Cash MD Referring [facility] [phone]

## 2022-10-17 NOTE — TELEPHONE ENCOUNTER
Patient needs a form for work stating if he is okay to lift 40-50 lbs. Or if there are any other restrictions he should be following while at work. Patient had stents put in by Dr. Gloria Bell recently. Work is requiring a note from .    Please call [phone] if you have any question for patient, he can be reached after 4:05pm VM can be left on this number.

## 2022-10-18 RX ORDER — OXYCODONE AND ACETAMINOPHEN 10; 325 MG/1; MG/1
1 TABLET ORAL EVERY 6 HOURS PRN
Qty: 30 TABLET | Refills: 0 | Status: SHIPPED | OUTPATIENT
Start: 2022-10-18 | End: 2022-10-31

## 2022-10-18 NOTE — TELEPHONE ENCOUNTER
Requested Prescriptions   Pending Prescriptions Disp Refills     oxyCODONE-acetaminophen (PERCOCET)  MG per tablet 30 tablet 0     Sig: Take 1 tablet by mouth every 6 hours as needed for severe pain Max of 2 a day      Next 5 appointments (look out 90 days)    Nov 09, 2022  2:30 PM  (Arrive by 2:25 PM)  Return Visit with Gloria Bell MD  [facility] ([facility] [address] [place] [id number] [phone]           Routing refill request to provider for review/approval because:  Drug not on the [facility], [facility] or [facility] refill protocol or controlled substance    
hematology lab- Derrick Enrique

## 2022-10-18 NOTE — MR AVS SNAPSHOT
After Visit Summary   4/14/2017    Hollis Diggs    MRN: [id number]           Patient Information     Date Of Birth          1970        Visit Information        Provider Department      4/14/2017 8:20 AM Sylvetser Cash MD [facility]        Today's Diagnoses     Leg pain, lateral, left    -  1    History of deep venous thrombosis        DDD (degenerative disc disease), lumbar           Follow-ups after your visit        Your next 10 appointments already scheduled     May 10, 2017  2:30 PM CDT   Return Visit with FACUNDO Ortez CNP   [facility] ([facility])    [address] [place] [id number] [phone]            May 12, 2017  8:15 AM CDT   Anticoagulation Visit with PH ANTI COAG   [facility]    [address] [id number] [phone]              Future tests that were ordered for you today     Open Future Orders        Priority Expected Expires Ordered    US Lower Extremity Venous Duplex Left Routine  4/14/2018 4/14/2017            Who to contact     If you have questions or need follow up information about today's clinic visit or your schedule please contact [facility] directly at [phone].  Normal or non-critical lab and imaging results will be communicated to you by Equinexthart, letter or phone within 4 business days after the clinic has received the results. If you do not hear from us within 7 days, please contact the clinic through Equinexthart or phone. If you have a critical or abnormal lab result, we will notify you by phone as soon as possible.  Submit refill requests through ExpertFile or call your pharmacy and they will forward the refill request to us. Please allow 3 business days for your refill to be completed.          Additional Information About Your Visit        ExpertFile Information     ExpertFile gives you secure access to  "your electronic health record. If you see a primary care provider, you can also send messages to your care team and make appointments. If you have questions, please call your primary care clinic.  If you do not have a primary care provider, please call [phone] and they will assist you.        Care EveryWhere ID     This is your Care EveryWhere ID. This could be used by other organizations to access your [facility] medical records  [id number]        Your Vitals Were     Pulse Temperature Respirations Height Pulse Oximetry BMI (Body Mass Index)    77 96.1  F (35.6  C) (Temporal) 16 6' 1\" (1.854 m) 98% 32.06 kg/m2       Blood Pressure from Last 3 Encounters:   04/14/17 132/80   12/14/16 148/78   10/13/16 116/78    Weight from Last 3 Encounters:   04/14/17 243 lb (110.2 kg)   12/14/16 238 lb 12.8 oz (108.3 kg)   10/13/16 234 lb (106.1 kg)              We Performed the Following     DEPRESSION ACTION PLAN (DAP)          Today's Medication Changes          These changes are accurate as of: 4/14/17  1:02 PM.  If you have any questions, ask your nurse or doctor.               Start taking these medicines.        Dose/Directions    oxyCODONE-acetaminophen 5-325 MG per tablet   Commonly known as:  PERCOCET   Used for:  DDD (degenerative disc disease), lumbar   Started by:  Sylvester Cash MD        Dose:  1 tablet   Take 1 tablet by mouth every 8 hours as needed for moderate to severe pain   Quantity:  30 tablet   Refills:  0            Where to get your medicines      Some of these will need a paper prescription and others can be bought over the counter.  Ask your nurse if you have questions.     Bring a paper prescription for each of these medications     oxyCODONE-acetaminophen 5-325 MG per tablet    traMADol 50 MG tablet                Primary Care Provider Office Phone # Fax #    Sylvester Cash [phone]       [facility] [address] [place] [id number]        Thank " you!     Thank you for choosing [facility]  for your care. Our goal is always to provide you with excellent care. Hearing back from our patients is one way we can continue to improve our services. Please take a few minutes to complete the written survey that you may receive in the mail after your visit with us. Thank you!             Your Updated Medication List - Protect others around you: Learn how to safely use, store and throw away your medicines at www.disposemymeds.org.          This list is accurate as of: 4/14/17  1:02 PM.  Always use your most recent med list.                   Brand Name Dispense Instructions for use    albuterol 108 (90 BASE) MCG/ACT Inhaler    PROAIR HFA/PROVENTIL HFA/VENTOLIN HFA    3 Inhaler    Inhale 2 puffs into the lungs every 4 hours as needed for shortness of breath / dyspnea       amitriptyline 10 MG tablet    ELAVIL    60 tablet    Take 1 tablet (10 mg) by mouth At Bedtime       DULoxetine 20 MG EC capsule    CYMBALTA    60 capsule    Take 1 capsule (20 mg) by mouth 2 times daily       finasteride 5 MG tablet    PROSCAR    30 tablet    TAKE ONE TABLET BY MOUTH ONCE DAILY       folic acid 1 MG tablet    FOLVITE    90 tablet    TAKE ONE TABLET BY MOUTH ONCE DAILY       leflunomide 20 MG tablet    ARAVA    30 tablet    Take 1 tablet (20 mg) by mouth daily       OLANZapine 5 MG tablet    zyPREXA    90 tablet    TAKE THREE TABLETS BY MOUTH AT BEDTIME       oxyCODONE-acetaminophen 5-325 MG per tablet    PERCOCET    30 tablet    Take 1 tablet by mouth every 8 hours as needed for moderate to severe pain       traMADol 50 MG tablet    ULTRAM    60 tablet    Take 1 tablet as needed twice day for pain. Max 2 tab daily. No driving or alcohol use while taking medication.       tretinoin 0.05 % cream    RETIN-A    45 g    Apply  topically At Bedtime.       warfarin 5 MG tablet    COUMADIN    100 tablet    Take 7.5 mg on Tuesday, Thursday and 5 mg all other days, or as directed  by the coumadin clinic.          Normal for race

## 2022-10-19 NOTE — TELEPHONE ENCOUNTER
Dr. Bell signed and completed patient's [place] paper work. Was unable to reach patient, left him a voicemail with direct call back number.

## 2022-10-25 ENCOUNTER — HOSPITAL ENCOUNTER (OUTPATIENT)
Dept: CARDIAC REHAB | Facility: CLINIC | Age: 52
Discharge: HOME | End: 2022-10-25
Attending: STUDENT IN AN ORGANIZED HEALTH CARE EDUCATION/TRAINING PROGRAM
Payer: COMMERCIAL

## 2022-10-25 DIAGNOSIS — I25.84 CORONARY ATHEROSCLEROSIS DUE TO CALCIFIED CORONARY LESION OF NATIVE ARTERY: ICD-10-CM

## 2022-10-25 DIAGNOSIS — I25.10 CORONARY ATHEROSCLEROSIS DUE TO CALCIFIED CORONARY LESION OF NATIVE ARTERY: ICD-10-CM

## 2022-10-25 PROCEDURE — 93798 PHYS/QHP OP CAR RHAB W/ECG: CPT

## 2022-10-25 PROCEDURE — 93797 PHYS/QHP OP CAR RHAB WO ECG: CPT | Mod: XU

## 2022-10-31 ENCOUNTER — HOSPITAL ENCOUNTER (OUTPATIENT)
Dept: CARDIAC REHAB | Facility: CLINIC | Age: 52
Discharge: HOME | End: 2022-10-31
Attending: STUDENT IN AN ORGANIZED HEALTH CARE EDUCATION/TRAINING PROGRAM
Payer: COMMERCIAL

## 2022-10-31 PROCEDURE — 93798 PHYS/QHP OP CAR RHAB W/ECG: CPT

## 2022-11-02 ENCOUNTER — HOSPITAL ENCOUNTER (OUTPATIENT)
Dept: CARDIAC REHAB | Facility: CLINIC | Age: 52
Discharge: HOME | End: 2022-11-02
Attending: STUDENT IN AN ORGANIZED HEALTH CARE EDUCATION/TRAINING PROGRAM
Payer: COMMERCIAL

## 2022-11-02 PROCEDURE — 93798 PHYS/QHP OP CAR RHAB W/ECG: CPT

## 2022-11-04 ENCOUNTER — HOSPITAL ENCOUNTER (OUTPATIENT)
Dept: CARDIAC REHAB | Facility: CLINIC | Age: 52
Discharge: HOME | End: 2022-11-04
Attending: STUDENT IN AN ORGANIZED HEALTH CARE EDUCATION/TRAINING PROGRAM
Payer: COMMERCIAL

## 2022-11-04 ENCOUNTER — ANTICOAGULATION THERAPY VISIT (OUTPATIENT)
Dept: ANTICOAGULATION | Facility: CLINIC | Age: 52
End: 2022-11-04

## 2022-11-04 ENCOUNTER — LAB (OUTPATIENT)
Dept: LAB | Facility: CLINIC | Age: 52
End: 2022-11-04
Payer: COMMERCIAL

## 2022-11-04 DIAGNOSIS — I82.4Y9 DEEP VEIN THROMBOSIS (DVT) OF PROXIMAL LOWER EXTREMITY, UNSPECIFIED CHRONICITY, UNSPECIFIED LATERALITY (H): ICD-10-CM

## 2022-11-04 DIAGNOSIS — D68.61 ANTIPHOSPHOLIPID SYNDROME (H): ICD-10-CM

## 2022-11-04 DIAGNOSIS — Z79.01 LONG TERM CURRENT USE OF ANTICOAGULANT THERAPY: Primary | ICD-10-CM

## 2022-11-04 DIAGNOSIS — Z79.01 LONG TERM CURRENT USE OF ANTICOAGULANT THERAPY: ICD-10-CM

## 2022-11-04 LAB — INR BLD: 3.1 (ref 0.9–1.1)

## 2022-11-04 PROCEDURE — 85610 PROTHROMBIN TIME: CPT

## 2022-11-04 PROCEDURE — 36416 COLLJ CAPILLARY BLOOD SPEC: CPT

## 2022-11-04 PROCEDURE — 93798 PHYS/QHP OP CAR RHAB W/ECG: CPT

## 2022-11-04 NOTE — PROGRESS NOTES
ANTICOAGULATION MANAGEMENT     Hollis Diggs 52 year old male is on warfarin with supratherapeutic INR result. (Goal INR 2.0-3.0)    Recent labs: (last 7 days)     11/04/22  1352   INR 3.1*       ASSESSMENT       Source(s): Chart Review and Patient/Caregiver Call       Warfarin doses taken: Warfarin taken as instructed    Diet: No new diet changes identified    New illness, injury, or hospitalization: No    Medication/supplement changes: None noted    Signs or symptoms of bleeding or clotting: No    Previous INR: Subtherapeutic    Additional findings: None       PLAN     Recommended plan for no diet, medication or health factor changes affecting INR     Dosing Instructions: Continue your current warfarin dose with next INR in 2 weeks       Summary  As of 11/4/2022    Full warfarin instructions:  7.5 mg every day; Starting 11/4/2022   Next INR check:  11/18/2022             Telephone call with Hollis who verbalizes understanding and agrees to plan and who agrees to plan and repeated back plan correctly    Lab visit scheduled    Education provided:     Please call back if any changes to your diet, medications or how you've been taking warfarin    Plan made per [facility] anticoagulation protocol    Jesse Flores RN  Anticoagulation Clinic  11/4/2022    _______________________________________________________________________     Anticoagulation Episode Summary     Current INR goal:  2.0-3.0   TTR:  45.4 % (1 y)   Target end date:  Indefinite   Send INR reminders to:  TIMOTEO ALVAREZ    Indications    DVT (deep venous thrombosis) (H) (Resolved) [I82.409]  Long-term (current) use of anticoagulants [Z79.01] [Z79.01]  Deep vein thrombosis (DVT) of proximal lower extremity  unspecified chronicity  unspecified laterality (H) [I82.4Y9]  Antiphospholipid syndrome (H) [D68.61]           Comments:           Anticoagulation Care Providers     Provider Role Specialty Phone number    Sylvester Cash MD Referring [facility]  [phone]

## 2022-11-07 ENCOUNTER — HOSPITAL ENCOUNTER (OUTPATIENT)
Dept: CARDIAC REHAB | Facility: CLINIC | Age: 52
Discharge: HOME | End: 2022-11-07
Attending: STUDENT IN AN ORGANIZED HEALTH CARE EDUCATION/TRAINING PROGRAM
Payer: COMMERCIAL

## 2022-11-07 PROCEDURE — 93798 PHYS/QHP OP CAR RHAB W/ECG: CPT

## 2022-11-09 ENCOUNTER — HOSPITAL ENCOUNTER (OUTPATIENT)
Dept: CARDIAC REHAB | Facility: CLINIC | Age: 52
Discharge: HOME | End: 2022-11-09
Attending: STUDENT IN AN ORGANIZED HEALTH CARE EDUCATION/TRAINING PROGRAM
Payer: COMMERCIAL

## 2022-11-09 ENCOUNTER — OFFICE VISIT (OUTPATIENT)
Dept: CARDIOLOGY | Facility: CLINIC | Age: 52
End: 2022-11-09
Payer: COMMERCIAL

## 2022-11-09 VITALS
SYSTOLIC BLOOD PRESSURE: 114 MMHG | HEART RATE: 83 BPM | DIASTOLIC BLOOD PRESSURE: 72 MMHG | WEIGHT: 238.2 LBS | HEIGHT: 72 IN | OXYGEN SATURATION: 97 % | BODY MASS INDEX: 32.26 KG/M2

## 2022-11-09 DIAGNOSIS — I25.84 CORONARY ATHEROSCLEROSIS DUE TO CALCIFIED CORONARY LESION OF NATIVE ARTERY: ICD-10-CM

## 2022-11-09 DIAGNOSIS — I25.10 CORONARY ATHEROSCLEROSIS DUE TO CALCIFIED CORONARY LESION OF NATIVE ARTERY: ICD-10-CM

## 2022-11-09 PROCEDURE — 99214 OFFICE O/P EST MOD 30 MIN: CPT | Performed by: INTERNAL MEDICINE

## 2022-11-09 PROCEDURE — 93798 PHYS/QHP OP CAR RHAB W/ECG: CPT

## 2022-11-09 NOTE — PROGRESS NOTES
HPI:     This is a 52 year old with PMH COVID x 2, antiphospholipid antibody syndrome on chronic warfarin, DVT, with CAD s/p recent PCI to RCA.     Here for follow up. From prior visit - here for chest pain. He is s/p ER visit recently at [facility]. This was an incident that occurred for about 2 hours with radiation to arm. Relieved with sl nitroglycerin x 2 in the ER. Prior to this he was not very active to endorse exertional chest pain or pressure.      Seen in ER in August and had CTPE negative for PE. Tn negative. EKG neg. Large esophageal hiatal hernia. Had extensive coronary disease of LAD and RCA noted on CT scan.      Family history reviewed. No known family history of MI, CAD.  Non smoker.      COVID history: had COVID twice, last bout over a year ago. Uncomplicated but was quite sick the first time.      ROS is negative for fevers, chills, ns, abd pain, n/v/d/, leg edema.     Interval history: he underwent PCI with Dr. Avila and had following performed. LMCA with no significant stenoses, LAD 60%, RCA mid 90%. He underwent PCI to the RCA via iVUS. He was discharged on plavix and warfarin with INR goal of 2-2.5.     No bleeding issues. No longer having chest pain. Access site healed well. Overall doing great. Only residual complaint is fatigue.      ASSESSMENT/PLAN:      1. Chest pain: given risk factors of COVID, antiphospholipid antibody syndrome, classic resting angina as well as CT scan demonstrating extensive coronary artery disease we underwent coronary angiogram and he is now s/p PCI to the rCA and doing well on plavix for 6 months and warfarin with INR goal 2.5-3.     2. Fatigue:   Recommend sleep study and echocardiogram prior to next follow up visit.      Gloria Bell MD MSC  [facility]       PAST MEDICAL HISTORY  Past Medical History:   Diagnosis Date     Antiphospholipid syndrome (H)      Arthritis      Asthma     Exercise     blood clot in leg      Depressive disorder, not  elsewhere classified     Depression (non-psychotic)     ANKIT (generalised anxiety disorder)      HH (hiatus hernia)      Hypercholesteremia     normal with weight loss 3/09     Lumbar disc herniation 1992     Seronegative rheumatoid arthritis (H)        CURRENT MEDICATIONS  Current Outpatient Medications   Medication Sig Dispense Refill     ARIPiprazole (ABILIFY) 10 MG tablet Take 1 tablet (10 mg) by mouth At Bedtime       aspirin (ASA) 81 MG chewable tablet Take 1 tablet (81 mg) by mouth daily Starting tomorrow. 21 tablet 0     aspirin (ASA) 81 MG EC tablet Take 1 tablet (81 mg) by mouth daily       clopidogrel (PLAVIX) 75 MG tablet Take 1 tablet (75 mg) by mouth daily 180 tablet 3     DULoxetine (CYMBALTA) 60 MG capsule TAKE TWO CAPSULES BY MOUTH EVERY  capsule 1     enoxaparin ANTICOAGULANT (LOVENOX) 120 MG/0.8ML syringe Inject 0.7 mLs (105 mg) Subcutaneous every 12 hours 12.8 mL 1     gabapentin (NEURONTIN) 800 MG tablet TAKE ONE TABLET BY MOUTH THREE TIMES A DAY 90 tablet 3     lisinopril (ZESTRIL) 5 MG tablet TAKE ONE TABLET BY MOUTH ONCE DAILY 90 tablet 1     nitroGLYcerin (NITROSTAT) 0.4 MG sublingual tablet For chest pain place 1 tablet under the tongue every 5 minutes for 3 doses. If symptoms persist 5 minutes after 1st dose call 911. 30 tablet 0     oxyCODONE-acetaminophen (PERCOCET)  MG per tablet Take 1 tablet by mouth every 6 hours as needed for severe pain Max of 2 a day 30 tablet 0     pantoprazole (PROTONIX) 40 MG EC tablet Take 1 tablet (40 mg) by mouth daily 30 tablet 1     rosuvastatin (CRESTOR) 20 MG tablet Take 1 tablet (20 mg) by mouth daily 90 tablet 3     STATIN NOT PRESCRIBED (INTENTIONAL) Please choose reason not prescribed from choices below.       traZODone (DESYREL) 150 MG tablet TAKE 1 & 1/2 TABLET BY MOUTH AT BEDTIME 135 tablet 1     warfarin ANTICOAGULANT (COUMADIN) 5 MG tablet Take 1 &1/2 tablets (7.5mg) by mouth daily, or as directed.  Adjust dose based on INR results.  140 tablet 1       PAST SURGICAL HISTORY:  Past Surgical History:   Procedure Laterality Date     APPENDECTOMY       COLONOSCOPY N/A 8/2/2016    Procedure: COMBINED COLONOSCOPY, SINGLE OR MULTIPLE BIOPSY/POLYPECTOMY BY BIOPSY;  Surgeon: Sydnee Walton MD;  Location: [facility]     COLONOSCOPY N/A 5/3/2022    Procedure: COLONOSCOPY;  Surgeon: Jose A Hurt MD;  Location: [facility]     COLONOSCOPY WITH CO2 INSUFFLATION N/A 8/2/2016    Procedure: COLONOSCOPY WITH CO2 INSUFFLATION;  Surgeon: Sydnee Walton MD;  Location: [facility]     COMBINED ESOPHAGOSCOPY, GASTROSCOPY, DUODENOSCOPY (EGD) WITH CO2 INSUFFLATION N/A 8/2/2016    Procedure: COMBINED ESOPHAGOSCOPY, GASTROSCOPY, DUODENOSCOPY (EGD) WITH CO2 INSUFFLATION;  Surgeon: Sydnee Walton MD;  Location: [facility]     COMBINED ESOPHAGOSCOPY, GASTROSCOPY, DUODENOSCOPY (EGD) WITH CO2 INSUFFLATION N/A 5/27/2021    Procedure: ESOPHAGOGASTRODUODENOSCOPY, WITH CO2 INSUFFLATION;  Surgeon: Alis Cotton DO;  Location: [facility]     CV CORONARY ANGIOGRAM N/A 10/11/2022    Procedure: Coronary Angiogram;  Surgeon: Hollis Avila MD;  Location: [facility] CARDIAC CATH LAB     CV INSTANTANEOUS WAVE-FREE RATIO N/A 10/11/2022    Procedure: Instantaneous Wave-Free Ratio;  Surgeon: Hollis Avila MD;  Location: [facility] CARDIAC CATH LAB     CV INTRAVASULAR ULTRASOUND N/A 10/11/2022    Procedure: Intravascular Ultrasound;  Surgeon: Hollis Avila MD;  Location: [facility] CARDIAC CATH LAB     CV PCI ANGIOPLASTY N/A 10/11/2022    Procedure: Percutaneous Transluminal Angioplasty;  Surgeon: Hollis Avila MD;  Location: [facility] CARDIAC CATH LAB     ESOPHAGOSCOPY, GASTROSCOPY, DUODENOSCOPY (EGD), COMBINED N/A 8/2/2016    Procedure: COMBINED ESOPHAGOSCOPY, GASTROSCOPY, DUODENOSCOPY (EGD), BIOPSY SINGLE OR MULTIPLE;  Surgeon: Sydnee Walton MD;  [facility]     ESOPHAGOSCOPY, GASTROSCOPY, DUODENOSCOPY (EGD), COMBINED  "N/A 5/27/2021    Procedure: Esophagogastroduodenoscopy, With Biopsy;  Surgeon: Alis Cotton DO;  Location: [facility]     ESOPHAGOSCOPY, GASTROSCOPY, DUODENOSCOPY (EGD), COMBINED N/A 5/3/2022    Procedure: ESOPHAGOGASTRODUODENOSCOPY, WITH BIOPSY;  Surgeon: Jose A Hurt MD;  Location: [facility]     HC REMOVAL OF TONSILS,<12 Y/O      Tonsils <11y.o.     HC REPAIR INCISIONAL HERNIA,REDUCIBLE  1970's    Hernia Repair, Incisional, Unilateral     HC UGI ENDOSCOPY DIAG W BIOPSY  02/01/06     HC VASECTOMY UNILAT/BILAT W POSTOP SEMEN  1/05    Vasectomy     History back lumbar laminectomy       INJECT EPIDURAL LUMBAR Right 4/22/2021    Procedure: Right Lumbar 4-5 and Lumbar 5 - Sacral 1 Epidural Steroid Injection;  Surgeon: Maxwell Zacarias MD;  Location: [facility]     ZZC NONSPECIFIC PROCEDURE  91 or 92    back surgery. lumbar. lamiectomy       ALLERGIES     Allergies   Allergen Reactions     No Known Drug Allergies        FAMILY HISTORY  Family History   Problem Relation Age of Onset     Brain Tumor Mother         Benign     Deep Vein Thrombosis Mother         \"neck\"      Heart Disease Father         \"wont tell anyone\"     Neurologic Disorder Paternal Grandmother         Parkinsons      Alcohol/Drug Paternal Grandfather         alcoholic     Cancer Maternal Grandfather         lung     Diabetes Maternal Grandmother      Cerebrovascular Disease Maternal Grandmother         tim age ~70     Arthritis Cousin         cousins x 2 \"RA\"     Musculoskeletal Disorder Maternal Aunt         MS     Family History Negative Other         psoriasis, crohns, UC, SLE         SOCIAL HISTORY  Social History     Socioeconomic History     Marital status:      Spouse name: Cassie     Number of children: 2     Years of education: 15     Highest education level: Not on file   Occupational History     Occupation:      Employer: [employer]   Tobacco Use     Smoking status: Never     Smokeless tobacco: Never   Vaping Use     " Vaping Use: Never used   Substance and Sexual Activity     Alcohol use: Yes     Comment: rare     Drug use: No     Sexual activity: Yes     Partners: Female   Other Topics Concern      Service No     Blood Transfusions No     Caffeine Concern Yes     Comment: 64 oz q day     Occupational Exposure Not Asked     Hobby Hazards Not Asked     Sleep Concern Yes     Stress Concern No     Weight Concern No     Special Diet Not Asked     Back Care Not Asked     Exercise Yes     Comment: sometimes     Bike Helmet Not Asked     Seat Belt Yes     Self-Exams No     Parent/sibling w/ CABG, MI or angioplasty before 65F 55M? Not Asked   Social History Narrative    4 children healthy     Social Determinants of Health     Financial Resource Strain: Not on file   Food Insecurity: Not on file   Transportation Needs: Not on file   Physical Activity: Not on file   Stress: Not on file   Social Connections: Not on file   Intimate Partner Violence: Not on file   Housing Stability: Not on file       ROS:   Constitutional: No fever, chills, or sweats. No weight gain/loss   ENT: No visual disturbance, ear ache, epistaxis, sore throat  Allergies/Immunologic: Negative  Respiratory: No cough, hemoptysia  Cardiovascular: As per HPI  GI: No nausea, vomiting, hematemesis, melena, or hematochezia  : No urinary frequency, dysuria, or hematuria  Integument: Negative  Psychiatric: Negative  Neuro: Negative  Endocrinology: Negative   Musculoskeletal: Negative  Vascular: No walking impairment, claudication, ischemic rest pain or nonhealing wounds    EXAM:  /72 (BP Location: Left arm, Patient Position: Sitting, Cuff Size: Adult Large)   Pulse 83   Ht 1.829 m (6')   Wt 108 kg (238 lb 3.2 oz)   SpO2 97%   BMI 32.31 kg/m    In general, the patient is a pleasant male in no apparent distress.    HEENT: NC/AT.  PERRLA.  EOMI.  Sclerae white, not injected.  Nares clear.  Pharynx without erythema or exudate.  Dentition intact.    Neck: No  adenopathy.  No thyromegaly. Carotids +2/2 bilaterally without bruits.  No jugular venous distension.   Heart: RRR. Normal S1, S2 splits physiologically. No murmur, rub, click, or gallop. The PMI is in the 5th ICS in the midclavicular line. There is no heave.    Lungs: CTA.  No ronchi, wheezes, rales.  No dullness to percussion.   Abdomen: Soft, nontender, nondistended. No organomegaly. No AAA.  No bruits.   Extremities: No clubbing, cyanosis, or edema.  No wounds. No varicose veins signs of chronic venous insufficiency.   Vascular: No bruits are noted.    Labs:  LIPID RESULTS:  Lab Results   Component Value Date    CHOL 151 11/15/2021    CHOL 161 06/23/2020    HDL 45 11/15/2021    HDL 38 (L) 06/23/2020    LDL 84 11/15/2021     (H) 06/23/2020    TRIG 110 11/15/2021    TRIG 92 06/23/2020    CHOLHDLRATIO 5.0 07/11/2014    NHDL 106 11/15/2021    NHDL 123 06/23/2020       LIVER ENZYME RESULTS:  Lab Results   Component Value Date    AST 22 08/21/2022    AST 14 06/22/2020    ALT 36 08/21/2022    ALT 22 06/22/2020       CBC RESULTS:  Lab Results   Component Value Date    WBC 4.1 10/11/2022    WBC 10.1 05/12/2021    RBC 5.34 10/11/2022    RBC 5.04 05/12/2021    HGB 14.1 10/11/2022    HGB 11.5 (L) 05/12/2021    HCT 42.8 10/11/2022    HCT 38.7 (L) 05/12/2021    MCV 80 10/11/2022    MCV 77 (L) 05/12/2021    MCH 26.4 (L) 10/11/2022    MCH 22.8 (L) 05/12/2021    MCHC 32.9 10/11/2022    MCHC 29.7 (L) 05/12/2021    RDW 14.1 10/11/2022    RDW 18.0 (H) 05/12/2021     10/11/2022     05/12/2021       BMP RESULTS:  Lab Results   Component Value Date     10/11/2022     05/12/2021    POTASSIUM 3.8 10/11/2022    POTASSIUM 3.4 05/12/2021    CHLORIDE 108 10/11/2022    CHLORIDE 109 05/12/2021    CO2 29 10/11/2022    CO2 30 05/12/2021    ANIONGAP 4 10/11/2022    ANIONGAP 4 05/12/2021     (H) 10/11/2022    GLC 82 05/12/2021    BUN 9 10/11/2022    BUN 11 05/12/2021    CR 1.01 10/11/2022    CR 1.18  05/12/2021    GFRESTIMATED 89 10/11/2022    GFRESTIMATED 71 05/12/2021    GFRESTBLACK 82 05/12/2021    JANEE 9.0 10/11/2022    JANEE 8.3 (L) 05/12/2021        A1C RESULTS:  Lab Results   Component Value Date    A1C 5.3 10/06/2017

## 2022-11-09 NOTE — LETTER
11/9/2022    Sylvester Cash MD  [address] [facility] [address]    RE: Julius Olson       Dear Colleague,     I had the pleasure of seeing Hollis FINNEGAN Dontae in the [facility].      HPI:     This is a 52 year old with PMH COVID x 2, antiphospholipid antibody syndrome on chronic warfarin, DVT, with CAD s/p recent PCI to RCA.     Here for follow up. From prior visit - here for chest pain. He is s/p ER visit recently at [facility]. This was an incident that occurred for about 2 hours with radiation to arm. Relieved with sl nitroglycerin x 2 in the ER. Prior to this he was not very active to endorse exertional chest pain or pressure.      Seen in ER in August and had CTPE negative for PE. Tn negative. EKG neg. Large esophageal hiatal hernia. Had extensive coronary disease of LAD and RCA noted on CT scan.      Family history reviewed. No known family history of MI, CAD.  Non smoker.      COVID history: had COVID twice, last bout over a year ago. Uncomplicated but was quite sick the first time.      ROS is negative for fevers, chills, ns, abd pain, n/v/d/, leg edema.     Interval history: he underwent PCI with Dr. Avila and had following performed. LMCA with no significant stenoses, LAD 60%, RCA mid 90%. He underwent PCI to the RCA via iVUS. He was discharged on plavix and warfarin with INR goal of 2-2.5.     No bleeding issues. No longer having chest pain. Access site healed well. Overall doing great. Only residual complaint is fatigue.      ASSESSMENT/PLAN:      1. Chest pain: given risk factors of COVID, antiphospholipid antibody syndrome, classic resting angina as well as CT scan demonstrating extensive coronary artery disease we underwent coronary angiogram and he is now s/p PCI to the rCA and doing well on plavix for 6 months and warfarin with INR goal 2.5-3.     2. Fatigue:   Recommend sleep study and echocardiogram prior to next follow up visit.      Gloria Bell MD MSC  [facility]       PAST MEDICAL HISTORY  Past Medical History:   Diagnosis Date     Antiphospholipid syndrome (H)      Arthritis      Asthma     Exercise     blood clot in leg      Depressive disorder, not elsewhere classified     Depression (non-psychotic)     ANKIT (generalised anxiety disorder)      HH (hiatus hernia)      Hypercholesteremia     normal with weight loss 3/09     Lumbar disc herniation 1992     Seronegative rheumatoid arthritis (H)        CURRENT MEDICATIONS  Current Outpatient Medications   Medication Sig Dispense Refill     ARIPiprazole (ABILIFY) 10 MG tablet Take 1 tablet (10 mg) by mouth At Bedtime       aspirin (ASA) 81 MG chewable tablet Take 1 tablet (81 mg) by mouth daily Starting tomorrow. 21 tablet 0     aspirin (ASA) 81 MG EC tablet Take 1 tablet (81 mg) by mouth daily       clopidogrel (PLAVIX) 75 MG tablet Take 1 tablet (75 mg) by mouth daily 180 tablet 3     DULoxetine (CYMBALTA) 60 MG capsule TAKE TWO CAPSULES BY MOUTH EVERY  capsule 1     enoxaparin ANTICOAGULANT (LOVENOX) 120 MG/0.8ML syringe Inject 0.7 mLs (105 mg) Subcutaneous every 12 hours 12.8 mL 1     gabapentin (NEURONTIN) 800 MG tablet TAKE ONE TABLET BY MOUTH THREE TIMES A DAY 90 tablet 3     lisinopril (ZESTRIL) 5 MG tablet TAKE ONE TABLET BY MOUTH ONCE DAILY 90 tablet 1     nitroGLYcerin (NITROSTAT) 0.4 MG sublingual tablet For chest pain place 1 tablet under the tongue every 5 minutes for 3 doses. If symptoms persist 5 minutes after 1st dose call 911. 30 tablet 0     oxyCODONE-acetaminophen (PERCOCET)  MG per tablet Take 1 tablet by mouth every 6 hours as needed for severe pain Max of 2 a day 30 tablet 0     pantoprazole (PROTONIX) 40 MG EC tablet Take 1 tablet (40 mg) by mouth daily 30 tablet 1     rosuvastatin (CRESTOR) 20 MG tablet Take 1 tablet (20 mg) by mouth daily 90 tablet 3     STATIN NOT PRESCRIBED (INTENTIONAL) Please choose reason not prescribed from choices below.       traZODone (DESYREL)  150 MG tablet TAKE 1 & 1/2 TABLET BY MOUTH AT BEDTIME 135 tablet 1     warfarin ANTICOAGULANT (COUMADIN) 5 MG tablet Take 1 &1/2 tablets (7.5mg) by mouth daily, or as directed.  Adjust dose based on INR results. 140 tablet 1       PAST SURGICAL HISTORY:  Past Surgical History:   Procedure Laterality Date     APPENDECTOMY       COLONOSCOPY N/A 8/2/2016    Procedure: COMBINED COLONOSCOPY, SINGLE OR MULTIPLE BIOPSY/POLYPECTOMY BY BIOPSY;  Surgeon: Sydnee Walton MD;  [facility]: [facility]     COLONOSCOPY N/A 5/3/2022    Procedure: COLONOSCOPY;  Surgeon: Jose A Hurt MD;  Location: [facility]     COLONOSCOPY WITH CO2 INSUFFLATION N/A 8/2/2016    Procedure: COLONOSCOPY WITH CO2 INSUFFLATION;  Surgeon: Sydnee Walton MD;  [facility]: [facility]     COMBINED ESOPHAGOSCOPY, GASTROSCOPY, DUODENOSCOPY (EGD) WITH CO2 INSUFFLATION N/A 8/2/2016    Procedure: COMBINED ESOPHAGOSCOPY, GASTROSCOPY, DUODENOSCOPY (EGD) WITH CO2 INSUFFLATION;  Surgeon: Sydnee Walton MD;  [facility]     COMBINED ESOPHAGOSCOPY, GASTROSCOPY, DUODENOSCOPY (EGD) WITH CO2 INSUFFLATION N/A 5/27/2021    Procedure: ESOPHAGOGASTRODUODENOSCOPY, WITH CO2 INSUFFLATION;  Surgeon: Alis Cotton DO;  Location: [facility]     CV CORONARY ANGIOGRAM N/A 10/11/2022    Procedure: Coronary Angiogram;  Surgeon: Hollis Avila MD;  Location:  HEART CARDIAC CATH LAB     CV INSTANTANEOUS WAVE-FREE RATIO N/A 10/11/2022    Procedure: Instantaneous Wave-Free Ratio;  Surgeon: Hollis Avila MD;  Location: [facility] CARDIAC CATH LAB     CV INTRAVASULAR ULTRASOUND N/A 10/11/2022    Procedure: Intravascular Ultrasound;  Surgeon: Hollis Avila MD;  Location:  HEART CARDIAC CATH LAB     CV PCI ANGIOPLASTY N/A 10/11/2022    Procedure: Percutaneous Transluminal Angioplasty;  Surgeon: Hollis Avila MD;  Location:  HEART CARDIAC CATH LAB     ESOPHAGOSCOPY, GASTROSCOPY, DUODENOSCOPY (EGD), COMBINED N/A 8/2/2016     "Procedure: COMBINED ESOPHAGOSCOPY, GASTROSCOPY, DUODENOSCOPY (EGD), BIOPSY SINGLE OR MULTIPLE;  Surgeon: Sydnee Walton MD;  [facility]     ESOPHAGOSCOPY, GASTROSCOPY, DUODENOSCOPY (EGD), COMBINED N/A 5/27/2021    Procedure: Esophagogastroduodenoscopy, With Biopsy;  Surgeon: Alis Cotton DO;  [facility]     ESOPHAGOSCOPY, GASTROSCOPY, DUODENOSCOPY (EGD), COMBINED N/A 5/3/2022    Procedure: ESOPHAGOGASTRODUODENOSCOPY, WITH BIOPSY;  Surgeon: Jose A Hurt MD;  Location: [facility]     HC REMOVAL OF TONSILS,<10 Y/O      Tonsils <11y.o.     HC REPAIR INCISIONAL HERNIA,REDUCIBLE  1970's    Hernia Repair, Incisional, Unilateral     HC UGI ENDOSCOPY DIAG W BIOPSY  02/01/06     HC VASECTOMY UNILAT/BILAT W POSTOP SEMEN  1/05    Vasectomy     History back lumbar laminectomy       INJECT EPIDURAL LUMBAR Right 4/22/2021    Procedure: Right Lumbar 4-5 and Lumbar 5 - Sacral 1 Epidural Steroid Injection;  Surgeon: Maxwell Zacarias MD;  Location: [facility]     ZZC NONSPECIFIC PROCEDURE  91 or 92    back surgery. lumbar. lamiectomy       ALLERGIES     Allergies   Allergen Reactions     No Known Drug Allergies        FAMILY HISTORY  Family History   Problem Relation Age of Onset     Brain Tumor Mother         Benign     Deep Vein Thrombosis Mother         \"neck\"      Heart Disease Father         \"wont tell anyone\"     Neurologic Disorder Paternal Grandmother         Parkinsons      Alcohol/Drug Paternal Grandfather         alcoholic     Cancer Maternal Grandfather         lung     Diabetes Maternal Grandmother      Cerebrovascular Disease Maternal Grandmother         tim age ~70     Arthritis Cousin         cousins x 2 \"RA\"     Musculoskeletal Disorder Maternal Aunt         MS     Family History Negative Other         psoriasis, crohns, UC, SLE         SOCIAL HISTORY  Social History     Socioeconomic History     Marital status:      Spouse name: Cassie     Number of children: 2     Years of education: " 15     Highest education level: Not on file   Occupational History     Occupation:      Employer: [employer]   Tobacco Use     Smoking status: Never     Smokeless tobacco: Never   Vaping Use     Vaping Use: Never used   Substance and Sexual Activity     Alcohol use: Yes     Comment: rare     Drug use: No     Sexual activity: Yes     Partners: Female   Other Topics Concern      Service No     Blood Transfusions No     Caffeine Concern Yes     Comment: 64 oz q day     Occupational Exposure Not Asked     Hobby Hazards Not Asked     Sleep Concern Yes     Stress Concern No     Weight Concern No     Special Diet Not Asked     Back Care Not Asked     Exercise Yes     Comment: sometimes     Bike Helmet Not Asked     Seat Belt Yes     Self-Exams No     Parent/sibling w/ CABG, MI or angioplasty before 65F 55M? Not Asked   Social History Narrative    4 children healthy     Social Determinants of Health     Financial Resource Strain: Not on file   Food Insecurity: Not on file   Transportation Needs: Not on file   Physical Activity: Not on file   Stress: Not on file   Social Connections: Not on file   Intimate Partner Violence: Not on file   Housing Stability: Not on file       ROS:   Constitutional: No fever, chills, or sweats. No weight gain/loss   ENT: No visual disturbance, ear ache, epistaxis, sore throat  Allergies/Immunologic: Negative  Respiratory: No cough, hemoptysia  Cardiovascular: As per HPI  GI: No nausea, vomiting, hematemesis, melena, or hematochezia  : No urinary frequency, dysuria, or hematuria  Integument: Negative  Psychiatric: Negative  Neuro: Negative  Endocrinology: Negative   Musculoskeletal: Negative  Vascular: No walking impairment, claudication, ischemic rest pain or nonhealing wounds    EXAM:  /72 (BP Location: Left arm, Patient Position: Sitting, Cuff Size: Adult Large)   Pulse 83   Ht 1.829 m (6')   Wt 108 kg (238 lb 3.2 oz)   SpO2 97%   BMI 32.31 kg/m     In general, the patient is a pleasant male in no apparent distress.    HEENT: NC/AT.  PERRLA.  EOMI.  Sclerae white, not injected.  Nares clear.  Pharynx without erythema or exudate.  Dentition intact.    Neck: No adenopathy.  No thyromegaly. Carotids +2/2 bilaterally without bruits.  No jugular venous distension.   Heart: RRR. Normal S1, S2 splits physiologically. No murmur, rub, click, or gallop. The PMI is in the 5th ICS in the midclavicular line. There is no heave.    Lungs: CTA.  No ronchi, wheezes, rales.  No dullness to percussion.   Abdomen: Soft, nontender, nondistended. No organomegaly. No AAA.  No bruits.   Extremities: No clubbing, cyanosis, or edema.  No wounds. No varicose veins signs of chronic venous insufficiency.   Vascular: No bruits are noted.    Labs:  LIPID RESULTS:  Lab Results   Component Value Date    CHOL 151 11/15/2021    CHOL 161 06/23/2020    HDL 45 11/15/2021    HDL 38 (L) 06/23/2020    LDL 84 11/15/2021     (H) 06/23/2020    TRIG 110 11/15/2021    TRIG 92 06/23/2020    CHOLHDLRATIO 5.0 07/11/2014    NHDL 106 11/15/2021    NHDL 123 06/23/2020       LIVER ENZYME RESULTS:  Lab Results   Component Value Date    AST 22 08/21/2022    AST 14 06/22/2020    ALT 36 08/21/2022    ALT 22 06/22/2020       CBC RESULTS:  Lab Results   Component Value Date    WBC 4.1 10/11/2022    WBC 10.1 05/12/2021    RBC 5.34 10/11/2022    RBC 5.04 05/12/2021    HGB 14.1 10/11/2022    HGB 11.5 (L) 05/12/2021    HCT 42.8 10/11/2022    HCT 38.7 (L) 05/12/2021    MCV 80 10/11/2022    MCV 77 (L) 05/12/2021    MCH 26.4 (L) 10/11/2022    MCH 22.8 (L) 05/12/2021    MCHC 32.9 10/11/2022    MCHC 29.7 (L) 05/12/2021    RDW 14.1 10/11/2022    RDW 18.0 (H) 05/12/2021     10/11/2022     05/12/2021       BMP RESULTS:  Lab Results   Component Value Date     10/11/2022     05/12/2021    POTASSIUM 3.8 10/11/2022    POTASSIUM 3.4 05/12/2021    CHLORIDE 108 10/11/2022    CHLORIDE 109 05/12/2021     CO2 29 10/11/2022    CO2 30 05/12/2021    ANIONGAP 4 10/11/2022    ANIONGAP 4 05/12/2021     (H) 10/11/2022    GLC 82 05/12/2021    BUN 9 10/11/2022    BUN 11 05/12/2021    CR 1.01 10/11/2022    CR 1.18 05/12/2021    GFRESTIMATED 89 10/11/2022    GFRESTIMATED 71 05/12/2021    GFRESTBLACK 82 05/12/2021    JANEE 9.0 10/11/2022    JANEE 8.3 (L) 05/12/2021        A1C RESULTS:  Lab Results   Component Value Date    A1C 5.3 10/06/2017       Thank you for allowing me to participate in the care of your patient.      Sincerely,     Gloria Bell MD     [facility]  cc:   Gloria Bell MD  [address] [place] [id number]

## 2022-11-09 NOTE — LETTER
[facility] [address] [address] [phone]          November 9, 2022    RE:  Hollis Diggs                                                                                                                                                       [address] [facility] [address]            To whom it may concern:    Hollis Diggs is under my professional care for Coronary atherosclerosis due to calcified coronary lesion of native artery. He will be attending cardiac rehab twice a week until approximately February 2023.       Sincerely,        Gloria Bell MD     Labs today including Gene test to [place]  Ok to do z pack  Await labs  Referral to Lauren Connellyagary  Consult Cardiology-?POTS  Follow up pending

## 2022-11-14 ENCOUNTER — HOSPITAL ENCOUNTER (OUTPATIENT)
Dept: CARDIAC REHAB | Facility: CLINIC | Age: 52
Discharge: HOME | End: 2022-11-14
Attending: STUDENT IN AN ORGANIZED HEALTH CARE EDUCATION/TRAINING PROGRAM
Payer: COMMERCIAL

## 2022-11-14 ENCOUNTER — MYC REFILL (OUTPATIENT)
Dept: FAMILY MEDICINE | Facility: CLINIC | Age: 52
End: 2022-11-14

## 2022-11-14 DIAGNOSIS — F11.90 CHRONIC, CONTINUOUS USE OF OPIOIDS: ICD-10-CM

## 2022-11-14 DIAGNOSIS — M54.16 LUMBAR RADICULOPATHY: ICD-10-CM

## 2022-11-14 PROCEDURE — 93798 PHYS/QHP OP CAR RHAB W/ECG: CPT

## 2022-11-15 RX ORDER — OXYCODONE AND ACETAMINOPHEN 10; 325 MG/1; MG/1
1 TABLET ORAL EVERY 6 HOURS PRN
Qty: 30 TABLET | Refills: 0 | Status: SHIPPED | OUTPATIENT
Start: 2022-11-15 | End: 2022-11-28

## 2022-11-16 ENCOUNTER — HOSPITAL ENCOUNTER (OUTPATIENT)
Dept: CARDIAC REHAB | Facility: CLINIC | Age: 52
Discharge: HOME | End: 2022-11-16
Attending: STUDENT IN AN ORGANIZED HEALTH CARE EDUCATION/TRAINING PROGRAM
Payer: COMMERCIAL

## 2022-11-16 PROCEDURE — 93798 PHYS/QHP OP CAR RHAB W/ECG: CPT

## 2022-11-17 ENCOUNTER — OFFICE VISIT (OUTPATIENT)
Dept: FAMILY MEDICINE | Facility: CLINIC | Age: 52
End: 2022-11-17
Payer: COMMERCIAL

## 2022-11-17 ENCOUNTER — ANTICOAGULATION THERAPY VISIT (OUTPATIENT)
Dept: ANTICOAGULATION | Facility: CLINIC | Age: 52
End: 2022-11-17

## 2022-11-17 VITALS
HEART RATE: 74 BPM | WEIGHT: 243 LBS | HEIGHT: 72 IN | TEMPERATURE: 98 F | OXYGEN SATURATION: 99 % | RESPIRATION RATE: 14 BRPM | DIASTOLIC BLOOD PRESSURE: 62 MMHG | SYSTOLIC BLOOD PRESSURE: 102 MMHG | BODY MASS INDEX: 32.91 KG/M2

## 2022-11-17 DIAGNOSIS — Z12.5 SCREENING FOR PROSTATE CANCER: ICD-10-CM

## 2022-11-17 DIAGNOSIS — B07.0 PLANTAR WARTS: ICD-10-CM

## 2022-11-17 DIAGNOSIS — Z98.890 STATUS POST CORONARY ANGIOGRAM: ICD-10-CM

## 2022-11-17 DIAGNOSIS — Z00.01 ENCOUNTER FOR GENERAL ADULT MEDICAL EXAMINATION WITH ABNORMAL FINDINGS: Primary | ICD-10-CM

## 2022-11-17 DIAGNOSIS — Z79.01 LONG TERM CURRENT USE OF ANTICOAGULANT THERAPY: Primary | ICD-10-CM

## 2022-11-17 DIAGNOSIS — I82.4Y9 DEEP VEIN THROMBOSIS (DVT) OF PROXIMAL LOWER EXTREMITY, UNSPECIFIED CHRONICITY, UNSPECIFIED LATERALITY (H): ICD-10-CM

## 2022-11-17 DIAGNOSIS — D68.61 ANTIPHOSPHOLIPID SYNDROME (H): ICD-10-CM

## 2022-11-17 DIAGNOSIS — E78.5 HYPERLIPIDEMIA LDL GOAL <70: ICD-10-CM

## 2022-11-17 LAB
CHOLEST SERPL-MCNC: 109 MG/DL
FASTING STATUS PATIENT QL REPORTED: NO
HDLC SERPL-MCNC: 52 MG/DL
INR BLD: 2.9 (ref 0.9–1.1)
LDLC SERPL CALC-MCNC: 43 MG/DL
NONHDLC SERPL-MCNC: 57 MG/DL
PSA SERPL-MCNC: 0.68 UG/L (ref 0–4)
TRIGL SERPL-MCNC: 72 MG/DL

## 2022-11-17 PROCEDURE — G0103 PSA SCREENING: HCPCS | Performed by: FAMILY MEDICINE

## 2022-11-17 PROCEDURE — 99396 PREV VISIT EST AGE 40-64: CPT | Mod: 25 | Performed by: FAMILY MEDICINE

## 2022-11-17 PROCEDURE — 85610 PROTHROMBIN TIME: CPT | Performed by: FAMILY MEDICINE

## 2022-11-17 PROCEDURE — 80061 LIPID PANEL: CPT | Performed by: FAMILY MEDICINE

## 2022-11-17 PROCEDURE — 17110 DESTRUCTION B9 LES UP TO 14: CPT | Performed by: FAMILY MEDICINE

## 2022-11-17 PROCEDURE — 99213 OFFICE O/P EST LOW 20 MIN: CPT | Mod: 25 | Performed by: FAMILY MEDICINE

## 2022-11-17 PROCEDURE — 90472 IMMUNIZATION ADMIN EACH ADD: CPT | Performed by: FAMILY MEDICINE

## 2022-11-17 PROCEDURE — 90682 RIV4 VACC RECOMBINANT DNA IM: CPT | Performed by: FAMILY MEDICINE

## 2022-11-17 PROCEDURE — 90677 PCV20 VACCINE IM: CPT | Performed by: FAMILY MEDICINE

## 2022-11-17 PROCEDURE — 90471 IMMUNIZATION ADMIN: CPT | Performed by: FAMILY MEDICINE

## 2022-11-17 PROCEDURE — 36415 COLL VENOUS BLD VENIPUNCTURE: CPT | Performed by: FAMILY MEDICINE

## 2022-11-17 ASSESSMENT — ENCOUNTER SYMPTOMS
FEVER: 0
ARTHRALGIAS: 0
COUGH: 0
HEMATOCHEZIA: 0
CONSTIPATION: 0
NAUSEA: 0
ABDOMINAL PAIN: 0
SHORTNESS OF BREATH: 0
HEMATURIA: 0
DIARRHEA: 0
CHILLS: 0
FREQUENCY: 0
DYSURIA: 0
WEAKNESS: 0
PARESTHESIAS: 0
HEARTBURN: 0
DIZZINESS: 0
PALPITATIONS: 0
EYE PAIN: 0
NERVOUS/ANXIOUS: 0
SORE THROAT: 0
HEADACHES: 0
JOINT SWELLING: 0
MYALGIAS: 0

## 2022-11-17 ASSESSMENT — ANXIETY QUESTIONNAIRES
IF YOU CHECKED OFF ANY PROBLEMS ON THIS QUESTIONNAIRE, HOW DIFFICULT HAVE THESE PROBLEMS MADE IT FOR YOU TO DO YOUR WORK, TAKE CARE OF THINGS AT HOME, OR GET ALONG WITH OTHER PEOPLE: SOMEWHAT DIFFICULT
5. BEING SO RESTLESS THAT IT IS HARD TO SIT STILL: SEVERAL DAYS
8. IF YOU CHECKED OFF ANY PROBLEMS, HOW DIFFICULT HAVE THESE MADE IT FOR YOU TO DO YOUR WORK, TAKE CARE OF THINGS AT HOME, OR GET ALONG WITH OTHER PEOPLE?: SOMEWHAT DIFFICULT
3. WORRYING TOO MUCH ABOUT DIFFERENT THINGS: MORE THAN HALF THE DAYS
1. FEELING NERVOUS, ANXIOUS, OR ON EDGE: MORE THAN HALF THE DAYS
4. TROUBLE RELAXING: MORE THAN HALF THE DAYS
7. FEELING AFRAID AS IF SOMETHING AWFUL MIGHT HAPPEN: MORE THAN HALF THE DAYS
GAD7 TOTAL SCORE: 13
2. NOT BEING ABLE TO STOP OR CONTROL WORRYING: MORE THAN HALF THE DAYS
6. BECOMING EASILY ANNOYED OR IRRITABLE: MORE THAN HALF THE DAYS
7. FEELING AFRAID AS IF SOMETHING AWFUL MIGHT HAPPEN: MORE THAN HALF THE DAYS
GAD7 TOTAL SCORE: 13
GAD7 TOTAL SCORE: 13

## 2022-11-17 ASSESSMENT — PATIENT HEALTH QUESTIONNAIRE - PHQ9
10. IF YOU CHECKED OFF ANY PROBLEMS, HOW DIFFICULT HAVE THESE PROBLEMS MADE IT FOR YOU TO DO YOUR WORK, TAKE CARE OF THINGS AT HOME, OR GET ALONG WITH OTHER PEOPLE: SOMEWHAT DIFFICULT
SUM OF ALL RESPONSES TO PHQ QUESTIONS 1-9: 10
SUM OF ALL RESPONSES TO PHQ QUESTIONS 1-9: 10

## 2022-11-17 ASSESSMENT — PAIN SCALES - GENERAL: PAINLEVEL: MODERATE PAIN (4)

## 2022-11-17 NOTE — PROGRESS NOTES
SUBJECTIVE:   CC: Hollis is an 52 year old who presents for preventative health visit.   Patient has been advised of split billing requirements and indicates understanding: Yes  Healthy Habits:     Getting at least 3 servings of Calcium per day:  Yes    Bi-annual eye exam:  Yes    Dental care twice a year:  Yes    Sleep apnea or symptoms of sleep apnea:  Daytime drowsiness, Excessive snoring and Sleep apnea    Diet:  Low salt and Low fat/cholesterol    Frequency of exercise:  4-5 days/week    Duration of exercise:  30-45 minutes    Taking medications regularly:  Yes    Medication side effects:  None    PHQ-2 Total Score: 3    Additional concerns today:  No    Has a wart on his left foot lateral of the base of the fifth M TP joint and is really bothering him.  Wants this treated.  Had coronary artery stenting back in mid October.  Following with cardiology.  Doing well.  In cardiac rehab.  History of DVT and is on warfarin.  Also on Plavix now since his heart issue and he reports no bleeding problems.        Today's PHQ-2 Score:   PHQ-2 ( 1999 [employer]) 11/17/2022   Q1: Little interest or pleasure in doing things 2   Q2: Feeling down, depressed or hopeless 1   PHQ-2 Score 3   PHQ-2 Total Score (12-17 Years)- Positive if 3 or more points; Administer PHQ-A if positive -   Q1: Little interest or pleasure in doing things More than half the days   Q2: Feeling down, depressed or hopeless Several days   PHQ-2 Score 3           Social History     Tobacco Use     Smoking status: Never     Smokeless tobacco: Never   Substance Use Topics     Alcohol use: Yes     Comment: rare         Alcohol Use 11/17/2022   Prescreen: >3 drinks/day or >7 drinks/week? No   Prescreen: >3 drinks/day or >7 drinks/week? -       Last PSA:   PSA   Date Value Ref Range Status   11/08/2019 0.97 0 - 4 ug/L Final     Comment:     Assay Method:  Chemiluminescence using Siemens Vista analyzer     Prostate Specific Antigen Screen   Date Value Ref Range  Status   11/15/2021 0.90 0.00 - 4.00 ug/L Final       Reviewed orders with patient. Reviewed health maintenance and updated orders accordingly - Yes  Labs reviewed in EPIC    Reviewed and updated as needed this visit by clinical staff   Tobacco  Allergies  Meds              Reviewed and updated as needed this visit by Provider                 Past Medical History:   Diagnosis Date     Antiphospholipid syndrome (H)      Arthritis      Asthma     Exercise     blood clot in leg      Depressive disorder, not elsewhere classified     Depression (non-psychotic)     ANKIT (generalised anxiety disorder)      HH (hiatus hernia)      Hypercholesteremia     normal with weight loss 3/09     Lumbar disc herniation 1992     Seronegative rheumatoid arthritis (H)       Past Surgical History:   Procedure Laterality Date     APPENDECTOMY       COLONOSCOPY N/A 8/2/2016    Procedure: COMBINED COLONOSCOPY, SINGLE OR MULTIPLE BIOPSY/POLYPECTOMY BY BIOPSY;  Surgeon: Sydnee Walton MD;  Location: [facility]     COLONOSCOPY N/A 5/3/2022    Procedure: COLONOSCOPY;  Surgeon: Jose A Hurt MD;  [facility]: [facility]     COLONOSCOPY WITH CO2 INSUFFLATION N/A 8/2/2016    Procedure: COLONOSCOPY WITH CO2 INSUFFLATION;  Surgeon: Sydnee Walton MD;  [facility]: [facility]     COMBINED ESOPHAGOSCOPY, GASTROSCOPY, DUODENOSCOPY (EGD) WITH CO2 INSUFFLATION N/A 8/2/2016    Procedure: COMBINED ESOPHAGOSCOPY, GASTROSCOPY, DUODENOSCOPY (EGD) WITH CO2 INSUFFLATION;  Surgeon: Sydnee Walton MD;  Location: [facility]     COMBINED ESOPHAGOSCOPY, GASTROSCOPY, DUODENOSCOPY (EGD) WITH CO2 INSUFFLATION N/A 5/27/2021    Procedure: ESOPHAGOGASTRODUODENOSCOPY, WITH CO2 INSUFFLATION;  Surgeon: Alis Cotton DO;  Location: [facility]     CV CORONARY ANGIOGRAM N/A 10/11/2022    Procedure: Coronary Angiogram;  Surgeon: Hollis Avila MD;  Location:  HEART CARDIAC CATH LAB     CV INSTANTANEOUS WAVE-FREE RATIO N/A 10/11/2022    Procedure:  Instantaneous Wave-Free Ratio;  Surgeon: Hollis Avila MD;  Location:  HEART CARDIAC CATH LAB     CV INTRAVASULAR ULTRASOUND N/A 10/11/2022    Procedure: Intravascular Ultrasound;  Surgeon: Hollis Avila MD;  Location:  HEART CARDIAC CATH LAB     CV PCI ANGIOPLASTY N/A 10/11/2022    Procedure: Percutaneous Transluminal Angioplasty;  Surgeon: Hollis Avila MD;  Location:  HEART CARDIAC CATH LAB     ESOPHAGOSCOPY, GASTROSCOPY, DUODENOSCOPY (EGD), COMBINED N/A 8/2/2016    Procedure: COMBINED ESOPHAGOSCOPY, GASTROSCOPY, DUODENOSCOPY (EGD), BIOPSY SINGLE OR MULTIPLE;  Surgeon: Sydnee Walton MD;  [facility]     ESOPHAGOSCOPY, GASTROSCOPY, DUODENOSCOPY (EGD), COMBINED N/A 5/27/2021    Procedure: Esophagogastroduodenoscopy, With Biopsy;  Surgeon: Alis Cotton DO;  [facility]: [facility]     ESOPHAGOSCOPY, GASTROSCOPY, DUODENOSCOPY (EGD), COMBINED N/A 5/3/2022    Procedure: ESOPHAGOGASTRODUODENOSCOPY, WITH BIOPSY;  Surgeon: Jose A Hurt MD;  Location:  GI     HC REMOVAL OF TONSILS,<12 Y/O      Tonsils <11y.o.     HC REPAIR INCISIONAL HERNIA,REDUCIBLE  1970's    Hernia Repair, Incisional, Unilateral     HC UGI ENDOSCOPY DIAG W BIOPSY  02/01/06     HC VASECTOMY UNILAT/BILAT W POSTOP SEMEN  1/05    Vasectomy     History back lumbar laminectomy       INJECT EPIDURAL LUMBAR Right 4/22/2021    Procedure: Right Lumbar 4-5 and Lumbar 5 - Sacral 1 Epidural Steroid Injection;  Surgeon: Maxwell Zacarias MD;  Location:  OR     ZZC NONSPECIFIC PROCEDURE  91 or 92    back surgery. lumbar. lamiectomy       Review of Systems   Constitutional: Negative for chills and fever.   HENT: Negative for congestion, ear pain, hearing loss and sore throat.    Eyes: Negative for pain and visual disturbance.   Respiratory: Negative for cough and shortness of breath.    Cardiovascular: Negative for chest pain, palpitations and peripheral edema.   Gastrointestinal: Negative for abdominal pain,  "constipation, diarrhea, heartburn, hematochezia and nausea.   Genitourinary: Negative for dysuria, frequency, genital sores, hematuria and urgency.   Musculoskeletal: Negative for arthralgias, joint swelling and myalgias.   Skin: Negative for rash.   Neurological: Negative for dizziness, weakness, headaches and paresthesias.   Psychiatric/Behavioral: Negative for mood changes. The patient is not nervous/anxious.          OBJECTIVE:   /62 (BP Location: Left arm, Patient Position: Sitting, Cuff Size: Adult Large)   Pulse 74   Temp 98  F (36.7  C) (Temporal)   Resp 14   Ht 1.84 m (6' 0.44\")   Wt 110.2 kg (243 lb)   SpO2 99%   BMI 32.56 kg/m      Physical Exam  GENERAL: healthy, alert and no distress  EYES: Eyes grossly normal to inspection, PERRL and conjunctivae and sclerae normal  HENT: ear canals and TM's normal, nose and mouth without ulcers or lesions  NECK: no adenopathy, no asymmetry, masses, or scars and thyroid normal to palpation  RESP: lungs clear to auscultation - no rales, rhonchi or wheezes  CV: regular rate and rhythm, normal S1 S2, no S3 or S4, no murmur, click or rub, no peripheral edema and peripheral pulses strong  ABDOMEN: soft, nontender, no hepatosplenomegaly, no masses and bowel sounds normal  MS: no gross musculoskeletal defects noted, no edema  SKIN: 3 mm plantar wart on the side of the foot and distal fifth metatarsal area.  This was prepped with alcohol sprayed down with a sterile blade and frozen solidly twice with liquid nitrogen for 40 seconds each.  NEURO: Normal strength and tone, mentation intact and speech normal  PSYCH: mentation appears normal, affect normal/bright    Diagnostic Test Results:  Labs reviewed in Epic  Results for orders placed or performed in visit on 11/17/22   INR point of care     Status: Abnormal   Result Value Ref Range    INR 2.9 (H) 0.9 - 1.1    Narrative    This test is intended for monitoring Coumadin therapy. Results are not accurate in patients " with prolonged INR due to factor deficiency.   PSA, screen     Status: Normal   Result Value Ref Range    Prostate Specific Antigen Screen 0.68 0.00 - 4.00 ug/L    Narrative    Assay Method:  Chemiluminescence using Siemens   Vista analyzer.   Lipid panel reflex to direct LDL Fasting     Status: None   Result Value Ref Range    Cholesterol 109 <200 mg/dL    Triglycerides 72 <150 mg/dL    Direct Measure HDL 52 >=40 mg/dL    LDL Cholesterol Calculated 43 <=100 mg/dL    Non HDL Cholesterol 57 <130 mg/dL    Patient Fasting > 8hrs? No     Narrative    Cholesterol  Desirable:  <200 mg/dL    Triglycerides  Normal:  Less than 150 mg/dL  Borderline High:  150-199 mg/dL  High:  200-499 mg/dL  Very High:  Greater than or equal to 500 mg/dL    Direct Measure HDL  Female:  Greater than or equal to 50 mg/dL   Male:  Greater than or equal to 40 mg/dL    LDL Cholesterol  Desirable:  <100mg/dL  Above Desirable:  100-129 mg/dL   Borderline High:  130-159 mg/dL   High:  160-189 mg/dL   Very High:  >= 190 mg/dL    Non HDL Cholesterol  Desirable:  130 mg/dL  Above Desirable:  130-159 mg/dL  Borderline High:  160-189 mg/dL  High:  190-219 mg/dL  Very High:  Greater than or equal to 220 mg/dL       ASSESSMENT/PLAN:   (Z00.01) Encounter for general adult medical examination with abnormal findings  (primary encounter diagnosis)  Comment: See discussion below.  Plan:     (B07.0) Plantar warts  Comment: He will follow-up if this does not clear in 2 to 3 weeks for retreatment.  Watch for blistering.  Plan: DESTRUCT BENIGN LESION, UP TO 14            (E78.5) Hyperlipidemia LDL goal <70  Comment: Will be notified of his results.  Plan: Lipid panel reflex to direct LDL Fasting            (Z98.890) Status post coronary angiogram  Comment: In cardiac rehab doing fine no chest pain or dyspnea on exertion.  Status post right coronary arteries drug-eluting stent now started on Plavix  Plan:     (I82.4Y9) Deep vein thrombosis (DVT) of proximal lower  "extremity, unspecified chronicity, unspecified laterality (H)  Comment: No troubles continues on warfarin.  Plan: INR point of care            (Z12.5) Screening for prostate cancer  Comment:   Plan: PSA, screen              Patient has been advised of split billing requirements and indicates understanding: Yes      COUNSELING:   Reviewed preventive health counseling, as reflected in patient instructions       Regular exercise       Healthy diet/nutrition       Vision screening      BMI:   Estimated body mass index is 32.56 kg/m  as calculated from the following:    Height as of this encounter: 1.84 m (6' 0.44\").    Weight as of this encounter: 110.2 kg (243 lb).   Weight management plan: Discussed healthy diet and exercise guidelines      He reports that he has never smoked. He has never used smokeless tobacco.        Sylvester Cash MD  [facility]  Answers for HPI/ROS submitted by the patient on 11/17/2022  If you checked off any problems, how difficult have these problems made it for you to do your work, take care of things at home, or get along with other people?: Somewhat difficult  PHQ9 TOTAL SCORE: 10  ANKIT 7 TOTAL SCORE: 13      "

## 2022-11-17 NOTE — PROGRESS NOTES
ANTICOAGULATION MANAGEMENT     Hollis Diggs 52 year old male is on warfarin with therapeutic INR result. (Goal INR 2.0-3.0)    Recent labs: (last 7 days)     11/17/22  1552   INR 2.9*       ASSESSMENT       Source(s): Chart Review and Patient/Caregiver Call       Warfarin doses taken: Warfarin taken as instructed    Diet: No new diet changes identified    New illness, injury, or hospitalization: No    Medication/supplement changes: None noted    Signs or symptoms of bleeding or clotting: No    Previous INR: Supratherapeutic    Additional findings: None       PLAN     Recommended plan for no diet, medication or health factor changes affecting INR     Dosing Instructions: Continue your current warfarin dose with next INR in 4 weeks       Summary  As of 11/17/2022    Full warfarin instructions:  7.5 mg every day; Starting 11/17/2022   Next INR check:  12/15/2022             Telephone call with Hollis who verbalizes understanding and agrees to plan and who agrees to plan and repeated back plan correctly    Lab visit scheduled    Education provided:     Please call back if any changes to your diet, medications or how you've been taking warfarin    Plan made per [facility] anticoagulation protocol    Jesse Flores RN  Anticoagulation Clinic  11/17/2022    _______________________________________________________________________     Anticoagulation Episode Summary     Current INR goal:  2.0-3.0   TTR:  43.7 % (1 y)   Target end date:  Indefinite   Send INR reminders to:  TIMOTEO ALVAREZ    Indications    DVT (deep venous thrombosis) (H) (Resolved) [I82.409]  Long-term (current) use of anticoagulants [Z79.01] [Z79.01]  Deep vein thrombosis (DVT) of proximal lower extremity  unspecified chronicity  unspecified laterality (H) [I82.4Y9]  Antiphospholipid syndrome (H) [D68.61]           Comments:           Anticoagulation Care Providers     Provider Role Specialty Phone number    Sylvester Cash MD Referring [facility]  [phone]

## 2022-11-17 NOTE — NURSING NOTE
Prior to immunization administration, verified patients identity using patient s name and date of birth. Please see Immunization Activity for additional information.     Screening Questionnaire for Adult Immunization    Are you sick today?   No   Do you have allergies to medications, food, a vaccine component or latex?   No   Have you ever had a serious reaction after receiving a vaccination?   No   Do you have a long-term health problem with heart, lung, kidney, or metabolic disease (e.g., diabetes), asthma, a blood disorder, no spleen, complement component deficiency, a cochlear implant, or a spinal fluid leak?  Are you on long-term aspirin therapy?   Yes   Do you have cancer, leukemia, HIV/AIDS, or any other immune system problem?   No   Do you have a parent, brother, or sister with an immune system problem?   No   In the past 3 months, have you taken medications that affect  your immune system, such as prednisone, other steroids, or anticancer drugs; drugs for the treatment of rheumatoid arthritis, Crohn s disease, or psoriasis; or have you had radiation treatments?   No   Have you had a seizure, or a brain or other nervous system problem?   No   During the past year, have you received a transfusion of blood or blood    products, or been given immune (gamma) globulin or antiviral drug?   No   For women: Are you pregnant or is there a chance you could become       pregnant during the next month?   No   Have you received any vaccinations in the past 4 weeks?   No     Immunization questionnaire was positive for at least one answer.  Notified Dr. Cash.        Per orders of  , injection of  given by Nathalie Abraham LPN. Patient instructed to remain in clinic for 15 minutes afterwards, and to report any adverse reaction to me immediately.       Screening performed by Nathalie Abraham LPN on 11/17/2022 at 3:33 PM.

## 2022-11-18 ENCOUNTER — HOSPITAL ENCOUNTER (OUTPATIENT)
Dept: CARDIAC REHAB | Facility: CLINIC | Age: 52
Discharge: HOME | End: 2022-11-18
Attending: STUDENT IN AN ORGANIZED HEALTH CARE EDUCATION/TRAINING PROGRAM
Payer: COMMERCIAL

## 2022-11-18 PROCEDURE — 93798 PHYS/QHP OP CAR RHAB W/ECG: CPT

## 2022-11-21 ENCOUNTER — HOSPITAL ENCOUNTER (OUTPATIENT)
Dept: CARDIAC REHAB | Facility: CLINIC | Age: 52
Discharge: HOME | End: 2022-11-21
Attending: STUDENT IN AN ORGANIZED HEALTH CARE EDUCATION/TRAINING PROGRAM
Payer: COMMERCIAL

## 2022-11-21 PROCEDURE — 93798 PHYS/QHP OP CAR RHAB W/ECG: CPT

## 2022-11-25 ENCOUNTER — HOSPITAL ENCOUNTER (OUTPATIENT)
Dept: CARDIAC REHAB | Facility: CLINIC | Age: 52
Discharge: HOME | End: 2022-11-25
Attending: STUDENT IN AN ORGANIZED HEALTH CARE EDUCATION/TRAINING PROGRAM
Payer: COMMERCIAL

## 2022-11-25 PROCEDURE — 93798 PHYS/QHP OP CAR RHAB W/ECG: CPT

## 2022-11-28 ENCOUNTER — MYC REFILL (OUTPATIENT)
Dept: FAMILY MEDICINE | Facility: CLINIC | Age: 52
End: 2022-11-28

## 2022-11-28 ENCOUNTER — HOSPITAL ENCOUNTER (OUTPATIENT)
Dept: CARDIAC REHAB | Facility: CLINIC | Age: 52
Discharge: HOME | End: 2022-11-28
Attending: STUDENT IN AN ORGANIZED HEALTH CARE EDUCATION/TRAINING PROGRAM
Payer: COMMERCIAL

## 2022-11-28 DIAGNOSIS — M54.16 LUMBAR RADICULOPATHY: ICD-10-CM

## 2022-11-28 DIAGNOSIS — F11.90 CHRONIC, CONTINUOUS USE OF OPIOIDS: ICD-10-CM

## 2022-11-28 PROCEDURE — 93798 PHYS/QHP OP CAR RHAB W/ECG: CPT

## 2022-11-29 RX ORDER — OXYCODONE AND ACETAMINOPHEN 10; 325 MG/1; MG/1
1 TABLET ORAL EVERY 6 HOURS PRN
Qty: 30 TABLET | Refills: 0 | Status: SHIPPED | OUTPATIENT
Start: 2022-11-29 | End: 2022-12-05

## 2022-11-29 NOTE — TELEPHONE ENCOUNTER
Routing refill request to provider for review/approval because:    Requested Prescriptions   Pending Prescriptions Disp Refills    oxyCODONE-acetaminophen (PERCOCET)  MG per tablet 30 tablet 0     Sig: Take 1 tablet by mouth every 6 hours as needed for severe pain (7-10) Max of 2 a day       There is no refill protocol information for this order

## 2022-11-30 ENCOUNTER — HOSPITAL ENCOUNTER (OUTPATIENT)
Dept: CARDIAC REHAB | Facility: CLINIC | Age: 52
Discharge: HOME | End: 2022-11-30
Attending: STUDENT IN AN ORGANIZED HEALTH CARE EDUCATION/TRAINING PROGRAM
Payer: COMMERCIAL

## 2022-11-30 PROCEDURE — 93798 PHYS/QHP OP CAR RHAB W/ECG: CPT

## 2022-12-02 ENCOUNTER — HOSPITAL ENCOUNTER (OUTPATIENT)
Dept: CARDIAC REHAB | Facility: CLINIC | Age: 52
Discharge: HOME | End: 2022-12-02
Attending: STUDENT IN AN ORGANIZED HEALTH CARE EDUCATION/TRAINING PROGRAM
Payer: COMMERCIAL

## 2022-12-02 PROCEDURE — 93798 PHYS/QHP OP CAR RHAB W/ECG: CPT

## 2022-12-05 ENCOUNTER — MYC REFILL (OUTPATIENT)
Dept: FAMILY MEDICINE | Facility: CLINIC | Age: 52
End: 2022-12-05

## 2022-12-05 ENCOUNTER — HOSPITAL ENCOUNTER (OUTPATIENT)
Dept: CARDIAC REHAB | Facility: CLINIC | Age: 52
Discharge: HOME | End: 2022-12-05
Attending: STUDENT IN AN ORGANIZED HEALTH CARE EDUCATION/TRAINING PROGRAM
Payer: COMMERCIAL

## 2022-12-05 DIAGNOSIS — F11.90 CHRONIC, CONTINUOUS USE OF OPIOIDS: ICD-10-CM

## 2022-12-05 DIAGNOSIS — M54.16 LUMBAR RADICULOPATHY: ICD-10-CM

## 2022-12-05 PROCEDURE — 93798 PHYS/QHP OP CAR RHAB W/ECG: CPT

## 2022-12-06 RX ORDER — OXYCODONE AND ACETAMINOPHEN 10; 325 MG/1; MG/1
1 TABLET ORAL EVERY 6 HOURS PRN
Qty: 30 TABLET | Refills: 0 | Status: SHIPPED | OUTPATIENT
Start: 2022-12-06 | End: 2022-12-16

## 2022-12-06 NOTE — TELEPHONE ENCOUNTER
Requested Prescriptions   Pending Prescriptions Disp Refills     oxyCODONE-acetaminophen (PERCOCET)  MG per tablet 30 tablet 0     Sig: Take 1 tablet by mouth every 6 hours as needed for severe pain (7-10) Max of 2 a day         Routing refill request to provider for review/approval because:  Drug not on the [facility], [facility] or [facility] refill protocol or controlled substance

## 2022-12-07 ENCOUNTER — HOSPITAL ENCOUNTER (OUTPATIENT)
Dept: CARDIAC REHAB | Facility: CLINIC | Age: 52
Discharge: HOME | End: 2022-12-07
Attending: STUDENT IN AN ORGANIZED HEALTH CARE EDUCATION/TRAINING PROGRAM
Payer: COMMERCIAL

## 2022-12-07 PROCEDURE — 93798 PHYS/QHP OP CAR RHAB W/ECG: CPT

## 2022-12-10 DIAGNOSIS — F41.9 ANXIETY: ICD-10-CM

## 2022-12-11 DIAGNOSIS — I10 HYPERTENSION, UNSPECIFIED TYPE: ICD-10-CM

## 2022-12-12 ENCOUNTER — HOSPITAL ENCOUNTER (OUTPATIENT)
Dept: CARDIAC REHAB | Facility: CLINIC | Age: 52
Discharge: HOME | End: 2022-12-12
Attending: STUDENT IN AN ORGANIZED HEALTH CARE EDUCATION/TRAINING PROGRAM
Payer: COMMERCIAL

## 2022-12-12 PROCEDURE — 93798 PHYS/QHP OP CAR RHAB W/ECG: CPT

## 2022-12-12 RX ORDER — GABAPENTIN 800 MG/1
TABLET ORAL
Qty: 90 TABLET | Refills: 3 | Status: SHIPPED | OUTPATIENT
Start: 2022-12-12 | End: 2023-04-10

## 2022-12-12 NOTE — TELEPHONE ENCOUNTER
Pending Prescriptions:                       Disp   Refills    gabapentin (NEURONTIN) 800 MG tablet [Phar*90 tab*3        Sig: TAKE ONE TABLET BY MOUTH THREE TIMES A DAY    Routing refill request to provider for review/approval because:  Drug not on the [facility] refill protocol   Requested Prescriptions   Pending Prescriptions Disp Refills    gabapentin (NEURONTIN) 800 MG tablet [Pharmacy Med Name: GABAPENTIN 800MG TABS] 90 tablet 3     Sig: TAKE ONE TABLET BY MOUTH THREE TIMES A DAY       There is no refill protocol information for this order

## 2022-12-13 ENCOUNTER — NURSE TRIAGE (OUTPATIENT)
Dept: FAMILY MEDICINE | Facility: CLINIC | Age: 52
End: 2022-12-13

## 2022-12-13 NOTE — TELEPHONE ENCOUNTER
Patient calling to report worsening reflux the past 4 days. Patient lisa an appointment on 12/16/22.    AYO Sawyer, RN      Reason for Disposition    Nausea is a chronic symptom (recurrent or ongoing AND present > 4 weeks)    Additional Information    Negative: Shock suspected (e.g., cold/pale/clammy skin, too weak to stand, low BP, rapid pulse)    Negative: Sounds like a life-threatening emergency to the triager    Negative: Nausea or vomiting and pregnancy < 20 weeks    Negative: Menstrual Period - Missed or Late (i.e., pregnancy suspected)    Negative: Heat exhaustion suspected (i.e., dehydration from heat exposure)    Negative: Anxiety or stress suspected (i.e., nausea with anxiety attacks or stressful situations)    Negative: Traumatic Brain Injury (TBI) suspected    Negative: Vomiting occurs    Negative: Other symptom is present, see that guideline.  (e.g., chest pain, headache, dizziness, abdominal pain, colds, sore throat, etc.).    Negative: Unable to walk, or can only walk with assistance (e.g., requires support)    Negative: Difficulty breathing    Negative: Insulin-dependent diabetes (Type I) and glucose > 400 mg/dL (22 mmol/L)    Negative: Drinking very little and dehydration suspected (e.g., no urine > 12 hours, very dry mouth, very lightheaded)    Negative: Patient sounds very sick or weak to the triager    Negative: Fever > 104 F  (40 C)    Negative: Fever > 101 F  (38.3 C) and over 60 years of age    Negative: Fever > 100.0 F  (37.8 C) and bedridden (e.g., nursing home patient, CVA, chronic illness, recovering from surgery)    Negative: Fever > 100.0 F  (37.8 C) and diabetes mellitus or weak immune system (e.g., HIV positive, cancer chemo, splenectomy, chronic steroids)    Negative: Taking any of the following medications: digoxin (Lanoxin), lithium, theophylline, phenytoin (Dilantin)    Negative: Yellowish color of the skin or white of the eye (i.e., jaundice)    Negative: Fever present > 3  "days (72 hours)    Negative: Receiving cancer chemotherapy medication    Negative: Taking prescription medication that could cause nausea (e.g., narcotics/opiates, antibiotics, OCPs, many others)    Negative: Nausea lasts > 1 week    Negative: Substance use (drug use) or unhealthy alcohol use, known or suspected    Answer Assessment - Initial Assessment Questions  1. NAUSEA SEVERITY: \"How bad is the nausea?\" (e.g., mild, moderate, severe; dehydration, weight loss)    - MILD: loss of appetite without change in eating habits    - MODERATE: decreased oral intake without significant weight loss, dehydration, or malnutrition    - SEVERE: inadequate caloric or fluid intake, significant weight loss, symptoms of dehydration      Mild  2. ONSET: \"When did the nausea begin?\"      The last 4 days  3. VOMITING: \"Any vomiting?\" If Yes, ask: \"How many times today?\"      NO  4. RECURRENT SYMPTOM: \"Have you had nausea before?\" If Yes, ask: \"When was the last time?\" \"What happened that time?\"      Yes, but it is worsening with eating  5. CAUSE: \"What do you think is causing the nausea?\"      Relux  6. PREGNANCY: \"Is there any chance you are pregnant?\" (e.g., unprotected intercourse, missed birth control pill, broken condom)      No    Protocols used: NAUSEA-A-OH      "

## 2022-12-14 ENCOUNTER — HOSPITAL ENCOUNTER (OUTPATIENT)
Dept: CARDIAC REHAB | Facility: CLINIC | Age: 52
Discharge: HOME | End: 2022-12-14
Attending: STUDENT IN AN ORGANIZED HEALTH CARE EDUCATION/TRAINING PROGRAM
Payer: COMMERCIAL

## 2022-12-14 PROCEDURE — 93798 PHYS/QHP OP CAR RHAB W/ECG: CPT

## 2022-12-14 RX ORDER — LISINOPRIL 5 MG/1
TABLET ORAL
Qty: 90 TABLET | Refills: 1 | Status: SHIPPED | OUTPATIENT
Start: 2022-12-14 | End: 2023-06-19

## 2022-12-15 ENCOUNTER — LAB (OUTPATIENT)
Dept: LAB | Facility: CLINIC | Age: 52
End: 2022-12-15
Payer: COMMERCIAL

## 2022-12-15 ENCOUNTER — ANTICOAGULATION THERAPY VISIT (OUTPATIENT)
Dept: ANTICOAGULATION | Facility: CLINIC | Age: 52
End: 2022-12-15

## 2022-12-15 DIAGNOSIS — Z79.01 LONG TERM CURRENT USE OF ANTICOAGULANT THERAPY: ICD-10-CM

## 2022-12-15 DIAGNOSIS — I82.4Y9 DEEP VEIN THROMBOSIS (DVT) OF PROXIMAL LOWER EXTREMITY, UNSPECIFIED CHRONICITY, UNSPECIFIED LATERALITY (H): ICD-10-CM

## 2022-12-15 DIAGNOSIS — D68.61 ANTIPHOSPHOLIPID SYNDROME (H): ICD-10-CM

## 2022-12-15 DIAGNOSIS — Z79.01 LONG TERM CURRENT USE OF ANTICOAGULANT THERAPY: Primary | ICD-10-CM

## 2022-12-15 LAB — INR BLD: 3.3 (ref 0.9–1.1)

## 2022-12-15 PROCEDURE — 36415 COLL VENOUS BLD VENIPUNCTURE: CPT

## 2022-12-15 PROCEDURE — 85610 PROTHROMBIN TIME: CPT

## 2022-12-15 NOTE — PROGRESS NOTES
ANTICOAGULATION MANAGEMENT     Hollis Diggs 52 year old male is on warfarin with supratherapeutic INR result. (Goal INR 2.0-3.0)    Recent labs: (last 7 days)     12/15/22  1617   INR 3.3*       ASSESSMENT       Source(s): Chart Review    Previous INR was Therapeutic last visit; previously outside of goal range    Medication, diet, health changes since last INR chart reviewed; none identified - has had more acid reflux, appt scheduled 12/16 to discuss           PLAN     Recommended plan for no diet, medication or health factor changes affecting INR     Dosing Instructions: partial hold then continue your current warfarin dose with next INR in 2 weeks       Summary  As of 12/15/2022    Full warfarin instructions:  12/15: 5 mg; Otherwise 7.5 mg every day; Starting 12/15/2022   Next INR check:  12/29/2022             Detailed voice message left for Hollis with dosing instructions and follow up date.     Contact [phone]  to schedule and with any changes, questions or concerns.     Education provided:     Please call back if any changes to your diet, medications or how you've been taking warfarin    Plan made per [facility] anticoagulation protocol    Jesse Flores RN  Anticoagulation Clinic  12/15/2022    _______________________________________________________________________     Anticoagulation Episode Summary     Current INR goal:  2.0-3.0   TTR:  38.0 % (1 y)   Target end date:  Indefinite   Send INR reminders to:  AV ANTONIO    Indications    DVT (deep venous thrombosis) (H) (Resolved) [I82.409]  Long-term (current) use of anticoagulants [Z79.01] [Z79.01]  Deep vein thrombosis (DVT) of proximal lower extremity  unspecified chronicity  unspecified laterality (H) [I82.4Y9]  Antiphospholipid syndrome (H) [D68.61]           Comments:           Anticoagulation Care Providers     Provider Role Specialty Phone number    Sylvester Cash MD [facility] [phone]

## 2022-12-16 ENCOUNTER — VIRTUAL VISIT (OUTPATIENT)
Dept: FAMILY MEDICINE | Facility: CLINIC | Age: 52
End: 2022-12-16
Payer: COMMERCIAL

## 2022-12-16 ENCOUNTER — HOSPITAL ENCOUNTER (OUTPATIENT)
Dept: CARDIAC REHAB | Facility: CLINIC | Age: 52
Discharge: HOME | End: 2022-12-16
Attending: STUDENT IN AN ORGANIZED HEALTH CARE EDUCATION/TRAINING PROGRAM
Payer: COMMERCIAL

## 2022-12-16 DIAGNOSIS — K21.9 GASTROESOPHAGEAL REFLUX DISEASE WITHOUT ESOPHAGITIS: Primary | ICD-10-CM

## 2022-12-16 DIAGNOSIS — M54.16 LUMBAR RADICULOPATHY: ICD-10-CM

## 2022-12-16 PROCEDURE — 93798 PHYS/QHP OP CAR RHAB W/ECG: CPT

## 2022-12-16 PROCEDURE — 99214 OFFICE O/P EST MOD 30 MIN: CPT | Mod: 95 | Performed by: FAMILY MEDICINE

## 2022-12-16 RX ORDER — SUCRALFATE 1 G/1
1 TABLET ORAL 4 TIMES DAILY
Qty: 40 TABLET | Refills: 1 | Status: SHIPPED | OUTPATIENT
Start: 2022-12-16

## 2022-12-16 RX ORDER — OXYCODONE AND ACETAMINOPHEN 10; 325 MG/1; MG/1
1 TABLET ORAL EVERY 6 HOURS PRN
Qty: 60 TABLET | Refills: 0 | Status: SHIPPED | OUTPATIENT
Start: 2022-12-20 | End: 2023-03-17

## 2022-12-16 NOTE — PROGRESS NOTES
Hollis is a 52 year old who is being evaluated via a billable telephone visit.      What phone number would you like to be contacted at? [phone]  How would you like to obtain your AVS? Christopher    Distant Location (provider location):      Assessment & Plan     Gastroesophageal reflux disease without esophagitis  He is continuing to take Protonix 40 mg a day.  Put him on Carafate.  Told him not to take this to let it dissolve in some water and drink it up to 4 times a day on an empty stomach and with no other pills.'s are difficult but he will go with this for like 10 days see if this helps.  If not may have to consider another EGD.  He denies any dark stools.  He also needs to cut back on caffeine very heavy use.  - sucralfate (CARAFATE) 1 GM tablet; Take 1 tablet (1 g) by mouth 4 times daily    Lumbar radiculopathy  Continues to use Percocet usually just a couple a day and is able to lead a good lifestyle and work with this.  It was renewed with him.  - oxyCODONE-acetaminophen (PERCOCET)  MG per tablet; Take 1 tablet by mouth every 6 hours as needed for severe pain (7-10) Max of 2 a day                 No follow-ups on file.    Sylvester Cash MD  [facility]   Hollis is a 52 year old, presenting for the following health issues:  Gastric Problem  Discussed flareup of below and his chronic use of medications.    HPI     GERD/Heartburn  Onset/Duration: 1 week  Description: burning after eating  Intensity: 9/10  Progression of Symptoms: improving  Accompanying Signs & Symptoms:  Does it feel like food gets stuck or trouble swallowing: No  Nausea: a little   Vomiting (bloody?): No  Abdominal Pain: No  Black-Tarry stools: No  Bloody stools: No  History:  Previous similar episodes: YES- not this bad  Previous ulcers: No  Precipitating factors:   Caffeine use: YES- heavy   Alcohol use: YES- light   NSAID/Aspirin use: No  Tobacco use: No  Worse with no particular food or  drink.  Alleviating factors: None  Therapies tried and outcome:             Lifestyle changes: laying on back at night            Medications: Protonix        Review of Systems   Constitutional, HEENT, cardiovascular, pulmonary, gi and gu systems are negative, except as otherwise noted.      Objective           Vitals:  No vitals were obtained today due to virtual visit.    Physical Exam   healthy, alert and no distress  PSYCH: Alert and oriented times 3; coherent speech, normal   rate and volume, able to articulate logical thoughts, able   to abstract reason, no tangential thoughts, no hallucinations   or delusions  His affect is normal and pleasant  RESP: No cough, no audible wheezing, able to talk in full sentences  Remainder of exam unable to be completed due to telephone visits                Phone call duration: 9 minutes

## 2022-12-18 DIAGNOSIS — F33.0 MAJOR DEPRESSIVE DISORDER, RECURRENT EPISODE, MILD (H): Primary | ICD-10-CM

## 2022-12-19 ENCOUNTER — HOSPITAL ENCOUNTER (OUTPATIENT)
Dept: CARDIAC REHAB | Facility: CLINIC | Age: 52
Discharge: HOME | End: 2022-12-19
Attending: STUDENT IN AN ORGANIZED HEALTH CARE EDUCATION/TRAINING PROGRAM
Payer: COMMERCIAL

## 2022-12-19 PROCEDURE — 93798 PHYS/QHP OP CAR RHAB W/ECG: CPT

## 2022-12-20 RX ORDER — ARIPIPRAZOLE 10 MG/1
TABLET ORAL
Qty: 90 TABLET | Refills: 1 | Status: SHIPPED | OUTPATIENT
Start: 2022-12-20 | End: 2023-07-05

## 2022-12-20 NOTE — TELEPHONE ENCOUNTER
Pending Prescriptions:                       Disp   Refills    ARIPiprazole (ABILIFY) 10 MG tablet [Pharm*90 tab*1        Sig: TAKE ONE TABLET BY MOUTH ONCE DAILY    Routing refill request to provider for review/approval because:  Medication is reported/historical

## 2022-12-21 ENCOUNTER — HOSPITAL ENCOUNTER (OUTPATIENT)
Dept: CARDIAC REHAB | Facility: CLINIC | Age: 52
Discharge: HOME | End: 2022-12-21
Attending: STUDENT IN AN ORGANIZED HEALTH CARE EDUCATION/TRAINING PROGRAM
Payer: COMMERCIAL

## 2022-12-21 PROCEDURE — 93798 PHYS/QHP OP CAR RHAB W/ECG: CPT

## 2022-12-24 DIAGNOSIS — F33.0 MAJOR DEPRESSIVE DISORDER, RECURRENT EPISODE, MILD (H): ICD-10-CM

## 2022-12-26 ENCOUNTER — HOSPITAL ENCOUNTER (OUTPATIENT)
Dept: CARDIAC REHAB | Facility: CLINIC | Age: 52
Discharge: HOME | End: 2022-12-26
Attending: STUDENT IN AN ORGANIZED HEALTH CARE EDUCATION/TRAINING PROGRAM
Payer: COMMERCIAL

## 2022-12-26 PROCEDURE — 93798 PHYS/QHP OP CAR RHAB W/ECG: CPT

## 2022-12-26 RX ORDER — DULOXETIN HYDROCHLORIDE 60 MG/1
CAPSULE, DELAYED RELEASE ORAL
Qty: 60 CAPSULE | Refills: 0 | Status: SHIPPED | OUTPATIENT
Start: 2022-12-26 | End: 2023-02-02

## 2022-12-26 NOTE — TELEPHONE ENCOUNTER
"Pending Prescriptions:                       Disp   Refills    DULoxetine (CYMBALTA) 60 MG capsule [Pharm*180 ca*1        Sig: TAKE TWO CAPSULES BY MOUTH EVERY DAY    Routing refill request to provider for review/approval because:  PHQ-9 score:    PHQ 11/17/2022   PHQ-9 Total Score 10   Q9: Thoughts of better off dead/self-harm past 2 weeks Not at all   PHQ-9 External Data -                 Requested Prescriptions   Pending Prescriptions Disp Refills    DULoxetine (CYMBALTA) 60 MG capsule [Pharmacy Med Name: DULOXETINE HCL 60MG CPEP] 180 capsule 1     Sig: TAKE TWO CAPSULES BY MOUTH EVERY DAY       Serotonin-Norepinephrine Reuptake Inhibitors  Failed - 12/24/2022  5:06 AM        Failed - PHQ-9 score of less than 5 in past 6 months     Please review last PHQ-9 score.           Passed - Blood pressure under 140/90 in past 12 months     BP Readings from Last 3 Encounters:   11/17/22 102/62   11/09/22 114/72   10/11/22 136/84                 Passed - Medication is active on med list        Passed - Patient is age 18 or older        Passed - Recent (6 mo) or future (30 days) visit within the authorizing provider's specialty     Patient had office visit in the last 6 months or has a visit in the next 30 days with authorizing provider or within the authorizing provider's specialty.  See \"Patient Info\" tab in inbasket, or \"Choose Columns\" in Meds & Orders section of the refill encounter.                       "

## 2022-12-28 ENCOUNTER — HOSPITAL ENCOUNTER (EMERGENCY)
Facility: CLINIC | Age: 52
Discharge: HOME | End: 2022-12-28
Attending: EMERGENCY MEDICINE | Admitting: EMERGENCY MEDICINE
Payer: COMMERCIAL

## 2022-12-28 ENCOUNTER — HOSPITAL ENCOUNTER (OUTPATIENT)
Dept: CARDIAC REHAB | Facility: CLINIC | Age: 52
Discharge: HOME | End: 2022-12-28
Attending: STUDENT IN AN ORGANIZED HEALTH CARE EDUCATION/TRAINING PROGRAM
Payer: COMMERCIAL

## 2022-12-28 VITALS
TEMPERATURE: 98.3 F | DIASTOLIC BLOOD PRESSURE: 69 MMHG | HEART RATE: 70 BPM | SYSTOLIC BLOOD PRESSURE: 125 MMHG | OXYGEN SATURATION: 93 % | WEIGHT: 250.3 LBS | RESPIRATION RATE: 12 BRPM | BODY MASS INDEX: 33.53 KG/M2

## 2022-12-28 DIAGNOSIS — R10.13 EPIGASTRIC PAIN: ICD-10-CM

## 2022-12-28 DIAGNOSIS — R07.9 LEFT-SIDED CHEST PAIN: ICD-10-CM

## 2022-12-28 DIAGNOSIS — M25.512 ACUTE PAIN OF LEFT SHOULDER: ICD-10-CM

## 2022-12-28 LAB
ALBUMIN SERPL-MCNC: 3.6 G/DL (ref 3.4–5)
ALP SERPL-CCNC: 76 U/L (ref 40–150)
ALT SERPL W P-5'-P-CCNC: 30 U/L (ref 0–70)
ANION GAP SERPL CALCULATED.3IONS-SCNC: 6 MMOL/L (ref 3–14)
AST SERPL W P-5'-P-CCNC: 25 U/L (ref 0–45)
BASOPHILS # BLD AUTO: 0 10E3/UL (ref 0–0.2)
BASOPHILS NFR BLD AUTO: 1 %
BILIRUB DIRECT SERPL-MCNC: <0.1 MG/DL (ref 0–0.2)
BILIRUB SERPL-MCNC: 0.4 MG/DL (ref 0.2–1.3)
BUN SERPL-MCNC: 9 MG/DL (ref 7–30)
CALCIUM SERPL-MCNC: 8.9 MG/DL (ref 8.5–10.1)
CHLORIDE BLD-SCNC: 106 MMOL/L (ref 94–109)
CO2 SERPL-SCNC: 29 MMOL/L (ref 20–32)
CREAT SERPL-MCNC: 1.05 MG/DL (ref 0.66–1.25)
D DIMER PPP FEU-MCNC: 0.28 UG/ML FEU (ref 0–0.5)
EOSINOPHIL # BLD AUTO: 0.1 10E3/UL (ref 0–0.7)
EOSINOPHIL NFR BLD AUTO: 2 %
ERYTHROCYTE [DISTWIDTH] IN BLOOD BY AUTOMATED COUNT: 12.4 % (ref 10–15)
GFR SERPL CREATININE-BSD FRML MDRD: 85 ML/MIN/1.73M2
GLUCOSE BLD-MCNC: 103 MG/DL (ref 70–99)
HCT VFR BLD AUTO: 43.1 % (ref 40–53)
HGB BLD-MCNC: 13.8 G/DL (ref 13.3–17.7)
IMM GRANULOCYTES # BLD: 0 10E3/UL
IMM GRANULOCYTES NFR BLD: 0 %
LIPASE SERPL-CCNC: 123 U/L (ref 73–393)
LYMPHOCYTES # BLD AUTO: 1.5 10E3/UL (ref 0.8–5.3)
LYMPHOCYTES NFR BLD AUTO: 24 %
MCH RBC QN AUTO: 26.2 PG (ref 26.5–33)
MCHC RBC AUTO-ENTMCNC: 32 G/DL (ref 31.5–36.5)
MCV RBC AUTO: 82 FL (ref 78–100)
MONOCYTES # BLD AUTO: 0.6 10E3/UL (ref 0–1.3)
MONOCYTES NFR BLD AUTO: 9 %
NEUTROPHILS # BLD AUTO: 4.1 10E3/UL (ref 1.6–8.3)
NEUTROPHILS NFR BLD AUTO: 64 %
NRBC # BLD AUTO: 0 10E3/UL
NRBC BLD AUTO-RTO: 0 /100
PLATELET # BLD AUTO: 242 10E3/UL (ref 150–450)
POTASSIUM BLD-SCNC: 3.6 MMOL/L (ref 3.4–5.3)
PROT SERPL-MCNC: 6.9 G/DL (ref 6.8–8.8)
RBC # BLD AUTO: 5.27 10E6/UL (ref 4.4–5.9)
SODIUM SERPL-SCNC: 141 MMOL/L (ref 133–144)
TROPONIN I SERPL HS-MCNC: 10 NG/L
TROPONIN I SERPL HS-MCNC: 8 NG/L
WBC # BLD AUTO: 6.4 10E3/UL (ref 4–11)

## 2022-12-28 PROCEDURE — 83690 ASSAY OF LIPASE: CPT | Performed by: EMERGENCY MEDICINE

## 2022-12-28 PROCEDURE — 93798 PHYS/QHP OP CAR RHAB W/ECG: CPT

## 2022-12-28 PROCEDURE — 99285 EMERGENCY DEPT VISIT HI MDM: CPT | Mod: 25 | Performed by: EMERGENCY MEDICINE

## 2022-12-28 PROCEDURE — 250N000011 HC RX IP 250 OP 636: Performed by: EMERGENCY MEDICINE

## 2022-12-28 PROCEDURE — 36415 COLL VENOUS BLD VENIPUNCTURE: CPT | Performed by: EMERGENCY MEDICINE

## 2022-12-28 PROCEDURE — 84484 ASSAY OF TROPONIN QUANT: CPT | Mod: 91 | Performed by: EMERGENCY MEDICINE

## 2022-12-28 PROCEDURE — 93010 ELECTROCARDIOGRAM REPORT: CPT | Performed by: EMERGENCY MEDICINE

## 2022-12-28 PROCEDURE — 85025 COMPLETE CBC W/AUTO DIFF WBC: CPT | Performed by: EMERGENCY MEDICINE

## 2022-12-28 PROCEDURE — 96374 THER/PROPH/DIAG INJ IV PUSH: CPT | Performed by: EMERGENCY MEDICINE

## 2022-12-28 PROCEDURE — 84484 ASSAY OF TROPONIN QUANT: CPT | Performed by: EMERGENCY MEDICINE

## 2022-12-28 PROCEDURE — 93005 ELECTROCARDIOGRAM TRACING: CPT | Performed by: EMERGENCY MEDICINE

## 2022-12-28 PROCEDURE — 82310 ASSAY OF CALCIUM: CPT | Performed by: EMERGENCY MEDICINE

## 2022-12-28 PROCEDURE — 250N000009 HC RX 250: Performed by: EMERGENCY MEDICINE

## 2022-12-28 PROCEDURE — 250N000013 HC RX MED GY IP 250 OP 250 PS 637: Performed by: EMERGENCY MEDICINE

## 2022-12-28 PROCEDURE — 85379 FIBRIN DEGRADATION QUANT: CPT | Performed by: EMERGENCY MEDICINE

## 2022-12-28 PROCEDURE — 82248 BILIRUBIN DIRECT: CPT | Performed by: EMERGENCY MEDICINE

## 2022-12-28 RX ORDER — MORPHINE SULFATE 4 MG/ML
4 INJECTION, SOLUTION INTRAMUSCULAR; INTRAVENOUS
Status: COMPLETED | OUTPATIENT
Start: 2022-12-28 | End: 2022-12-28

## 2022-12-28 RX ADMIN — ALUMINUM HYDROXIDE, MAGNESIUM HYDROXIDE, AND SIMETHICONE 30 ML: 200; 200; 20 SUSPENSION ORAL at 11:18

## 2022-12-28 RX ADMIN — NITROGLYCERIN 15 MG: 20 OINTMENT TOPICAL at 10:47

## 2022-12-28 RX ADMIN — MORPHINE SULFATE 4 MG: 4 INJECTION, SOLUTION INTRAMUSCULAR; INTRAVENOUS at 11:30

## 2022-12-28 ASSESSMENT — ACTIVITIES OF DAILY LIVING (ADL)
ADLS_ACUITY_SCORE: 35
ADLS_ACUITY_SCORE: 35

## 2022-12-28 NOTE — DISCHARGE INSTRUCTIONS
Your troponin is normal on both checks.    I suspect that this pain most likely is from your hiatal hernia/GI tract.    Continue your pantoprazole/Protonix.    Follow-up with your cardiology group as scheduled, continue your cardiac rehab.    If at anytime you have any significant worsening, changes or concerns return at any time.    I hope that you have a wonderful new year!!

## 2022-12-28 NOTE — ED TRIAGE NOTES
He has epigastric pain that is severe and started a half hour ago.  He has a history of cardiac stent and hiatal hernia.

## 2022-12-28 NOTE — LETTER
December 28, 2022      To Whom It May Concern:      Hollis Diggs was seen in our Emergency Department today, 12/28/22.  Please excuse him for the rest of the day.  He should continue to be excused for his cardiac rehab.      Sincerely,        Komal Krishnamurthy MD

## 2022-12-29 ENCOUNTER — DOCUMENTATION ONLY (OUTPATIENT)
Dept: ANTICOAGULATION | Facility: CLINIC | Age: 52
End: 2022-12-29

## 2022-12-29 NOTE — ED PROVIDER NOTES
History     Chief Complaint   Patient presents with     Abdominal Pain     Chest Pain     HPI  History per patient, medical records    This is a 52-year-old male, history of coronary artery disease status post stenting 10/11/2022, anxiety/depression, antiphospholipid syndrome/DVT on Coumadin, RA, GERD, hiatal hernia, presenting with abdominal and chest pain.  Patient states that he went to work early this morning and felt well.  He went to cardiac rehab and completed the therapy without issues.  He returned to work and ate a sandwich and had acute onset of epigastric pain, left-sided chest pain with radiation into his left shoulder.  He notes that it hurts to breathe.  He had a little cough in the morning but no fevers or chills.  His pain is reportedly exactly like his pain prior to his catheterization procedure.  He does have known history of hiatal hernia and is on pantoprazole and recently was started on sucralfate.  No blackness of blood in his stools.  No nausea or vomiting.  No weakness or dizziness.  He left work because of the pain.    Allergies:  Allergies   Allergen Reactions     No Known Drug Allergies        Problem List:    Patient Active Problem List    Diagnosis Date Noted     Status post coronary angiogram 10/11/2022     Priority: Medium     Hiatal hernia 09/02/2022     Priority: Medium     Other iron deficiency anemia 05/26/2022     Priority: Medium     Other acute gastritis, presence of bleeding unspecified 05/26/2022     Priority: Medium     Hypertension, unspecified type 02/14/2022     Priority: Medium     HILTON (dyspnea on exertion) 02/14/2022     Priority: Medium     Chronic, continuous use of opioids 05/14/2021     Priority: Medium     Deep vein thrombosis (DVT) of proximal lower extremity, unspecified chronicity, unspecified laterality (H) 04/28/2021     Priority: Medium     Gastroesophageal reflux disease without esophagitis 04/26/2021     Priority: Medium     Antiphospholipid syndrome (H)  03/06/2021     Priority: Medium     Suicidal behavior 06/22/2020     Priority: Medium     Class 1 obesity due to excess calories without serious comorbidity with body mass index (BMI) of 33.0 to 33.9 in adult 01/08/2020     Priority: Medium     Long-term use of high-risk medication 05/12/2017     Priority: Medium     History of deep venous thrombosis 04/14/2017     Priority: Medium     DDD (degenerative disc disease), lumbar 04/14/2017     Priority: Medium     On prednisone therapy 07/27/2016     Priority: Medium     Seronegative rheumatoid arthritis (H) 06/28/2016     Priority: Medium     Long-term (current) use of anticoagulants [Z79.01] 03/16/2016     Priority: Medium     Rheumatoid arthritis of multiple sites with negative rheumatoid factor (H) 12/07/2015     Priority: Medium     Midline low back pain, with sciatica presence unspecified 10/14/2015     Priority: Medium     Major depressive disorder, recurrent episode, mild (H) 10/07/2015     Priority: Medium     Pain in joint, multiple sites 03/20/2015     Priority: Medium     Anxiety state 02/10/2015     Priority: Medium     Problem list name updated by automated process. Provider to review       CARDIOVASCULAR SCREENING; LDL GOAL LESS THAN 160 01/15/2013     Priority: Medium     Alopecia 02/19/2010     Priority: Medium     Ingrown toenail 08/18/2009     Priority: Medium     Anxiety 07/09/2008     Priority: Medium     Abdominal pain, epigastric 01/25/2006     Priority: Medium        Past Medical History:    Past Medical History:   Diagnosis Date     Antiphospholipid syndrome (H)      Arthritis      Asthma      blood clot in leg      Depressive disorder, not elsewhere classified      ANKIT (generalised anxiety disorder)      HH (hiatus hernia)      Hypercholesteremia      Lumbar disc herniation 1992     Seronegative rheumatoid arthritis (H)        Past Surgical History:    Past Surgical History:   Procedure Laterality Date     APPENDECTOMY       COLONOSCOPY N/A  8/2/2016    Procedure: COMBINED COLONOSCOPY, SINGLE OR MULTIPLE BIOPSY/POLYPECTOMY BY BIOPSY;  Surgeon: Sydnee Walton MD;  Location: [facility]     COLONOSCOPY N/A 5/3/2022    Procedure: COLONOSCOPY;  Surgeon: Jose A Hurt MD;  Location: [facility]     COLONOSCOPY WITH CO2 INSUFFLATION N/A 8/2/2016    Procedure: COLONOSCOPY WITH CO2 INSUFFLATION;  Surgeon: Sydnee Walton MD;  Location: [facility]     COMBINED ESOPHAGOSCOPY, GASTROSCOPY, DUODENOSCOPY (EGD) WITH CO2 INSUFFLATION N/A 8/2/2016    Procedure: COMBINED ESOPHAGOSCOPY, GASTROSCOPY, DUODENOSCOPY (EGD) WITH CO2 INSUFFLATION;  Surgeon: Sydnee Walton MD;  Location: [facility]     COMBINED ESOPHAGOSCOPY, GASTROSCOPY, DUODENOSCOPY (EGD) WITH CO2 INSUFFLATION N/A 5/27/2021    Procedure: ESOPHAGOGASTRODUODENOSCOPY, WITH CO2 INSUFFLATION;  Surgeon: Alis Cotton DO;  Location: [facility]     CV CORONARY ANGIOGRAM N/A 10/11/2022    Procedure: Coronary Angiogram;  Surgeon: Hollis Avila MD;  Location: [facility] CARDIAC CATH LAB     CV INSTANTANEOUS WAVE-FREE RATIO N/A 10/11/2022    Procedure: Instantaneous Wave-Free Ratio;  Surgeon: Hollis Avila MD;  Location: [facility] CARDIAC CATH LAB     CV INTRAVASULAR ULTRASOUND N/A 10/11/2022    Procedure: Intravascular Ultrasound;  Surgeon: Hollis Avila MD;  Location:  [facility]     CV PCI ANGIOPLASTY N/A 10/11/2022    Procedure: Percutaneous Transluminal Angioplasty;  Surgeon: Hollis Avila MD;  Location: [facility] CARDIAC CATH LAB     ESOPHAGOSCOPY, GASTROSCOPY, DUODENOSCOPY (EGD), COMBINED N/A 8/2/2016    Procedure: COMBINED ESOPHAGOSCOPY, GASTROSCOPY, DUODENOSCOPY (EGD), BIOPSY SINGLE OR MULTIPLE;  Surgeon: Sydnee Walton MD;  Location: [facility]     ESOPHAGOSCOPY, GASTROSCOPY, DUODENOSCOPY (EGD), COMBINED N/A 5/27/2021    Procedure: Esophagogastroduodenoscopy, With Biopsy;  Surgeon: Alis Cotton DO;  Location:  OR      "ESOPHAGOSCOPY, GASTROSCOPY, DUODENOSCOPY (EGD), COMBINED N/A 5/3/2022    Procedure: ESOPHAGOGASTRODUODENOSCOPY, WITH BIOPSY;  Surgeon: Jose A Hurt MD;  Location: [facility]     HC REMOVAL OF TONSILS,<10 Y/O      Tonsils <11y.o.     HC REPAIR INCISIONAL HERNIA,REDUCIBLE  1970's    Hernia Repair, Incisional, Unilateral     HC UGI ENDOSCOPY DIAG W BIOPSY  02/01/06     HC VASECTOMY UNILAT/BILAT W POSTOP SEMEN  1/05    Vasectomy     History back lumbar laminectomy       INJECT EPIDURAL LUMBAR Right 4/22/2021    Procedure: Right Lumbar 4-5 and Lumbar 5 - Sacral 1 Epidural Steroid Injection;  Surgeon: Maxwell Zacarias MD;  Location: [facility]     ZZC NONSPECIFIC PROCEDURE  91 or 92    back surgery. lumbar. lamiectomy       Family History:    Family History   Problem Relation Age of Onset     Brain Tumor Mother         Benign     Deep Vein Thrombosis Mother         \"neck\"      Heart Disease Father         \"wont tell anyone\"     Neurologic Disorder Paternal Grandmother         Parkinsons      Alcohol/Drug Paternal Grandfather         alcoholic     Cancer Maternal Grandfather         lung     Diabetes Maternal Grandmother      Cerebrovascular Disease Maternal Grandmother         tim age ~70     Arthritis Cousin         cousins x 2 \"RA\"     Musculoskeletal Disorder Maternal Aunt         MS     Family History Negative Other         psoriasis, crohns, UC, SLE       Social History:  Marital Status:   [2]  Social History     Tobacco Use     Smoking status: Never     Smokeless tobacco: Never   Vaping Use     Vaping Use: Never used   Substance Use Topics     Alcohol use: Yes     Comment: rare     Drug use: No        Medications:    ARIPiprazole (ABILIFY) 10 MG tablet  aspirin (ASA) 81 MG chewable tablet  clopidogrel (PLAVIX) 75 MG tablet  DULoxetine (CYMBALTA) 60 MG capsule  gabapentin (NEURONTIN) 800 MG tablet  lisinopril (ZESTRIL) 5 MG tablet  nitroGLYcerin (NITROSTAT) 0.4 MG sublingual tablet  oxyCODONE-acetaminophen " (PERCOCET)  MG per tablet  pantoprazole (PROTONIX) 40 MG EC tablet  rosuvastatin (CRESTOR) 20 MG tablet  sucralfate (CARAFATE) 1 GM tablet  traZODone (DESYREL) 150 MG tablet  warfarin ANTICOAGULANT (COUMADIN) 5 MG tablet          Review of Systems   All other ROS reviewed and are negative or non-contributory except as stated in HPI.     Physical Exam   BP: (!) 153/97  Pulse: 97  Temp: 98.3  F (36.8  C)  Resp: 20  Weight: 113.5 kg (250 lb 4.8 oz)  SpO2: 98 %      Physical Exam  Vitals and nursing note reviewed.   Constitutional:       Appearance: He is well-developed. He is obese.      Comments: Very pleasant, mildly anxious male sitting in the bed   HENT:      Head: Normocephalic.      Mouth/Throat:      Mouth: Mucous membranes are moist.      Pharynx: Oropharynx is clear.   Eyes:      Extraocular Movements: Extraocular movements intact.      Pupils: Pupils are equal, round, and reactive to light.   Cardiovascular:      Rate and Rhythm: Normal rate and regular rhythm.      Heart sounds: Normal heart sounds.   Pulmonary:      Effort: Pulmonary effort is normal.      Breath sounds: Normal breath sounds.   Abdominal:      Palpations: Abdomen is soft.      Tenderness: There is abdominal tenderness (Mild epigastric).   Musculoskeletal:         General: Normal range of motion.      Cervical back: Normal range of motion and neck supple.      Comments: No pain with left shoulder range of motion   Skin:     General: Skin is warm and dry.   Neurological:      General: No focal deficit present.      Mental Status: He is alert and oriented to person, place, and time.   Psychiatric:         Mood and Affect: Mood normal.         Behavior: Behavior normal.         ED Course (with Medical Decision Making)    Pt seen and examined by me.  RN and EPIC notes reviewed.       Patient with recent cardiac stenting presenting with cute onset of chest pain.  Interestingly, this occurred after he ate a sandwich but did not occur while  he was at cardiac rehab.  He has epigastric pain also.  Possible cardiac versus GI source.    Patient placed on the monitor, IV, EKG.  I am going to try some Nitropaste.    EKG shows sinus rhythm with a rate of 77.  He has an occasional PAC.  Right bundle branch block noted.  This is comparable to EKG done in 10/22.  Read by myself at 1003.    Patient did not note improvement with the nitroglycerin.  He was given a GI cocktail and a small dose of morphine.    Labs unremarkable including CMP, CBC, lipase, troponin, D-dimer.    Patient had improvement in symptoms with the GI cocktail.  Left shoulder pain improved with the morphine.    I did do a 2-hour troponin.  This was unchanged/unremarkable.    I think patient's pain is primarily GI related and not cardiac at this point.  He has close follow-up with his cardiology group.  Continue his current medication regimen.  Continue to monitor symptoms.  He may need an endoscopy.  If he has any significant worsening, changes or concerns return promptly at any time.   Procedures      Results for orders placed or performed during the hospital encounter of 12/28/22 (from the past 24 hour(s))   CBC with Platelets & Differential    Narrative    The following orders were created for panel order CBC with Platelets & Differential.  Procedure                               Abnormality         Status                     ---------                               -----------         ------                     CBC with platelets and d...[[id number]]  Abnormal            Final result                 Please view results for these tests on the individual orders.   Basic metabolic panel   Result Value Ref Range    Sodium 141 133 - 144 mmol/L    Potassium 3.6 3.4 - 5.3 mmol/L    Chloride 106 94 - 109 mmol/L    Carbon Dioxide (CO2) 29 20 - 32 mmol/L    Anion Gap 6 3 - 14 mmol/L    Urea Nitrogen 9 7 - 30 mg/dL    Creatinine 1.05 0.66 - 1.25 mg/dL    Calcium 8.9 8.5 - 10.1 mg/dL    Glucose 103 (H)  70 - 99 mg/dL    GFR Estimate 85 >60 mL/min/1.73m2   Troponin I   Result Value Ref Range    Troponin I High Sensitivity 8 <79 ng/L   CBC with platelets and differential   Result Value Ref Range    WBC Count 6.4 4.0 - 11.0 10e3/uL    RBC Count 5.27 4.40 - 5.90 10e6/uL    Hemoglobin 13.8 13.3 - 17.7 g/dL    Hematocrit 43.1 40.0 - 53.0 %    MCV 82 78 - 100 fL    MCH 26.2 (L) 26.5 - 33.0 pg    MCHC 32.0 31.5 - 36.5 g/dL    RDW 12.4 10.0 - 15.0 %    Platelet Count 242 150 - 450 10e3/uL    % Neutrophils 64 %    % Lymphocytes 24 %    % Monocytes 9 %    % Eosinophils 2 %    % Basophils 1 %    % Immature Granulocytes 0 %    NRBCs per 100 WBC 0 <1 /100    Absolute Neutrophils 4.1 1.6 - 8.3 10e3/uL    Absolute Lymphocytes 1.5 0.8 - 5.3 10e3/uL    Absolute Monocytes 0.6 0.0 - 1.3 10e3/uL    Absolute Eosinophils 0.1 0.0 - 0.7 10e3/uL    Absolute Basophils 0.0 0.0 - 0.2 10e3/uL    Absolute Immature Granulocytes 0.0 <=0.4 10e3/uL    Absolute NRBCs 0.0 10e3/uL   Lipase   Result Value Ref Range    Lipase 123 73 - 393 U/L   Hepatic panel   Result Value Ref Range    Bilirubin Total 0.4 0.2 - 1.3 mg/dL    Bilirubin Direct <0.1 0.0 - 0.2 mg/dL    Protein Total 6.9 6.8 - 8.8 g/dL    Albumin 3.6 3.4 - 5.0 g/dL    Alkaline Phosphatase 76 40 - 150 U/L    AST 25 0 - 45 U/L    ALT 30 0 - 70 U/L   D dimer quantitative   Result Value Ref Range    D-Dimer Quantitative 0.28 0.00 - 0.50 ug/mL FEU    Narrative    This D-dimer assay is intended for use in conjunction with a clinical pretest probability assessment model to exclude pulmonary embolism (PE) and deep venous thrombosis (DVT) in outpatients suspected of PE or DVT. The cut-off value is 0.50 ug/mL FEU.   Troponin I   Result Value Ref Range    Troponin I High Sensitivity 10 <79 ng/L     *Note: Due to a large number of results and/or encounters for the requested time period, some results have not been displayed. A complete set of results can be found in Results Review.       Medications    lidocaine (viscous) (XYLOCAINE) 2 % 15 mL, alum & mag hydroxide-simethicone (MAALOX) 15 mL GI Cocktail (30 mLs Oral Given 12/28/22 1118)   morphine (PF) injection 4 mg (4 mg Intravenous Given 12/28/22 1130)       Assessments & Plan     I have reviewed the findings, diagnosis, plan and need for follow up with the patient.    Discharge Medication List as of 12/28/2022  1:18 PM          Final diagnoses:   Epigastric pain   Left-sided chest pain   Acute pain of left shoulder     Disposition: Patient discharged home in stable condition.  Plan as above.  Return for concerns.     Note: Chart documentation done in part with Dragon Voice Recognition software. Although reviewed after completion, some word and grammatical errors may remain.     12/28/2022   [facility] EMERGENCY DEPT     Komal Krishnamurthy MD  12/29/22 [id number]

## 2022-12-29 NOTE — PROGRESS NOTES
ANTICOAGULATION  MANAGEMENT: Discharge Review    Hollis Diggs chart reviewed for anticoagulation continuity of care    Emergency room visit on 12/28 for Epigastric pain.    Discharge disposition: Home    Results:    No results for input(s): INR, [id number], F2, ALMWH, AAUFH in the last 168 hours.  Anticoagulation inpatient management:     not applicable     Anticoagulation discharge instructions:     Warfarin dosing: home regimen continued   Bridging: No   INR goal change: No      Medication changes affecting anticoagulation: No    Additional factors affecting anticoagulation: No     PLAN     No adjustment to anticoagulation plan needed    Patient not contacted    No adjustment to Anticoagulation Calendar was required    Jesse Flores RN

## 2023-01-01 DIAGNOSIS — K29.00 OTHER ACUTE GASTRITIS, PRESENCE OF BLEEDING UNSPECIFIED: ICD-10-CM

## 2023-01-03 RX ORDER — PANTOPRAZOLE SODIUM 40 MG/1
TABLET, DELAYED RELEASE ORAL
Qty: 30 TABLET | Refills: 8 | Status: SHIPPED | OUTPATIENT
Start: 2023-01-03 | End: 2023-01-05

## 2023-01-04 ENCOUNTER — HOSPITAL ENCOUNTER (OUTPATIENT)
Dept: CARDIAC REHAB | Facility: CLINIC | Age: 53
Discharge: HOME | End: 2023-01-04
Attending: STUDENT IN AN ORGANIZED HEALTH CARE EDUCATION/TRAINING PROGRAM
Payer: COMMERCIAL

## 2023-01-04 PROCEDURE — 93798 PHYS/QHP OP CAR RHAB W/ECG: CPT

## 2023-01-05 ENCOUNTER — ANTICOAGULATION THERAPY VISIT (OUTPATIENT)
Dept: ANTICOAGULATION | Facility: CLINIC | Age: 53
End: 2023-01-05

## 2023-01-05 ENCOUNTER — OFFICE VISIT (OUTPATIENT)
Dept: FAMILY MEDICINE | Facility: CLINIC | Age: 53
End: 2023-01-05
Payer: COMMERCIAL

## 2023-01-05 VITALS
DIASTOLIC BLOOD PRESSURE: 78 MMHG | HEIGHT: 72 IN | HEART RATE: 88 BPM | TEMPERATURE: 97.9 F | BODY MASS INDEX: 33.51 KG/M2 | SYSTOLIC BLOOD PRESSURE: 122 MMHG | OXYGEN SATURATION: 98 % | WEIGHT: 247.38 LBS

## 2023-01-05 DIAGNOSIS — I82.4Y9 DEEP VEIN THROMBOSIS (DVT) OF PROXIMAL LOWER EXTREMITY, UNSPECIFIED CHRONICITY, UNSPECIFIED LATERALITY (H): ICD-10-CM

## 2023-01-05 DIAGNOSIS — M06.00 SERONEGATIVE RHEUMATOID ARTHRITIS (H): ICD-10-CM

## 2023-01-05 DIAGNOSIS — D68.61 ANTIPHOSPHOLIPID SYNDROME (H): ICD-10-CM

## 2023-01-05 DIAGNOSIS — I25.84 CORONARY ATHEROSCLEROSIS DUE TO CALCIFIED CORONARY LESION OF NATIVE ARTERY: ICD-10-CM

## 2023-01-05 DIAGNOSIS — G89.29 CHRONIC LOW BACK PAIN WITHOUT SCIATICA, UNSPECIFIED BACK PAIN LATERALITY: ICD-10-CM

## 2023-01-05 DIAGNOSIS — M54.50 CHRONIC LOW BACK PAIN WITHOUT SCIATICA, UNSPECIFIED BACK PAIN LATERALITY: ICD-10-CM

## 2023-01-05 DIAGNOSIS — M51.369 DDD (DEGENERATIVE DISC DISEASE), LUMBAR: ICD-10-CM

## 2023-01-05 DIAGNOSIS — F33.0 MAJOR DEPRESSIVE DISORDER, RECURRENT EPISODE, MILD (H): ICD-10-CM

## 2023-01-05 DIAGNOSIS — K21.9 GASTROESOPHAGEAL REFLUX DISEASE WITHOUT ESOPHAGITIS: ICD-10-CM

## 2023-01-05 DIAGNOSIS — M54.40 ACUTE MIDLINE LOW BACK PAIN WITH SCIATICA, SCIATICA LATERALITY UNSPECIFIED: ICD-10-CM

## 2023-01-05 DIAGNOSIS — K44.9 HIATAL HERNIA: ICD-10-CM

## 2023-01-05 DIAGNOSIS — I25.10 CORONARY ATHEROSCLEROSIS DUE TO CALCIFIED CORONARY LESION OF NATIVE ARTERY: ICD-10-CM

## 2023-01-05 DIAGNOSIS — F11.90 CHRONIC, CONTINUOUS USE OF OPIOIDS: ICD-10-CM

## 2023-01-05 DIAGNOSIS — Z79.01 LONG TERM CURRENT USE OF ANTICOAGULANT THERAPY: Primary | ICD-10-CM

## 2023-01-05 DIAGNOSIS — Z76.89 ENCOUNTER TO ESTABLISH CARE: Primary | ICD-10-CM

## 2023-01-05 DIAGNOSIS — B07.0 PLANTAR WART: ICD-10-CM

## 2023-01-05 DIAGNOSIS — I10 HYPERTENSION, UNSPECIFIED TYPE: ICD-10-CM

## 2023-01-05 DIAGNOSIS — Z79.01 LONG TERM CURRENT USE OF ANTICOAGULANT THERAPY: ICD-10-CM

## 2023-01-05 LAB — INR BLD: 3.8 (ref 0.9–1.1)

## 2023-01-05 PROCEDURE — 90715 TDAP VACCINE 7 YRS/> IM: CPT | Performed by: STUDENT IN AN ORGANIZED HEALTH CARE EDUCATION/TRAINING PROGRAM

## 2023-01-05 PROCEDURE — 36416 COLLJ CAPILLARY BLOOD SPEC: CPT | Performed by: STUDENT IN AN ORGANIZED HEALTH CARE EDUCATION/TRAINING PROGRAM

## 2023-01-05 PROCEDURE — 90471 IMMUNIZATION ADMIN: CPT | Performed by: STUDENT IN AN ORGANIZED HEALTH CARE EDUCATION/TRAINING PROGRAM

## 2023-01-05 PROCEDURE — 85610 PROTHROMBIN TIME: CPT | Performed by: STUDENT IN AN ORGANIZED HEALTH CARE EDUCATION/TRAINING PROGRAM

## 2023-01-05 PROCEDURE — 99214 OFFICE O/P EST MOD 30 MIN: CPT | Mod: 25 | Performed by: STUDENT IN AN ORGANIZED HEALTH CARE EDUCATION/TRAINING PROGRAM

## 2023-01-05 PROCEDURE — 17110 DESTRUCTION B9 LES UP TO 14: CPT | Performed by: STUDENT IN AN ORGANIZED HEALTH CARE EDUCATION/TRAINING PROGRAM

## 2023-01-05 RX ORDER — CLOPIDOGREL BISULFATE 75 MG/1
75 TABLET ORAL DAILY
Qty: 180 TABLET | Refills: 3 | Status: SHIPPED | OUTPATIENT
Start: 2023-01-05 | End: 2023-12-01

## 2023-01-05 RX ORDER — PANTOPRAZOLE SODIUM 40 MG/1
40 TABLET, DELAYED RELEASE ORAL DAILY
Qty: 90 TABLET | Refills: 1 | Status: SHIPPED | OUTPATIENT
Start: 2023-01-05 | End: 2023-03-17

## 2023-01-05 RX ORDER — ROSUVASTATIN CALCIUM 20 MG/1
20 TABLET, COATED ORAL DAILY
Qty: 90 TABLET | Refills: 3 | Status: SHIPPED | OUTPATIENT
Start: 2023-01-05 | End: 2024-01-15

## 2023-01-05 ASSESSMENT — ANXIETY QUESTIONNAIRES
6. BECOMING EASILY ANNOYED OR IRRITABLE: NEARLY EVERY DAY
7. FEELING AFRAID AS IF SOMETHING AWFUL MIGHT HAPPEN: MORE THAN HALF THE DAYS
GAD7 TOTAL SCORE: 16
8. IF YOU CHECKED OFF ANY PROBLEMS, HOW DIFFICULT HAVE THESE MADE IT FOR YOU TO DO YOUR WORK, TAKE CARE OF THINGS AT HOME, OR GET ALONG WITH OTHER PEOPLE?: VERY DIFFICULT
2. NOT BEING ABLE TO STOP OR CONTROL WORRYING: MORE THAN HALF THE DAYS
GAD7 TOTAL SCORE: 16
1. FEELING NERVOUS, ANXIOUS, OR ON EDGE: MORE THAN HALF THE DAYS
7. FEELING AFRAID AS IF SOMETHING AWFUL MIGHT HAPPEN: MORE THAN HALF THE DAYS
5. BEING SO RESTLESS THAT IT IS HARD TO SIT STILL: MORE THAN HALF THE DAYS
IF YOU CHECKED OFF ANY PROBLEMS ON THIS QUESTIONNAIRE, HOW DIFFICULT HAVE THESE PROBLEMS MADE IT FOR YOU TO DO YOUR WORK, TAKE CARE OF THINGS AT HOME, OR GET ALONG WITH OTHER PEOPLE: VERY DIFFICULT
3. WORRYING TOO MUCH ABOUT DIFFERENT THINGS: MORE THAN HALF THE DAYS
4. TROUBLE RELAXING: NEARLY EVERY DAY

## 2023-01-05 ASSESSMENT — PAIN SCALES - GENERAL: PAINLEVEL: MODERATE PAIN (4)

## 2023-01-05 NOTE — PROGRESS NOTES
ANTICOAGULATION MANAGEMENT     Hollis Diggs 52 year old male is on warfarin with supratherapeutic INR result. (Goal INR 2.0-3.0)    Recent labs: (last 7 days)     01/05/23  0810   INR 3.8*       ASSESSMENT       Source(s): Chart Review    Previous INR was Supratherapeutic    Medication, diet, health changes since last INR chart reviewed; none identified           PLAN     Recommended plan for no diet, medication or health factor changes affecting INR     Dosing Instructions: hold dose then decrease your warfarin dose (9.5% change) with next INR in 10 days       Summary  As of 1/5/2023    Full warfarin instructions:  1/5: Hold; Otherwise 5 mg every Mon, Thu; 7.5 mg all other days   Next INR check:  1/16/2023             Detailed voice message left for Hollis with dosing instructions and follow up date.     Lab visit scheduled    Education provided:     Please call back if any changes to your diet, medications or how you've been taking warfarin    Goal range and lab monitoring: Importance of therapeutic range and Importance of following up at instructed interval    Plan made per [facility] anticoagulation protocol    Jesse Flores, RN  Anticoagulation Clinic  1/5/2023    _______________________________________________________________________     Anticoagulation Episode Summary     Current INR goal:  2.0-3.0   TTR:  36.4 % (1 y)   Target end date:  Indefinite   Send INR reminders to:  TIMOTEO ALVAREZ    Indications    DVT (deep venous thrombosis) (H) (Resolved) [I82.409]  Long-term (current) use of anticoagulants [Z79.01] [Z79.01]  Deep vein thrombosis (DVT) of proximal lower extremity  unspecified chronicity  unspecified laterality (H) [I82.4Y9]  Antiphospholipid syndrome (H) [D68.61]           Comments:           Anticoagulation Care Providers     Provider Role Specialty Phone number    Sylvester Cash MD [facility] [phone]

## 2023-01-05 NOTE — PROGRESS NOTES
Assessment & Plan     Encounter to establish care  History reviewed and updated    Coronary atherosclerosis due to calcified coronary lesion of native artery  Following with cardiology and cardiac rehab.  Continues on Plavix as well as lisinopril and rosuvastatin.   - clopidogrel (PLAVIX) 75 MG tablet  Dispense: 180 tablet; Refill: 3  - rosuvastatin (CRESTOR) 20 MG tablet  Dispense: 90 tablet; Refill: 3    Deep vein thrombosis (DVT) of proximal lower extremity, unspecified chronicity, unspecified laterality (H)  Long-term (current) use of anticoagulants [Z79.01]  Antiphospholipid syndrome (H)  Tolerate medication well.  Continue chronic anticoagulation.    Hypertension, unspecified type  Stable    Gastroesophageal reflux disease without esophagitis  Hiatal hernia  Doing well with pantoprazole now but has had flares.  We discussed lifestyle changes as well as continued pantoprazole use.  Does have interaction with Plavix but less so than other PPIs and needed given his large hiatal hernia.    Major depressive disorder, recurrent episode, mild (H)  Using Cymbalta Abilify and gabapentin for his anxiety.  Reports being stable at this time.  No side effects of medications.  No counseling currently.    Plantar wart  Patient wanting cryotherapy today.  Risk and benefits discussed.  Follow-up in 2 weeks if things not resolved.  - DESTRUCT BENIGN LESION, UP TO 14    Seronegative rheumatoid arthritis (H)  Patient wanting to reestablish with his rheumatologist.  Joint pain stable at this time.  - Adult Rheumatology  Referral    DDD (degenerative disc disease), lumbar  Acute midline low back pain with sciatica, sciatica laterality unspecified  Chronic, continuous use of opioids  On discussion today regarding the use of chronic opiates prescribed by previous provider.  Long-term risks of continued use as well as interactions discussed in detail.  I would not have him continue at current doses if I were to continue  prescribing opiates.  Would also have him start with therapy program and consider following with spine clinic in the future.  Given stability of his symptoms I would not repeat imaging at this time.  He would also need to fill out a controlled substance use contract in the future.  Plan for him to follow-up with physical therapy now.  Would decrease oxycodone down to 5 mg tablets in the future and wean down on dose significantly.  Continue to focus on weight loss and being as active as possible.  Follow-up in 2 months.      Christiano Ledezma MD  [facility]    Zoey Shafer is a 52 year old accompanied by his spouse, presenting for the following health issues:  Recheck Medication and Wart      History of Present Illness       Reason for visit:  Medications and a wart    He eats 0-1 servings of fruits and vegetables daily.He consumes 8 sweetened beverage(s) daily.He exercises with enough effort to increase his heart rate 30 to 60 minutes per day.  He exercises with enough effort to increase his heart rate 4 days per week.   He is taking medications regularly.  Today's ANKIT-7 Score: 16     Hollis is a pleasant 52-year-old male here looking to establish care after the provider is leaving.  Needs medications refilled today.  Most recently dealt with his reflux hiatal hernia that has given him episodic epigastric pain.  Last EGD done in May with 5 cm hernia.  Taking Protonix with good benefit and was on Carafate previously but no longer taking.  Negative cardiac work-up when he was in the ER with a discomfort and does follow with cardiology after recent stents were placed this fall.  Taking Plavix as well as chronic warfarin due to antiphospholipid syndrome and previous DVTs.  Does have diagnosis of seronegative rheumatoid arthritis and would like to follow-up with rheumatology.  He also has degenerative disc disease and is on chronic opiates for his back.  Last saw spine clinic back in  2021 and did do 1 physical therapy session at that time.  Has not followed up with them since.  Weight has been fairly stable for him.  Appetite has been okay.  No chest pain shortness of breath or breathing issues.  Has been doing cardiac rehab with good results.  Does have a wart on his foot that he would like frozen again today.  Was last froze 6 weeks ago.      Review of Systems   Constitutional, HEENT, cardiovascular, pulmonary, gi and gu systems are negative, except as otherwise noted.      Objective    /78   Pulse 88   Temp 97.9  F (36.6  C) (Temporal)   Ht 1.829 m (6')   Wt 112.2 kg (247 lb 6 oz)   SpO2 98%   BMI 33.55 kg/m    Body mass index is 33.55 kg/m .  Physical Exam   GENERAL: healthy, alert and no distress  EYES: Eyes grossly normal to inspection, PERRL and conjunctivae and sclerae normal  RESP: lungs clear to auscultation - no rales, rhonchi or wheezes  CV: regular rate and rhythm, normal S1 S2, no murmur, click or rub, no peripheral edema and peripheral pulses strong  ABDOMEN: soft, nontender, no hepatosplenomegaly, no masses and bowel sounds normal  MS: no gross musculoskeletal defects noted, no edema, right sided paraspinal lumbar tenderness to palpation  SKIN: Plantars wart on lateral left foot  NEURO: Normal strength and tone, mentation intact and speech normal  PSYCH: mentation appears normal, affect normal/bright             Cryotherapy procedure note: After verbal consent and discussion of risks and benefits including but no limited to dyspigmentation/scar, blister, and pain, 1 wart was(were) treated with 1-2mm freeze border for 3 cycles with liquid nitrogen after thick skin was removed with scalpel. Post cryotherapy instructions were provided. Plan for patient to return to clinic in 2 weeks if still present.

## 2023-01-09 ENCOUNTER — HOSPITAL ENCOUNTER (OUTPATIENT)
Dept: CARDIAC REHAB | Facility: CLINIC | Age: 53
Discharge: HOME | End: 2023-01-09
Attending: STUDENT IN AN ORGANIZED HEALTH CARE EDUCATION/TRAINING PROGRAM
Payer: COMMERCIAL

## 2023-01-09 PROCEDURE — 93798 PHYS/QHP OP CAR RHAB W/ECG: CPT

## 2023-01-16 ENCOUNTER — MYC REFILL (OUTPATIENT)
Dept: FAMILY MEDICINE | Facility: CLINIC | Age: 53
End: 2023-01-16

## 2023-01-16 DIAGNOSIS — M54.16 LUMBAR RADICULOPATHY: Primary | ICD-10-CM

## 2023-01-16 RX ORDER — OXYCODONE AND ACETAMINOPHEN 10; 325 MG/1; MG/1
1 TABLET ORAL EVERY 6 HOURS PRN
Qty: 60 TABLET | Refills: 0 | Status: CANCELLED | OUTPATIENT
Start: 2023-01-16

## 2023-01-16 NOTE — TELEPHONE ENCOUNTER
Requested Prescriptions   Pending Prescriptions Disp Refills     oxyCODONE-acetaminophen (PERCOCET)  MG per tablet 60 tablet 0     Sig: Take 1 tablet by mouth every 6 hours as needed for severe pain (7-10) Max of 2 a day       There is no refill protocol information for this order           Routing refill request to provider for review/approval because:  Drug not on the [facility] or [facility] refill protocol or controlled substance

## 2023-01-17 RX ORDER — OXYCODONE AND ACETAMINOPHEN 5; 325 MG/1; MG/1
1 TABLET ORAL EVERY 6 HOURS PRN
Qty: 60 TABLET | Refills: 0 | Status: SHIPPED | OUTPATIENT
Start: 2023-01-17 | End: 2023-02-15

## 2023-01-25 ENCOUNTER — HOSPITAL ENCOUNTER (OUTPATIENT)
Dept: CARDIAC REHAB | Facility: CLINIC | Age: 53
Discharge: HOME | End: 2023-01-25
Attending: STUDENT IN AN ORGANIZED HEALTH CARE EDUCATION/TRAINING PROGRAM
Payer: COMMERCIAL

## 2023-01-25 ENCOUNTER — LAB (OUTPATIENT)
Dept: LAB | Facility: CLINIC | Age: 53
End: 2023-01-25
Payer: COMMERCIAL

## 2023-01-25 ENCOUNTER — HOSPITAL ENCOUNTER (OUTPATIENT)
Dept: PHYSICAL THERAPY | Facility: CLINIC | Age: 53
Setting detail: THERAPIES SERIES
Discharge: HOME | End: 2023-01-25
Attending: STUDENT IN AN ORGANIZED HEALTH CARE EDUCATION/TRAINING PROGRAM
Payer: COMMERCIAL

## 2023-01-25 ENCOUNTER — ANTICOAGULATION THERAPY VISIT (OUTPATIENT)
Dept: ANTICOAGULATION | Facility: CLINIC | Age: 53
End: 2023-01-25

## 2023-01-25 DIAGNOSIS — D68.61 ANTIPHOSPHOLIPID SYNDROME (H): ICD-10-CM

## 2023-01-25 DIAGNOSIS — Z79.01 LONG TERM CURRENT USE OF ANTICOAGULANT THERAPY: Primary | ICD-10-CM

## 2023-01-25 DIAGNOSIS — I82.4Y9 DEEP VEIN THROMBOSIS (DVT) OF PROXIMAL LOWER EXTREMITY, UNSPECIFIED CHRONICITY, UNSPECIFIED LATERALITY (H): ICD-10-CM

## 2023-01-25 LAB — INR BLD: 2.7 (ref 0.9–1.1)

## 2023-01-25 PROCEDURE — 97110 THERAPEUTIC EXERCISES: CPT | Mod: GP,59 | Performed by: PHYSICAL THERAPIST

## 2023-01-25 PROCEDURE — 97161 PT EVAL LOW COMPLEX 20 MIN: CPT | Mod: GP | Performed by: PHYSICAL THERAPIST

## 2023-01-25 PROCEDURE — 36416 COLLJ CAPILLARY BLOOD SPEC: CPT

## 2023-01-25 PROCEDURE — 97010 HOT OR COLD PACKS THERAPY: CPT | Mod: GP | Performed by: PHYSICAL THERAPIST

## 2023-01-25 PROCEDURE — 85610 PROTHROMBIN TIME: CPT

## 2023-01-25 PROCEDURE — 93798 PHYS/QHP OP CAR RHAB W/ECG: CPT

## 2023-01-25 PROCEDURE — 97014 ELECTRIC STIMULATION THERAPY: CPT | Mod: GP | Performed by: PHYSICAL THERAPIST

## 2023-01-25 NOTE — PROGRESS NOTES
ANTICOAGULATION MANAGEMENT     Hollis Diggs 52 year old male is on warfarin with therapeutic INR result. (Goal INR 2.0-3.0)    Recent labs: (last 7 days)     01/25/23  1330   INR 2.7*       ASSESSMENT       Source(s): Chart Review       Warfarin doses taken: Reviewed in chart    Diet: No new diet changes identified    New illness, injury, or hospitalization: No    Medication/supplement changes: None noted    Signs or symptoms of bleeding or clotting: No    Previous INR: Supratherapeutic    Additional findings: None       PLAN     Recommended plan for no diet, medication or health factor changes affecting INR     Dosing Instructions: Continue your current warfarin dose with next INR in 3 weeks       Summary  As of 1/25/2023    Full warfarin instructions:  5 mg every Mon, Thu; 7.5 mg all other days   Next INR check:  2/15/2023             Detailed voice message left for Hollis with dosing instructions and follow up date.     Contact [phone]  to schedule and with any changes, questions or concerns.     Education provided:     Please call back if any changes to your diet, medications or how you've been taking warfarin    Plan made per [facility] anticoagulation protocol    Анна Hale, RN  Anticoagulation Clinic  1/25/2023    _______________________________________________________________________     Anticoagulation Episode Summary     Current INR goal:  2.0-3.0   TTR:  37.9 % (1 y)   Target end date:  Indefinite   Send INR reminders to:  TIMOTEO ALVAREZ    Indications    DVT (deep venous thrombosis) (H) (Resolved) [I82.409]  Long-term (current) use of anticoagulants [Z79.01] [Z79.01]  Deep vein thrombosis (DVT) of proximal lower extremity  unspecified chronicity  unspecified laterality (H) [I82.4Y9]  Antiphospholipid syndrome (H) [D68.61]           Comments:           Anticoagulation Care Providers     Provider Role Specialty Phone number    Sylvester Cash MD [facility] [phone]

## 2023-01-29 DIAGNOSIS — F33.0 MAJOR DEPRESSIVE DISORDER, RECURRENT EPISODE, MILD (H): ICD-10-CM

## 2023-01-30 ENCOUNTER — HOSPITAL ENCOUNTER (OUTPATIENT)
Dept: CARDIAC REHAB | Facility: CLINIC | Age: 53
Discharge: HOME | End: 2023-01-30
Attending: STUDENT IN AN ORGANIZED HEALTH CARE EDUCATION/TRAINING PROGRAM
Payer: COMMERCIAL

## 2023-01-30 PROCEDURE — 93798 PHYS/QHP OP CAR RHAB W/ECG: CPT

## 2023-01-30 NOTE — TELEPHONE ENCOUNTER
Pending Prescriptions:                       Disp   Refills    DULoxetine (CYMBALTA) 60 MG capsule [Pharm*60 cap*0        Sig: TAKE TWO CAPSULES BY MOUTH EVERY DAY      Routing refill request to provider for review/approval because:  Phq 9 is out of range    Arianna Ontiveros RN on 1/30/2023 at 3:30 PM

## 2023-02-01 ENCOUNTER — HOSPITAL ENCOUNTER (OUTPATIENT)
Dept: CARDIAC REHAB | Facility: CLINIC | Age: 53
Discharge: HOME | End: 2023-02-01
Attending: STUDENT IN AN ORGANIZED HEALTH CARE EDUCATION/TRAINING PROGRAM
Payer: COMMERCIAL

## 2023-02-01 PROCEDURE — 93798 PHYS/QHP OP CAR RHAB W/ECG: CPT

## 2023-02-02 RX ORDER — DULOXETIN HYDROCHLORIDE 60 MG/1
CAPSULE, DELAYED RELEASE ORAL
Qty: 60 CAPSULE | Refills: 0 | Status: SHIPPED | OUTPATIENT
Start: 2023-02-02 | End: 2023-03-09

## 2023-02-06 NOTE — TELEPHONE ENCOUNTER
Prescription approved per [facility] Refill Protocol.    Monica Maddox, BSN, RN      pt c/o SOB, facial swelling around 12:35 after allergy shots. use her epi pen at 12:45.

## 2023-02-07 ENCOUNTER — HOSPITAL ENCOUNTER (OUTPATIENT)
Dept: PHYSICAL THERAPY | Facility: CLINIC | Age: 53
Setting detail: THERAPIES SERIES
Discharge: HOME | End: 2023-02-07
Attending: STUDENT IN AN ORGANIZED HEALTH CARE EDUCATION/TRAINING PROGRAM
Payer: COMMERCIAL

## 2023-02-07 DIAGNOSIS — M54.50 CHRONIC LOW BACK PAIN WITHOUT SCIATICA, UNSPECIFIED BACK PAIN LATERALITY: ICD-10-CM

## 2023-02-07 DIAGNOSIS — G89.29 CHRONIC LOW BACK PAIN WITHOUT SCIATICA, UNSPECIFIED BACK PAIN LATERALITY: ICD-10-CM

## 2023-02-07 DIAGNOSIS — M51.369 DDD (DEGENERATIVE DISC DISEASE), LUMBAR: ICD-10-CM

## 2023-02-07 PROCEDURE — 97014 ELECTRIC STIMULATION THERAPY: CPT | Mod: GP | Performed by: PHYSICAL THERAPIST

## 2023-02-07 PROCEDURE — 97110 THERAPEUTIC EXERCISES: CPT | Mod: GP | Performed by: PHYSICAL THERAPIST

## 2023-02-07 PROCEDURE — 97010 HOT OR COLD PACKS THERAPY: CPT | Mod: GP | Performed by: PHYSICAL THERAPIST

## 2023-02-08 ENCOUNTER — HOSPITAL ENCOUNTER (OUTPATIENT)
Dept: CARDIAC REHAB | Facility: CLINIC | Age: 53
Discharge: HOME | End: 2023-02-08
Attending: STUDENT IN AN ORGANIZED HEALTH CARE EDUCATION/TRAINING PROGRAM
Payer: COMMERCIAL

## 2023-02-08 PROCEDURE — 93798 PHYS/QHP OP CAR RHAB W/ECG: CPT

## 2023-02-10 ENCOUNTER — HOSPITAL ENCOUNTER (OUTPATIENT)
Dept: CARDIAC REHAB | Facility: CLINIC | Age: 53
Discharge: HOME | End: 2023-02-10
Attending: STUDENT IN AN ORGANIZED HEALTH CARE EDUCATION/TRAINING PROGRAM
Payer: COMMERCIAL

## 2023-02-10 PROCEDURE — 93798 PHYS/QHP OP CAR RHAB W/ECG: CPT

## 2023-02-13 ENCOUNTER — HOSPITAL ENCOUNTER (OUTPATIENT)
Dept: CARDIAC REHAB | Facility: CLINIC | Age: 53
Discharge: HOME | End: 2023-02-13
Attending: STUDENT IN AN ORGANIZED HEALTH CARE EDUCATION/TRAINING PROGRAM
Payer: COMMERCIAL

## 2023-02-13 PROCEDURE — 93798 PHYS/QHP OP CAR RHAB W/ECG: CPT

## 2023-02-15 ENCOUNTER — HOSPITAL ENCOUNTER (OUTPATIENT)
Dept: PHYSICAL THERAPY | Facility: CLINIC | Age: 53
Setting detail: THERAPIES SERIES
Discharge: HOME | End: 2023-02-15
Attending: STUDENT IN AN ORGANIZED HEALTH CARE EDUCATION/TRAINING PROGRAM
Payer: COMMERCIAL

## 2023-02-15 ENCOUNTER — MYC REFILL (OUTPATIENT)
Dept: FAMILY MEDICINE | Facility: CLINIC | Age: 53
End: 2023-02-15
Payer: COMMERCIAL

## 2023-02-15 DIAGNOSIS — M54.16 LUMBAR RADICULOPATHY: ICD-10-CM

## 2023-02-15 PROCEDURE — 97110 THERAPEUTIC EXERCISES: CPT | Mod: GP | Performed by: PHYSICAL THERAPIST

## 2023-02-15 PROCEDURE — 97012 MECHANICAL TRACTION THERAPY: CPT | Mod: GP | Performed by: PHYSICAL THERAPIST

## 2023-02-15 RX ORDER — OXYCODONE AND ACETAMINOPHEN 5; 325 MG/1; MG/1
1 TABLET ORAL EVERY 6 HOURS PRN
Qty: 60 TABLET | Refills: 0 | Status: SHIPPED | OUTPATIENT
Start: 2023-02-15 | End: 2023-03-17

## 2023-02-15 NOTE — TELEPHONE ENCOUNTER
Requested Prescriptions   Pending Prescriptions Disp Refills    oxyCODONE-acetaminophen (PERCOCET) 5-325 MG tablet 60 tablet 0     Sig: Take 1 tablet by mouth every 6 hours as needed for moderate to severe pain       There is no refill protocol information for this order           Bita Salazar RN

## 2023-02-20 ENCOUNTER — HOSPITAL ENCOUNTER (OUTPATIENT)
Dept: CARDIAC REHAB | Facility: CLINIC | Age: 53
Discharge: HOME | End: 2023-02-20
Attending: STUDENT IN AN ORGANIZED HEALTH CARE EDUCATION/TRAINING PROGRAM
Payer: COMMERCIAL

## 2023-02-20 PROCEDURE — 93798 PHYS/QHP OP CAR RHAB W/ECG: CPT

## 2023-02-21 DIAGNOSIS — F33.0 MAJOR DEPRESSIVE DISORDER, RECURRENT EPISODE, MILD (H): ICD-10-CM

## 2023-02-21 NOTE — TELEPHONE ENCOUNTER
Routing refill request to provider for review/approval because:  Drug interaction warning        Monica Maddox, BSN, RN

## 2023-02-22 ENCOUNTER — HOSPITAL ENCOUNTER (OUTPATIENT)
Dept: PHYSICAL THERAPY | Facility: CLINIC | Age: 53
Setting detail: THERAPIES SERIES
Discharge: HOME | End: 2023-02-22
Attending: STUDENT IN AN ORGANIZED HEALTH CARE EDUCATION/TRAINING PROGRAM
Payer: COMMERCIAL

## 2023-02-22 PROCEDURE — 97010 HOT OR COLD PACKS THERAPY: CPT | Mod: GP | Performed by: PHYSICAL THERAPIST

## 2023-02-22 PROCEDURE — 97110 THERAPEUTIC EXERCISES: CPT | Mod: GP | Performed by: PHYSICAL THERAPIST

## 2023-02-22 PROCEDURE — 97014 ELECTRIC STIMULATION THERAPY: CPT | Mod: GP | Performed by: PHYSICAL THERAPIST

## 2023-02-25 RX ORDER — TRAZODONE HYDROCHLORIDE 150 MG/1
TABLET ORAL
Qty: 135 TABLET | Refills: 1 | Status: SHIPPED | OUTPATIENT
Start: 2023-02-25 | End: 2023-08-15

## 2023-02-28 ENCOUNTER — LAB (OUTPATIENT)
Dept: LAB | Facility: CLINIC | Age: 53
End: 2023-02-28
Payer: COMMERCIAL

## 2023-02-28 ENCOUNTER — ANTICOAGULATION THERAPY VISIT (OUTPATIENT)
Dept: ANTICOAGULATION | Facility: CLINIC | Age: 53
End: 2023-02-28

## 2023-02-28 DIAGNOSIS — Z79.01 LONG TERM CURRENT USE OF ANTICOAGULANT THERAPY: Primary | ICD-10-CM

## 2023-02-28 DIAGNOSIS — D68.61 ANTIPHOSPHOLIPID SYNDROME (H): ICD-10-CM

## 2023-02-28 DIAGNOSIS — I82.4Y9 DEEP VEIN THROMBOSIS (DVT) OF PROXIMAL LOWER EXTREMITY, UNSPECIFIED CHRONICITY, UNSPECIFIED LATERALITY (H): ICD-10-CM

## 2023-02-28 LAB — INR BLD: 1.8 (ref 0.9–1.1)

## 2023-02-28 PROCEDURE — 36416 COLLJ CAPILLARY BLOOD SPEC: CPT

## 2023-02-28 PROCEDURE — 85610 PROTHROMBIN TIME: CPT

## 2023-02-28 NOTE — PROGRESS NOTES
ANTICOAGULATION MANAGEMENT     Hollis Diggs 52 year old male is on warfarin with subtherapeutic INR result. (Goal INR 2.0-3.0)    Recent labs: (last 7 days)     02/28/23  1417   INR 1.8*       ASSESSMENT       Source(s): Chart Review    Previous INR was Therapeutic last visit; previously outside of goal range    Medication, diet, health changes since last INR chart reviewed; none identified             PLAN     Unable to reach Hollis today.    Left message to take a booster dose of warfarin,  10 mg tonight. Request call back for assessment.   MCM sent.    Follow up required to confirm warfarin dose taken and assess for changes    Claudia GARCIA RN  Anticoagulation Clinic  2/28/2023

## 2023-03-01 ENCOUNTER — HOSPITAL ENCOUNTER (OUTPATIENT)
Dept: CARDIAC REHAB | Facility: CLINIC | Age: 53
Discharge: HOME | End: 2023-03-01
Attending: STUDENT IN AN ORGANIZED HEALTH CARE EDUCATION/TRAINING PROGRAM
Payer: COMMERCIAL

## 2023-03-01 PROCEDURE — 93798 PHYS/QHP OP CAR RHAB W/ECG: CPT

## 2023-03-01 NOTE — PROGRESS NOTES
ANTICOAGULATION MANAGEMENT     Hollis Diggs 52 year old male is on warfarin with subtherapeutic INR result. (Goal INR 2.0-3.0)    Recent labs: (last 7 days)     02/28/23  1417   INR 1.8*       ASSESSMENT       Source(s): Chart Review and Patient/Caregiver Call       Warfarin doses taken: Warfarin taken as instructed and missed dose on 2/23, but took boost per vm left yesterday    Diet: No new diet changes identified    New illness, injury, or hospitalization: No    Medication/supplement changes: None noted    Signs or symptoms of bleeding or clotting: No    Previous INR: Therapeutic last visit; previously outside of goal range    Additional findings: None         PLAN     Recommended plan for temporary change(s) affecting INR     Dosing Instructions: took boost as instructed, continue dosing  with next INR in 2 weeks       Summary  As of 2/28/2023    Full warfarin instructions:  2/28: 10 mg; Otherwise 5 mg every Mon, Thu; 7.5 mg all other days   Next INR check:  3/17/2023             Telephone call with Hollis who verbalizes understanding and agrees to plan    Lab visit scheduled    Education provided:     Please call back if any changes to your diet, medications or how you've been taking warfarin    Goal range and lab monitoring: goal range and significance of current result, Importance of therapeutic range and Importance of following up at instructed interval    Plan made per [facility] anticoagulation protocol    Antonieta Saravia RN  Anticoagulation Clinic  3/1/2023    _______________________________________________________________________     Anticoagulation Episode Summary     Current INR goal:  2.0-3.0   TTR:  44.5 % (1 y)   Target end date:  Indefinite   Send INR reminders to:  TIMOTEO ALVAREZ    Indications    DVT (deep venous thrombosis) (H) (Resolved) [I82.409]  Long-term (current) use of anticoagulants [Z79.01] [Z79.01]  Deep vein thrombosis (DVT) of proximal lower extremity  unspecified  chronicity  unspecified laterality (H) [I82.4Y9]  Antiphospholipid syndrome (H) [D68.61]           Comments:           Anticoagulation Care Providers     Provider Role Specialty Phone number    Sylvester Cash MD [facility] [phone]

## 2023-03-07 ENCOUNTER — HOSPITAL ENCOUNTER (OUTPATIENT)
Dept: PHYSICAL THERAPY | Facility: CLINIC | Age: 53
Setting detail: THERAPIES SERIES
Discharge: HOME | End: 2023-03-07
Attending: STUDENT IN AN ORGANIZED HEALTH CARE EDUCATION/TRAINING PROGRAM
Payer: COMMERCIAL

## 2023-03-07 PROCEDURE — 97010 HOT OR COLD PACKS THERAPY: CPT | Mod: GP | Performed by: PHYSICAL THERAPIST

## 2023-03-07 PROCEDURE — 97014 ELECTRIC STIMULATION THERAPY: CPT | Mod: GP | Performed by: PHYSICAL THERAPIST

## 2023-03-07 PROCEDURE — 97110 THERAPEUTIC EXERCISES: CPT | Mod: GP | Performed by: PHYSICAL THERAPIST

## 2023-03-09 DIAGNOSIS — F33.0 MAJOR DEPRESSIVE DISORDER, RECURRENT EPISODE, MILD (H): ICD-10-CM

## 2023-03-09 RX ORDER — DULOXETIN HYDROCHLORIDE 60 MG/1
CAPSULE, DELAYED RELEASE ORAL
Qty: 60 CAPSULE | Refills: 0 | Status: SHIPPED | OUTPATIENT
Start: 2023-03-09 | End: 2023-05-17

## 2023-03-09 NOTE — TELEPHONE ENCOUNTER
"Requested Prescriptions   Pending Prescriptions Disp Refills    DULoxetine (CYMBALTA) 60 MG capsule [Pharmacy Med Name: DULOXETINE HCL 60MG CPEP] 60 capsule 0     Sig: TAKE TWO CAPSULES BY MOUTH EVERY DAY       Serotonin-Norepinephrine Reuptake Inhibitors  Failed - 3/9/2023  5:06 AM        Failed - PHQ-9 score of less than 5 in past 6 months     Please review last PHQ-9 score.           Passed - Blood pressure under 140/90 in past 12 months     BP Readings from Last 3 Encounters:   01/05/23 122/78   12/28/22 125/69   11/17/22 102/62                 Passed - Medication is active on med list        Passed - Patient is age 18 or older        Passed - Recent (6 mo) or future (30 days) visit within the authorizing provider's specialty     Patient had office visit in the last 6 months or has a visit in the next 30 days with authorizing provider or within the authorizing provider's specialty.  See \"Patient Info\" tab in inbasket, or \"Choose Columns\" in Meds & Orders section of the refill encounter.                 "

## 2023-03-17 ENCOUNTER — ANTICOAGULATION THERAPY VISIT (OUTPATIENT)
Dept: ANTICOAGULATION | Facility: CLINIC | Age: 53
End: 2023-03-17

## 2023-03-17 ENCOUNTER — OFFICE VISIT (OUTPATIENT)
Dept: FAMILY MEDICINE | Facility: CLINIC | Age: 53
End: 2023-03-17
Payer: COMMERCIAL

## 2023-03-17 ENCOUNTER — LAB (OUTPATIENT)
Dept: LAB | Facility: CLINIC | Age: 53
End: 2023-03-17
Payer: COMMERCIAL

## 2023-03-17 VITALS
TEMPERATURE: 98.1 F | SYSTOLIC BLOOD PRESSURE: 124 MMHG | DIASTOLIC BLOOD PRESSURE: 70 MMHG | HEIGHT: 71 IN | HEART RATE: 88 BPM | OXYGEN SATURATION: 96 % | BODY MASS INDEX: 34.76 KG/M2 | RESPIRATION RATE: 20 BRPM | WEIGHT: 248.3 LBS

## 2023-03-17 DIAGNOSIS — M51.369 DDD (DEGENERATIVE DISC DISEASE), LUMBAR: ICD-10-CM

## 2023-03-17 DIAGNOSIS — Z79.899 ENCOUNTER FOR LONG-TERM (CURRENT) USE OF MEDICATIONS: Primary | ICD-10-CM

## 2023-03-17 DIAGNOSIS — F41.1 ANXIETY STATE: ICD-10-CM

## 2023-03-17 DIAGNOSIS — D68.61 ANTIPHOSPHOLIPID SYNDROME (H): ICD-10-CM

## 2023-03-17 DIAGNOSIS — F33.0 MAJOR DEPRESSIVE DISORDER, RECURRENT EPISODE, MILD (H): ICD-10-CM

## 2023-03-17 DIAGNOSIS — M54.16 LUMBAR RADICULOPATHY: ICD-10-CM

## 2023-03-17 DIAGNOSIS — I82.4Y9 DEEP VEIN THROMBOSIS (DVT) OF PROXIMAL LOWER EXTREMITY, UNSPECIFIED CHRONICITY, UNSPECIFIED LATERALITY (H): ICD-10-CM

## 2023-03-17 DIAGNOSIS — M06.00 SERONEGATIVE RHEUMATOID ARTHRITIS (H): ICD-10-CM

## 2023-03-17 DIAGNOSIS — F11.90 CHRONIC, CONTINUOUS USE OF OPIOIDS: ICD-10-CM

## 2023-03-17 DIAGNOSIS — B07.0 PLANTAR WARTS: ICD-10-CM

## 2023-03-17 DIAGNOSIS — K21.9 GASTROESOPHAGEAL REFLUX DISEASE WITHOUT ESOPHAGITIS: ICD-10-CM

## 2023-03-17 DIAGNOSIS — Z79.01 LONG TERM CURRENT USE OF ANTICOAGULANT THERAPY: ICD-10-CM

## 2023-03-17 DIAGNOSIS — Z79.01 LONG TERM CURRENT USE OF ANTICOAGULANT THERAPY: Primary | ICD-10-CM

## 2023-03-17 DIAGNOSIS — M54.40 ACUTE MIDLINE LOW BACK PAIN WITH SCIATICA, SCIATICA LATERALITY UNSPECIFIED: ICD-10-CM

## 2023-03-17 LAB — INR BLD: 2.3 (ref 0.9–1.1)

## 2023-03-17 PROCEDURE — 17110 DESTRUCTION B9 LES UP TO 14: CPT | Performed by: STUDENT IN AN ORGANIZED HEALTH CARE EDUCATION/TRAINING PROGRAM

## 2023-03-17 PROCEDURE — 99214 OFFICE O/P EST MOD 30 MIN: CPT | Mod: 25 | Performed by: STUDENT IN AN ORGANIZED HEALTH CARE EDUCATION/TRAINING PROGRAM

## 2023-03-17 PROCEDURE — 36416 COLLJ CAPILLARY BLOOD SPEC: CPT

## 2023-03-17 PROCEDURE — 85610 PROTHROMBIN TIME: CPT

## 2023-03-17 RX ORDER — PANTOPRAZOLE SODIUM 40 MG/1
40 TABLET, DELAYED RELEASE ORAL DAILY
Qty: 90 TABLET | Refills: 1 | Status: SHIPPED | OUTPATIENT
Start: 2023-03-17 | End: 2023-09-29

## 2023-03-17 RX ORDER — OXYCODONE AND ACETAMINOPHEN 5; 325 MG/1; MG/1
1 TABLET ORAL EVERY 6 HOURS PRN
Qty: 60 TABLET | Refills: 0 | Status: SHIPPED | OUTPATIENT
Start: 2023-03-17 | End: 2023-04-14

## 2023-03-17 ASSESSMENT — ANXIETY QUESTIONNAIRES
1. FEELING NERVOUS, ANXIOUS, OR ON EDGE: MORE THAN HALF THE DAYS
4. TROUBLE RELAXING: SEVERAL DAYS
8. IF YOU CHECKED OFF ANY PROBLEMS, HOW DIFFICULT HAVE THESE MADE IT FOR YOU TO DO YOUR WORK, TAKE CARE OF THINGS AT HOME, OR GET ALONG WITH OTHER PEOPLE?: VERY DIFFICULT
3. WORRYING TOO MUCH ABOUT DIFFERENT THINGS: MORE THAN HALF THE DAYS
IF YOU CHECKED OFF ANY PROBLEMS ON THIS QUESTIONNAIRE, HOW DIFFICULT HAVE THESE PROBLEMS MADE IT FOR YOU TO DO YOUR WORK, TAKE CARE OF THINGS AT HOME, OR GET ALONG WITH OTHER PEOPLE: VERY DIFFICULT
GAD7 TOTAL SCORE: 11
GAD7 TOTAL SCORE: 11
2. NOT BEING ABLE TO STOP OR CONTROL WORRYING: MORE THAN HALF THE DAYS
GAD7 TOTAL SCORE: 11
7. FEELING AFRAID AS IF SOMETHING AWFUL MIGHT HAPPEN: MORE THAN HALF THE DAYS
5. BEING SO RESTLESS THAT IT IS HARD TO SIT STILL: SEVERAL DAYS
7. FEELING AFRAID AS IF SOMETHING AWFUL MIGHT HAPPEN: MORE THAN HALF THE DAYS
6. BECOMING EASILY ANNOYED OR IRRITABLE: SEVERAL DAYS

## 2023-03-17 ASSESSMENT — ENCOUNTER SYMPTOMS: BACK PAIN: 1

## 2023-03-17 ASSESSMENT — PATIENT HEALTH QUESTIONNAIRE - PHQ9
SUM OF ALL RESPONSES TO PHQ QUESTIONS 1-9: 14
10. IF YOU CHECKED OFF ANY PROBLEMS, HOW DIFFICULT HAVE THESE PROBLEMS MADE IT FOR YOU TO DO YOUR WORK, TAKE CARE OF THINGS AT HOME, OR GET ALONG WITH OTHER PEOPLE: SOMEWHAT DIFFICULT
SUM OF ALL RESPONSES TO PHQ QUESTIONS 1-9: 14

## 2023-03-17 ASSESSMENT — PAIN SCALES - GENERAL: PAINLEVEL: MODERATE PAIN (5)

## 2023-03-17 NOTE — PROGRESS NOTES
Assessment & Plan     Encounter for long-term (current) use of medications  Acute midline low back pain with sciatica, sciatica laterality unspecified  Lumbar radiculopathy  Chronic, continuous use of opioids  DDD (degenerative disc disease), lumbar  Patient functioning with decrease in opioid use.  We will continue current dose for now limited to 60 pills monthly.  Encouraged him to focus on avoiding use is much as possible.  Again risks of long-term use of chronic opioids discussed in detail today.  Did work with physical therapy and will complete with home exercises.  No acute worsening in symptoms so I will not update imaging at this time. Patient will need updated pain agreement by next visit as well as urine drug screen. Follow up in 3 months.     Seronegative rheumatoid arthritis (H)    Gastroesophageal reflux disease without esophagitis  Stable symptoms.   - pantoprazole (PROTONIX) 40 MG EC tablet  Dispense: 90 tablet; Refill: 1    Major depressive disorder, recurrent episode, mild (H)  Anxiety state  Continues with Cymbalta, Abilify and gabapentin.  He feels like he is in a fine spot now and does not want changes at this time.    Long-term (current) use of anticoagulants [Z79.01]  Antiphospholipid syndrome (H)  Stable on warfarin    Plantar warts   Patient wanting lesions frozen today.  Other treatment options discussed.  -Cryotherapy      Christiano Ledezma MD  [facility]    Zoey Shafer is a 52 year old presenting for the following health issues:  Back Pain (recheck)      Back Pain     History of Present Illness       Back Pain:  He presents for follow up of back pain. Patient's back pain is a chronic problem.  Location of back pain:  Right lower back  Description of back pain: sharp  Back pain spreads: right foot    Since patient first noticed back pain, pain is: always present, but gets better and worse  Does back pain interfere with his job:  Yes      Mental Health  "Follow-up:  Patient presents to follow-up on Depression & Anxiety.Patient's depression since last visit has been:  Medium  The patient is not having other symptoms associated with depression.  Patient's anxiety since last visit has been:  Worse  The patient is not having other symptoms associated with anxiety.  Any significant life events: No  Patient is not feeling anxious or having panic attacks.  Patient has no concerns about alcohol or drug use.    Vascular Disease:  He presents for follow up of vascular disease.  He is not taking daily aspirin.    He eats 2-3 servings of fruits and vegetables daily.He consumes 4 sweetened beverage(s) daily.He exercises with enough effort to increase his heart rate 30 to 60 minutes per day.  He exercises with enough effort to increase his heart rate 4 days per week.   He is taking medications regularly.    Today's PHQ-9         PHQ-9 Total Score: 14    PHQ-9 Q9 Thoughts of better off dead/self-harm past 2 weeks :   Not at all    How difficult have these problems made it for you to do your work, take care of things at home, or get along with other people: Somewhat difficult  Today's ANKIT-7 Score: 11     Patient following up for chronic back pain.  Also has recurrent of warts on both feet that he would like frozen off again today.    Review of Systems   Musculoskeletal: Positive for back pain.      Constitutional, HEENT, cardiovascular, pulmonary, gi and gu systems are negative, except as otherwise noted.      Objective    /70 (BP Location: Left arm, Patient Position: Chair)   Pulse 88   Temp 98.1  F (36.7  C) (Temporal)   Resp 20   Ht 1.803 m (5' 11\")   Wt 112.6 kg (248 lb 4.8 oz)   SpO2 96%   BMI 34.63 kg/m    Body mass index is 34.63 kg/m .  Physical Exam   GENERAL: healthy, alert and no distress  EYES: Eyes grossly normal to inspection, PERRL and conjunctivae and sclerae normal  RESP: lungs clear to auscultation - no rales, rhonchi or wheezes  CV: regular rate and " rhythm, normal S1 S2, no S3 or S4, no murmur, click or rub, no peripheral edema and peripheral pulses strong  MS: no gross musculoskeletal defects noted, no edema  SKIN: no suspicious lesions or rashes, bladder not noted on bilateral feet with nail lesion noted.  NEURO: Normal strength and tone, mentation intact and speech normal  PSYCH: mentation appears normal, affect normal/bright    Cryotherapy procedure note: After verbal consent and discussion of risks and benefits including but no limited to dyspigmentation/scar, blister, and pain, 3 warts (were) treated with 1-2mm freeze border for 3 cycles with liquid nitrogen. Post cryotherapy instructions were provided. Plan for patient to return to clinic in 2 weeks if still present.

## 2023-03-17 NOTE — PROGRESS NOTES
ANTICOAGULATION MANAGEMENT     Hollis Diggs 52 year old male is on warfarin with therapeutic INR result. (Goal INR 2.0-3.0)    Recent labs: (last 7 days)     03/17/23  1220   INR 2.3*       ASSESSMENT       Source(s): Chart Review       Warfarin doses taken: Reviewed in chart    Diet: lm    New illness, injury, or hospitalization: No    Medication/supplement changes: None noted    Signs or symptoms of bleeding or clotting: No    Previous INR: Subtherapeutic    Additional findings: None         PLAN     Recommended plan for no diet, medication or health factor changes affecting INR     Dosing Instructions: Continue your current warfarin dose with next INR in 3 weeks       Summary  As of 3/17/2023    Full warfarin instructions:  5 mg every Mon, Thu; 7.5 mg all other days   Next INR check:  4/7/2023             Detailed voice message left for Hollis with dosing instructions and follow up date.     Contact [phone]  to schedule and with any changes, questions or concerns.     Education provided:     Please call back if any changes to your diet, medications or how you've been taking warfarin    Plan made per [facility] anticoagulation protocol    Анна Hale RN  Anticoagulation Clinic  3/17/2023    _______________________________________________________________________     Anticoagulation Episode Summary     Current INR goal:  2.0-3.0   TTR:  42.8 % (1 y)   Target end date:  Indefinite   Send INR reminders to:  TIMOTEO ALVAREZ    Indications    DVT (deep venous thrombosis) (H) (Resolved) [I82.409]  Long-term (current) use of anticoagulants [Z79.01] [Z79.01]  Deep vein thrombosis (DVT) of proximal lower extremity  unspecified chronicity  unspecified laterality (H) [I82.4Y9]  Antiphospholipid syndrome (H) [D68.61]           Comments:           Anticoagulation Care Providers     Provider Role Specialty Phone number    Sylvester Cash MD [facility] [phone]

## 2023-03-23 ENCOUNTER — MYC MEDICAL ADVICE (OUTPATIENT)
Dept: FAMILY MEDICINE | Facility: CLINIC | Age: 53
End: 2023-03-23
Payer: COMMERCIAL

## 2023-03-30 ENCOUNTER — HOSPITAL ENCOUNTER (EMERGENCY)
Facility: CLINIC | Age: 53
Discharge: HOME | End: 2023-03-30
Attending: NURSE PRACTITIONER | Admitting: NURSE PRACTITIONER
Payer: COMMERCIAL

## 2023-03-30 ENCOUNTER — APPOINTMENT (OUTPATIENT)
Dept: CT IMAGING | Facility: CLINIC | Age: 53
End: 2023-03-30
Attending: NURSE PRACTITIONER
Payer: COMMERCIAL

## 2023-03-30 ENCOUNTER — APPOINTMENT (OUTPATIENT)
Dept: GENERAL RADIOLOGY | Facility: CLINIC | Age: 53
End: 2023-03-30
Attending: NURSE PRACTITIONER
Payer: COMMERCIAL

## 2023-03-30 ENCOUNTER — ANTICOAGULATION THERAPY VISIT (OUTPATIENT)
Dept: ANTICOAGULATION | Facility: CLINIC | Age: 53
End: 2023-03-30

## 2023-03-30 VITALS
SYSTOLIC BLOOD PRESSURE: 108 MMHG | OXYGEN SATURATION: 98 % | DIASTOLIC BLOOD PRESSURE: 69 MMHG | HEART RATE: 72 BPM | RESPIRATION RATE: 13 BRPM | TEMPERATURE: 98.6 F

## 2023-03-30 DIAGNOSIS — R07.9 CHEST PAIN, UNSPECIFIED TYPE: ICD-10-CM

## 2023-03-30 DIAGNOSIS — Z79.01 LONG TERM CURRENT USE OF ANTICOAGULANT THERAPY: Primary | ICD-10-CM

## 2023-03-30 DIAGNOSIS — I82.4Y9 DEEP VEIN THROMBOSIS (DVT) OF PROXIMAL LOWER EXTREMITY, UNSPECIFIED CHRONICITY, UNSPECIFIED LATERALITY (H): ICD-10-CM

## 2023-03-30 DIAGNOSIS — D68.61 ANTIPHOSPHOLIPID SYNDROME (H): ICD-10-CM

## 2023-03-30 DIAGNOSIS — R10.12 LEFT UPPER QUADRANT PAIN: ICD-10-CM

## 2023-03-30 LAB
ALBUMIN SERPL BCG-MCNC: 3.7 G/DL (ref 3.5–5.2)
ALP SERPL-CCNC: 69 U/L (ref 40–129)
ALT SERPL W P-5'-P-CCNC: 20 U/L (ref 10–50)
ANION GAP SERPL CALCULATED.3IONS-SCNC: 11 MMOL/L (ref 7–15)
AST SERPL W P-5'-P-CCNC: 26 U/L (ref 10–50)
BASOPHILS # BLD AUTO: 0 10E3/UL (ref 0–0.2)
BASOPHILS NFR BLD AUTO: 1 %
BILIRUB SERPL-MCNC: 0.3 MG/DL
BUN SERPL-MCNC: 8.1 MG/DL (ref 6–20)
CALCIUM SERPL-MCNC: 9.2 MG/DL (ref 8.6–10)
CHLORIDE SERPL-SCNC: 105 MMOL/L (ref 98–107)
CREAT SERPL-MCNC: 0.95 MG/DL (ref 0.67–1.17)
DEPRECATED HCO3 PLAS-SCNC: 26 MMOL/L (ref 22–29)
EOSINOPHIL # BLD AUTO: 0.1 10E3/UL (ref 0–0.7)
EOSINOPHIL NFR BLD AUTO: 2 %
ERYTHROCYTE [DISTWIDTH] IN BLOOD BY AUTOMATED COUNT: 14.8 % (ref 10–15)
GFR SERPL CREATININE-BSD FRML MDRD: >90 ML/MIN/1.73M2
GLUCOSE SERPL-MCNC: 117 MG/DL (ref 70–99)
HCT VFR BLD AUTO: 41 % (ref 40–53)
HGB BLD-MCNC: 12.7 G/DL (ref 13.3–17.7)
IMM GRANULOCYTES # BLD: 0 10E3/UL
IMM GRANULOCYTES NFR BLD: 0 %
INR PPP: 3.28 (ref 0.85–1.15)
LYMPHOCYTES # BLD AUTO: 1.2 10E3/UL (ref 0.8–5.3)
LYMPHOCYTES NFR BLD AUTO: 24 %
MCH RBC QN AUTO: 25 PG (ref 26.5–33)
MCHC RBC AUTO-ENTMCNC: 31 G/DL (ref 31.5–36.5)
MCV RBC AUTO: 81 FL (ref 78–100)
MONOCYTES # BLD AUTO: 0.3 10E3/UL (ref 0–1.3)
MONOCYTES NFR BLD AUTO: 5 %
NEUTROPHILS # BLD AUTO: 3.4 10E3/UL (ref 1.6–8.3)
NEUTROPHILS NFR BLD AUTO: 68 %
NRBC # BLD AUTO: 0 10E3/UL
NRBC BLD AUTO-RTO: 0 /100
PLATELET # BLD AUTO: 254 10E3/UL (ref 150–450)
POTASSIUM SERPL-SCNC: 3.7 MMOL/L (ref 3.4–5.3)
PROT SERPL-MCNC: 6.4 G/DL (ref 6.4–8.3)
RBC # BLD AUTO: 5.09 10E6/UL (ref 4.4–5.9)
SODIUM SERPL-SCNC: 142 MMOL/L (ref 136–145)
TROPONIN T SERPL HS-MCNC: 7 NG/L
WBC # BLD AUTO: 5 10E3/UL (ref 4–11)

## 2023-03-30 PROCEDURE — 99285 EMERGENCY DEPT VISIT HI MDM: CPT | Mod: 25

## 2023-03-30 PROCEDURE — 93010 ELECTROCARDIOGRAM REPORT: CPT | Performed by: NURSE PRACTITIONER

## 2023-03-30 PROCEDURE — 250N000011 HC RX IP 250 OP 636: Performed by: NURSE PRACTITIONER

## 2023-03-30 PROCEDURE — 80053 COMPREHEN METABOLIC PANEL: CPT | Performed by: NURSE PRACTITIONER

## 2023-03-30 PROCEDURE — 84484 ASSAY OF TROPONIN QUANT: CPT | Performed by: NURSE PRACTITIONER

## 2023-03-30 PROCEDURE — 85025 COMPLETE CBC W/AUTO DIFF WBC: CPT | Performed by: NURSE PRACTITIONER

## 2023-03-30 PROCEDURE — 99284 EMERGENCY DEPT VISIT MOD MDM: CPT | Mod: 25 | Performed by: NURSE PRACTITIONER

## 2023-03-30 PROCEDURE — 71046 X-RAY EXAM CHEST 2 VIEWS: CPT

## 2023-03-30 PROCEDURE — 36415 COLL VENOUS BLD VENIPUNCTURE: CPT | Performed by: NURSE PRACTITIONER

## 2023-03-30 PROCEDURE — 85610 PROTHROMBIN TIME: CPT | Performed by: NURSE PRACTITIONER

## 2023-03-30 PROCEDURE — 250N000009 HC RX 250: Performed by: NURSE PRACTITIONER

## 2023-03-30 PROCEDURE — 93005 ELECTROCARDIOGRAM TRACING: CPT

## 2023-03-30 PROCEDURE — 71260 CT THORAX DX C+: CPT

## 2023-03-30 RX ORDER — IOPAMIDOL 755 MG/ML
500 INJECTION, SOLUTION INTRAVASCULAR ONCE
Status: COMPLETED | OUTPATIENT
Start: 2023-03-30 | End: 2023-03-30

## 2023-03-30 RX ADMIN — SODIUM CHLORIDE 64 ML: 9 INJECTION, SOLUTION INTRAVENOUS at 13:40

## 2023-03-30 RX ADMIN — IOPAMIDOL 82 ML: 755 INJECTION, SOLUTION INTRAVENOUS at 13:41

## 2023-03-30 ASSESSMENT — ACTIVITIES OF DAILY LIVING (ADL): ADLS_ACUITY_SCORE: 35

## 2023-03-30 NOTE — LETTER
March 30, 2023      To Whom It May Concern:      Hollis FINNEGAN Diggs was seen in our Emergency Department today, 03/30/23.  Please excuse from work today.      Sincerely,        FACUNDO Houser CNP

## 2023-03-30 NOTE — ED PROVIDER NOTES
"  History     Chief Complaint   Patient presents with     Chest Pain     HPI  Hollis Diggs is a 52 year old male with history of Htn, obesity, CAD (stent placed 10/2022, on plavix), antiphospholipid syndrome/DVT (on coumadin),  RA (on prednisone), GERD, asthma, anxiety, and chronic low back pain (on oxycodone and gabapentin) who presents to the emergency department via EMS for evaluation of chest pain.  Patient states he was eating a cheeseburger out of a vending machine at work.  Shortly after he finished eating he started to have left upper quadrant and left chest pain.  No nausea or vomiting.  Shortness of breath.  He did not get diaphoretic.  No constipation or diarrhea.  He has not had any fever or chills.  He has not felt ill the last few days.  He did have a gastrointestinal illness about a week ago, but those symptoms resolved.  Patient states \"I do not know why I am here\".  He tells me his work made him come.  EMS gave him 2 nitroglycerin, and patient states he is not sure if it helped.  His pain has subsided.    PDMP Review       Value Time User    State PDMP site checked  Yes 3/30/2023 12:05 PM Johanna Larson APRN CNP          Allergies:  Allergies   Allergen Reactions     No Known Drug Allergies        Problem List:    Patient Active Problem List    Diagnosis Date Noted     Status post coronary angiogram 10/11/2022     Priority: Medium     Hiatal hernia 09/02/2022     Priority: Medium     Other iron deficiency anemia 05/26/2022     Priority: Medium     Other acute gastritis, presence of bleeding unspecified 05/26/2022     Priority: Medium     Hypertension, unspecified type 02/14/2022     Priority: Medium     HILTON (dyspnea on exertion) 02/14/2022     Priority: Medium     Chronic, continuous use of opioids 05/14/2021     Priority: Medium     Deep vein thrombosis (DVT) of proximal lower extremity, unspecified chronicity, unspecified laterality (H) 04/28/2021     Priority: Medium     " Gastroesophageal reflux disease without esophagitis 04/26/2021     Priority: Medium     Antiphospholipid syndrome (H) 03/06/2021     Priority: Medium     Suicidal behavior 06/22/2020     Priority: Medium     Class 1 obesity due to excess calories without serious comorbidity with body mass index (BMI) of 33.0 to 33.9 in adult 01/08/2020     Priority: Medium     Long-term use of high-risk medication 05/12/2017     Priority: Medium     History of deep venous thrombosis 04/14/2017     Priority: Medium     DDD (degenerative disc disease), lumbar 04/14/2017     Priority: Medium     On prednisone therapy 07/27/2016     Priority: Medium     Seronegative rheumatoid arthritis (H) 06/28/2016     Priority: Medium     Long-term (current) use of anticoagulants [Z79.01] 03/16/2016     Priority: Medium     Rheumatoid arthritis of multiple sites with negative rheumatoid factor (H) 12/07/2015     Priority: Medium     Midline low back pain, with sciatica presence unspecified 10/14/2015     Priority: Medium     Major depressive disorder, recurrent episode, mild (H) 10/07/2015     Priority: Medium     Pain in joint, multiple sites 03/20/2015     Priority: Medium     Anxiety state 02/10/2015     Priority: Medium     Problem list name updated by automated process. Provider to review       CARDIOVASCULAR SCREENING; LDL GOAL LESS THAN 160 01/15/2013     Priority: Medium     Alopecia 02/19/2010     Priority: Medium     Ingrown toenail 08/18/2009     Priority: Medium     Anxiety 07/09/2008     Priority: Medium     Abdominal pain, epigastric 01/25/2006     Priority: Medium        Past Medical History:    Past Medical History:   Diagnosis Date     Antiphospholipid syndrome (H)      Arthritis      Asthma      blood clot in leg      Depressive disorder, not elsewhere classified      ANKIT (generalised anxiety disorder)      HH (hiatus hernia)      Hypercholesteremia      Lumbar disc herniation 1992     Seronegative rheumatoid arthritis (H)         Past Surgical History:    Past Surgical History:   Procedure Laterality Date     APPENDECTOMY       COLONOSCOPY N/A 8/2/2016    Procedure: COMBINED COLONOSCOPY, SINGLE OR MULTIPLE BIOPSY/POLYPECTOMY BY BIOPSY;  Surgeon: Sydnee Walton MD;  Location: [facility]     COLONOSCOPY N/A 5/3/2022    Procedure: COLONOSCOPY;  Surgeon: Jose A Hurt MD;  Location: [facility]     COLONOSCOPY WITH CO2 INSUFFLATION N/A 8/2/2016    Procedure: COLONOSCOPY WITH CO2 INSUFFLATION;  Surgeon: Sydnee Walton MD;  Location: [facility]     COMBINED ESOPHAGOSCOPY, GASTROSCOPY, DUODENOSCOPY (EGD) WITH CO2 INSUFFLATION N/A 8/2/2016    Procedure: COMBINED ESOPHAGOSCOPY, GASTROSCOPY, DUODENOSCOPY (EGD) WITH CO2 INSUFFLATION;  Surgeon: Sydnee Walton MD;  Location: [facility]     COMBINED ESOPHAGOSCOPY, GASTROSCOPY, DUODENOSCOPY (EGD) WITH CO2 INSUFFLATION N/A 5/27/2021    Procedure: ESOPHAGOGASTRODUODENOSCOPY, WITH CO2 INSUFFLATION;  Surgeon: Alis Cotton DO;  Location: [facility]     CV CORONARY ANGIOGRAM N/A 10/11/2022    Procedure: Coronary Angiogram;  Surgeon: Hollis Avila MD;  Location: [facility] CARDIAC CATH LAB     CV INSTANTANEOUS WAVE-FREE RATIO N/A 10/11/2022    Procedure: Instantaneous Wave-Free Ratio;  Surgeon: Hollis Avila MD;  Location: [facility] CARDIAC CATH LAB     CV INTRAVASULAR ULTRASOUND N/A 10/11/2022    Procedure: Intravascular Ultrasound;  Surgeon: Hollis Avila MD;  Location: [facility] CARDIAC CATH LAB     CV PCI ANGIOPLASTY N/A 10/11/2022    Procedure: Percutaneous Transluminal Angioplasty;  Surgeon: Hollis Avila MD;  Location: [facility] CARDIAC CATH LAB     ESOPHAGOSCOPY, GASTROSCOPY, DUODENOSCOPY (EGD), COMBINED N/A 8/2/2016    Procedure: COMBINED ESOPHAGOSCOPY, GASTROSCOPY, DUODENOSCOPY (EGD), BIOPSY SINGLE OR MULTIPLE;  Surgeon: Sydnee Walton MD;  Location: [facility]     ESOPHAGOSCOPY, GASTROSCOPY, DUODENOSCOPY (EGD), COMBINED N/A  "5/27/2021    Procedure: Esophagogastroduodenoscopy, With Biopsy;  Surgeon: Alis Cotton DO;  Location: [facility]     ESOPHAGOSCOPY, GASTROSCOPY, DUODENOSCOPY (EGD), COMBINED N/A 5/3/2022    Procedure: ESOPHAGOGASTRODUODENOSCOPY, WITH BIOPSY;  Surgeon: Jose A Hurt MD;  Location: [facility]     HC REMOVAL OF TONSILS,<13 Y/O      Tonsils <11y.o.     HC UGI ENDOSCOPY DIAG W BIOPSY  02/01/06     HC VASECTOMY UNILAT/BILAT W POSTOP SEMEN  1/05    Vasectomy     History back lumbar laminectomy       INJECT EPIDURAL LUMBAR Right 4/22/2021    Procedure: Right Lumbar 4-5 and Lumbar 5 - Sacral 1 Epidural Steroid Injection;  Surgeon: Maxwell Zacarias MD;  Location: [facility]     ZZC NONSPECIFIC PROCEDURE  91 or 92    back surgery. lumbar. lamiectomy     ZZHC REPAIR INCISIONAL HERNIA,REDUCIBLE  1970's    Hernia Repair, Incisional, Unilateral       Family History:    Family History   Problem Relation Age of Onset     Brain Tumor Mother         Benign     Deep Vein Thrombosis Mother         \"neck\"      Heart Disease Father         \"wont tell anyone\"     Neurologic Disorder Paternal Grandmother         Parkinsons      Alcohol/Drug Paternal Grandfather         alcoholic     Cancer Maternal Grandfather         lung     Diabetes Maternal Grandmother      Cerebrovascular Disease Maternal Grandmother         tim age ~70     Arthritis Cousin         cousins x 2 \"RA\"     Musculoskeletal Disorder Maternal Aunt         MS     Family History Negative Other         psoriasis, crohns, UC, SLE       Social History:  Marital Status:   [2]  Social History     Tobacco Use     Smoking status: Never     Passive exposure: Never     Smokeless tobacco: Never   Vaping Use     Vaping Use: Never used   Substance Use Topics     Alcohol use: Yes     Comment: rare     Drug use: No        Medications:    ARIPiprazole (ABILIFY) 10 MG tablet  clopidogrel (PLAVIX) 75 MG tablet  DULoxetine (CYMBALTA) 60 MG capsule  gabapentin (NEURONTIN) 800 MG " tablet  lisinopril (ZESTRIL) 5 MG tablet  naloxone (NARCAN) 4 MG/0.1ML nasal spray  nitroGLYcerin (NITROSTAT) 0.4 MG sublingual tablet  oxyCODONE-acetaminophen (PERCOCET) 5-325 MG tablet  pantoprazole (PROTONIX) 40 MG EC tablet  rosuvastatin (CRESTOR) 20 MG tablet  sucralfate (CARAFATE) 1 GM tablet  traZODone (DESYREL) 150 MG tablet  warfarin ANTICOAGULANT (COUMADIN) 5 MG tablet          Review of Systems  As mentioned above in the history present illness. All other systems were reviewed and are negative.    Physical Exam   BP: 115/66  Pulse: 88  Temp: 98.6  F (37  C)  Resp: 20  SpO2: 95 %      Physical Exam  Constitutional:       General: He is not in acute distress.     Appearance: Normal appearance. He is well-developed. He is not ill-appearing.   HENT:      Head: Normocephalic and atraumatic.      Right Ear: External ear normal.      Left Ear: External ear normal.      Nose: Nose normal.      Mouth/Throat:      Mouth: Mucous membranes are moist.   Eyes:      Conjunctiva/sclera: Conjunctivae normal.   Cardiovascular:      Rate and Rhythm: Normal rate and regular rhythm.      Heart sounds: Normal heart sounds. No murmur heard.  Pulmonary:      Effort: Pulmonary effort is normal. No respiratory distress.      Breath sounds: Normal breath sounds.   Abdominal:      General: Bowel sounds are normal. There is no distension.      Palpations: Abdomen is soft.      Tenderness: There is no abdominal tenderness.   Musculoskeletal:         General: Normal range of motion.   Skin:     General: Skin is warm and dry.      Findings: No rash.   Neurological:      General: No focal deficit present.      Mental Status: He is alert and oriented to person, place, and time.         ED Course                 Procedures              EKG Interpretation:      Interpreted by FACUNDO Houser CNP  Time reviewed:  11:44am  Symptoms at time of EKG: none   Rhythm: Normal sinus   Rate: Normal  Axis: Left Axis Deviation  Ectopy:  None  Conduction: Normal and Right bundle branch block (complete)  ST Segments/ T Waves: No ST-T wave changes and No acute ischemic changes  Q Waves: None  Comparison to prior: Unchanged from 12/28/2022    Clinical Impression: NSR with no acute ischemic changes.      Results for orders placed or performed during the hospital encounter of 03/30/23 (from the past 24 hour(s))   CBC with platelets differential    Narrative    The following orders were created for panel order CBC with platelets differential.  Procedure                               Abnormality         Status                     ---------                               -----------         ------                     CBC with platelets and d...[[id number]]  Abnormal            Final result                 Please view results for these tests on the individual orders.   Comprehensive metabolic panel   Result Value Ref Range    Sodium 142 136 - 145 mmol/L    Potassium 3.7 3.4 - 5.3 mmol/L    Chloride 105 98 - 107 mmol/L    Carbon Dioxide (CO2) 26 22 - 29 mmol/L    Anion Gap 11 7 - 15 mmol/L    Urea Nitrogen 8.1 6.0 - 20.0 mg/dL    Creatinine 0.95 0.67 - 1.17 mg/dL    Calcium 9.2 8.6 - 10.0 mg/dL    Glucose 117 (H) 70 - 99 mg/dL    Alkaline Phosphatase 69 40 - 129 U/L    AST 26 10 - 50 U/L    ALT 20 10 - 50 U/L    Protein Total 6.4 6.4 - 8.3 g/dL    Albumin 3.7 3.5 - 5.2 g/dL    Bilirubin Total 0.3 <=1.2 mg/dL    GFR Estimate >90 >60 mL/min/1.73m2   Troponin T, High Sensitivity   Result Value Ref Range    Troponin T, High Sensitivity 7 <=22 ng/L   INR   Result Value Ref Range    INR 3.28 (H) 0.85 - 1.15   CBC with platelets and differential   Result Value Ref Range    WBC Count 5.0 4.0 - 11.0 10e3/uL    RBC Count 5.09 4.40 - 5.90 10e6/uL    Hemoglobin 12.7 (L) 13.3 - 17.7 g/dL    Hematocrit 41.0 40.0 - 53.0 %    MCV 81 78 - 100 fL    MCH 25.0 (L) 26.5 - 33.0 pg    MCHC 31.0 (L) 31.5 - 36.5 g/dL    RDW 14.8 10.0 - 15.0 %    Platelet Count 254 150 - 450 10e3/uL     % Neutrophils 68 %    % Lymphocytes 24 %    % Monocytes 5 %    % Eosinophils 2 %    % Basophils 1 %    % Immature Granulocytes 0 %    NRBCs per 100 WBC 0 <1 /100    Absolute Neutrophils 3.4 1.6 - 8.3 10e3/uL    Absolute Lymphocytes 1.2 0.8 - 5.3 10e3/uL    Absolute Monocytes 0.3 0.0 - 1.3 10e3/uL    Absolute Eosinophils 0.1 0.0 - 0.7 10e3/uL    Absolute Basophils 0.0 0.0 - 0.2 10e3/uL    Absolute Immature Granulocytes 0.0 <=0.4 10e3/uL    Absolute NRBCs 0.0 10e3/uL   XR Chest 2 Views    Narrative    XR CHEST 2 VIEWS 3/30/2023 12:32 PM    HISTORY: chest pain    COMPARISON: CT 8/21/2022      Impression    IMPRESSION: Moderate size hiatal hernia. Right upper lobe calcified  granuloma. Left upper lobe 3.7 cm opacity likely related to a left  fifth rib lesion. Recommend follow-up with chest CT. No focal  consolidation or pleural effusion. Normal heart size.    GUI PRAJAPATI MD         SYSTEM ID:  [id number]   CT CHEST W CONTRAST    Narrative    CT CHEST WITH CONTRAST 3/30/2023 1:49 PM    CLINICAL HISTORY: Evaluate abnormal x-ray findings (left upper lobe  opacity and 5th rib bony lesion).    TECHNIQUE: CT chest with IV contrast. Multiplanar reformats were  obtained. Dose reduction techniques were used.  CONTRAST: Isovue 370, 81 mL IV.    COMPARISON: Chest x-ray 3/30/2023, CT chest 8/21/2022. Chest x-ray  5/9/2007.    FINDINGS:   LUNGS AND PLEURA: No effusions. No acute airspace disease. Calcified  granulomas noted bilaterally. Stable noncalcified nodule anterior  right lower lobe measuring 0.4 cm series 4 image 142. No worrisome new  focal airspace disease.    MEDIASTINUM/AXILLAE: Stable prominent hiatal hernia. No adenopathy or  fluid collection.    CORONARY ARTERY CALCIFICATION: Moderate.    UPPER ABDOMEN: No acute abnormality at the upper abdomen.    MUSCULOSKELETAL: Stable left posterior fifth rib mixed sclerosis and  lucency that is approximately 3.2 cm series 3 image 42. This finding  is also present and not  overall significantly changed in size compared  to a remote chest x-ray from 5/9/2007. No acute fracture can be seen.  Spine degenerative changes.      Impression    IMPRESSION:   1.  Left posterior fifth rib mixed sclerosis and lucency again  identified. When reviewing an older chest x-ray from 5/9/2007, this  appears present and stable in size. Therefore, it is suggestive for a  nonaggressive process.  2.  No acute abnormality is seen.  3.  Stable technically indeterminate small pulmonary nodule on the  right, see below for follow-up if applicable.  4.  Coronary artery calcifications.  5.  Stable prominent hiatal hernia.    Recommendations for one or multiple incidental lung nodules < 6mm:    Low risk patients: No routine follow-up.    High risk patients: Optional follow-up CT at 12 months; if  unchanged, no further follow-up.    *Low Risk: Minimal or absent history of smoking or other known risk  factors.  *Nonsolid (ground glass) or partly solid nodules may require longer  follow-up to exclude indolent adenocarcinoma.  *Recommendations based on Guidelines for the Management of Incidental  Pulmonary Nodules Detected at CT: From the Fleischner Society 2017,  Radiology 2017.       *Note: Due to a large number of results and/or encounters for the requested time period, some results have not been displayed. A complete set of results can be found in Results Review.       Medications   iopamidol (ISOVUE-370) solution 500 mL (82 mLs Intravenous $Given 3/30/23 1341)   sodium chloride 0.9 % bag 100mL for CT scan flush use (64 mLs Intravenous $Given 3/30/23 1340)     1:35 PM at bedside.  I discussed the lab and imaging findings with patient.  We discussed the incidental finding of the left upper lobe opacity and fifth rib lesion.  I recommend we get a CT to further characterize this.  Patient is in agreement.  Remains asymptomatic at this time.  2:15 PM at bedside.  I discussed the CT findings with patient and his wife.   Nothing worrisome.  We did discuss his mildly elevated INR (supratherapeutic).    Assessments & Plan (with Medical Decision Making)   I have low suspicion for PE given he is already on anticoagulation and his Coumadin is supratherapeutic.  I have low suspicion for ACS or cardiac cause for his discomfort, he has a negative troponin, no ischemic changes on EKG.  I do suspect this was gastrointestinal related given the size of his hiatal hernia and he just finished eating a cheeseburger.  His symptoms have resolved.  Normal vital signs.  His INR is supratherapeutic at 3.28 and I do recommend he contact his INR clinic for further management.  We did obtain a chest CT with contrast to further evaluate the incidental finding on his checks x-ray as noted above showing a left rib lesion with left upper lobe opacity.  CT shows this is a stable left posterior fifth rib sclerosis and was seen on an x-ray in 2007.  No further follow-up is needed.    PLan:  Your INR is 3.28. follow-up with INR clinic as discussed.    Remainder of your labs look good.  I suspect your symptoms were related to gastrointestinal symptoms with eating cheeseburger and your large hiatal hernia.    Please have a low threshold for returning if you develop fevers, vomiting, persistent chest pain, worsening abdominal pain, or any other symptoms of concern.  Final diagnoses:   Left upper quadrant pain   Chest pain, unspecified type       3/30/2023   [facility] EMERGENCY DEPT     Mrasha, FACUNDO Castaneda CNP  03/30/23 [id number]

## 2023-03-30 NOTE — PROGRESS NOTES
ANTICOAGULATION MANAGEMENT     Hollis Diggs 52 year old male is on warfarin with supratherapeutic INR result. (Goal INR 2.0-3.0)    Recent labs: (last 7 days)     03/30/23  1223   INR 3.28*       ASSESSMENT       Source(s): Chart Review       Warfarin doses taken: Reviewed in chart    Diet: Illness may be affecting diet and INR    New illness, injury, or hospitalization: Yes: IN ED today for GI issue    Medication/supplement changes: None noted    Signs or symptoms of bleeding or clotting: No    Previous INR: Therapeutic last visit; previously outside of goal range    Additional findings: None         PLAN     Recommended plan for temporary change(s) affecting INR     Dosing Instructions: partial hold then continue your current warfarin dose with next INR in 2 weeks       Summary  As of 3/30/2023    Full warfarin instructions:  3/30: 2.5 mg; Otherwise 5 mg every Mon, Thu; 7.5 mg all other days   Next INR check:  4/13/2023             Detailed voice message left for Hollis with dosing instructions and follow up date.     Contact [phone]  to schedule and with any changes, questions or concerns.     Education provided:     Please call back if any changes to your diet, medications or how you've been taking warfarin    Symptom monitoring: monitoring for bleeding signs and symptoms and when to seek medical attention/emergency care    Importance of notifying anticoagulation clinic for: diarrhea, nausea/vomiting, reduced intake, cold/flu, and/or infections; a sooner lab recheck maybe needed    Plan made per [facility] anticoagulation protocol    Анна Hale RN  Anticoagulation Clinic  3/30/2023    _______________________________________________________________________     Anticoagulation Episode Summary     Current INR goal:  2.0-3.0   TTR:  41.8 % (1 y)   Target end date:  Indefinite   Send INR reminders to:  TIMOTEO ALVAREZ    Indications    DVT (deep venous thrombosis) (H) (Resolved) [I82.409]  Long-term  (current) use of anticoagulants [Z79.01] [Z79.01]  Deep vein thrombosis (DVT) of proximal lower extremity  unspecified chronicity  unspecified laterality (H) [I82.4Y9]  Antiphospholipid syndrome (H) [D68.61]           Comments:           Anticoagulation Care Providers     Provider Role Specialty Phone number    Sylvester Cash MD [facility] [phone]

## 2023-03-30 NOTE — ED TRIAGE NOTES
"PT had chest pain that started about an hour ago. Brought in by EMS. Was given 2 doses of nitro by EMS that helped his pain \"a little bit\". Had a stent put in last year.       "

## 2023-03-31 ENCOUNTER — HOSPITAL ENCOUNTER (OUTPATIENT)
Dept: CARDIOLOGY | Facility: CLINIC | Age: 53
Discharge: HOME | End: 2023-03-31
Attending: INTERNAL MEDICINE | Admitting: INTERNAL MEDICINE
Payer: COMMERCIAL

## 2023-03-31 DIAGNOSIS — I25.10 CORONARY ATHEROSCLEROSIS DUE TO CALCIFIED CORONARY LESION OF NATIVE ARTERY: ICD-10-CM

## 2023-03-31 DIAGNOSIS — I25.84 CORONARY ATHEROSCLEROSIS DUE TO CALCIFIED CORONARY LESION OF NATIVE ARTERY: ICD-10-CM

## 2023-03-31 LAB — LVEF ECHO: NORMAL

## 2023-03-31 PROCEDURE — 93306 TTE W/DOPPLER COMPLETE: CPT | Mod: 26 | Performed by: INTERNAL MEDICINE

## 2023-03-31 PROCEDURE — 255N000002 HC RX 255 OP 636: Performed by: INTERNAL MEDICINE

## 2023-03-31 PROCEDURE — 999N000208 ECHOCARDIOGRAM COMPLETE

## 2023-03-31 RX ADMIN — HUMAN ALBUMIN MICROSPHERES AND PERFLUTREN 6 ML: 10; .22 INJECTION, SOLUTION INTRAVENOUS at 13:42

## 2023-04-05 NOTE — PROGRESS NOTES
[facility]    Outpatient Physical Therapy Discharge Note  Patient: Hollis Diggs  : 1970    Beginning/End Dates of Reporting Period:  2023 to 2023    Referring Provider: agustín chairez MD     Therapy Diagnosis: back pain      Client Self Report: HEP is going well and overall his back is feeling alittle better    Objective Measurements:  Objective Measure: pain 2-7/10 [place] low SOILA 20 90/90 right 40 left 30 at eval  Details: 23 pain 2-6.5/10      patient has been seen for 5 Rx sessions for instruction in exercises and HEP at last Rx he was completing the following   reviewed the HEP 1 avoid flexion posture , 2 standing or prone if able extension every 2-3 hours as able , 3 sitting knee flexion/extension flossing hold 10 x 3, 4 added strengthening bridges , SLR, hip adduction isometric , side hip abduction , prone knee flexion 10x goal 30    Also in clinic completed trial of traction or IFC with hot pack     He cancelled his last Rx on 3/22/23 and as of 23 he has made no further contact with PT            Goals:  Goal Identifier     Goal Description     Target Date     Date Met      Progress (detail required for progress note):       Goal Identifier  1   Goal Description  instruction in HEP and compliant with it 5 of 7 days    Target Date  23   Date Met      Progress (detail required for progress note):       Goal Identifier  2   Goal Description  patient to have reduction in pain level currently -7/10 goal is 2-4/10   Target Date  23   Date Met      Progress (detail required for progress note):       Goal Identifier  3   Goal Description  patient to have improved tolerance to work and activity currently SOILA goal is 50% improvement   Target Date  23   Date Met      Progress (detail required for progress note):       Plan:  Discharge from  therapy.    Discharge:    Reason for Discharge: No further expectation of progress.  Patient chooses to discontinue therapy.  Patient has failed to schedule further appointments.    Equipment Issued: none    Discharge Plan: Patient to continue home program.

## 2023-04-10 DIAGNOSIS — F41.9 ANXIETY: ICD-10-CM

## 2023-04-11 NOTE — TELEPHONE ENCOUNTER
Requested Prescriptions   Pending Prescriptions Disp Refills    gabapentin (NEURONTIN) 800 MG tablet 90 tablet 3     Sig: Take 1 tablet (800 mg) by mouth 3 times daily       There is no refill protocol information for this order

## 2023-04-12 ENCOUNTER — LAB (OUTPATIENT)
Dept: LAB | Facility: CLINIC | Age: 53
End: 2023-04-12
Payer: COMMERCIAL

## 2023-04-12 ENCOUNTER — ANTICOAGULATION THERAPY VISIT (OUTPATIENT)
Dept: ANTICOAGULATION | Facility: CLINIC | Age: 53
End: 2023-04-12

## 2023-04-12 DIAGNOSIS — D68.61 ANTIPHOSPHOLIPID SYNDROME (H): ICD-10-CM

## 2023-04-12 DIAGNOSIS — I82.4Y9 DEEP VEIN THROMBOSIS (DVT) OF PROXIMAL LOWER EXTREMITY, UNSPECIFIED CHRONICITY, UNSPECIFIED LATERALITY (H): ICD-10-CM

## 2023-04-12 DIAGNOSIS — Z79.01 LONG TERM CURRENT USE OF ANTICOAGULANT THERAPY: Primary | ICD-10-CM

## 2023-04-12 DIAGNOSIS — F11.90 CHRONIC, CONTINUOUS USE OF OPIOIDS: ICD-10-CM

## 2023-04-12 DIAGNOSIS — Z79.01 LONG TERM CURRENT USE OF ANTICOAGULANT THERAPY: ICD-10-CM

## 2023-04-12 LAB
AMPHETAMINES UR QL: NOT DETECTED
BARBITURATES UR QL SCN: NOT DETECTED
BENZODIAZ UR QL SCN: NOT DETECTED
BUPRENORPHINE UR QL: NOT DETECTED
CANNABINOIDS UR QL: NOT DETECTED
COCAINE UR QL SCN: NOT DETECTED
D-METHAMPHET UR QL: NOT DETECTED
INR BLD: 3 (ref 0.9–1.1)
METHADONE UR QL SCN: NOT DETECTED
OPIATES UR QL SCN: NOT DETECTED
OXYCODONE UR QL SCN: NOT DETECTED
PCP UR QL SCN: NOT DETECTED
PROPOXYPH UR QL: NOT DETECTED
TRICYCLICS UR QL SCN: NOT DETECTED

## 2023-04-12 PROCEDURE — 80306 DRUG TEST PRSMV INSTRMNT: CPT

## 2023-04-12 PROCEDURE — 36416 COLLJ CAPILLARY BLOOD SPEC: CPT

## 2023-04-12 PROCEDURE — 85610 PROTHROMBIN TIME: CPT

## 2023-04-12 NOTE — PROGRESS NOTES
ANTICOAGULATION MANAGEMENT     Hollis Diggs 52 year old male is on warfarin with therapeutic INR result. (Goal INR 2.0-3.0)    Recent labs: (last 7 days)     04/12/23  1555   INR 3.0*       ASSESSMENT       Source(s): Chart Review and Patient/Caregiver Call       Warfarin doses taken: Warfarin taken as instructed    Diet: No new diet changes identified    New illness, injury, or hospitalization: No    Medication/supplement changes: None noted    Signs or symptoms of bleeding or clotting: No    Previous INR: Supratherapeutic    Additional findings: None         PLAN     Recommended plan for no diet, medication or health factor changes affecting INR     Dosing Instructions: Continue your current warfarin dose with next INR in 3 weeks       Summary  As of 4/12/2023    Full warfarin instructions:  5 mg every Mon, Thu; 7.5 mg all other days   Next INR check:  5/3/2023             Telephone call with Hollis who verbalizes understanding and agrees to plan and who agrees to plan and repeated back plan correctly    Lab visit scheduled    Education provided:     Please call back if any changes to your diet, medications or how you've been taking warfarin    Plan made per [facility] anticoagulation protocol    Jesse Flores, RN  Anticoagulation Clinic  4/12/2023    _______________________________________________________________________     Anticoagulation Episode Summary     Current INR goal:  2.0-3.0   TTR:  38.2 % (1 y)   Target end date:  Indefinite   Send INR reminders to:  TIMOTEO ALVAREZ    Indications    DVT (deep venous thrombosis) (H) (Resolved) [I82.409]  Long-term (current) use of anticoagulants [Z79.01] [Z79.01]  Deep vein thrombosis (DVT) of proximal lower extremity  unspecified chronicity  unspecified laterality (H) [I82.4Y9]  Antiphospholipid syndrome (H) [D68.61]           Comments:           Anticoagulation Care Providers     Provider Role Specialty Phone number    Sylvester Cash MD Referring Family  [facility] [phone]

## 2023-04-13 ENCOUNTER — MYC MEDICAL ADVICE (OUTPATIENT)
Dept: FAMILY MEDICINE | Facility: CLINIC | Age: 53
End: 2023-04-13
Payer: COMMERCIAL

## 2023-04-13 RX ORDER — GABAPENTIN 800 MG/1
800 TABLET ORAL 3 TIMES DAILY
Qty: 90 TABLET | Refills: 3 | Status: SHIPPED | OUTPATIENT
Start: 2023-04-13 | End: 2023-08-02

## 2023-04-14 ENCOUNTER — MYC REFILL (OUTPATIENT)
Dept: FAMILY MEDICINE | Facility: CLINIC | Age: 53
End: 2023-04-14
Payer: COMMERCIAL

## 2023-04-14 DIAGNOSIS — M54.16 LUMBAR RADICULOPATHY: ICD-10-CM

## 2023-04-14 NOTE — TELEPHONE ENCOUNTER
Requested Prescriptions   Pending Prescriptions Disp Refills     oxyCODONE-acetaminophen (PERCOCET) 5-325 MG tablet 60 tablet 0     Sig: Take 1 tablet by mouth every 6 hours as needed for severe pain       Routing refill request to provider for review/approval because:  Drug not on the [facility], [facility] or [facility] refill protocol or controlled substance

## 2023-04-18 ENCOUNTER — MYC MEDICAL ADVICE (OUTPATIENT)
Dept: FAMILY MEDICINE | Facility: CLINIC | Age: 53
End: 2023-04-18
Payer: COMMERCIAL

## 2023-04-19 RX ORDER — OXYCODONE AND ACETAMINOPHEN 5; 325 MG/1; MG/1
1 TABLET ORAL EVERY 6 HOURS PRN
Qty: 60 TABLET | Refills: 0 | Status: SHIPPED | OUTPATIENT
Start: 2023-04-19 | End: 2023-05-18

## 2023-04-19 NOTE — TELEPHONE ENCOUNTER
Message handled by Nurse Triage with [facility] - provider name: Dr. Ledezma.  Is unsure why the drug screen was reordered on 04/12/23; mentioned this was done in error as he only wanted to have the one in March 2023.  He said he would also sent the refill for the Percocet.    Patient informed of this via [employer].

## 2023-05-03 ENCOUNTER — LAB (OUTPATIENT)
Dept: LAB | Facility: CLINIC | Age: 53
End: 2023-05-03
Payer: COMMERCIAL

## 2023-05-03 ENCOUNTER — TELEPHONE (OUTPATIENT)
Dept: ANTICOAGULATION | Facility: CLINIC | Age: 53
End: 2023-05-03

## 2023-05-03 ENCOUNTER — ANTICOAGULATION THERAPY VISIT (OUTPATIENT)
Dept: ANTICOAGULATION | Facility: CLINIC | Age: 53
End: 2023-05-03

## 2023-05-03 DIAGNOSIS — D68.61 ANTIPHOSPHOLIPID SYNDROME (H): ICD-10-CM

## 2023-05-03 DIAGNOSIS — Z79.01 LONG TERM CURRENT USE OF ANTICOAGULANT THERAPY: Primary | ICD-10-CM

## 2023-05-03 DIAGNOSIS — I82.4Y9 DEEP VEIN THROMBOSIS (DVT) OF PROXIMAL LOWER EXTREMITY, UNSPECIFIED CHRONICITY, UNSPECIFIED LATERALITY (H): ICD-10-CM

## 2023-05-03 DIAGNOSIS — I82.4Y9 DEEP VEIN THROMBOSIS (DVT) OF PROXIMAL LOWER EXTREMITY, UNSPECIFIED CHRONICITY, UNSPECIFIED LATERALITY (H): Primary | ICD-10-CM

## 2023-05-03 DIAGNOSIS — Z79.01 LONG TERM CURRENT USE OF ANTICOAGULANT THERAPY: ICD-10-CM

## 2023-05-03 LAB — INR BLD: 3 (ref 0.9–1.1)

## 2023-05-03 PROCEDURE — 85610 PROTHROMBIN TIME: CPT

## 2023-05-03 PROCEDURE — 36416 COLLJ CAPILLARY BLOOD SPEC: CPT

## 2023-05-03 NOTE — PROGRESS NOTES
ANTICOAGULATION MANAGEMENT     Hollis Diggs 52 year old male is on warfarin with therapeutic INR result. (Goal INR 2.0-3.0)    Recent labs: (last 7 days)     05/03/23  1545   INR 3.0*       ASSESSMENT       Source(s): Chart Review and Patient/Caregiver Call       Warfarin doses taken: Warfarin taken as instructed    Diet: No new diet changes identified    Medication/supplement changes: None noted    New illness, injury, or hospitalization: No    Signs or symptoms of bleeding or clotting: No    Previous result: Therapeutic last visit; previously outside of goal range    Additional findings: None         PLAN     Recommended plan for no diet, medication or health factor changes affecting INR     Dosing Instructions: Continue your current warfarin dose with next INR in 4 weeks       Summary  As of 5/3/2023    Full warfarin instructions:  5 mg every Mon, Thu; 7.5 mg all other days   Next INR check:  5/31/2023             Telephone call with Hollis who verbalizes understanding and agrees to plan    Lab visit scheduled    Education provided:     Please call back if any changes to your diet, medications or how you've been taking warfarin    Contact [phone]  with any changes, questions or concerns.     Plan made per [facility] anticoagulation protocol    Claudia GARCIA RN  Anticoagulation Clinic  5/3/2023    _______________________________________________________________________     Anticoagulation Episode Summary     Current INR goal:  2.0-3.0   TTR:  42.9 % (1 y)   Target end date:  Indefinite   Send INR reminders to:  TIMOTEO ALVAREZ    Indications    DVT (deep venous thrombosis) (H) (Resolved) [I82.409]  Long-term (current) use of anticoagulants [Z79.01] [Z79.01]  Deep vein thrombosis (DVT) of proximal lower extremity  unspecified chronicity  unspecified laterality (H) [I82.4Y9]  Antiphospholipid syndrome (H) [D68.61]           Comments:           Anticoagulation Care Providers     Provider Role Specialty Phone number     Sylvester Cash MD [facility] [phone]

## 2023-05-03 NOTE — TELEPHONE ENCOUNTER
ANTICOAGULATION CLINIC REFERRAL RENEWAL REQUEST       An annual renewal order is required for all patients referred to [facility].?  Please review and sign the pended referral order for Hollis Diggs.       ANTICOAGULATION SUMMARY      Warfarin indication(s)   DVT and Antiphospholipid Antibodies    Mechanical heart valve present?  NO       Current goal range   INR: 2.0-3.0     Goal appropriate for indication? Goal INR 2-3, standard for indication(s) above     Time in Therapeutic Range (TTR)  (Goal > 60%) 38.2%       Office visit with referring provider's group within last year yes on 1/5/23       Claudia Guzman RN  [facility]

## 2023-05-11 ENCOUNTER — OFFICE VISIT (OUTPATIENT)
Dept: CARDIOLOGY | Facility: CLINIC | Age: 53
End: 2023-05-11
Attending: INTERNAL MEDICINE
Payer: COMMERCIAL

## 2023-05-11 ENCOUNTER — OFFICE VISIT (OUTPATIENT)
Dept: FAMILY MEDICINE | Facility: CLINIC | Age: 53
End: 2023-05-11
Payer: COMMERCIAL

## 2023-05-11 VITALS
OXYGEN SATURATION: 97 % | HEART RATE: 74 BPM | WEIGHT: 244.8 LBS | BODY MASS INDEX: 34.27 KG/M2 | DIASTOLIC BLOOD PRESSURE: 74 MMHG | HEIGHT: 71 IN | SYSTOLIC BLOOD PRESSURE: 122 MMHG

## 2023-05-11 VITALS
DIASTOLIC BLOOD PRESSURE: 70 MMHG | RESPIRATION RATE: 16 BRPM | HEART RATE: 69 BPM | HEIGHT: 71 IN | OXYGEN SATURATION: 98 % | SYSTOLIC BLOOD PRESSURE: 114 MMHG | TEMPERATURE: 97.8 F | BODY MASS INDEX: 34.23 KG/M2 | WEIGHT: 244.5 LBS

## 2023-05-11 DIAGNOSIS — I25.10 CORONARY ATHEROSCLEROSIS DUE TO CALCIFIED CORONARY LESION OF NATIVE ARTERY: ICD-10-CM

## 2023-05-11 DIAGNOSIS — I25.84 CORONARY ATHEROSCLEROSIS DUE TO CALCIFIED CORONARY LESION OF NATIVE ARTERY: ICD-10-CM

## 2023-05-11 DIAGNOSIS — B07.0 PLANTAR WARTS: Primary | ICD-10-CM

## 2023-05-11 PROCEDURE — 17110 DESTRUCTION B9 LES UP TO 14: CPT | Performed by: STUDENT IN AN ORGANIZED HEALTH CARE EDUCATION/TRAINING PROGRAM

## 2023-05-11 PROCEDURE — 99214 OFFICE O/P EST MOD 30 MIN: CPT | Performed by: INTERNAL MEDICINE

## 2023-05-11 ASSESSMENT — ANXIETY QUESTIONNAIRES
6. BECOMING EASILY ANNOYED OR IRRITABLE: SEVERAL DAYS
IF YOU CHECKED OFF ANY PROBLEMS ON THIS QUESTIONNAIRE, HOW DIFFICULT HAVE THESE PROBLEMS MADE IT FOR YOU TO DO YOUR WORK, TAKE CARE OF THINGS AT HOME, OR GET ALONG WITH OTHER PEOPLE: SOMEWHAT DIFFICULT
GAD7 TOTAL SCORE: 10
2. NOT BEING ABLE TO STOP OR CONTROL WORRYING: MORE THAN HALF THE DAYS
5. BEING SO RESTLESS THAT IT IS HARD TO SIT STILL: SEVERAL DAYS
7. FEELING AFRAID AS IF SOMETHING AWFUL MIGHT HAPPEN: SEVERAL DAYS
1. FEELING NERVOUS, ANXIOUS, OR ON EDGE: SEVERAL DAYS
3. WORRYING TOO MUCH ABOUT DIFFERENT THINGS: MORE THAN HALF THE DAYS
7. FEELING AFRAID AS IF SOMETHING AWFUL MIGHT HAPPEN: SEVERAL DAYS
4. TROUBLE RELAXING: MORE THAN HALF THE DAYS
GAD7 TOTAL SCORE: 10
8. IF YOU CHECKED OFF ANY PROBLEMS, HOW DIFFICULT HAVE THESE MADE IT FOR YOU TO DO YOUR WORK, TAKE CARE OF THINGS AT HOME, OR GET ALONG WITH OTHER PEOPLE?: SOMEWHAT DIFFICULT

## 2023-05-11 NOTE — PROGRESS NOTES
HPI:     This is a 53 year old with PMH COVID x 2, antiphospholipid antibody syndrome on chronic warfarin, DVT, with CAD s/p recent PCI to RCA.      Here for follow up. From prior visit - here for chest pain. He is s/p ER visit recently at [facility]. This was an incident that occurred for about 2 hours with radiation to arm. Relieved with sl nitroglycerin x 2 in the ER. Prior to this he was not very active to endorse exertional chest pain or pressure.      Seen in ER in August and had CTPE negative for PE. Tn negative. EKG neg. Large esophageal hiatal hernia. Had extensive coronary disease of LAD and RCA noted on CT scan.      Family history reviewed. No known family history of MI, CAD.  Non smoker.      COVID history: had COVID twice, last bout over a year ago. Uncomplicated but was quite sick the first time.      ROS is negative for fevers, chills, ns, abd pain, n/v/d/, leg edema.      Interval history: he underwent PCI with Dr. Avila and had following performed. LMCA with no significant stenoses, LAD 60%, RCA mid 90%. He underwent PCI to the RCA via iVUS. He was discharged on plavix and warfarin with INR goal of 2-2.5.      No bleeding issues. No longer having chest pain. Access site healed well. Overall doing great. Only residual complaint is fatigue.      ASSESSMENT/PLAN:      1. Chest pain: given risk factors of COVID, antiphospholipid antibody syndrome, classic resting angina as well as CT scan demonstrating extensive coronary artery disease we underwent coronary angiogram and he is now s/p PCI to the rCA and doing well on plavix for 6 months and warfarin with INR goal 2.5-3.      2. Fatigue: echocardiogram reviewed  Sleep study recommended  Will follow up one year    3. HTN: on ACEi     Gloria Bell MD MSC  [facility]        PAST MEDICAL HISTORY  Past Medical History:   Diagnosis Date     Antiphospholipid syndrome (H)      Arthritis      Asthma     Exercise     blood clot in leg       Depressive disorder, not elsewhere classified     Depression (non-psychotic)     ANKIT (generalised anxiety disorder)      HH (hiatus hernia)      Hypercholesteremia     normal with weight loss 3/09     Lumbar disc herniation 1992     Seronegative rheumatoid arthritis (H)        CURRENT MEDICATIONS  Current Outpatient Medications   Medication Sig Dispense Refill     ARIPiprazole (ABILIFY) 10 MG tablet TAKE ONE TABLET BY MOUTH ONCE DAILY 90 tablet 1     clopidogrel (PLAVIX) 75 MG tablet Take 1 tablet (75 mg) by mouth daily 180 tablet 3     DULoxetine (CYMBALTA) 60 MG capsule TAKE TWO CAPSULES BY MOUTH EVERY DAY 60 capsule 0     gabapentin (NEURONTIN) 800 MG tablet Take 1 tablet (800 mg) by mouth 3 times daily 90 tablet 3     lisinopril (ZESTRIL) 5 MG tablet TAKE ONE TABLET BY MOUTH ONCE DAILY 90 tablet 1     oxyCODONE-acetaminophen (PERCOCET) 5-325 MG tablet Take 1 tablet by mouth every 6 hours as needed for severe pain 60 tablet 0     pantoprazole (PROTONIX) 40 MG EC tablet Take 1 tablet (40 mg) by mouth daily 90 tablet 1     rosuvastatin (CRESTOR) 20 MG tablet Take 1 tablet (20 mg) by mouth daily 90 tablet 3     sucralfate (CARAFATE) 1 GM tablet Take 1 tablet (1 g) by mouth 4 times daily 40 tablet 1     traZODone (DESYREL) 150 MG tablet TAKE 1 & 1/2 TABLET BY MOUTH AT BEDTIME Strength: 150 mg 135 tablet 1     warfarin ANTICOAGULANT (COUMADIN) 5 MG tablet Take 1 &1/2 tablets (7.5mg) by mouth daily, or as directed.  Adjust dose based on INR results. 140 tablet 1     naloxone (NARCAN) 4 MG/0.1ML nasal spray Spray 1 spray (4 mg) into one nostril alternating nostrils as needed for opioid reversal every 2-3 minutes until assistance arrives (Patient not taking: Reported on 3/17/2023) 0.2 mL 0     nitroGLYcerin (NITROSTAT) 0.4 MG sublingual tablet For chest pain place 1 tablet under the tongue every 5 minutes for 3 doses. If symptoms persist 5 minutes after 1st dose call 911. (Patient not taking: Reported on 3/17/2023) 30  tablet 0       PAST SURGICAL HISTORY:  Past Surgical History:   Procedure Laterality Date     APPENDECTOMY       COLONOSCOPY N/A 8/2/2016    Procedure: COMBINED COLONOSCOPY, SINGLE OR MULTIPLE BIOPSY/POLYPECTOMY BY BIOPSY;  Surgeon: Sydnee Walton MD;  Location: [facility] OR     COLONOSCOPY N/A 5/3/2022    Procedure: COLONOSCOPY;  Surgeon: Jose A Hurt MD;  Location: [facility]     COLONOSCOPY WITH CO2 INSUFFLATION N/A 8/2/2016    Procedure: COLONOSCOPY WITH CO2 INSUFFLATION;  Surgeon: Sydnee Walton MD;  Location: [facility]     COMBINED ESOPHAGOSCOPY, GASTROSCOPY, DUODENOSCOPY (EGD) WITH CO2 INSUFFLATION N/A 8/2/2016    Procedure: COMBINED ESOPHAGOSCOPY, GASTROSCOPY, DUODENOSCOPY (EGD) WITH CO2 INSUFFLATION;  Surgeon: Sydnee Walton MD;  Location: [facility]     COMBINED ESOPHAGOSCOPY, GASTROSCOPY, DUODENOSCOPY (EGD) WITH CO2 INSUFFLATION N/A 5/27/2021    Procedure: ESOPHAGOGASTRODUODENOSCOPY, WITH CO2 INSUFFLATION;  Surgeon: Alis Cotton DO;  Location: [facility] OR     CV CORONARY ANGIOGRAM N/A 10/11/2022    Procedure: Coronary Angiogram;  Surgeon: Hollis Avila MD;  Location: [facility] CARDIAC CATH LAB     CV INSTANTANEOUS WAVE-FREE RATIO N/A 10/11/2022    Procedure: Instantaneous Wave-Free Ratio;  Surgeon: Hollis Avila MD;  Location: [facility] CARDIAC CATH LAB     CV INTRAVASULAR ULTRASOUND N/A 10/11/2022    Procedure: Intravascular Ultrasound;  Surgeon: Hollis Avila MD;  Location: [facility] CARDIAC CATH LAB     CV PCI ANGIOPLASTY N/A 10/11/2022    Procedure: Percutaneous Transluminal Angioplasty;  Surgeon: Hollis Avila MD;  Location: [facility] CARDIAC CATH LAB     ESOPHAGOSCOPY, GASTROSCOPY, DUODENOSCOPY (EGD), COMBINED N/A 8/2/2016    Procedure: COMBINED ESOPHAGOSCOPY, GASTROSCOPY, DUODENOSCOPY (EGD), BIOPSY SINGLE OR MULTIPLE;  Surgeon: Sydnee Walton MD;  Location: [facility]     ESOPHAGOSCOPY, GASTROSCOPY, DUODENOSCOPY (EGD), COMBINED N/A  "5/27/2021    Procedure: Esophagogastroduodenoscopy, With Biopsy;  Surgeon: Alis Cotton DO;  Location: [facility]     ESOPHAGOSCOPY, GASTROSCOPY, DUODENOSCOPY (EGD), COMBINED N/A 5/3/2022    Procedure: ESOPHAGOGASTRODUODENOSCOPY, WITH BIOPSY;  Surgeon: Jose A Hurt MD;  Location: [facility]     HC REMOVAL OF TONSILS,<11 Y/O      Tonsils <11y.o.     HC UGI ENDOSCOPY DIAG W BIOPSY  02/01/06     HC VASECTOMY UNILAT/BILAT W POSTOP SEMEN  1/05    Vasectomy     History back lumbar laminectomy       INJECT EPIDURAL LUMBAR Right 4/22/2021    Procedure: Right Lumbar 4-5 and Lumbar 5 - Sacral 1 Epidural Steroid Injection;  Surgeon: Maxwell Zacarias MD;  Location: [facility] OR     ZZC NONSPECIFIC PROCEDURE  91 or 92    back surgery. lumbar. lamiectomy     ZZHC REPAIR INCISIONAL HERNIA,REDUCIBLE  1970's    Hernia Repair, Incisional, Unilateral       ALLERGIES     Allergies   Allergen Reactions     No Known Drug Allergy        FAMILY HISTORY  Family History   Problem Relation Age of Onset     Brain Tumor Mother         Benign     Deep Vein Thrombosis Mother         \"neck\"      Heart Disease Father         \"wont tell anyone\"     Neurologic Disorder Paternal Grandmother         Parkinsons      Alcohol/Drug Paternal Grandfather         alcoholic     Cancer Maternal Grandfather         lung     Diabetes Maternal Grandmother      Cerebrovascular Disease Maternal Grandmother         tim age ~70     Arthritis Cousin         cousins x 2 \"RA\"     Musculoskeletal Disorder Maternal Aunt         MS     Family History Negative Other         psoriasis, crohns, UC, SLE         SOCIAL HISTORY  Social History     Socioeconomic History     Marital status:      Spouse name: Cassie     Number of children: 2     Years of education: 15     Highest education level: Not on file   Occupational History     Occupation:      Employer: [employer]   Tobacco Use     Smoking status: Never     Passive exposure: Never     Smokeless tobacco: " "Never   Vaping Use     Vaping status: Never Used   Substance and Sexual Activity     Alcohol use: Yes     Comment: rare     Drug use: No     Sexual activity: Yes     Partners: Female   Other Topics Concern      Service No     Blood Transfusions No     Caffeine Concern Yes     Comment: 64 oz q day     Occupational Exposure Not Asked     Hobby Hazards Not Asked     Sleep Concern Yes     Stress Concern No     Weight Concern No     Special Diet Not Asked     Back Care Not Asked     Exercise Yes     Comment: sometimes     Bike Helmet Not Asked     Seat Belt Yes     Self-Exams No     Parent/sibling w/ CABG, MI or angioplasty before 65F 55M? Not Asked   Social History Narrative    4 children healthy     Social Determinants of Health     Financial Resource Strain: Not on file   Food Insecurity: Not on file   Transportation Needs: Not on file   Physical Activity: Not on file   Stress: Not on file   Social Connections: Not on file   Intimate Partner Violence: Not on file   Housing Stability: Not on file       ROS:   Constitutional: No fever, chills, or sweats. No weight gain/loss   ENT: No visual disturbance, ear ache, epistaxis, sore throat  Allergies/Immunologic: Negative  Respiratory: No cough, hemoptysia  Cardiovascular: As per HPI  GI: No nausea, vomiting, hematemesis, melena, or hematochezia  : No urinary frequency, dysuria, or hematuria  Integument: Negative  Psychiatric: Negative  Neuro: Negative  Endocrinology: Negative   Musculoskeletal: Negative  Vascular: No walking impairment, claudication, ischemic rest pain or nonhealing wounds    EXAM:  /74 (BP Location: Left arm, Patient Position: Sitting, Cuff Size: Adult Large)   Pulse 74   Ht 1.803 m (5' 11\")   Wt 111 kg (244 lb 12.8 oz)   SpO2 97%   BMI 34.14 kg/m    In general, the patient is a pleasant male in no apparent distress.    HEENT: NC/AT.  PERRLA.  EOMI.  Sclerae white, not injected.   Neck: No adenopathy.  No thyromegaly. Carotids +2/2 " bilaterally without bruits.  No jugular venous distension.   Heart: RRR. Normal S1, S2 splits physiologically. No murmur, rub, click, or gallop.  Lungs: CTA.  No ronchi, wheezes, rales.  No dullness to percussion.   Abdomen: Soft, nontender, nondistende No bruits.   Extremities: No clubbing, cyanosis, or edema.    Vascular: No bruits are noted.    Labs:  LIPID RESULTS:  Lab Results   Component Value Date    CHOL 109 11/17/2022    CHOL 161 06/23/2020    HDL 52 11/17/2022    HDL 38 (L) 06/23/2020    LDL 43 11/17/2022     (H) 06/23/2020    TRIG 72 11/17/2022    TRIG 92 06/23/2020    CHOLHDLRATIO 5.0 07/11/2014    NHDL 57 11/17/2022    NHDL 123 06/23/2020       LIVER ENZYME RESULTS:  Lab Results   Component Value Date    AST 26 03/30/2023    AST 14 06/22/2020    ALT 20 03/30/2023    ALT 22 06/22/2020       CBC RESULTS:  Lab Results   Component Value Date    WBC 5.0 03/30/2023    WBC 10.1 05/12/2021    RBC 5.09 03/30/2023    RBC 5.04 05/12/2021    HGB 12.7 (L) 03/30/2023    HGB 11.5 (L) 05/12/2021    HCT 41.0 03/30/2023    HCT 38.7 (L) 05/12/2021    MCV 81 03/30/2023    MCV 77 (L) 05/12/2021    MCH 25.0 (L) 03/30/2023    MCH 22.8 (L) 05/12/2021    MCHC 31.0 (L) 03/30/2023    MCHC 29.7 (L) 05/12/2021    RDW 14.8 03/30/2023    RDW 18.0 (H) 05/12/2021     03/30/2023     05/12/2021       BMP RESULTS:  Lab Results   Component Value Date     03/30/2023     05/12/2021    POTASSIUM 3.7 03/30/2023    POTASSIUM 3.6 12/28/2022    POTASSIUM 3.4 05/12/2021    CHLORIDE 105 03/30/2023    CHLORIDE 106 12/28/2022    CHLORIDE 109 05/12/2021    CO2 26 03/30/2023    CO2 29 12/28/2022    CO2 30 05/12/2021    ANIONGAP 11 03/30/2023    ANIONGAP 6 12/28/2022    ANIONGAP 4 05/12/2021     (H) 03/30/2023     (H) 12/28/2022    GLC 82 05/12/2021    BUN 8.1 03/30/2023    BUN 9 12/28/2022    BUN 11 05/12/2021    CR 0.95 03/30/2023    CR 1.18 05/12/2021    GFRESTIMATED >90 03/30/2023    GFRESTIMATED 71  05/12/2021    GFRESTBLACK 82 05/12/2021    JANEE 9.2 03/30/2023    JANEE 8.3 (L) 05/12/2021        A1C RESULTS:  Lab Results   Component Value Date    A1C 5.3 10/06/2017

## 2023-05-11 NOTE — Clinical Note
5/11/2023    Christiano Ledezma MD  [address] [place] [address]    RE: Hollis Diggs       Dear Colleague,     I had the pleasure of seeing Hollis Diggs in the [facility].  HPI:       This is a 52 year old with PMH COVID x 2, antiphospholipid antibody syndrome on chronic warfarin, DVT, with CAD s/p recent PCI to RCA.      Here for follow up. From prior visit - here for chest pain. He is s/p ER visit recently at [facility]. This was an incident that occurred for about 2 hours with radiation to arm. Relieved with sl nitroglycerin x 2 in the ER. Prior to this he was not very active to endorse exertional chest pain or pressure.      Seen in ER in August and had CTPE negative for PE. Tn negative. EKG neg. Large esophageal hiatal hernia. Had extensive coronary disease of LAD and RCA noted on CT scan.      Family history reviewed. No known family history of MI, CAD.  Non smoker.      COVID history: had COVID twice, last bout over a year ago. Uncomplicated but was quite sick the first time.      ROS is negative for fevers, chills, ns, abd pain, n/v/d/, leg edema.      Interval history: he underwent PCI with Dr. Avila and had following performed. LMCA with no significant stenoses, LAD 60%, RCA mid 90%. He underwent PCI to the RCA via iVUS. He was discharged on plavix and warfarin with INR goal of 2-2.5.      No bleeding issues. No longer having chest pain. Access site healed well. Overall doing great. Only residual complaint is fatigue.      ASSESSMENT/PLAN:      1. Chest pain: given risk factors of COVID, antiphospholipid antibody syndrome, classic resting angina as well as CT scan demonstrating extensive coronary artery disease we underwent coronary angiogram and he is now s/p PCI to the rCA and doing well on plavix for 6 months and warfarin with INR goal 2.5-3.      2. Fatigue:   Recommend sleep study and echocardiogram prior to next follow up visit.      Gloria Bell MD MSC  [facility]        PAST MEDICAL HISTORY  Past Medical History:   Diagnosis Date     Antiphospholipid syndrome (H)      Arthritis      Asthma     Exercise     blood clot in leg      Depressive disorder, not elsewhere classified     Depression (non-psychotic)     ANKIT (generalised anxiety disorder)      HH (hiatus hernia)      Hypercholesteremia     normal with weight loss 3/09     Lumbar disc herniation 1992     Seronegative rheumatoid arthritis (H)        CURRENT MEDICATIONS  Current Outpatient Medications   Medication Sig Dispense Refill     ARIPiprazole (ABILIFY) 10 MG tablet TAKE ONE TABLET BY MOUTH ONCE DAILY 90 tablet 1     clopidogrel (PLAVIX) 75 MG tablet Take 1 tablet (75 mg) by mouth daily 180 tablet 3     DULoxetine (CYMBALTA) 60 MG capsule TAKE TWO CAPSULES BY MOUTH EVERY DAY 60 capsule 0     gabapentin (NEURONTIN) 800 MG tablet Take 1 tablet (800 mg) by mouth 3 times daily 90 tablet 3     lisinopril (ZESTRIL) 5 MG tablet TAKE ONE TABLET BY MOUTH ONCE DAILY 90 tablet 1     oxyCODONE-acetaminophen (PERCOCET) 5-325 MG tablet Take 1 tablet by mouth every 6 hours as needed for severe pain 60 tablet 0     pantoprazole (PROTONIX) 40 MG EC tablet Take 1 tablet (40 mg) by mouth daily 90 tablet 1     rosuvastatin (CRESTOR) 20 MG tablet Take 1 tablet (20 mg) by mouth daily 90 tablet 3     sucralfate (CARAFATE) 1 GM tablet Take 1 tablet (1 g) by mouth 4 times daily 40 tablet 1     traZODone (DESYREL) 150 MG tablet TAKE 1 & 1/2 TABLET BY MOUTH AT BEDTIME Strength: 150 mg 135 tablet 1     warfarin ANTICOAGULANT (COUMADIN) 5 MG tablet Take 1 &1/2 tablets (7.5mg) by mouth daily, or as directed.  Adjust dose based on INR results. 140 tablet 1     naloxone (NARCAN) 4 MG/0.1ML nasal spray Spray 1 spray (4 mg) into one nostril alternating nostrils as needed for opioid reversal every 2-3 minutes until assistance arrives (Patient not taking: Reported on 3/17/2023) 0.2 mL 0     nitroGLYcerin (NITROSTAT) 0.4 MG sublingual  tablet For chest pain place 1 tablet under the tongue every 5 minutes for 3 doses. If symptoms persist 5 minutes after 1st dose call 911. (Patient not taking: Reported on 3/17/2023) 30 tablet 0       PAST SURGICAL HISTORY:  Past Surgical History:   Procedure Laterality Date     APPENDECTOMY       COLONOSCOPY N/A 8/2/2016    Procedure: COMBINED COLONOSCOPY, SINGLE OR MULTIPLE BIOPSY/POLYPECTOMY BY BIOPSY;  Surgeon: Sydnee Walton MD;  Location: [facility]     COLONOSCOPY N/A 5/3/2022    Procedure: COLONOSCOPY;  Surgeon: Jose A Hurt MD;  Location: [facility]     COLONOSCOPY WITH CO2 INSUFFLATION N/A 8/2/2016    Procedure: COLONOSCOPY WITH CO2 INSUFFLATION;  Surgeon: Sydnee Walton MD;  Location: [facility]     COMBINED ESOPHAGOSCOPY, GASTROSCOPY, DUODENOSCOPY (EGD) WITH CO2 INSUFFLATION N/A 8/2/2016    Procedure: COMBINED ESOPHAGOSCOPY, GASTROSCOPY, DUODENOSCOPY (EGD) WITH CO2 INSUFFLATION;  Surgeon: Sydnee Walton MD;  Location: [facility]     COMBINED ESOPHAGOSCOPY, GASTROSCOPY, DUODENOSCOPY (EGD) WITH CO2 INSUFFLATION N/A 5/27/2021    Procedure: ESOPHAGOGASTRODUODENOSCOPY, WITH CO2 INSUFFLATION;  Surgeon: Alis Cotton DO;  Location: [facility]     CV CORONARY ANGIOGRAM N/A 10/11/2022    Procedure: Coronary Angiogram;  Surgeon: Hollis Avila MD;  Location: [facility] CARDIAC CATH LAB     CV INSTANTANEOUS WAVE-FREE RATIO N/A 10/11/2022    Procedure: Instantaneous Wave-Free Ratio;  Surgeon: Hollis Avila MD;  Location: [facility] CARDIAC CATH LAB     CV INTRAVASULAR ULTRASOUND N/A 10/11/2022    Procedure: Intravascular Ultrasound;  Surgeon: Hollis Avila MD;  Location: [facility] CARDIAC CATH LAB     CV PCI ANGIOPLASTY N/A 10/11/2022    Procedure: Percutaneous Transluminal Angioplasty;  Surgeon: Hollis Avila MD;  Location: [facility] CARDIAC CATH LAB     ESOPHAGOSCOPY, GASTROSCOPY, DUODENOSCOPY (EGD), COMBINED N/A 8/2/2016    Procedure: COMBINED ESOPHAGOSCOPY,  "GASTROSCOPY, DUODENOSCOPY (EGD), BIOPSY SINGLE OR MULTIPLE;  Surgeon: Sydnee Walton MD;  [facility]     ESOPHAGOSCOPY, GASTROSCOPY, DUODENOSCOPY (EGD), COMBINED N/A 5/27/2021    Procedure: Esophagogastroduodenoscopy, With Biopsy;  Surgeon: Alis Cotton DO;  Location: [facility]     ESOPHAGOSCOPY, GASTROSCOPY, DUODENOSCOPY (EGD), COMBINED N/A 5/3/2022    Procedure: ESOPHAGOGASTRODUODENOSCOPY, WITH BIOPSY;  Surgeon: Jose A Hurt MD;  Location: [facility]     HC REMOVAL OF TONSILS,<13 Y/O      Tonsils <11y.o.     HC UGI ENDOSCOPY DIAG W BIOPSY  02/01/06     HC VASECTOMY UNILAT/BILAT W POSTOP SEMEN  1/05    Vasectomy     History back lumbar laminectomy       INJECT EPIDURAL LUMBAR Right 4/22/2021    Procedure: Right Lumbar 4-5 and Lumbar 5 - Sacral 1 Epidural Steroid Injection;  Surgeon: Maxwell Zacarias MD;  Location: [facility]     ZZC NONSPECIFIC PROCEDURE  91 or 92    back surgery. lumbar. lamiectomy     ZZHC REPAIR INCISIONAL HERNIA,REDUCIBLE  1970's    Hernia Repair, Incisional, Unilateral       ALLERGIES     Allergies   Allergen Reactions     No Known Drug Allergy        FAMILY HISTORY  Family History   Problem Relation Age of Onset     Brain Tumor Mother         Benign     Deep Vein Thrombosis Mother         \"neck\"      Heart Disease Father         \"wont tell anyone\"     Neurologic Disorder Paternal Grandmother         Parkinsons      Alcohol/Drug Paternal Grandfather         alcoholic     Cancer Maternal Grandfather         lung     Diabetes Maternal Grandmother      Cerebrovascular Disease Maternal Grandmother         tim age ~70     Arthritis Cousin         cousins x 2 \"RA\"     Musculoskeletal Disorder Maternal Aunt         MS     Family History Negative Other         psoriasis, crohns, UC, SLE       VASCULAR FAMILY HISTORY  1st order relative with atherosclerotic PAD: {YES / NO:068919::\"Yes\"}  1st order relative with AAA: {YES / NO:156995::\"Yes\"}    SOCIAL HISTORY  Social History "     Socioeconomic History     Marital status:      Spouse name: Cassie     Number of children: 2     Years of education: 15     Highest education level: Not on file   Occupational History     Occupation:      Employer: [employer]   Tobacco Use     Smoking status: Never     Passive exposure: Never     Smokeless tobacco: Never   Vaping Use     Vaping status: Never Used   Substance and Sexual Activity     Alcohol use: Yes     Comment: rare     Drug use: No     Sexual activity: Yes     Partners: Female   Other Topics Concern      Service No     Blood Transfusions No     Caffeine Concern Yes     Comment: 64 oz q day     Occupational Exposure Not Asked     Hobby Hazards Not Asked     Sleep Concern Yes     Stress Concern No     Weight Concern No     Special Diet Not Asked     Back Care Not Asked     Exercise Yes     Comment: sometimes     Bike Helmet Not Asked     Seat Belt Yes     Self-Exams No     Parent/sibling w/ CABG, MI or angioplasty before 65F 55M? Not Asked   Social History Narrative    4 children healthy     Social Determinants of Health     Financial Resource Strain: Not on file   Food Insecurity: Not on file   Transportation Needs: Not on file   Physical Activity: Not on file   Stress: Not on file   Social Connections: Not on file   Intimate Partner Violence: Not on file   Housing Stability: Not on file       ROS:   Constitutional: No fever, chills, or sweats. No weight gain/loss   ENT: No visual disturbance, ear ache, epistaxis, sore throat  Allergies/Immunologic: Negative  Respiratory: No cough, hemoptysia  Cardiovascular: As per HPI  GI: No nausea, vomiting, hematemesis, melena, or hematochezia  : No urinary frequency, dysuria, or hematuria  Integument: Negative  Psychiatric: Negative  Neuro: Negative  Endocrinology: Negative   Musculoskeletal: Negative  Vascular: No walking impairment, claudication, ischemic rest pain or nonhealing wounds    EXAM:  /74 (BP  "Location: Left arm, Patient Position: Sitting, Cuff Size: Adult Large)   Pulse 74   Ht 1.803 m (5' 11\")   Wt 111 kg (244 lb 12.8 oz)   SpO2 97%   BMI 34.14 kg/m    In general, the patient is a pleasant male in no apparent distress.    HEENT: NC/AT.  PERRLA.  EOMI.  Sclerae white, not injected.  Nares clear.  Pharynx without erythema or exudate.  Dentition intact.    Neck: No adenopathy.  No thyromegaly. Carotids +2/2 bilaterally without bruits.  No jugular venous distension.   Heart: RRR. Normal S1, S2 splits physiologically. No murmur, rub, click, or gallop. The PMI is in the 5th ICS in the midclavicular line. There is no heave.    Lungs: CTA.  No ronchi, wheezes, rales.  No dullness to percussion.   Abdomen: Soft, nontender, nondistended. No organomegaly. No AAA.  No bruits.   Extremities: No clubbing, cyanosis, or edema.  No wounds. No varicose veins signs of chronic venous insufficiency.   Vascular: No bruits are noted.   Brachial Radial Ulnar Femoral Popliteal DP PT   Left ***/2 ***/2 ***/2 ***/2 ***/2 ***/2 ***/2   Right ***/2 ***/2 ***/2 ***/2 ***/2 ***/2 ***/2     Labs:  LIPID RESULTS:  Lab Results   Component Value Date    CHOL 109 11/17/2022    CHOL 161 06/23/2020    HDL 52 11/17/2022    HDL 38 (L) 06/23/2020    LDL 43 11/17/2022     (H) 06/23/2020    TRIG 72 11/17/2022    TRIG 92 06/23/2020    CHOLHDLRATIO 5.0 07/11/2014    NHDL 57 11/17/2022    NHDL 123 06/23/2020       LIVER ENZYME RESULTS:  Lab Results   Component Value Date    AST 26 03/30/2023    AST 14 06/22/2020    ALT 20 03/30/2023    ALT 22 06/22/2020       CBC RESULTS:  Lab Results   Component Value Date    WBC 5.0 03/30/2023    WBC 10.1 05/12/2021    RBC 5.09 03/30/2023    RBC 5.04 05/12/2021    HGB 12.7 (L) 03/30/2023    HGB 11.5 (L) 05/12/2021    HCT 41.0 03/30/2023    HCT 38.7 (L) 05/12/2021    MCV 81 03/30/2023    MCV 77 (L) 05/12/2021    MCH 25.0 (L) 03/30/2023    MCH 22.8 (L) 05/12/2021    MCHC 31.0 (L) 03/30/2023    MCHC 29.7 " (L) 05/12/2021    RDW 14.8 03/30/2023    RDW 18.0 (H) 05/12/2021     03/30/2023     05/12/2021       BMP RESULTS:  Lab Results   Component Value Date     03/30/2023     05/12/2021    POTASSIUM 3.7 03/30/2023    POTASSIUM 3.6 12/28/2022    POTASSIUM 3.4 05/12/2021    CHLORIDE 105 03/30/2023    CHLORIDE 106 12/28/2022    CHLORIDE 109 05/12/2021    CO2 26 03/30/2023    CO2 29 12/28/2022    CO2 30 05/12/2021    ANIONGAP 11 03/30/2023    ANIONGAP 6 12/28/2022    ANIONGAP 4 05/12/2021     (H) 03/30/2023     (H) 12/28/2022    GLC 82 05/12/2021    BUN 8.1 03/30/2023    BUN 9 12/28/2022    BUN 11 05/12/2021    CR 0.95 03/30/2023    CR 1.18 05/12/2021    GFRESTIMATED >90 03/30/2023    GFRESTIMATED 71 05/12/2021    GFRESTBLACK 82 05/12/2021    JANEE 9.2 03/30/2023    JANEE 8.3 (L) 05/12/2021        A1C RESULTS:  Lab Results   Component Value Date    A1C 5.3 10/06/2017       Procedures:      Assessment and Plan: ***      Thank you for allowing me to participate in the care of your patient.      Sincerely,     Gloria Bell MD     [facility]  cc:   Gloria Bell MD  [address] [place] [id number]

## 2023-05-11 NOTE — PROGRESS NOTES
"  Assessment & Plan     Plantar warts  Returning today for repeat cryotherapy with persistent warts.  Does have a new spot on his left foot that was noted but others improved.  Discussed treatment options and will repeat cryotherapy today.  See note below.  We will have him continue to work on removing dead skin and Compound W at home.  Given the size I suspect this treatment will be sufficient but follow-up in 2 to 3 weeks if not.  - DESTRUCT BENIGN LESION, UP TO 14      BMI:   Estimated body mass index is 34.1 kg/m  as calculated from the following:    Height as of this encounter: 1.803 m (5' 11\").    Weight as of this encounter: 110.9 kg (244 lb 8 oz).       Christiano Ledezma MD  [facility] ANTONIO Shafer is a 53 year old, presenting for the following health issues:  Wart        5/11/2023     8:14 AM   Additional Questions   Roomed by Hermelinda GUADALUPE   Accompanied by self         5/11/2023     8:14 AM   Patient Reported Additional Medications   Patient reports taking the following new medications none     History of Present Illness       Reason for visit:  Warts on my feet    He eats 2-3 servings of fruits and vegetables daily.He consumes 4 sweetened beverage(s) daily.He exercises with enough effort to increase his heart rate 30 to 60 minutes per day.  He exercises with enough effort to increase his heart rate 4 days per week.   He is taking medications regularly.  Today's ANKIT-7 Score: 10       Warts  Onset/Duration: 6 months, had them treated about 6 weeks ago  Description (location/number): Both feet, 5 warts  Accompanying signs and symptoms (pain, redness): YES painful  History: prior warts: YES  Therapies tried and outcome: OTC meds          Review of Systems   Constitutional, HEENT, cardiovascular, pulmonary, gi and gu systems are negative, except as otherwise noted.      Objective    /70 (BP Location: Left arm, Patient Position: Sitting, Cuff Size: Adult Large)   Pulse " "69   Temp 97.8  F (36.6  C) (Temporal)   Resp 16   Ht 1.803 m (5' 11\")   Wt 110.9 kg (244 lb 8 oz)   SpO2 98%   BMI 34.10 kg/m    Body mass index is 34.1 kg/m .  Physical Exam   GENERAL: healthy, alert and no distress  EYES: Eyes grossly normal to inspection, PERRL and conjunctivae and sclerae normal  SKIN: Bilateral feet with plantar warts on right and left toe as well as plantar surface of left foot and lateral edge.  Thickened callus surrounding without erythema.  PSYCH: mentation appears normal, affect normal/bright    Cryotherapy procedure note: After verbal consent and discussion of risks and benefits including but no limited to dyspigmentation/scar, blister, bleeding and pain, 5 was(were) treated with 1-2mm freeze border for 3 cycles with liquid nitrogen. Dead skin removed with scalpel prior to freezing. Tolerated procedure well.  Post cryotherapy instructions were provided. Plan for patient to return to clinic in 2 weeks if still present.               "

## 2023-05-14 DIAGNOSIS — F33.0 MAJOR DEPRESSIVE DISORDER, RECURRENT EPISODE, MILD (H): ICD-10-CM

## 2023-05-16 NOTE — TELEPHONE ENCOUNTER
Routing refill request to provider for review/approval because:    Requested Prescriptions   Pending Prescriptions Disp Refills    DULoxetine (CYMBALTA) 60 MG capsule [Pharmacy Med Name: DULOXETINE HCL 60MG CPEP] 60 capsule 0     Sig: TAKE TWO CAPSULES BY MOUTH EVERY DAY       Serotonin-Norepinephrine Reuptake Inhibitors  Failed - 5/14/2023  8:44 PM        Failed - PHQ-9 score of less than 5 in past 6 months     Please review last PHQ-9 score.

## 2023-05-17 RX ORDER — DULOXETIN HYDROCHLORIDE 60 MG/1
CAPSULE, DELAYED RELEASE ORAL
Qty: 60 CAPSULE | Refills: 0 | Status: SHIPPED | OUTPATIENT
Start: 2023-05-17 | End: 2023-07-13

## 2023-05-18 ENCOUNTER — MYC REFILL (OUTPATIENT)
Dept: FAMILY MEDICINE | Facility: CLINIC | Age: 53
End: 2023-05-18
Payer: COMMERCIAL

## 2023-05-18 DIAGNOSIS — M54.16 LUMBAR RADICULOPATHY: ICD-10-CM

## 2023-05-18 NOTE — TELEPHONE ENCOUNTER
Routing refill request to provider for review/approval because:    Requested Prescriptions   Pending Prescriptions Disp Refills    oxyCODONE-acetaminophen (PERCOCET) 5-325 MG tablet 60 tablet 0     Sig: Take 1 tablet by mouth every 6 hours as needed for severe pain       There is no refill protocol information for this order

## 2023-05-19 RX ORDER — OXYCODONE AND ACETAMINOPHEN 5; 325 MG/1; MG/1
1 TABLET ORAL EVERY 6 HOURS PRN
Qty: 60 TABLET | Refills: 0 | Status: SHIPPED | OUTPATIENT
Start: 2023-05-19 | End: 2023-06-16

## 2023-05-23 DIAGNOSIS — D68.61 ANTIPHOSPHOLIPID SYNDROME (H): ICD-10-CM

## 2023-05-23 DIAGNOSIS — Z79.01 LONG TERM CURRENT USE OF ANTICOAGULANT THERAPY: ICD-10-CM

## 2023-05-23 DIAGNOSIS — I82.4Y9 DEEP VEIN THROMBOSIS (DVT) OF PROXIMAL LOWER EXTREMITY, UNSPECIFIED CHRONICITY, UNSPECIFIED LATERALITY (H): ICD-10-CM

## 2023-05-23 RX ORDER — WARFARIN SODIUM 5 MG/1
TABLET ORAL
Qty: 115 TABLET | Refills: 1 | Status: SHIPPED | OUTPATIENT
Start: 2023-05-23 | End: 2023-11-20

## 2023-05-23 NOTE — TELEPHONE ENCOUNTER
ANTICOAGULATION MANAGEMENT:  Medication Refill    Anticoagulation Summary  As of 5/3/2023    Warfarin maintenance plan:  5 mg (5 mg x 1) every Mon, Thu; 7.5 mg (5 mg x 1.5) all other days   Next INR check:  5/31/2023   Target end date:  Indefinite    Indications    DVT (deep venous thrombosis) (H) (Resolved) [I82.409]  Long-term (current) use of anticoagulants [Z79.01] [Z79.01]  Deep vein thrombosis (DVT) of proximal lower extremity  unspecified chronicity  unspecified laterality (H) [I82.4Y9]  Antiphospholipid syndrome (H) [D68.61]             Anticoagulation Care Providers     Provider Role Specialty Phone number    Sylvester Cash MD Referring Family Practice [phone]    Christiano Ledezma MD Referring Family Medicine [phone]          Visit with referring provider/group within last year: Yes    [facility] referral signed within last year: Yes    Hollis meets all criteria for refill (current [facility] referral, office visit with referring provider/group in last year, lab monitoring up to date or not exceeding 2 weeks overdue). Rx instructions and quantity supplied updated to match patient's current dosing plan. Warfarin 90 day supply with 1 refill granted per [facility] protocol     Claudia Guzman RN  Anticoagulation Clinic

## 2023-05-31 ENCOUNTER — ANTICOAGULATION THERAPY VISIT (OUTPATIENT)
Dept: ANTICOAGULATION | Facility: CLINIC | Age: 53
End: 2023-05-31

## 2023-05-31 ENCOUNTER — LAB (OUTPATIENT)
Dept: LAB | Facility: CLINIC | Age: 53
End: 2023-05-31
Payer: COMMERCIAL

## 2023-05-31 DIAGNOSIS — D68.61 ANTIPHOSPHOLIPID SYNDROME (H): ICD-10-CM

## 2023-05-31 DIAGNOSIS — I82.4Y9 DEEP VEIN THROMBOSIS (DVT) OF PROXIMAL LOWER EXTREMITY, UNSPECIFIED CHRONICITY, UNSPECIFIED LATERALITY (H): ICD-10-CM

## 2023-05-31 DIAGNOSIS — Z79.01 LONG TERM CURRENT USE OF ANTICOAGULANT THERAPY: Primary | ICD-10-CM

## 2023-05-31 LAB — INR BLD: 2.1 (ref 0.9–1.1)

## 2023-05-31 PROCEDURE — 36416 COLLJ CAPILLARY BLOOD SPEC: CPT

## 2023-05-31 PROCEDURE — 85610 PROTHROMBIN TIME: CPT

## 2023-05-31 NOTE — PROGRESS NOTES
ANTICOAGULATION MANAGEMENT     Hollis Diggs 53 year old male is on warfarin with therapeutic INR result. (Goal INR 2.0-3.0)    Recent labs: (last 7 days)     05/31/23  1532   INR 2.1*       ASSESSMENT       Source(s): Chart Review and Patient/Caregiver Call       Warfarin doses taken: Warfarin taken as instructed    Diet: No new diet changes identified    Medication/supplement changes: None noted    New illness, injury, or hospitalization: No    Signs or symptoms of bleeding or clotting: No    Previous result: Therapeutic last 2(+) visits    Additional findings: None         PLAN     Recommended plan for no diet, medication or health factor changes affecting INR     Dosing Instructions: Continue your current warfarin dose with next INR in 4 weeks       Summary  As of 5/31/2023    Full warfarin instructions:  5 mg every Mon, Thu; 7.5 mg all other days   Next INR check:  6/28/2023             Telephone call with Hollis who verbalizes understanding and agrees to plan    Lab visit scheduled    Education provided:     Contact [phone]  with any changes, questions or concerns.     Plan made per [facility] anticoagulation protocol    Ana Lofton RN  Anticoagulation Clinic  5/31/2023    _______________________________________________________________________     Anticoagulation Episode Summary     Current INR goal:  2.0-3.0   TTR:  47.4 % (1 y)   Target end date:  Indefinite   [facility]    Indications    DVT (deep venous thrombosis) (H) (Resolved) [I82.409]  Long-term (current) use of anticoagulants [Z79.01] [Z79.01]  Deep vein thrombosis (DVT) of proximal lower extremity  unspecified chronicity  unspecified laterality (H) [I82.4Y9]  Antiphospholipid syndrome (H) [D68.61]           Comments:           Anticoagulation Care Providers     Provider Role Specialty Phone number    Sylvester Cash MD Referring [facility] [phone]    Christiano Ledezma MD Referring Family  Medicine [phone]

## 2023-06-16 ENCOUNTER — MYC REFILL (OUTPATIENT)
Dept: FAMILY MEDICINE | Facility: CLINIC | Age: 53
End: 2023-06-16
Payer: COMMERCIAL

## 2023-06-16 DIAGNOSIS — M54.16 LUMBAR RADICULOPATHY: ICD-10-CM

## 2023-06-16 RX ORDER — OXYCODONE AND ACETAMINOPHEN 5; 325 MG/1; MG/1
1 TABLET ORAL EVERY 6 HOURS PRN
Qty: 60 TABLET | Refills: 0 | Status: SHIPPED | OUTPATIENT
Start: 2023-06-16 | End: 2023-07-05

## 2023-06-16 NOTE — TELEPHONE ENCOUNTER
Requested Prescriptions   Pending Prescriptions Disp Refills     oxyCODONE-acetaminophen (PERCOCET) 5-325 MG tablet 60 tablet 0     Sig: Take 1 tablet by mouth every 6 hours as needed for severe pain       Routing refill request to provider for review/approval because:  Drug not on the [facility], P or [facility] refill protocol or controlled substance

## 2023-06-19 DIAGNOSIS — I10 HYPERTENSION, UNSPECIFIED TYPE: ICD-10-CM

## 2023-06-20 RX ORDER — LISINOPRIL 5 MG/1
5 TABLET ORAL DAILY
Qty: 90 TABLET | Refills: 2 | Status: SHIPPED | OUTPATIENT
Start: 2023-06-20 | End: 2024-01-28

## 2023-06-28 ENCOUNTER — ANTICOAGULATION THERAPY VISIT (OUTPATIENT)
Dept: ANTICOAGULATION | Facility: CLINIC | Age: 53
End: 2023-06-28

## 2023-06-28 ENCOUNTER — LAB (OUTPATIENT)
Dept: LAB | Facility: CLINIC | Age: 53
End: 2023-06-28
Payer: COMMERCIAL

## 2023-06-28 DIAGNOSIS — D68.61 ANTIPHOSPHOLIPID SYNDROME (H): ICD-10-CM

## 2023-06-28 DIAGNOSIS — Z79.01 LONG TERM CURRENT USE OF ANTICOAGULANT THERAPY: Primary | ICD-10-CM

## 2023-06-28 DIAGNOSIS — I82.4Y9 DEEP VEIN THROMBOSIS (DVT) OF PROXIMAL LOWER EXTREMITY, UNSPECIFIED CHRONICITY, UNSPECIFIED LATERALITY (H): ICD-10-CM

## 2023-06-28 LAB — INR BLD: 2.9 (ref 0.9–1.1)

## 2023-06-28 PROCEDURE — 36416 COLLJ CAPILLARY BLOOD SPEC: CPT

## 2023-06-28 PROCEDURE — 85610 PROTHROMBIN TIME: CPT

## 2023-06-28 NOTE — PROGRESS NOTES
ANTICOAGULATION MANAGEMENT     Hollis Diggs 53 year old male is on warfarin with therapeutic INR result. (Goal INR 2.0-3.0)    Recent labs: (last 7 days)     06/28/23  1535   INR 2.9*       ASSESSMENT       Source(s): Chart Review and Patient/Caregiver Call       Warfarin doses taken: Warfarin taken as instructed    Diet: No new diet changes identified    Medication/supplement changes: None noted    New illness, injury, or hospitalization: No    Signs or symptoms of bleeding or clotting: No    Previous result: Therapeutic last 2(+) visits    Additional findings: None         PLAN     Recommended plan for no diet, medication or health factor changes affecting INR     Dosing Instructions: Continue your current warfarin dose with next INR in 6 weeks       Summary  As of 6/28/2023    Full warfarin instructions:  5 mg every Mon, Thu; 7.5 mg all other days   Next INR check:  8/2/2023             Telephone call with Hollis who verbalizes understanding and agrees to plan    Lab visit scheduled    Education provided:     Goal range and lab monitoring: goal range and significance of current result and Importance of therapeutic range    Plan made per [facility] anticoagulation protocol    Jaskaran Quiles RN  Anticoagulation Clinic  6/28/2023    _______________________________________________________________________     Anticoagulation Episode Summary     Current INR goal:  2.0-3.0   TTR:  47.4 % (1 y)   Target end date:  Indefinite   Send INR reminders to:  TIMOTEO ALVAREZ    Indications    DVT (deep venous thrombosis) (H) (Resolved) [I82.409]  Long-term (current) use of anticoagulants [Z79.01] [Z79.01]  Deep vein thrombosis (DVT) of proximal lower extremity  unspecified chronicity  unspecified laterality (H) [I82.4Y9]  Antiphospholipid syndrome (H) [D68.61]           Comments:           Anticoagulation Care Providers     Provider Role Specialty Phone number    Sylvester Cash MD [facility] [phone]     Christiano Ledezma MD [facility] [phone]

## 2023-07-05 ENCOUNTER — MYC REFILL (OUTPATIENT)
Dept: FAMILY MEDICINE | Facility: CLINIC | Age: 53
End: 2023-07-05
Payer: COMMERCIAL

## 2023-07-05 DIAGNOSIS — M54.16 LUMBAR RADICULOPATHY: ICD-10-CM

## 2023-07-05 DIAGNOSIS — F33.0 MAJOR DEPRESSIVE DISORDER, RECURRENT EPISODE, MILD (H): ICD-10-CM

## 2023-07-05 NOTE — TELEPHONE ENCOUNTER
Percocet      Last Written Prescription Date:  6-  Last Fill Quantity: 60,   # refills: 0  Last Office Visit: 5-11-23  Future Office visit:       Routing refill request to provider for review/approval because:  Drug not on the [facility], P or [facility] refill protocol or controlled substance

## 2023-07-05 NOTE — TELEPHONE ENCOUNTER
Pending Prescriptions:                       Disp   Refills    ARIPiprazole (ABILIFY) 10 MG tablet        90 tab*1        Sig: Take 1 tablet (10 mg) by mouth daily      Routing refill request to provider for review/approval because:  Phq9 is out of range    Arianna Ontiveros RN on 7/5/2023 at 1:23 PM

## 2023-07-06 RX ORDER — ARIPIPRAZOLE 10 MG/1
10 TABLET ORAL DAILY
Qty: 90 TABLET | Refills: 3 | Status: SHIPPED | OUTPATIENT
Start: 2023-07-06 | End: 2024-09-25

## 2023-07-06 RX ORDER — OXYCODONE AND ACETAMINOPHEN 5; 325 MG/1; MG/1
1 TABLET ORAL EVERY 6 HOURS PRN
Qty: 30 TABLET | Refills: 0 | Status: SHIPPED | OUTPATIENT
Start: 2023-07-06 | End: 2023-07-27

## 2023-07-09 DIAGNOSIS — F33.0 MAJOR DEPRESSIVE DISORDER, RECURRENT EPISODE, MILD (H): ICD-10-CM

## 2023-07-11 NOTE — TELEPHONE ENCOUNTER
"Routing refill request to provider for review/approval because:    Requested Prescriptions   Pending Prescriptions Disp Refills    DULoxetine (CYMBALTA) 60 MG capsule [Pharmacy Med Name: DULOXETINE HCL 60MG CPEP] 60 capsule 0     Sig: TAKE TWO CAPSULES BY MOUTH EVERY DAY       Serotonin-Norepinephrine Reuptake Inhibitors  Failed - 7/9/2023  6:48 PM        Failed - PHQ-9 score of less than 5 in past 6 months     Please review last PHQ-9 score.           Passed - Blood pressure under 140/90 in past 12 months     BP Readings from Last 3 Encounters:   05/11/23 122/74   05/11/23 114/70   03/30/23 108/69                 Passed - Medication is active on med list        Passed - Patient is age 18 or older        Passed - Recent (6 mo) or future (30 days) visit within the authorizing provider's specialty     Patient had office visit in the last 6 months or has a visit in the next 30 days with authorizing provider or within the authorizing provider's specialty.  See \"Patient Info\" tab in inbasket, or \"Choose Columns\" in Meds & Orders section of the refill encounter.                       "

## 2023-07-13 RX ORDER — DULOXETIN HYDROCHLORIDE 60 MG/1
CAPSULE, DELAYED RELEASE ORAL
Qty: 60 CAPSULE | Refills: 0 | Status: SHIPPED | OUTPATIENT
Start: 2023-07-13 | End: 2023-08-15

## 2023-07-25 ENCOUNTER — MYC REFILL (OUTPATIENT)
Dept: FAMILY MEDICINE | Facility: CLINIC | Age: 53
End: 2023-07-25
Payer: COMMERCIAL

## 2023-07-25 DIAGNOSIS — M54.16 LUMBAR RADICULOPATHY: ICD-10-CM

## 2023-07-25 RX ORDER — OXYCODONE AND ACETAMINOPHEN 5; 325 MG/1; MG/1
1 TABLET ORAL EVERY 6 HOURS PRN
Qty: 30 TABLET | Refills: 0 | OUTPATIENT
Start: 2023-07-25

## 2023-07-25 NOTE — TELEPHONE ENCOUNTER
Requested Prescriptions   Pending Prescriptions Disp Refills    oxyCODONE-acetaminophen (PERCOCET) 5-325 MG tablet 30 tablet 0     Sig: Take 1 tablet by mouth every 6 hours as needed for severe pain       Routing refill request to provider for review/approval because:  Drug not on the [facility], [facility] or [facility] refill protocol or controlled substance

## 2023-07-27 ENCOUNTER — MYC MEDICAL ADVICE (OUTPATIENT)
Dept: FAMILY MEDICINE | Facility: CLINIC | Age: 53
End: 2023-07-27
Payer: COMMERCIAL

## 2023-07-27 DIAGNOSIS — M54.16 LUMBAR RADICULOPATHY: ICD-10-CM

## 2023-07-28 RX ORDER — OXYCODONE AND ACETAMINOPHEN 5; 325 MG/1; MG/1
1 TABLET ORAL EVERY 6 HOURS PRN
Qty: 30 TABLET | Refills: 0 | Status: SHIPPED | OUTPATIENT
Start: 2023-07-28 | End: 2023-08-18

## 2023-07-31 ENCOUNTER — MYC MEDICAL ADVICE (OUTPATIENT)
Dept: FAMILY MEDICINE | Facility: CLINIC | Age: 53
End: 2023-07-31
Payer: COMMERCIAL

## 2023-07-31 DIAGNOSIS — F33.0 MAJOR DEPRESSIVE DISORDER, RECURRENT EPISODE, MILD (H): ICD-10-CM

## 2023-07-31 DIAGNOSIS — F41.9 ANXIETY: ICD-10-CM

## 2023-08-01 RX ORDER — ARIPIPRAZOLE 10 MG/1
10 TABLET ORAL DAILY
Qty: 90 TABLET | Refills: 3 | OUTPATIENT
Start: 2023-08-01

## 2023-08-01 NOTE — TELEPHONE ENCOUNTER
RN called pharmacy and they confirmed they do not see the refill for Abilify in their system and requested us to resend it.

## 2023-08-02 RX ORDER — GABAPENTIN 800 MG/1
TABLET ORAL
Qty: 90 TABLET | Refills: 3 | Status: SHIPPED | OUTPATIENT
Start: 2023-08-02 | End: 2023-08-04

## 2023-08-04 ENCOUNTER — MYC MEDICAL ADVICE (OUTPATIENT)
Dept: FAMILY MEDICINE | Facility: CLINIC | Age: 53
End: 2023-08-04
Payer: COMMERCIAL

## 2023-08-04 DIAGNOSIS — F41.9 ANXIETY: ICD-10-CM

## 2023-08-04 RX ORDER — GABAPENTIN 800 MG/1
TABLET ORAL
Qty: 90 TABLET | Refills: 3 | OUTPATIENT
Start: 2023-08-04

## 2023-08-04 RX ORDER — GABAPENTIN 800 MG/1
800 TABLET ORAL 3 TIMES DAILY
Qty: 90 TABLET | Refills: 3 | Status: SHIPPED | OUTPATIENT
Start: 2023-08-04 | End: 2023-10-23

## 2023-08-04 NOTE — TELEPHONE ENCOUNTER
Pharmacy called and stated they do not have prescription for gabapentin on file dated 08/02/23.  Verbal given.

## 2023-08-09 ENCOUNTER — ANTICOAGULATION THERAPY VISIT (OUTPATIENT)
Dept: ANTICOAGULATION | Facility: CLINIC | Age: 53
End: 2023-08-09

## 2023-08-09 ENCOUNTER — LAB (OUTPATIENT)
Dept: LAB | Facility: CLINIC | Age: 53
End: 2023-08-09
Payer: COMMERCIAL

## 2023-08-09 DIAGNOSIS — D68.61 ANTIPHOSPHOLIPID SYNDROME (H): ICD-10-CM

## 2023-08-09 DIAGNOSIS — I82.4Y9 DEEP VEIN THROMBOSIS (DVT) OF PROXIMAL LOWER EXTREMITY, UNSPECIFIED CHRONICITY, UNSPECIFIED LATERALITY (H): ICD-10-CM

## 2023-08-09 DIAGNOSIS — Z79.01 LONG TERM CURRENT USE OF ANTICOAGULANT THERAPY: Primary | ICD-10-CM

## 2023-08-09 LAB — INR BLD: 3.2 (ref 0.9–1.1)

## 2023-08-09 PROCEDURE — 85610 PROTHROMBIN TIME: CPT

## 2023-08-09 PROCEDURE — 36416 COLLJ CAPILLARY BLOOD SPEC: CPT

## 2023-08-09 NOTE — PROGRESS NOTES
ANTICOAGULATION MANAGEMENT     Hollis Diggs 53 year old male is on warfarin with supratherapeutic INR result. (Goal INR 2.0-3.0)    Recent labs: (last 7 days)     08/09/23  1532   INR 3.2*       ASSESSMENT     Source(s): Chart Review and Patient/Caregiver Call     Warfarin doses taken: Warfarin taken as instructed  Diet: No new diet changes identified  Medication/supplement changes: None noted  New illness, injury, or hospitalization: No  Signs or symptoms of bleeding or clotting: No  Previous result: Therapeutic last 2(+) visits  Additional findings: None       PLAN     Recommended plan for no diet, medication or health factor changes affecting INR     Dosing Instructions: Continue your current warfarin dose with next INR in 2 weeks       Summary  As of 8/9/2023      Full warfarin instructions:  5 mg every Mon, Thu; 7.5 mg all other days   Next INR check:  8/23/2023               Telephone call with Hollis who verbalizes understanding and agrees to plan and who agrees to plan and repeated back plan correctly    Lab visit scheduled    Education provided:   None required    Plan made per [facility] anticoagulation protocol    Анна Hale RN  Anticoagulation Clinic  8/9/2023    _______________________________________________________________________     Anticoagulation Episode Summary       Current INR goal:  2.0-3.0   TTR:  48.1 % (1 y)   Target end date:  Indefinite   [facility]    Indications    DVT (deep venous thrombosis) (H) (Resolved) [I82.409]  Long-term (current) use of anticoagulants [Z79.01] [Z79.01]  Deep vein thrombosis (DVT) of proximal lower extremity  unspecified chronicity  unspecified laterality (H) [I82.4Y9]  Antiphospholipid syndrome (H) [D68.61]             Comments:               Anticoagulation Care Providers       Provider Role Specialty Phone number    Sylvester Cash MD Referring [facility] [phone]    Christiano Ledezma MD Referring Family  Medicine [phone]

## 2023-08-13 DIAGNOSIS — F33.0 MAJOR DEPRESSIVE DISORDER, RECURRENT EPISODE, MILD (H): ICD-10-CM

## 2023-08-14 ENCOUNTER — MYC REFILL (OUTPATIENT)
Dept: FAMILY MEDICINE | Facility: CLINIC | Age: 53
End: 2023-08-14
Payer: COMMERCIAL

## 2023-08-14 ENCOUNTER — MYC MEDICAL ADVICE (OUTPATIENT)
Dept: FAMILY MEDICINE | Facility: CLINIC | Age: 53
End: 2023-08-14
Payer: COMMERCIAL

## 2023-08-14 DIAGNOSIS — F33.0 MAJOR DEPRESSIVE DISORDER, RECURRENT EPISODE, MILD (H): ICD-10-CM

## 2023-08-14 RX ORDER — DULOXETIN HYDROCHLORIDE 60 MG/1
120 CAPSULE, DELAYED RELEASE ORAL DAILY
Qty: 60 CAPSULE | Refills: 0 | OUTPATIENT
Start: 2023-08-14

## 2023-08-14 NOTE — TELEPHONE ENCOUNTER
"Routing refill request to provider for review/approval because:  Drug interaction warning        Requested Prescriptions   Pending Prescriptions Disp Refills    DULoxetine (CYMBALTA) 60 MG capsule [Pharmacy Med Name: DULOXETINE HCL 60MG CPEP] 60 capsule 0     Sig: TAKE TWO CAPSULES BY MOUTH EVERY DAY       Serotonin-Norepinephrine Reuptake Inhibitors  Failed - 8/13/2023  8:47 PM        Failed - PHQ-9 score of less than 5 in past 6 months     Please review last PHQ-9 score.           Passed - Blood pressure under 140/90 in past 12 months     BP Readings from Last 3 Encounters:   05/11/23 122/74   05/11/23 114/70   03/30/23 108/69                 Passed - Medication is active on med list        Passed - Patient is age 18 or older        Passed - Recent (6 mo) or future (30 days) visit within the authorizing provider's specialty     Patient had office visit in the last 6 months or has a visit in the next 30 days with authorizing provider or within the authorizing provider's specialty.  See \"Patient Info\" tab in inbasket, or \"Choose Columns\" in Meds & Orders section of the refill encounter.              traZODone (DESYREL) 150 MG tablet [Pharmacy Med Name: TRAZODONE HCL 150MG TABS] 135 tablet 1     Sig: TAKE 1&1/2 TABLETS BY MOUTH AT BEDTIME       Serotonin Modulators Passed - 8/13/2023  8:47 PM        Passed - Recent (12 mo) or future (30 days) visit within the authorizing provider's specialty     Patient has had an office visit with the authorizing provider or a provider within the authorizing providers department within the previous 12 mos or has a future within next 30 days. See \"Patient Info\" tab in inbasket, or \"Choose Columns\" in Meds & Orders section of the refill encounter.              Passed - Medication is active on med list        Passed - Patient is age 18 or older             "

## 2023-08-15 RX ORDER — TRAZODONE HYDROCHLORIDE 150 MG/1
TABLET ORAL
Qty: 135 TABLET | Refills: 1 | Status: SHIPPED | OUTPATIENT
Start: 2023-08-15 | End: 2024-01-29

## 2023-08-15 RX ORDER — DULOXETIN HYDROCHLORIDE 60 MG/1
CAPSULE, DELAYED RELEASE ORAL
Qty: 60 CAPSULE | Refills: 0 | Status: SHIPPED | OUTPATIENT
Start: 2023-08-15 | End: 2023-09-14

## 2023-08-18 ENCOUNTER — MYC REFILL (OUTPATIENT)
Dept: FAMILY MEDICINE | Facility: CLINIC | Age: 53
End: 2023-08-18
Payer: COMMERCIAL

## 2023-08-18 DIAGNOSIS — M54.16 LUMBAR RADICULOPATHY: ICD-10-CM

## 2023-08-18 RX ORDER — OXYCODONE AND ACETAMINOPHEN 5; 325 MG/1; MG/1
1 TABLET ORAL EVERY 6 HOURS PRN
Qty: 30 TABLET | Refills: 0 | Status: SHIPPED | OUTPATIENT
Start: 2023-08-18 | End: 2023-09-07

## 2023-08-18 NOTE — TELEPHONE ENCOUNTER
Requested Prescriptions   Pending Prescriptions Disp Refills    oxyCODONE-acetaminophen (PERCOCET) 5-325 MG tablet 30 tablet 0     Sig: Take 1 tablet by mouth every 6 hours as needed for severe pain     Routing refill request to provider for review/approval because:  Drug not on the [facility], [facility] or [facility] refill protocol or controlled substance

## 2023-08-23 ENCOUNTER — LAB (OUTPATIENT)
Dept: LAB | Facility: CLINIC | Age: 53
End: 2023-08-23
Payer: COMMERCIAL

## 2023-08-23 ENCOUNTER — ANTICOAGULATION THERAPY VISIT (OUTPATIENT)
Dept: ANTICOAGULATION | Facility: CLINIC | Age: 53
End: 2023-08-23

## 2023-08-23 DIAGNOSIS — D68.61 ANTIPHOSPHOLIPID SYNDROME (H): ICD-10-CM

## 2023-08-23 DIAGNOSIS — I82.4Y9 DEEP VEIN THROMBOSIS (DVT) OF PROXIMAL LOWER EXTREMITY, UNSPECIFIED CHRONICITY, UNSPECIFIED LATERALITY (H): ICD-10-CM

## 2023-08-23 DIAGNOSIS — Z79.01 LONG TERM CURRENT USE OF ANTICOAGULANT THERAPY: Primary | ICD-10-CM

## 2023-08-23 LAB — INR BLD: 3.7 (ref 0.9–1.1)

## 2023-08-23 PROCEDURE — 36416 COLLJ CAPILLARY BLOOD SPEC: CPT

## 2023-08-23 PROCEDURE — 85610 PROTHROMBIN TIME: CPT

## 2023-08-23 NOTE — PROGRESS NOTES
ANTICOAGULATION MANAGEMENT     Hollis Diggs 53 year old male is on warfarin with supratherapeutic INR result. (Goal INR 2.0-3.0)    Recent labs: (last 7 days)     08/23/23  1528   INR 3.7*       ASSESSMENT     Source(s): Chart Review and Patient/Caregiver Call     Warfarin doses taken: Warfarin taken as instructed  Diet: No new diet changes identified  Medication/supplement changes: None noted  New illness, injury, or hospitalization: No  Signs or symptoms of bleeding or clotting: No  Previous result: Supratherapeutic  Additional findings: None       PLAN     Recommended plan for no diet, medication or health factor changes affecting INR     Dosing Instructions: decrease your warfarin dose (5% change) with next INR in 2 weeks       Summary  As of 8/23/2023      Full warfarin instructions:  5 mg every Mon, Wed, Fri; 7.5 mg all other days   Next INR check:  9/6/2023               Telephone call with Hollis who verbalizes understanding and agrees to plan    Lab visit scheduled    Education provided:   Goal range and lab monitoring: goal range and significance of current result  Contact [phone]  with any changes, questions or concerns.     Plan made per [facility] anticoagulation protocol    Ana Lofton RN  Anticoagulation Clinic  8/23/2023    _______________________________________________________________________     Anticoagulation Episode Summary       Current INR goal:  2.0-3.0   TTR:  48.1 % (1 y)   Target end date:  Indefinite   Send INR reminders to:  TIMOTE[facility] ALVAREZ    Indications    DVT (deep venous thrombosis) (H) (Resolved) [I82.409]  Long-term (current) use of anticoagulants [Z79.01] [Z79.01]  Deep vein thrombosis (DVT) of proximal lower extremity  unspecified chronicity  unspecified laterality (H) [I82.4Y9]  Antiphospholipid syndrome (H) [D68.61]             Comments:               Anticoagulation Care Providers       Provider Role Specialty Phone number    Sylvester Cash MD Referring Family  Practice     Christiano Ledezma MD [facility] [phone]

## 2023-09-06 ENCOUNTER — TELEPHONE (OUTPATIENT)
Dept: FAMILY MEDICINE | Facility: CLINIC | Age: 53
End: 2023-09-06

## 2023-09-06 ENCOUNTER — LAB (OUTPATIENT)
Dept: LAB | Facility: CLINIC | Age: 53
End: 2023-09-06
Payer: COMMERCIAL

## 2023-09-06 ENCOUNTER — ANTICOAGULATION THERAPY VISIT (OUTPATIENT)
Dept: ANTICOAGULATION | Facility: CLINIC | Age: 53
End: 2023-09-06

## 2023-09-06 DIAGNOSIS — D68.61 ANTIPHOSPHOLIPID SYNDROME (H): ICD-10-CM

## 2023-09-06 DIAGNOSIS — Z79.01 LONG TERM CURRENT USE OF ANTICOAGULANT THERAPY: Primary | ICD-10-CM

## 2023-09-06 DIAGNOSIS — I82.4Y9 DEEP VEIN THROMBOSIS (DVT) OF PROXIMAL LOWER EXTREMITY, UNSPECIFIED CHRONICITY, UNSPECIFIED LATERALITY (H): ICD-10-CM

## 2023-09-06 LAB — INR BLD: 2.3 (ref 0.9–1.1)

## 2023-09-06 PROCEDURE — 85610 PROTHROMBIN TIME: CPT

## 2023-09-06 PROCEDURE — 36416 COLLJ CAPILLARY BLOOD SPEC: CPT

## 2023-09-06 NOTE — PROGRESS NOTES
ANTICOAGULATION MANAGEMENT     Hollis Diggs 53 year old male is on warfarin with therapeutic INR result. (Goal INR 2.0-3.0)    Recent labs: (last 7 days)     09/06/23  1544   INR 2.3*       ASSESSMENT     Source(s): Chart Review and Patient/Caregiver Call     Warfarin doses taken: Warfarin taken as instructed  Diet: No new diet changes identified  Medication/supplement changes: None noted  New illness, injury, or hospitalization: No  Signs or symptoms of bleeding or clotting: No  Previous result: Supratherapeutic  Additional findings: None       PLAN     Recommended plan for no diet, medication or health factor changes affecting INR     Dosing Instructions: Continue your current warfarin dose with next INR in 3 weeks       Summary  As of 9/6/2023      Full warfarin instructions:  5 mg every Mon, Wed, Fri; 7.5 mg all other days   Next INR check:  9/27/2023               Telephone call with Hollis who verbalizes understanding and agrees to plan    Lab visit scheduled    Education provided:   Please call back if any changes to your diet, medications or how you've been taking warfarin  Contact [phone]  with any changes, questions or concerns.     Plan made per [facility] anticoagulation protocol    Claudia GARCIA RN  Anticoagulation Clinic  9/6/2023    _______________________________________________________________________     Anticoagulation Episode Summary       Current INR goal:  2.0-3.0   TTR:  50.0 % (1 y)   Target end date:  Indefinite   Send INR reminders to:  TIMOTEO ALVARZE    Indications    DVT (deep venous thrombosis) (H) (Resolved) [I82.409]  Long-term (current) use of anticoagulants [Z79.01] [Z79.01]  Deep vein thrombosis (DVT) of proximal lower extremity  unspecified chronicity  unspecified laterality (H) [I82.4Y9]  Antiphospholipid syndrome (H) [D68.61]             Comments:               Anticoagulation Care Providers       Provider Role Specialty Phone number    Sylvester Cash MD Referring Family  Practice     Christiano Ledezma MD [facility] Medicine [phone]

## 2023-09-06 NOTE — TELEPHONE ENCOUNTER
Forms/Letter Request    Type of form/letter: FMLA - Unknown    Have you been seen for this request: Yes     Do we have the form/letter: Yes:     Who is the form from? Patient    Where did/will the form come from? Patient or family brought in       When is form/letter needed by:ASAP    How would you like the form/letter returned:     Patient Notified form requests are processed in 3-5 business days:Yes    Could we send this information to you in [employer] [place] or would you prefer to receive a phone call?:   Patient would prefer a phone call   Okay to leave a detailed message?: Yes at Home number on file [phone] (home)

## 2023-09-07 ENCOUNTER — MYC REFILL (OUTPATIENT)
Dept: FAMILY MEDICINE | Facility: CLINIC | Age: 53
End: 2023-09-07
Payer: COMMERCIAL

## 2023-09-07 DIAGNOSIS — M54.16 LUMBAR RADICULOPATHY: ICD-10-CM

## 2023-09-11 DIAGNOSIS — F33.0 MAJOR DEPRESSIVE DISORDER, RECURRENT EPISODE, MILD (H): ICD-10-CM

## 2023-09-11 RX ORDER — OXYCODONE AND ACETAMINOPHEN 5; 325 MG/1; MG/1
1 TABLET ORAL EVERY 6 HOURS PRN
Qty: 30 TABLET | Refills: 0 | Status: SHIPPED | OUTPATIENT
Start: 2023-09-11 | End: 2023-09-29

## 2023-09-11 NOTE — TELEPHONE ENCOUNTER
Patient should try and limit to one tablet per day as much as possible. Follow up in clinic for pain management should be scheduled.

## 2023-09-12 NOTE — TELEPHONE ENCOUNTER
Patient informed back via mychart of note below. 9/15 reminder if not read to send letter.   Closing encounter.   Etta Talley MA

## 2023-09-14 RX ORDER — DULOXETIN HYDROCHLORIDE 60 MG/1
CAPSULE, DELAYED RELEASE ORAL
Qty: 60 CAPSULE | Refills: 0 | Status: SHIPPED | OUTPATIENT
Start: 2023-09-14 | End: 2023-10-17

## 2023-09-20 ENCOUNTER — OFFICE VISIT (OUTPATIENT)
Dept: FAMILY MEDICINE | Facility: CLINIC | Age: 53
End: 2023-09-20
Payer: COMMERCIAL

## 2023-09-20 VITALS
SYSTOLIC BLOOD PRESSURE: 128 MMHG | OXYGEN SATURATION: 97 % | WEIGHT: 241.31 LBS | RESPIRATION RATE: 18 BRPM | BODY MASS INDEX: 33.78 KG/M2 | DIASTOLIC BLOOD PRESSURE: 76 MMHG | HEIGHT: 71 IN | HEART RATE: 84 BPM | TEMPERATURE: 97.2 F

## 2023-09-20 DIAGNOSIS — Z79.01 LONG TERM CURRENT USE OF ANTICOAGULANT THERAPY: ICD-10-CM

## 2023-09-20 DIAGNOSIS — M54.41 CHRONIC RIGHT-SIDED LOW BACK PAIN WITH RIGHT-SIDED SCIATICA: ICD-10-CM

## 2023-09-20 DIAGNOSIS — F11.90 CHRONIC, CONTINUOUS USE OF OPIOIDS: ICD-10-CM

## 2023-09-20 DIAGNOSIS — Z86.718 HISTORY OF DEEP VENOUS THROMBOSIS: ICD-10-CM

## 2023-09-20 DIAGNOSIS — M51.369 DDD (DEGENERATIVE DISC DISEASE), LUMBAR: ICD-10-CM

## 2023-09-20 DIAGNOSIS — G89.29 CHRONIC RIGHT-SIDED LOW BACK PAIN WITH RIGHT-SIDED SCIATICA: ICD-10-CM

## 2023-09-20 DIAGNOSIS — M76.62 ACHILLES TENDINITIS OF LEFT LOWER EXTREMITY: Primary | ICD-10-CM

## 2023-09-20 PROCEDURE — 99214 OFFICE O/P EST MOD 30 MIN: CPT | Mod: 25 | Performed by: STUDENT IN AN ORGANIZED HEALTH CARE EDUCATION/TRAINING PROGRAM

## 2023-09-20 PROCEDURE — 90471 IMMUNIZATION ADMIN: CPT | Performed by: STUDENT IN AN ORGANIZED HEALTH CARE EDUCATION/TRAINING PROGRAM

## 2023-09-20 PROCEDURE — 90682 RIV4 VACC RECOMBINANT DNA IM: CPT | Performed by: STUDENT IN AN ORGANIZED HEALTH CARE EDUCATION/TRAINING PROGRAM

## 2023-09-20 ASSESSMENT — ANXIETY QUESTIONNAIRES
7. FEELING AFRAID AS IF SOMETHING AWFUL MIGHT HAPPEN: SEVERAL DAYS
4. TROUBLE RELAXING: SEVERAL DAYS
3. WORRYING TOO MUCH ABOUT DIFFERENT THINGS: SEVERAL DAYS
6. BECOMING EASILY ANNOYED OR IRRITABLE: SEVERAL DAYS
GAD7 TOTAL SCORE: 7
1. FEELING NERVOUS, ANXIOUS, OR ON EDGE: SEVERAL DAYS
IF YOU CHECKED OFF ANY PROBLEMS ON THIS QUESTIONNAIRE, HOW DIFFICULT HAVE THESE PROBLEMS MADE IT FOR YOU TO DO YOUR WORK, TAKE CARE OF THINGS AT HOME, OR GET ALONG WITH OTHER PEOPLE: SOMEWHAT DIFFICULT
5. BEING SO RESTLESS THAT IT IS HARD TO SIT STILL: SEVERAL DAYS
GAD7 TOTAL SCORE: 7
2. NOT BEING ABLE TO STOP OR CONTROL WORRYING: SEVERAL DAYS

## 2023-09-20 ASSESSMENT — PATIENT HEALTH QUESTIONNAIRE - PHQ9
SUM OF ALL RESPONSES TO PHQ QUESTIONS 1-9: 5
10. IF YOU CHECKED OFF ANY PROBLEMS, HOW DIFFICULT HAVE THESE PROBLEMS MADE IT FOR YOU TO DO YOUR WORK, TAKE CARE OF THINGS AT HOME, OR GET ALONG WITH OTHER PEOPLE: NOT DIFFICULT AT ALL
SUM OF ALL RESPONSES TO PHQ QUESTIONS 1-9: 5

## 2023-09-20 ASSESSMENT — PAIN SCALES - GENERAL: PAINLEVEL: MODERATE PAIN (5)

## 2023-09-20 NOTE — PROGRESS NOTES
"  Assessment & Plan     Injury of left ankle, initial encounter  Patient presents with an acute ankle injury of unknown mechanism. Patient has been using conservative treatment over the last few weeks which has been slowly improving symptoms of pain. Pain is likely from Achilles tendonitis based on exam today but minimal symptoms. Patient was advised to continue conservative treatment. No labs or imagine warranted at this time. Patient was advised that if symptoms persist or worsen to let me know.    Chronic continued opioid use  Chronic right-sided back pain  Degenerative disc disease  Patient continues home exercise program and things have been better controlled lately.  Importance of getting off this chronic opiates.  She has been on for several years discussed.  Right now we will decrease to 1-1/2 tablets daily.  We will plan to continue this now and decrease to once daily after 1 months.  Patient cannot use NSAIDs given his chronic anticoagulation.  Using other treatment modalities including topicals and therapy.  Again discussed potential side effects as well as interactions with opiates.  Risk of respiratory suppression and death or addiction discussed.  Yearly urine drug screen up-to-date.  Follow-up in 3 months.      BMI:   Estimated body mass index is 33.66 kg/m  as calculated from the following:    Height as of this encounter: 1.803 m (5' 11\").    Weight as of this encounter: 109.5 kg (241 lb 5 oz).     Christiano Ledezma MD  [facility] is [facility] 53 year [facility], presenting for the following health issues:Trauma (Left ankle). Onset of symptoms was \"a couple weeks ago\", and patient does not remember a specific event that caused the injury. Patient has been Icing, compressing, and elevating the ankle in that time and it has slowly been getting better. The last 3 days the patient has been resting it and has presented today with lesser pain then he was experiencing in " "the past. Patient has also been taking a prescription of Percocet for back pain that has been helping with his pain. Walking for long periods of time exacerbate pain. Patient has no other associated symptoms.            9/20/2023     3:36 PM   Additional Questions   Roomed by Etta JAMES       History of Present Illness       Reason for visit:  Ankle pain  Symptom onset:  1-2 weeks ago  Symptom intensity:  Moderate  Symptom progression:  Improving  Had these symptoms before:  No  What makes it worse:  Walking  What makes it better:  No    He eats 0-1 servings of fruits and vegetables daily.He consumes 4 sweetened beverage(s) daily.He exercises with enough effort to increase his heart rate 30 to 60 minutes per day.  He exercises with enough effort to increase his heart rate 4 days per week.   He is taking medications regularly.      Review of Systems   Constitutional, HEENT, cardiovascular, pulmonary, gi and gu systems are negative, except as otherwise noted.      Objective    /76   Pulse 84   Temp 97.2  F (36.2  C) (Temporal)   Resp 18   Ht 1.803 m (5' 11\")   Wt 109.5 kg (241 lb 5 oz)   SpO2 97%   BMI 33.66 kg/m    Body mass index is 33.66 kg/m .  Physical Exam   GENERAL: healthy, alert and no distress  RESP: Breathing comfortably room air  CV: No lower extremity edema  MS: no gross musculoskeletal defects noted, no edema. Pain palpated on the left Achilles tendon and plantarflexion against resistance elicit pain. No pain palpated on joint line and shows no pain with dorsiflexion.  No calf tenderness to palpation  SKIN: no suspicious lesions or rashes  NEURO: Normal strength and tone, mentation intact and speech normal  PSYCH: mentation appears normal, affect normal/bright          EDIS Mathis   9/20/23    Provider Disclosure:  I agree with above History, Review of Systems, Physical exam and Plan.  I have reviewed the content of the documentation and have edited it as needed. I have personally " performed the services documented here and the documentation accurately represents those services and the decisions I have made.      Christiano Ledezma MD

## 2023-09-20 NOTE — LETTER
Opioid / Opioid Plus Controlled Substance Agreement    This is an agreement between you and your provider about the safe and appropriate use of controlled substance/opioids prescribed by your care team. Controlled substances are medicines that can cause physical and mental dependence (abuse).    There are strict laws about having and using these medicines. We here at [facility] are committing to working with you in your efforts to get better. To support you in this work, we ll help you schedule regular office appointments for medicine refills. If we must cancel or change your appointment for any reason, we ll make sure you have enough medicine to last until your next appointment.     As a Provider, I will:  Listen carefully to your concerns and treat you with respect.   Recommend a treatment plan that I believe is in your best interest. This plan may involve therapies other than opioid pain medication.   Talk with you often about the possible benefits, and the risk of harm of any medicine that we prescribe for you.   Provide a plan on how to taper (discontinue or go off) using this medicine if the decision is made to stop its use.    As a Patient, I understand that opioid(s):   Are a controlled substance prescribed by my care team to help me function or work and manage my condition(s).   Are strong medicines and can cause serious side effects such as:  Drowsiness, which can seriously affect my driving ability  A lower breathing rate, enough to cause death  Harm to my thinking ability   Depression   Abuse of and addiction to this medicine  Need to be taken exactly as prescribed. Combining opioids with certain medicines or chemicals (such as illegal drugs, sedatives, sleeping pills, and benzodiazepines) can be dangerous or even fatal. If I stop opioids suddenly, I may have severe withdrawal symptoms.  Do not work for all types of pain nor for all patients. If they re not helpful, I may be asked to stop  them.      The risks, benefits and side effects of these medicine(s) were explained to me. I agree that:  I will take part in other treatments as advised by my care team. This may be psychiatry or counseling, physical therapy, behavioral therapy, group treatment or a referral to a specialist.     I will keep all my appointments. I understand that this is part of the monitoring of opioids. My care team may require an office visit for EVERY opioid/controlled substance refill. If I miss appointments or don t follow instructions, my care team may stop my medicine.    I will take my medicines as prescribed. I will not change the dose or schedule unless my care team tells me to. There will be no refills if I run out early.     I may be asked to come to the clinic and complete a urine drug test or complete a pill count at any time. If I don t give a urine sample or participate in a pill count, the care team may stop my medicine.    I will only receive prescriptions from this clinic for chronic pain. If I am treated by another provider for acute pain issues, I will tell them that I am taking opioid pain medication for chronic pain and that I have a treatment agreement with this provider. I will inform my [facility] care team within one business day if I am given a prescription for any pain medication by another healthcare provider. My [facility] care team can contact other providers and pharmacists about my use of any medicines.    It is up to me to make sure that I don t run out of my medicines on weekends or holidays. If my care team is willing to refill my opioid prescription without a visit, I must request refills only during office hours. Refills may take up to 3 business days to process. I will use one pharmacy to fill all my opioid and other controlled substance prescriptions. I will notify the clinic about any changes to my insurance or medication availability.    I am responsible for my prescriptions.  If the medicine/prescription is lost, stolen or destroyed, it will not be replaced. I also agree not to share controlled substance medicines with anyone.    I am aware I should not use any illegal or recreational drugs. I agree not to drink alcohol unless my care team says I can.       If I enroll in the [place] [facility] program, I will tell my care team prior to my next refill.     I will tell my care team right away if I become pregnant, have a new medical problem treated outside of my regular clinic, or have a change in my medications.    I understand that this medicine can affect my thinking, judgment and reaction time. Alcohol and drugs affect the brain and body, which can affect the safety of my driving. Being under the influence of alcohol or drugs can affect my decision-making, behaviors, personal safety, and the safety of others. Driving while impaired (DWI) can occur if a person is driving, operating, or in physical control of a car, motorcycle, boat, snowmobile, ATV, motorbike, off-road vehicle, or any other motor vehicle ([place] Statute 169A.20). I understand the risk if I choose to drive or operate any vehicle or machinery.    I understand that if I do not follow any of the conditions above, my prescriptions or treatment may be stopped or changed.          Opioids  What You Need to Know    What are opioids?   Opioids are pain medicines that must be prescribed by a doctor. They are also known as narcotics.     Examples are:   morphine (MS Contin, Gabriela)  oxycodone (Oxycontin)  oxycodone and acetaminophen (Percocet)  hydrocodone and acetaminophen (Vicodin, Norco)   fentanyl patch (Duragesic)   hydromorphone (Dilaudid)   methadone  codeine (Tylenol #3)     What do opioids do well?   Opioids are best for severe short-term pain such as after a surgery or injury. They may work well for cancer pain. They may help some people with long-lasting (chronic) pain.     What do opioids NOT do well?   Opioids  never get rid of pain entirely, and they don t work well for most patients with chronic pain. Opioids don t reduce swelling, one of the causes of pain.                                    Other ways to manage chronic pain and improve function include:     Treat the health problem that may be causing pain  Anti-inflammation medicines, which reduce swelling and tenderness, such as ibuprofen (Advil, Motrin) or naproxen (Aleve)  Acetaminophen (Tylenol)  Antidepressants and anti-seizure medicines, especially for nerve pain  Topical treatments such as patches or creams  Injections or nerve blocks  Chiropractic or osteopathic treatment  Acupuncture, massage, deep breathing, meditation, visual imagery, aromatherapy  Use heat or ice at the pain site  Physical therapy   Exercise  Stop smoking  Take part in therapy       Risks and side effects     Talk to your doctor before you start or decide to keep taking opioids. Possible side effects include:    Lowering your breathing rate enough to cause death  Overdose, including death, especially if taking higher than prescribed doses  Worse depression symptoms; less pleasure in things you usually enjoy  Feeling tired or sluggish  Slower thoughts or cloudy thinking  Being more sensitive to pain over time; pain is harder to control  Trouble sleeping or restless sleep  Changes in hormone levels (for example, less testosterone)  Changes in sex drive or ability to have sex  Constipation  Unsafe driving  Itching and sweating  Dizziness  Nausea, throwing up and dry mouth    What else should I know about opioids?    Opioids may lead to dependence, tolerance, or addiction.    Dependence means that if you stop or reduce the medicine too quickly, you will have withdrawal symptoms. These include loose poop (diarrhea), jitters, flu-like symptoms, nervousness and tremors. Dependence is not the same as addiction.                     Tolerance means needing higher doses over time to get the same  effect. This may increase the chance of serious side effects.    Addiction is when people improperly use a substance that harms their body, their mind or their relations with others. Use of opiates can cause a relapse of addiction if you have a history of drug or alcohol abuse.    People who have used opioids for a long time may have a lower quality of life, worse depression, higher levels of pain and more visits to doctors.    You can overdose on opioids. Take these steps to lower your risk of overdose:    Recognize the signs:  Signs of overdose include decrease or loss of consciousness (blackout), slowed breathing, trouble waking up and blue lips. If someone is worried about overdose, they should call 911.    Talk to your doctor about Narcan (naloxone).   If you are at risk for overdose, you may be given a prescription for Narcan. This medicine very quickly reverses the effects of opioids.   If you overdose, a friend or family member can give you Narcan while waiting for the ambulance. They need to know the signs of overdose and how to give Narcan.     Don't use alcohol or street drugs.   Taking them with opioids can cause death.    Do not take any of these medicines unless your doctor says it s OK. Taking these with opioids can cause death:  Benzodiazepines, such as lorazepam (Ativan), alprazolam (Xanax) or diazepam (Valium)  Muscle relaxers, such as cyclobenzaprine (Flexeril)  Sleeping pills like zolpidem (Ambien)   Other opioids      How to keep you and other people safe while taking opioids:    Never share your opioids with others.  Opioid medicines are regulated by the Drug Enforcement Agency (FELY). Selling or sharing medications is a criminal act.    2. Be sure to store opioids in a secure place, locked up if possible. Young children can easily swallow them and overdose.    3. When you are traveling with your medicines, keep them in the original bottles. If you use a pill box, be sure you also carry a copy  of your medicine list from your clinic or pharmacy.    4. Safe disposal of opioids    Most pharmacies have places to get rid of medicine, called disposal kiosks. Medicine disposal options are also available in every [facility]. Search your county and  medication disposal  to find more options. You can find more details at:  [email or web address]-medications     I agree that my provider, clinic care team, and pharmacy may work with any city, state or federal law enforcement agency that investigates the misuse, sale, or other diversion of my controlled medicine. I will allow my provider to discuss my care with, or share a copy of, this agreement with any other treating provider, pharmacy or emergency room where I receive care.    I have read this agreement and have asked questions about anything I did not understand.    _______________________________________________________  Patient Signature - Hollis Diggs _____________________                   Date     _______________________________________________________  Provider Signature - Christiano Ledezma MD   _____________________                   Date     _______________________________________________________  Witness Signature (required if provider not present while patient signing)   _____________________                   Date

## 2023-09-27 ENCOUNTER — ANTICOAGULATION THERAPY VISIT (OUTPATIENT)
Dept: ANTICOAGULATION | Facility: CLINIC | Age: 53
End: 2023-09-27

## 2023-09-27 ENCOUNTER — LAB (OUTPATIENT)
Dept: LAB | Facility: CLINIC | Age: 53
End: 2023-09-27
Payer: COMMERCIAL

## 2023-09-27 DIAGNOSIS — I82.4Y9 DEEP VEIN THROMBOSIS (DVT) OF PROXIMAL LOWER EXTREMITY, UNSPECIFIED CHRONICITY, UNSPECIFIED LATERALITY (H): ICD-10-CM

## 2023-09-27 DIAGNOSIS — D68.61 ANTIPHOSPHOLIPID SYNDROME (H): ICD-10-CM

## 2023-09-27 DIAGNOSIS — Z79.01 LONG TERM CURRENT USE OF ANTICOAGULANT THERAPY: Primary | ICD-10-CM

## 2023-09-27 LAB — INR BLD: 2.8 (ref 0.9–1.1)

## 2023-09-27 PROCEDURE — 36416 COLLJ CAPILLARY BLOOD SPEC: CPT

## 2023-09-27 PROCEDURE — 85610 PROTHROMBIN TIME: CPT

## 2023-09-27 NOTE — PROGRESS NOTES
ANTICOAGULATION MANAGEMENT     Hollis Diggs 53 year old male is on warfarin with therapeutic INR result. (Goal INR 2.0-3.0)    Recent labs: (last 7 days)     09/27/23  1547   INR 2.8*       ASSESSMENT     Source(s): Chart Review and Patient/Caregiver Call     Warfarin doses taken: Warfarin taken as instructed  Diet: No new diet changes identified  Medication/supplement changes: None noted  New illness, injury, or hospitalization: No  Signs or symptoms of bleeding or clotting: No  Previous result: Therapeutic last visit; previously outside of goal range  Additional findings: None       PLAN     Recommended plan for no diet, medication or health factor changes affecting INR     Dosing Instructions: Continue your current warfarin dose with next INR in 4 weeks       Summary  As of 9/27/2023      Full warfarin instructions:  5 mg every Mon, Wed, Fri; 7.5 mg all other days   Next INR check:  10/25/2023               Telephone call with Hollis who verbalizes understanding and agrees to plan    Lab visit scheduled    Education provided:   Contact [phone]  with any changes, questions or concerns.     Plan made per [facility] anticoagulation protocol    Veda Brambila RN  Anticoagulation Clinic  9/27/2023    _______________________________________________________________________     Anticoagulation Episode Summary       Current INR goal:  2.0-3.0   TTR:  55.8 % (1 y)   Target end date:  Indefinite   Send INR reminders to:  TIMOTEO ALVAREZ    Indications    DVT (deep venous thrombosis) (H) (Resolved) [I82.409]  Long-term (current) use of anticoagulants [Z79.01] [Z79.01]  Deep vein thrombosis (DVT) of proximal lower extremity  unspecified chronicity  unspecified laterality (H) [I82.4Y9]  Antiphospholipid syndrome (H) [D68.61]             Comments:               Anticoagulation Care Providers       Provider Role Specialty Phone number    Sylvester Cash MD Referring Family Practice     Christiano Ledezma MD  [facility] [phone]

## 2023-09-29 ENCOUNTER — MYC REFILL (OUTPATIENT)
Dept: FAMILY MEDICINE | Facility: CLINIC | Age: 53
End: 2023-09-29
Payer: COMMERCIAL

## 2023-09-29 DIAGNOSIS — K21.9 GASTROESOPHAGEAL REFLUX DISEASE WITHOUT ESOPHAGITIS: ICD-10-CM

## 2023-09-29 DIAGNOSIS — M54.16 LUMBAR RADICULOPATHY: ICD-10-CM

## 2023-09-29 RX ORDER — PANTOPRAZOLE SODIUM 40 MG/1
40 TABLET, DELAYED RELEASE ORAL DAILY
Qty: 90 TABLET | Refills: 1 | Status: SHIPPED | OUTPATIENT
Start: 2023-09-29

## 2023-09-29 NOTE — TELEPHONE ENCOUNTER
Requested Prescriptions   Pending Prescriptions Disp Refills    oxyCODONE-acetaminophen (PERCOCET) 5-325 MG tablet 30 tablet 0     Sig: Take 1 tablet by mouth every 6 hours as needed for severe pain         Routing refill request to provider for review/approval because:  Drug not on the [facility], [facility] or [facility] refill protocol or controlled substance

## 2023-10-03 RX ORDER — OXYCODONE AND ACETAMINOPHEN 5; 325 MG/1; MG/1
1 TABLET ORAL EVERY 6 HOURS PRN
Qty: 30 TABLET | Refills: 0 | Status: SHIPPED | OUTPATIENT
Start: 2023-10-03 | End: 2023-11-01

## 2023-10-15 DIAGNOSIS — F33.0 MAJOR DEPRESSIVE DISORDER, RECURRENT EPISODE, MILD (H): ICD-10-CM

## 2023-10-17 RX ORDER — DULOXETIN HYDROCHLORIDE 60 MG/1
CAPSULE, DELAYED RELEASE ORAL
Qty: 60 CAPSULE | Refills: 0 | Status: SHIPPED | OUTPATIENT
Start: 2023-10-17 | End: 2023-11-13

## 2023-10-21 DIAGNOSIS — F41.9 ANXIETY: ICD-10-CM

## 2023-10-23 RX ORDER — GABAPENTIN 800 MG/1
800 TABLET ORAL 3 TIMES DAILY
Qty: 90 TABLET | Refills: 2 | Status: SHIPPED | OUTPATIENT
Start: 2023-10-23 | End: 2024-01-09

## 2023-10-25 ENCOUNTER — LAB (OUTPATIENT)
Dept: LAB | Facility: CLINIC | Age: 53
End: 2023-10-25
Payer: COMMERCIAL

## 2023-10-25 ENCOUNTER — ANTICOAGULATION THERAPY VISIT (OUTPATIENT)
Dept: ANTICOAGULATION | Facility: CLINIC | Age: 53
End: 2023-10-25

## 2023-10-25 DIAGNOSIS — Z79.01 LONG TERM CURRENT USE OF ANTICOAGULANT THERAPY: Primary | ICD-10-CM

## 2023-10-25 DIAGNOSIS — I82.4Y9 DEEP VEIN THROMBOSIS (DVT) OF PROXIMAL LOWER EXTREMITY, UNSPECIFIED CHRONICITY, UNSPECIFIED LATERALITY (H): ICD-10-CM

## 2023-10-25 DIAGNOSIS — D68.61 ANTIPHOSPHOLIPID SYNDROME (H): ICD-10-CM

## 2023-10-25 LAB — INR BLD: 2.3 (ref 0.9–1.1)

## 2023-10-25 PROCEDURE — 85610 PROTHROMBIN TIME: CPT

## 2023-10-25 PROCEDURE — 36416 COLLJ CAPILLARY BLOOD SPEC: CPT

## 2023-10-25 NOTE — PROGRESS NOTES
ANTICOAGULATION MANAGEMENT     Hollis Diggs 53 year old male is on warfarin with therapeutic INR result. (Goal INR 2.0-3.0)    Recent labs: (last 7 days)     10/25/23  1411   INR 2.3*       ASSESSMENT     Source(s): Chart Review and Patient/Caregiver Call     Warfarin doses taken: Warfarin taken as instructed  Diet: No new diet changes identified  Medication/supplement changes: None noted  New illness, injury, or hospitalization: No  Signs or symptoms of bleeding or clotting: No  Previous result: Therapeutic last 2(+) visits  Additional findings: None       PLAN     Recommended plan for no diet, medication or health factor changes affecting INR     Dosing Instructions: Continue your current warfarin dose with next INR in 5 weeks       Summary  As of 10/25/2023      Full warfarin instructions:  5 mg every Mon, Wed, Fri; 7.5 mg all other days   Next INR check:  11/29/2023               Telephone call with Hollis who verbalizes understanding and agrees to plan    Lab visit scheduled    Education provided:   Contact [phone]  with any changes, questions or concerns.     Plan made per [facility] anticoagulation protocol    Ana Lofton RN  Anticoagulation Clinic  10/25/2023    _______________________________________________________________________     Anticoagulation Episode Summary       Current INR goal:  2.0-3.0   TTR:  60.5% (1 y)   Target end date:  Indefinite   Send INR reminders to:  TIMOTEO ALVAREZ    Indications    DVT (deep venous thrombosis) (H) (Resolved) [I82.409]  Long-term (current) use of anticoagulants [Z79.01] [Z79.01]  Deep vein thrombosis (DVT) of proximal lower extremity  unspecified chronicity  unspecified laterality (H) [I82.4Y9]  Antiphospholipid syndrome (H24) [D68.61]             Comments:               Anticoagulation Care Providers       Provider Role Specialty Phone number    Sylvester Cash MD Referring Family Practice     Christiano Ledezma MD Referring Family Medicine  [phone]

## 2023-11-01 ENCOUNTER — TELEPHONE (OUTPATIENT)
Dept: FAMILY MEDICINE | Facility: CLINIC | Age: 53
End: 2023-11-01
Payer: COMMERCIAL

## 2023-11-01 ENCOUNTER — MYC REFILL (OUTPATIENT)
Dept: FAMILY MEDICINE | Facility: CLINIC | Age: 53
End: 2023-11-01
Payer: COMMERCIAL

## 2023-11-01 DIAGNOSIS — M54.16 LUMBAR RADICULOPATHY: ICD-10-CM

## 2023-11-01 NOTE — TELEPHONE ENCOUNTER
Patient returning call for scheduling of an office visit for follow up on worsening right shoulder pain/injury. Informed that his primary does not have any availability over the next week. Offered an appointment with Dr. Gaston for tomorrow, 11/2 with a 9:20 am arrival. Kandi Renae LPN

## 2023-11-01 NOTE — TELEPHONE ENCOUNTER
Symptoms    Describe your symptoms: right shoulder pain from lifting, pain getting worse    Any pain: Yes: painful with shoulderr movements    How long have you been having symptoms: 2-3  weeks    Have you been seen for this:  No    Appointment offered?: Yes: nothing avaliable soon with PCP or PCP taking new pts    Triage offered?: No    Home remedies tried: tylenol    Preferred Pharmacy:   [id number] - [facility] [address]  [address] [address] [address]  Phone: [phone] Fax: [phone]    [facility] - [place], [place] - [address]  [address]  [facility] [address]  Phone: [phone] Fax: [phone]      Could we send this information to you in [address] or would you prefer to receive a phone call?:   Patient would prefer a phone call   Okay to leave a detailed message?: Yes at Home number on file [phone] (home)

## 2023-11-01 NOTE — TELEPHONE ENCOUNTER
Left message for patient to call back.    - can use virtual for in person visit if needed, also suggested sports medicine    Sydnee Jeong XRO/

## 2023-11-02 ENCOUNTER — HOSPITAL ENCOUNTER (OUTPATIENT)
Dept: GENERAL RADIOLOGY | Facility: CLINIC | Age: 53
Discharge: HOME | End: 2023-11-02
Attending: FAMILY MEDICINE | Admitting: FAMILY MEDICINE
Payer: COMMERCIAL

## 2023-11-02 ENCOUNTER — OFFICE VISIT (OUTPATIENT)
Dept: FAMILY MEDICINE | Facility: CLINIC | Age: 53
End: 2023-11-02
Payer: COMMERCIAL

## 2023-11-02 VITALS
SYSTOLIC BLOOD PRESSURE: 122 MMHG | HEART RATE: 90 BPM | DIASTOLIC BLOOD PRESSURE: 80 MMHG | WEIGHT: 242.25 LBS | TEMPERATURE: 97.6 F | OXYGEN SATURATION: 96 % | HEIGHT: 71 IN | BODY MASS INDEX: 33.91 KG/M2 | RESPIRATION RATE: 20 BRPM

## 2023-11-02 DIAGNOSIS — M75.21 BICEPS TENDONITIS, RIGHT: ICD-10-CM

## 2023-11-02 DIAGNOSIS — M75.41 IMPINGEMENT SYNDROME OF SHOULDER REGION, RIGHT: ICD-10-CM

## 2023-11-02 DIAGNOSIS — M75.41 IMPINGEMENT SYNDROME OF SHOULDER REGION, RIGHT: Primary | ICD-10-CM

## 2023-11-02 PROCEDURE — 73030 X-RAY EXAM OF SHOULDER: CPT | Mod: RT

## 2023-11-02 PROCEDURE — 99213 OFFICE O/P EST LOW 20 MIN: CPT | Performed by: FAMILY MEDICINE

## 2023-11-02 ASSESSMENT — PAIN SCALES - GENERAL: PAINLEVEL: EXTREME PAIN (8)

## 2023-11-02 NOTE — PROGRESS NOTES
"  Assessment & Plan     ASSESSMENT/ORDERS:    ICD-10-CM    1. Impingement syndrome of shoulder region, right  M75.41 Physical Therapy Referral     XR Shoulder Right G/E 3 Views      2. Biceps tendonitis, right  M75.21 Physical Therapy Referral     XR Shoulder Right G/E 3 Views        PLAN:  1.  X-ray for rule out bony path/arthritis  2.  Physical therapy referral  3.  Ice and acetaminophen and over the counter topicals as needed for pain.    Follow-up in 1 month if pain worsens or fails to improve.  Can consider corticosteroid injection.                  Frank Gaston MD  [facility] ANTONIO Shafer is a 53 year old, presenting for the following health issues:  Shoulder      11/2/2023     9:16 AM   Additional Questions   Roomed by Etta JAMES       History of Present Illness       Reason for visit:  Shoulder pain  Symptom onset:  3-4 weeks ago    He eats 0-1 servings of fruits and vegetables daily.He consumes 7 sweetened beverage(s) daily.He exercises with enough effort to increase his heart rate 30 to 60 minutes per day.    He is taking medications regularly.             Pain anterior shouler to neck and then posteriorly.  Worse anteriorly.      No injury.  Woke up one day and sore.  No grasp/, no numbness/tingling.  No elbow pain.      Works at [employer].  Stacking boxes.  Forward flexion and abduction make pain worse.  Full active range of motion.  Feels like he is losing strength AND having more pain.      Sleeping okay with minimal issues due to pain.      Review of Systems         Objective    /80   Pulse 90   Temp 97.6  F (36.4  C) (Temporal)   Resp 20   Ht 1.8 m (5' 10.87\")   Wt 109.9 kg (242 lb 4 oz)   SpO2 96%   BMI 33.91 kg/m    Body mass index is 33.91 kg/m .  Physical Exam   Right Shoulder Exam   Right shoulder exam is normal.    Tenderness   The patient is experiencing tenderness in the acromioclavicular joint, biceps tendon and clavicle (pain at SC " junction).    Range of Motion   The patient has normal right shoulder ROM.    Muscle Strength   Abduction: 4/5   Internal rotation: 4/5   External rotation: 4/5   Supraspinatus: 5/5   Subscapularis: 5/5   Biceps: 5/5     Tests   Scott test: positive  Cross arm: positive  Impingement: positive    Comments:  Palpable muscle spasm, tender to palpation along right upper back

## 2023-11-06 NOTE — RESULT ENCOUNTER NOTE
Hollis,  Your results show some arthritic changes in the shoulder.  Physical therapy can still be of benefit.  I can also do a corticosteroid injection if physical therapy is not effective right away or pain worsens, but physical therapy is highly recommend for your pain management.   Please let me know if you have any questions.    Sincerely,  Dr. Gaston

## 2023-11-07 RX ORDER — OXYCODONE AND ACETAMINOPHEN 5; 325 MG/1; MG/1
1 TABLET ORAL EVERY 6 HOURS PRN
Qty: 30 TABLET | Refills: 0 | Status: SHIPPED | OUTPATIENT
Start: 2023-11-07 | End: 2023-12-05

## 2023-11-12 DIAGNOSIS — F33.0 MAJOR DEPRESSIVE DISORDER, RECURRENT EPISODE, MILD (H): ICD-10-CM

## 2023-11-13 RX ORDER — DULOXETIN HYDROCHLORIDE 60 MG/1
CAPSULE, DELAYED RELEASE ORAL
Qty: 60 CAPSULE | Refills: 0 | Status: SHIPPED | OUTPATIENT
Start: 2023-11-13 | End: 2023-12-13

## 2023-11-19 DIAGNOSIS — D68.61 ANTIPHOSPHOLIPID SYNDROME (H): ICD-10-CM

## 2023-11-19 DIAGNOSIS — I82.4Y9 DEEP VEIN THROMBOSIS (DVT) OF PROXIMAL LOWER EXTREMITY, UNSPECIFIED CHRONICITY, UNSPECIFIED LATERALITY (H): ICD-10-CM

## 2023-11-19 DIAGNOSIS — Z79.01 LONG TERM CURRENT USE OF ANTICOAGULANT THERAPY: ICD-10-CM

## 2023-11-20 RX ORDER — WARFARIN SODIUM 5 MG/1
TABLET ORAL
Qty: 115 TABLET | Refills: 1 | Status: SHIPPED | OUTPATIENT
Start: 2023-11-20 | End: 2024-06-13

## 2023-11-20 NOTE — TELEPHONE ENCOUNTER
ANTICOAGULATION MANAGEMENT:  Medication Refill    Anticoagulation Summary  As of 10/25/2023      Warfarin maintenance plan:  5 mg (5 mg x 1) every Mon, Wed, Fri; 7.5 mg (5 mg x 1.5) all other days   Next INR check:  11/29/2023   Target end date:  Indefinite    Indications    DVT (deep venous thrombosis) (H) (Resolved) [I82.409]  Long-term (current) use of anticoagulants [Z79.01] [Z79.01]  Deep vein thrombosis (DVT) of proximal lower extremity  unspecified chronicity  unspecified laterality (H) [I82.4Y9]  Antiphospholipid syndrome (H24) [D68.61]                 Anticoagulation Care Providers       Provider Role Specialty Phone number    Sylvester Cash MD Referring Family Practice     Christiano Ledezma MD Referring Family Medicine [phone]            Refill Criteria    Visit with referring provider/group: Meets criteria: office visit within referring provider group in the last 1 year on 11/2/23    [facility] referral signed last signed: 05/04/2023; within last year: Yes    Lab monitoring not exceeding 2 weeks overdue: Yes    Hollis meets all criteria for refill. Rx instructions and quantity supplied updated to match patient's current dosing plan. Warfarin 90 day supply with 1 refill granted per [facility] protocol     Jesse Flores RN  Anticoagulation Clinic

## 2023-11-21 ENCOUNTER — MYC MEDICAL ADVICE (OUTPATIENT)
Dept: CARDIOLOGY | Facility: CLINIC | Age: 53
End: 2023-11-21
Payer: COMMERCIAL

## 2023-11-21 NOTE — TELEPHONE ENCOUNTER
Patient sent [employer] message asking if he can stop Plavix 75mg daily. Patient's cardiac cath was on 10/11/22. Last OV with Dr. Bell on 5/11/23, recommended patient follow up in one year (05/2024). Will route to Dr. Bell to confirm it is okay for patient to stop Plavix.       Conclusion  Prox RCA-1 lesion is 50% stenosed.  Prox RCA-2 lesion is 80% stenosed.  RPAV lesion is 40% stenosed.  Prox LAD to Mid LAD lesion is 60% stenosed.  Ost Cx lesion is 30% stenosed.     One vessel obstructive coronary artery disease (proximal to mRCA 80% stenosis)  pLAD 60% stenosis is not hemodynamically significant as assessed by iFR of 0.96   IVUS guided PCI of proximal to mid RCA with DESx1 (Synergy 4.50x32 mm) post dilated to 5.2 mm vessel  Hemostasis of RRA with TR band

## 2023-11-28 ENCOUNTER — MYC MEDICAL ADVICE (OUTPATIENT)
Dept: CARDIOLOGY | Facility: CLINIC | Age: 53
End: 2023-11-28
Payer: COMMERCIAL

## 2023-11-29 ENCOUNTER — LAB (OUTPATIENT)
Dept: LAB | Facility: CLINIC | Age: 53
End: 2023-11-29
Payer: COMMERCIAL

## 2023-11-29 ENCOUNTER — ANTICOAGULATION THERAPY VISIT (OUTPATIENT)
Dept: ANTICOAGULATION | Facility: CLINIC | Age: 53
End: 2023-11-29

## 2023-11-29 DIAGNOSIS — D68.61 ANTIPHOSPHOLIPID SYNDROME (H): ICD-10-CM

## 2023-11-29 DIAGNOSIS — I82.4Y9 DEEP VEIN THROMBOSIS (DVT) OF PROXIMAL LOWER EXTREMITY, UNSPECIFIED CHRONICITY, UNSPECIFIED LATERALITY (H): ICD-10-CM

## 2023-11-29 DIAGNOSIS — Z79.01 LONG TERM CURRENT USE OF ANTICOAGULANT THERAPY: Primary | ICD-10-CM

## 2023-11-29 LAB — INR BLD: 2.9 (ref 0.9–1.1)

## 2023-11-29 PROCEDURE — 36416 COLLJ CAPILLARY BLOOD SPEC: CPT

## 2023-11-29 PROCEDURE — 85610 PROTHROMBIN TIME: CPT

## 2023-11-29 NOTE — PROGRESS NOTES
ANTICOAGULATION MANAGEMENT     Hollis Diggs 53 year old male is on warfarin with therapeutic INR result. (Goal INR 2.0-3.0)    Recent labs: (last 7 days)     11/29/23  1411   INR 2.9*       ASSESSMENT     Source(s): Chart Review  Previous INR was Therapeutic last 2(+) visits  Medication, diet, health changes since last INR chart reviewed; none identified         PLAN     Recommended plan for no diet, medication or health factor changes affecting INR     Dosing Instructions: Continue your current warfarin dose with next INR in 6 weeks       Summary  As of 11/29/2023      Full warfarin instructions:  5 mg every Mon, Wed, Fri; 7.5 mg all other days   Next INR check:  1/10/2024               Detailed voice message left for Hollis with dosing instructions and follow up date.  My chart message also sent.     Contact [phone]  to schedule and with any changes, questions or concerns.     Education provided:   Contact [phone]  with any changes, questions or concerns.     Plan made per [facility] anticoagulation protocol    Ana Lofton RN  Anticoagulation Clinic  11/29/2023    _______________________________________________________________________     Anticoagulation Episode Summary       Current INR goal:  2.0-3.0   TTR:  63.8% (1 y)   Target end date:  Indefinite   Send INR reminders to:  [facility]    Indications    DVT (deep venous thrombosis) (H) (Resolved) [I82.409]  Long-term (current) use of anticoagulants [Z79.01] [Z79.01]  Deep vein thrombosis (DVT) of proximal lower extremity  unspecified chronicity  unspecified laterality (H) [I82.4Y9]  Antiphospholipid syndrome (H24) [D68.61]             Comments:               Anticoagulation Care Providers       Provider Role Specialty Phone number    Sylvester Cash MD Referring Family Practice     Christiano Ledezma MD Referring Family Medicine [phone]

## 2023-11-30 ENCOUNTER — THERAPY VISIT (OUTPATIENT)
Dept: PHYSICAL THERAPY | Facility: CLINIC | Age: 53
End: 2023-11-30
Attending: FAMILY MEDICINE
Payer: COMMERCIAL

## 2023-11-30 DIAGNOSIS — M75.41 IMPINGEMENT SYNDROME OF SHOULDER REGION, RIGHT: ICD-10-CM

## 2023-11-30 DIAGNOSIS — M75.21 BICEPS TENDONITIS, RIGHT: ICD-10-CM

## 2023-11-30 PROCEDURE — 97110 THERAPEUTIC EXERCISES: CPT | Mod: GP | Performed by: PHYSICAL THERAPIST

## 2023-11-30 PROCEDURE — 97161 PT EVAL LOW COMPLEX 20 MIN: CPT | Mod: GP | Performed by: PHYSICAL THERAPIST

## 2023-11-30 NOTE — PROGRESS NOTES
PHYSICAL THERAPY EVALUATION  Type of Visit: Evaluation    See electronic medical record for Abuse and Falls Screening details.    Subjective Since October started noticing R shoulder pain along the top part of the clavicle.  Pt states that he works at the [employer] and does a lot of lifting, carrying, and various heights. Pt states does have pain with laying on the R side.  If he adds weights or overhead reaching over head he has more pain.  Pt states that he is have some soreness into the neck but no headaches.  Tylenol, taking more than normal since October.  Did try heat and did not help.  No other conservative measures.  If he has the shoulder resting on the arm rest over time he will have increasing pain.      Presenting condition or subjective complaint:  Shoulder  Date of onset: 10/02/23    Relevant medical history:   Chest pain; depression; dizziness; DVT  Prior therapy history for the same diagnosis, illness or injury:    No    Prior Level of Function  Transfers: Independent  Ambulation: Independent  ADL: Independent  IADL: Driving, Finances, Housekeeping, Laundry, Meal preparation, Medication management, Work, Yard work    Living Environment  Social support:   With a significant other or spouse  Type of home:   Multi-level  Stairs to enter the home:       2  Stairs inside the home:       12, multi level  Help at home:  None needed  Employment:    Yes  Patient goals for therapy:  Use it without pain    Pain assessment: See objective evaluation for additional pain details     Objective   SHOULDER EVALUATION  PAIN: Pain Level at Rest: 0/10  Pain Level with Use: 7/10  Pain Location: AC joint line and anterior shoulder  Pain Quality: Uncomfortable, gnawing pain  INTEGUMENTARY (edema, incisions): WNL  POSTURE: Standing Posture: Rounded shoulders, Forward head  Sitting Posture: Rounded shoulders, Forward head  ROM: AROM WNL  Painful with overhead motion  STRENGTH:   Pain: - none + mild ++ moderate +++  severe  Strength Scale: 0-5/5 Left Right   Shoulder Flexion 5 5   Shoulder Extension 5 5   Shoulder Abduction 5 5   Shoulder Internal Rotation 5 5   Shoulder External Rotation 5 5   Elbow Flexion 5 5   Elbow Extension 5 5   Challenged with scapular retraction and facilitating mid trap and lower trap.  Pt has thoracic kyphosis and tightness.  FLEXIBILITY:  Tightness through pectoral musculature, thoracic extension, and cervical retraction tightness.  SPECIAL TESTS:    Left Right   Impingement     Neer's Negative  Positive   Hawkin's-Malik Negative  Positive   Multi-Directional Instability      Sulcus Negative  Negative    Biceps      Speed's Negative  Negative    Rotator Cuff Tear     Drop Arm Negative  Negative    Belly Press Negative  Negative    Lift off  Negative  Negative    PALPATION:   + Tenderness At Location Left Right   Clavicle - +   Sternoclavicular - +   Acromioclavicular - +   Biceps - -   Triceps - -   Supraspinatus - -   Infraspinatus - -   Teres minor - -   Subscapularis - -   Deltoid - -   Levator - +   Rhomboids - +   Upper trap - +   Bicipital groove - -   JOINT MOBILITY: WNL  CERVICAL SCREEN: WNL    Assessment & Plan   CLINICAL IMPRESSIONS  Medical Diagnosis: Impingement syndrome of shoulder region, right (M75.41)  - Primary; Biceps tendonitis, right (M75.21)    Treatment Diagnosis: AC joint tightness and pain, poor posture   Impression/Assessment: Patient is a 53 year old male with R shoulder complaints.  The following significant findings have been identified: Pain, Decreased ROM/flexibility, poor posture, and Decreased strength. These impairments interfere with their ability to perform work tasks and recreational activities as compared to previous level of function.     Clinical Decision Making (Complexity):  Clinical Presentation: Stable/Uncomplicated  Clinical Presentation Rationale: based on medical and personal factors listed in PT evaluation  Clinical Decision Making (Complexity): Low  complexity    PLAN OF CARE  Treatment Interventions:  Interventions: Manual Therapy, Neuromuscular Re-education, Therapeutic Activity, Therapeutic Exercise    Long Term Goals     PT Goal 1  Goal Identifier: #1  Goal Description: Pt will demonstrate ability to complete overhead motions with 10 lbs and greater without increased pain in the R shoulder in order to perform work duties.  Rationale: to maximize safety and independence with performance of ADLs and functional tasks  Target Date: 01/12/24  PT Goal 2  Goal Identifier: #2  Goal Description: Pt will demonstrate ability to carry objects for 40 lbs or more without R shoulder in order to perform work and home duties.  Rationale: to maximize safety and independence with performance of ADLs and functional tasks  Target Date: 01/12/24  PT Goal 3  Goal Identifier: #3  Goal Description: Pt will demonstrate ability to get sweatshirt/jacket on without R shoulder pain.  Rationale: to maximize safety and independence with performance of ADLs and functional tasks  Target Date: 01/12/24      Frequency of Treatment: 1x/week  Duration of Treatment: 6 weeks    Education Assessment:   Learner/Method: Patient;Reading;Listening;Demonstration;Pictures/Video;No Barriers to Learning  Education Comments: HEP, POC  Risks and benefits of evaluation/treatment have been explained.   Patient/Family/caregiver agrees with Plan of Care.     Evaluation Time:     PT Eval, Low Complexity Minutes ([id number]): 30       Signing Clinician: Amy Mckeon, PT

## 2023-12-01 ENCOUNTER — OFFICE VISIT (OUTPATIENT)
Dept: FAMILY MEDICINE | Facility: CLINIC | Age: 53
End: 2023-12-01
Payer: COMMERCIAL

## 2023-12-01 VITALS
DIASTOLIC BLOOD PRESSURE: 70 MMHG | HEIGHT: 71 IN | HEART RATE: 70 BPM | WEIGHT: 248.7 LBS | TEMPERATURE: 97.8 F | RESPIRATION RATE: 12 BRPM | OXYGEN SATURATION: 99 % | BODY MASS INDEX: 34.82 KG/M2 | SYSTOLIC BLOOD PRESSURE: 124 MMHG

## 2023-12-01 DIAGNOSIS — F33.0 MAJOR DEPRESSIVE DISORDER, RECURRENT EPISODE, MILD (H): ICD-10-CM

## 2023-12-01 DIAGNOSIS — D68.61 ANTIPHOSPHOLIPID SYNDROME (H): ICD-10-CM

## 2023-12-01 DIAGNOSIS — I82.4Y9 DEEP VEIN THROMBOSIS (DVT) OF PROXIMAL LOWER EXTREMITY, UNSPECIFIED CHRONICITY, UNSPECIFIED LATERALITY (H): ICD-10-CM

## 2023-12-01 DIAGNOSIS — Z00.00 ROUTINE GENERAL MEDICAL EXAMINATION AT A HEALTH CARE FACILITY: Primary | ICD-10-CM

## 2023-12-01 DIAGNOSIS — F41.9 ANXIETY: ICD-10-CM

## 2023-12-01 DIAGNOSIS — I25.10 CORONARY ARTERY DISEASE INVOLVING NATIVE CORONARY ARTERY OF NATIVE HEART WITHOUT ANGINA PECTORIS: ICD-10-CM

## 2023-12-01 DIAGNOSIS — F11.90 CHRONIC, CONTINUOUS USE OF OPIOIDS: ICD-10-CM

## 2023-12-01 DIAGNOSIS — G47.33 OSA (OBSTRUCTIVE SLEEP APNEA): ICD-10-CM

## 2023-12-01 DIAGNOSIS — M51.369 DDD (DEGENERATIVE DISC DISEASE), LUMBAR: ICD-10-CM

## 2023-12-01 DIAGNOSIS — I10 HYPERTENSION, UNSPECIFIED TYPE: ICD-10-CM

## 2023-12-01 DIAGNOSIS — Z79.01 LONG TERM CURRENT USE OF ANTICOAGULANT THERAPY: ICD-10-CM

## 2023-12-01 DIAGNOSIS — M06.00 SERONEGATIVE RHEUMATOID ARTHRITIS (H): ICD-10-CM

## 2023-12-01 DIAGNOSIS — M54.40 ACUTE MIDLINE LOW BACK PAIN WITH SCIATICA, SCIATICA LATERALITY UNSPECIFIED: ICD-10-CM

## 2023-12-01 DIAGNOSIS — Z12.5 SCREENING FOR PROSTATE CANCER: ICD-10-CM

## 2023-12-01 DIAGNOSIS — K21.9 GASTROESOPHAGEAL REFLUX DISEASE WITHOUT ESOPHAGITIS: ICD-10-CM

## 2023-12-01 DIAGNOSIS — E61.1 IRON DEFICIENCY: ICD-10-CM

## 2023-12-01 PROBLEM — M75.41 IMPINGEMENT SYNDROME OF SHOULDER REGION, RIGHT: Status: ACTIVE | Noted: 2023-12-01

## 2023-12-01 PROBLEM — M75.21 BICEPS TENDONITIS, RIGHT: Status: ACTIVE | Noted: 2023-12-01

## 2023-12-01 LAB
ALBUMIN SERPL BCG-MCNC: 4.3 G/DL (ref 3.5–5.2)
ALP SERPL-CCNC: 78 U/L (ref 40–150)
ALT SERPL W P-5'-P-CCNC: 25 U/L (ref 0–70)
ANION GAP SERPL CALCULATED.3IONS-SCNC: 7 MMOL/L (ref 7–15)
AST SERPL W P-5'-P-CCNC: 27 U/L (ref 0–45)
BILIRUB SERPL-MCNC: 0.3 MG/DL
BUN SERPL-MCNC: 8 MG/DL (ref 6–20)
CALCIUM SERPL-MCNC: 9.2 MG/DL (ref 8.6–10)
CHLORIDE SERPL-SCNC: 105 MMOL/L (ref 98–107)
CHOLEST SERPL-MCNC: 112 MG/DL
CREAT SERPL-MCNC: 1.06 MG/DL (ref 0.67–1.17)
DEPRECATED HCO3 PLAS-SCNC: 29 MMOL/L (ref 22–29)
EGFRCR SERPLBLD CKD-EPI 2021: 83 ML/MIN/1.73M2
ERYTHROCYTE [DISTWIDTH] IN BLOOD BY AUTOMATED COUNT: 14.9 % (ref 10–15)
GLUCOSE SERPL-MCNC: 83 MG/DL (ref 70–99)
HCT VFR BLD AUTO: 38.6 % (ref 40–53)
HDLC SERPL-MCNC: 55 MG/DL
HGB BLD-MCNC: 11.6 G/DL (ref 13.3–17.7)
IRON BINDING CAPACITY (ROCHE): 326 UG/DL (ref 240–430)
IRON SATN MFR SERPL: 6 % (ref 15–46)
IRON SERPL-MCNC: 19 UG/DL (ref 61–157)
LDLC SERPL CALC-MCNC: 46 MG/DL
MCH RBC QN AUTO: 22.5 PG (ref 26.5–33)
MCHC RBC AUTO-ENTMCNC: 30.1 G/DL (ref 31.5–36.5)
MCV RBC AUTO: 75 FL (ref 78–100)
NONHDLC SERPL-MCNC: 57 MG/DL
PLATELET # BLD AUTO: 266 10E3/UL (ref 150–450)
POTASSIUM SERPL-SCNC: 4 MMOL/L (ref 3.4–5.3)
PROT SERPL-MCNC: 7 G/DL (ref 6.4–8.3)
PSA SERPL DL<=0.01 NG/ML-MCNC: 0.75 NG/ML (ref 0–3.5)
RBC # BLD AUTO: 5.15 10E6/UL (ref 4.4–5.9)
SODIUM SERPL-SCNC: 141 MMOL/L (ref 135–145)
TRIGL SERPL-MCNC: 54 MG/DL
WBC # BLD AUTO: 5.6 10E3/UL (ref 4–11)

## 2023-12-01 PROCEDURE — 80053 COMPREHEN METABOLIC PANEL: CPT | Performed by: STUDENT IN AN ORGANIZED HEALTH CARE EDUCATION/TRAINING PROGRAM

## 2023-12-01 PROCEDURE — 36415 COLL VENOUS BLD VENIPUNCTURE: CPT | Performed by: STUDENT IN AN ORGANIZED HEALTH CARE EDUCATION/TRAINING PROGRAM

## 2023-12-01 PROCEDURE — G0103 PSA SCREENING: HCPCS | Performed by: STUDENT IN AN ORGANIZED HEALTH CARE EDUCATION/TRAINING PROGRAM

## 2023-12-01 PROCEDURE — 83550 IRON BINDING TEST: CPT | Performed by: STUDENT IN AN ORGANIZED HEALTH CARE EDUCATION/TRAINING PROGRAM

## 2023-12-01 PROCEDURE — 83540 ASSAY OF IRON: CPT | Performed by: STUDENT IN AN ORGANIZED HEALTH CARE EDUCATION/TRAINING PROGRAM

## 2023-12-01 PROCEDURE — 99396 PREV VISIT EST AGE 40-64: CPT | Performed by: STUDENT IN AN ORGANIZED HEALTH CARE EDUCATION/TRAINING PROGRAM

## 2023-12-01 PROCEDURE — 85027 COMPLETE CBC AUTOMATED: CPT | Performed by: STUDENT IN AN ORGANIZED HEALTH CARE EDUCATION/TRAINING PROGRAM

## 2023-12-01 PROCEDURE — 80061 LIPID PANEL: CPT | Performed by: STUDENT IN AN ORGANIZED HEALTH CARE EDUCATION/TRAINING PROGRAM

## 2023-12-01 ASSESSMENT — ENCOUNTER SYMPTOMS
NAUSEA: 0
DIZZINESS: 0
ARTHRALGIAS: 0
SHORTNESS OF BREATH: 0
CHILLS: 0
FREQUENCY: 0
HEARTBURN: 0
PARESTHESIAS: 0
ABDOMINAL PAIN: 0
HEMATOCHEZIA: 0
HEADACHES: 0
EYE PAIN: 0
DYSURIA: 0
CONSTIPATION: 0
MYALGIAS: 0
SORE THROAT: 0
NERVOUS/ANXIOUS: 0
FEVER: 0
JOINT SWELLING: 0
COUGH: 0
DIARRHEA: 0
HEMATURIA: 0
WEAKNESS: 0
PALPITATIONS: 0

## 2023-12-01 NOTE — PROGRESS NOTES
SUBJECTIVE:   Hollis is a 53 year old, presenting for the following:  Physical        12/1/2023     7:39 AM   Additional Questions   Roomed by Melina rider       Healthy Habits:     Getting at least 3 servings of Calcium per day:  Yes    Bi-annual eye exam:  Yes    Dental care twice a year:  Yes    Sleep apnea or symptoms of sleep apnea:  None    Diet:  Low fat/cholesterol    Frequency of exercise:  2-3 days/week    Duration of exercise:  15-30 minutes    Taking medications regularly:  Yes    Medication side effects:  None    Additional concerns today:  No        Social History     Tobacco Use    Smoking status: Never     Passive exposure: Never    Smokeless tobacco: Never   Substance Use Topics    Alcohol use: Yes     Comment: rare             12/1/2023     7:28 AM   Alcohol Use   Prescreen: >3 drinks/day or >7 drinks/week? No       Last PSA:   PSA   Date Value Ref Range Status   11/08/2019 0.97 0 - 4 ug/L Final     Comment:     Assay Method:  Chemiluminescence using Siemens Vista analyzer     Prostate Specific Antigen Screen   Date Value Ref Range Status   11/17/2022 0.68 0.00 - 4.00 ug/L Final       Reviewed orders with patient. Reviewed health maintenance and updated orders accordingly - Yes  Lab work is in process    Reviewed and updated as needed this visit by clinical staff     Meds              Reviewed and updated as needed this visit by Provider     Meds             Past Medical History:   Diagnosis Date    Antiphospholipid syndrome (H24)     Arthritis     Asthma     Exercise    blood clot in leg     Depressive disorder, not elsewhere classified     Depression (non-psychotic)    ANKIT (generalised anxiety disorder)     HH (hiatus hernia)     Hypercholesteremia     normal with weight loss 3/09    Lumbar disc herniation 1992    DEDE (obstructive sleep apnea) 12/1/2023    Seronegative rheumatoid arthritis (H)       Past Surgical History:   Procedure Laterality Date    APPENDECTOMY      COLONOSCOPY N/A 8/2/2016     Procedure: COMBINED COLONOSCOPY, SINGLE OR MULTIPLE BIOPSY/POLYPECTOMY BY BIOPSY;  Surgeon: Sydnee Walton MD;  Location: [facility]    COLONOSCOPY N/A 5/3/2022    Procedure: COLONOSCOPY;  Surgeon: Jose A Hurt MD;  Location: [facility]    COLONOSCOPY WITH CO2 INSUFFLATION N/A 8/2/2016    Procedure: COLONOSCOPY WITH CO2 INSUFFLATION;  Surgeon: Sydnee Walton MD;  Location: [facility]    COMBINED ESOPHAGOSCOPY, GASTROSCOPY, DUODENOSCOPY (EGD) WITH CO2 INSUFFLATION N/A 8/2/2016    Procedure: COMBINED ESOPHAGOSCOPY, GASTROSCOPY, DUODENOSCOPY (EGD) WITH CO2 INSUFFLATION;  Surgeon: Sydnee Walton MD;  Location: [facility]    COMBINED ESOPHAGOSCOPY, GASTROSCOPY, DUODENOSCOPY (EGD) WITH CO2 INSUFFLATION N/A 5/27/2021    Procedure: ESOPHAGOGASTRODUODENOSCOPY, WITH CO2 INSUFFLATION;  Surgeon: Alis Cotton DO;  Location: [facility] OR    CV CORONARY ANGIOGRAM N/A 10/11/2022    Procedure: Coronary Angiogram;  Surgeon: Hollis Avila MD;  Location: [facility] CARDIAC CATH LAB    CV INSTANTANEOUS WAVE-FREE RATIO N/A 10/11/2022    Procedure: Instantaneous Wave-Free Ratio;  Surgeon: Hollis Avila MD;  Location: [facility] CARDIAC CATH LAB    CV INTRAVASULAR ULTRASOUND N/A 10/11/2022    Procedure: Intravascular Ultrasound;  Surgeon: Hollis Avila MD;  Location: [facility] CARDIAC CATH LAB    CV PCI ANGIOPLASTY N/A 10/11/2022    Procedure: Percutaneous Transluminal Angioplasty;  Surgeon: Hollis Avila MD;  Location: [facility] CARDIAC CATH LAB    ESOPHAGOSCOPY, GASTROSCOPY, DUODENOSCOPY (EGD), COMBINED N/A 8/2/2016    Procedure: COMBINED ESOPHAGOSCOPY, GASTROSCOPY, DUODENOSCOPY (EGD), BIOPSY SINGLE OR MULTIPLE;  Surgeon: Sydnee Walton MD;  Location: [facility]    ESOPHAGOSCOPY, GASTROSCOPY, DUODENOSCOPY (EGD), COMBINED N/A 5/27/2021    Procedure: Esophagogastroduodenoscopy, With Biopsy;  Surgeon: McBeath, Alis, DO;  Location: [facility]    ESOPHAGOSCOPY, GASTROSCOPY,  "DUODENOSCOPY (EGD), COMBINED N/A 5/3/2022    Procedure: ESOPHAGOGASTRODUODENOSCOPY, WITH BIOPSY;  Surgeon: Jose A Hurt MD;  Location: [facility]    HC REMOVAL OF TONSILS,<11 Y/O      Tonsils <11y.o.    HC UGI ENDOSCOPY DIAG W BIOPSY  02/01/06    HC VASECTOMY UNILAT/BILAT W POSTOP SEMEN  1/05    Vasectomy    History back lumbar laminectomy      INJECT EPIDURAL LUMBAR Right 4/22/2021    Procedure: Right Lumbar 4-5 and Lumbar 5 - Sacral 1 Epidural Steroid Injection;  Surgeon: Maxwell Zacarias MD;  Location:  OR    Z NONSPECIFIC PROCEDURE  91 or 92    back surgery. lumbar. lamiectomy    ZZ REPAIR INCISIONAL HERNIA,REDUCIBLE  1970's    Hernia Repair, Incisional, Unilateral       Review of Systems   Constitutional:  Negative for chills and fever.   HENT:  Negative for congestion, ear pain, hearing loss and sore throat.    Eyes:  Negative for pain and visual disturbance.   Respiratory:  Negative for cough and shortness of breath.    Cardiovascular:  Negative for chest pain, palpitations and peripheral edema.   Gastrointestinal:  Negative for abdominal pain, constipation, diarrhea, heartburn, hematochezia and nausea.   Genitourinary:  Negative for dysuria, frequency, genital sores, hematuria, impotence, penile discharge and urgency.   Musculoskeletal:  Negative for arthralgias, joint swelling and myalgias.   Skin:  Negative for rash.   Neurological:  Negative for dizziness, weakness, headaches and paresthesias.   Psychiatric/Behavioral:  Negative for mood changes. The patient is not nervous/anxious.          OBJECTIVE:   /70   Pulse 70   Temp 97.8  F (36.6  C)   Resp 12   Ht 1.8 m (5' 10.87\")   Wt 112.8 kg (248 lb 11.2 oz)   SpO2 99%   BMI 34.82 kg/m      Physical Exam  GENERAL: healthy, alert and no distress  EYES: Eyes grossly normal to inspection, PERRL and conjunctivae and sclerae normal  HENT: ear canals and TM's normal, nose and mouth without ulcers or lesions  NECK: no adenopathy, no asymmetry, " masses, or scars and thyroid normal to palpation  RESP: lungs clear to auscultation - no rales, rhonchi or wheezes  CV: regular rate and rhythm, normal S1 S2, no S3 or S4, no murmur, click or rub, no peripheral edema and peripheral pulses strong  ABDOMEN: soft, nontender, no hepatosplenomegaly, no masses and bowel sounds normal  MS: no gross musculoskeletal defects noted, no edema  SKIN: no suspicious lesions or rashes  NEURO: Normal strength and tone, mentation intact and speech normal  PSYCH: mentation appears normal, affect normal/bright    Diagnostic Test Results:  Labs reviewed in Epic    ASSESSMENT/PLAN:   Hollis was seen today for physical.    Diagnoses and all orders for this visit:    Routine general medical examination at a health care facility    Major depressive disorder, recurrent episode, mild (H24)  -     Lipid panel reflex to direct LDL Fasting; Future  -     CBC with platelets; Future  -     Comprehensive metabolic panel (BMP + Alb, Alk Phos, ALT, AST, Total. Bili, TP); Future  -     Lipid panel reflex to direct LDL Fasting  -     CBC with platelets  -     Comprehensive metabolic panel (BMP + Alb, Alk Phos, ALT, AST, Total. Bili, TP)    Anxiety  Anxiety and depression of been very stable and not seeing counseling now.  Has done well with Cymbalta gabapentin and Abilify.  Repeat labs today.  Patient has resources for counseling and crisis line.    Deep vein thrombosis (DVT) of proximal lower extremity, unspecified chronicity, unspecified laterality (H)  Antiphospholipid syndrome (H24)  Long-term (current) use of anticoagulants [Z79.01]  Stable on chronic warfarin    Hypertension, unspecified type  Well-controlled with lisinopril low-dose    Coronary artery disease involving native coronary artery of native heart without angina pectoris  Patient on chronic anticoagulation and stop Plavix now by cardiology.  No continued symptoms after PCI.  On statin    Gastroesophageal reflux disease without  esophagitis    Chronic, continuous use of opioids  DDD (degenerative disc disease), lumbar  Acute midline low back pain with sciatica, sciatica laterality unspecified  Patient with chronic pain and previous surgery.  Has been stable on 30 oxycodone per month and taking as prescribed.  Tries to limit his doses and understand the risk of chronic opioid use including respiratory suppression and death and addiction.  His pain contract is up-to-date    DEDE (obstructive sleep apnea)  Recommend he follow-up with sleep medicine for other options as he did not tolerate CPAP in the past.  May be a good candidate for inspire if weight loss    Screening for prostate cancer  -     PSA, screen; Future  -     PSA, screen    Seronegative rheumatoid arthritis (H)  No active inflammation    Other orders  -     PRIMARY CARE FOLLOW-UP SCHEDULING; Future        Patient has been advised of split billing requirements and indicates understanding: Yes      COUNSELING:   Reviewed preventive health counseling, as reflected in patient instructions       Regular exercise       Healthy diet/nutrition       Vision screening       Hearing screening       Immunizations         Aspirin prophylaxis        Alcohol Use        Safe sex practices/STD prevention       Consider Hep C screening for all patients one time for ages 18-79 years       HIV screeninx in teen years, 1x in adult years, and at intervals if high risk       Colorectal cancer screening       Prostate cancer screening        He reports that he has never smoked. He has never been exposed to tobacco smoke. He has never used smokeless tobacco.            Christiano Ledezma MD  [facility]

## 2023-12-05 ENCOUNTER — MYC REFILL (OUTPATIENT)
Dept: FAMILY MEDICINE | Facility: CLINIC | Age: 53
End: 2023-12-05
Payer: COMMERCIAL

## 2023-12-05 DIAGNOSIS — M54.16 LUMBAR RADICULOPATHY: ICD-10-CM

## 2023-12-05 RX ORDER — OXYCODONE AND ACETAMINOPHEN 5; 325 MG/1; MG/1
1 TABLET ORAL EVERY 6 HOURS PRN
Qty: 30 TABLET | Refills: 0 | Status: SHIPPED | OUTPATIENT
Start: 2023-12-05 | End: 2024-01-03

## 2023-12-05 NOTE — TELEPHONE ENCOUNTER
Requested Prescriptions   Pending Prescriptions Disp Refills    oxyCODONE-acetaminophen (PERCOCET) 5-325 MG tablet 30 tablet 0     Sig: Take 1 tablet by mouth every 6 hours as needed for severe pain       Routing refill request to provider for review/approval because:  Drug not on the [facility], P or [facility] refill protocol or controlled substance

## 2023-12-06 ASSESSMENT — ANXIETY QUESTIONNAIRES
6. BECOMING EASILY ANNOYED OR IRRITABLE: MORE THAN HALF THE DAYS
GAD7 TOTAL SCORE: 4
7. FEELING AFRAID AS IF SOMETHING AWFUL MIGHT HAPPEN: NOT AT ALL
3. WORRYING TOO MUCH ABOUT DIFFERENT THINGS: NOT AT ALL
GAD7 TOTAL SCORE: 4
IF YOU CHECKED OFF ANY PROBLEMS ON THIS QUESTIONNAIRE, HOW DIFFICULT HAVE THESE PROBLEMS MADE IT FOR YOU TO DO YOUR WORK, TAKE CARE OF THINGS AT HOME, OR GET ALONG WITH OTHER PEOPLE: NOT DIFFICULT AT ALL
5. BEING SO RESTLESS THAT IT IS HARD TO SIT STILL: NOT AT ALL
1. FEELING NERVOUS, ANXIOUS, OR ON EDGE: SEVERAL DAYS
2. NOT BEING ABLE TO STOP OR CONTROL WORRYING: SEVERAL DAYS

## 2023-12-06 ASSESSMENT — PATIENT HEALTH QUESTIONNAIRE - PHQ9
SUM OF ALL RESPONSES TO PHQ QUESTIONS 1-9: 4
5. POOR APPETITE OR OVEREATING: NOT AT ALL

## 2023-12-11 DIAGNOSIS — F33.0 MAJOR DEPRESSIVE DISORDER, RECURRENT EPISODE, MILD (H): ICD-10-CM

## 2023-12-12 RX ORDER — FERROUS SULFATE 325(65) MG
325 TABLET ORAL
Qty: 30 TABLET | Refills: 1 | Status: SHIPPED | OUTPATIENT
Start: 2023-12-12 | End: 2024-02-01

## 2023-12-13 RX ORDER — DULOXETIN HYDROCHLORIDE 60 MG/1
CAPSULE, DELAYED RELEASE ORAL
Qty: 60 CAPSULE | Refills: 0 | Status: SHIPPED | OUTPATIENT
Start: 2023-12-13 | End: 2024-02-14

## 2023-12-14 ENCOUNTER — THERAPY VISIT (OUTPATIENT)
Dept: PHYSICAL THERAPY | Facility: CLINIC | Age: 53
End: 2023-12-14
Attending: FAMILY MEDICINE
Payer: COMMERCIAL

## 2023-12-14 DIAGNOSIS — M75.21 BICEPS TENDONITIS, RIGHT: ICD-10-CM

## 2023-12-14 DIAGNOSIS — M75.41 IMPINGEMENT SYNDROME OF SHOULDER REGION, RIGHT: Primary | ICD-10-CM

## 2023-12-14 PROCEDURE — 97110 THERAPEUTIC EXERCISES: CPT | Mod: GP | Performed by: PHYSICAL THERAPIST

## 2023-12-14 PROCEDURE — 97140 MANUAL THERAPY 1/> REGIONS: CPT | Mod: GP | Performed by: PHYSICAL THERAPIST

## 2023-12-25 DIAGNOSIS — F33.0 MAJOR DEPRESSIVE DISORDER, RECURRENT EPISODE, MILD (H): ICD-10-CM

## 2023-12-26 RX ORDER — DULOXETIN HYDROCHLORIDE 60 MG/1
CAPSULE, DELAYED RELEASE ORAL
Qty: 60 CAPSULE | Refills: 0 | OUTPATIENT
Start: 2023-12-26

## 2024-01-02 ENCOUNTER — THERAPY VISIT (OUTPATIENT)
Dept: PHYSICAL THERAPY | Facility: CLINIC | Age: 54
End: 2024-01-02
Attending: FAMILY MEDICINE
Payer: COMMERCIAL

## 2024-01-02 DIAGNOSIS — M75.21 BICEPS TENDONITIS, RIGHT: ICD-10-CM

## 2024-01-02 DIAGNOSIS — M75.41 IMPINGEMENT SYNDROME OF SHOULDER REGION, RIGHT: Primary | ICD-10-CM

## 2024-01-02 PROCEDURE — 97110 THERAPEUTIC EXERCISES: CPT | Mod: GP | Performed by: PHYSICAL THERAPIST

## 2024-01-02 PROCEDURE — 97140 MANUAL THERAPY 1/> REGIONS: CPT | Mod: GP | Performed by: PHYSICAL THERAPIST

## 2024-01-03 ENCOUNTER — MYC REFILL (OUTPATIENT)
Dept: FAMILY MEDICINE | Facility: CLINIC | Age: 54
End: 2024-01-03
Payer: COMMERCIAL

## 2024-01-03 DIAGNOSIS — M54.16 LUMBAR RADICULOPATHY: ICD-10-CM

## 2024-01-04 RX ORDER — OXYCODONE AND ACETAMINOPHEN 5; 325 MG/1; MG/1
1 TABLET ORAL EVERY 6 HOURS PRN
Qty: 30 TABLET | Refills: 0 | Status: SHIPPED | OUTPATIENT
Start: 2024-01-04 | End: 2024-02-02

## 2024-01-06 DIAGNOSIS — F41.9 ANXIETY: ICD-10-CM

## 2024-01-09 ENCOUNTER — THERAPY VISIT (OUTPATIENT)
Dept: PHYSICAL THERAPY | Facility: CLINIC | Age: 54
End: 2024-01-09
Attending: FAMILY MEDICINE
Payer: COMMERCIAL

## 2024-01-09 DIAGNOSIS — M75.41 IMPINGEMENT SYNDROME OF SHOULDER REGION, RIGHT: Primary | ICD-10-CM

## 2024-01-09 DIAGNOSIS — M75.21 BICEPS TENDONITIS, RIGHT: ICD-10-CM

## 2024-01-09 PROCEDURE — 97110 THERAPEUTIC EXERCISES: CPT | Mod: GP | Performed by: PHYSICAL THERAPIST

## 2024-01-09 RX ORDER — GABAPENTIN 800 MG/1
800 TABLET ORAL 3 TIMES DAILY
Qty: 90 TABLET | Refills: 2 | Status: SHIPPED | OUTPATIENT
Start: 2024-01-09 | End: 2024-03-27

## 2024-01-13 DIAGNOSIS — I25.84 CORONARY ATHEROSCLEROSIS DUE TO CALCIFIED CORONARY LESION OF NATIVE ARTERY: ICD-10-CM

## 2024-01-13 DIAGNOSIS — I25.10 CORONARY ATHEROSCLEROSIS DUE TO CALCIFIED CORONARY LESION OF NATIVE ARTERY: ICD-10-CM

## 2024-01-15 RX ORDER — ROSUVASTATIN CALCIUM 20 MG/1
20 TABLET, COATED ORAL DAILY
Qty: 90 TABLET | Refills: 3 | Status: SHIPPED | OUTPATIENT
Start: 2024-01-15

## 2024-01-23 ENCOUNTER — LAB (OUTPATIENT)
Dept: LAB | Facility: CLINIC | Age: 54
End: 2024-01-23
Payer: COMMERCIAL

## 2024-01-23 ENCOUNTER — ANTICOAGULATION THERAPY VISIT (OUTPATIENT)
Dept: ANTICOAGULATION | Facility: CLINIC | Age: 54
End: 2024-01-23

## 2024-01-23 DIAGNOSIS — Z79.01 LONG TERM CURRENT USE OF ANTICOAGULANT THERAPY: Primary | ICD-10-CM

## 2024-01-23 DIAGNOSIS — I82.4Y9 DEEP VEIN THROMBOSIS (DVT) OF PROXIMAL LOWER EXTREMITY, UNSPECIFIED CHRONICITY, UNSPECIFIED LATERALITY (H): ICD-10-CM

## 2024-01-23 DIAGNOSIS — D68.61 ANTIPHOSPHOLIPID SYNDROME (H): ICD-10-CM

## 2024-01-23 LAB — INR BLD: 1.7 (ref 0.9–1.1)

## 2024-01-23 PROCEDURE — 85610 PROTHROMBIN TIME: CPT

## 2024-01-23 PROCEDURE — 36416 COLLJ CAPILLARY BLOOD SPEC: CPT

## 2024-01-23 NOTE — PROGRESS NOTES
ANTICOAGULATION MANAGEMENT     oHllis Diggs 53 year old male is on warfarin with subtherapeutic INR result. (Goal INR 2.0-3.0)    Recent labs: (last 7 days)     01/23/24  1435   INR 1.7*       ASSESSMENT     Source(s): Chart Review and Patient/Caregiver Call     Warfarin doses taken: Missed dose(s) may be affecting INR  Diet: No new diet changes identified  Medication/supplement changes: None noted  New illness, injury, or hospitalization: No  Signs or symptoms of bleeding or clotting: No  Previous result: Therapeutic last 2(+) visits  Additional findings: None       PLAN     Recommended plan for temporary change(s) affecting INR     Dosing Instructions: booster dose then continue your current warfarin dose with next INR in 2 weeks       Summary  As of 1/23/2024      Full warfarin instructions:  1/23: 12.5 mg; Otherwise 5 mg every Mon, Wed, Fri; 7.5 mg all other days   Next INR check:  2/6/2024               Telephone call with Hollis who verbalizes understanding and agrees to plan and who agrees to plan and repeated back plan correctly    Lab visit scheduled    Education provided:   Symptom monitoring: monitoring for clotting signs and symptoms    Plan made per [facility] anticoagulation protocol    Анна Hale RN  [facility]  1/23/2024    _______________________________________________________________________     Anticoagulation Episode Summary       Current INR goal:  2.0-3.0   TTR:  74.3% (1 y)   Target end date:  Indefinite   Send INR reminders to:  TIMOTEO ALVAREZ    Indications    DVT (deep venous thrombosis) (H) (Resolved) [I82.409]  Long-term (current) use of anticoagulants [Z79.01] [Z79.01]  Deep vein thrombosis (DVT) of proximal lower extremity  unspecified chronicity  unspecified laterality (H) [I82.4Y9]  Antiphospholipid syndrome (H24) [D68.61]             Comments:               Anticoagulation Care Providers       Provider Role Specialty Phone number    Sylvester Cash MD Referring  Family Practice     Christiano Ledezma MD Referring Family Medicine [phone]

## 2024-01-28 ENCOUNTER — APPOINTMENT (OUTPATIENT)
Dept: ULTRASOUND IMAGING | Facility: CLINIC | Age: 54
End: 2024-01-28
Attending: PHYSICIAN ASSISTANT
Payer: COMMERCIAL

## 2024-01-28 ENCOUNTER — HOSPITAL ENCOUNTER (EMERGENCY)
Facility: CLINIC | Age: 54
Discharge: HOME | End: 2024-01-28
Attending: PHYSICIAN ASSISTANT | Admitting: PHYSICIAN ASSISTANT
Payer: COMMERCIAL

## 2024-01-28 ENCOUNTER — APPOINTMENT (OUTPATIENT)
Dept: CT IMAGING | Facility: CLINIC | Age: 54
End: 2024-01-28
Attending: PHYSICIAN ASSISTANT
Payer: COMMERCIAL

## 2024-01-28 VITALS
BODY MASS INDEX: 36.26 KG/M2 | RESPIRATION RATE: 20 BRPM | SYSTOLIC BLOOD PRESSURE: 161 MMHG | WEIGHT: 259 LBS | DIASTOLIC BLOOD PRESSURE: 87 MMHG | TEMPERATURE: 97.8 F | OXYGEN SATURATION: 98 % | HEART RATE: 81 BPM

## 2024-01-28 DIAGNOSIS — F33.0 MAJOR DEPRESSIVE DISORDER, RECURRENT EPISODE, MILD (H): ICD-10-CM

## 2024-01-28 DIAGNOSIS — I10 HYPERTENSION, UNSPECIFIED TYPE: ICD-10-CM

## 2024-01-28 DIAGNOSIS — I10 ELEVATED BLOOD PRESSURE READING WITH DIAGNOSIS OF HYPERTENSION: ICD-10-CM

## 2024-01-28 LAB
ANION GAP SERPL CALCULATED.3IONS-SCNC: 8 MMOL/L (ref 7–15)
BASOPHILS # BLD AUTO: 0 10E3/UL (ref 0–0.2)
BASOPHILS NFR BLD AUTO: 1 %
BUN SERPL-MCNC: 6.9 MG/DL (ref 6–20)
CALCIUM SERPL-MCNC: 8.6 MG/DL (ref 8.6–10)
CHLORIDE SERPL-SCNC: 109 MMOL/L (ref 98–107)
CREAT SERPL-MCNC: 0.92 MG/DL (ref 0.67–1.17)
DEPRECATED HCO3 PLAS-SCNC: 23 MMOL/L (ref 22–29)
EGFRCR SERPLBLD CKD-EPI 2021: >90 ML/MIN/1.73M2
EOSINOPHIL # BLD AUTO: 0.2 10E3/UL (ref 0–0.7)
EOSINOPHIL NFR BLD AUTO: 3 %
ERYTHROCYTE [DISTWIDTH] IN BLOOD BY AUTOMATED COUNT: 18.7 % (ref 10–15)
GLUCOSE SERPL-MCNC: 131 MG/DL (ref 70–99)
HCT VFR BLD AUTO: 41.4 % (ref 40–53)
HGB BLD-MCNC: 12.9 G/DL (ref 13.3–17.7)
IMM GRANULOCYTES # BLD: 0 10E3/UL
IMM GRANULOCYTES NFR BLD: 0 %
INR PPP: 2.16 (ref 0.85–1.15)
LYMPHOCYTES # BLD AUTO: 2 10E3/UL (ref 0.8–5.3)
LYMPHOCYTES NFR BLD AUTO: 32 %
MCH RBC QN AUTO: 24.2 PG (ref 26.5–33)
MCHC RBC AUTO-ENTMCNC: 31.2 G/DL (ref 31.5–36.5)
MCV RBC AUTO: 78 FL (ref 78–100)
MONOCYTES # BLD AUTO: 0.5 10E3/UL (ref 0–1.3)
MONOCYTES NFR BLD AUTO: 8 %
NEUTROPHILS # BLD AUTO: 3.6 10E3/UL (ref 1.6–8.3)
NEUTROPHILS NFR BLD AUTO: 56 %
NRBC # BLD AUTO: 0 10E3/UL
NRBC BLD AUTO-RTO: 0 /100
PLATELET # BLD AUTO: 254 10E3/UL (ref 150–450)
POTASSIUM SERPL-SCNC: 4 MMOL/L (ref 3.4–5.3)
RBC # BLD AUTO: 5.34 10E6/UL (ref 4.4–5.9)
SODIUM SERPL-SCNC: 140 MMOL/L (ref 135–145)
TROPONIN T SERPL HS-MCNC: 6 NG/L
WBC # BLD AUTO: 6.3 10E3/UL (ref 4–11)

## 2024-01-28 PROCEDURE — 36415 COLL VENOUS BLD VENIPUNCTURE: CPT | Performed by: PHYSICIAN ASSISTANT

## 2024-01-28 PROCEDURE — 250N000013 HC RX MED GY IP 250 OP 250 PS 637: Performed by: PHYSICIAN ASSISTANT

## 2024-01-28 PROCEDURE — 93010 ELECTROCARDIOGRAM REPORT: CPT | Performed by: PHYSICIAN ASSISTANT

## 2024-01-28 PROCEDURE — 85610 PROTHROMBIN TIME: CPT | Performed by: PHYSICIAN ASSISTANT

## 2024-01-28 PROCEDURE — 85025 COMPLETE CBC W/AUTO DIFF WBC: CPT | Performed by: PHYSICIAN ASSISTANT

## 2024-01-28 PROCEDURE — 80048 BASIC METABOLIC PNL TOTAL CA: CPT | Performed by: PHYSICIAN ASSISTANT

## 2024-01-28 PROCEDURE — 99284 EMERGENCY DEPT VISIT MOD MDM: CPT | Mod: 25 | Performed by: PHYSICIAN ASSISTANT

## 2024-01-28 PROCEDURE — 93926 LOWER EXTREMITY STUDY: CPT | Mod: RT

## 2024-01-28 PROCEDURE — 70496 CT ANGIOGRAPHY HEAD: CPT

## 2024-01-28 PROCEDURE — 250N000011 HC RX IP 250 OP 636: Performed by: PHYSICIAN ASSISTANT

## 2024-01-28 PROCEDURE — 99285 EMERGENCY DEPT VISIT HI MDM: CPT | Mod: 25

## 2024-01-28 PROCEDURE — 84484 ASSAY OF TROPONIN QUANT: CPT | Performed by: PHYSICIAN ASSISTANT

## 2024-01-28 PROCEDURE — 70450 CT HEAD/BRAIN W/O DYE: CPT

## 2024-01-28 PROCEDURE — 250N000009 HC RX 250: Performed by: PHYSICIAN ASSISTANT

## 2024-01-28 PROCEDURE — 93005 ELECTROCARDIOGRAM TRACING: CPT

## 2024-01-28 RX ORDER — LISINOPRIL 2.5 MG/1
5 TABLET ORAL ONCE
Status: COMPLETED | OUTPATIENT
Start: 2024-01-28 | End: 2024-01-28

## 2024-01-28 RX ORDER — IOPAMIDOL 755 MG/ML
500 INJECTION, SOLUTION INTRAVASCULAR ONCE
Status: COMPLETED | OUTPATIENT
Start: 2024-01-28 | End: 2024-01-28

## 2024-01-28 RX ORDER — WARFARIN SODIUM 5 MG/1
5 TABLET ORAL
COMMUNITY

## 2024-01-28 RX ORDER — LISINOPRIL 5 MG/1
5 TABLET ORAL DAILY
Qty: 30 TABLET | Refills: 0 | Status: SHIPPED | OUTPATIENT
Start: 2024-01-28 | End: 2024-03-05

## 2024-01-28 RX ADMIN — IOPAMIDOL 67 ML: 755 INJECTION, SOLUTION INTRAVENOUS at 18:47

## 2024-01-28 RX ADMIN — SODIUM CHLORIDE 100 ML: 9 INJECTION, SOLUTION INTRAVENOUS at 18:47

## 2024-01-28 RX ADMIN — LISINOPRIL 5 MG: 2.5 TABLET ORAL at 19:57

## 2024-01-28 ASSESSMENT — ACTIVITIES OF DAILY LIVING (ADL)
ADLS_ACUITY_SCORE: 33
ADLS_ACUITY_SCORE: 35

## 2024-01-28 NOTE — ED PROVIDER NOTES
History     Chief Complaint   Patient presents with    Hypertension       HPI  Hollis Diggs is a 53 year old male with a history of hypertension, antiphospholipid syndrome on Coumadin, asthma, DEDE, sleep apnea, anxiety, who presents to the emergency department for concerns of elevated blood pressure. The patient reports today he started having right jaw pain and right shoulder pain.  Because of this he checked his blood pressure and it was in the 190s systolic at home.  Usually it is in the 120s/70s.  He has a history of high blood pressure but is not currently on anything for his blood pressure.  He also notes for the last couple weeks has been having intermittent right-sided headaches.  He has had occasional mild chest pain for a while, denies any pain currently.  He has not had any shortness of breath.  A few days ago he had a dizzy spell where he fell back on the couch.  He has not had any loss of consciousness but has been having intermittent dizzy spells the last few weeks.  No associated nausea or vomiting with these dizzy spells.  He also notes for the last couple weeks the distal half of his foot has been numb through all of the toes.  He denies any pain in the foot or leg, just that it feels numb.  Denies any weakness.  No fevers.  No back pain.        Allergies:  No Known Allergies    Problem List:    Patient Active Problem List    Diagnosis Date Noted    DEDE (obstructive sleep apnea) 12/01/2023     Priority: Medium    Impingement syndrome of shoulder region, right 12/01/2023     Priority: Medium    Biceps tendonitis, right 12/01/2023     Priority: Medium    Status post coronary angiogram 10/11/2022     Priority: Medium    Hiatal hernia 09/02/2022     Priority: Medium    Other iron deficiency anemia 05/26/2022     Priority: Medium    Other acute gastritis, presence of bleeding unspecified 05/26/2022     Priority: Medium    Hypertension, unspecified type 02/14/2022     Priority: Medium    HILTON (dyspnea  on exertion) 02/14/2022     Priority: Medium    Chronic, continuous use of opioids 05/14/2021     Priority: Medium    Deep vein thrombosis (DVT) of proximal lower extremity, unspecified chronicity, unspecified laterality (H) 04/28/2021     Priority: Medium    Gastroesophageal reflux disease without esophagitis 04/26/2021     Priority: Medium    Antiphospholipid syndrome (H24) 03/06/2021     Priority: Medium    Suicidal behavior 06/22/2020     Priority: Medium    Class 1 obesity due to excess calories without serious comorbidity with body mass index (BMI) of 33.0 to 33.9 in adult 01/08/2020     Priority: Medium    Long-term use of high-risk medication 05/12/2017     Priority: Medium    History of deep venous thrombosis 04/14/2017     Priority: Medium    DDD (degenerative disc disease), lumbar 04/14/2017     Priority: Medium    On prednisone therapy 07/27/2016     Priority: Medium    Seronegative rheumatoid arthritis (H) 06/28/2016     Priority: Medium    Long-term (current) use of anticoagulants [Z79.01] 03/16/2016     Priority: Medium    Rheumatoid arthritis of multiple sites with negative rheumatoid factor (H) 12/07/2015     Priority: Medium    Midline low back pain, with sciatica presence unspecified 10/14/2015     Priority: Medium    Major depressive disorder, recurrent episode, mild (H24) 10/07/2015     Priority: Medium    Pain in joint, multiple sites 03/20/2015     Priority: Medium    Anxiety state 02/10/2015     Priority: Medium     Problem list name updated by automated process. Provider to review      CARDIOVASCULAR SCREENING; LDL GOAL LESS THAN 160 01/15/2013     Priority: Medium    Alopecia 02/19/2010     Priority: Medium    Ingrown toenail 08/18/2009     Priority: Medium    Anxiety 07/09/2008     Priority: Medium    Abdominal pain, epigastric 01/25/2006     Priority: Medium        Past Medical History:    Past Medical History:   Diagnosis Date    Antiphospholipid syndrome (H24)     Arthritis     Asthma      blood clot in leg     Depressive disorder, not elsewhere classified     ANKIT (generalised anxiety disorder)     HH (hiatus hernia)     Hypercholesteremia     Lumbar disc herniation 1992    DEDE (obstructive sleep apnea) 12/1/2023    Seronegative rheumatoid arthritis (H)        Past Surgical History:    Past Surgical History:   Procedure Laterality Date    APPENDECTOMY      COLONOSCOPY N/A 8/2/2016    Procedure: COMBINED COLONOSCOPY, SINGLE OR MULTIPLE BIOPSY/POLYPECTOMY BY BIOPSY;  Surgeon: Sydnee Walton MD;  Location: [facility]    COLONOSCOPY N/A 5/3/2022    Procedure: COLONOSCOPY;  Surgeon: Jose A Hurt MD;  Location: [facility]    COLONOSCOPY WITH CO2 INSUFFLATION N/A 8/2/2016    Procedure: COLONOSCOPY WITH CO2 INSUFFLATION;  Surgeon: Sydnee Walton MD;  Location: [facility]    COMBINED ESOPHAGOSCOPY, GASTROSCOPY, DUODENOSCOPY (EGD) WITH CO2 INSUFFLATION N/A 8/2/2016    Procedure: COMBINED ESOPHAGOSCOPY, GASTROSCOPY, DUODENOSCOPY (EGD) WITH CO2 INSUFFLATION;  Surgeon: Sydnee Walton MD;  Location: [facility]    COMBINED ESOPHAGOSCOPY, GASTROSCOPY, DUODENOSCOPY (EGD) WITH CO2 INSUFFLATION N/A 5/27/2021    Procedure: ESOPHAGOGASTRODUODENOSCOPY, WITH CO2 INSUFFLATION;  Surgeon: Alis Cotton DO;  Location: [facility]    CV CORONARY ANGIOGRAM N/A 10/11/2022    Procedure: Coronary Angiogram;  Surgeon: Hollis Avila MD;  Location: [facility] CARDIAC CATH LAB    CV INSTANTANEOUS WAVE-FREE RATIO N/A 10/11/2022    Procedure: Instantaneous Wave-Free Ratio;  Surgeon: Hollis Avila MD;  Location: [facility] CARDIAC CATH LAB    CV INTRAVASULAR ULTRASOUND N/A 10/11/2022    Procedure: Intravascular Ultrasound;  Surgeon: Hollis Avila MD;  Location: [facility] CARDIAC CATH LAB    CV PCI ANGIOPLASTY N/A 10/11/2022    Procedure: Percutaneous Transluminal Angioplasty;  Surgeon: Hollis Avila MD;  Location: [facility] CARDIAC CATH LAB    ESOPHAGOSCOPY, GASTROSCOPY, DUODENOSCOPY  "(EGD), COMBINED N/A 8/2/2016    Procedure: COMBINED ESOPHAGOSCOPY, GASTROSCOPY, DUODENOSCOPY (EGD), BIOPSY SINGLE OR MULTIPLE;  Surgeon: Sydnee Walton MD;  Location: [facility]    ESOPHAGOSCOPY, GASTROSCOPY, DUODENOSCOPY (EGD), COMBINED N/A 5/27/2021    Procedure: Esophagogastroduodenoscopy, With Biopsy;  Surgeon: Alis Cotton DO;  Location: [facility]    ESOPHAGOSCOPY, GASTROSCOPY, DUODENOSCOPY (EGD), COMBINED N/A 5/3/2022    Procedure: ESOPHAGOGASTRODUODENOSCOPY, WITH BIOPSY;  Surgeon: Jose A Hurt MD;  Location: [facility]    HC REMOVAL OF TONSILS,<10 Y/O      Tonsils <11y.o.    HC UGI ENDOSCOPY DIAG W BIOPSY  02/01/06    HC VASECTOMY UNILAT/BILAT W POSTOP SEMEN  1/05    Vasectomy    History back lumbar laminectomy      INJECT EPIDURAL LUMBAR Right 4/22/2021    Procedure: Right Lumbar 4-5 and Lumbar 5 - Sacral 1 Epidural Steroid Injection;  Surgeon: Maxwell Zacarias MD;  Location: [facility]    ZZC NONSPECIFIC PROCEDURE  91 or 92    back surgery. lumbar. lamiectomy    ZZHC REPAIR INCISIONAL HERNIA,REDUCIBLE  1970's    Hernia Repair, Incisional, Unilateral       Family History:    Family History   Problem Relation Age of Onset    Brain Tumor Mother         Benign    Deep Vein Thrombosis Mother         \"neck\"     Heart Disease Father         \"wont tell anyone\"    Neurologic Disorder Paternal Grandmother         Parkinsons     Alcohol/Drug Paternal Grandfather         alcoholic    Cancer Maternal Grandfather         lung    Diabetes Maternal Grandmother     Cerebrovascular Disease Maternal Grandmother         tim age ~70    Arthritis Cousin         cousins x 2 \"RA\"    Musculoskeletal Disorder Maternal Aunt         MS    Family History Negative Other         psoriasis, crohns, UC, SLE       Social History:  Marital Status:   [2]  Social History     Tobacco Use    Smoking status: Never     Passive exposure: Never    Smokeless tobacco: Never   Vaping Use    Vaping Use: Never used   Substance Use Topics    " Alcohol use: Yes     Comment: rare    Drug use: No        Medications:    ARIPiprazole (ABILIFY) 10 MG tablet  DULoxetine (CYMBALTA) 60 MG capsule  ferrous sulfate (FEROSUL) 325 (65 Fe) MG tablet  gabapentin (NEURONTIN) 800 MG tablet  lisinopril (ZESTRIL) 5 MG tablet  nitroGLYcerin (NITROSTAT) 0.4 MG sublingual tablet  oxyCODONE-acetaminophen (PERCOCET) 5-325 MG tablet  pantoprazole (PROTONIX) 40 MG EC tablet  rosuvastatin (CRESTOR) 20 MG tablet  traZODone (DESYREL) 150 MG tablet  warfarin ANTICOAGULANT (COUMADIN) 5 MG tablet  warfarin ANTICOAGULANT (COUMADIN) 5 MG tablet  reason aspirin not prescribed, intentional,  sucralfate (CARAFATE) 1 GM tablet          Review of Systems   All other systems reviewed and are negative.        Physical Exam   BP: (!) 167/96  Pulse: 61  Temp: 97.8  F (36.6  C)  Resp: 20  Weight: 117.5 kg (259 lb)  SpO2: 98 %      Physical Exam  Vitals and nursing note reviewed.   Constitutional:       General: He is not in acute distress.     Appearance: Normal appearance. He is not ill-appearing, toxic-appearing or diaphoretic.   HENT:      Head: Normocephalic and atraumatic.      Nose: Nose normal.      Mouth/Throat:      Mouth: Mucous membranes are moist.      Pharynx: Oropharynx is clear.   Eyes:      Extraocular Movements: Extraocular movements intact.      Conjunctiva/sclera: Conjunctivae normal.      Pupils: Pupils are equal, round, and reactive to light.   Cardiovascular:      Rate and Rhythm: Normal rate and regular rhythm.      Heart sounds: Normal heart sounds.      Comments: PT pulses are palpable and strong bilaterally.  Left foot with a strong DP pulse but unable to palpate right DP pulse.  Pulmonary:      Effort: Pulmonary effort is normal. No respiratory distress.      Breath sounds: Normal breath sounds.   Abdominal:      General: Abdomen is flat. There is no distension.      Tenderness: There is no abdominal tenderness.   Musculoskeletal:      Cervical back: Neck supple.    Skin:     General: Skin is warm and dry.   Neurological:      General: No focal deficit present.      Mental Status: He is alert. Mental status is at baseline.      Cranial Nerves: No cranial nerve deficit.      Motor: No weakness or pronator drift.      Coordination: Coordination is intact. Finger-Nose-Finger Test normal.   Psychiatric:         Mood and Affect: Mood normal.           ED Course           Procedures              EKG Interpretation:      Interpreted by Анна Gonzalez PA-C  Time reviewed: 1755  Symptoms at time of EKG: right jaw pain   Rhythm: normal sinus   Rate: Normal  Axis: Left Axis Deviation  Ectopy: none  Conduction: right bundle branch block (complete) and bifasicular  ST Segments/ T Waves: No acute ischemic changes  Q Waves: none  Comparison to prior: Unchanged    Clinical Impression: no acute changes        Results for orders placed or performed during the hospital encounter of 01/28/24 (from the past 24 hour(s))   CBC with platelets differential    Narrative    The following orders were created for panel order CBC with platelets differential.  Procedure                               Abnormality         Status                     ---------                               -----------         ------                     CBC with platelets and d...[[id number]]  Abnormal            Final result                 Please view results for these tests on the individual orders.   INR   Result Value Ref Range    INR 2.16 (H) 0.85 - 1.15   Basic metabolic panel   Result Value Ref Range    Sodium 140 135 - 145 mmol/L    Potassium 4.0 3.4 - 5.3 mmol/L    Chloride 109 (H) 98 - 107 mmol/L    Carbon Dioxide (CO2) 23 22 - 29 mmol/L    Anion Gap 8 7 - 15 mmol/L    Urea Nitrogen 6.9 6.0 - 20.0 mg/dL    Creatinine 0.92 0.67 - 1.17 mg/dL    GFR Estimate >90 >60 mL/min/1.73m2    Calcium 8.6 8.6 - 10.0 mg/dL    Glucose 131 (H) 70 - 99 mg/dL   Troponin T, High Sensitivity   Result Value Ref Range    Troponin T, High  Sensitivity 6 <=22 ng/L   CBC with platelets and differential   Result Value Ref Range    WBC Count 6.3 4.0 - 11.0 10e3/uL    RBC Count 5.34 4.40 - 5.90 10e6/uL    Hemoglobin 12.9 (L) 13.3 - 17.7 g/dL    Hematocrit 41.4 40.0 - 53.0 %    MCV 78 78 - 100 fL    MCH 24.2 (L) 26.5 - 33.0 pg    MCHC 31.2 (L) 31.5 - 36.5 g/dL    RDW 18.7 (H) 10.0 - 15.0 %    Platelet Count 254 150 - 450 10e3/uL    % Neutrophils 56 %    % Lymphocytes 32 %    % Monocytes 8 %    % Eosinophils 3 %    % Basophils 1 %    % Immature Granulocytes 0 %    NRBCs per 100 WBC 0 <1 /100    Absolute Neutrophils 3.6 1.6 - 8.3 10e3/uL    Absolute Lymphocytes 2.0 0.8 - 5.3 10e3/uL    Absolute Monocytes 0.5 0.0 - 1.3 10e3/uL    Absolute Eosinophils 0.2 0.0 - 0.7 10e3/uL    Absolute Basophils 0.0 0.0 - 0.2 10e3/uL    Absolute Immature Granulocytes 0.0 <=0.4 10e3/uL    Absolute NRBCs 0.0 10e3/uL   US Lower Extremity Arterial Duplex Right    Narrative    EXAM: US LOWER EXTREMITY ARTERIAL DUPLEX RIGHT  LOCATION: [facility]  DATE: 1/28/2024    INDICATION: foot numbness,diminished dp pulse  COMPARISON: None.  TECHNIQUE: Duplex utilizing 2D gray-scale imaging, Doppler interrogation with color-flow and spectral waveform analysis.    FINDINGS: Minimal atherosclerosis visualized throughout the right lower extremity with normal triphasic waveforms and velocities to the level of the ankle. No evidence of critical stenosis or occlusion.    RIGHT LOWER EXTREMITY ARTERIAL ASSESSMENT:  Common femoral artery: 126 cm/s, triphasic  Profunda femoris artery: 112 cm/s, triphasic  SFA (proximal): 108 cm/s, triphasic  SFA (mid): 112 cm/s, triphasic  SFA (distal): 100 cm/s, triphasic  Popliteal artery (proximal): 76 cm/s, triphasic  Popliteal artery (distal): 74 cm/s, triphasic  Anterior tibial artery: 99 cm/s, triphasic  Posterior tibial artery: 135 cm/s, triphasic  Dorsalis pedis artery: 58 cm/s, triphasic      Impression    IMPRESSION:  1.  Normal  arterial ultrasound of the right lower extremity without evidence of critical stenosis or occlusion.   CT Head w/o Contrast    Narrative    EXAM: CTA HEAD NECK W CONTRAST, CT HEAD W/O CONTRAST  LOCATION: [facility]  DATE: 1/28/2024    INDICATION: Right headaches, right foot numbness, hx antiphospholipid.  COMPARISON: None.  CONTRAST: 67mL, Isovue 370  TECHNIQUE: Head and neck CT angiogram with IV contrast. Noncontrast head CT followed by axial helical CT images of the head and neck vessels obtained during the arterial phase of intravenous contrast administration. Axial 2D reconstructed images and   multiplanar 3D MIP reconstructed images of the head and neck vessels were performed by the technologist. Dose reduction techniques were used. All stenosis measurements made according to NASCET criteria unless otherwise specified.    FINDINGS:   NONCONTRAST HEAD CT:   INTRACRANIAL CONTENTS: No intracranial hemorrhage, extraaxial collection, or mass effect.  No CT evidence of acute infarct. Mild presumed chronic small vessel ischemic changes. Mild generalized volume loss. No hydrocephalus.     VISUALIZED ORBITS/SINUSES/MASTOIDS: No intraorbital abnormality. Small retention cyst left maxillary sinus. No middle ear or mastoid effusion.    BONES/SOFT TISSUES: No acute abnormality.    HEAD CTA:  ANTERIOR CIRCULATION: No stenosis/occlusion, aneurysm, or high flow vascular malformation. Fetal origin of the right posterior cerebral artery from the anterior circulation.    POSTERIOR CIRCULATION: No stenosis/occlusion, aneurysm, or high flow vascular malformation. Dominant left and smaller right vertebral artery contribute to a normal basilar artery.     DURAL VENOUS SINUSES: Expected enhancement of the major dural venous sinuses.    NECK CTA:  RIGHT CAROTID: No measurable stenosis or dissection.    LEFT CAROTID: No measurable stenosis or dissection.    VERTEBRAL ARTERIES: No focal stenosis or  dissection. Balanced vertebral arteries.    AORTIC ARCH: Classic aortic arch anatomy with no significant stenosis at the origin of the great vessels.    NONVASCULAR STRUCTURES: Unremarkable.      Impression    IMPRESSION:   HEAD CT:  1.  No CT evidence for acute intracranial process.  2.  Mild chronic microvascular ischemic changes as above.    HEAD CTA:   1.  Normal CTA [place] [facility] [place].    NECK CTA:  1.  Normal neck CTA.   CTA Head Neck with Contrast    Narrative    EXAM: CTA HEAD NECK W CONTRAST, CT HEAD W/O CONTRAST  LOCATION: [facility]  DATE: 1/28/2024    INDICATION: Right headaches, right foot numbness, hx antiphospholipid.  COMPARISON: None.  CONTRAST: 67mL, Isovue 370  TECHNIQUE: Head and neck CT angiogram with IV contrast. Noncontrast head CT followed by axial helical CT images of the head and neck vessels obtained during the arterial phase of intravenous contrast administration. Axial 2D reconstructed images and   multiplanar 3D MIP reconstructed images of the head and neck vessels were performed by the technologist. Dose reduction techniques were used. All stenosis measurements made according to NASCET criteria unless otherwise specified.    FINDINGS:   NONCONTRAST HEAD CT:   INTRACRANIAL CONTENTS: No intracranial hemorrhage, extraaxial collection, or mass effect.  No CT evidence of acute infarct. Mild presumed chronic small vessel ischemic changes. Mild generalized volume loss. No hydrocephalus.     VISUALIZED ORBITS/SINUSES/MASTOIDS: No intraorbital abnormality. Small retention cyst left maxillary sinus. No middle ear or mastoid effusion.    BONES/SOFT TISSUES: No acute abnormality.    HEAD CTA:  ANTERIOR CIRCULATION: No stenosis/occlusion, aneurysm, or high flow vascular malformation. Fetal origin of the right posterior cerebral artery from the anterior circulation.    POSTERIOR CIRCULATION: No stenosis/occlusion, aneurysm, or high flow vascular malformation. Dominant  left and smaller right vertebral artery contribute to a normal basilar artery.     DURAL VENOUS SINUSES: Expected enhancement of the major dural venous sinuses.    NECK CTA:  RIGHT CAROTID: No measurable stenosis or dissection.    LEFT CAROTID: No measurable stenosis or dissection.    VERTEBRAL ARTERIES: No focal stenosis or dissection. Balanced vertebral arteries.    AORTIC ARCH: Classic aortic arch anatomy with no significant stenosis at the origin of the great vessels.    NONVASCULAR STRUCTURES: Unremarkable.      Impression    IMPRESSION:   HEAD CT:  1.  No CT evidence for acute intracranial process.  2.  Mild chronic microvascular ischemic changes as above.    HEAD CTA:   1.  Normal CTA [place].    NECK CTA:  1.  Normal neck CTA.     *Note: Due to a large number of results and/or encounters for the requested time period, some results have not been displayed. A complete set of results can be found in Results Review.       Medications   lisinopril (ZESTRIL) tablet 5 mg (has no administration in time range)   iopamidol (ISOVUE-370) solution 500 mL (67 mLs Intravenous $Given 1/28/24 1847)   sodium chloride 0.9 % bag 100mL for CT scan flush use (100 mLs Intravenous $Given 1/28/24 1847)         Assessments & Plan (with Medical Decision Making)  Hollis Diggs is a 53 year old male who presented to the ED complaining of high blood pressure.  Also with right-sided headaches, right jaw pain, and right shoulder pain.  He also mentions his right foot has been numb.  Denies chest pain or shortness of breath.  He has been having intermittent dizzy spells.  On arrival to the ED he did have a blood pressure of 169/96.  Otherwise normal vital signs.  Exam demonstrated no acute neurological deficits.  Cardiopulmonary exam was benign.  Pulses in the distal lower extremities however demonstrated decreased versus absent in the right DP compared to the left.  I was able to palpate PT pulse bilaterally.  Question if  decreased blood flow contributing to foot numbness which he describes as his entire distal half of the foot, does not follow a dermatome.  Given multiple symptoms, history of antiphospholipid syndrome, concern for coagulopathic process such as CVA, ACS, or PAD.  IV established and labs drawn for further evaluation.  His EKG today showed a normal sinus rhythm with right bundle branch block and bifascicular changes but no acute ischemia or changes from previous.  His troponin was within normal limits at 6.  No significant electrolyte abnormalities.  White count was normal.  INR 2.16.  An ultrasound of his right lower extremity evaluated arterial flow and this was fortunately normal.  Head and neck CTs were negative for ischemic process.  I went over all these results with the patient.  It does not appear that he is having an acute stroke.  No signs of acute coronary syndrome.  Unclear etiology for his symptoms though his blood pressure has remained elevated throughout his stay in the 160s/80s to 90s.  This could have caused some of his dizzy spells over the last couple weeks.  Because of this I think it would be reasonable to restart him on his lisinopril that had been previously prescribed.  He was given a dose here and a prescription was sent for him to  in the morning.  I do think he will require follow-up with his clinic provider regarding his constellation of symptoms.  He was agreeable to contact them in the morning and schedule an appointment.  He was provided instructions in the meantime on when to return to the ED.  All questions answered and patient discharged in stable condition.     I have reviewed the nursing notes.    I have reviewed the findings, diagnosis, plan and need for follow up with the patient.      Current Discharge Medication List          Final diagnoses:   Elevated blood pressure reading with diagnosis of hypertension     Note: Chart documentation done in part with Dragon Voice  Recognition software. Although reviewed after completion, some word and grammatical errors may remain.     1/28/2024   [facility] EMERGENCY DEPT       Анна Gonzalez PA-C  01/28/24 [id number]

## 2024-01-28 NOTE — ED TRIAGE NOTES
Pt reports hypertension with no meds therapeutic, syncopal episode at home with hypertension following and no evaluation at home. 167/96 at triage, reports baseline of 120/70s. Coorelating lightheadedness and numbness in right foot today.      Triage Assessment (Adult)       Row Name 01/28/24 1553          Triage Assessment    Airway WDL WDL

## 2024-01-29 ENCOUNTER — DOCUMENTATION ONLY (OUTPATIENT)
Dept: ANTICOAGULATION | Facility: CLINIC | Age: 54
End: 2024-01-29
Payer: COMMERCIAL

## 2024-01-29 RX ORDER — TRAZODONE HYDROCHLORIDE 150 MG/1
TABLET ORAL
Qty: 135 TABLET | Refills: 1 | Status: SHIPPED | OUTPATIENT
Start: 2024-01-29 | End: 2024-07-17

## 2024-01-29 NOTE — DISCHARGE INSTRUCTIONS
Your workup today was overall very reassuring.  No signs of stroke or heart attack at this time.  I am not quite sure why your foot is numb but I recommend you follow-up with your clinic provider regarding this along with your high blood pressure.  I did send a prescription of lisinopril to the pharmacy for you to  in the morning and continue taking to manage your blood pressure further.  If you do have any worsening symptoms please do not hesitate to return to the emergency department.    Thank you for choosing [facility]'s Emergency Department. It was a pleasure taking care of you today. If you have any questions, please call [phone].    Анна Gonzalez PA-C

## 2024-01-29 NOTE — PROGRESS NOTES
ANTICOAGULATION  MANAGEMENT: Discharge Review    Hollis Diggs chart reviewed for anticoagulation continuity of care    Emergency room visit on 1/28/24 for hypertension.    Discharge disposition: Home    Results:    Recent labs: (last 7 days)     01/23/24  1435 01/28/24  1759   INR 1.7* 2.16*     Anticoagulation inpatient management:     not applicable     Anticoagulation discharge instructions:     Warfarin dosing: home regimen continued   Bridging: No   INR goal change: No      Medication changes affecting anticoagulation: No    Additional factors affecting anticoagulation: No, started on Lisinopril and no interaction anticipated.      PLAN     No adjustment to anticoagulation plan needed    Recommended follow up is scheduled  Patient not contacted    No adjustment to Anticoagulation Calendar was required    Ana Lofton RN

## 2024-01-29 NOTE — MEDICATION SCRIBE - ADMISSION MEDICATION HISTORY
Medication Scribe Admission Medication History    Admission medication history is complete. The information provided in this note is only as accurate as the sources available at the time of the update.    Information Source(s): Patient and CareEverywhere/SureScripts via in-person    Pertinent Information: n/a    Changes made to PTA medication list:  Added: None  Deleted: narcan  Changed: updated warfarin regimen, takes at bedtime along with abilify, cymbalta, crestor    Medication Affordability:       Allergies reviewed with patient and updates made in EHR: yes    Medication History Completed By: SIDDHARTHA AMAYA 1/28/2024 7:25 PM    PTA Med List   Medication Sig Note Last Dose    ARIPiprazole (ABILIFY) 10 MG tablet Take 1 tablet (10 mg) by mouth daily (Patient taking differently: Take 10 mg by mouth at bedtime)  1/27/2024 at hs    DULoxetine (CYMBALTA) 60 MG capsule TAKE TWO CAPSULES BY MOUTH EVERY DAY (Patient taking differently: Take 120 mg by mouth at bedtime)  1/27/2024 at hs    ferrous sulfate (FEROSUL) 325 (65 Fe) MG tablet Take 1 tablet (325 mg) by mouth daily (with breakfast)  1/27/2024 at am    gabapentin (NEURONTIN) 800 MG tablet TAKE ONE TABLET BY MOUTH THREE TIMES A DAY (Patient taking differently: Take 800 mg by mouth 3 times daily 0500, noon and 1600)  1/28/2024 at 1600 3rd    lisinopril (ZESTRIL) 5 MG tablet Take 1 tablet (5 mg) by mouth daily  More than a month at [facility]    nitroGLYcerin (NITROSTAT) 0.4 MG sublingual tablet For chest pain place 1 tablet under the tongue every 5 minutes for 3 doses. If symptoms persist 5 minutes after 1st dose call 911. 1/28/2024: Last used December 2023 More than a month at on hand    oxyCODONE-acetaminophen (PERCOCET) 5-325 MG tablet Take 1 tablet by mouth every 6 hours as needed for severe pain  1/26/2024 at am    pantoprazole (PROTONIX) 40 MG EC tablet TAKE ONE TABLET BY MOUTH ONCE DAILY  1/28/2024 at am    rosuvastatin (CRESTOR) 20 MG tablet TAKE ONE TABLET BY MOUTH  DAILY (Patient taking differently: Take 20 mg by mouth at bedtime)  1/27/2024 at     traZODone (DESYREL) 150 MG tablet TAKE 1&1/2 TABLETS BY MOUTH AT BEDTIME (Patient taking differently: Take 225 mg by mouth at bedtime)  1/27/2024 at     warfarin ANTICOAGULANT (COUMADIN) 5 MG tablet Take 5 mg by mouth BEDTIME: Mondays Wednesdays and Fridays (take 7.5mg all other days of the week at BEDTIME)  1/26/2024 at     warfarin ANTICOAGULANT (COUMADIN) 5 MG tablet TAKE ONE TABLET BY MOUTH EVERY MONDAY, WED AND FRIDAY, AND ONE AND ONE-HALF TABLETS BY MOUTH ALL OTHER DAYS OR AS DIRECTED BY THE INR CLINIC (Patient taking differently: Take 7.5 mg by mouth BEDTIME: on Tuesdays, Thursdays, Saturdays and Sundays (5mg M, W, Fri at bedtime) OR AS DIRECTED BY THE INR CLINIC)  1/27/2024 at

## 2024-02-01 DIAGNOSIS — E61.1 IRON DEFICIENCY: ICD-10-CM

## 2024-02-01 RX ORDER — FERROUS SULFATE 325(65) MG
325 TABLET ORAL
Qty: 30 TABLET | Refills: 1 | Status: SHIPPED | OUTPATIENT
Start: 2024-02-01

## 2024-02-02 ENCOUNTER — MYC REFILL (OUTPATIENT)
Dept: FAMILY MEDICINE | Facility: CLINIC | Age: 54
End: 2024-02-02
Payer: COMMERCIAL

## 2024-02-02 DIAGNOSIS — M54.16 LUMBAR RADICULOPATHY: ICD-10-CM

## 2024-02-02 NOTE — TELEPHONE ENCOUNTER
Requested Prescriptions   Pending Prescriptions Disp Refills    oxyCODONE-acetaminophen (PERCOCET) 5-325 MG tablet 30 tablet 0     Sig: Take 1 tablet by mouth every 6 hours as needed for severe pain          Routing refill request to provider for review/approval because:  Drug not on the [facility], P or [facility] refill protocol or controlled substance

## 2024-02-04 RX ORDER — OXYCODONE AND ACETAMINOPHEN 5; 325 MG/1; MG/1
1 TABLET ORAL EVERY 6 HOURS PRN
Qty: 30 TABLET | Refills: 0 | Status: SHIPPED | OUTPATIENT
Start: 2024-02-04 | End: 2024-03-04

## 2024-02-06 ENCOUNTER — LAB (OUTPATIENT)
Dept: LAB | Facility: CLINIC | Age: 54
End: 2024-02-06
Payer: COMMERCIAL

## 2024-02-06 ENCOUNTER — ANTICOAGULATION THERAPY VISIT (OUTPATIENT)
Dept: ANTICOAGULATION | Facility: CLINIC | Age: 54
End: 2024-02-06

## 2024-02-06 DIAGNOSIS — Z79.01 LONG TERM CURRENT USE OF ANTICOAGULANT THERAPY: Primary | ICD-10-CM

## 2024-02-06 DIAGNOSIS — D68.61 ANTIPHOSPHOLIPID SYNDROME (H): ICD-10-CM

## 2024-02-06 DIAGNOSIS — I82.4Y9 DEEP VEIN THROMBOSIS (DVT) OF PROXIMAL LOWER EXTREMITY, UNSPECIFIED CHRONICITY, UNSPECIFIED LATERALITY (H): ICD-10-CM

## 2024-02-06 LAB — INR BLD: 2.1 (ref 0.9–1.1)

## 2024-02-06 PROCEDURE — 85610 PROTHROMBIN TIME: CPT

## 2024-02-06 PROCEDURE — 36416 COLLJ CAPILLARY BLOOD SPEC: CPT

## 2024-02-06 NOTE — PROGRESS NOTES
ANTICOAGULATION MANAGEMENT     Hollis Diggs 53 year old male is on warfarin with therapeutic INR result. (Goal INR 2.0-3.0)    Recent labs: (last 7 days)     02/06/24  1409   INR 2.1*       ASSESSMENT     Source(s): Chart Review   Medication/supplement changes:  lisinopril   New illness, injury, or hospitalization: Yes: ED visit 1/28/24 for HTN  Signs or symptoms of bleeding or clotting: No  Previous result: Subtherapeutic  Additional findings: None     PLAN     Recommended plan for ongoing change(s) affecting INR     Dosing Instructions: Continue your current warfarin dose with next INR in 3 weeks       Summary  As of 2/6/2024      Full warfarin instructions:  5 mg every Mon, Wed, Fri; 7.5 mg all other days   Next INR check:  2/27/2024               Detailed voice message left for Hollis with dosing instructions and follow up date.     Contact [phone]  to schedule and with any changes, questions or concerns.     Education provided:   Please call back if any changes to your diet, medications or how you've been taking warfarin    Plan made per [facility] anticoagulation protocol    Tanika Canchola RN  Anticoagulation Clinic  2/6/2024    _______________________________________________________________________     Anticoagulation Episode Summary       Current INR goal:  2.0-3.0   TTR:  73.4% (1 y)   Target end date:  Indefinite   Send INR reminders to:  TIMOTE[facility] ALVAREZ    Indications    DVT (deep venous thrombosis) (H) (Resolved) [I82.409]  Long-term (current) use of anticoagulants [Z79.01] [Z79.01]  Deep vein thrombosis (DVT) of proximal lower extremity  unspecified chronicity  unspecified laterality (H) [I82.4Y9]  Antiphospholipid syndrome (H24) [D68.61]             Comments:               Anticoagulation Care Providers       Provider Role Specialty Phone number    Sylvester Cash MD Referring Family Practice     Christiano Ledezma MD Referring Family Medicine [phone]

## 2024-02-11 DIAGNOSIS — F33.0 MAJOR DEPRESSIVE DISORDER, RECURRENT EPISODE, MILD (H): ICD-10-CM

## 2024-02-14 RX ORDER — DULOXETIN HYDROCHLORIDE 60 MG/1
CAPSULE, DELAYED RELEASE ORAL
Qty: 60 CAPSULE | Refills: 0 | Status: SHIPPED | OUTPATIENT
Start: 2024-02-14 | End: 2024-03-21

## 2024-02-28 ENCOUNTER — ANTICOAGULATION THERAPY VISIT (OUTPATIENT)
Dept: ANTICOAGULATION | Facility: CLINIC | Age: 54
End: 2024-02-28

## 2024-02-28 ENCOUNTER — LAB (OUTPATIENT)
Dept: LAB | Facility: CLINIC | Age: 54
End: 2024-02-28
Payer: COMMERCIAL

## 2024-02-28 DIAGNOSIS — Z79.01 LONG TERM CURRENT USE OF ANTICOAGULANT THERAPY: Primary | ICD-10-CM

## 2024-02-28 DIAGNOSIS — I82.4Y9 DEEP VEIN THROMBOSIS (DVT) OF PROXIMAL LOWER EXTREMITY, UNSPECIFIED CHRONICITY, UNSPECIFIED LATERALITY (H): ICD-10-CM

## 2024-02-28 DIAGNOSIS — D68.61 ANTIPHOSPHOLIPID SYNDROME (H): ICD-10-CM

## 2024-02-28 LAB — INR BLD: 2.9 (ref 0.9–1.1)

## 2024-02-28 PROCEDURE — 36416 COLLJ CAPILLARY BLOOD SPEC: CPT

## 2024-02-28 PROCEDURE — 85610 PROTHROMBIN TIME: CPT

## 2024-02-28 NOTE — PROGRESS NOTES
ANTICOAGULATION MANAGEMENT     Hollis Diggs 53 year old male is on warfarin with therapeutic INR result. (Goal INR 2.0-3.0)    Recent labs: (last 7 days)     02/28/24  1412   INR 2.9*       ASSESSMENT     Source(s): Chart Review  Previous INR was Therapeutic last 2(+) visits  Medication, diet, health changes since last INR chart reviewed; none identified         PLAN     Recommended plan for no diet, medication or health factor changes affecting INR     Dosing Instructions: Continue your current warfarin dose with next INR in 4 weeks       Summary  As of 2/28/2024      Full warfarin instructions:  5 mg every Mon, Wed, Fri; 7.5 mg all other days   Next INR check:  3/27/2024               Sent AT Internet message with dosing and follow up instructions    Contact [phone]  to schedule and with any changes, questions or concerns.     Education provided:   None required    Plan made per [facility] anticoagulation protocol    Claudia GARCIA RN  Anticoagulation Clinic  2/28/2024    _______________________________________________________________________     Anticoagulation Episode Summary       Current INR goal:  2.0-3.0   TTR:  75.5% (1 y)   Target end date:  Indefinite   Send INR reminders to:  [facility]    Indications    DVT (deep venous thrombosis) (H) (Resolved) [I82.409]  Long-term (current) use of anticoagulants [Z79.01] [Z79.01]  Deep vein thrombosis (DVT) of proximal lower extremity  unspecified chronicity  unspecified laterality (H) [I82.4Y9]  Antiphospholipid syndrome (H24) [D68.61]             Comments:               Anticoagulation Care Providers       Provider Role Specialty Phone number    Sylvester Cash MD Referring Family Practice     Christiano Ledezma MD Referring Family Medicine [phone]

## 2024-03-04 ENCOUNTER — MYC REFILL (OUTPATIENT)
Dept: FAMILY MEDICINE | Facility: CLINIC | Age: 54
End: 2024-03-04
Payer: COMMERCIAL

## 2024-03-04 DIAGNOSIS — M54.16 LUMBAR RADICULOPATHY: ICD-10-CM

## 2024-03-05 DIAGNOSIS — I10 HYPERTENSION, UNSPECIFIED TYPE: ICD-10-CM

## 2024-03-06 RX ORDER — OXYCODONE AND ACETAMINOPHEN 5; 325 MG/1; MG/1
1 TABLET ORAL EVERY 6 HOURS PRN
Qty: 30 TABLET | Refills: 0 | Status: SHIPPED | OUTPATIENT
Start: 2024-03-06 | End: 2024-04-04

## 2024-03-06 RX ORDER — LISINOPRIL 5 MG/1
5 TABLET ORAL DAILY
Qty: 30 TABLET | Refills: 0 | Status: SHIPPED | OUTPATIENT
Start: 2024-03-06 | End: 2024-04-17

## 2024-03-20 DIAGNOSIS — F33.0 MAJOR DEPRESSIVE DISORDER, RECURRENT EPISODE, MILD (H): ICD-10-CM

## 2024-03-21 RX ORDER — DULOXETIN HYDROCHLORIDE 60 MG/1
CAPSULE, DELAYED RELEASE ORAL
Qty: 60 CAPSULE | Refills: 0 | Status: SHIPPED | OUTPATIENT
Start: 2024-03-21 | End: 2024-04-17

## 2024-03-24 DIAGNOSIS — F41.9 ANXIETY: ICD-10-CM

## 2024-03-27 RX ORDER — GABAPENTIN 800 MG/1
800 TABLET ORAL 3 TIMES DAILY
Qty: 90 TABLET | Refills: 2 | Status: SHIPPED | OUTPATIENT
Start: 2024-03-27 | End: 2024-06-12

## 2024-04-03 ENCOUNTER — ANTICOAGULATION THERAPY VISIT (OUTPATIENT)
Dept: ANTICOAGULATION | Facility: CLINIC | Age: 54
End: 2024-04-03

## 2024-04-03 ENCOUNTER — LAB (OUTPATIENT)
Dept: LAB | Facility: CLINIC | Age: 54
End: 2024-04-03
Payer: COMMERCIAL

## 2024-04-03 DIAGNOSIS — I82.4Y9 DEEP VEIN THROMBOSIS (DVT) OF PROXIMAL LOWER EXTREMITY, UNSPECIFIED CHRONICITY, UNSPECIFIED LATERALITY (H): ICD-10-CM

## 2024-04-03 DIAGNOSIS — D68.61 ANTIPHOSPHOLIPID SYNDROME (H): ICD-10-CM

## 2024-04-03 DIAGNOSIS — Z79.01 LONG TERM CURRENT USE OF ANTICOAGULANT THERAPY: Primary | ICD-10-CM

## 2024-04-03 LAB — INR BLD: 3.2 (ref 0.9–1.1)

## 2024-04-03 PROCEDURE — 36416 COLLJ CAPILLARY BLOOD SPEC: CPT

## 2024-04-03 PROCEDURE — 85610 PROTHROMBIN TIME: CPT

## 2024-04-04 ENCOUNTER — DOCUMENTATION ONLY (OUTPATIENT)
Dept: ANTICOAGULATION | Facility: CLINIC | Age: 54
End: 2024-04-04
Payer: COMMERCIAL

## 2024-04-04 ENCOUNTER — MYC REFILL (OUTPATIENT)
Dept: FAMILY MEDICINE | Facility: CLINIC | Age: 54
End: 2024-04-04
Payer: COMMERCIAL

## 2024-04-04 DIAGNOSIS — I82.4Y9 DEEP VEIN THROMBOSIS (DVT) OF PROXIMAL LOWER EXTREMITY, UNSPECIFIED CHRONICITY, UNSPECIFIED LATERALITY (H): ICD-10-CM

## 2024-04-04 DIAGNOSIS — M54.16 LUMBAR RADICULOPATHY: ICD-10-CM

## 2024-04-04 DIAGNOSIS — D68.61 ANTIPHOSPHOLIPID SYNDROME (H): Primary | ICD-10-CM

## 2024-04-04 NOTE — PROGRESS NOTES
ANTICOAGULATION MANAGEMENT     Hollis Diggs 53 year old male is on warfarin with supratherapeutic INR result. (Goal INR 2.0-3.0)    Recent labs: (last 7 days)     04/03/24  1438   INR 3.2*       ASSESSMENT     Source(s): Chart Review  Previous INR was Therapeutic last 2(+) visits  Medication, diet, health changes since last INR chart reviewed; none identified         PLAN     Recommended plan for no diet, medication or health factor changes affecting INR     Dosing Instructions: Continue your current warfarin dose with next INR in 2 weeks       Summary  As of 4/3/2024      Full warfarin instructions:  5 mg every Mon, Wed, Fri; 7.5 mg all other days   Next INR check:  4/17/2024               Detailed voice message left for Hollis with dosing instructions and follow up date.   Sent [employer] message with dosing and follow up instructions    Contact [phone]  to schedule and with any changes, questions or concerns.     Education provided:   Contact [phone]  with any changes, questions or concerns.     Plan made per [facility] anticoagulation protocol    Ana Lofton RN  Anticoagulation Clinic  4/4/2024    _______________________________________________________________________     Anticoagulation Episode Summary       Current INR goal:  2.0-3.0   TTR:  73.4% (1 y)   Target end date:  Indefinite   Send INR reminders to:  [facility]    Indications    DVT (deep venous thrombosis) (H) (Resolved) [I82.409]  Long-term (current) use of anticoagulants [Z79.01] [Z79.01]  Deep vein thrombosis (DVT) of proximal lower extremity  unspecified chronicity  unspecified laterality (H) [I82.4Y9]  Antiphospholipid syndrome (H24) [D68.61]             Comments:               Anticoagulation Care Providers       Provider Role Specialty Phone number    Sylvester Cash MD Referring Family Practice     Christiano Ledezma MD Referring Family Medicine [phone]

## 2024-04-04 NOTE — PROGRESS NOTES
ANTICOAGULATION CLINIC REFERRAL RENEWAL REQUEST       An annual renewal order is required for all patients referred to [facility].?  Please review and sign the pended referral order for Hollis Diggs.       ANTICOAGULATION SUMMARY      Warfarin indication(s)   DVT and Antiphospholipid Antibodies    Mechanical heart valve present?  NO       Current goal range   INR: 2.0-3.0     Goal appropriate for indication? Goal INR 2-3, standard for indication(s) above     Time in Therapeutic Range (TTR)  (Goal > 60%) 73%       Office visit with referring provider's group within last year yes on 12/1/23       Ana Lofton RN  [facility]

## 2024-04-05 RX ORDER — OXYCODONE AND ACETAMINOPHEN 5; 325 MG/1; MG/1
1 TABLET ORAL EVERY 6 HOURS PRN
Qty: 30 TABLET | Refills: 0 | Status: SHIPPED | OUTPATIENT
Start: 2024-04-05 | End: 2024-05-06

## 2024-04-17 DIAGNOSIS — F33.0 MAJOR DEPRESSIVE DISORDER, RECURRENT EPISODE, MILD (H): ICD-10-CM

## 2024-04-17 DIAGNOSIS — I10 HYPERTENSION, UNSPECIFIED TYPE: ICD-10-CM

## 2024-04-17 RX ORDER — LISINOPRIL 5 MG/1
5 TABLET ORAL DAILY
Qty: 30 TABLET | Refills: 0 | Status: SHIPPED | OUTPATIENT
Start: 2024-04-17 | End: 2024-05-21

## 2024-04-17 RX ORDER — DULOXETIN HYDROCHLORIDE 60 MG/1
CAPSULE, DELAYED RELEASE ORAL
Qty: 60 CAPSULE | Refills: 0 | Status: SHIPPED | OUTPATIENT
Start: 2024-04-17 | End: 2024-05-21

## 2024-04-18 DIAGNOSIS — F33.0 MAJOR DEPRESSIVE DISORDER, RECURRENT EPISODE, MILD (H): ICD-10-CM

## 2024-04-18 RX ORDER — DULOXETIN HYDROCHLORIDE 60 MG/1
CAPSULE, DELAYED RELEASE ORAL
Qty: 60 CAPSULE | Refills: 0 | OUTPATIENT
Start: 2024-04-18

## 2024-04-19 DIAGNOSIS — F33.0 MAJOR DEPRESSIVE DISORDER, RECURRENT EPISODE, MILD (H): ICD-10-CM

## 2024-04-19 RX ORDER — DULOXETIN HYDROCHLORIDE 60 MG/1
CAPSULE, DELAYED RELEASE ORAL
Qty: 60 CAPSULE | Refills: 0 | OUTPATIENT
Start: 2024-04-19

## 2024-04-20 DIAGNOSIS — F33.0 MAJOR DEPRESSIVE DISORDER, RECURRENT EPISODE, MILD (H): ICD-10-CM

## 2024-04-21 DIAGNOSIS — I10 HYPERTENSION, UNSPECIFIED TYPE: ICD-10-CM

## 2024-04-22 RX ORDER — DULOXETIN HYDROCHLORIDE 60 MG/1
CAPSULE, DELAYED RELEASE ORAL
Qty: 60 CAPSULE | Refills: 0 | OUTPATIENT
Start: 2024-04-22

## 2024-04-22 RX ORDER — LISINOPRIL 5 MG/1
5 TABLET ORAL DAILY
Qty: 30 TABLET | Refills: 0 | OUTPATIENT
Start: 2024-04-22

## 2024-04-24 ENCOUNTER — MYC MEDICAL ADVICE (OUTPATIENT)
Dept: ANTICOAGULATION | Facility: CLINIC | Age: 54
End: 2024-04-24
Payer: COMMERCIAL

## 2024-05-01 ENCOUNTER — LAB (OUTPATIENT)
Dept: LAB | Facility: CLINIC | Age: 54
End: 2024-05-01
Payer: COMMERCIAL

## 2024-05-01 ENCOUNTER — ANTICOAGULATION THERAPY VISIT (OUTPATIENT)
Dept: ANTICOAGULATION | Facility: CLINIC | Age: 54
End: 2024-05-01

## 2024-05-01 DIAGNOSIS — I82.4Y9 DEEP VEIN THROMBOSIS (DVT) OF PROXIMAL LOWER EXTREMITY, UNSPECIFIED CHRONICITY, UNSPECIFIED LATERALITY (H): ICD-10-CM

## 2024-05-01 DIAGNOSIS — D68.61 ANTIPHOSPHOLIPID SYNDROME (H): ICD-10-CM

## 2024-05-01 LAB — INR BLD: 2.7 (ref 0.9–1.1)

## 2024-05-01 PROCEDURE — 36416 COLLJ CAPILLARY BLOOD SPEC: CPT

## 2024-05-01 PROCEDURE — 85610 PROTHROMBIN TIME: CPT

## 2024-05-01 NOTE — PROGRESS NOTES
ANTICOAGULATION MANAGEMENT     Hollis Diggs 53 year old male is on warfarin with therapeutic INR result. (Goal INR 2.0-3.0)    Recent labs: (last 7 days)     05/01/24  1421   INR 2.7*       ASSESSMENT     Source(s): Chart Review  Previous INR was Supratherapeutic  Medication, diet, health changes since last INR chart reviewed; none identified         PLAN       Dosing Instructions: Continue your current warfarin dose with next INR in 5 weeks       Summary  As of 5/1/2024      Full warfarin instructions:  5 mg every Mon, Wed, Fri; 7.5 mg all other days   Next INR check:  6/5/2024               Detailed voice message left for Hollis with dosing instructions and follow up date.     Contact [phone]  to schedule and with any changes, questions or concerns.     Education provided:   Please call back if any changes to your diet, medications or how you've been taking warfarin  Contact [phone]  with any changes, questions or concerns.     Plan made per [facility] anticoagulation protocol    Emy Hagen RN  Anticoagulation Clinic  5/1/2024    _______________________________________________________________________     Anticoagulation Episode Summary       Current INR goal:  2.0-3.0   TTR:  72.5% (1 y)   Target end date:  Indefinite   Send INR reminders to:  [facility]    Indications    DVT (deep venous thrombosis) (H) (Resolved) [I82.409]  Long-term (current) use of anticoagulants [Z79.01] [Z79.01]  Deep vein thrombosis (DVT) of proximal lower extremity  unspecified chronicity  unspecified laterality (H) [I82.4Y9]  Antiphospholipid syndrome (H24) [D68.61]             Comments:               Anticoagulation Care Providers       Provider Role Specialty Phone number    Sylvester Cash MD Referring Family Practice     Christiano Ledezma MD Referring Family Medicine [phone]

## 2024-05-06 ENCOUNTER — MYC REFILL (OUTPATIENT)
Dept: FAMILY MEDICINE | Facility: CLINIC | Age: 54
End: 2024-05-06
Payer: COMMERCIAL

## 2024-05-06 DIAGNOSIS — M54.16 LUMBAR RADICULOPATHY: ICD-10-CM

## 2024-05-08 RX ORDER — OXYCODONE AND ACETAMINOPHEN 5; 325 MG/1; MG/1
1 TABLET ORAL EVERY 6 HOURS PRN
Qty: 30 TABLET | Refills: 0 | Status: SHIPPED | OUTPATIENT
Start: 2024-05-08 | End: 2024-06-06

## 2024-05-19 DIAGNOSIS — F33.0 MAJOR DEPRESSIVE DISORDER, RECURRENT EPISODE, MILD (H): ICD-10-CM

## 2024-05-19 DIAGNOSIS — I10 HYPERTENSION, UNSPECIFIED TYPE: ICD-10-CM

## 2024-05-21 RX ORDER — DULOXETIN HYDROCHLORIDE 60 MG/1
CAPSULE, DELAYED RELEASE ORAL
Qty: 60 CAPSULE | Refills: 0 | Status: SHIPPED | OUTPATIENT
Start: 2024-05-21 | End: 2024-06-17

## 2024-05-21 RX ORDER — LISINOPRIL 5 MG/1
5 TABLET ORAL DAILY
Qty: 30 TABLET | Refills: 0 | Status: SHIPPED | OUTPATIENT
Start: 2024-05-21 | End: 2024-06-17

## 2024-06-05 ENCOUNTER — ANTICOAGULATION THERAPY VISIT (OUTPATIENT)
Dept: ANTICOAGULATION | Facility: CLINIC | Age: 54
End: 2024-06-05

## 2024-06-05 ENCOUNTER — LAB (OUTPATIENT)
Dept: LAB | Facility: CLINIC | Age: 54
End: 2024-06-05
Payer: COMMERCIAL

## 2024-06-05 DIAGNOSIS — I82.4Y9 DEEP VEIN THROMBOSIS (DVT) OF PROXIMAL LOWER EXTREMITY, UNSPECIFIED CHRONICITY, UNSPECIFIED LATERALITY (H): ICD-10-CM

## 2024-06-05 DIAGNOSIS — D68.61 ANTIPHOSPHOLIPID SYNDROME (H): ICD-10-CM

## 2024-06-05 DIAGNOSIS — Z79.01 LONG TERM CURRENT USE OF ANTICOAGULANT THERAPY: Primary | ICD-10-CM

## 2024-06-05 LAB — INR BLD: 2.7 (ref 0.9–1.1)

## 2024-06-05 PROCEDURE — 85610 PROTHROMBIN TIME: CPT

## 2024-06-05 PROCEDURE — 36416 COLLJ CAPILLARY BLOOD SPEC: CPT

## 2024-06-05 NOTE — PROGRESS NOTES
ANTICOAGULATION MANAGEMENT     Hollis Diggs 54 year old male is on warfarin with therapeutic INR result. (Goal INR 2.0-3.0)    Recent labs: (last 7 days)     06/05/24  1412   INR 2.7*       ASSESSMENT     Source(s): Chart Review  Previous INR was Therapeutic last visit; previously outside of goal range  Medication, diet, health changes since last INR chart reviewed; none identified         PLAN     Recommended plan for no diet, medication or health factor changes affecting INR     Dosing Instructions: Continue your current warfarin dose with next INR in 6 weeks       Summary  As of 6/5/2024      Full warfarin instructions:  5 mg every Mon, Wed, Fri; 7.5 mg all other days   Next INR check:  7/17/2024               Detailed voice message left for Hollis with dosing instructions and follow up date.   Sent [employer] message with dosing and follow up instructions    Contact [phone]  to schedule and with any changes, questions or concerns.     Education provided:   Please call back if any changes to your diet, medications or how you've been taking warfarin    Plan made per [facility] anticoagulation protocol    Jesse Flores RN  Anticoagulation Clinic  6/5/2024    _______________________________________________________________________     Anticoagulation Episode Summary       Current INR goal:  2.0-3.0   TTR:  72.5% (1 y)   Target end date:  Indefinite   Send INR reminders to:  [facility]    Indications    DVT (deep venous thrombosis) (H) (Resolved) [I82.409]  Long-term (current) use of anticoagulants [Z79.01] [Z79.01]  Deep vein thrombosis (DVT) of proximal lower extremity  unspecified chronicity  unspecified laterality (H) [I82.4Y9]  Antiphospholipid syndrome (H24) [D68.61]             Comments:               Anticoagulation Care Providers       Provider Role Specialty Phone number    Sylvester Cash MD Referring Family Practice     Christiano Ledezma MD Referring Family Medicine [phone]

## 2024-06-06 ENCOUNTER — MYC REFILL (OUTPATIENT)
Dept: FAMILY MEDICINE | Facility: CLINIC | Age: 54
End: 2024-06-06
Payer: COMMERCIAL

## 2024-06-06 DIAGNOSIS — M54.16 LUMBAR RADICULOPATHY: ICD-10-CM

## 2024-06-07 RX ORDER — OXYCODONE AND ACETAMINOPHEN 5; 325 MG/1; MG/1
1 TABLET ORAL EVERY 6 HOURS PRN
Qty: 30 TABLET | Refills: 0 | Status: SHIPPED | OUTPATIENT
Start: 2024-06-07 | End: 2024-07-08

## 2024-06-12 DIAGNOSIS — F41.9 ANXIETY: ICD-10-CM

## 2024-06-12 RX ORDER — GABAPENTIN 800 MG/1
800 TABLET ORAL 3 TIMES DAILY
Qty: 90 TABLET | Refills: 2 | Status: SHIPPED | OUTPATIENT
Start: 2024-06-12 | End: 2024-09-03

## 2024-06-13 DIAGNOSIS — Z79.01 LONG TERM CURRENT USE OF ANTICOAGULANT THERAPY: ICD-10-CM

## 2024-06-13 DIAGNOSIS — I82.4Y9 DEEP VEIN THROMBOSIS (DVT) OF PROXIMAL LOWER EXTREMITY, UNSPECIFIED CHRONICITY, UNSPECIFIED LATERALITY (H): ICD-10-CM

## 2024-06-13 DIAGNOSIS — D68.61 ANTIPHOSPHOLIPID SYNDROME (H): ICD-10-CM

## 2024-06-13 RX ORDER — WARFARIN SODIUM 5 MG/1
TABLET ORAL
Qty: 90 TABLET | Refills: 1 | Status: SHIPPED | OUTPATIENT
Start: 2024-06-13

## 2024-06-13 NOTE — TELEPHONE ENCOUNTER
ANTICOAGULATION MANAGEMENT:  Medication Refill    Anticoagulation Summary  As of 6/5/2024      Warfarin maintenance plan:  5 mg (5 mg x 1) every Mon, Wed, Fri; 7.5 mg (5 mg x 1.5) all other days   Next INR check:  7/17/2024   Target end date:  Indefinite    Indications    DVT (deep venous thrombosis) (H) (Resolved) [I82.409]  Long-term (current) use of anticoagulants [Z79.01] [Z79.01]  Deep vein thrombosis (DVT) of proximal lower extremity  unspecified chronicity  unspecified laterality (H) [I82.4Y9]  Antiphospholipid syndrome (H24) [D68.61]                 Anticoagulation Care Providers       Provider Role Specialty Phone number    Sylvester Cash MD Referring Family Practice     Christiano Ledezma MD Referring Family Medicine [phone]            Refill Criteria    Visit with referring provider/group: Meets criteria: office visit within referring provider group in the last 1 year on 12/1/23    [facility] referral last signed: 04/05/2024; within last year: Yes    Lab monitoring not exceeding 2 weeks overdue: Yes    Hollis meets all criteria for refill. Rx instructions and quantity match patient's current dosing plan. Warfarin 90 day supply with 1 refill granted per [facility] protocol     Анна Hale RN  Anticoagulation Clinic

## 2024-06-14 ENCOUNTER — OFFICE VISIT (OUTPATIENT)
Dept: FAMILY MEDICINE | Facility: CLINIC | Age: 54
End: 2024-06-14
Payer: COMMERCIAL

## 2024-06-14 VITALS
OXYGEN SATURATION: 98 % | TEMPERATURE: 97.1 F | HEIGHT: 70 IN | BODY MASS INDEX: 34.65 KG/M2 | SYSTOLIC BLOOD PRESSURE: 130 MMHG | HEART RATE: 84 BPM | DIASTOLIC BLOOD PRESSURE: 74 MMHG | WEIGHT: 242 LBS

## 2024-06-14 DIAGNOSIS — M06.00 SERONEGATIVE RHEUMATOID ARTHRITIS (H): ICD-10-CM

## 2024-06-14 DIAGNOSIS — M79.674 TOE PAIN, RIGHT: ICD-10-CM

## 2024-06-14 DIAGNOSIS — R07.9 CHEST PAIN, UNSPECIFIED TYPE: ICD-10-CM

## 2024-06-14 DIAGNOSIS — I10 HYPERTENSION, UNSPECIFIED TYPE: ICD-10-CM

## 2024-06-14 DIAGNOSIS — M54.50 CHRONIC LOW BACK PAIN WITHOUT SCIATICA, UNSPECIFIED BACK PAIN LATERALITY: ICD-10-CM

## 2024-06-14 DIAGNOSIS — G47.33 OSA (OBSTRUCTIVE SLEEP APNEA): ICD-10-CM

## 2024-06-14 DIAGNOSIS — K44.9 HIATAL HERNIA: ICD-10-CM

## 2024-06-14 DIAGNOSIS — Z79.01 LONG TERM CURRENT USE OF ANTICOAGULANT THERAPY: ICD-10-CM

## 2024-06-14 DIAGNOSIS — I82.4Y9 DEEP VEIN THROMBOSIS (DVT) OF PROXIMAL LOWER EXTREMITY, UNSPECIFIED CHRONICITY, UNSPECIFIED LATERALITY (H): ICD-10-CM

## 2024-06-14 DIAGNOSIS — G89.29 CHRONIC LOW BACK PAIN WITHOUT SCIATICA, UNSPECIFIED BACK PAIN LATERALITY: ICD-10-CM

## 2024-06-14 DIAGNOSIS — I25.10 CORONARY ATHEROSCLEROSIS DUE TO CALCIFIED CORONARY LESION OF NATIVE ARTERY: ICD-10-CM

## 2024-06-14 DIAGNOSIS — I25.84 CORONARY ATHEROSCLEROSIS DUE TO CALCIFIED CORONARY LESION OF NATIVE ARTERY: ICD-10-CM

## 2024-06-14 DIAGNOSIS — R13.10 PILL DYSPHAGIA: ICD-10-CM

## 2024-06-14 DIAGNOSIS — D68.61 ANTIPHOSPHOLIPID SYNDROME (H): ICD-10-CM

## 2024-06-14 DIAGNOSIS — F33.0 MAJOR DEPRESSIVE DISORDER, RECURRENT EPISODE, MILD (H): ICD-10-CM

## 2024-06-14 DIAGNOSIS — K21.9 GASTROESOPHAGEAL REFLUX DISEASE WITHOUT ESOPHAGITIS: Primary | ICD-10-CM

## 2024-06-14 LAB — CREAT UR-MCNC: 187 MG/DL

## 2024-06-14 PROCEDURE — G2211 COMPLEX E/M VISIT ADD ON: HCPCS | Performed by: STUDENT IN AN ORGANIZED HEALTH CARE EDUCATION/TRAINING PROGRAM

## 2024-06-14 PROCEDURE — 99214 OFFICE O/P EST MOD 30 MIN: CPT | Performed by: STUDENT IN AN ORGANIZED HEALTH CARE EDUCATION/TRAINING PROGRAM

## 2024-06-14 PROCEDURE — 93000 ELECTROCARDIOGRAM COMPLETE: CPT | Performed by: STUDENT IN AN ORGANIZED HEALTH CARE EDUCATION/TRAINING PROGRAM

## 2024-06-14 PROCEDURE — G0481 DRUG TEST DEF 8-14 CLASSES: HCPCS | Performed by: STUDENT IN AN ORGANIZED HEALTH CARE EDUCATION/TRAINING PROGRAM

## 2024-06-14 ASSESSMENT — PATIENT HEALTH QUESTIONNAIRE - PHQ9
10. IF YOU CHECKED OFF ANY PROBLEMS, HOW DIFFICULT HAVE THESE PROBLEMS MADE IT FOR YOU TO DO YOUR WORK, TAKE CARE OF THINGS AT HOME, OR GET ALONG WITH OTHER PEOPLE: SOMEWHAT DIFFICULT
SUM OF ALL RESPONSES TO PHQ QUESTIONS 1-9: 4
SUM OF ALL RESPONSES TO PHQ QUESTIONS 1-9: 4

## 2024-06-14 ASSESSMENT — ENCOUNTER SYMPTOMS: COUGH: 1

## 2024-06-14 ASSESSMENT — PAIN SCALES - GENERAL: PAINLEVEL: MODERATE PAIN (5)

## 2024-06-14 NOTE — PROGRESS NOTES
Assessment & Plan   Problem List Items Addressed This Visit          Respiratory    Hiatal hernia    DEDE (obstructive sleep apnea)       Digestive    Gastroesophageal reflux disease without esophagitis - Primary       Circulatory    Hypertension, unspecified type       Other    Long-term (current) use of anticoagulants [Z79.01]     Other Visit Diagnoses       Toe pain, right        Relevant Orders    Orthopedic  Referral    Pill dysphagia        Relevant Orders    Adult ENT  Referral    Chest pain, unspecified type        Relevant Orders    EKG 12-lead complete w/read - Clinics    Coronary atherosclerosis due to calcified coronary lesion of native artery        Chronic low back pain without sciatica, unspecified back pain laterality        Relevant Orders    [id number] - Urine Drug Confirmation Panel (Comprehensive)           Chest discomfort does not sound cardiac in nature and rare going on for quite some time.  Suspect reflux related but EKG done today and unchanged.  Follow-up with cardiology.  Continues on anticoagulation.  Chronic pain management discussed today and continues on chronic opioids with review of respiratory suppression death and addiction discussed.  Pain contract still up-to-date and will need to review this fall.  Repeat urine drug screen now.  Plan for him to follow-up with podiatry regarding his thickened toe.  Unable to visualize anything on the base of his tongue.  Will have him follow-up with ENT and consider nasolaryngoscopy for visualization. Patient does well with swallowing generally and I will hold off on swallow study now. Continue PPI    The longitudinal plan of care for the diagnosis(es)/condition(s) as documented were addressed during this visit. Due to the added complexity in care, I will continue to support Hollis in the subsequent management and with ongoing continuity of care.         BMI  Estimated body mass index is 34.72 kg/m  as calculated from the  "following:    Height as of this encounter: 1.778 m (5' 10\").    Weight as of this encounter: 109.8 kg (242 lb).   Weight management plan: Discussed healthy diet and exercise guidelines        Zoey Shafer is a 54 year old, presenting for the following health issues:  Cough        6/14/2024     7:03 AM   Additional Questions   Roomed by Geni Kate    History of Present Illness       Reason for visit:  Throat toenail chest pains  Symptom onset:  More than a month  Symptoms include:  Dry cough chest pains there is something in my throat  Symptom intensity:  Mild  Symptom progression:  Staying the same  Had these symptoms before:  Yes  Has tried/received treatment for these symptoms:  No  What makes it worse:  Stress  What makes it better:  Relax    He eats 0-1 servings of fruits and vegetables daily.He consumes 4 sweetened beverage(s) daily.He exercises with enough effort to increase his heart rate 30 to 60 minutes per day.  He exercises with enough effort to increase his heart rate 3 or less days per week.   He is taking medications regularly.       Patient initially had cough after illness but this has resolved.  Notes several month history of some difficulty with swallowing pills but no other issues with swallowing.  Felt lump on the base of his tongue that is not painful but does think it affects his swallowing.  Has not visualized.  Occasional postnasal drip reported but no other allergies sneezing or itchy watery eyes.  Also notes rare episodes of short-lived chest discomfort on the right side that are not associated with exertion.  No palpitations or shortness of breath with these.  Does not think palpation or movement makes it worse.  Does not think it is worse after eating or lying down but unsure.  He does have upcoming follow-up with cardiology and known coronary artery disease.        Review of Systems  Constitutional, HEENT, cardiovascular, pulmonary, GI, , musculoskeletal, neuro, skin, " endocrine and psych systems are negative, except as otherwise noted.      Objective    There were no vitals taken for this visit.  There is no height or weight on file to calculate BMI.  Physical Exam   GENERAL: alert and no distress  EYES: Eyes grossly normal to inspection, PERRL and conjunctivae and sclerae normal  HENT: ear canals and TM's normal, nose and mouth without ulcers or lesions, could not visualize but could feel smaller than pea-sized lump on the base of his tongue  NECK: no adenopathy, no asymmetry, masses, or scars  RESP: lungs clear to auscultation - no rales, rhonchi or wheezes  CV: regular rate and rhythm, normal S1 S2, no S3 or S4, no murmur, click or rub, no peripheral edema  ABDOMEN: soft, nontender, no hepatosplenomegaly, no masses and bowel sounds normal  MS: no gross musculoskeletal defects noted, no edema  SKIN: no suspicious lesions or rashes  NEURO: Normal strength and tone, mentation intact and speech normal  PSYCH: mentation appears normal, affect normal/bright        EKG: Sinus rhythm with right bundle branch block  Signed Electronically by: Christiano Ledezma MD

## 2024-06-17 DIAGNOSIS — I10 HYPERTENSION, UNSPECIFIED TYPE: ICD-10-CM

## 2024-06-17 DIAGNOSIS — F33.0 MAJOR DEPRESSIVE DISORDER, RECURRENT EPISODE, MILD (H): ICD-10-CM

## 2024-06-17 RX ORDER — LISINOPRIL 5 MG/1
5 TABLET ORAL DAILY
Qty: 30 TABLET | Refills: 5 | Status: SHIPPED | OUTPATIENT
Start: 2024-06-17

## 2024-06-17 RX ORDER — DULOXETIN HYDROCHLORIDE 60 MG/1
CAPSULE, DELAYED RELEASE ORAL
Qty: 60 CAPSULE | Refills: 5 | Status: SHIPPED | OUTPATIENT
Start: 2024-06-17

## 2024-06-19 DIAGNOSIS — F33.0 MAJOR DEPRESSIVE DISORDER, RECURRENT EPISODE, MILD (H): ICD-10-CM

## 2024-06-19 RX ORDER — DULOXETIN HYDROCHLORIDE 60 MG/1
CAPSULE, DELAYED RELEASE ORAL
Qty: 60 CAPSULE | Refills: 0 | OUTPATIENT
Start: 2024-06-19

## 2024-06-20 LAB
GABAPENTIN UR QL CFM: PRESENT
OXYCODONE UR CFM-MCNC: 350 NG/ML
OXYCODONE/CREAT UR: 187 NG/MG {CREAT}
OXYMORPHONE UR CFM-MCNC: 218 NG/ML
OXYMORPHONE/CREAT UR: 117 NG/MG {CREAT}

## 2024-06-24 DIAGNOSIS — F33.0 MAJOR DEPRESSIVE DISORDER, RECURRENT EPISODE, MILD (H): ICD-10-CM

## 2024-06-24 RX ORDER — DULOXETIN HYDROCHLORIDE 60 MG/1
CAPSULE, DELAYED RELEASE ORAL
Qty: 60 CAPSULE | Refills: 0 | OUTPATIENT
Start: 2024-06-24

## 2024-07-08 ENCOUNTER — MYC REFILL (OUTPATIENT)
Dept: FAMILY MEDICINE | Facility: CLINIC | Age: 54
End: 2024-07-08
Payer: COMMERCIAL

## 2024-07-08 DIAGNOSIS — M54.16 LUMBAR RADICULOPATHY: ICD-10-CM

## 2024-07-11 ENCOUNTER — OFFICE VISIT (OUTPATIENT)
Dept: CARDIOLOGY | Facility: CLINIC | Age: 54
End: 2024-07-11
Payer: COMMERCIAL

## 2024-07-11 VITALS
BODY MASS INDEX: 34.5 KG/M2 | HEIGHT: 70 IN | DIASTOLIC BLOOD PRESSURE: 88 MMHG | WEIGHT: 241 LBS | SYSTOLIC BLOOD PRESSURE: 126 MMHG | HEART RATE: 81 BPM | RESPIRATION RATE: 16 BRPM | OXYGEN SATURATION: 97 %

## 2024-07-11 DIAGNOSIS — R20.0 NUMBNESS AND TINGLING OF LEFT LEG: Primary | ICD-10-CM

## 2024-07-11 DIAGNOSIS — R20.2 NUMBNESS AND TINGLING OF LEFT LEG: Primary | ICD-10-CM

## 2024-07-11 DIAGNOSIS — I25.84 CORONARY ATHEROSCLEROSIS DUE TO CALCIFIED CORONARY LESION OF NATIVE ARTERY: ICD-10-CM

## 2024-07-11 DIAGNOSIS — I25.10 CORONARY ATHEROSCLEROSIS DUE TO CALCIFIED CORONARY LESION OF NATIVE ARTERY: ICD-10-CM

## 2024-07-11 PROCEDURE — 99214 OFFICE O/P EST MOD 30 MIN: CPT | Performed by: INTERNAL MEDICINE

## 2024-07-11 PROCEDURE — G2211 COMPLEX E/M VISIT ADD ON: HCPCS | Performed by: INTERNAL MEDICINE

## 2024-07-11 RX ORDER — NITROGLYCERIN 0.4 MG/1
TABLET SUBLINGUAL
Qty: 30 TABLET | Refills: 0 | Status: SHIPPED | OUTPATIENT
Start: 2024-07-11

## 2024-07-11 RX ORDER — OXYCODONE AND ACETAMINOPHEN 5; 325 MG/1; MG/1
1 TABLET ORAL EVERY 6 HOURS PRN
Qty: 30 TABLET | Refills: 0 | Status: SHIPPED | OUTPATIENT
Start: 2024-07-11 | End: 2024-08-09

## 2024-07-11 ASSESSMENT — PAIN SCALES - GENERAL: PAINLEVEL: NO PAIN (0)

## 2024-07-11 NOTE — PATIENT INSTRUCTIONS
1. Nuclear stress test   2. Echocardiogram  3. ABIs with exercise  4. Follow up first available EL after testing is done  5. SL nitroglycerin refilled

## 2024-07-11 NOTE — PROGRESS NOTES
Vascular Cardiology Consultation      HPI:     This is a 54 year old with PMH COVID x 2, antiphospholipid antibody syndrome on chronic warfarin, DVT, with CAD s/p recent PCI to RCA with residual CAD we are monitoring.     He underwent PCI with Dr. Avila and had following performed. LMCA with no significant stenoses, LAD 60%, RCA mid 90%. He underwent PCI to the RCA via iVUS. He was discharged on plavix for 6 months and warfarin with INR goal of 2-2.5.      No bleeding issues. Today he is having chest pain similar to prior event. No associated SOB but occasional palpitations. Also c/o leg numbness with exercise.     Needs SL nitroglycerin refilled.     ASSESSMENT/PLAN:      1. Chest pain: given risk factors of COVID, antiphospholipid antibody syndrome, classic resting angina as well as CT scan demonstrating extensive coronary artery disease we underwent coronary angiogram in 2022 and he is s/p PCI to the rCA and doing well on plavix for 6 months and warfarin with INR goal 2.5-3. Had residual disease. Today having chest pain again, typical symptoms.  -nuclear stress test, low threshold for repeat cor angiogram   -echocardiogram (had moderate concentric hypertrophy - evaluate for LVOT obstruction)    2. Leg numbness:   -check ABIs with exercise    3. HTN: on ACEi    Follow up EL first available      Gloria Bell MD [facility]       The longitudinal plan of care for the diagnosis(es)/condition(s) as documented were addressed during this visit. Due to the added complexity in care, I will continue to support Hollis in the subsequent management and with ongoing continuity of care.        PAST MEDICAL HISTORY  Past Medical History:   Diagnosis Date    Antiphospholipid syndrome (H24)     Arthritis     Asthma     Exercise    blood clot in leg     Depressive disorder, not elsewhere classified     Depression (non-psychotic)    ANKIT (generalised anxiety disorder)     HH (hiatus hernia)      Hypercholesteremia     normal with weight loss 3/09    Lumbar disc herniation 1992    DEDE (obstructive sleep apnea) 12/1/2023    Seronegative rheumatoid arthritis (H)        CURRENT MEDICATIONS  Current Outpatient Medications   Medication Sig Dispense Refill    ARIPiprazole (ABILIFY) 10 MG tablet Take 1 tablet (10 mg) by mouth daily (Patient taking differently: Take 10 mg by mouth at bedtime) 90 tablet 3    DULoxetine (CYMBALTA) 60 MG capsule TAKE TWO CAPSULES BY MOUTH EVERY DAY 60 capsule 5    ferrous sulfate (FEROSUL) 325 (65 Fe) MG tablet TAKE ONE TABLET BY MOUTH ONCE DAILY WITH BREAKFAST 30 tablet 1    gabapentin (NEURONTIN) 800 MG tablet TAKE ONE TABLET BY MOUTH THREE TIMES A DAY 90 tablet 2    lisinopril (ZESTRIL) 5 MG tablet TAKE ONE TABLET BY MOUTH ONCE DAILY 30 tablet 5    oxyCODONE-acetaminophen (PERCOCET) 5-325 MG tablet Take 1 tablet by mouth every 6 hours as needed for severe pain 30 tablet 0    pantoprazole (PROTONIX) 40 MG EC tablet TAKE ONE TABLET BY MOUTH ONCE DAILY 90 tablet 1    rosuvastatin (CRESTOR) 20 MG tablet TAKE ONE TABLET BY MOUTH DAILY (Patient taking differently: Take 20 mg by mouth at bedtime) 90 tablet 3    traZODone (DESYREL) 150 MG tablet TAKE 1 AND 1/2 TABLETS BY MOUTH AT BEDTIME 135 tablet 1    warfarin ANTICOAGULANT (COUMADIN) 5 MG tablet TAKE ONE TABLET BY MOUTH ON MON, WED, AND FRI, AND TAKE 1 AND 1/2 TABLETS ALL OTHER DAYS OR AS DIRECTED 90 tablet 1    warfarin ANTICOAGULANT (COUMADIN) 5 MG tablet Take 5 mg by mouth BEDTIME: Mondays Wednesdays and Fridays (take 7.5mg all other days of the week at BEDTIME)      nitroGLYcerin (NITROSTAT) 0.4 MG sublingual tablet For chest pain place 1 tablet under the tongue every 5 minutes for 3 doses. If symptoms persist 5 minutes after 1st dose call 911. (Patient not taking: Reported on 7/11/2024) 30 tablet 0    reason aspirin not prescribed, intentional, Please choose reason not prescribed from choices below. (Patient not taking: Reported on  7/11/2024)      sucralfate (CARAFATE) 1 GM tablet Take 1 tablet (1 g) by mouth 4 times daily (Patient not taking: Reported on 7/11/2024) 40 tablet 1       PAST SURGICAL HISTORY:  Past Surgical History:   Procedure Laterality Date    APPENDECTOMY      COLONOSCOPY N/A 8/2/2016    Procedure: COMBINED COLONOSCOPY, SINGLE OR MULTIPLE BIOPSY/POLYPECTOMY BY BIOPSY;  Surgeon: Sydnee Walton MD;  Location: [facility]    COLONOSCOPY N/A 5/3/2022    Procedure: COLONOSCOPY;  Surgeon: Jose A Hurt MD;  Location: [facility]    COLONOSCOPY WITH CO2 INSUFFLATION N/A 8/2/2016    Procedure: COLONOSCOPY WITH CO2 INSUFFLATION;  Surgeon: Sydnee Walton MD;  Location: [facility]    COMBINED ESOPHAGOSCOPY, GASTROSCOPY, DUODENOSCOPY (EGD) WITH CO2 INSUFFLATION N/A 8/2/2016    Procedure: COMBINED ESOPHAGOSCOPY, GASTROSCOPY, DUODENOSCOPY (EGD) WITH CO2 INSUFFLATION;  Surgeon: Sydnee Walton MD;  Location: [facility]    COMBINED ESOPHAGOSCOPY, GASTROSCOPY, DUODENOSCOPY (EGD) WITH CO2 INSUFFLATION N/A 5/27/2021    Procedure: ESOPHAGOGASTRODUODENOSCOPY, WITH CO2 INSUFFLATION;  Surgeon: Alis Cotton DO;  Location: [facility]    CV CORONARY ANGIOGRAM N/A 10/11/2022    Procedure: Coronary Angiogram;  Surgeon: Hollis Avila MD;  Location: [facility] CARDIAC CATH LAB    CV INSTANTANEOUS WAVE-FREE RATIO N/A 10/11/2022    Procedure: Instantaneous Wave-Free Ratio;  Surgeon: Hollis Avila MD;  Location: [facility] CARDIAC CATH LAB    CV INTRAVASULAR ULTRASOUND N/A 10/11/2022    Procedure: Intravascular Ultrasound;  Surgeon: Hollis Avila MD;  Location: [facility] CARDIAC CATH LAB    CV PCI ANGIOPLASTY N/A 10/11/2022    Procedure: Percutaneous Transluminal Angioplasty;  Surgeon: Hollis Avila MD;  Location: [facility] CARDIAC CATH LAB    ESOPHAGOSCOPY, GASTROSCOPY, DUODENOSCOPY (EGD), COMBINED N/A 8/2/2016    Procedure: COMBINED ESOPHAGOSCOPY, GASTROSCOPY, DUODENOSCOPY (EGD), BIOPSY SINGLE OR  "MULTIPLE;  Surgeon: Sydnee Walton MD;  Location: [facility]    ESOPHAGOSCOPY, GASTROSCOPY, DUODENOSCOPY (EGD), COMBINED N/A 5/27/2021    Procedure: Esophagogastroduodenoscopy, With Biopsy;  Surgeon: Alis Cotton DO;  Location: [facility]    ESOPHAGOSCOPY, GASTROSCOPY, DUODENOSCOPY (EGD), COMBINED N/A 5/3/2022    Procedure: ESOPHAGOGASTRODUODENOSCOPY, WITH BIOPSY;  Surgeon: Jose A Hurt MD;  Location: [facility]    HC REMOVAL OF TONSILS,<12 Y/O      Tonsils <11y.o.    HC UGI ENDOSCOPY DIAG W BIOPSY  02/01/06    HC VASECTOMY UNILAT/BILAT W POSTOP SEMEN  1/05    Vasectomy    History back lumbar laminectomy      INJECT EPIDURAL LUMBAR Right 4/22/2021    Procedure: Right Lumbar 4-5 and Lumbar 5 - Sacral 1 Epidural Steroid Injection;  Surgeon: Maxwell Zacarias MD;  Location: [facility]    ZZC NONSPECIFIC PROCEDURE  91 or 92    back surgery. lumbar. lamiectomy    ZZHC REPAIR INCISIONAL HERNIA,REDUCIBLE  1970's    Hernia Repair, Incisional, Unilateral       ALLERGIES   No Known Allergies    FAMILY HISTORY  Family History   Problem Relation Age of Onset    Brain Tumor Mother         Benign    Deep Vein Thrombosis Mother         \"neck\"     Heart Disease Father         \"wont tell anyone\"    Neurologic Disorder Paternal Grandmother         Parkinsons     Alcohol/Drug Paternal Grandfather         alcoholic    Cancer Maternal Grandfather         lung    Diabetes Maternal Grandmother     Cerebrovascular Disease Maternal Grandmother         tim age ~70    Arthritis Cousin         cousins x 2 \"RA\"    Musculoskeletal Disorder Maternal Aunt         MS    Family History Negative Other         psoriasis, crohns, UC, SLE       SOCIAL HISTORY  Social History     Socioeconomic History    Marital status:      Spouse name: Cassie    Number of children: 2    Years of education: 15    Highest education level: Not on file   Occupational History    Occupation:      Employer: [employer]   Tobacco Use    Smoking " status: Never     Passive exposure: Never    Smokeless tobacco: Never   Vaping Use    Vaping status: Never Used   Substance and Sexual Activity    Alcohol use: Yes     Comment: rare    Drug use: No    Sexual activity: Yes     Partners: Female   Other Topics Concern     Service No    Blood Transfusions No    Caffeine Concern Yes     Comment: 64 oz q day    Occupational Exposure Not Asked    Hobby Hazards Not Asked    Sleep Concern Yes    Stress Concern No    Weight Concern No    Special Diet Not Asked    Back Care Not Asked    Exercise Yes     Comment: sometimes    Bike Helmet Not Asked    Seat Belt Yes    Self-Exams No    Parent/sibling w/ CABG, MI or angioplasty before 65F 55M? Not Asked   Social History Narrative    4 children healthy     Social Determinants of Health     Financial Resource Strain: Low Risk  (12/1/2023)    Financial Resource Strain     Within the past 12 months, have you or your family members you live with been unable to get utilities (heat, electricity) when it was really needed?: No   Food Insecurity: Low Risk  (12/1/2023)    Food Insecurity     Within the past 12 months, did you worry that your food would run out before you got money to buy more?: No     Within the past 12 months, did the food you bought just not last and you didn t have money to get more?: No   Transportation Needs: Low Risk  (12/1/2023)    Transportation Needs     Within the past 12 months, has lack of transportation kept you from medical appointments, getting your medicines, non-medical meetings or appointments, work, or from getting things that you need?: No   Physical Activity: Not on file   Stress: Not on file   Social Connections: Not on file   Interpersonal Safety: Low Risk  (6/14/2024)    Interpersonal Safety     Do you feel physically and emotionally safe where you currently live?: Yes     Within the past 12 months, have you been hit, slapped, kicked or otherwise physically hurt by someone?: No     Within the  "past 12 months, have you been humiliated or emotionally abused in other ways by your partner or ex-partner?: No   Housing Stability: Low Risk  (12/1/2023)    Housing Stability     Do you have housing? : Yes     Are you worried about losing your housing?: No       ROS:   Constitutional: No fever, chills, or sweats. No weight gain/loss   ENT: No visual disturbance, ear ache, epistaxis, sore throat  Allergies/Immunologic: Negative  Respiratory: No cough, hemoptysia  Cardiovascular: As per HPI  GI: No nausea, vomiting, hematemesis, melena, or hematochezia  : No urinary frequency, dysuria, or hematuria  Integument: Negative  Psychiatric: Negative  Neuro: Negative  Endocrinology: Negative   Musculoskeletal: Negative  Vascular: No walking impairment, claudication, ischemic rest pain or nonhealing wounds    EXAM:  /88 (BP Location: Right arm, Patient Position: Sitting, Cuff Size: Adult Large)   Pulse 81   Resp 16   Ht 1.778 m (5' 10\")   Wt 109.3 kg (241 lb)   SpO2 97%   BMI 34.58 kg/m    In general, the patient is a pleasant male in no apparent distress.    HEENT: NC/AT.  PERRLA.  EOMI.  Sclerae white, not injected.  Nares clear.  Pharynx without erythema or exudate.  Dentition intact.    Neck: No adenopathy.  No thyromegaly. Carotids +2/2 bilaterally without bruits.  No jugular venous distension.   Heart: RRR. Normal S1, S2 splits physiologically. No murmur, rub, click, or gallop. The PMI is in the 5th ICS in the midclavicular line. There is no heave.    Lungs: CTA.  No ronchi, wheezes, rales.  No dullness to percussion.   Abdomen: Soft, nontender, nondistended. No organomegaly. No AAA.  No bruits.   Extremities: No clubbing, cyanosis, or edema.  No wounds. No varicose veins signs of chronic venous insufficiency.   Vascular: No bruits are noted.        Labs:  LIPID RESULTS:  Lab Results   Component Value Date    CHOL 112 12/01/2023    CHOL 161 06/23/2020    HDL 55 12/01/2023    HDL 38 (L) 06/23/2020    LDL 46 " 12/01/2023     (H) 06/23/2020    TRIG 54 12/01/2023    TRIG 92 06/23/2020    CHOLHDLRATIO 5.0 07/11/2014    NHDL 57 12/01/2023    NHDL 123 06/23/2020       LIVER ENZYME RESULTS:  Lab Results   Component Value Date    AST 27 12/01/2023    AST 14 06/22/2020    ALT 25 12/01/2023    ALT 22 06/22/2020       CBC RESULTS:  Lab Results   Component Value Date    WBC 6.3 01/28/2024    WBC 10.1 05/12/2021    RBC 5.34 01/28/2024    RBC 5.04 05/12/2021    HGB 12.9 (L) 01/28/2024    HGB 11.5 (L) 05/12/2021    HCT 41.4 01/28/2024    HCT 38.7 (L) 05/12/2021    MCV 78 01/28/2024    MCV 77 (L) 05/12/2021    MCH 24.2 (L) 01/28/2024    MCH 22.8 (L) 05/12/2021    MCHC 31.2 (L) 01/28/2024    MCHC 29.7 (L) 05/12/2021    RDW 18.7 (H) 01/28/2024    RDW 18.0 (H) 05/12/2021     01/28/2024     05/12/2021       BMP RESULTS:  Lab Results   Component Value Date     01/28/2024     05/12/2021    POTASSIUM 4.0 01/28/2024    POTASSIUM 3.6 12/28/2022    POTASSIUM 3.4 05/12/2021    CHLORIDE 109 (H) 01/28/2024    CHLORIDE 106 12/28/2022    CHLORIDE 109 05/12/2021    CO2 23 01/28/2024    CO2 29 12/28/2022    CO2 30 05/12/2021    ANIONGAP 8 01/28/2024    ANIONGAP 6 12/28/2022    ANIONGAP 4 05/12/2021     (H) 01/28/2024     (H) 12/28/2022    GLC 82 05/12/2021    BUN 6.9 01/28/2024    BUN 9 12/28/2022    BUN 11 05/12/2021    CR 0.92 01/28/2024    CR 1.18 05/12/2021    GFRESTIMATED >90 01/28/2024    GFRESTIMATED 71 05/12/2021    GFRESTBLACK 82 05/12/2021    JANEE 8.6 01/28/2024    JANEE 8.3 (L) 05/12/2021        A1C RESULTS:  Lab Results   Component Value Date    A1C 5.3 10/06/2017

## 2024-07-11 NOTE — LETTER
7/11/2024    Christiano Ledezma MD  [address] [place] [address]    RE: Hollis Diggs       Dear Colleague,     I had the pleasure of seeing Hollis Diggs in the [facility].           Vascular Cardiology Consultation      HPI:     This is a 54 year old with PMH COVID x 2, antiphospholipid antibody syndrome on chronic warfarin, DVT, with CAD s/p recent PCI to RCA with residual CAD we are monitoring.     He underwent PCI with Dr. Avila and had following performed. LMCA with no significant stenoses, LAD 60%, RCA mid 90%. He underwent PCI to the RCA via iVUS. He was discharged on plavix for 6 months and warfarin with INR goal of 2-2.5.      No bleeding issues. Today he is having chest pain similar to prior event. No associated SOB but occasional palpitations. Also c/o leg numbness with exercise.     Needs SL nitroglycerin refilled.     ASSESSMENT/PLAN:      1. Chest pain: given risk factors of COVID, antiphospholipid antibody syndrome, classic resting angina as well as CT scan demonstrating extensive coronary artery disease we underwent coronary angiogram in 2022 and he is s/p PCI to the rCA and doing well on plavix for 6 months and warfarin with INR goal 2.5-3. Had residual disease. Today having chest pain again, typical symptoms.  -nuclear stress test, low threshold for repeat cor angiogram   -echocardiogram (had moderate concentric hypertrophy - evaluate for LVOT obstruction)    2. Leg numbness:   -check ABIs with exercise    3. HTN: on ACEi     Gloria Bell MD MSC  [facility]           PAST MEDICAL HISTORY  Past Medical History:   Diagnosis Date    Antiphospholipid syndrome (H24)     Arthritis     Asthma     Exercise    blood clot in leg     Depressive disorder, not elsewhere classified     Depression (non-psychotic)    ANKIT (generalised anxiety disorder)     HH (hiatus hernia)     Hypercholesteremia     normal with weight loss 3/09    Lumbar disc herniation 1992    DEDE  (obstructive sleep apnea) 12/1/2023    Seronegative rheumatoid arthritis (H)        CURRENT MEDICATIONS  Current Outpatient Medications   Medication Sig Dispense Refill    ARIPiprazole (ABILIFY) 10 MG tablet Take 1 tablet (10 mg) by mouth daily (Patient taking differently: Take 10 mg by mouth at bedtime) 90 tablet 3    DULoxetine (CYMBALTA) 60 MG capsule TAKE TWO CAPSULES BY MOUTH EVERY DAY 60 capsule 5    ferrous sulfate (FEROSUL) 325 (65 Fe) MG tablet TAKE ONE TABLET BY MOUTH ONCE DAILY WITH BREAKFAST 30 tablet 1    gabapentin (NEURONTIN) 800 MG tablet TAKE ONE TABLET BY MOUTH THREE TIMES A DAY 90 tablet 2    lisinopril (ZESTRIL) 5 MG tablet TAKE ONE TABLET BY MOUTH ONCE DAILY 30 tablet 5    oxyCODONE-acetaminophen (PERCOCET) 5-325 MG tablet Take 1 tablet by mouth every 6 hours as needed for severe pain 30 tablet 0    pantoprazole (PROTONIX) 40 MG EC tablet TAKE ONE TABLET BY MOUTH ONCE DAILY 90 tablet 1    rosuvastatin (CRESTOR) 20 MG tablet TAKE ONE TABLET BY MOUTH DAILY (Patient taking differently: Take 20 mg by mouth at bedtime) 90 tablet 3    traZODone (DESYREL) 150 MG tablet TAKE 1 AND 1/2 TABLETS BY MOUTH AT BEDTIME 135 tablet 1    warfarin ANTICOAGULANT (COUMADIN) 5 MG tablet TAKE ONE TABLET BY MOUTH ON MON, WED, AND FRI, AND TAKE 1 AND 1/2 TABLETS ALL OTHER DAYS OR AS DIRECTED 90 tablet 1    warfarin ANTICOAGULANT (COUMADIN) 5 MG tablet Take 5 mg by mouth BEDTIME: Mondays Wednesdays and Fridays (take 7.5mg all other days of the week at BEDTIME)      nitroGLYcerin (NITROSTAT) 0.4 MG sublingual tablet For chest pain place 1 tablet under the tongue every 5 minutes for 3 doses. If symptoms persist 5 minutes after 1st dose call 911. (Patient not taking: Reported on 7/11/2024) 30 tablet 0    reason aspirin not prescribed, intentional, Please choose reason not prescribed from choices below. (Patient not taking: Reported on 7/11/2024)      sucralfate (CARAFATE) 1 GM tablet Take 1 tablet (1 g) by mouth 4 times  daily (Patient not taking: Reported on 7/11/2024) 40 tablet 1       PAST SURGICAL HISTORY:  Past Surgical History:   Procedure Laterality Date    APPENDECTOMY      COLONOSCOPY N/A 8/2/2016    Procedure: COMBINED COLONOSCOPY, SINGLE OR MULTIPLE BIOPSY/POLYPECTOMY BY BIOPSY;  Surgeon: Sydnee Walton MD;  Location: [facility]    COLONOSCOPY N/A 5/3/2022    Procedure: COLONOSCOPY;  Surgeon: Jose A Hurt MD;  Location: [facility]    COLONOSCOPY WITH CO2 INSUFFLATION N/A 8/2/2016    Procedure: COLONOSCOPY WITH CO2 INSUFFLATION;  Surgeon: Sydnee Walton MD;  Location: [facility]    COMBINED ESOPHAGOSCOPY, GASTROSCOPY, DUODENOSCOPY (EGD) WITH CO2 INSUFFLATION N/A 8/2/2016    Procedure: COMBINED ESOPHAGOSCOPY, GASTROSCOPY, DUODENOSCOPY (EGD) WITH CO2 INSUFFLATION;  Surgeon: Sydnee Walton MD;  Location: [facility]    COMBINED ESOPHAGOSCOPY, GASTROSCOPY, DUODENOSCOPY (EGD) WITH CO2 INSUFFLATION N/A 5/27/2021    Procedure: ESOPHAGOGASTRODUODENOSCOPY, WITH CO2 INSUFFLATION;  Surgeon: Alis Cotton DO;  Location: [facility]    CV CORONARY ANGIOGRAM N/A 10/11/2022    Procedure: Coronary Angiogram;  Surgeon: Hollis Avila MD;  Location: [facility] CARDIAC CATH LAB    CV INSTANTANEOUS WAVE-FREE RATIO N/A 10/11/2022    Procedure: Instantaneous Wave-Free Ratio;  Surgeon: Hollis Avila MD;  Location: [facility] CARDIAC CATH LAB    CV INTRAVASULAR ULTRASOUND N/A 10/11/2022    Procedure: Intravascular Ultrasound;  Surgeon: Hollis Avila MD;  Location: [facility] CARDIAC CATH LAB    CV PCI ANGIOPLASTY N/A 10/11/2022    Procedure: Percutaneous Transluminal Angioplasty;  Surgeon: Hollis Avila MD;  Location: [facility] CARDIAC CATH LAB    ESOPHAGOSCOPY, GASTROSCOPY, DUODENOSCOPY (EGD), COMBINED N/A 8/2/2016    Procedure: COMBINED ESOPHAGOSCOPY, GASTROSCOPY, DUODENOSCOPY (EGD), BIOPSY SINGLE OR MULTIPLE;  Surgeon: Sydnee Walton MD;  Location: [facility]    ESOPHAGOSCOPY,  "GASTROSCOPY, DUODENOSCOPY (EGD), COMBINED N/A 5/27/2021    Procedure: Esophagogastroduodenoscopy, With Biopsy;  Surgeon: Alis Cotton DO;  Location: [facility]    ESOPHAGOSCOPY, GASTROSCOPY, DUODENOSCOPY (EGD), COMBINED N/A 5/3/2022    Procedure: ESOPHAGOGASTRODUODENOSCOPY, WITH BIOPSY;  Surgeon: Jose A Hurt MD;  Location: [facility]    HC REMOVAL OF TONSILS,<13 Y/O      Tonsils <11y.o.    HC UGI ENDOSCOPY DIAG W BIOPSY  02/01/06    HC VASECTOMY UNILAT/BILAT W POSTOP SEMEN  1/05    Vasectomy    History back lumbar laminectomy      INJECT EPIDURAL LUMBAR Right 4/22/2021    Procedure: Right Lumbar 4-5 and Lumbar 5 - Sacral 1 Epidural Steroid Injection;  Surgeon: Maxwell Zacarias MD;  Location: [facility]    ZZC NONSPECIFIC PROCEDURE  91 or 92    back surgery. lumbar. lamiectomy    ZZHC REPAIR INCISIONAL HERNIA,REDUCIBLE  1970's    Hernia Repair, Incisional, Unilateral       ALLERGIES   No Known Allergies    FAMILY HISTORY  Family History   Problem Relation Age of Onset    Brain Tumor Mother         Benign    Deep Vein Thrombosis Mother         \"neck\"     Heart Disease Father         \"wont tell anyone\"    Neurologic Disorder Paternal Grandmother         Parkinsons     Alcohol/Drug Paternal Grandfather         alcoholic    Cancer Maternal Grandfather         lung    Diabetes Maternal Grandmother     Cerebrovascular Disease Maternal Grandmother         tim age ~70    Arthritis Cousin         cousins x 2 \"RA\"    Musculoskeletal Disorder Maternal Aunt         MS    Family History Negative Other         psoriasis, crohns, UC, SLE       SOCIAL HISTORY  Social History     Socioeconomic History    Marital status:      Spouse name: Cassie    Number of children: 2    Years of education: 15    Highest education level: Not on file   Occupational History    Occupation:      Employer: [employer]   Tobacco Use    Smoking status: Never     Passive exposure: Never    Smokeless tobacco: Never   Vaping Use    " Vaping status: Never Used   Substance and Sexual Activity    Alcohol use: Yes     Comment: rare    Drug use: No    Sexual activity: Yes     Partners: Female   Other Topics Concern     Service No    Blood Transfusions No    Caffeine Concern Yes     Comment: 64 oz q day    Occupational Exposure Not Asked    Hobby Hazards Not Asked    Sleep Concern Yes    Stress Concern No    Weight Concern No    Special Diet Not Asked    Back Care Not Asked    Exercise Yes     Comment: sometimes    Bike Helmet Not Asked    Seat Belt Yes    Self-Exams No    Parent/sibling w/ CABG, MI or angioplasty before 65F 55M? Not Asked   Social History Narrative    4 children healthy     Social Determinants of Health     Financial Resource Strain: Low Risk  (12/1/2023)    Financial Resource Strain     Within the past 12 months, have you or your family members you live with been unable to get utilities (heat, electricity) when it was really needed?: No   Food Insecurity: Low Risk  (12/1/2023)    Food Insecurity     Within the past 12 months, did you worry that your food would run out before you got money to buy more?: No     Within the past 12 months, did the food you bought just not last and you didn t have money to get more?: No   Transportation Needs: Low Risk  (12/1/2023)    Transportation Needs     Within the past 12 months, has lack of transportation kept you from medical appointments, getting your medicines, non-medical meetings or appointments, work, or from getting things that you need?: No   Physical Activity: Not on file   Stress: Not on file   Social Connections: Not on file   Interpersonal Safety: Low Risk  (6/14/2024)    Interpersonal Safety     Do you feel physically and emotionally safe where you currently live?: Yes     Within the past 12 months, have you been hit, slapped, kicked or otherwise physically hurt by someone?: No     Within the past 12 months, have you been humiliated or emotionally abused in other ways by your  "partner or ex-partner?: No   Housing Stability: Low Risk  (12/1/2023)    Housing Stability     Do you have housing? : Yes     Are you worried about losing your housing?: No       ROS:   Constitutional: No fever, chills, or sweats. No weight gain/loss   ENT: No visual disturbance, ear ache, epistaxis, sore throat  Allergies/Immunologic: Negative  Respiratory: No cough, hemoptysia  Cardiovascular: As per HPI  GI: No nausea, vomiting, hematemesis, melena, or hematochezia  : No urinary frequency, dysuria, or hematuria  Integument: Negative  Psychiatric: Negative  Neuro: Negative  Endocrinology: Negative   Musculoskeletal: Negative  Vascular: No walking impairment, claudication, ischemic rest pain or nonhealing wounds    EXAM:  /88 (BP Location: Right arm, Patient Position: Sitting, Cuff Size: Adult Large)   Pulse 81   Resp 16   Ht 1.778 m (5' 10\")   Wt 109.3 kg (241 lb)   SpO2 97%   BMI 34.58 kg/m    In general, the patient is a pleasant male in no apparent distress.    HEENT: NC/AT.  PERRLA.  EOMI.  Sclerae white, not injected.  Nares clear.  Pharynx without erythema or exudate.  Dentition intact.    Neck: No adenopathy.  No thyromegaly. Carotids +2/2 bilaterally without bruits.  No jugular venous distension.   Heart: RRR. Normal S1, S2 splits physiologically. No murmur, rub, click, or gallop. The PMI is in the 5th ICS in the midclavicular line. There is no heave.    Lungs: CTA.  No ronchi, wheezes, rales.  No dullness to percussion.   Abdomen: Soft, nontender, nondistended. No organomegaly. No AAA.  No bruits.   Extremities: No clubbing, cyanosis, or edema.  No wounds. No varicose veins signs of chronic venous insufficiency.   Vascular: No bruits are noted.        Labs:  LIPID RESULTS:  Lab Results   Component Value Date    CHOL 112 12/01/2023    CHOL 161 06/23/2020    HDL 55 12/01/2023    HDL 38 (L) 06/23/2020    LDL 46 12/01/2023     (H) 06/23/2020    TRIG 54 12/01/2023    TRIG 92 06/23/2020    " CHOLHDLRATIO 5.0 07/11/2014    NHDL 57 12/01/2023    NHDL 123 06/23/2020       LIVER ENZYME RESULTS:  Lab Results   Component Value Date    AST 27 12/01/2023    AST 14 06/22/2020    ALT 25 12/01/2023    ALT 22 06/22/2020       CBC RESULTS:  Lab Results   Component Value Date    WBC 6.3 01/28/2024    WBC 10.1 05/12/2021    RBC 5.34 01/28/2024    RBC 5.04 05/12/2021    HGB 12.9 (L) 01/28/2024    HGB 11.5 (L) 05/12/2021    HCT 41.4 01/28/2024    HCT 38.7 (L) 05/12/2021    MCV 78 01/28/2024    MCV 77 (L) 05/12/2021    MCH 24.2 (L) 01/28/2024    MCH 22.8 (L) 05/12/2021    MCHC 31.2 (L) 01/28/2024    MCHC 29.7 (L) 05/12/2021    RDW 18.7 (H) 01/28/2024    RDW 18.0 (H) 05/12/2021     01/28/2024     05/12/2021       BMP RESULTS:  Lab Results   Component Value Date     01/28/2024     05/12/2021    POTASSIUM 4.0 01/28/2024    POTASSIUM 3.6 12/28/2022    POTASSIUM 3.4 05/12/2021    CHLORIDE 109 (H) 01/28/2024    CHLORIDE 106 12/28/2022    CHLORIDE 109 05/12/2021    CO2 23 01/28/2024    CO2 29 12/28/2022    CO2 30 05/12/2021    ANIONGAP 8 01/28/2024    ANIONGAP 6 12/28/2022    ANIONGAP 4 05/12/2021     (H) 01/28/2024     (H) 12/28/2022    GLC 82 05/12/2021    BUN 6.9 01/28/2024    BUN 9 12/28/2022    BUN 11 05/12/2021    CR 0.92 01/28/2024    CR 1.18 05/12/2021    GFRESTIMATED >90 01/28/2024    GFRESTIMATED 71 05/12/2021    GFRESTBLACK 82 05/12/2021    JANEE 8.6 01/28/2024    JANEE 8.3 (L) 05/12/2021        A1C RESULTS:  Lab Results   Component Value Date    A1C 5.3 10/06/2017         Thank you for allowing me to participate in the care of your patient.      Sincerely,     Gloria Bell MD     [facility]  cc:   Referred Self,

## 2024-07-14 DIAGNOSIS — F33.0 MAJOR DEPRESSIVE DISORDER, RECURRENT EPISODE, MILD (H): ICD-10-CM

## 2024-07-16 ENCOUNTER — TELEPHONE (OUTPATIENT)
Dept: CARDIOLOGY | Facility: CLINIC | Age: 54
End: 2024-07-16

## 2024-07-16 ENCOUNTER — HOSPITAL ENCOUNTER (OUTPATIENT)
Dept: CARDIOLOGY | Facility: CLINIC | Age: 54
Discharge: HOME | End: 2024-07-16
Attending: INTERNAL MEDICINE
Payer: COMMERCIAL

## 2024-07-16 ENCOUNTER — HOSPITAL ENCOUNTER (OUTPATIENT)
Dept: NUCLEAR MEDICINE | Facility: CLINIC | Age: 54
Setting detail: NUCLEAR MEDICINE
Discharge: HOME | End: 2024-07-16
Attending: INTERNAL MEDICINE
Payer: COMMERCIAL

## 2024-07-16 DIAGNOSIS — I25.84 CORONARY ATHEROSCLEROSIS DUE TO CALCIFIED CORONARY LESION OF NATIVE ARTERY: ICD-10-CM

## 2024-07-16 DIAGNOSIS — I25.10 CORONARY ATHEROSCLEROSIS DUE TO CALCIFIED CORONARY LESION OF NATIVE ARTERY: ICD-10-CM

## 2024-07-16 DIAGNOSIS — R94.39 ABNORMAL CARDIOVASCULAR STRESS TEST: Primary | ICD-10-CM

## 2024-07-16 LAB
CV STRESS MAX HR HE: 90
NUC STRESS EJECTION FRACTION: 64 %
RATE PRESSURE PRODUCT: NORMAL
STRESS ECHO BASELINE DIASTOLIC HE: 78
STRESS ECHO BASELINE HR: 72 BPM
STRESS ECHO BASELINE SYSTOLIC BP: 132
STRESS ECHO CALCULATED PERCENT HR: 55 %
STRESS ECHO LAST STRESS DIASTOLIC BP: 78
STRESS ECHO LAST STRESS SYSTOLIC BP: 128
STRESS ECHO TARGET HR: 165

## 2024-07-16 PROCEDURE — 78452 HT MUSCLE IMAGE SPECT MULT: CPT

## 2024-07-16 PROCEDURE — 93018 CV STRESS TEST I&R ONLY: CPT | Performed by: INTERNAL MEDICINE

## 2024-07-16 PROCEDURE — 78452 HT MUSCLE IMAGE SPECT MULT: CPT | Mod: 26 | Performed by: INTERNAL MEDICINE

## 2024-07-16 PROCEDURE — 343N000001 HC RX 343: Performed by: INTERNAL MEDICINE

## 2024-07-16 PROCEDURE — 250N000011 HC RX IP 250 OP 636: Performed by: INTERNAL MEDICINE

## 2024-07-16 PROCEDURE — 93017 CV STRESS TEST TRACING ONLY: CPT

## 2024-07-16 PROCEDURE — A9502 TC99M TETROFOSMIN: HCPCS | Performed by: INTERNAL MEDICINE

## 2024-07-16 PROCEDURE — 93016 CV STRESS TEST SUPVJ ONLY: CPT | Performed by: INTERNAL MEDICINE

## 2024-07-16 RX ORDER — REGADENOSON 0.08 MG/ML
0.4 INJECTION, SOLUTION INTRAVENOUS ONCE
Status: COMPLETED | OUTPATIENT
Start: 2024-07-16 | End: 2024-07-16

## 2024-07-16 RX ADMIN — TETROFOSMIN 37.3 MILLICURIE: 1.38 INJECTION, POWDER, LYOPHILIZED, FOR SOLUTION INTRAVENOUS at 14:30

## 2024-07-16 RX ADMIN — REGADENOSON 0.4 MG: 0.08 INJECTION, SOLUTION INTRAVENOUS at 14:39

## 2024-07-16 RX ADMIN — TETROFOSMIN 11.4 MILLICURIE: 1.38 INJECTION, POWDER, LYOPHILIZED, FOR SOLUTION INTRAVENOUS at 13:25

## 2024-07-16 NOTE — LETTER
July 19, 2024    RE:  Hollis Diggs 1970  [address] [facility] [id number]          To Huy Valdez,    This letter is to certify that Hollis Diggs is able to return to work on 7/22/24 with the following restrictions:    Patient will not be able to lift anything greater than 10lbs until futher notice.    Should you have any questions, please call [phone].    Sincerely,        [facility]

## 2024-07-16 NOTE — TELEPHONE ENCOUNTER
Pt had lexiscan done 7/16/24  Result Text       The nuclear stress test is abnormal.    There is nontransmural infarction in the inferior and inferoseptal segment(s) of the left ventricle.    There is a small area of infarction in the distal apical and anteroseptal segment(s) of the left ventricle.    Left ventricular function is normal.    The left ventricular ejection fraction at stress is 64%.    A prior study was conducted on 2/17/2022.  This study has changes noted when compared with the prior study.       Last saw Dr. Bell 7/11/24   1. Chest pain: given risk factors of COVID, antiphospholipid antibody syndrome, classic resting angina as well as CT scan demonstrating extensive coronary artery disease we underwent coronary angiogram in 2022 and he is s/p PCI to the rCA and doing well on plavix for 6 months and warfarin with INR goal 2.5-3. Had residual disease. Today having chest pain again, typical symptoms.  -nuclear stress test, low threshold for repeat cor angiogram   -echocardiogram (had moderate concentric hypertrophy - evaluate for LVOT obstruction)    Pt has echo scheduled 8/14/24      Will route to Dr. Bell to review

## 2024-07-17 RX ORDER — TRAZODONE HYDROCHLORIDE 150 MG/1
TABLET ORAL
Qty: 135 TABLET | Refills: 1 | Status: SHIPPED | OUTPATIENT
Start: 2024-07-17

## 2024-07-19 NOTE — TELEPHONE ENCOUNTER
RN called patient and left VM requesting callback to discuss Dr. Bell's recommendations.           I had recommended possible coronary angiogram if any changes. Is there someone who can set this up? Make sure patient ok with this plan?    Dr. Bell

## 2024-07-19 NOTE — TELEPHONE ENCOUNTER
Patient returned call and left detailed VM with work restrictions. RN drafted letter and sent to patient via [employer].

## 2024-07-19 NOTE — TELEPHONE ENCOUNTER
Patient returned call and RN reviewed with him Dr. Bell's recommendations and stress test results. Patient verbalized understanding and is in agreement with plan. RN placed orders for EL visit to discuss risks and benefits as well as cath order. Patient aware to minimize strenuous activity until after cath procedure completed. Patient will call us prior to cath date should he have any worsening symptoms of chest pain/pressure or shortness of breath.

## 2024-07-19 NOTE — TELEPHONE ENCOUNTER
Patient returned call and requested letter for work with lifting restrictions. RN returned patient's call and requested call back to discuss requested restrictions for letter.

## 2024-07-23 ENCOUNTER — LAB (OUTPATIENT)
Dept: LAB | Facility: CLINIC | Age: 54
End: 2024-07-23
Payer: COMMERCIAL

## 2024-07-23 ENCOUNTER — ANTICOAGULATION THERAPY VISIT (OUTPATIENT)
Dept: ANTICOAGULATION | Facility: CLINIC | Age: 54
End: 2024-07-23

## 2024-07-23 ENCOUNTER — TELEPHONE (OUTPATIENT)
Dept: ANTICOAGULATION | Facility: CLINIC | Age: 54
End: 2024-07-23

## 2024-07-23 DIAGNOSIS — D68.61 ANTIPHOSPHOLIPID SYNDROME (H): ICD-10-CM

## 2024-07-23 DIAGNOSIS — D68.61 ANTIPHOSPHOLIPID SYNDROME (H): Primary | ICD-10-CM

## 2024-07-23 DIAGNOSIS — Z79.01 LONG TERM CURRENT USE OF ANTICOAGULANT THERAPY: ICD-10-CM

## 2024-07-23 DIAGNOSIS — I82.4Y9 DEEP VEIN THROMBOSIS (DVT) OF PROXIMAL LOWER EXTREMITY, UNSPECIFIED CHRONICITY, UNSPECIFIED LATERALITY (H): ICD-10-CM

## 2024-07-23 DIAGNOSIS — Z79.01 LONG TERM CURRENT USE OF ANTICOAGULANT THERAPY: Primary | ICD-10-CM

## 2024-07-23 LAB — INR BLD: 2.8 (ref 0.9–1.1)

## 2024-07-23 PROCEDURE — 85610 PROTHROMBIN TIME: CPT

## 2024-07-23 PROCEDURE — 36416 COLLJ CAPILLARY BLOOD SPEC: CPT

## 2024-07-23 NOTE — PROGRESS NOTES
ANTICOAGULATION MANAGEMENT     Hollis Diggs 54 year old male is on warfarin with therapeutic INR result. (Goal INR 2.0-3.0)    Recent labs: (last 7 days)     07/23/24  1449   INR 2.8*       ASSESSMENT     Source(s): Chart Review and Patient/Caregiver Call     Warfarin doses taken: Warfarin taken as instructed  Diet: No new diet changes identified  Medication/supplement changes: None noted  New illness, injury, or hospitalization: No  Signs or symptoms of bleeding or clotting: No  Previous result: Therapeutic last 2(+) visits  Additional findings: Upcoming surgery/procedure coronary angiogram 8/7       PLAN     Recommended plan for no diet, medication or health factor changes affecting INR     Dosing Instructions: Continue your current warfarin dose with next INR in 6 weeks- will discuss warfarin with angiogram with [facility]       Summary  As of 7/23/2024      Full warfarin instructions:  5 mg every Mon, Wed, Fri; 7.5 mg all other days   Next INR check:  9/3/2024               Telephone call with Hollis who verbalizes understanding and agrees to plan and who agrees to plan and repeated back plan correctly    Lab visit scheduled    Education provided: Importance of notifying anticoagulation clinic for: upcoming surgeries and procedures 2 weeks in advance    Plan made per [facility] anticoagulation protocol    Анна Hale RN  Anticoagulation Clinic  7/23/2024    _______________________________________________________________________     Anticoagulation Episode Summary       Current INR goal:  2.0-3.0   TTR:  75.6% (1 y)   Target end date:  Indefinite   Send INR reminders to:  [facility]    Indications    DVT (deep venous thrombosis) (H) (Resolved) [I82.409]  Long-term (current) use of anticoagulants [Z79.01] [Z79.01]  Deep vein thrombosis (DVT) of proximal lower extremity  unspecified chronicity  unspecified laterality (H) [I82.4Y9]  Antiphospholipid syndrome (H24) [D68.61]             Comments:                Anticoagulation Care Providers       Provider Role Specialty Phone number    Sylvester Cash MD Referring Family Practice     Christiano Ledezma MD Referring Family Medicine [phone]

## 2024-07-23 NOTE — TELEPHONE ENCOUNTER
[facility]-PROCEDURAL ANTICOAGULATION  MANAGEMENT    Hollis requesting pre-procedure hold orders for warfarin and review for bridging      Procedure date: 8/7/24       Procedure:  Coronary Angiogram      Procedure location and phone number (if external): [place]     Number of warfarin hold days requested and/or target INR: 4 days ?    Pre-op date:  unk      Routing to Anticoagulation Pharmacist for review.      Анна Hale RN

## 2024-07-24 NOTE — TELEPHONE ENCOUNTER
"[facility]-PROCEDURAL ANTICOAGULATION  MANAGEMENT    ASSESSMENT     Warfarin interruption plan for coronary angiogram on 2024.    Indication for Anticoagulation: DVT and Lupus Anticoagulant    DVT 10/2015   LAC+ 2015 & 2016    [facility]-Procedure Risk stratification for thromboembolism: high ( Chest guidelines)    HIGH RISK:  CHEST Perioperative Management guidelines suggests bridging patients at high risk for thromboembolism when interrupting warfarin for an elective surgery/procedure    RECOMMENDATION     Routing to cardiolgy to clarify goal INR or days hold needed for upcoming procedure , will set for bridge if needs hold per below:    Pre-Procedure:  Hold warfarin for 4 days, until after procedure startin2024   Enoxaparin (Lovenox) 120 mg subq Q 12 hrs (1 mg/kg Q 12 hrs for CrCl >= 60 ml/min and BMI <= 40 kg/m2)   Start enoxaparin: 2024 AM  Last dose of enoxaparin prior to procedure: 2024 AM  (~24 hours prior to procedure)    Post-Procedure:  Resume warfarin dose if okay with provider doing procedure on night of procedure, 2024 PM: 7.5mg  Resume enoxaparin (Lovenox) ~ 24 hrs post procedure when okay with provider doing procedure. Continue until INR >= 2.0  Recheck INR ~4-5 days after resuming warfarin   ?     Plan routed to referring provider for approval  ?   Teresita Cash [facility]    SUBJECTIVE/OBJECTIVE     Hollis Diggs, a 54 year old male    Goal INR Range: 2.0-3.0     Patient bridged in past: Yes: 2022 for colonoscopy    Wt Readings from Last 3 Encounters:   24 109.3 kg (241 lb)   24 109.8 kg (242 lb)   24 117.5 kg (259 lb)      Ideal body weight: 73 kg (160 lb 15 oz)  Adjusted ideal body weight: 87.5 kg (192 lb 15.4 oz)     Estimated body mass index is 34.58 kg/m  as calculated from the following:    Height as of 24: 1.778 m (5' 10\").    Weight as of 24: 109.3 kg (241 lb).    Lab Results   Component Value Date    INR 2.8 (H) 2024 "    INR 2.7 (H) 06/05/2024    INR 2.7 (H) 05/01/2024     Lab Results   Component Value Date    HGB 12.9 (L) 01/28/2024    HCT 41.4 01/28/2024     01/28/2024     Lab Results   Component Value Date    CR 0.92 01/28/2024    CR 1.06 12/01/2023    CR 0.95 03/30/2023     Estimated Creatinine Clearance: 112.3 mL/min (based on SCr of 0.92 mg/dL).

## 2024-07-25 ENCOUNTER — HOSPITAL ENCOUNTER (EMERGENCY)
Facility: CLINIC | Age: 54
Discharge: HOME | End: 2024-07-25
Attending: FAMILY MEDICINE | Admitting: FAMILY MEDICINE
Payer: COMMERCIAL

## 2024-07-25 ENCOUNTER — APPOINTMENT (OUTPATIENT)
Dept: GENERAL RADIOLOGY | Facility: CLINIC | Age: 54
End: 2024-07-25
Attending: FAMILY MEDICINE
Payer: COMMERCIAL

## 2024-07-25 ENCOUNTER — APPOINTMENT (OUTPATIENT)
Dept: ULTRASOUND IMAGING | Facility: CLINIC | Age: 54
End: 2024-07-25
Attending: FAMILY MEDICINE
Payer: COMMERCIAL

## 2024-07-25 VITALS
BODY MASS INDEX: 35.83 KG/M2 | RESPIRATION RATE: 17 BRPM | TEMPERATURE: 97.8 F | DIASTOLIC BLOOD PRESSURE: 59 MMHG | WEIGHT: 249.7 LBS | OXYGEN SATURATION: 96 % | SYSTOLIC BLOOD PRESSURE: 122 MMHG | HEART RATE: 68 BPM

## 2024-07-25 DIAGNOSIS — K44.9 HIATAL HERNIA: ICD-10-CM

## 2024-07-25 DIAGNOSIS — K21.00 GASTROESOPHAGEAL REFLUX DISEASE WITH ESOPHAGITIS WITHOUT HEMORRHAGE: ICD-10-CM

## 2024-07-25 LAB
ALBUMIN SERPL BCG-MCNC: 4.2 G/DL (ref 3.5–5.2)
ALP SERPL-CCNC: 74 U/L (ref 40–150)
ALT SERPL W P-5'-P-CCNC: 23 U/L (ref 0–70)
ANION GAP SERPL CALCULATED.3IONS-SCNC: 12 MMOL/L (ref 7–15)
AST SERPL W P-5'-P-CCNC: 24 U/L (ref 0–45)
BASOPHILS # BLD AUTO: 0 10E3/UL (ref 0–0.2)
BASOPHILS NFR BLD AUTO: 0 %
BILIRUB SERPL-MCNC: 0.4 MG/DL
BUN SERPL-MCNC: 6.2 MG/DL (ref 6–20)
CALCIUM SERPL-MCNC: 9.2 MG/DL (ref 8.8–10.4)
CHLORIDE SERPL-SCNC: 105 MMOL/L (ref 98–107)
CREAT SERPL-MCNC: 0.98 MG/DL (ref 0.67–1.17)
CRP SERPL-MCNC: <3 MG/L
D DIMER PPP FEU-MCNC: <0.27 UG/ML FEU (ref 0–0.5)
EGFRCR SERPLBLD CKD-EPI 2021: >90 ML/MIN/1.73M2
EOSINOPHIL # BLD AUTO: 0.1 10E3/UL (ref 0–0.7)
EOSINOPHIL NFR BLD AUTO: 3 %
ERYTHROCYTE [DISTWIDTH] IN BLOOD BY AUTOMATED COUNT: 13 % (ref 10–15)
GLUCOSE SERPL-MCNC: 102 MG/DL (ref 70–99)
HCO3 SERPL-SCNC: 25 MMOL/L (ref 22–29)
HCT VFR BLD AUTO: 43.3 % (ref 40–53)
HGB BLD-MCNC: 14.2 G/DL (ref 13.3–17.7)
IMM GRANULOCYTES # BLD: 0 10E3/UL
IMM GRANULOCYTES NFR BLD: 0 %
LIPASE SERPL-CCNC: 154 U/L (ref 13–60)
LYMPHOCYTES # BLD AUTO: 1.3 10E3/UL (ref 0.8–5.3)
LYMPHOCYTES NFR BLD AUTO: 24 %
MCH RBC QN AUTO: 27.5 PG (ref 26.5–33)
MCHC RBC AUTO-ENTMCNC: 32.8 G/DL (ref 31.5–36.5)
MCV RBC AUTO: 84 FL (ref 78–100)
MONOCYTES # BLD AUTO: 0.4 10E3/UL (ref 0–1.3)
MONOCYTES NFR BLD AUTO: 7 %
NEUTROPHILS # BLD AUTO: 3.7 10E3/UL (ref 1.6–8.3)
NEUTROPHILS NFR BLD AUTO: 66 %
NRBC # BLD AUTO: 0 10E3/UL
NRBC BLD AUTO-RTO: 0 /100
PLATELET # BLD AUTO: 207 10E3/UL (ref 150–450)
POTASSIUM SERPL-SCNC: 3.8 MMOL/L (ref 3.4–5.3)
PROT SERPL-MCNC: 7 G/DL (ref 6.4–8.3)
RBC # BLD AUTO: 5.16 10E6/UL (ref 4.4–5.9)
SODIUM SERPL-SCNC: 142 MMOL/L (ref 135–145)
TROPONIN T SERPL HS-MCNC: 7 NG/L
WBC # BLD AUTO: 5.5 10E3/UL (ref 4–11)

## 2024-07-25 PROCEDURE — 99285 EMERGENCY DEPT VISIT HI MDM: CPT | Mod: 25 | Performed by: FAMILY MEDICINE

## 2024-07-25 PROCEDURE — 71045 X-RAY EXAM CHEST 1 VIEW: CPT

## 2024-07-25 PROCEDURE — 84484 ASSAY OF TROPONIN QUANT: CPT | Performed by: FAMILY MEDICINE

## 2024-07-25 PROCEDURE — 96374 THER/PROPH/DIAG INJ IV PUSH: CPT | Performed by: FAMILY MEDICINE

## 2024-07-25 PROCEDURE — 93010 ELECTROCARDIOGRAM REPORT: CPT | Performed by: FAMILY MEDICINE

## 2024-07-25 PROCEDURE — 250N000013 HC RX MED GY IP 250 OP 250 PS 637: Performed by: FAMILY MEDICINE

## 2024-07-25 PROCEDURE — 36415 COLL VENOUS BLD VENIPUNCTURE: CPT | Performed by: FAMILY MEDICINE

## 2024-07-25 PROCEDURE — 80053 COMPREHEN METABOLIC PANEL: CPT | Performed by: FAMILY MEDICINE

## 2024-07-25 PROCEDURE — 76705 ECHO EXAM OF ABDOMEN: CPT

## 2024-07-25 PROCEDURE — 250N000009 HC RX 250: Performed by: FAMILY MEDICINE

## 2024-07-25 PROCEDURE — 83690 ASSAY OF LIPASE: CPT | Performed by: FAMILY MEDICINE

## 2024-07-25 PROCEDURE — 86140 C-REACTIVE PROTEIN: CPT | Performed by: FAMILY MEDICINE

## 2024-07-25 PROCEDURE — 85025 COMPLETE CBC W/AUTO DIFF WBC: CPT | Performed by: FAMILY MEDICINE

## 2024-07-25 PROCEDURE — 96376 TX/PRO/DX INJ SAME DRUG ADON: CPT | Performed by: FAMILY MEDICINE

## 2024-07-25 PROCEDURE — 96361 HYDRATE IV INFUSION ADD-ON: CPT | Performed by: FAMILY MEDICINE

## 2024-07-25 PROCEDURE — 85379 FIBRIN DEGRADATION QUANT: CPT | Performed by: FAMILY MEDICINE

## 2024-07-25 PROCEDURE — 250N000011 HC RX IP 250 OP 636: Performed by: FAMILY MEDICINE

## 2024-07-25 PROCEDURE — 99285 EMERGENCY DEPT VISIT HI MDM: CPT | Mod: 59 | Performed by: FAMILY MEDICINE

## 2024-07-25 PROCEDURE — 93005 ELECTROCARDIOGRAM TRACING: CPT | Performed by: FAMILY MEDICINE

## 2024-07-25 PROCEDURE — 258N000003 HC RX IP 258 OP 636: Performed by: FAMILY MEDICINE

## 2024-07-25 RX ORDER — LIDOCAINE HYDROCHLORIDE 20 MG/ML
10 SOLUTION OROPHARYNGEAL ONCE
Status: COMPLETED | OUTPATIENT
Start: 2024-07-25 | End: 2024-07-25

## 2024-07-25 RX ORDER — HYDROMORPHONE HYDROCHLORIDE 1 MG/ML
0.5 INJECTION, SOLUTION INTRAMUSCULAR; INTRAVENOUS; SUBCUTANEOUS EVERY 30 MIN PRN
Status: COMPLETED | OUTPATIENT
Start: 2024-07-25 | End: 2024-07-25

## 2024-07-25 RX ORDER — MAGNESIUM HYDROXIDE/ALUMINUM HYDROXICE/SIMETHICONE 120; 1200; 1200 MG/30ML; MG/30ML; MG/30ML
15 SUSPENSION ORAL ONCE
Status: COMPLETED | OUTPATIENT
Start: 2024-07-25 | End: 2024-07-25

## 2024-07-25 RX ADMIN — LIDOCAINE HYDROCHLORIDE 10 ML: 20 SOLUTION ORAL at 14:44

## 2024-07-25 RX ADMIN — ALUMINUM HYDROXIDE, MAGNESIUM HYDROXIDE, AND SIMETHICONE 15 ML: 1200; 120; 1200 SUSPENSION ORAL at 14:43

## 2024-07-25 RX ADMIN — HYDROMORPHONE HYDROCHLORIDE 0.5 MG: 1 INJECTION, SOLUTION INTRAMUSCULAR; INTRAVENOUS; SUBCUTANEOUS at 15:17

## 2024-07-25 RX ADMIN — HYDROMORPHONE HYDROCHLORIDE 0.5 MG: 1 INJECTION, SOLUTION INTRAMUSCULAR; INTRAVENOUS; SUBCUTANEOUS at 16:17

## 2024-07-25 RX ADMIN — HYDROMORPHONE HYDROCHLORIDE 0.5 MG: 1 INJECTION, SOLUTION INTRAMUSCULAR; INTRAVENOUS; SUBCUTANEOUS at 14:44

## 2024-07-25 RX ADMIN — SODIUM CHLORIDE 1000 ML: 9 INJECTION, SOLUTION INTRAVENOUS at 14:46

## 2024-07-25 ASSESSMENT — ENCOUNTER SYMPTOMS
ABDOMINAL PAIN: 1
CHEST TIGHTNESS: 1
CHILLS: 0
VOMITING: 0
ARTHRALGIAS: 1
FEVER: 0
DIARRHEA: 0
NEUROLOGICAL NEGATIVE: 1
PSYCHIATRIC NEGATIVE: 1
ENDOCRINE NEGATIVE: 1
COUGH: 0
EYES NEGATIVE: 1

## 2024-07-25 ASSESSMENT — ACTIVITIES OF DAILY LIVING (ADL)
ADLS_ACUITY_SCORE: 35
ADLS_ACUITY_SCORE: 35

## 2024-07-25 ASSESSMENT — COLUMBIA-SUICIDE SEVERITY RATING SCALE - C-SSRS
2. HAVE YOU ACTUALLY HAD ANY THOUGHTS OF KILLING YOURSELF IN THE PAST MONTH?: NO
1. IN THE PAST MONTH, HAVE YOU WISHED YOU WERE DEAD OR WISHED YOU COULD GO TO SLEEP AND NOT WAKE UP?: NO
6. HAVE YOU EVER DONE ANYTHING, STARTED TO DO ANYTHING, OR PREPARED TO DO ANYTHING TO END YOUR LIFE?: NO

## 2024-07-25 NOTE — ED NOTES
Pt given 0.5mg of dilaudid per MD and order, 6/10 pain left shoulder, abdomen returning after US and movement to bathroom

## 2024-07-25 NOTE — ED NOTES
Pt able to ambulate out of ER at this time and not in distress at time of exit. Pt accompanied by wife at time of discharge.  Education given about diet and hydration also, pt is pain free at this time.

## 2024-07-25 NOTE — ED PROVIDER NOTES
History     Chief Complaint   Patient presents with    Chest Pain     HPI  Hollis Diggs is a 54 year old male who presented to the emergency room with his significant other secondary to concerns of left-sided abdominal pain with left shoulder pain.  Patient states that he has a history for a hiatal hernia and has had similar symptoms in the past but they usually resolve after a few minutes but this time it does not go away.  Patient states that he is also had a history for coronary artery disease and has a stent placed in the past and is concerned this could be associated with a heart attack.  He denies any significant cough or fever symptoms recently.  He has not noticed any bloody stools or bloody emesis.  Most intense pain is to his left shoulder currently.  I asked if his shoulder is painful with motion or palpation to any states it is not and movement does not seem to affect the pain at all.  The pain gets worse in his shoulder if he pushes on his abdomen.     Allergies:  No Known Allergies    Problem List:    Patient Active Problem List    Diagnosis Date Noted    DEDE (obstructive sleep apnea) 12/01/2023     Priority: Medium    Impingement syndrome of shoulder region, right 12/01/2023     Priority: Medium    Biceps tendonitis, right 12/01/2023     Priority: Medium    Status post coronary angiogram 10/11/2022     Priority: Medium    Hiatal hernia 09/02/2022     Priority: Medium    Other iron deficiency anemia 05/26/2022     Priority: Medium    Other acute gastritis, presence of bleeding unspecified 05/26/2022     Priority: Medium    Hypertension, unspecified type 02/14/2022     Priority: Medium    HILTON (dyspnea on exertion) 02/14/2022     Priority: Medium    Chronic, continuous use of opioids 05/14/2021     Priority: Medium    Deep vein thrombosis (DVT) of proximal lower extremity, unspecified chronicity, unspecified laterality (H) 04/28/2021     Priority: Medium    Gastroesophageal reflux disease without  esophagitis 04/26/2021     Priority: Medium    Antiphospholipid syndrome (H24) 03/06/2021     Priority: Medium    Suicidal behavior 06/22/2020     Priority: Medium    Class 1 obesity due to excess calories without serious comorbidity with body mass index (BMI) of 33.0 to 33.9 in adult 01/08/2020     Priority: Medium    Long-term use of high-risk medication 05/12/2017     Priority: Medium    History of deep venous thrombosis 04/14/2017     Priority: Medium    DDD (degenerative disc disease), lumbar 04/14/2017     Priority: Medium    On prednisone therapy 07/27/2016     Priority: Medium    Seronegative rheumatoid arthritis (H) 06/28/2016     Priority: Medium    Long-term (current) use of anticoagulants [Z79.01] 03/16/2016     Priority: Medium    Rheumatoid arthritis of multiple sites with negative rheumatoid factor (H) 12/07/2015     Priority: Medium    Midline low back pain, with sciatica presence unspecified 10/14/2015     Priority: Medium    Major depressive disorder, recurrent episode, mild (H24) 10/07/2015     Priority: Medium    Pain in joint, multiple sites 03/20/2015     Priority: Medium    Anxiety state 02/10/2015     Priority: Medium     Problem list name updated by automated process. Provider to review      CARDIOVASCULAR SCREENING; LDL GOAL LESS THAN 160 01/15/2013     Priority: Medium    Alopecia 02/19/2010     Priority: Medium    Ingrown toenail 08/18/2009     Priority: Medium    Anxiety 07/09/2008     Priority: Medium    Abdominal pain, epigastric 01/25/2006     Priority: Medium        Past Medical History:    Past Medical History:   Diagnosis Date    Antiphospholipid syndrome (H24)     Arthritis     Asthma     blood clot in leg     Depressive disorder, not elsewhere classified     ANKIT (generalised anxiety disorder)     HH (hiatus hernia)     Hypercholesteremia     Lumbar disc herniation 1992    DEDE (obstructive sleep apnea) 12/1/2023    Seronegative rheumatoid arthritis (H)        Past Surgical  History:    Past Surgical History:   Procedure Laterality Date    APPENDECTOMY      COLONOSCOPY N/A 8/2/2016    Procedure: COMBINED COLONOSCOPY, SINGLE OR MULTIPLE BIOPSY/POLYPECTOMY BY BIOPSY;  Surgeon: Sydnee Walton MD;  Location: [facility]    COLONOSCOPY N/A 5/3/2022    Procedure: COLONOSCOPY;  Surgeon: Jose A Hurt MD;  Location: [facility]    COLONOSCOPY WITH CO2 INSUFFLATION N/A 8/2/2016    Procedure: COLONOSCOPY WITH CO2 INSUFFLATION;  Surgeon: Sydnee Walton MD;  Location: [facility]    COMBINED ESOPHAGOSCOPY, GASTROSCOPY, DUODENOSCOPY (EGD) WITH CO2 INSUFFLATION N/A 8/2/2016    Procedure: COMBINED ESOPHAGOSCOPY, GASTROSCOPY, DUODENOSCOPY (EGD) WITH CO2 INSUFFLATION;  Surgeon: Sydnee Walton MD;  Location: [facility]    COMBINED ESOPHAGOSCOPY, GASTROSCOPY, DUODENOSCOPY (EGD) WITH CO2 INSUFFLATION N/A 5/27/2021    Procedure: ESOPHAGOGASTRODUODENOSCOPY, WITH CO2 INSUFFLATION;  Surgeon: Alis Cotton DO;  Location: [facility]    CV CORONARY ANGIOGRAM N/A 10/11/2022    Procedure: Coronary Angiogram;  Surgeon: Hollis Avila MD;  Location: [facility] CARDIAC CATH LAB    CV INSTANTANEOUS WAVE-FREE RATIO N/A 10/11/2022    Procedure: Instantaneous Wave-Free Ratio;  Surgeon: Hollis Avila MD;  Location: [facility] CARDIAC CATH LAB    CV INTRAVASULAR ULTRASOUND N/A 10/11/2022    Procedure: Intravascular Ultrasound;  Surgeon: Hollis Avila MD;  Location: [facility] CARDIAC CATH LAB    CV PCI ANGIOPLASTY N/A 10/11/2022    Procedure: Percutaneous Transluminal Angioplasty;  Surgeon: Hollis Avila MD;  Location: [facility] CARDIAC CATH LAB    ESOPHAGOSCOPY, GASTROSCOPY, DUODENOSCOPY (EGD), COMBINED N/A 8/2/2016    Procedure: COMBINED ESOPHAGOSCOPY, GASTROSCOPY, DUODENOSCOPY (EGD), BIOPSY SINGLE OR MULTIPLE;  Surgeon: Sydnee Walton MD;  [facility]: [facility]    ESOPHAGOSCOPY, GASTROSCOPY, DUODENOSCOPY (EGD), COMBINED N/A 5/27/2021    Procedure:  "Esophagogastroduodenoscopy, With Biopsy;  Surgeon: Alis Cotton DO;  Location: [facility]    ESOPHAGOSCOPY, GASTROSCOPY, DUODENOSCOPY (EGD), COMBINED N/A 5/3/2022    Procedure: ESOPHAGOGASTRODUODENOSCOPY, WITH BIOPSY;  Surgeon: Jose A Hurt MD;  [facility]: [facility]    HC REMOVAL OF TONSILS,<10 Y/O      Tonsils <11y.o.    HC UGI ENDOSCOPY DIAG W BIOPSY  02/01/06    HC VASECTOMY UNILAT/BILAT W POSTOP SEMEN  1/05    Vasectomy    History back lumbar laminectomy      INJECT EPIDURAL LUMBAR Right 4/22/2021    Procedure: Right Lumbar 4-5 and Lumbar 5 - Sacral 1 Epidural Steroid Injection;  Surgeon: Maxwell Zacarias MD;  Location: [facility]    ZZC NONSPECIFIC PROCEDURE  91 or 92    back surgery. lumbar. lamiectomy    ZZHC REPAIR INCISIONAL HERNIA,REDUCIBLE  1970's    Hernia Repair, Incisional, Unilateral       Family History:    Family History   Problem Relation Age of Onset    Brain Tumor Mother         Benign    Deep Vein Thrombosis Mother         \"neck\"     Heart Disease Father         \"wont tell anyone\"    Neurologic Disorder Paternal Grandmother         Parkinsons     Alcohol/Drug Paternal Grandfather         alcoholic    Cancer Maternal Grandfather         lung    Diabetes Maternal Grandmother     Cerebrovascular Disease Maternal Grandmother         tim age ~70    Arthritis Cousin         cousins x 2 \"RA\"    Musculoskeletal Disorder Maternal Aunt         MS    Family History Negative Other         psoriasis, crohns, UC, SLE       Social History:  Marital Status:   [2]  Social History     Tobacco Use    Smoking status: Never     Passive exposure: Never    Smokeless tobacco: Never   Vaping Use    Vaping status: Never Used   Substance Use Topics    Alcohol use: Yes     Comment: rare    Drug use: No        Medications:    ARIPiprazole (ABILIFY) 10 MG tablet  DULoxetine (CYMBALTA) 60 MG capsule  ferrous sulfate (FEROSUL) 325 (65 Fe) MG tablet  gabapentin (NEURONTIN) 800 MG tablet  lisinopril (ZESTRIL) 5 MG " tablet  nitroGLYcerin (NITROSTAT) 0.4 MG sublingual tablet  oxyCODONE-acetaminophen (PERCOCET) 5-325 MG tablet  pantoprazole (PROTONIX) 40 MG EC tablet  reason aspirin not prescribed, intentional,  rosuvastatin (CRESTOR) 20 MG tablet  sucralfate (CARAFATE) 1 GM tablet  traZODone (DESYREL) 150 MG tablet  warfarin ANTICOAGULANT (COUMADIN) 5 MG tablet  warfarin ANTICOAGULANT (COUMADIN) 5 MG tablet          Review of Systems   Constitutional:  Negative for chills and fever.   HENT: Negative.     Eyes: Negative.    Respiratory:  Positive for chest tightness. Negative for cough.    Cardiovascular:  Positive for chest pain.   Gastrointestinal:  Positive for abdominal pain (left sided). Negative for diarrhea and vomiting.   Endocrine: Negative.    Genitourinary: Negative.    Musculoskeletal:  Positive for arthralgias (left shoulder).   Skin: Negative.  Negative for rash.   Neurological: Negative.    Psychiatric/Behavioral: Negative.     All other systems reviewed and are negative.      Physical Exam   BP: (!) 156/99  Pulse: 60  Temp: 97.5  F (36.4  C)  Resp: 20  Weight: 113.3 kg (249 lb 11.2 oz)  SpO2: 99 %      Physical Exam  Vitals and nursing note reviewed.   Constitutional:       General: He is in acute distress.      Appearance: He is well-developed. He is not toxic-appearing or diaphoretic.   HENT:      Head: Normocephalic and atraumatic.   Eyes:      Extraocular Movements: Extraocular movements intact.      Pupils: Pupils are equal, round, and reactive to light.   Neck:      Vascular: No JVD.   Cardiovascular:      Rate and Rhythm: Normal rate and regular rhythm.      Heart sounds: Normal heart sounds.      No diastolic murmur is present.      No friction rub.   Pulmonary:      Effort: No accessory muscle usage.      Breath sounds: Normal breath sounds.      Comments: Patient with mild splinting with attempts to take deep breaths secondary to pain in his left upper abdomen and left shoulder.  Chest:      Chest wall:  No deformity, tenderness or crepitus.   Abdominal:      General: Bowel sounds are normal.      Palpations: Abdomen is soft.      Tenderness: There is abdominal tenderness (Mild tenderness with palpation of the left upper quadrant of the abdomen.-).   Musculoskeletal:      Cervical back: Normal range of motion and neck supple.      Right lower leg: No tenderness. No edema.      Left lower leg: No tenderness. No edema.   Skin:     General: Skin is warm.      Capillary Refill: Capillary refill takes less than 2 seconds.      Findings: No erythema or rash.   Neurological:      General: No focal deficit present.      Mental Status: He is alert and oriented to person, place, and time.   Psychiatric:         Behavior: Behavior normal.         ED Course        Procedures              EKG Interpretation:      Interpreted by Fercho Rojas DO  Time reviewed: 14:10  Symptoms at time of EKG: left chest/abd pain   Rhythm: normal sinus   Rate: Normal  Axis: Left Axis Deviation  Ectopy: none  Conduction: right bundle branch block (complete) and bifasicular  ST Segments/ T Waves: No ST-T wave changes and No acute ischemic changes  Q Waves: none  Comparison to prior: Unchanged from 6/14/24    Clinical Impression: no acute changes        Critical Care time:  none               Results for orders placed or performed during the hospital encounter of 07/25/24 (from the past 24 hour(s))   CBC with platelets differential    Narrative    The following orders were created for panel order CBC with platelets differential.  Procedure                               Abnormality         Status                     ---------                               -----------         ------                     CBC with platelets and d...[[id number]]                      Final result                 Please view results for these tests on the individual orders.   Comprehensive metabolic panel   Result Value Ref Range    Sodium 142 135 - 145 mmol/L     Potassium 3.8 3.4 - 5.3 mmol/L    Carbon Dioxide (CO2) 25 22 - 29 mmol/L    Anion Gap 12 7 - 15 mmol/L    Urea Nitrogen 6.2 6.0 - 20.0 mg/dL    Creatinine 0.98 0.67 - 1.17 mg/dL    GFR Estimate >90 >60 mL/min/1.73m2    Calcium 9.2 8.8 - 10.4 mg/dL    Chloride 105 98 - 107 mmol/L    Glucose 102 (H) 70 - 99 mg/dL    Alkaline Phosphatase 74 40 - 150 U/L    AST 24 0 - 45 U/L    ALT 23 0 - 70 U/L    Protein Total 7.0 6.4 - 8.3 g/dL    Albumin 4.2 3.5 - 5.2 g/dL    Bilirubin Total 0.4 <=1.2 mg/dL   Lipase   Result Value Ref Range    Lipase 154 (H) 13 - 60 U/L   Troponin T, High Sensitivity   Result Value Ref Range    Troponin T, High Sensitivity 7 <=22 ng/L   CRP inflammation   Result Value Ref Range    CRP Inflammation <3.00 <5.00 mg/L   D dimer quantitative   Result Value Ref Range    D-Dimer Quantitative <0.27 0.00 - 0.50 ug/mL FEU    Narrative    This D-dimer assay is intended for use in conjunction with a clinical pretest probability assessment model to exclude pulmonary embolism (PE) and deep venous thrombosis (DVT) in outpatients suspected of PE or DVT. The cut-off value is 0.50 ug/mL FEU.    For patients 50 years of age or older, the application of age-adjusted cut-off values for D-Dimer may increase the specificity without significant effect on sensitivity. The literature suggested calculation age adjusted cut-off in ug/L = age in years x 10 ug/L. The results in this laboratory are reported as ug/mL rather than ug/L. The calculation for age adjusted cut off in ug/mL= age in years x 0.01 ug/mL. For example, the cut off for a 76 year old male is 75 x 0.01 ug/mL = 0.76 ug/mL (760 ug/L).    M Melissa et al. Age adjusted D-dimer cut-off levels to rule out pulmonary embolism: The ADJUST-PE Study. YONY 2014;311:3346-4287.; HJ Althea et al. Diagnostic accuracy of conventional or age adjusted D-dimer cutoff values in older patients with suspected venous thromboembolism. Systemic review and meta-analysis. BMJ  2013:346:f2492.   CBC with platelets and differential   Result Value Ref Range    WBC Count 5.5 4.0 - 11.0 10e3/uL    RBC Count 5.16 4.40 - 5.90 10e6/uL    Hemoglobin 14.2 13.3 - 17.7 g/dL    Hematocrit 43.3 40.0 - 53.0 %    MCV 84 78 - 100 fL    MCH 27.5 26.5 - 33.0 pg    MCHC 32.8 31.5 - 36.5 g/dL    RDW 13.0 10.0 - 15.0 %    Platelet Count 207 150 - 450 10e3/uL    % Neutrophils 66 %    % Lymphocytes 24 %    % Monocytes 7 %    % Eosinophils 3 %    % Basophils 0 %    % Immature Granulocytes 0 %    NRBCs per 100 WBC 0 <1 /100    Absolute Neutrophils 3.7 1.6 - 8.3 10e3/uL    Absolute Lymphocytes 1.3 0.8 - 5.3 10e3/uL    Absolute Monocytes 0.4 0.0 - 1.3 10e3/uL    Absolute Eosinophils 0.1 0.0 - 0.7 10e3/uL    Absolute Basophils 0.0 0.0 - 0.2 10e3/uL    Absolute Immature Granulocytes 0.0 <=0.4 10e3/uL    Absolute NRBCs 0.0 10e3/uL   XR Chest Port 1 View    Narrative    CHEST ONE VIEW PORTABLE July 25, 2024 3:21 PM     HISTORY: Left chest pain.    COMPARISON: 3/30/2023.      Impression    IMPRESSION: Mild elevation of the left hemidiaphragm is new since the  previous exam. No other significant interval change. Moderate to large  hiatal hernia. Right upper lobe calcified granuloma. The lungs are  otherwise clear. No pneumothorax. Pulmonary vascularity is within  normal limits.    SUNDAR DUNCAN MD         SYSTEM ID:  [id number]   US Abdomen Limited (RUQ)    Narrative    US ABDOMEN LIMITED 7/25/2024 3:45 PM    CLINICAL HISTORY: Abdominal pain. Elevated lipase.  TECHNIQUE: Limited abdominal ultrasound.  COMPARISON: None.    FINDINGS: Exam was quite limited related to prominent overlying bowel  gas and poor acoustic windows.    GALLBLADDER: Limited visualization of the gallbladder, but  unremarkable where seen. No gallstones, wall thickening, or  pericholecystic fluid. Sonographic Frazier's sign could not be assessed  due to the patient having received pain medication.    BILE DUCTS: There is no biliary dilatation. The  common duct measures 2  mm. Portions of the common duct could not be visualized due to  overlying bowel gas.    LIVER: Limited visualization, but unremarkable where seen. No focal  hepatic masses.    RIGHT KIDNEY: Unremarkable. No hydronephrosis.    PANCREAS: Obscured by overlying bowel gas.    No ascites.      Impression    IMPRESSION:  1.  No acute sonographic abnormality in the right upper quadrant.  2.  Exam was quite limited due to prominent overlying bowel gas and  poor acoustic windows.    SUNDAR DUNCAN MD         SYSTEM ID:  [id number]     *Note: Due to a large number of results and/or encounters for the requested time period, some results have not been displayed. A complete set of results can be found in Results Review.       Medications   HYDROmorphone (PF) (DILAUDID) injection 0.5 mg (0.5 mg Intravenous $Given 7/25/24 1617)   lidocaine (viscous) (XYLOCAINE) 2 % solution 10 mL (10 mLs Mouth/Throat $Given 7/25/24 1444)   alum & mag hydroxide-simethicone (MAALOX) suspension 15 mL (15 mLs Oral $Given 7/25/24 1443)   sodium chloride 0.9% BOLUS 1,000 mL (0 mLs Intravenous Stopped 7/25/24 1607)       Assessments & Plan (with Medical Decision Making)  54-year-old to the ER with concerns of severe left-sided shoulder pain and left upper abdominal pain.  History of similar symptoms in the past with his hiatal hernia exacerbation.  Patient given medication as listed above with near total resolution of his symptoms during the ER stay.  Patient with a very mild elevation of his lipase ultrasound was performed of the right upper quadrant looking for possible choledocholithiasis but no evidence of that was identified.  Patient did not want to pursue additional imaging in the form of CT scan at this time.  Patient's symptoms had resolved.  I suspect patient was some left sided diaphragmatic spasm associated with his hiatal hernia as reason for symptomatology and radiation to left shoulder.  Patient was given  information regarding things he can do to help prevent recurrent episodes of diaphragmatic spasm.  We discussed elevation of the head of the bed at night.  Also he is currently taking his PPI medication before bed and have asked him to take it in the morning 15 to 20 minutes before his first meal of the day in hopes that I will be more efficacious.  Encourage follow-up with his clinic physician.  Also recommended consultation with surgery to discuss options for additional treatment of the hiatal hernia.  Patient felt comfortable and discharged home at this time and he was discharged to care of his significant other.     I have reviewed the nursing notes.    I have reviewed the findings, diagnosis, plan and need for follow up with the patient.         Discharge Medication List as of 7/25/2024  4:42 PM               I verbally discussed the findings of the evaluation today in the ER. I have verbally discussed with Hollis the suggested treatment(s) as described in the discharge instructions and handouts.      I have verbally suggested he follow-up in his clinic or return to the ER for increased symptoms. See the follow-up recommendations documented  in the after visit summary in this visit's EPIC chart.      Disclaimer: This note consists of words and symbols derived from keyboarding and dictation using voice recognition software.  As a result, there may be errors that have gone undetected.  Please consider this when interpreting information found in this note.    Final diagnoses:   Gastroesophageal reflux disease with esophagitis without hemorrhage   Hiatal hernia       7/25/2024   [facility] EMERGENCY DEPT       Fercho Rojas,   07/25/24 2031

## 2024-07-25 NOTE — ED NOTES
After MD left room pain coming back to about 5-6 in left shoulder and abdominal area.  Pt waiting for dispo, discussed pain medication regiment.

## 2024-07-25 NOTE — ED TRIAGE NOTES
Patient presents with chest pain, L shoulder pain, and abdominal pain x a few hours. Hx of stent placement.

## 2024-07-26 ENCOUNTER — PATIENT OUTREACH (OUTPATIENT)
Dept: FAMILY MEDICINE | Facility: CLINIC | Age: 54
End: 2024-07-26
Payer: COMMERCIAL

## 2024-07-26 ENCOUNTER — DOCUMENTATION ONLY (OUTPATIENT)
Dept: ANTICOAGULATION | Facility: CLINIC | Age: 54
End: 2024-07-26
Payer: COMMERCIAL

## 2024-07-26 RX ORDER — ENOXAPARIN SODIUM 150 MG/ML
1 INJECTION SUBCUTANEOUS EVERY 12 HOURS
Qty: 16 ML | Refills: 1 | Status: SHIPPED | OUTPATIENT
Start: 2024-07-26

## 2024-07-26 NOTE — TELEPHONE ENCOUNTER
REFERRAL INFORMATION:  Referring Provider: Dr. Rojas  Referring Clinic: [facility] - ED  Reason for Visit/Diagnosis: Hiatal Hernia       FUTURE VISIT INFORMATION:  Appointment Date: 8/9/2024  Appointment Time: 7:30 AM     NOTES RECORD STATUS  DETAILS   OFFICE NOTE from Referring Provider N/A    OFFICE NOTE from [facility]:  6/14/24 - PCC OV with Dr. Ledezma  12/16/22, 9/2/22 - PCC OV with Dr. Cash  5/10/21 - PCC OV with Dr. Long   HOSPITAL DISCHARGE SUMMARY/ ED VISITS  Internal [facility]:  7/25/24 - ED OV with Dr. Rojas  3/30/23 - ED OV with Johanna Larson NP  12/28/22 - ED OV with Dr. Krishnamurthy   OPERATIVE REPORT N/A    ENDOSCOPY (EGD)  Internal [facility]:  5/3/22 - EGD  5/27/21 - EGD  8/2/16 - EGD   PERTINENT LABS Internal    IMAGING (CT, MRI, US, XR)  Internal [facility]:  7/25/24 - US Abdomen  7/25/24, 3/30/23 - XR Chest  3/30/23, 8/21/22 - CT Chest

## 2024-07-26 NOTE — PROGRESS NOTES
ANTICOAGULATION  MANAGEMENT: Discharge Review    Hollis Diggs chart reviewed for anticoagulation continuity of care    Emergency room visit on 7/26/24 for GERD.    Discharge disposition: Home    Results:    Recent labs: (last 7 days)     07/23/24  1449   INR 2.8*     Anticoagulation inpatient management:     not applicable     Anticoagulation discharge instructions:     Warfarin dosing: home regimen continued   Bridging: No   INR goal change: No      Medication changes affecting anticoagulation: No    Additional factors affecting anticoagulation: No     PLAN     No adjustment to anticoagulation plan needed    Patient not contacted    No adjustment to Anticoagulation Calendar was required    Jesse Flores RN

## 2024-07-26 NOTE — TELEPHONE ENCOUNTER
Procedure instructions went over with pt below. Mychart sent with instructions as well. [facility] calendar updated and lab appt scheduled. Patient verbalized understanding and agrees with plan of care. Pt had no further questions or concerns at this time.     Jesse Flores RN, BSN  [facility] Anticoagulation Team

## 2024-07-26 NOTE — TELEPHONE ENCOUNTER
Transitions of Care Outreach  Chief Complaint   Patient presents with    Hospital F/U       Most Recent Admission Date: 7/25/2024   Most Recent Admission Diagnosis:      Most Recent Discharge Date: 7/25/2024   Most Recent Discharge Diagnosis: Gastroesophageal reflux disease with esophagitis without hemorrhage - K21.00  Hiatal hernia - K44.9     Transitions of Care Assessment    Discharge Assessment  How are you doing now that you are home?: Ok  How are your symptoms? (Red Flag symptoms escalate to triage hotline per guidelines): Improved  Do you know how to contact your clinic care team if you have future questions or changes to your health status? : Yes  Does the patient have their discharge instructions? : Yes  Does the patient have questions regarding their discharge instructions? : No  Were you started on any new medications or were there changes to any of your previous medications? : No  Does the patient have all of their medications?: Yes  Do you have questions regarding any of your medications? : No  Do you have all of your needed medical supplies or equipment (DME)?  (i.e. oxygen tank, CPAP, cane, etc.): Yes    Follow up Plan     Discharge Follow-Up  Discharge follow up appointment scheduled in alignment with recommended follow up timeframe or Transitions of Risk Category? (Low = within 30 days; Moderate= within 14 days; High= within 7 days): Yes  Discharge Follow Up Appointment Scheduled with?: Specialty Care Provider    Future Appointments   Date Time Provider Department Center   8/2/2024  4:15 PM Jessie Alva APRN [facility]   8/9/2024  7:30 AM Luis Daniel Petersen MD [facility]   8/12/2024  3:00 PM [facility]   8/14/2024  2:00 PM [id number] [facility]   9/3/2024  2:30 PM [facility]   10/8/2024  8:55 AM Jaimie Woods PA-C [facility]   10/28/2024  8:00 AM Jitendra Manzo MD [facility]       Outpatient Plan as outlined  on AVS reviewed with patient.    For any urgent concerns, please contact our 24 hour nurse triage line: [phone] ([phone])       Eduard Ibarra RN

## 2024-08-02 ENCOUNTER — VIRTUAL VISIT (OUTPATIENT)
Dept: CARDIOLOGY | Facility: CLINIC | Age: 54
End: 2024-08-02
Payer: COMMERCIAL

## 2024-08-02 DIAGNOSIS — I10 BENIGN ESSENTIAL HYPERTENSION: ICD-10-CM

## 2024-08-02 DIAGNOSIS — R94.39 ABNORMAL CARDIOVASCULAR STRESS TEST: ICD-10-CM

## 2024-08-02 DIAGNOSIS — I25.10 CORONARY ARTERY DISEASE INVOLVING NATIVE CORONARY ARTERY OF NATIVE HEART, UNSPECIFIED WHETHER ANGINA PRESENT: Primary | ICD-10-CM

## 2024-08-02 DIAGNOSIS — E78.5 HYPERLIPIDEMIA LDL GOAL <70: ICD-10-CM

## 2024-08-02 PROCEDURE — 99214 OFFICE O/P EST MOD 30 MIN: CPT | Mod: 95 | Performed by: NURSE PRACTITIONER

## 2024-08-02 NOTE — PATIENT INSTRUCTIONS
Medication Changes:  None     Recommendations:  Check blood pressure at least 1 hour after medications. Call the clinic if your blood pressure is consistently greater than 130/80.     Follow-up:  Schedule exercise ABIs as previously recommended by Dr. Bell.  Coronary angiogram in [place] 8/7/2024 as scheduled  Echo 8/14/2024 as scheduled in [place]  Cardiology follow up at [facility]: Jaimie in October as scheduled    Cardiology Scheduling~[phone]  Cardiology Clinic RN~[phone] (Melina RN, Jade RN; Funmilayo RN)

## 2024-08-02 NOTE — LETTER
8/2/2024    Christiano Ledezma MD  [address] [place] [address]    RE: Hollis FINNEGAN Dontae       Dear Colleague,     I had the pleasure of seeing Hollis Diggs in the [facility].  Cardiology Clinic Progress Note  Hollis Diggs MRN# [id number]   YOB: 1970 Age: 54 year old      Primary Cardiologist:   Dr. Bell for angiogram H&P          History of Presenting Illness:      Patient was seen by Dr. Bell in July 2024 for annual follow-up.  He reported typical anginal symptoms and stress testing recommended.  He also had leg numbness and exercise ABIs were ordered.    Patient was seen in the ED on 7/25/2024 for chest pain.  He had left-sided shoulder and left upper abdominal pain which historically has been related to hiatal hernia exacerbation.  Symptoms improved with pain medication.  ED note reviewed today.    Most recent lipid profile, BMP, ALT reviewed today. Lexiscan nuclear stress test July 2024 showed nontransmural inferior/inferoseptal infarction and a small area of distal apical/anteroseptal infarction.  Per the reader compared to 2022 study there were changes with previous study showing no ischemia or infarction and fixed inferior defect likely consistent with attenuation.  This was reviewed by Dr. Bell who recommended proceeding with coronary angiogram.    Angina? Stable   BP?  Doesn't check at home   Treating sleep apnea? Couldn't tolerate CPAP   Has not Scheduled exercise ABIs yet     Patient reports no  shortness of breath, PND, orthopnea, presyncope, syncope, edema, heart racing, or palpitations.    Risks and benefits of left heart catheterization and coronary angiogram were discussed with the patient in detail. Including death, MI, stroke, hematoma, possible urgent bypass surgery for failed PCI, use of stents, possible peripheral vascular complications, use of FFR in clinical-decision making, arrhythmia, possible percutaneous coronary intervention, and  alternative of medical therapy alone. The risks discussed include risk of heart attack or risk bleeding requiring surgery or transfusion of less than 1%. The risk of stroke or needing emergency surgery is less than 1 in 500. We discussed that contrast is used during the procedure which can potentially damage the kidney. Additionally we discussed the risk of contrast induced allergic reaction, renal dysfunction, and vascular complications. I have discussed the need for DAPT if stenting is required and there are no contraindications for this. No history of bleeding problems or current bleeding, and no scheduled surgeries or procedures in the next year. Patient understands and wishes to proceed with it.  Contrast allergy: none   Blood thinner: warfarin- he was contacted by INR about holding   ASA: none, just warfarin   H/o CABG: no   DM: no   Creat/GFR: WNL 7/2024   Bleeding concerns: none   Upcoming surgeries/procedures: none                     Assessment and Plan:     Plan  Patient Instructions   Medication Changes:  None     Recommendations:  Check blood pressure at least 1 hour after medications. Call the clinic if your blood pressure is consistently greater than 130/80.     Follow-up:  Schedule exercise ABIs as previously recommended by Dr. Bell.  Coronary angiogram in [place] 8/7/2024 as scheduled  Echo 8/14/2024 as scheduled in [place]  Cardiology follow up at [facility]: Jaimie in October as scheduled    Cardiology Scheduling~[phone]  Cardiology Clinic RN~[phone] (Melina RN, Jade RN; Funmilayo RN)          Problem List as of 8/2/2024 Reviewed: 12/18/2022 12:33 PM by Sylvester Cash MD            Noted       Active Problems    1. Hyperlipidemia LDL goal <70 10/31/2010     Last Assessment & Plan 8/2/2024 Virtual Visit Written 8/2/2024  8:22 AM by Jessie Alva APRN CNP      LDL at goal  Continue statin         2. Coronary artery disease involving native coronary artery of native  heart, unspecified whether angina present - Primary 8/2/2024     Overview Signed 8/2/2024  8:24 AM by Jessie Alva APRN CNP      Angio 2022 with PCI to RCA and residual 60% LAD.          Last Assessment & Plan 8/2/2024 Virtual Visit Written 8/2/2024  8:24 AM by Jessie Alva APRN CNP      Stable angina  Continue GDMT         3. Benign essential hypertension 2/14/2022     Last Assessment & Plan 8/2/2024 Virtual Visit Written 8/2/2024  8:23 AM by Jessie Alva APRN CNP      Controlled  Continue current medications                      Thank you for allowing me to participate in this delightful patient's care.   This note was completed in part using Dragon voice recognition software. Although reviewed after completion, some word and grammatical errors may occur.    FACUNDO Ramos CNP                  Hollis is a 54 year old who is being evaluated via a billable video visit.    How would you like to obtain your AVS? MyChart  If the video visit is dropped, the invitation should be resent by: Text to cell phone: [phone]  Will anyone else be joining your video visit? No    Video-Visit Details    Type of service:  Video Visit   4:42 PM  4:51 PM  Originating Location (pt. Location): Home    Distant Location (provider location):  On-site  Platform used for Video Visit: Tristan       Thank you for allowing me to participate in the care of your patient.      Sincerely,     FACUNDO Ramos CNP     [facility]  cc:   Gloria Bell MD  [address] [place] [id number]

## 2024-08-02 NOTE — PROGRESS NOTES
Hollis is a 54 year old who is being evaluated via a billable video visit.    How would you like to obtain your AVS? [employer]  If the video visit is dropped, the invitation should be resent by: Text to cell phone: [phone]  Will anyone else be joining your video visit? No    Video-Visit Details    Type of service:  Video Visit   4:42 PM  4:51 PM  Originating Location (pt. Location): Home    Distant Location (provider location):  On-site  Platform used for Video Visit: Tristan

## 2024-08-02 NOTE — PROGRESS NOTES
Cardiology Clinic Progress Note  Hollis Diggs MRN# [id number]   YOB: 1970 Age: 54 year old      Primary Cardiologist:   Dr. Bell for angiogram H&P          History of Presenting Illness:      Patient was seen by Dr. Bell in July 2024 for annual follow-up.  He reported typical anginal symptoms and stress testing recommended.  He also had leg numbness and exercise ABIs were ordered.    Patient was seen in the ED on 7/25/2024 for chest pain.  He had left-sided shoulder and left upper abdominal pain which historically has been related to hiatal hernia exacerbation.  Symptoms improved with pain medication.  ED note reviewed today.    Most recent lipid profile, BMP, ALT reviewed today. Lexiscan nuclear stress test July 2024 showed nontransmural inferior/inferoseptal infarction and a small area of distal apical/anteroseptal infarction.  Per the reader compared to 2022 study there were changes with previous study showing no ischemia or infarction and fixed inferior defect likely consistent with attenuation.  This was reviewed by Dr. Bell who recommended proceeding with coronary angiogram.    Angina? Stable   BP?  Doesn't check at home   Treating sleep apnea? Couldn't tolerate CPAP   Has not Scheduled exercise ABIs yet     Patient reports no  shortness of breath, PND, orthopnea, presyncope, syncope, edema, heart racing, or palpitations.    Risks and benefits of left heart catheterization and coronary angiogram were discussed with the patient in detail. Including death, MI, stroke, hematoma, possible urgent bypass surgery for failed PCI, use of stents, possible peripheral vascular complications, use of FFR in clinical-decision making, arrhythmia, possible percutaneous coronary intervention, and alternative of medical therapy alone. The risks discussed include risk of heart attack or risk bleeding requiring surgery or transfusion of less than 1%. The risk of stroke or needing emergency surgery is less  than 1 in 500. We discussed that contrast is used during the procedure which can potentially damage the kidney. Additionally we discussed the risk of contrast induced allergic reaction, renal dysfunction, and vascular complications. I have discussed the need for DAPT if stenting is required and there are no contraindications for this. No history of bleeding problems or current bleeding, and no scheduled surgeries or procedures in the next year. Patient understands and wishes to proceed with it.  Contrast allergy: none   Blood thinner: warfarin- he was contacted by INR about holding   ASA: none, just warfarin   H/o CABG: no   DM: no   Creat/GFR: WNL 7/2024   Bleeding concerns: none   Upcoming surgeries/procedures: none                     Assessment and Plan:     Plan  Patient Instructions   Medication Changes:  None     Recommendations:  Check blood pressure at least 1 hour after medications. Call the clinic if your blood pressure is consistently greater than 130/80.     Follow-up:  Schedule exercise ABIs as previously recommended by Dr. Bell.  Coronary angiogram in [place] 8/7/2024 as scheduled  Echo 8/14/2024 as scheduled in [place]  Cardiology follow up at [facility]: Jaimie in October as scheduled    Cardiology Scheduling~[phone]  Cardiology Clinic RN~[phone] (Melina RN, Jade RN; Funmilayo RN)          Problem List as of 8/2/2024 Reviewed: 12/18/2022 12:33 PM by Sylvester Cash MD            Noted       Active Problems    1. Hyperlipidemia LDL goal <70 10/31/2010     Last Assessment & Plan 8/2/2024 Virtual Visit Written 8/2/2024  8:22 AM by Jessie Alva APRN CNP      LDL at goal  Continue statin         2. Coronary artery disease involving native coronary artery of native heart, unspecified whether angina present - Primary 8/2/2024     Overview Signed 8/2/2024  8:24 AM by Jessie Alva APRN CNP      Angio 2022 with PCI to RCA and residual 60% LAD.          Last  Assessment & Plan 8/2/2024 Virtual Visit Written 8/2/2024  8:24 AM by Jessie Alva APRN CNP      Stable angina  Continue GDMT         3. Benign essential hypertension 2/14/2022     Last Assessment & Plan 8/2/2024 Virtual Visit Written 8/2/2024  8:23 AM by Jessie Alva APRN CNP      Controlled  Continue current medications                      Thank you for allowing me to participate in this delightful patient's care.   This note was completed in part using Dragon voice recognition software. Although reviewed after completion, some word and grammatical errors may occur.    FACUNDO Ramos CNP

## 2024-08-06 ENCOUNTER — PATIENT OUTREACH (OUTPATIENT)
Dept: ENDOCRINOLOGY | Facility: CLINIC | Age: 54
End: 2024-08-06
Payer: COMMERCIAL

## 2024-08-06 DIAGNOSIS — I25.10 CORONARY ATHEROSCLEROSIS DUE TO CALCIFIED CORONARY LESION OF NATIVE ARTERY: Primary | ICD-10-CM

## 2024-08-06 DIAGNOSIS — I25.84 CORONARY ATHEROSCLEROSIS DUE TO CALCIFIED CORONARY LESION OF NATIVE ARTERY: Primary | ICD-10-CM

## 2024-08-06 RX ORDER — LIDOCAINE 40 MG/G
CREAM TOPICAL
Status: CANCELLED | OUTPATIENT
Start: 2024-08-06

## 2024-08-06 RX ORDER — ASPIRIN 81 MG/1
243 TABLET, CHEWABLE ORAL ONCE
Status: CANCELLED | OUTPATIENT
Start: 2024-08-06

## 2024-08-06 RX ORDER — POTASSIUM CHLORIDE 1500 MG/1
20 TABLET, EXTENDED RELEASE ORAL
Status: CANCELLED | OUTPATIENT
Start: 2024-08-06

## 2024-08-06 RX ORDER — SODIUM CHLORIDE 9 MG/ML
INJECTION, SOLUTION INTRAVENOUS CONTINUOUS
Status: CANCELLED | OUTPATIENT
Start: 2024-08-06

## 2024-08-06 RX ORDER — ASPIRIN 325 MG
325 TABLET ORAL ONCE
Status: CANCELLED | OUTPATIENT
Start: 2024-08-06 | End: 2024-08-06

## 2024-08-06 NOTE — PROGRESS NOTES
LVM with heart clinic team #4 RN requesting orders for heart cath scheduled 8/7/24. Maria T Lozano, RN on 8/6/2024 at 2:28 PM

## 2024-08-06 NOTE — PROGRESS NOTES
Coronary angiogram/PCI/Right Heart Cath prep instructions.     Patient is scheduled for a Coronary Angiogram at [facility] - [address], [place] [address] - Main Entrance of the Hospital on 8/7/24.  Check in time is at 6:30 am  and procedure to follow.    Patient instructed to remain NPO for solid foods 8 hours prior to arrival and may have clear liquids up to 2 hours prior to arrival.    Patient Patient does not require extra fluids prior to procedure.    Patient is not diabetic.    Patient is on warfarin/coumadin and has been instructed to hold for 4 days prior to procedure, starting   8/2/24, Take last dose of warfarin   8/3/24, NO warfarin   8/4/24, NO warfarin   8/5/24, NO warfarin, enoxaparin every 12 hours (AM and PM)   8/6/24, NO warfarin, enoxaparin AM only (no enoxaparin 24 hours prior to surgery)   .  Patient may resume warfarin/coumadin after the procedure.    Patient is not on diuretics.     Patient is not currently taking ASA and has been advised to take one 325 mg tablet the day prior and morning of the procedure.    Pt is not on a SGLT2 inhibitor.    Pt is not on a GLP-1 Agonist    Patient advised to take their other daily medications the morning of the procedure with small sips of water.     Patient advised to shower the night before and morning of their procedure with regular soap.    Verified patient does not have a contrast allergy.    Verified patient has someone available to drive them home from the hospital and can stay with them for 24 hours after the procedure.     Patient advised they will have bedrest post procedure.  Length of time is 2-6 hours and dependent on access site used for procedure.  This bedrest is to allow proper clotting of the access site to prevent bleeding.    Patient advised to notify care team with any new COVID like symptoms prior to procedure. Day of procedure phone number: Leslye at [phone]    Patient is aware of visitor  policy.    Patient expresses understanding of above instructions and denies further questions at this time.      Rosa Velásquez RN  [facility]

## 2024-08-06 NOTE — PROGRESS NOTES
Patient notified to call Thoracic surgery to set up consultation for hiatal hernia repair.  [place] [facility]If you don t hear from a representative within 2 business days, please call [phone].

## 2024-08-07 ENCOUNTER — DOCUMENTATION ONLY (OUTPATIENT)
Dept: ANTICOAGULATION | Facility: CLINIC | Age: 54
End: 2024-08-07

## 2024-08-07 ENCOUNTER — TELEPHONE (OUTPATIENT)
Dept: FAMILY MEDICINE | Facility: CLINIC | Age: 54
End: 2024-08-07

## 2024-08-07 ENCOUNTER — TRANSCRIBE ORDERS (OUTPATIENT)
Dept: OTHER | Age: 54
End: 2024-08-07

## 2024-08-07 ENCOUNTER — HOSPITAL ENCOUNTER (OUTPATIENT)
Facility: CLINIC | Age: 54
Discharge: HOME | End: 2024-08-07
Attending: INTERNAL MEDICINE | Admitting: STUDENT IN AN ORGANIZED HEALTH CARE EDUCATION/TRAINING PROGRAM
Payer: COMMERCIAL

## 2024-08-07 VITALS
RESPIRATION RATE: 16 BRPM | WEIGHT: 244.1 LBS | HEART RATE: 63 BPM | OXYGEN SATURATION: 96 % | BODY MASS INDEX: 29.72 KG/M2 | TEMPERATURE: 98.2 F | SYSTOLIC BLOOD PRESSURE: 111 MMHG | HEIGHT: 76 IN | DIASTOLIC BLOOD PRESSURE: 64 MMHG

## 2024-08-07 DIAGNOSIS — I25.10 CORONARY ATHEROSCLEROSIS DUE TO CALCIFIED CORONARY LESION OF NATIVE ARTERY: ICD-10-CM

## 2024-08-07 DIAGNOSIS — K44.9 HIATAL HERNIA: Primary | ICD-10-CM

## 2024-08-07 DIAGNOSIS — I25.84 CORONARY ATHEROSCLEROSIS DUE TO CALCIFIED CORONARY LESION OF NATIVE ARTERY: ICD-10-CM

## 2024-08-07 PROBLEM — Z98.890 STATUS POST CORONARY ANGIOGRAM: Status: ACTIVE | Noted: 2022-10-11

## 2024-08-07 LAB
ANION GAP SERPL CALCULATED.3IONS-SCNC: 6 MMOL/L (ref 7–15)
BUN SERPL-MCNC: 13.2 MG/DL (ref 6–20)
CALCIUM SERPL-MCNC: 9.2 MG/DL (ref 8.8–10.4)
CHLORIDE SERPL-SCNC: 105 MMOL/L (ref 98–107)
CREAT SERPL-MCNC: 1.01 MG/DL (ref 0.67–1.17)
EGFRCR SERPLBLD CKD-EPI 2021: 88 ML/MIN/1.73M2
ERYTHROCYTE [DISTWIDTH] IN BLOOD BY AUTOMATED COUNT: 13.2 % (ref 10–15)
GLUCOSE SERPL-MCNC: 105 MG/DL (ref 70–99)
HCO3 SERPL-SCNC: 28 MMOL/L (ref 22–29)
HCT VFR BLD AUTO: 44.6 % (ref 40–53)
HGB BLD-MCNC: 14.5 G/DL (ref 13.3–17.7)
INR PPP: 1.17 (ref 0.85–1.15)
MCH RBC QN AUTO: 27.2 PG (ref 26.5–33)
MCHC RBC AUTO-ENTMCNC: 32.5 G/DL (ref 31.5–36.5)
MCV RBC AUTO: 84 FL (ref 78–100)
PLATELET # BLD AUTO: 249 10E3/UL (ref 150–450)
POTASSIUM SERPL-SCNC: 4.1 MMOL/L (ref 3.4–5.3)
RBC # BLD AUTO: 5.33 10E6/UL (ref 4.4–5.9)
SODIUM SERPL-SCNC: 139 MMOL/L (ref 135–145)
WBC # BLD AUTO: 5.7 10E3/UL (ref 4–11)

## 2024-08-07 PROCEDURE — C1894 INTRO/SHEATH, NON-LASER: HCPCS | Performed by: INTERNAL MEDICINE

## 2024-08-07 PROCEDURE — 93458 L HRT ARTERY/VENTRICLE ANGIO: CPT | Performed by: INTERNAL MEDICINE

## 2024-08-07 PROCEDURE — 99152 MOD SED SAME PHYS/QHP 5/>YRS: CPT | Mod: GC | Performed by: INTERNAL MEDICINE

## 2024-08-07 PROCEDURE — 85027 COMPLETE CBC AUTOMATED: CPT | Performed by: INTERNAL MEDICINE

## 2024-08-07 PROCEDURE — 85610 PROTHROMBIN TIME: CPT | Performed by: INTERNAL MEDICINE

## 2024-08-07 PROCEDURE — 250N000011 HC RX IP 250 OP 636: Performed by: INTERNAL MEDICINE

## 2024-08-07 PROCEDURE — 93799 UNLISTED CV SVC/PROCEDURE: CPT | Performed by: INTERNAL MEDICINE

## 2024-08-07 PROCEDURE — 93458 L HRT ARTERY/VENTRICLE ANGIO: CPT | Mod: 26 | Performed by: INTERNAL MEDICINE

## 2024-08-07 PROCEDURE — C1769 GUIDE WIRE: HCPCS | Performed by: INTERNAL MEDICINE

## 2024-08-07 PROCEDURE — 99153 MOD SED SAME PHYS/QHP EA: CPT | Performed by: INTERNAL MEDICINE

## 2024-08-07 PROCEDURE — 93571 IV DOP VEL&/PRESS C FLO 1ST: CPT | Mod: 26 | Performed by: INTERNAL MEDICINE

## 2024-08-07 PROCEDURE — 82565 ASSAY OF CREATININE: CPT | Performed by: INTERNAL MEDICINE

## 2024-08-07 PROCEDURE — 999N000071 HC STATISTIC HEART CATH LAB OR EP LAB

## 2024-08-07 PROCEDURE — 250N000013 HC RX MED GY IP 250 OP 250 PS 637: Performed by: INTERNAL MEDICINE

## 2024-08-07 PROCEDURE — 999N000184 HC STATISTIC TELEMETRY

## 2024-08-07 PROCEDURE — 99152 MOD SED SAME PHYS/QHP 5/>YRS: CPT | Performed by: INTERNAL MEDICINE

## 2024-08-07 PROCEDURE — 250N000009 HC RX 250: Performed by: INTERNAL MEDICINE

## 2024-08-07 PROCEDURE — 36415 COLL VENOUS BLD VENIPUNCTURE: CPT | Performed by: INTERNAL MEDICINE

## 2024-08-07 PROCEDURE — 272N000001 HC OR GENERAL SUPPLY STERILE: Performed by: INTERNAL MEDICINE

## 2024-08-07 PROCEDURE — 258N000003 HC RX IP 258 OP 636: Performed by: INTERNAL MEDICINE

## 2024-08-07 RX ORDER — OXYCODONE HYDROCHLORIDE 5 MG/1
10 TABLET ORAL EVERY 4 HOURS PRN
Status: DISCONTINUED | OUTPATIENT
Start: 2024-08-07 | End: 2024-08-07 | Stop reason: HOSPADM

## 2024-08-07 RX ORDER — POTASSIUM CHLORIDE 1500 MG/1
20 TABLET, EXTENDED RELEASE ORAL
Status: DISCONTINUED | OUTPATIENT
Start: 2024-08-07 | End: 2024-08-07 | Stop reason: HOSPADM

## 2024-08-07 RX ORDER — ASPIRIN 81 MG/1
243 TABLET, CHEWABLE ORAL ONCE
Status: COMPLETED | OUTPATIENT
Start: 2024-08-07 | End: 2024-08-07

## 2024-08-07 RX ORDER — IOPAMIDOL 755 MG/ML
INJECTION, SOLUTION INTRAVASCULAR
Status: DISCONTINUED | OUTPATIENT
Start: 2024-08-07 | End: 2024-08-07 | Stop reason: HOSPADM

## 2024-08-07 RX ORDER — NITROGLYCERIN 5 MG/ML
VIAL (ML) INTRAVENOUS
Status: DISCONTINUED | OUTPATIENT
Start: 2024-08-07 | End: 2024-08-07 | Stop reason: HOSPADM

## 2024-08-07 RX ORDER — OXYCODONE HYDROCHLORIDE 5 MG/1
5 TABLET ORAL EVERY 4 HOURS PRN
Status: DISCONTINUED | OUTPATIENT
Start: 2024-08-07 | End: 2024-08-07 | Stop reason: HOSPADM

## 2024-08-07 RX ORDER — ACETAMINOPHEN 325 MG/1
650 TABLET ORAL EVERY 4 HOURS PRN
Status: DISCONTINUED | OUTPATIENT
Start: 2024-08-07 | End: 2024-08-07 | Stop reason: HOSPADM

## 2024-08-07 RX ORDER — SODIUM CHLORIDE 9 MG/ML
INJECTION, SOLUTION INTRAVENOUS CONTINUOUS
Status: DISCONTINUED | OUTPATIENT
Start: 2024-08-07 | End: 2024-08-07 | Stop reason: HOSPADM

## 2024-08-07 RX ORDER — ASPIRIN 325 MG
325 TABLET ORAL ONCE
Status: COMPLETED | OUTPATIENT
Start: 2024-08-07 | End: 2024-08-07

## 2024-08-07 RX ORDER — NALOXONE HYDROCHLORIDE 0.4 MG/ML
0.2 INJECTION, SOLUTION INTRAMUSCULAR; INTRAVENOUS; SUBCUTANEOUS
Status: DISCONTINUED | OUTPATIENT
Start: 2024-08-07 | End: 2024-08-07 | Stop reason: HOSPADM

## 2024-08-07 RX ORDER — FLUMAZENIL 0.1 MG/ML
0.2 INJECTION, SOLUTION INTRAVENOUS
Status: DISCONTINUED | OUTPATIENT
Start: 2024-08-07 | End: 2024-08-07 | Stop reason: HOSPADM

## 2024-08-07 RX ORDER — NALOXONE HYDROCHLORIDE 0.4 MG/ML
0.4 INJECTION, SOLUTION INTRAMUSCULAR; INTRAVENOUS; SUBCUTANEOUS
Status: DISCONTINUED | OUTPATIENT
Start: 2024-08-07 | End: 2024-08-07 | Stop reason: HOSPADM

## 2024-08-07 RX ORDER — FENTANYL CITRATE 50 UG/ML
25 INJECTION, SOLUTION INTRAMUSCULAR; INTRAVENOUS
Status: DISCONTINUED | OUTPATIENT
Start: 2024-08-07 | End: 2024-08-07 | Stop reason: HOSPADM

## 2024-08-07 RX ORDER — LIDOCAINE 40 MG/G
CREAM TOPICAL
Status: DISCONTINUED | OUTPATIENT
Start: 2024-08-07 | End: 2024-08-07 | Stop reason: HOSPADM

## 2024-08-07 RX ORDER — FENTANYL CITRATE 50 UG/ML
INJECTION, SOLUTION INTRAMUSCULAR; INTRAVENOUS
Status: DISCONTINUED | OUTPATIENT
Start: 2024-08-07 | End: 2024-08-07 | Stop reason: HOSPADM

## 2024-08-07 RX ORDER — ATROPINE SULFATE 0.1 MG/ML
0.5 INJECTION INTRAVENOUS
Status: DISCONTINUED | OUTPATIENT
Start: 2024-08-07 | End: 2024-08-07 | Stop reason: HOSPADM

## 2024-08-07 RX ADMIN — ASPIRIN 325 MG ORAL TABLET 325 MG: 325 PILL ORAL at 07:47

## 2024-08-07 RX ADMIN — SODIUM CHLORIDE: 9 INJECTION, SOLUTION INTRAVENOUS at 06:49

## 2024-08-07 ASSESSMENT — ACTIVITIES OF DAILY LIVING (ADL)
ADLS_ACUITY_SCORE: 35

## 2024-08-07 ASSESSMENT — ABNORMAL INVOLUNTARY MOVEMENT SCALE (AIMS): LIPS_PARIETAL: MILD

## 2024-08-07 NOTE — PROGRESS NOTES
ANTICOAGULATION  MANAGEMENT: Discharge Review    Hollis Diggs chart reviewed for anticoagulation continuity of care    Outpatient surgery/procedure on 8/7/24 for coronary angiogram.    Discharge disposition: Home    Results:    Recent labs: (last 7 days)     08/07/24  0647   INR 1.17*     Anticoagulation inpatient management:     not applicable     Anticoagulation discharge instructions:     Warfarin dosing:  Per procedure plan in TE on 7/23/24 pt was advised if ok with provider to take booster dose of warfarin the night of procedure    Bridging:  Per procedure plan in TE on 7/23/24 pt was advised to resume bridging with enoxaparin ~24 hours post procedure if ok with provider until INR >=2.0    INR goal change: No      Medication changes affecting anticoagulation: Pt took one time dose of ASA per medication list    Additional factors affecting anticoagulation: No     PLAN     No adjustment to anticoagulation plan needed    Patient not contacted    No adjustment to Anticoagulation Calendar was required    Jaskaran Quiles, RN

## 2024-08-07 NOTE — PROGRESS NOTES
1110 Report received from Wesley Parsons RN.  1125 Pt A/O. Flat affect. Gauze drsg applied to right groin puncture site. No oozing or hematoma noted. Area soft & flat. Pt denies pain. Pt's wife at bedside. Detailed update given.   1130 OOB - steady on feet. Ambulated in halls to bathroom with good jenny. No change in puncture site assessment with activity.  1203 Pt discharged per w/c to private vehicle. All personal belongings taken w/ pt.

## 2024-08-07 NOTE — TELEPHONE ENCOUNTER
----- Message from Christiano Ledezma sent at 2024 11:32 AM CDT -----  Regarding: RE: Thoracic Referral  Can schedule clinic follow up to discuss further if this is something he wants to pursue. Currently working with cardiology on review.  ----- Message -----  From: Eduard Ibarra RN  Sent: 2024   1:21 PM CDT  To: Christiano Ledezma MD  Subject: FW: Thoracic Referral                              ----- Message -----  From: Karen Dover RN  Sent: 2024  12:44 PM CDT  To: Eduard Ibarra RN  Subject: Thoracic Referral                                Eduard,    This patient was referred to surgery by the ED provider.  Dr. Petersen does not do surgery for hiatal hernias.  Please place a referral for thoracic surgery, if appropriate, for the patient to be seen in consultation for hiatal hernia repair for control of GERD.    Thank you,    Karen Dover RN, BSN  RN Care Coordinator  General and Bariatric Surgery   [facility]    Phone: [phone]  Fax: [phone]  [phone]   no concerns

## 2024-08-07 NOTE — PROGRESS NOTES
Care Suites Post Procedure Note    Patient Information  Name: Hollis Diggs  Age: 54 year old    Post Procedure  Time patient returned to Care Suites: 0915  Concerns/abnormal assessment: none  If abnormal assessment, provider notified: N/A  Plan/Other: discharge this afternoon    Wesley Parsons RN

## 2024-08-07 NOTE — PROGRESS NOTES
Care Suites Admission Nursing Note    Patient Information  Name: Hollis Diggs  Age: 54 year old  Reason for admission: angiogram   Care Suites arrival time: 0630    Visitor Information  Name: Keyla Muniz      Patient Admission/Assessment   Pre-procedure assessment complete: Yes  If abnormal assessment/labs, provider notified: N/A  NPO: Yes  Medications held per instructions/orders: Yes  Consent: deferred  If applicable, pregnancy test status: deferred  Patient oriented to room: Yes  Education/questions answered: Yes  Plan/other: angiogram    Discharge Planning  Discharge name/phone number: Keyla Muniz [phone]   Overnight post sedation caregiver: Keyla Muniz  Discharge location: [place]    Wesley Parsons RN

## 2024-08-07 NOTE — TELEPHONE ENCOUNTER
Left message for patient to call back.    - schedule appointment with Dr Ledezma to discuss need for surgery  - can use virtual for IN Person if needed.    Sydnee Jeong XRO/

## 2024-08-07 NOTE — DISCHARGE INSTRUCTIONS
Cardiac Angiogram Discharge Instructions - Radial    After you go home:    Have an adult stay with you until tomorrow.  Drink extra fluids for 2 days.  You may resume your normal diet.  No smoking       For 24 hours - due to the sedation you received:  Relax and take it easy.  Do NOT make any important or legal decisions.  Do NOT drive or operate machines at home or at work.  Do NOT drink alcohol.    Care of Wrist Puncture Site:    For the first 24 hrs - check the puncture site every 1-2 hours while awake.  It is normal to have soreness at the puncture site and mild tingling in your hand for up to 3 days.  Remove the bandaid after 24 hours. If there is minor oozing, apply another bandaid and remove it after 12 hours.  You may shower tomorrow.  Do NOT take a bath, or use a hot tub or pool for at least 3 days. Do NOT scrub the site. Do not use lotion or powder near the puncture site.           Activity:        For 2 days:   do not use your hand or arm to support your weight (such as rising from a chair)   do not bend your wrist (such as lifting a garage door).  do not lift more than 5 pounds or exercise your arm (such as tennis, golf or bowling).  Do NOT do any heavy activity such as exercise, lifting, or straining.     Bleeding:    If you start bleeding from the site in your wrist, sit down and press firmly on/above the site for 10 minutes.   Once bleeding stops, keep arm still for 2 hours.   Call [facility] as soon as you can.       Call 911 right away if you have heavy bleeding or bleeding that does not stop.      Medicines:    If you are taking an antiplatelet medication such as Plavix, Brilinta or Effient, do not stop taking it until you talk to your cardiologist.      If you are on Metformin (Glucophage), do not restart it until you have blood tests (within 2 to 3 days after discharge).  After you have your blood drawn, you may restart the Metformin.   Take your medications, including blood thinners, unless your  provider tells you not to.    If you take Coumadin (Warfarin), have your INR checked by your provider in  3-5 days. Call your clinic to schedule this.  If you have stopped any medicines, check with your provider about when to restart them.    Follow Up Appointments:    Follow up with [facility] Nurse Practitioner at [facility] Clinic of patient preference in 7-10 days.    Call the clinic if:    You have a large or growing hard lump around the site.  The site is red, swollen, hot or tender.  Blood or fluid is draining from the site.  You have chills or a fever greater than 101 F (38 C).  Your arm feels numb, cool or changes color.  You have hives, a rash or unusual itching.  Any questions or concerns.          [facility] [place]:    [phone] [facility] (7 days a week)    Or you may contact your provider via My Chart

## 2024-08-07 NOTE — PRE-PROCEDURE
GENERAL PRE-PROCEDURE:   Procedure:  Heart catheterization possible intervention  Date/Time:  8/7/2024 8:11 AM    Risks and benefits: Risks, benefits and alternatives were discussed    Consent given by:  Patient  Patient states understanding of procedure being performed: Yes    Patient's understanding of procedure matches consent: Yes    Procedure consent matches procedure scheduled: Yes    Expected level of sedation:  Moderate  Appropriately NPO:  Yes  Lungs:  Lungs clear with good breath sounds bilaterally  Heart:  Normal heart sounds and rate  History & Physical reviewed:  History and physical reviewed and no updates needed  Statement of review:  I have reviewed the lab findings, diagnostic data, medications, and the plan for sedation  Risk benefit indication for cath poss interv discussed with pt and wife  he has had before  Mi cva clot tat or dapt renal or vasc damage death emergent surg   all questions answered and they wish to proceed

## 2024-08-07 NOTE — Clinical Note
Hemodynamic equipment used: 5 lead ECG, [employer] With 3 Leads, Machine BP Cuff and pulse oximeter probe.

## 2024-08-08 ENCOUNTER — PATIENT OUTREACH (OUTPATIENT)
Dept: ONCOLOGY | Facility: CLINIC | Age: 54
End: 2024-08-08
Payer: COMMERCIAL

## 2024-08-08 DIAGNOSIS — K44.9 HIATAL HERNIA: Primary | ICD-10-CM

## 2024-08-08 NOTE — PROGRESS NOTES
New Patient Oncology Nurse Navigator Note     Referring provider: Dr. Petersen    Referring Clinic/Organization: [facility]  Referred to: Thoracic Surgery  Requested provider (if applicable): First available - did not specify   Referral Received: 08/07/24       Evaluation for :   Diagnosis   K44.9 (ICD-10-CM) - Hiatal hernia     Clinical History (per Nurse review of records provided):      03/30/2023 CT Chest w/ contrast (bookmarked) showed:   IMPRESSION:   1.  Left posterior fifth rib mixed sclerosis and lucency again  identified. When reviewing an older chest x-ray from 5/9/2007, this  appears present and stable in size. Therefore, it is suggestive for a  nonaggressive process.  2.  No acute abnormality is seen.  3.  Stable technically indeterminate small pulmonary nodule on the  right, see below for follow-up if applicable.  4.  Coronary artery calcifications.  5.  Stable prominent hiatal hernia.    05/03/2022 Upper GI endoscopy (bookmarked) showed:   Impression:            - Tortuous esophagus.                          - 5 cm hiatal hernia.                          - Erythematous mucosa in the antrum. Biopsied.                          - Normal examined duodenum.     Clinical Assessment / Barriers to Care (Per Nurse):  Never smoker  Records Location: [facility]   Records Needed: None  Additional testing needed prior to consult: CT Chest    SURESH OsorioN, RN, OCN  [facility] Oncology Nurse Navigator  [phone] / [phone]

## 2024-08-09 ENCOUNTER — PRE VISIT (OUTPATIENT)
Dept: SURGERY | Facility: CLINIC | Age: 54
End: 2024-08-09

## 2024-08-09 ENCOUNTER — MYC REFILL (OUTPATIENT)
Dept: FAMILY MEDICINE | Facility: CLINIC | Age: 54
End: 2024-08-09

## 2024-08-09 ENCOUNTER — TELEPHONE (OUTPATIENT)
Dept: CARDIOLOGY | Facility: CLINIC | Age: 54
End: 2024-08-09

## 2024-08-09 DIAGNOSIS — M54.16 LUMBAR RADICULOPATHY: ICD-10-CM

## 2024-08-09 NOTE — TELEPHONE ENCOUNTER
Patient was admitted to [facility] on 8/7/24 with history of CAD who presents for OP coronary angiogram and possible PCI given abnormal stress test.     8/7/24: Coronary angiogram via RFA showed:      Ost Cx lesion is 30% stenosed.    RPAV lesion is 40% stenosed.    Prox LAD to Mid LAD lesion is 60% stenosed.    Prox RCA-1 lesion is 45% stenosed.    Left ventricular filling pressures are normal.    Hemodynamic data has been modified in Epic per physician review.     Moderate ISR of previously placed RCA stent. Not likely flow limiting  Moderate nonhemodynamically significant disease of the mid LAD as previously described.  iFR measurement 0.93.  Normal LVEDP 10mmHg  Successful, uncomplicated RFA access. Continue ongoing medical Rx recommended.    No medication changes made.    Called patient to discuss any post hospital d/c questions he may have and confirm f/u appts.     Patient denied any SOB, chest pain or lightheadedness.     RFA cardiac cath site is without bleeding, swelling, redness or signs of infection.     RN confirmed with patient that he is scheduled for an echo on 8/14/24 at 1400, a chest CT on 9/23/24 at 0615, and an OV on 10/8/24 at 0840 all at our [place] location.    Patient advised to call clinic with any cardiac related questions or concerns prior to this beatriz't. Patient verbalized understanding and agreed with plan. SHU Casanova RN.

## 2024-08-12 ENCOUNTER — ANTICOAGULATION THERAPY VISIT (OUTPATIENT)
Dept: ANTICOAGULATION | Facility: CLINIC | Age: 54
End: 2024-08-12

## 2024-08-12 ENCOUNTER — LAB (OUTPATIENT)
Dept: LAB | Facility: CLINIC | Age: 54
End: 2024-08-12
Payer: COMMERCIAL

## 2024-08-12 DIAGNOSIS — I82.4Y9 DEEP VEIN THROMBOSIS (DVT) OF PROXIMAL LOWER EXTREMITY, UNSPECIFIED CHRONICITY, UNSPECIFIED LATERALITY (H): ICD-10-CM

## 2024-08-12 DIAGNOSIS — D68.61 ANTIPHOSPHOLIPID SYNDROME (H): ICD-10-CM

## 2024-08-12 DIAGNOSIS — Z79.01 LONG TERM CURRENT USE OF ANTICOAGULANT THERAPY: Primary | ICD-10-CM

## 2024-08-12 LAB — INR BLD: 1.6 (ref 0.9–1.1)

## 2024-08-12 PROCEDURE — 36416 COLLJ CAPILLARY BLOOD SPEC: CPT

## 2024-08-12 PROCEDURE — 85610 PROTHROMBIN TIME: CPT

## 2024-08-12 NOTE — PROGRESS NOTES
ANTICOAGULATION MANAGEMENT     Hollis Diggs 54 year old male is on warfarin with subtherapeutic INR result. (Goal INR 2.0-3.0)    Recent labs: (last 7 days)     08/12/24  1445   INR 1.6*       ASSESSMENT     Source(s): Chart Review and Patient/Caregiver Call     Warfarin doses taken: Booster dose(s) recently taken which may be affecting INR  Diet: No new diet changes identified  Medication/supplement changes: None noted  New illness, injury, or hospitalization: Yes: Angiogram on 8/7/24  Signs or symptoms of bleeding or clotting: No  Previous result: Subtherapeutic  Additional findings: Bridging with Enoxaparin until INR >= 2.0       PLAN     Recommended plan for temporary change(s) affecting INR     Dosing Instructions: booster dose then continue your current warfarin dose with next INR in 2 days       Summary  As of 8/12/2024      Full warfarin instructions:  8/12: 7.5 mg; Otherwise 5 mg every Mon, Wed, Fri; 7.5 mg all other days   Next INR check:  8/14/2024               Telephone call with Hollis who verbalizes understanding and agrees to plan and who agrees to plan and repeated back plan correctly    Lab visit scheduled    Education provided: Contact [phone] with any changes, questions or concerns.     Plan made per [facility] anticoagulation protocol    Jesse Flores RN  Anticoagulation Clinic  8/12/2024    _______________________________________________________________________     Anticoagulation Episode Summary       Current INR goal:  2.0-3.0   TTR:  77.5% (1 y)   Target end date:  Indefinite   Send INR reminders to:  TIMOTEO ALVAREZ    Indications    DVT (deep venous thrombosis) (H) (Resolved) [I82.409]  Long-term (current) use of anticoagulants [Z79.01] [Z79.01]  Deep vein thrombosis (DVT) of proximal lower extremity  unspecified chronicity  unspecified laterality (H) [I82.4Y9]  Antiphospholipid syndrome (H24) [D68.61]             Comments:               Anticoagulation Care Providers        Provider Role Specialty Phone number    Sylvester Cash MD Referring Family Practice     Christiano Ledezma MD Referring Family Medicine [phone]

## 2024-08-13 RX ORDER — OXYCODONE AND ACETAMINOPHEN 5; 325 MG/1; MG/1
1 TABLET ORAL EVERY 6 HOURS PRN
Qty: 30 TABLET | Refills: 0 | Status: SHIPPED | OUTPATIENT
Start: 2024-08-13 | End: 2024-09-11

## 2024-08-14 ENCOUNTER — LAB (OUTPATIENT)
Dept: LAB | Facility: CLINIC | Age: 54
End: 2024-08-14
Payer: COMMERCIAL

## 2024-08-14 ENCOUNTER — HOSPITAL ENCOUNTER (OUTPATIENT)
Dept: CARDIOLOGY | Facility: CLINIC | Age: 54
Discharge: HOME | End: 2024-08-14
Attending: INTERNAL MEDICINE | Admitting: INTERNAL MEDICINE
Payer: COMMERCIAL

## 2024-08-14 ENCOUNTER — ANTICOAGULATION THERAPY VISIT (OUTPATIENT)
Dept: ANTICOAGULATION | Facility: CLINIC | Age: 54
End: 2024-08-14

## 2024-08-14 DIAGNOSIS — D68.61 ANTIPHOSPHOLIPID SYNDROME (H): ICD-10-CM

## 2024-08-14 DIAGNOSIS — I82.4Y9 DEEP VEIN THROMBOSIS (DVT) OF PROXIMAL LOWER EXTREMITY, UNSPECIFIED CHRONICITY, UNSPECIFIED LATERALITY (H): ICD-10-CM

## 2024-08-14 DIAGNOSIS — I25.10 CORONARY ATHEROSCLEROSIS DUE TO CALCIFIED CORONARY LESION OF NATIVE ARTERY: ICD-10-CM

## 2024-08-14 DIAGNOSIS — Z79.01 LONG TERM CURRENT USE OF ANTICOAGULANT THERAPY: Primary | ICD-10-CM

## 2024-08-14 DIAGNOSIS — I25.84 CORONARY ATHEROSCLEROSIS DUE TO CALCIFIED CORONARY LESION OF NATIVE ARTERY: ICD-10-CM

## 2024-08-14 LAB
BI-PLANE LVEF ECHO: NORMAL
INR BLD: 1.8 (ref 0.9–1.1)

## 2024-08-14 PROCEDURE — 999N000208 ECHOCARDIOGRAM COMPLETE

## 2024-08-14 PROCEDURE — 85610 PROTHROMBIN TIME: CPT

## 2024-08-14 PROCEDURE — 93306 TTE W/DOPPLER COMPLETE: CPT | Mod: 26 | Performed by: INTERNAL MEDICINE

## 2024-08-14 PROCEDURE — 36416 COLLJ CAPILLARY BLOOD SPEC: CPT

## 2024-08-14 PROCEDURE — C8929 TTE W OR WO FOL WCON,DOPPLER: HCPCS

## 2024-08-14 PROCEDURE — 255N000002 HC RX 255 OP 636: Performed by: INTERNAL MEDICINE

## 2024-08-14 RX ADMIN — HUMAN ALBUMIN MICROSPHERES AND PERFLUTREN 6 ML: 10; .22 INJECTION, SOLUTION INTRAVENOUS at 14:14

## 2024-08-14 NOTE — PROGRESS NOTES
ANTICOAGULATION MANAGEMENT     Hollis Diggs 54 year old male is on warfarin with subtherapeutic INR result. (Goal INR 2.0-3.0)    Recent labs: (last 7 days)     08/14/24  1329   INR 1.8*       ASSESSMENT     Source(s): Chart Review and Patient/Caregiver Call     Warfarin doses taken: Booster dose(s) recently taken which may be affecting INR  Diet: No new diet changes identified  Medication/supplement changes: None noted  New illness, injury, or hospitalization: No  Signs or symptoms of bleeding or clotting: No  Previous result: Subtherapeutic  Additional findings: None and Bridging with Enoxaparin until INR >= 2.0       PLAN     Recommended plan for temporary change(s) affecting INR     Dosing Instructions: booster dose then continue your current warfarin dose with next INR in 2 days. Continue Lovenox. Writer did request patient hold off on his Lovenox for morning of the INR.        Summary  As of 8/14/2024      Full warfarin instructions:  8/14: 10 mg; Otherwise 5 mg every Mon, Wed, Fri; 7.5 mg all other days   Next INR check:  8/16/2024               Telephone call with Hollis who verbalizes understanding and agrees to plan    Lab visit scheduled    Education provided: Lovenox/Heparin education provided: role of enoxaparin/heparin in bridge therapy   Contact [phone] with any changes, questions or concerns.     Plan made per [facility] anticoagulation protocol    Claudia GARCIA RN  Anticoagulation Clinic  8/14/2024    _______________________________________________________________________     Anticoagulation Episode Summary       Current INR goal:  2.0-3.0   TTR:  77.6% (1 y)   Target end date:  Indefinite   Send INR reminders to:  TIMOTEO ALVAREZ    Indications    DVT (deep venous thrombosis) (H) (Resolved) [I82.409]  Long-term (current) use of anticoagulants [Z79.01] [Z79.01]  Deep vein thrombosis (DVT) of proximal lower extremity  unspecified chronicity  unspecified laterality (H) [I82.4Y9]  Antiphospholipid  syndrome (H24) [D68.61]             Comments:               Anticoagulation Care Providers       Provider Role Specialty Phone number    Sylvester Cash MD Referring Family Practice     Christiano Ledezma MD Referring Family Medicine [phone]

## 2024-08-15 NOTE — TELEPHONE ENCOUNTER
Attempted contacting patient, no answer, left a voicemail asking for patient to return call.     See message below from Richie Aguilera,

## 2024-08-16 ENCOUNTER — ANTICOAGULATION THERAPY VISIT (OUTPATIENT)
Dept: ANTICOAGULATION | Facility: CLINIC | Age: 54
End: 2024-08-16

## 2024-08-16 ENCOUNTER — TELEPHONE (OUTPATIENT)
Dept: GASTROENTEROLOGY | Facility: CLINIC | Age: 54
End: 2024-08-16

## 2024-08-16 ENCOUNTER — LAB (OUTPATIENT)
Dept: LAB | Facility: CLINIC | Age: 54
End: 2024-08-16
Payer: COMMERCIAL

## 2024-08-16 ENCOUNTER — OFFICE VISIT (OUTPATIENT)
Dept: FAMILY MEDICINE | Facility: CLINIC | Age: 54
End: 2024-08-16
Payer: COMMERCIAL

## 2024-08-16 VITALS
TEMPERATURE: 97.2 F | OXYGEN SATURATION: 97 % | BODY MASS INDEX: 33.78 KG/M2 | DIASTOLIC BLOOD PRESSURE: 60 MMHG | HEART RATE: 76 BPM | SYSTOLIC BLOOD PRESSURE: 116 MMHG | WEIGHT: 249.4 LBS | HEIGHT: 72 IN | RESPIRATION RATE: 14 BRPM

## 2024-08-16 DIAGNOSIS — D68.61 ANTIPHOSPHOLIPID SYNDROME (H): ICD-10-CM

## 2024-08-16 DIAGNOSIS — K21.9 GASTROESOPHAGEAL REFLUX DISEASE WITHOUT ESOPHAGITIS: ICD-10-CM

## 2024-08-16 DIAGNOSIS — I25.84 CORONARY ATHEROSCLEROSIS DUE TO CALCIFIED CORONARY LESION OF NATIVE ARTERY: ICD-10-CM

## 2024-08-16 DIAGNOSIS — G47.33 OSA (OBSTRUCTIVE SLEEP APNEA): ICD-10-CM

## 2024-08-16 DIAGNOSIS — E78.5 HYPERLIPIDEMIA LDL GOAL <70: ICD-10-CM

## 2024-08-16 DIAGNOSIS — I82.4Y9 DEEP VEIN THROMBOSIS (DVT) OF PROXIMAL LOWER EXTREMITY, UNSPECIFIED CHRONICITY, UNSPECIFIED LATERALITY (H): ICD-10-CM

## 2024-08-16 DIAGNOSIS — F11.90 CHRONIC, CONTINUOUS USE OF OPIOIDS: ICD-10-CM

## 2024-08-16 DIAGNOSIS — Z79.01 LONG TERM CURRENT USE OF ANTICOAGULANT THERAPY: ICD-10-CM

## 2024-08-16 DIAGNOSIS — K44.9 HIATAL HERNIA: Primary | ICD-10-CM

## 2024-08-16 DIAGNOSIS — Z79.01 LONG TERM CURRENT USE OF ANTICOAGULANT THERAPY: Primary | ICD-10-CM

## 2024-08-16 DIAGNOSIS — I25.10 CORONARY ATHEROSCLEROSIS DUE TO CALCIFIED CORONARY LESION OF NATIVE ARTERY: ICD-10-CM

## 2024-08-16 LAB — INR BLD: 2.1 (ref 0.9–1.1)

## 2024-08-16 PROCEDURE — 99214 OFFICE O/P EST MOD 30 MIN: CPT | Performed by: STUDENT IN AN ORGANIZED HEALTH CARE EDUCATION/TRAINING PROGRAM

## 2024-08-16 PROCEDURE — 36416 COLLJ CAPILLARY BLOOD SPEC: CPT

## 2024-08-16 PROCEDURE — G2211 COMPLEX E/M VISIT ADD ON: HCPCS | Performed by: STUDENT IN AN ORGANIZED HEALTH CARE EDUCATION/TRAINING PROGRAM

## 2024-08-16 PROCEDURE — 85610 PROTHROMBIN TIME: CPT

## 2024-08-16 ASSESSMENT — ANXIETY QUESTIONNAIRES
2. NOT BEING ABLE TO STOP OR CONTROL WORRYING: NOT AT ALL
7. FEELING AFRAID AS IF SOMETHING AWFUL MIGHT HAPPEN: NOT AT ALL
GAD7 TOTAL SCORE: 0
1. FEELING NERVOUS, ANXIOUS, OR ON EDGE: NOT AT ALL
GAD7 TOTAL SCORE: 0
IF YOU CHECKED OFF ANY PROBLEMS ON THIS QUESTIONNAIRE, HOW DIFFICULT HAVE THESE PROBLEMS MADE IT FOR YOU TO DO YOUR WORK, TAKE CARE OF THINGS AT HOME, OR GET ALONG WITH OTHER PEOPLE: NOT DIFFICULT AT ALL
3. WORRYING TOO MUCH ABOUT DIFFERENT THINGS: NOT AT ALL
6. BECOMING EASILY ANNOYED OR IRRITABLE: NOT AT ALL
5. BEING SO RESTLESS THAT IT IS HARD TO SIT STILL: NOT AT ALL

## 2024-08-16 ASSESSMENT — PATIENT HEALTH QUESTIONNAIRE - PHQ9
SUM OF ALL RESPONSES TO PHQ QUESTIONS 1-9: 0
5. POOR APPETITE OR OVEREATING: NOT AT ALL

## 2024-08-16 ASSESSMENT — PAIN SCALES - GENERAL: PAINLEVEL: SEVERE PAIN (6)

## 2024-08-16 NOTE — PROGRESS NOTES
ANTICOAGULATION MANAGEMENT     Hollis Diggs 54 year old male is on warfarin with therapeutic INR result. (Goal INR 2.0-3.0)    Recent labs: (last 7 days)     08/16/24  0758   INR 2.1*       ASSESSMENT     Source(s): Chart Review  Previous INR was Subtherapeutic  Medication, diet, health changes since last INR: Patient was bridging with Lovenox until INR was >= 2.0.         PLAN     Recommended plan for temporary change(s) affecting INR     Dosing Instructions: Continue your current warfarin dose with next INR in 1 week. Patient may stop Lovenox injections.        Summary  As of 8/16/2024      Full warfarin instructions:  5 mg every Mon, Wed, Fri; 7.5 mg all other days   Next INR check:  8/23/2024               Detailed voice message left for Hollis with dosing instructions and follow up date.   Sent [employer] message with dosing and follow up instructions    Contact [phone] to schedule and with any changes, questions or concerns.     Education provided: Contact [phone] with any changes, questions or concerns.     Plan made per [facility] anticoagulation protocol    Claudia GARCIA RN  Anticoagulation Clinic  8/16/2024    _______________________________________________________________________     Anticoagulation Episode Summary       Current INR goal:  2.0-3.0   TTR:  77.8% (1 y)   Target end date:  Indefinite   Send INR reminders to:  TIMOTEO ALVAREZ    Indications    DVT (deep venous thrombosis) (H) (Resolved) [I82.409]  Long-term (current) use of anticoagulants [Z79.01] [Z79.01]  Deep vein thrombosis (DVT) of proximal lower extremity  unspecified chronicity  unspecified laterality (H) [I82.4Y9]  Antiphospholipid syndrome (H24) [D68.61]             Comments:               Anticoagulation Care Providers       Provider Role Specialty Phone number    Sylvester Cash MD Referring Family Practice     Christiano Ledezma MD Referring Family Medicine [phone]

## 2024-08-16 NOTE — TELEPHONE ENCOUNTER
Patient's PCP ordered Hollis an EGD due to hiatal hernia with pain.     Pre Assessment RN Review    Focused Assessments    Cardiac Review      Has the patient had a stent placed in the last year?  No. Patient has hx of hospitalization for a cardiac event/procedure in the last 6 months. Contact cardiologist and ordering provider to discuss appropriateness of procedure and recommended delay of procedure for at least 6 months. Appropriateness of procedure will be reevaluated according to provider recommendation.    Patient does need MAC based on his ongoing opioid use, and hospital setting as outpatient due to severe DEDE and recent cardiac issues.      Scheduling Status & Recommendations    Delay scheduling, awaiting clinical input. Message sent to Dr. Mohr and Dr. Ledezma.

## 2024-08-16 NOTE — TELEPHONE ENCOUNTER
"Patient returned call to \"cj\".  He is wanting appointment to discuss surgery.  Appointment made for today;  9:40am arrival time Dr. Ledezma.  Melissa BOSE RN  "

## 2024-08-16 NOTE — PROGRESS NOTES
Assessment & Plan   Problem List Items Addressed This Visit          Respiratory    Hiatal hernia - Primary    Relevant Orders    Adult Gen Surg  Referral    Adult GI  Referral - Procedure Only    DEDE (obstructive sleep apnea)       Digestive    Gastroesophageal reflux disease without esophagitis       Endocrine    Hyperlipidemia LDL goal <70       Circulatory    Deep vein thrombosis (DVT) of proximal lower extremity, unspecified chronicity, unspecified laterality (H)    Coronary atherosclerosis due to calcified coronary lesion of native artery       Immune    Antiphospholipid syndrome (H24)       Other    Long-term (current) use of anticoagulants [Z79.01]    Chronic, continuous use of opioids        Plan to increase Protonix to twice daily and repeat EGD now.  Will have him follow-up with surgery to discuss other surgical options for his hernia as it does seem to be the cause of his symptoms with unchanged cardiac workup and postprandial symptoms without significant exertional symptoms. [facility] paperwork for his warfarin monitor completed today    The longitudinal plan of care for the diagnosis(es)/condition(s) as documented were addressed during this visit. Due to the added complexity in care, I will continue to support Hollis in the subsequent management and with ongoing continuity of care.      Subjective   Hollis is a 54 year old, presenting for the following health issues:  Hernia        8/16/2024     9:44 AM   Additional Questions   Roomed by Melina rider     History of Present Illness       Reason for visit:  Hernia    He eats 2-3 servings of fruits and vegetables daily.He consumes 2 sweetened beverage(s) daily.He exercises with enough effort to increase his heart rate 30 to 60 minutes per day.  He exercises with enough effort to increase his heart rate 3 or less days per week.   He is taking medications regularly.     Patient with recurrent discomfort after eating and continued reflux symptoms  "despite PPI.  Minimal dysphagia.  Following with cardiology and no changes with recent cath.  Recent ER visits with unchanged workup.  Recommended surgical follow-up.  Otherwise no acute changes or GI symptoms.  No nausea or vomiting now.  No blood in stool or urine.  No exertional symptoms.        Review of Systems  Constitutional, HEENT, cardiovascular, pulmonary, GI, , musculoskeletal, neuro, skin, endocrine and psych systems are negative, except as otherwise noted.      Objective    /60   Pulse 76   Temp 97.2  F (36.2  C)   Resp 14   Ht 1.825 m (5' 11.85\")   Wt 113.1 kg (249 lb 6.4 oz)   SpO2 97%   BMI 33.97 kg/m    Body mass index is 33.97 kg/m .  Physical Exam   GENERAL: alert and no distress  EYES: Eyes grossly normal to inspection, PERRL and conjunctivae and sclerae normal  HENT: enose and mouth without ulcers or lesions  RESP: lungs clear to auscultation - no rales, rhonchi or wheezes  CV: regular rate and rhythm, normal S1 S2, no S3 or S4, no murmur, click or rub, no peripheral edema  ABDOMEN: soft, nontender, no hepatosplenomegaly, no masses and bowel sounds normal  MS: no gross musculoskeletal defects noted, no edema  SKIN: no suspicious lesions or rashes  NEURO:  mentation intact and speech normal  PSYCH: mentation appears normal, affect normal/bright          Signed Electronically by: Christiano Ledezma MD    "

## 2024-08-22 ENCOUNTER — LAB (OUTPATIENT)
Dept: LAB | Facility: CLINIC | Age: 54
End: 2024-08-22
Payer: COMMERCIAL

## 2024-08-22 ENCOUNTER — ANTICOAGULATION THERAPY VISIT (OUTPATIENT)
Dept: ANTICOAGULATION | Facility: CLINIC | Age: 54
End: 2024-08-22

## 2024-08-22 DIAGNOSIS — I82.4Y9 DEEP VEIN THROMBOSIS (DVT) OF PROXIMAL LOWER EXTREMITY, UNSPECIFIED CHRONICITY, UNSPECIFIED LATERALITY (H): ICD-10-CM

## 2024-08-22 DIAGNOSIS — D68.61 ANTIPHOSPHOLIPID SYNDROME (H): ICD-10-CM

## 2024-08-22 DIAGNOSIS — Z79.01 LONG TERM CURRENT USE OF ANTICOAGULANT THERAPY: Primary | ICD-10-CM

## 2024-08-22 LAB — INR BLD: 1.9 (ref 0.9–1.1)

## 2024-08-22 PROCEDURE — 36416 COLLJ CAPILLARY BLOOD SPEC: CPT

## 2024-08-22 PROCEDURE — 85610 PROTHROMBIN TIME: CPT

## 2024-08-22 NOTE — PROGRESS NOTES
ANTICOAGULATION MANAGEMENT     Hollis Diggs 54 year old male is on warfarin with subtherapeutic INR result. (Goal INR 2.0-3.0)    Recent labs: (last 7 days)     08/22/24  1456   INR 1.9*       ASSESSMENT     Source(s): Chart Review and Patient/Caregiver Call     Warfarin doses taken: Warfarin taken as instructed  Diet: Protein supplement/shake started which maybe affecting INR but patient plans to continue these. He started them about 2 weeks ago  Medication/supplement changes:  protonix dose increased on 8-16 No interaction anticipated  New illness, injury, or hospitalization: No  Signs or symptoms of bleeding or clotting: No  Previous result: Therapeutic last visit; previously outside of goal range  Additional findings: None       PLAN     Recommended plan for ongoing change(s) affecting INR     Dosing Instructions: Increase your warfarin dose (5.6% change) with next INR in 12 days       Summary  As of 8/22/2024      Full warfarin instructions:  5 mg every Mon, Fri; 7.5 mg all other days   Next INR check:  9/3/2024               Telephone call with Hollis who verbalizes understanding and agrees to plan    Lab visit scheduled    Education provided: Please call back if any changes to your diet, medications or how you've been taking warfarin  Dietary considerations: importance of consistent vitamin K intake, Impact of protein intake on INR , and importance of notifying [facility] to changes in diet  Contact [phone] with any changes, questions or concerns.     Plan made per [facility] anticoagulation protocol    Mary Meyer RN  Anticoagulation Clinic  8/22/2024    _______________________________________________________________________     Anticoagulation Episode Summary       Current INR goal:  2.0-3.0   TTR:  78.7% (1 y)   Target end date:  Indefinite   Send INR reminders to:  TIMOTEO ALVAREZ    Indications    DVT (deep venous thrombosis) (H) (Resolved) [I82.409]  Long-term (current) use of anticoagulants [Z79.01]  [Z79.01]  Deep vein thrombosis (DVT) of proximal lower extremity  unspecified chronicity  unspecified laterality (H) [I82.4Y9]  Antiphospholipid syndrome (H24) [D68.61]             Comments:               Anticoagulation Care Providers       Provider Role Specialty Phone number    Sylvester Cash MD Referring Family Practice     Christiano Ledezma MD Referring Family Medicine [phone]               Valtrex Counseling: I discussed with the patient the risks of valacyclovir including but not limited to kidney damage, nausea, vomiting and severe allergy.  The patient understands that if the infection seems to be worsening or is not improving, they are to call.

## 2024-08-23 ENCOUNTER — TELEPHONE (OUTPATIENT)
Dept: GASTROENTEROLOGY | Facility: CLINIC | Age: 54
End: 2024-08-23
Payer: COMMERCIAL

## 2024-08-23 ENCOUNTER — HOSPITAL ENCOUNTER (OUTPATIENT)
Facility: CLINIC | Age: 54
End: 2024-08-23
Attending: SURGERY | Admitting: SURGERY
Payer: COMMERCIAL

## 2024-08-23 NOTE — TELEPHONE ENCOUNTER
"Endoscopy Scheduling Screen    Have you had a positive Covid test in the last 14 days?  No    What is your communication preference for Instructions and/or Bowel Prep?   MyChart    What insurance is in the chart?  Other:  [employer]    Ordering/Referring Provider:   MARY HART        (If ordering provider performs procedure, schedule with ordering provider unless otherwise instructed. )    BMI: Estimated body mass index is 33.97 kg/m  as calculated from the following:    Height as of 8/16/24: 1.825 m (5' 11.85\").    Weight as of 8/16/24: 113.1 kg (249 lb 6.4 oz).          Sedation Ordered  moderate sedation.   If patient BMI > 50 do not schedule in ASC.    If patient BMI > 45 do not schedule at [facility].    Are you taking methadone or Suboxone?  No    Have you had difficulties, pain, or discomfort during past endoscopy procedures?  No    Are you taking any prescription medications for pain 3 or more times per week?   YES, RN review needed to determine if MAC is required.  (RN Review required.)       Do you have a history of malignant hyperthermia?  No    (Females) Are you currently pregnant?   No     Have you been diagnosed or told you have pulmonary hypertension?   No    Do you have an LVAD?  No    Have you been told you have moderate to severe sleep apnea?  Yes (RN Review required for scheduling unless scheduling in Hospital.)    Have you been told you have COPD, asthma, or any other lung disease?  No    Do you have any heart conditions?  Yes     In the past year, have you had any hospitalizations for heart related issues including cardiomyopathy, heart attack, or stent placement?  No    Do you have any implantable devices in your body (pacemaker, ICD)?  Other stent    Do you take nitroglycerine?  No    Have you ever had or are you waiting for an organ transplant?  No. Continue scheduling, no site restrictions.    Have you had a stroke or transient ischemic attack (TIA aka \"mini stroke\" in the last 6 " "months?   No    Have you been diagnosed with or been told you have cirrhosis of the liver?   No    Are you currently on dialysis?   No    Do you need assistance transferring?   No    BMI: Estimated body mass index is 33.97 kg/m  as calculated from the following:    Height as of 8/16/24: 1.825 m (5' 11.85\").    Weight as of 8/16/24: 113.1 kg (249 lb 6.4 oz).     Is patients BMI > 40 and scheduling location UP?  No    Do you take an injectable medication for weight loss or diabetes (excluding insulin)?  No    Do you take the medication Naltrexone?  No    Do you take blood thinners?  Yes     Are you taking Effient/Prasugrel?  No, you must contact your prescribing provider for direction on holding or bridging with a different medication.       Prep   Are you currently on dialysis or do you have chronic kidney disease?  No    Do you have a diagnosis of diabetes?  No    Do you have a diagnosis of cystic fibrosis (CF)?  No    On a regular basis do you go 3 -5 days between bowel movements?  No    BMI > 40?  No    Preferred Pharmacy:        [facility] [address] [place] [address] - [address] [place] [address]  [address] [place] [address]  Phone: [phone] Fax: [phone]      Final Scheduling Details     Procedure scheduled  Upper endoscopy (EGD)    Surgeon:  Joselyn     Date of procedure:  10/24     Pre-OP / PAC:   No - Not required for this site.    Location  [facility] - Patient preference.    Sedation   MAC/Deep Sedation - Per RN assessment.      Patient Reminders:   You will receive a call from a Nurse to review instructions and health history.  This assessment must be completed prior to your procedure.  Failure to complete the Nurse assessment may result in the procedure being cancelled.      On the day of your procedure, please designate an adult(s) who can drive you home stay with you for the next 24 hours. The medicines used in the exam will make you sleepy. You will not be able to drive.      You cannot take public transportation, " ride share services, or non-medical taxi service without a responsible caregiver.  Medical transport services are allowed with the requirement that a responsible caregiver will receive you at your destination.  We require that drivers and caregivers are confirmed prior to your procedure.

## 2024-09-02 DIAGNOSIS — F41.9 ANXIETY: ICD-10-CM

## 2024-09-03 ENCOUNTER — ANTICOAGULATION THERAPY VISIT (OUTPATIENT)
Dept: ANTICOAGULATION | Facility: CLINIC | Age: 54
End: 2024-09-03

## 2024-09-03 ENCOUNTER — LAB (OUTPATIENT)
Dept: LAB | Facility: CLINIC | Age: 54
End: 2024-09-03
Payer: COMMERCIAL

## 2024-09-03 DIAGNOSIS — I82.4Y9 DEEP VEIN THROMBOSIS (DVT) OF PROXIMAL LOWER EXTREMITY, UNSPECIFIED CHRONICITY, UNSPECIFIED LATERALITY (H): ICD-10-CM

## 2024-09-03 DIAGNOSIS — D68.61 ANTIPHOSPHOLIPID SYNDROME (H): ICD-10-CM

## 2024-09-03 DIAGNOSIS — Z79.01 LONG TERM CURRENT USE OF ANTICOAGULANT THERAPY: Primary | ICD-10-CM

## 2024-09-03 LAB — INR BLD: 1.8 (ref 0.9–1.1)

## 2024-09-03 PROCEDURE — 85610 PROTHROMBIN TIME: CPT

## 2024-09-03 PROCEDURE — 36416 COLLJ CAPILLARY BLOOD SPEC: CPT

## 2024-09-03 RX ORDER — GABAPENTIN 800 MG/1
800 TABLET ORAL 3 TIMES DAILY
Qty: 90 TABLET | Refills: 2 | Status: SHIPPED | OUTPATIENT
Start: 2024-09-03

## 2024-09-03 NOTE — PROGRESS NOTES
ANTICOAGULATION MANAGEMENT     Hollis Diggs 54 year old male is on warfarin with subtherapeutic INR result. (Goal INR 2.0-3.0)    Recent labs: (last 7 days)     09/03/24  1454   INR 1.8*       ASSESSMENT     Source(s): Chart Review  Previous INR was Subtherapeutic  Medication, diet, health changes since last INR chart reviewed; none identified         PLAN     Recommended plan for no diet, medication or health factor changes affecting INR     Dosing Instructions: Increase your warfarin dose (10.5% change) with next INR in 2 weeks       Summary  As of 9/3/2024      Full warfarin instructions:  9/3: 10 mg; Otherwise 7.5 mg every day   Next INR check:  9/17/2024               Sent [employer] message with dosing and follow up instructions    Contact [phone] to schedule and with any changes, questions or concerns.     Education provided: None required    Plan made per [facility] anticoagulation protocol    Claudia GARCIA RN  Anticoagulation Clinic  9/3/2024    _______________________________________________________________________     Anticoagulation Episode Summary       Current INR goal:  2.0-3.0   TTR:  77.4% (1 y)   Target end date:  Indefinite   Send INR reminders to:  TIMOTEO ALVAREZ    Indications    DVT (deep venous thrombosis) (H) (Resolved) [I82.409]  Long-term (current) use of anticoagulants [Z79.01] [Z79.01]  Deep vein thrombosis (DVT) of proximal lower extremity  unspecified chronicity  unspecified laterality (H) [I82.4Y9]  Antiphospholipid syndrome (H24) [D68.61]             Comments:               Anticoagulation Care Providers       Provider Role Specialty Phone number    Sylvester Cash MD Referring Family Practice     Christiano Ledezma MD Referring Family Medicine [phone]

## 2024-09-11 ENCOUNTER — MYC REFILL (OUTPATIENT)
Dept: FAMILY MEDICINE | Facility: CLINIC | Age: 54
End: 2024-09-11
Payer: COMMERCIAL

## 2024-09-11 DIAGNOSIS — M54.16 LUMBAR RADICULOPATHY: ICD-10-CM

## 2024-09-12 RX ORDER — OXYCODONE AND ACETAMINOPHEN 5; 325 MG/1; MG/1
1 TABLET ORAL EVERY 6 HOURS PRN
Qty: 30 TABLET | Refills: 0 | Status: SHIPPED | OUTPATIENT
Start: 2024-09-12

## 2024-09-19 ENCOUNTER — LAB (OUTPATIENT)
Dept: LAB | Facility: CLINIC | Age: 54
End: 2024-09-19
Payer: COMMERCIAL

## 2024-09-19 ENCOUNTER — ANTICOAGULATION THERAPY VISIT (OUTPATIENT)
Dept: ANTICOAGULATION | Facility: CLINIC | Age: 54
End: 2024-09-19

## 2024-09-19 DIAGNOSIS — I82.4Y9 DEEP VEIN THROMBOSIS (DVT) OF PROXIMAL LOWER EXTREMITY, UNSPECIFIED CHRONICITY, UNSPECIFIED LATERALITY (H): ICD-10-CM

## 2024-09-19 DIAGNOSIS — D68.61 ANTIPHOSPHOLIPID SYNDROME (H): ICD-10-CM

## 2024-09-19 DIAGNOSIS — Z79.01 LONG TERM CURRENT USE OF ANTICOAGULANT THERAPY: Primary | ICD-10-CM

## 2024-09-19 LAB — INR BLD: 2.7 (ref 0.9–1.1)

## 2024-09-19 PROCEDURE — 36416 COLLJ CAPILLARY BLOOD SPEC: CPT

## 2024-09-19 PROCEDURE — 85610 PROTHROMBIN TIME: CPT

## 2024-09-19 NOTE — PROGRESS NOTES
ANTICOAGULATION MANAGEMENT     Hollis Diggs 54 year old male is on warfarin with therapeutic INR result. (Goal INR 2.0-3.0)    Recent labs: (last 7 days)     09/19/24  1449   INR 2.7*       ASSESSMENT     Source(s): Chart Review  Previous INR was Subtherapeutic  Medication, diet, health changes since last INR chart reviewed; none identified         PLAN     Recommended plan for no diet, medication or health factor changes affecting INR     Dosing Instructions: Continue your current warfarin dose with next INR in 2 weeks       Summary  As of 9/19/2024      Full warfarin instructions:  7.5 mg every day   Next INR check:  10/3/2024               Detailed voice message left for Hollis with dosing instructions and follow up date.     Contact [phone] to schedule and with any changes, questions or concerns.     Education provided: Please call back if any changes to your diet, medications or how you've been taking warfarin    Plan made per [facility] anticoagulation protocol    Анна Hale RN  9/19/2024  Anticoagulation Clinic  [facility] for routing messages: malcom ALVAREZ  [facility] patient phone line: [phone]        _______________________________________________________________________     Anticoagulation Episode Summary       Current INR goal:  2.0-3.0   TTR:  76.4% (1 y)   Target end date:  Indefinite   Send INR reminders to:  TIMOTEO ALVAREZ    Indications    DVT (deep venous thrombosis) (H) (Resolved) [I82.409]  Long-term (current) use of anticoagulants [Z79.01] [Z79.01]  Deep vein thrombosis (DVT) of proximal lower extremity  unspecified chronicity  unspecified laterality (H) [I82.4Y9]  Antiphospholipid syndrome (H24) [D68.61]             Comments:               Anticoagulation Care Providers       Provider Role Specialty Phone number    Sylvester Cash MD Referring Family Practice     Christiano Ledezma MD Referring Family Medicine [phone]

## 2024-09-24 DIAGNOSIS — F33.0 MAJOR DEPRESSIVE DISORDER, RECURRENT EPISODE, MILD (H): ICD-10-CM

## 2024-09-25 RX ORDER — ARIPIPRAZOLE 10 MG/1
10 TABLET ORAL DAILY
Qty: 90 TABLET | Refills: 0 | Status: SHIPPED | OUTPATIENT
Start: 2024-09-25

## 2024-09-30 ENCOUNTER — TELEPHONE (OUTPATIENT)
Dept: GASTROENTEROLOGY | Facility: CLINIC | Age: 54
End: 2024-09-30
Payer: COMMERCIAL

## 2024-09-30 NOTE — TELEPHONE ENCOUNTER
Caller: Hollis Diggs     Reason for Reschedule/Cancellation   (please be detailed, any staff messages or encounters to note?): his symptoms have improved not rescheduling at this time      Prior to reschedule please review:  Ordering Provider: Darien Mckeon Determined: mac site  Does patient have any ASC Exclusions, please identify?: n      Notes on Cancelled Procedure:  Procedure: Upper Endoscopy [EGD]   Date: 10/24  Location: [facility]; [address] [place] [address] , [place] [address]  Surgeon: Joselyn      Rescheduled: No,         Did you cancel or rescheduled an EUS procedure? No.

## 2024-10-03 ENCOUNTER — ANTICOAGULATION THERAPY VISIT (OUTPATIENT)
Dept: ANTICOAGULATION | Facility: CLINIC | Age: 54
End: 2024-10-03

## 2024-10-03 ENCOUNTER — LAB (OUTPATIENT)
Dept: LAB | Facility: CLINIC | Age: 54
End: 2024-10-03
Payer: COMMERCIAL

## 2024-10-03 DIAGNOSIS — D68.61 ANTIPHOSPHOLIPID SYNDROME (H): ICD-10-CM

## 2024-10-03 DIAGNOSIS — Z79.01 LONG TERM CURRENT USE OF ANTICOAGULANT THERAPY: Primary | ICD-10-CM

## 2024-10-03 DIAGNOSIS — I82.4Y9 DEEP VEIN THROMBOSIS (DVT) OF PROXIMAL LOWER EXTREMITY, UNSPECIFIED CHRONICITY, UNSPECIFIED LATERALITY (H): ICD-10-CM

## 2024-10-03 LAB — INR BLD: 3.2 (ref 0.9–1.1)

## 2024-10-03 PROCEDURE — 36416 COLLJ CAPILLARY BLOOD SPEC: CPT

## 2024-10-03 PROCEDURE — 85610 PROTHROMBIN TIME: CPT

## 2024-10-03 NOTE — PROGRESS NOTES
ANTICOAGULATION MANAGEMENT     Hollis Diggs 54 year old male is on warfarin with supratherapeutic INR result. (Goal INR 2.0-3.0)    Recent labs: (last 7 days)     10/03/24  1422   INR 3.2*       ASSESSMENT     Source(s): Chart Review  Previous INR was Therapeutic last visit; previously outside of goal range  Medication, diet, health changes since last INR chart reviewed; none identified    I left a detailed voicemail with the orders reflected in flowsheet. I have also requested a call back if there have been any missed doses, concerns, illness, fever, or if there have been any changes in medications, activity level, or diet        PLAN     Recommended plan for no diet, medication or health factor changes affecting INR     Dosing Instructions: Continue your current warfarin dose with next INR in 2 weeks       Summary  As of 10/3/2024      Full warfarin instructions:  7.5 mg every day   Next INR check:  10/17/2024               Detailed voice message left for Hollis with dosing instructions and follow up date.   Sent Daily Dealy message with dosing and follow up instructions    Contact [phone] to schedule and with any changes, questions or concerns.     Education provided: Goal range and lab monitoring: goal range and significance of current result, Importance of therapeutic range, and Importance of following up at instructed interval  Contact [phone] with any changes, questions or concerns.     Plan made per [facility] anticoagulation protocol    Jesse Flores RN  10/3/2024  Anticoagulation Clinic  [facility] for routing messages: malcom ALVAREZ  [facility] patient phone line: [phone]        _______________________________________________________________________     Anticoagulation Episode Summary       Current INR goal:  2.0-3.0   TTR:  74.9% (1 y)   Target end date:  Indefinite   Send INR reminders to:  TIMOTEO ALVAREZ    Indications    DVT (deep venous thrombosis) (H) (Resolved) [I93.409]  Long-term  (current) use of anticoagulants [Z79.01] [Z79.01]  Deep vein thrombosis (DVT) of proximal lower extremity  unspecified chronicity  unspecified laterality (H) [I82.4Y9]  Antiphospholipid syndrome (H) [D68.61]             Comments:               Anticoagulation Care Providers       Provider Role Specialty Phone number    Sylvester Cash MD Referring Family Practice     Christiano Ledezma MD Referring Family Medicine [phone]

## 2024-10-07 ENCOUNTER — TELEPHONE (OUTPATIENT)
Dept: FAMILY MEDICINE | Facility: CLINIC | Age: 54
End: 2024-10-07
Payer: COMMERCIAL

## 2024-10-07 NOTE — TELEPHONE ENCOUNTER
S: Patient update    B: QUENTIN De Los Santos from [facility] Case Management called to confirm patient was seen on 08/16/2024 with Dr. Ledezma - yes accurate information.     A: Requesting date of upcoming appointment: 12/06/2024.    R: closing encounter    Renuka Gamez RN on 10/7/2024 at 4:39 PM

## 2024-10-08 ENCOUNTER — OFFICE VISIT (OUTPATIENT)
Dept: CARDIOLOGY | Facility: CLINIC | Age: 54
End: 2024-10-08
Payer: COMMERCIAL

## 2024-10-08 VITALS
DIASTOLIC BLOOD PRESSURE: 84 MMHG | HEART RATE: 76 BPM | BODY MASS INDEX: 32.17 KG/M2 | RESPIRATION RATE: 18 BRPM | OXYGEN SATURATION: 98 % | SYSTOLIC BLOOD PRESSURE: 122 MMHG | WEIGHT: 237.5 LBS | HEIGHT: 72 IN

## 2024-10-08 DIAGNOSIS — I77.810 AORTIC ROOT DILATATION (H): ICD-10-CM

## 2024-10-08 DIAGNOSIS — I25.10 CORONARY ARTERY DISEASE INVOLVING NATIVE CORONARY ARTERY OF NATIVE HEART, UNSPECIFIED WHETHER ANGINA PRESENT: Primary | ICD-10-CM

## 2024-10-08 DIAGNOSIS — I10 BENIGN ESSENTIAL HYPERTENSION: ICD-10-CM

## 2024-10-08 PROCEDURE — 99214 OFFICE O/P EST MOD 30 MIN: CPT

## 2024-10-08 ASSESSMENT — PAIN SCALES - GENERAL: PAINLEVEL: NO PAIN (0)

## 2024-10-08 NOTE — PROGRESS NOTES
~Cardiology Clinic Visit~    Hollis Diggs MRN# [id number]   YOB: 1970 Age: 54 year old   Primary Cardiologist: Dr. Bell            Assessment and Plan:   Hollis Diggs is a very pleasant 54 year old male who is here today for post angiogram follow up     Coronary artery disease  Typical chest pain, resolved   S/p PCI to the RCA in 2022  Abnormal nuclear stress test 7/2024  Coronary angiogram August 2024 with moderate ISR of RCA stent and a moderate mid LAD lesion, no flow limitations.  LDL from 12/2023 was 46, followed by PCP, managed with rosuvastatin 20 mg daily  Currently asymptomatic      Leg numbness, resolved   Did not pursue ABIs with exercise       Mild aortic root dilatation  Measuring 4.0 cm on echo 8/2024      HTN  Controlled with lisinopril        Follow up with Dr. Bell in 12 months with echo prior       Jaimie Woods PA-C  Physician Assistant   [facility] [place] [facility]  Pager: [phone]          History of Presenting Illness:    Hollis Diggs is a very pleasant 54 year old male with a history of COVID x 2, antiphospholipid antibody syndrome on chronic warfarin, DVT, with CAD s/p recent PCI to RCA with residual CAD.    In brief, he underwent coronary angiogram in October 2022.  It showed the LMCA with no significant stenoses, LAD 60%, mid RCA 90%. He underwent PCI to the RCA via iVUS and received MILDRED x1. He was discharged on plavix for 6 months and warfarin with INR goal of 2-2.5.      Patient was seen by Dr. Bell in July 2024 for annual follow-up.  He reported typical anginal symptoms and stress testing was recommended.  He also had leg numbness and exercise ABIs were ordered.    Lexiscan stress test done July 2024 was noted to be abnormal. There was nontransmural infarction in the inferior and inferoseptal segment(s) of the left ventricle.There was a small area of infarction in the distal apical and anteroseptal segment(s) of the left ventricle.  Left  ventricular function was normal and LVEF at rest was 64%.  Compared to previous study 2/2022 there are new changes.  Coronary angiogram was recommended.     Patient was seen in the ED on 7/25/2024 for chest pain.  He had left-sided shoulder and left upper abdominal pain which historically has been related to hiatal hernia exacerbation.  Symptoms improved with pain medication.      Coronary angiogram done August 2024 showed moderate ISR of previous placed RCA stent with no flow limitation.  There was moderate nonhemodynamically significant disease of the mid LAD with iFR measurement 0.93.    Echocardiogram from August 2024 showed an LVEF of 62%, no wall motion abnormalities and no valvular disease.  There was mild aortic root dilatation measuring 4.0 cm.  Negative for LVOT obstruction.    Patient reports feeling good. His chest pain has resolved with no recurrence. His leg numbness has resolved. He reports tingling to his left arm today. He just started lifting weights and runs on the treadmill.     Denies shortness of breath, orthopnea and PND. Denies chest pain, palpitations, lightheadedness, dizziness, near syncope and syncope. Denies epistaxis, ecchymosis, and melena.     Taking medications daily as prescribed.     Blood pressure 122/84 and HR 76 in clinic today.         Social History       Social History     Socioeconomic History    Marital status:      Spouse name: Cassie    Number of children: 2    Years of education: 15    Highest education level: Not on file   Occupational History    Occupation:      Employer: [employer]   Tobacco Use    Smoking status: Never     Passive exposure: Never    Smokeless tobacco: Never   Vaping Use    Vaping status: Never Used   Substance and Sexual Activity    Alcohol use: Yes     Comment: rare    Drug use: No    Sexual activity: Yes     Partners: Female   Other Topics Concern     Service No    Blood Transfusions No    Caffeine Concern Yes      Comment: 64 oz q day    Occupational Exposure Not Asked    Hobby Hazards Not Asked    Sleep Concern Yes    Stress Concern No    Weight Concern No    Special Diet Not Asked    Back Care Not Asked    Exercise Yes     Comment: sometimes    Bike Helmet Not Asked    Seat Belt Yes    Self-Exams No    Parent/sibling w/ CABG, MI or angioplasty before 65F 55M? Not Asked   Social History Narrative    4 children healthy     Social Determinants of Health     Financial Resource Strain: Low Risk  (12/1/2023)    Financial Resource Strain     Within the past 12 months, have you or your family members you live with been unable to get utilities (heat, electricity) when it was really needed?: No   Food Insecurity: Low Risk  (12/1/2023)    Food Insecurity     Within the past 12 months, did you worry that your food would run out before you got money to buy more?: No     Within the past 12 months, did the food you bought just not last and you didn t have money to get more?: No   Transportation Needs: Low Risk  (12/1/2023)    Transportation Needs     Within the past 12 months, has lack of transportation kept you from medical appointments, getting your medicines, non-medical meetings or appointments, work, or from getting things that you need?: No   Physical Activity: Not on file   Stress: Not on file   Social Connections: Not on file   Interpersonal Safety: Low Risk  (8/16/2024)    Interpersonal Safety     Do you feel physically and emotionally safe where you currently live?: Yes     Within the past 12 months, have you been hit, slapped, kicked or otherwise physically hurt by someone?: No     Within the past 12 months, have you been humiliated or emotionally abused in other ways by your partner or ex-partner?: No   Housing Stability: Low Risk  (12/1/2023)    Housing Stability     Do you have housing? : Yes     Are you worried about losing your housing?: No            Review of Systems:   Please see HPI         Physical Exam:   Vitals:  There were no vitals taken for this visit.   Wt Readings from Last 4 Encounters:   08/16/24 113.1 kg (249 lb 6.4 oz)   08/07/24 110.7 kg (244 lb 1.6 oz)   07/25/24 113.3 kg (249 lb 11.2 oz)   07/11/24 109.3 kg (241 lb)     GEN: well nourished, in no acute distress.  NECK: Supple. JVP was not appreciated.  C/V:  Regular rate and rhythm, no murmur, rub or gallop.    RESP: Respirations are unlabored. Clear to auscultation bilaterally without wheezing, rales, or rhonchi.  EXTREM: Bilateral lower extremities with no edema.   SKIN: Warm and dry.        Data:   LIPID RESULTS:  Lab Results   Component Value Date    CHOL 112 12/01/2023    CHOL 161 06/23/2020    HDL 55 12/01/2023    HDL 38 (L) 06/23/2020    LDL 46 12/01/2023     (H) 06/23/2020    TRIG 54 12/01/2023    TRIG 92 06/23/2020    CHOLHDLRATIO 5.0 07/11/2014     LIVER ENZYME RESULTS:  Lab Results   Component Value Date    AST 24 07/25/2024    AST 14 06/22/2020    ALT 23 07/25/2024    ALT 22 06/22/2020     CBC RESULTS:  Lab Results   Component Value Date    WBC 5.7 08/07/2024    WBC 10.1 05/12/2021    RBC 5.33 08/07/2024    RBC 5.04 05/12/2021    HGB 14.5 08/07/2024    HGB 11.5 (L) 05/12/2021    HCT 44.6 08/07/2024    HCT 38.7 (L) 05/12/2021    MCV 84 08/07/2024    MCV 77 (L) 05/12/2021    MCH 27.2 08/07/2024    MCH 22.8 (L) 05/12/2021    MCHC 32.5 08/07/2024    MCHC 29.7 (L) 05/12/2021    RDW 13.2 08/07/2024    RDW 18.0 (H) 05/12/2021     08/07/2024     05/12/2021     BMP RESULTS:  Lab Results   Component Value Date     08/07/2024     05/12/2021    POTASSIUM 4.1 08/07/2024    POTASSIUM 3.6 12/28/2022    POTASSIUM 3.4 05/12/2021    CHLORIDE 105 08/07/2024    CHLORIDE 106 12/28/2022    CHLORIDE 109 05/12/2021    CO2 28 08/07/2024    CO2 29 12/28/2022    CO2 30 05/12/2021    ANIONGAP 6 (L) 08/07/2024    ANIONGAP 6 12/28/2022    ANIONGAP 4 05/12/2021     (H) 08/07/2024     (H) 12/28/2022    GLC 82 05/12/2021    BUN 13.2  08/07/2024    BUN 9 12/28/2022    BUN 11 05/12/2021    CR 1.01 08/07/2024    CR 1.18 05/12/2021    GFRESTIMATED 88 08/07/2024    GFRESTIMATED 71 05/12/2021    GFRESTBLACK 82 05/12/2021    JANEE 9.2 08/07/2024    JANEE 8.3 (L) 05/12/2021      A1C RESULTS:  Lab Results   Component Value Date    A1C 5.3 10/06/2017     INR RESULTS:  Lab Results   Component Value Date    INR 3.2 (H) 10/03/2024    INR 2.7 (H) 09/19/2024    INR 1.17 (H) 08/07/2024    INR 2.16 (H) 01/28/2024    INR 2.1 (H) 06/29/2021    INR 1.9 (H) 06/15/2021    INR 2.70 (H) 05/12/2021    INR 1.10 04/22/2021            Medications     Current Outpatient Medications   Medication Sig Dispense Refill    ARIPiprazole (ABILIFY) 10 MG tablet TAKE ONE TABLET BY MOUTH ONCE DAILY 90 tablet 0    DULoxetine (CYMBALTA) 60 MG capsule TAKE TWO CAPSULES BY MOUTH EVERY DAY 60 capsule 5    enoxaparin ANTICOAGULANT (LOVENOX) 120 MG/0.8ML syringe Inject 0.8 mLs (120 mg) subcutaneously every 12 hours (Patient not taking: Reported on 8/16/2024) 16 mL 1    ferrous sulfate (FEROSUL) 325 (65 Fe) MG tablet TAKE ONE TABLET BY MOUTH ONCE DAILY WITH BREAKFAST 30 tablet 1    gabapentin (NEURONTIN) 800 MG tablet TAKE ONE TABLET BY MOUTH THREE TIMES A DAY 90 tablet 2    lisinopril (ZESTRIL) 5 MG tablet TAKE ONE TABLET BY MOUTH ONCE DAILY 30 tablet 5    nitroGLYcerin (NITROSTAT) 0.4 MG sublingual tablet For chest pain place 1 tablet under the tongue every 5 minutes for 3 doses. If symptoms persist 5 minutes after 1st dose call 911. 30 tablet 0    oxyCODONE-acetaminophen (PERCOCET) 5-325 MG tablet Take 1 tablet by mouth every 6 hours as needed for severe pain. 30 tablet 0    pantoprazole (PROTONIX) 40 MG EC tablet TAKE ONE TABLET BY MOUTH ONCE DAILY 90 tablet 1    reason aspirin not prescribed, intentional, Please choose reason not prescribed from choices below.      rosuvastatin (CRESTOR) 20 MG tablet TAKE ONE TABLET BY MOUTH DAILY (Patient taking differently: Take 20 mg by mouth at bedtime)  90 tablet 3    sucralfate (CARAFATE) 1 GM tablet Take 1 tablet (1 g) by mouth 4 times daily 40 tablet 1    traZODone (DESYREL) 150 MG tablet TAKE 1 AND 1/2 TABLETS BY MOUTH AT BEDTIME 135 tablet 1    warfarin ANTICOAGULANT (COUMADIN) 5 MG tablet TAKE ONE TABLET BY MOUTH ON MON, WED, AND FRI, AND TAKE 1 AND 1/2 TABLETS ALL OTHER DAYS OR AS DIRECTED 90 tablet 1    warfarin ANTICOAGULANT (COUMADIN) 5 MG tablet Take 5 mg by mouth BEDTIME: Mondays Wednesdays and Fridays (take 7.5mg all other days of the week at BEDTIME)            Past Medical History     Past Medical History:   Diagnosis Date    Antiphospholipid syndrome (H)     Arthritis     Asthma     Exercise    blood clot in leg     Depressive disorder, not elsewhere classified     Depression (non-psychotic)    ANKIT (generalised anxiety disorder)     HH (hiatus hernia)     Hypercholesteremia     normal with weight loss 3/09    Lumbar disc herniation 1992    DEDE (obstructive sleep apnea) 12/1/2023    Seronegative rheumatoid arthritis (H)      Past Surgical History:   Procedure Laterality Date    APPENDECTOMY      COLONOSCOPY N/A 8/2/2016    Procedure: COMBINED COLONOSCOPY, SINGLE OR MULTIPLE BIOPSY/POLYPECTOMY BY BIOPSY;  Surgeon: Sydnee Walton MD;  Location: [facility] OR    COLONOSCOPY N/A 5/3/2022    Procedure: COLONOSCOPY;  Surgeon: Jose A Hurt MD;  Location: [facility]    COLONOSCOPY WITH CO2 INSUFFLATION N/A 8/2/2016    Procedure: COLONOSCOPY WITH CO2 INSUFFLATION;  Surgeon: Sydnee Walton MD;  Location: [facility] OR    COMBINED ESOPHAGOSCOPY, GASTROSCOPY, DUODENOSCOPY (EGD) WITH CO2 INSUFFLATION N/A 8/2/2016    Procedure: COMBINED ESOPHAGOSCOPY, GASTROSCOPY, DUODENOSCOPY (EGD) WITH CO2 INSUFFLATION;  Surgeon: Sydnee Walton MD;  Location: [facility]    COMBINED ESOPHAGOSCOPY, GASTROSCOPY, DUODENOSCOPY (EGD) WITH CO2 INSUFFLATION N/A 5/27/2021    Procedure: ESOPHAGOGASTRODUODENOSCOPY, WITH CO2 INSUFFLATION;  Surgeon: Alis Cotton,  DO;  Location: [facility] OR    CV CORONARY ANGIOGRAM N/A 10/11/2022    Procedure: Coronary Angiogram;  Surgeon: Hollis Avila MD;  Location:  [facility] CATH LAB    CV CORONARY ANGIOGRAM N/A 8/7/2024    Procedure: Coronary Angiogram;  Surgeon: Sylvester Mohr MD;  Location:  [facility]    CV INSTANTANEOUS WAVE-FREE RATIO N/A 10/11/2022    Procedure: Instantaneous Wave-Free Ratio;  Surgeon: Hollis Avila MD;  Location:  [facility] C[facility] LAB    CV INSTANTANEOUS WAVE-FREE RATIO N/A 8/7/2024    Procedure: Instantaneous Wave-Free Ratio;  Surgeon: Sylvester Mohr MD;  Location:  [facility] CATH LAB    CV INTRAVASULAR ULTRASOUND N/A 10/11/2022    Procedure: Intravascular Ultrasound;  Surgeon: Hollis Avila MD;  Location:  [facility] CATH LAB    CV LEFT HEART CATH N/A 8/7/2024    Procedure: Left Heart Catheterization;  Surgeon: Sylvester Mohr MD;  Location:  [facility] CATH LAB    CV PCI ANGIOPLASTY N/A 10/11/2022    Procedure: Percutaneous Transluminal Angioplasty;  Surgeon: Hollis Avila MD;  Location:  HE[facility]ARD[facility] CATH LAB    ESOPHAGOSCOPY, GASTROSCOPY, DUODENOSCOPY (EGD), COMBINED N/A 8/2/2016    Procedure: COMBINED ESOPHAGOSCOPY, GASTROSCOPY, DUODENOSCOPY (EGD), BIOPSY SINGLE OR MULTIPLE;  Surgeon: Sydnee Walton MD;  Location: [facility]    ESOPHAGOSCOPY, GASTROSCOPY, DUODENOSCOPY (EGD), COMBINED N/A 5/27/2021    Procedure: Esophagogastroduodenoscopy, With Biopsy;  Surgeon: Alis Cotton DO;  Location: [facility]    ESOPHAGOSCOPY, GASTROSCOPY, DUODENOSCOPY (EGD), COMBINED N/A 5/3/2022    Procedure: ESOPHAGOGASTRODUODENOSCOPY, WITH BIOPSY;  Surgeon: Jose A Hurt MD;  Location:  GI    HC REMOVAL OF TONSILS,<10 Y/O      Tonsils <11y.o.    HC UGI ENDOSCOPY DIAG W BIOPSY  02/01/06    HC VASECTOMY UNILAT/BILAT W POSTOP SEMEN  1/05    Vasectomy    History back lumbar laminectomy      INJECT EPIDURAL LUMBAR Right 4/22/2021     "Procedure: Right Lumbar 4-5 and Lumbar 5 - Sacral 1 Epidural Steroid Injection;  Surgeon: Maxwell Zacarias MD;  Location:  OR    [facility] NONSPECIFIC PROCEDURE  91 or 92    back surgery. lumbar. lamiectomy    ZZHC REPAIR INCISIONAL HERNIA,REDUCIBLE  1970's    Hernia Repair, Incisional, Unilateral     Family History   Problem Relation Age of Onset    Brain Tumor Mother         Benign    Deep Vein Thrombosis Mother         \"neck\"     Heart Disease Father         \"wont tell anyone\"    Neurologic Disorder Paternal Grandmother         Parkinsons     Alcohol/Drug Paternal Grandfather         alcoholic    Cancer Maternal Grandfather         lung    Diabetes Maternal Grandmother     Cerebrovascular Disease Maternal Grandmother         tim age ~70    Arthritis Cousin         cousins x 2 \"RA\"    Musculoskeletal Disorder Maternal Aunt         MS    Family History Negative Other         psoriasis, crohns, UC, SLE            Allergies   Patient has no known allergies.      This note was completed in part using dictation via the Dragon voice recognition software. Some word and grammatical errors may occur and must be interpreted in the appropriate clinical context.  If there are any questions pertaining to this issue, please contact me for further clarification.  "

## 2024-10-08 NOTE — LETTER
10/8/2024    Christiano Ledezma MD  [address] [place] [address]    RE: Hollis Diggs       Dear Colleague,     I had the pleasure of seeing Hollis Diggs in the [facility].              ~Cardiology Clinic Visit~    Hollis Diggs MRN# [id number]   YOB: 1970 Age: 54 year old   Primary Cardiologist: Dr. Bell            Assessment and Plan:   Hollis Diggs is a very pleasant 54 year old male who is here today for post angiogram follow up     Coronary artery disease  Typical chest pain, resolved   S/p PCI to the RCA in 2022  Abnormal nuclear stress test 7/2024  Coronary angiogram August 2024 with moderate ISR of RCA stent and a moderate mid LAD lesion, no flow limitations.  LDL from 12/2023 was 46, followed by PCP, managed with rosuvastatin 20 mg daily  Currently asymptomatic      Leg numbness, resolved   Did not pursue ABIs with exercise       Mild aortic root dilatation  Measuring 4.0 cm on echo 8/2024      HTN  Controlled with lisinopril        Follow up with Dr. Bell in 12 months with echo prior       Jaimie Woods PA-C  Physician Assistant   [facility]  Pager: [phone]          History of Presenting Illness:    Hollis Diggs is a very pleasant 54 year old male with a history of COVID x 2, antiphospholipid antibody syndrome on chronic warfarin, DVT, with CAD s/p recent PCI to RCA with residual CAD.    In brief, he underwent coronary angiogram in October 2022.  It showed the LMCA with no significant stenoses, LAD 60%, mid RCA 90%. He underwent PCI to the RCA via iVUS and received MILDRED x1. He was discharged on plavix for 6 months and warfarin with INR goal of 2-2.5.      Patient was seen by Dr. Bell in July 2024 for annual follow-up.  He reported typical anginal symptoms and stress testing was recommended.  He also had leg numbness and exercise ABIs were ordered.    Lexiscan stress test done July 2024 was noted to be abnormal. There was  nontransmural infarction in the inferior and inferoseptal segment(s) of the left ventricle.There was a small area of infarction in the distal apical and anteroseptal segment(s) of the left ventricle.  Left ventricular function was normal and LVEF at rest was 64%.  Compared to previous study 2/2022 there are new changes.  Coronary angiogram was recommended.     Patient was seen in the ED on 7/25/2024 for chest pain.  He had left-sided shoulder and left upper abdominal pain which historically has been related to hiatal hernia exacerbation.  Symptoms improved with pain medication.      Coronary angiogram done August 2024 showed moderate ISR of previous placed RCA stent with no flow limitation.  There was moderate nonhemodynamically significant disease of the mid LAD with iFR measurement 0.93.    Echocardiogram from August 2024 showed an LVEF of 62%, no wall motion abnormalities and no valvular disease.  There was mild aortic root dilatation measuring 4.0 cm.  Negative for LVOT obstruction.    Patient reports feeling good. His chest pain has resolved with no recurrence. His leg numbness has resolved. He reports tingling to his left arm today. He just started lifting weights and runs on the treadmill.     Denies shortness of breath, orthopnea and PND. Denies chest pain, palpitations, lightheadedness, dizziness, near syncope and syncope. Denies epistaxis, ecchymosis, and melena.     Taking medications daily as prescribed.     Blood pressure 122/84 and HR 76 in clinic today.         Social History       Social History     Socioeconomic History     Marital status:      Spouse name: Cassie     Number of children: 2     Years of education: 15     Highest education level: Not on file   Occupational History     Occupation:      Employer: [employer]   Tobacco Use     Smoking status: Never     Passive exposure: Never     Smokeless tobacco: Never   Vaping Use     Vaping status: Never Used   Substance  and Sexual Activity     Alcohol use: Yes     Comment: rare     Drug use: No     Sexual activity: Yes     Partners: Female   Other Topics Concern      Service No     Blood Transfusions No     Caffeine Concern Yes     Comment: 64 oz q day     Occupational Exposure Not Asked     Hobby Hazards Not Asked     Sleep Concern Yes     Stress Concern No     Weight Concern No     Special Diet Not Asked     Back Care Not Asked     Exercise Yes     Comment: sometimes     Bike Helmet Not Asked     Seat Belt Yes     Self-Exams No     Parent/sibling w/ CABG, MI or angioplasty before 65F 55M? Not Asked   Social History Narrative    4 children healthy     Social Determinants of Health     Financial Resource Strain: Low Risk  (12/1/2023)    Financial Resource Strain      Within the past 12 months, have you or your family members you live with been unable to get utilities (heat, electricity) when it was really needed?: No   Food Insecurity: Low Risk  (12/1/2023)    Food Insecurity      Within the past 12 months, did you worry that your food would run out before you got money to buy more?: No      Within the past 12 months, did the food you bought just not last and you didn t have money to get more?: No   Transportation Needs: Low Risk  (12/1/2023)    Transportation Needs      Within the past 12 months, has lack of transportation kept you from medical appointments, getting your medicines, non-medical meetings or appointments, work, or from getting things that you need?: No   Physical Activity: Not on file   Stress: Not on file   Social Connections: Not on file   Interpersonal Safety: Low Risk  (8/16/2024)    Interpersonal Safety      Do you feel physically and emotionally safe where you currently live?: Yes      Within the past 12 months, have you been hit, slapped, kicked or otherwise physically hurt by someone?: No      Within the past 12 months, have you been humiliated or emotionally abused in other ways by your partner or  ex-partner?: No   Housing Stability: Low Risk  (12/1/2023)    Housing Stability      Do you have housing? : Yes      Are you worried about losing your housing?: No            Review of Systems:   Please see HPI         Physical Exam:   Vitals: There were no vitals taken for this visit.   Wt Readings from Last 4 Encounters:   08/16/24 113.1 kg (249 lb 6.4 oz)   08/07/24 110.7 kg (244 lb 1.6 oz)   07/25/24 113.3 kg (249 lb 11.2 oz)   07/11/24 109.3 kg (241 lb)     GEN: well nourished, in no acute distress.  NECK: Supple. JVP was not appreciated.  C/V:  Regular rate and rhythm, no murmur, rub or gallop.    RESP: Respirations are unlabored. Clear to auscultation bilaterally without wheezing, rales, or rhonchi.  EXTREM: Bilateral lower extremities with no edema.   SKIN: Warm and dry.        Data:   LIPID RESULTS:  Lab Results   Component Value Date    CHOL 112 12/01/2023    CHOL 161 06/23/2020    HDL 55 12/01/2023    HDL 38 (L) 06/23/2020    LDL 46 12/01/2023     (H) 06/23/2020    TRIG 54 12/01/2023    TRIG 92 06/23/2020    CHOLHDLRATIO 5.0 07/11/2014     LIVER ENZYME RESULTS:  Lab Results   Component Value Date    AST 24 07/25/2024    AST 14 06/22/2020    ALT 23 07/25/2024    ALT 22 06/22/2020     CBC RESULTS:  Lab Results   Component Value Date    WBC 5.7 08/07/2024    WBC 10.1 05/12/2021    RBC 5.33 08/07/2024    RBC 5.04 05/12/2021    HGB 14.5 08/07/2024    HGB 11.5 (L) 05/12/2021    HCT 44.6 08/07/2024    HCT 38.7 (L) 05/12/2021    MCV 84 08/07/2024    MCV 77 (L) 05/12/2021    MCH 27.2 08/07/2024    MCH 22.8 (L) 05/12/2021    MCHC 32.5 08/07/2024    MCHC 29.7 (L) 05/12/2021    RDW 13.2 08/07/2024    RDW 18.0 (H) 05/12/2021     08/07/2024     05/12/2021     BMP RESULTS:  Lab Results   Component Value Date     08/07/2024     05/12/2021    POTASSIUM 4.1 08/07/2024    POTASSIUM 3.6 12/28/2022    POTASSIUM 3.4 05/12/2021    CHLORIDE 105 08/07/2024    CHLORIDE 106 12/28/2022    CHLORIDE  109 05/12/2021    CO2 28 08/07/2024    CO2 29 12/28/2022    CO2 30 05/12/2021    ANIONGAP 6 (L) 08/07/2024    ANIONGAP 6 12/28/2022    ANIONGAP 4 05/12/2021     (H) 08/07/2024     (H) 12/28/2022    GLC 82 05/12/2021    BUN 13.2 08/07/2024    BUN 9 12/28/2022    BUN 11 05/12/2021    CR 1.01 08/07/2024    CR 1.18 05/12/2021    GFRESTIMATED 88 08/07/2024    GFRESTIMATED 71 05/12/2021    GFRESTBLACK 82 05/12/2021    JANEE 9.2 08/07/2024    JANEE 8.3 (L) 05/12/2021      A1C RESULTS:  Lab Results   Component Value Date    A1C 5.3 10/06/2017     INR RESULTS:  Lab Results   Component Value Date    INR 3.2 (H) 10/03/2024    INR 2.7 (H) 09/19/2024    INR 1.17 (H) 08/07/2024    INR 2.16 (H) 01/28/2024    INR 2.1 (H) 06/29/2021    INR 1.9 (H) 06/15/2021    INR 2.70 (H) 05/12/2021    INR 1.10 04/22/2021            Medications     Current Outpatient Medications   Medication Sig Dispense Refill     ARIPiprazole (ABILIFY) 10 MG tablet TAKE ONE TABLET BY MOUTH ONCE DAILY 90 tablet 0     DULoxetine (CYMBALTA) 60 MG capsule TAKE TWO CAPSULES BY MOUTH EVERY DAY 60 capsule 5     enoxaparin ANTICOAGULANT (LOVENOX) 120 MG/0.8ML syringe Inject 0.8 mLs (120 mg) subcutaneously every 12 hours (Patient not taking: Reported on 8/16/2024) 16 mL 1     ferrous sulfate (FEROSUL) 325 (65 Fe) MG tablet TAKE ONE TABLET BY MOUTH ONCE DAILY WITH BREAKFAST 30 tablet 1     gabapentin (NEURONTIN) 800 MG tablet TAKE ONE TABLET BY MOUTH THREE TIMES A DAY 90 tablet 2     lisinopril (ZESTRIL) 5 MG tablet TAKE ONE TABLET BY MOUTH ONCE DAILY 30 tablet 5     nitroGLYcerin (NITROSTAT) 0.4 MG sublingual tablet For chest pain place 1 tablet under the tongue every 5 minutes for 3 doses. If symptoms persist 5 minutes after 1st dose call 911. 30 tablet 0     oxyCODONE-acetaminophen (PERCOCET) 5-325 MG tablet Take 1 tablet by mouth every 6 hours as needed for severe pain. 30 tablet 0     pantoprazole (PROTONIX) 40 MG EC tablet TAKE ONE TABLET BY MOUTH ONCE  DAILY 90 tablet 1     reason aspirin not prescribed, intentional, Please choose reason not prescribed from choices below.       rosuvastatin (CRESTOR) 20 MG tablet TAKE ONE TABLET BY MOUTH DAILY (Patient taking differently: Take 20 mg by mouth at bedtime) 90 tablet 3     sucralfate (CARAFATE) 1 GM tablet Take 1 tablet (1 g) by mouth 4 times daily 40 tablet 1     traZODone (DESYREL) 150 MG tablet TAKE 1 AND 1/2 TABLETS BY MOUTH AT BEDTIME 135 tablet 1     warfarin ANTICOAGULANT (COUMADIN) 5 MG tablet TAKE ONE TABLET BY MOUTH ON MON, WED, AND FRI, AND TAKE 1 AND 1/2 TABLETS ALL OTHER DAYS OR AS DIRECTED 90 tablet 1     warfarin ANTICOAGULANT (COUMADIN) 5 MG tablet Take 5 mg by mouth BEDTIME: Mondays Wednesdays and Fridays (take 7.5mg all other days of the week at BEDTIME)            Past Medical History     Past Medical History:   Diagnosis Date     Antiphospholipid syndrome (H)      Arthritis      Asthma     Exercise     blood clot in leg      Depressive disorder, not elsewhere classified     Depression (non-psychotic)     ANKIT (generalised anxiety disorder)      HH (hiatus hernia)      Hypercholesteremia     normal with weight loss 3/09     Lumbar disc herniation 1992     DEDE (obstructive sleep apnea) 12/1/2023     Seronegative rheumatoid arthritis (H)      Past Surgical History:   Procedure Laterality Date     APPENDECTOMY       COLONOSCOPY N/A 8/2/2016    Procedure: COMBINED COLONOSCOPY, SINGLE OR MULTIPLE BIOPSY/POLYPECTOMY BY BIOPSY;  Surgeon: Sydnee Walton MD;  Location: [facility]     COLONOSCOPY N/A 5/3/2022    Procedure: COLONOSCOPY;  Surgeon: Jose A Hurt MD;  Location: [facility]     COLONOSCOPY WITH CO2 INSUFFLATION N/A 8/2/2016    Procedure: COLONOSCOPY WITH CO2 INSUFFLATION;  Surgeon: Sydnee Walton MD;  Location: [facility]     COMBINED ESOPHAGOSCOPY, GASTROSCOPY, DUODENOSCOPY (EGD) WITH CO2 INSUFFLATION N/A 8/2/2016    Procedure: COMBINED ESOPHAGOSCOPY, GASTROSCOPY, DUODENOSCOPY  (EGD) WITH CO2 INSUFFLATION;  Surgeon: Sydnee Walton MD;  Location: [facility]     COMBINED ESOPHAGOSCOPY, GASTROSCOPY, DUODENOSCOPY (EGD) WITH CO2 INSUFFLATION N/A 5/27/2021    Procedure: ESOPHAGOGASTRODUODENOSCOPY, WITH CO2 INSUFFLATION;  Surgeon: Alis Cotton DO;  Location: [facility]     CV CORONARY ANGIOGRAM N/A 10/11/2022    Procedure: Coronary Angiogram;  Surgeon: Hollis Avila MD;  Location:  [facility] CARDIAC CATH LAB     CV CORONARY ANGIOGRAM N/A 8/7/2024    Procedure: Coronary Angiogram;  Surgeon: Sylvester Mohr MD;  Location: [facility] CARDIAC CATH LAB     CV INSTANTANEOUS WAVE-FREE RATIO N/A 10/11/2022    Procedure: Instantaneous Wave-Free Ratio;  Surgeon: Hollis Avila MD;  Location:  HEART CARDIAC CATH LAB     CV INSTANTANEOUS WAVE-FREE RATIO N/A 8/7/2024    Procedure: Instantaneous Wave-Free Ratio;  Surgeon: Sylvester Mohr MD;  Location:  HEART CARDIAC CATH LAB     CV INTRAVASULAR ULTRASOUND N/A 10/11/2022    Procedure: Intravascular Ultrasound;  Surgeon: Hollis Avila MD;  Location: [facility] CARDIAC CATH LAB     CV LEFT HEART CATH N/A 8/7/2024    Procedure: Left Heart Catheterization;  Surgeon: Sylvester Mohr MD;  Location: [facility] CARDIAC CATH LAB     CV PCI ANGIOPLASTY N/A 10/11/2022    Procedure: Percutaneous Transluminal Angioplasty;  Surgeon: Hollis Avila MD;  Location: [facility] CARDIAC CATH LAB     ESOPHAGOSCOPY, GASTROSCOPY, DUODENOSCOPY (EGD), COMBINED N/A 8/2/2016    Procedure: COMBINED ESOPHAGOSCOPY, GASTROSCOPY, DUODENOSCOPY (EGD), BIOPSY SINGLE OR MULTIPLE;  Surgeon: Sydnee Walton MD;  [facility]     ESOPHAGOSCOPY, GASTROSCOPY, DUODENOSCOPY (EGD), COMBINED N/A 5/27/2021    Procedure: Esophagogastroduodenoscopy, With Biopsy;  Surgeon: Alis Cotton DO;  [facility]: [facility]     ESOPHAGOSCOPY, GASTROSCOPY, DUODENOSCOPY (EGD), COMBINED N/A 5/3/2022    Procedure: ESOPHAGOGASTRODUODENOSCOPY, WITH BIOPSY;  Surgeon:  "Jose A Hurt MD;  Location: [facility]     HC REMOVAL OF TONSILS,<13 Y/O      Tonsils <11y.o.     HC UGI ENDOSCOPY DIAG W BIOPSY  02/01/06     HC VASECTOMY UNILAT/BILAT W POSTOP SEMEN  1/05    Vasectomy     History back lumbar laminectomy       INJECT EPIDURAL LUMBAR Right 4/22/2021    Procedure: Right Lumbar 4-5 and Lumbar 5 - Sacral 1 Epidural Steroid Injection;  Surgeon: Maxwell Zacarias MD;  Location: [facility]     ZZC NONSPECIFIC PROCEDURE  91 or 92    back surgery. lumbar. lamiectomy     ZZHC REPAIR INCISIONAL HERNIA,REDUCIBLE  1970's    Hernia Repair, Incisional, Unilateral     Family History   Problem Relation Age of Onset     Brain Tumor Mother         Benign     Deep Vein Thrombosis Mother         \"neck\"      Heart Disease Father         \"wont tell anyone\"     Neurologic Disorder Paternal Grandmother         Parkinsons      Alcohol/Drug Paternal Grandfather         alcoholic     Cancer Maternal Grandfather         lung     Diabetes Maternal Grandmother      Cerebrovascular Disease Maternal Grandmother         tim age ~70     Arthritis Cousin         cousins x 2 \"RA\"     Musculoskeletal Disorder Maternal Aunt         MS     Family History Negative Other         psoriasis, crohns, UC, SLE            Allergies   Patient has no known allergies.      This note was completed in part using dictation via the Dragon voice recognition software. Some word and grammatical errors may occur and must be interpreted in the appropriate clinical context.  If there are any questions pertaining to this issue, please contact me for further clarification.      Thank you for allowing me to participate in the care of your patient.      Sincerely,     GUZMAN Garcia [facility]  cc:   Christiano Ledezma MD  [address] [place] [address] [address] [place]  [place] [id number]      "

## 2024-10-08 NOTE — PATIENT INSTRUCTIONS
Thank you for your visit with the [facility] today.    Today's plan:   Medication changes: none  Follow up with Dr. Bell in 12 months with echo prior     If you have questions or concerns please call the nurse team at [phone] or send a [employer] message.     Scheduling phone number: [phone]    On Fridays call: [phone] as the [facility] Clinic office is closed     It was a pleasure seeing you today!     Jaimie Woods PA-C   Physician Assistant   [facility]

## 2024-10-10 ENCOUNTER — MYC REFILL (OUTPATIENT)
Dept: FAMILY MEDICINE | Facility: CLINIC | Age: 54
End: 2024-10-10
Payer: COMMERCIAL

## 2024-10-10 DIAGNOSIS — M54.16 LUMBAR RADICULOPATHY: ICD-10-CM

## 2024-10-11 RX ORDER — OXYCODONE AND ACETAMINOPHEN 5; 325 MG/1; MG/1
1 TABLET ORAL EVERY 6 HOURS PRN
Qty: 30 TABLET | Refills: 0 | Status: SHIPPED | OUTPATIENT
Start: 2024-10-11 | End: 2024-11-08

## 2024-10-17 ENCOUNTER — LAB (OUTPATIENT)
Dept: LAB | Facility: CLINIC | Age: 54
End: 2024-10-17
Payer: COMMERCIAL

## 2024-10-17 ENCOUNTER — ANTICOAGULATION THERAPY VISIT (OUTPATIENT)
Dept: ANTICOAGULATION | Facility: CLINIC | Age: 54
End: 2024-10-17

## 2024-10-17 DIAGNOSIS — D68.61 ANTIPHOSPHOLIPID SYNDROME (H): ICD-10-CM

## 2024-10-17 DIAGNOSIS — I82.4Y9 DEEP VEIN THROMBOSIS (DVT) OF PROXIMAL LOWER EXTREMITY, UNSPECIFIED CHRONICITY, UNSPECIFIED LATERALITY (H): ICD-10-CM

## 2024-10-17 DIAGNOSIS — Z79.01 LONG TERM CURRENT USE OF ANTICOAGULANT THERAPY: Primary | ICD-10-CM

## 2024-10-17 LAB — INR BLD: 3.3 (ref 0.9–1.1)

## 2024-10-17 PROCEDURE — 36416 COLLJ CAPILLARY BLOOD SPEC: CPT

## 2024-10-17 PROCEDURE — 85610 PROTHROMBIN TIME: CPT

## 2024-10-17 NOTE — PROGRESS NOTES
ANTICOAGULATION MANAGEMENT     Hollis Diggs 54 year old male is on warfarin with supratherapeutic INR result. (Goal INR 2.0-3.0)    Recent labs: (last 7 days)     10/17/24  1431   INR 3.3*       ASSESSMENT     Source(s): Chart Review and Patient/Caregiver Call     Warfarin doses taken: Warfarin taken as instructed  Diet: No new diet changes identified  Medication/supplement changes: None noted  New illness, injury, or hospitalization: No  Signs or symptoms of bleeding or clotting: No  Previous result: Supratherapeutic  Additional findings: None       PLAN     Recommended plan for no diet, medication or health factor changes affecting INR     Dosing Instructions: decrease your warfarin dose (4.8% change) with next INR in 2 weeks       Summary  As of 10/17/2024      Full warfarin instructions:  5 mg every Thu; 7.5 mg all other days   Next INR check:  10/31/2024               Telephone call with Hollis who verbalizes understanding and agrees to plan and who agrees to plan and repeated back plan correctly    Lab visit scheduled    Education provided: Contact [phone] with any changes, questions or concerns.     Plan made per [facility] anticoagulation protocol    Jesse Flores RN  10/17/2024  Anticoagulation Clinic  [employer] for routing messages: malcom [place]  [facility] patient phone line: [phone]        _______________________________________________________________________     Anticoagulation Episode Summary       Current INR goal:  2.0-3.0   TTR:  71.0% (1 y)   Target end date:  Indefinite   Send INR reminders to:  TIMOTEO [place]    Indications    DVT (deep venous thrombosis) (H) (Resolved) [I82.409]  Long-term (current) use of anticoagulants [Z79.01] [Z79.01]  Deep vein thrombosis (DVT) of proximal lower extremity  unspecified chronicity  unspecified laterality (H) [I82.4Y9]  Antiphospholipid syndrome (H) [D68.61]             Comments:               Anticoagulation Care Providers       Provider Role  Specialty Phone number    Sylvester Cash MD Referring Family Practice     Christiano Ledezma MD Referring Family Medicine [phone]

## 2024-10-22 NOTE — ASSESSMENT & PLAN NOTE
Ambulatory Visit  Name: Armond Foster      : 1963      MRN: [id number]  Encounter Provider: Donell Tse MD  Encounter Date: 10/22/2024   Encounter department: [facility]ARE [place]    Assessment & Plan  Depression, unspecified depression type  Continues on fluoxetine 20 mg daily    Orders:    venlafaxine (EFFEXOR-XR) 150 mg 24 hr capsule; Take 1 capsule (150 mg total) by mouth daily    Cardiac arrhythmia, unspecified cardiac arrhythmia type    Orders:    Comprehensive metabolic panel    CBC and differential    Magnesium; Future    POCT ECG    Encounter for immunization    Orders:    influenza vaccine, recombinant, PF, 0.5 mL IM (Flublok)    Asymptomatic PVCs  Will check serum chemistries, potassium, magnesium and serum electrolytes.  Patient is asymptomatic.  Recommended reducing the usage of cetirizine and also to decrease or eliminate intake of caffeine.         Hypothyroidism, unspecified type  TSH is mildly elevated free T4 is normal.  No adjustment in thyroid replacement dosage is necessary         Hypertension, essential, benign  Blood pressure is stable with valsartan 160 mg daily and amlodipine 5 mg daily..         ADHD, predominantly inattentive type  Doing well on current dosing of Adderall 5 mg.  No necessity for dose change.            History of Present Illness     The patient presents to the office for a follow-up visit for ADHD.  She is doing well with her current medication dosage and does not feel any adjustment is necessary at this time.  She had some labs drawn in July which were reviewed.  Cholesterol is mildly elevatedat 235.  Triglycerides 156.  HDL cholesterol 65 and LDL cholesterol 139.  CMP was within normal limits.  CBC was likewise normal.  TSH was slightly elevated but free T4 was within normal range.  No adjustment is needed to her levothyroxine dose.          Review of Systems   Constitutional: Negative.    HENT:  Positive for congestion  Controlled  Continue current medications   "and postnasal drip.    Eyes: Negative.    Respiratory: Negative.     Cardiovascular: Negative.    Gastrointestinal: Negative.    Endocrine: Negative.    Genitourinary: Negative.    Musculoskeletal: Negative.    Skin: Negative.    Allergic/Immunologic: Positive for environmental allergies.   Neurological: Negative.    Hematological: Negative.    Psychiatric/Behavioral: Negative.             Objective     /64 (BP Location: Left arm, Patient Position: Sitting, Cuff Size: Standard)   Pulse 76   Temp 98.2 °F (36.8 °C) (Temporal)   Ht 5' 0.71\" (1.542 m)   Wt 76.2 kg (168 lb)   LMP  (LMP Unknown)   SpO2 97%   BMI 32.05 kg/m²     Physical Exam  Vitals reviewed.   Constitutional:       General: She is not in acute distress.     Appearance: Normal appearance. She is obese. She is not ill-appearing, toxic-appearing or diaphoretic.   HENT:      Head: Normocephalic and atraumatic.      Right Ear: External ear normal.      Left Ear: External ear normal.      Nose: Nose normal. No congestion or rhinorrhea.   Eyes:      General: No scleral icterus.     Conjunctiva/sclera: Conjunctivae normal.      Pupils: Pupils are equal, round, and reactive to light.   Cardiovascular:      Rate and Rhythm: Normal rate and regular rhythm.      Pulses: Normal pulses.      Comments: Premature beats noted on auscultation.  Pulmonary:      Effort: Pulmonary effort is normal. No respiratory distress.      Breath sounds: Normal breath sounds.   Abdominal:      General: Abdomen is flat. Bowel sounds are normal.   Musculoskeletal:      Right lower leg: No edema.      Left lower leg: No edema.   Skin:     General: Skin is warm and dry.      Coloration: Skin is not jaundiced.      Findings: No bruising, erythema or rash.   Neurological:      General: No focal deficit present.      Mental Status: She is alert and oriented to person, place, and time. Mental status is at baseline.   Psychiatric:         Mood and Affect: Mood normal.         " Behavior: Behavior normal.     EKG: normal sinus rhythm, frequent PVC's noted.

## 2024-10-26 ASSESSMENT — PATIENT HEALTH QUESTIONNAIRE - PHQ9: SUM OF ALL RESPONSES TO PHQ QUESTIONS 1-9: 20

## 2024-10-31 ENCOUNTER — LAB (OUTPATIENT)
Dept: LAB | Facility: CLINIC | Age: 54
End: 2024-10-31
Payer: COMMERCIAL

## 2024-10-31 ENCOUNTER — ANTICOAGULATION THERAPY VISIT (OUTPATIENT)
Dept: ANTICOAGULATION | Facility: CLINIC | Age: 54
End: 2024-10-31

## 2024-10-31 DIAGNOSIS — I82.4Y9 DEEP VEIN THROMBOSIS (DVT) OF PROXIMAL LOWER EXTREMITY, UNSPECIFIED CHRONICITY, UNSPECIFIED LATERALITY (H): ICD-10-CM

## 2024-10-31 DIAGNOSIS — D68.61 ANTIPHOSPHOLIPID SYNDROME (H): ICD-10-CM

## 2024-10-31 DIAGNOSIS — Z79.01 LONG TERM CURRENT USE OF ANTICOAGULANT THERAPY: Primary | ICD-10-CM

## 2024-10-31 LAB — INR BLD: 2.7 (ref 0.9–1.1)

## 2024-10-31 PROCEDURE — 85610 PROTHROMBIN TIME: CPT

## 2024-10-31 PROCEDURE — 36416 COLLJ CAPILLARY BLOOD SPEC: CPT

## 2024-10-31 NOTE — PROGRESS NOTES
ANTICOAGULATION MANAGEMENT     Hollis Diggs 54 year old male is on warfarin with therapeutic INR result. (Goal INR 2.0-3.0)    Recent labs: (last 7 days)     10/31/24  1417   INR 2.7*       ASSESSMENT     Source(s): Chart Review  Previous INR was Supratherapeutic  Medication, diet, health changes since last INR chart reviewed; none identified         PLAN     Recommended plan for no diet, medication or health factor changes affecting INR     Dosing Instructions: Continue your current warfarin dose with next INR in 3 weeks       Summary  As of 10/31/2024      Full warfarin instructions:  5 mg every Thu; 7.5 mg all other days   Next INR check:  11/21/2024               Detailed voice message left for Hollis with dosing instructions and follow up date.     Contact [phone] to schedule and with any changes, questions or concerns.     Education provided: Contact [phone] with any changes, questions or concerns.     Plan made per [facility] anticoagulation protocol    Ana Lofton RN  10/31/2024  Anticoagulation Clinic  [employer] for routing messages: malcom ALVAREZ  [facility] patient phone line: [phone]        _______________________________________________________________________     Anticoagulation Episode Summary       Current INR goal:  2.0-3.0   TTR:  69.1% (1 y)   Target end date:  Indefinite   Send INR reminders to:  TIMOTEO ALVAREZ    Indications    DVT (deep venous thrombosis) (H) (Resolved) [I82.409]  Long-term (current) use of anticoagulants [Z79.01] [Z79.01]  Deep vein thrombosis (DVT) of proximal lower extremity  unspecified chronicity  unspecified laterality (H) [I82.4Y9]  Antiphospholipid syndrome (H) [D68.61]             Comments:  --             Anticoagulation Care Providers       Provider Role Specialty Phone number    Sylvester Cash MD Referring Family Practice     Christiano Ledezma MD Referring Family Medicine [phone]

## 2024-11-03 DIAGNOSIS — Z79.01 LONG TERM CURRENT USE OF ANTICOAGULANT THERAPY: ICD-10-CM

## 2024-11-03 DIAGNOSIS — I82.4Y9 DEEP VEIN THROMBOSIS (DVT) OF PROXIMAL LOWER EXTREMITY, UNSPECIFIED CHRONICITY, UNSPECIFIED LATERALITY (H): ICD-10-CM

## 2024-11-03 DIAGNOSIS — D68.61 ANTIPHOSPHOLIPID SYNDROME (H): ICD-10-CM

## 2024-11-04 RX ORDER — WARFARIN SODIUM 5 MG/1
TABLET ORAL
Qty: 90 TABLET | Refills: 1 | Status: SHIPPED | OUTPATIENT
Start: 2024-11-04

## 2024-11-04 NOTE — TELEPHONE ENCOUNTER
ANTICOAGULATION MANAGEMENT:  Medication Refill    Anticoagulation Summary  As of 10/31/2024      Warfarin maintenance plan:  5 mg (5 mg x 1) every Thu; 7.5 mg (5 mg x 1.5) all other days   Next INR check:  11/21/2024   Target end date:  Indefinite    Indications    DVT (deep venous thrombosis) (H) (Resolved) [I82.409]  Long-term (current) use of anticoagulants [Z79.01] [Z79.01]  Deep vein thrombosis (DVT) of proximal lower extremity  unspecified chronicity  unspecified laterality (H) [I82.4Y9]  Antiphospholipid syndrome (H) [D68.61]                 Anticoagulation Care Providers       Provider Role Specialty Phone number    Sylvester Cash MD Referring Family Practice     Christiano Ledezma MD Referring Family Medicine [phone]            Refill Criteria    Visit with referring provider/group: Meets criteria: visit within referring provider group in the last 15 months on 8/16/24    [facility] referral last signed: 04/05/2024; within last year: Yes    Lab monitoring not exceeding 2 weeks overdue: Yes    Hollis meets all criteria for refill. Rx instructions and quantity supplied updated to match patient's current dosing plan. Warfarin 90 day supply with 1 refill granted per [facility] protocol     Анна Hale RN  Anticoagulation Clinic

## 2024-11-08 ENCOUNTER — MYC REFILL (OUTPATIENT)
Dept: FAMILY MEDICINE | Facility: CLINIC | Age: 54
End: 2024-11-08
Payer: COMMERCIAL

## 2024-11-08 DIAGNOSIS — M54.16 LUMBAR RADICULOPATHY: ICD-10-CM

## 2024-11-08 RX ORDER — OXYCODONE AND ACETAMINOPHEN 5; 325 MG/1; MG/1
1 TABLET ORAL EVERY 6 HOURS PRN
Qty: 30 TABLET | Refills: 0 | Status: SHIPPED | OUTPATIENT
Start: 2024-11-08

## 2024-11-21 ENCOUNTER — LAB (OUTPATIENT)
Dept: LAB | Facility: CLINIC | Age: 54
End: 2024-11-21
Payer: COMMERCIAL

## 2024-11-21 ENCOUNTER — ANTICOAGULATION THERAPY VISIT (OUTPATIENT)
Dept: ANTICOAGULATION | Facility: CLINIC | Age: 54
End: 2024-11-21

## 2024-11-21 DIAGNOSIS — Z79.01 LONG TERM CURRENT USE OF ANTICOAGULANT THERAPY: Primary | ICD-10-CM

## 2024-11-21 DIAGNOSIS — Z13.220 SCREENING FOR HYPERLIPIDEMIA: Primary | ICD-10-CM

## 2024-11-21 DIAGNOSIS — D68.61 ANTIPHOSPHOLIPID SYNDROME (H): ICD-10-CM

## 2024-11-21 DIAGNOSIS — I82.4Y9 DEEP VEIN THROMBOSIS (DVT) OF PROXIMAL LOWER EXTREMITY, UNSPECIFIED CHRONICITY, UNSPECIFIED LATERALITY (H): ICD-10-CM

## 2024-11-21 DIAGNOSIS — I25.84 CORONARY ATHEROSCLEROSIS DUE TO CALCIFIED CORONARY LESION OF NATIVE ARTERY: ICD-10-CM

## 2024-11-21 DIAGNOSIS — I25.10 CORONARY ATHEROSCLEROSIS DUE TO CALCIFIED CORONARY LESION OF NATIVE ARTERY: ICD-10-CM

## 2024-11-21 LAB
CHOLEST SERPL-MCNC: 118 MG/DL
FASTING STATUS PATIENT QL REPORTED: YES
HDLC SERPL-MCNC: 61 MG/DL
INR BLD: 3 (ref 0.9–1.1)
LDLC SERPL CALC-MCNC: 45 MG/DL
NONHDLC SERPL-MCNC: 57 MG/DL
TRIGL SERPL-MCNC: 60 MG/DL

## 2024-11-21 NOTE — PROGRESS NOTES
ANTICOAGULATION MANAGEMENT     Hollis Diggs 54 year old male is on warfarin with therapeutic INR result. (Goal INR 2.0-3.0)    Recent labs: (last 7 days)     11/21/24  1320   INR 3.0*       ASSESSMENT     Source(s): Chart Review  Previous INR was Therapeutic last visit; previously outside of goal range  Medication, diet, health changes since last INR chart reviewed; none identified         PLAN     Recommended plan for no diet, medication or health factor changes affecting INR     Dosing Instructions: Continue your current warfarin dose with next INR in 4 weeks       Summary  As of 11/21/2024      Full warfarin instructions:  5 mg every Thu; 7.5 mg all other days   Next INR check:  12/19/2024               Sent Inform[employer] message with dosing and follow up instructions    Contact [phone] to schedule and with any changes, questions or concerns.     Education provided: Contact [phone] with any changes, questions or concerns.     Plan made per [facility] anticoagulation protocol    Claudia GARCIA RN  11/21/2024  Anticoagulation Clinic  [employer] for routing messages: malcom ALVAREZ  [facility] patient phone line: [phone]        _______________________________________________________________________     Anticoagulation Episode Summary       Current INR goal:  2.0-3.0   TTR:  69.1% (1 y)   Target end date:  Indefinite   Send INR reminders to:  TIMOTEO ALVAREZ    Indications    DVT (deep venous thrombosis) (H) (Resolved) [I82.409]  Long-term (current) use of anticoagulants [Z79.01] [Z79.01]  Deep vein thrombosis (DVT) of proximal lower extremity  unspecified chronicity  unspecified laterality (H) [I82.4Y9]  Antiphospholipid syndrome (H) [D68.61]             Comments:  --             Anticoagulation Care Providers       Provider Role Specialty Phone number    Sylvester Cash MD Referring Family Practice     Christiano Ledezma MD Referring Family Medicine [phone]

## 2024-12-17 ENCOUNTER — ANTICOAGULATION THERAPY VISIT (OUTPATIENT)
Dept: ANTICOAGULATION | Facility: CLINIC | Age: 54
End: 2024-12-17

## 2024-12-17 ENCOUNTER — LAB (OUTPATIENT)
Dept: LAB | Facility: CLINIC | Age: 54
End: 2024-12-17
Payer: COMMERCIAL

## 2024-12-17 DIAGNOSIS — Z79.01 LONG TERM CURRENT USE OF ANTICOAGULANT THERAPY: Primary | ICD-10-CM

## 2024-12-17 DIAGNOSIS — D68.61 ANTIPHOSPHOLIPID SYNDROME (H): ICD-10-CM

## 2024-12-17 DIAGNOSIS — I82.4Y9 DEEP VEIN THROMBOSIS (DVT) OF PROXIMAL LOWER EXTREMITY, UNSPECIFIED CHRONICITY, UNSPECIFIED LATERALITY (H): ICD-10-CM

## 2024-12-17 LAB — INR BLD: 2.9 (ref 0.9–1.1)

## 2024-12-17 PROCEDURE — 36416 COLLJ CAPILLARY BLOOD SPEC: CPT

## 2024-12-17 PROCEDURE — 85610 PROTHROMBIN TIME: CPT

## 2024-12-17 NOTE — PROGRESS NOTES
ANTICOAGULATION MANAGEMENT     Hollis Diggs 54 year old male is on warfarin with therapeutic INR result. (Goal INR 2.0-3.0)    Recent labs: (last 7 days)     12/17/24  1350   INR 2.9*       ASSESSMENT     Source(s): Chart Review and Patient/Caregiver Call     Warfarin doses taken: Warfarin taken as instructed  Diet: No new diet changes identified  Medication/supplement changes: None noted  New illness, injury, or hospitalization: No  Signs or symptoms of bleeding or clotting: No  Previous result: Therapeutic last 2(+) visits  Additional findings: None       PLAN     Recommended plan for no diet, medication or health factor changes affecting INR     Dosing Instructions: Continue your current warfarin dose with next INR in 5 weeks       Summary  As of 12/17/2024      Full warfarin instructions:  5 mg every Thu; 7.5 mg all other days   Next INR check:  1/21/2025               Telephone call with Hollis who verbalizes understanding and agrees to plan and who agrees to plan and repeated back plan correctly    Lab visit scheduled    Education provided: None required    Plan made per [facility] anticoagulation protocol    Анна Hale RN  12/17/2024  Anticoagulation Clinic  [employer] for routing messages: malcom ALVAREZ  [facility] patient phone line: [phone]        _______________________________________________________________________     Anticoagulation Episode Summary       Current INR goal:  2.0-3.0   TTR:  69.1% (1 y)   Target end date:  Indefinite   Send INR reminders to:  TIMOTEO ALVAREZ    Indications    DVT (deep venous thrombosis) (H) (Resolved) [I82.409]  Long-term (current) use of anticoagulants [Z79.01] [Z79.01]  Deep vein thrombosis (DVT) of proximal lower extremity  unspecified chronicity  unspecified laterality (H) [I82.4Y9]  Antiphospholipid syndrome (H) [D68.61]             Comments:  --             Anticoagulation Care Providers       Provider Role Specialty Phone number    Sylvester Cash MD  Referring Family Practice     Christiano Ledezma MD Referring Family Medicine [phone]

## 2024-12-29 DIAGNOSIS — I10 HYPERTENSION, UNSPECIFIED TYPE: ICD-10-CM

## 2024-12-29 DIAGNOSIS — F33.0 MAJOR DEPRESSIVE DISORDER, RECURRENT EPISODE, MILD (H): ICD-10-CM

## 2024-12-30 RX ORDER — LISINOPRIL 5 MG/1
5 TABLET ORAL DAILY
Qty: 30 TABLET | Refills: 7 | Status: SHIPPED | OUTPATIENT
Start: 2024-12-30

## 2024-12-30 RX ORDER — DULOXETIN HYDROCHLORIDE 60 MG/1
CAPSULE, DELAYED RELEASE ORAL
Qty: 60 CAPSULE | Refills: 7 | Status: SHIPPED | OUTPATIENT
Start: 2024-12-30

## 2024-12-30 RX ORDER — TRAZODONE HYDROCHLORIDE 150 MG/1
TABLET ORAL
Qty: 135 TABLET | Refills: 2 | Status: SHIPPED | OUTPATIENT
Start: 2024-12-30

## 2025-01-03 ENCOUNTER — MYC REFILL (OUTPATIENT)
Dept: FAMILY MEDICINE | Facility: CLINIC | Age: 55
End: 2025-01-03
Payer: COMMERCIAL

## 2025-01-03 DIAGNOSIS — M54.16 LUMBAR RADICULOPATHY: ICD-10-CM

## 2025-01-06 DIAGNOSIS — F33.0 MAJOR DEPRESSIVE DISORDER, RECURRENT EPISODE, MILD: ICD-10-CM

## 2025-01-06 RX ORDER — ARIPIPRAZOLE 10 MG/1
10 TABLET ORAL DAILY
Qty: 90 TABLET | Refills: 0 | Status: SHIPPED | OUTPATIENT
Start: 2025-01-06

## 2025-01-08 RX ORDER — OXYCODONE AND ACETAMINOPHEN 5; 325 MG/1; MG/1
1 TABLET ORAL EVERY 6 HOURS PRN
Qty: 30 TABLET | Refills: 0 | Status: SHIPPED | OUTPATIENT
Start: 2025-01-08

## 2025-01-09 DIAGNOSIS — F33.0 MAJOR DEPRESSIVE DISORDER, RECURRENT EPISODE, MILD: ICD-10-CM

## 2025-01-09 RX ORDER — ARIPIPRAZOLE 10 MG/1
10 TABLET ORAL DAILY
Qty: 90 TABLET | Refills: 0 | OUTPATIENT
Start: 2025-01-09

## 2025-01-21 ENCOUNTER — LAB (OUTPATIENT)
Dept: LAB | Facility: CLINIC | Age: 55
End: 2025-01-21
Payer: COMMERCIAL

## 2025-01-21 ENCOUNTER — ANTICOAGULATION THERAPY VISIT (OUTPATIENT)
Dept: ANTICOAGULATION | Facility: CLINIC | Age: 55
End: 2025-01-21

## 2025-01-21 DIAGNOSIS — Z79.01 LONG TERM CURRENT USE OF ANTICOAGULANT THERAPY: Primary | ICD-10-CM

## 2025-01-21 DIAGNOSIS — D68.61 ANTIPHOSPHOLIPID SYNDROME: ICD-10-CM

## 2025-01-21 DIAGNOSIS — I82.4Y9 DEEP VEIN THROMBOSIS (DVT) OF PROXIMAL LOWER EXTREMITY, UNSPECIFIED CHRONICITY, UNSPECIFIED LATERALITY (H): ICD-10-CM

## 2025-01-21 LAB — INR BLD: 2.8 (ref 0.9–1.1)

## 2025-01-21 PROCEDURE — 36416 COLLJ CAPILLARY BLOOD SPEC: CPT

## 2025-01-21 PROCEDURE — 85610 PROTHROMBIN TIME: CPT

## 2025-01-21 NOTE — PROGRESS NOTES
ANTICOAGULATION MANAGEMENT     Hollis Diggs 54 year old male is on warfarin with therapeutic INR result. (Goal INR 2.0-3.0)    Recent labs: (last 7 days)     01/21/25  1414   INR 2.8*       ASSESSMENT     Source(s): Chart Review and Patient/Caregiver Call     Warfarin doses taken: Warfarin taken as instructed  Diet: No new diet changes identified  Medication/supplement changes: None noted  New illness, injury, or hospitalization: No  Signs or symptoms of bleeding or clotting: No  Previous result: Therapeutic last 2(+) visits  Additional findings: None       PLAN     Recommended plan for no diet, medication or health factor changes and previous 2 INR results within goal affecting INR     Dosing Instructions: Continue your current warfarin dose with next INR in 6 weeks       Summary  As of 1/21/2025      Full warfarin instructions:  5 mg every Thu; 7.5 mg all other days   Next INR check:  3/4/2025               Telephone call with Hollis who verbalizes understanding and agrees to plan    Lab visit scheduled    Education provided: Contact [phone] with any changes, questions or concerns.     Plan made per [facility] anticoagulation protocol    Ana Lofton RN  1/21/2025  Anticoagulation Clinic  [employer] for routing messages: malcom ALVAREZ  [facility] patient phone line: [phone]        _______________________________________________________________________     Anticoagulation Episode Summary       Current INR goal:  2.0-3.0   TTR:  72.5% (1 y)   Target end date:  Indefinite   Send INR reminders to:  TIMOTEO ALVAREZ    Indications    DVT (deep venous thrombosis) (H) (Resolved) [I82.409]  Long-term (current) use of anticoagulants [Z79.01] [Z79.01]  Deep vein thrombosis (DVT) of proximal lower extremity  unspecified chronicity  unspecified laterality (H) [I82.4Y9]  Antiphospholipid syndrome [D68.61]             Comments:  --             Anticoagulation Care Providers       Provider Role Specialty Phone  number    Sylvester Cash MD Referring Family Practice     Christiano Ledezma MD Referring Family Medicine [phone]

## 2025-02-02 DIAGNOSIS — F33.0 MAJOR DEPRESSIVE DISORDER, RECURRENT EPISODE, MILD: ICD-10-CM

## 2025-02-03 RX ORDER — ARIPIPRAZOLE 10 MG/1
10 TABLET ORAL DAILY
Qty: 90 TABLET | Refills: 0 | OUTPATIENT
Start: 2025-02-03

## 2025-02-06 ENCOUNTER — MYC REFILL (OUTPATIENT)
Dept: FAMILY MEDICINE | Facility: CLINIC | Age: 55
End: 2025-02-06
Payer: COMMERCIAL

## 2025-02-06 DIAGNOSIS — M54.16 LUMBAR RADICULOPATHY: ICD-10-CM

## 2025-02-10 ENCOUNTER — APPOINTMENT (OUTPATIENT)
Dept: CT IMAGING | Facility: CLINIC | Age: 55
End: 2025-02-10
Attending: FAMILY MEDICINE

## 2025-02-10 ENCOUNTER — HOSPITAL ENCOUNTER (EMERGENCY)
Facility: CLINIC | Age: 55
Discharge: HOME | End: 2025-02-10
Attending: FAMILY MEDICINE | Admitting: FAMILY MEDICINE

## 2025-02-10 ENCOUNTER — TELEPHONE (OUTPATIENT)
Dept: ANTICOAGULATION | Facility: CLINIC | Age: 55
End: 2025-02-10
Payer: COMMERCIAL

## 2025-02-10 ENCOUNTER — APPOINTMENT (OUTPATIENT)
Dept: GENERAL RADIOLOGY | Facility: CLINIC | Age: 55
End: 2025-02-10
Attending: STUDENT IN AN ORGANIZED HEALTH CARE EDUCATION/TRAINING PROGRAM

## 2025-02-10 VITALS
WEIGHT: 255 LBS | HEIGHT: 72 IN | RESPIRATION RATE: 16 BRPM | HEART RATE: 79 BPM | DIASTOLIC BLOOD PRESSURE: 81 MMHG | BODY MASS INDEX: 34.54 KG/M2 | TEMPERATURE: 98.4 F | OXYGEN SATURATION: 94 % | SYSTOLIC BLOOD PRESSURE: 119 MMHG

## 2025-02-10 DIAGNOSIS — Z79.01 CHRONIC ANTICOAGULATION: ICD-10-CM
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
## 2025-02-10 DIAGNOSIS — Z79.01 LONG TERM CURRENT USE OF ANTICOAGULANT THERAPY: Primary | ICD-10-CM

## 2025-02-10 DIAGNOSIS — I82.4Y9 DEEP VEIN THROMBOSIS (DVT) OF PROXIMAL LOWER EXTREMITY, UNSPECIFIED CHRONICITY, UNSPECIFIED LATERALITY (H): ICD-10-CM

## 2025-02-10 DIAGNOSIS — D68.61 ANTIPHOSPHOLIPID SYNDROME: ICD-10-CM

## 2025-02-10 DIAGNOSIS — S09.90XA CLOSED HEAD INJURY, INITIAL ENCOUNTER: ICD-10-CM

## 2025-02-10 DIAGNOSIS — G89.29 ACUTE EXACERBATION OF CHRONIC LOW BACK PAIN: ICD-10-CM

## 2025-02-10 DIAGNOSIS — M54.50 ACUTE EXACERBATION OF CHRONIC LOW BACK PAIN: ICD-10-CM

## 2025-02-10 LAB
ALBUMIN SERPL BCG-MCNC: 3.9 G/DL (ref 3.5–5.2)
ALP SERPL-CCNC: 68 U/L (ref 40–150)
ALT SERPL W P-5'-P-CCNC: 15 U/L (ref 0–70)
ANION GAP SERPL CALCULATED.3IONS-SCNC: 8 MMOL/L (ref 7–15)
APTT PPP: 39 SECONDS (ref 22–38)
AST SERPL W P-5'-P-CCNC: 20 U/L (ref 0–45)
BASOPHILS # BLD AUTO: 0 10E3/UL (ref 0–0.2)
BASOPHILS NFR BLD AUTO: 1 %
BILIRUB SERPL-MCNC: 0.2 MG/DL
BUN SERPL-MCNC: 7.1 MG/DL (ref 6–20)
CALCIUM SERPL-MCNC: 8.5 MG/DL (ref 8.8–10.4)
CHLORIDE SERPL-SCNC: 106 MMOL/L (ref 98–107)
CREAT SERPL-MCNC: 1.05 MG/DL (ref 0.67–1.17)
EGFRCR SERPLBLD CKD-EPI 2021: 84 ML/MIN/1.73M2
EOSINOPHIL # BLD AUTO: 0.1 10E3/UL (ref 0–0.7)
EOSINOPHIL NFR BLD AUTO: 2 %
ERYTHROCYTE [DISTWIDTH] IN BLOOD BY AUTOMATED COUNT: 14.4 % (ref 10–15)
GLUCOSE SERPL-MCNC: 126 MG/DL (ref 70–99)
HCO3 SERPL-SCNC: 26 MMOL/L (ref 22–29)
HCT VFR BLD AUTO: 39.6 % (ref 40–53)
HGB BLD-MCNC: 12.6 G/DL (ref 13.3–17.7)
IMM GRANULOCYTES # BLD: 0 10E3/UL
IMM GRANULOCYTES NFR BLD: 0 %
INR PPP: 3.22 (ref 0.85–1.15)
LYMPHOCYTES # BLD AUTO: 1.3 10E3/UL (ref 0.8–5.3)
LYMPHOCYTES NFR BLD AUTO: 25 %
MCH RBC QN AUTO: 25.3 PG (ref 26.5–33)
MCHC RBC AUTO-ENTMCNC: 31.8 G/DL (ref 31.5–36.5)
MCV RBC AUTO: 79 FL (ref 78–100)
MONOCYTES # BLD AUTO: 0.4 10E3/UL (ref 0–1.3)
MONOCYTES NFR BLD AUTO: 7 %
NEUTROPHILS # BLD AUTO: 3.5 10E3/UL (ref 1.6–8.3)
NEUTROPHILS NFR BLD AUTO: 66 %
NRBC # BLD AUTO: 0 10E3/UL
NRBC BLD AUTO-RTO: 0 /100
PLATELET # BLD AUTO: 207 10E3/UL (ref 150–450)
POTASSIUM SERPL-SCNC: 3.4 MMOL/L (ref 3.4–5.3)
PROT SERPL-MCNC: 6.4 G/DL (ref 6.4–8.3)
RBC # BLD AUTO: 4.99 10E6/UL (ref 4.4–5.9)
SODIUM SERPL-SCNC: 140 MMOL/L (ref 135–145)
WBC # BLD AUTO: 5.3 10E3/UL (ref 4–11)

## 2025-02-10 PROCEDURE — 71045 X-RAY EXAM CHEST 1 VIEW: CPT

## 2025-02-10 PROCEDURE — 250N000011 HC RX IP 250 OP 636: Mod: JZ | Performed by: STUDENT IN AN ORGANIZED HEALTH CARE EDUCATION/TRAINING PROGRAM

## 2025-02-10 PROCEDURE — 74177 CT ABD & PELVIS W/CONTRAST: CPT

## 2025-02-10 PROCEDURE — 96376 TX/PRO/DX INJ SAME DRUG ADON: CPT | Performed by: FAMILY MEDICINE

## 2025-02-10 PROCEDURE — 82947 ASSAY GLUCOSE BLOOD QUANT: CPT | Performed by: FAMILY MEDICINE

## 2025-02-10 PROCEDURE — 99285 EMERGENCY DEPT VISIT HI MDM: CPT | Mod: 25 | Performed by: FAMILY MEDICINE

## 2025-02-10 PROCEDURE — 250N000011 HC RX IP 250 OP 636: Mod: JZ | Performed by: FAMILY MEDICINE

## 2025-02-10 PROCEDURE — 250N000011 HC RX IP 250 OP 636: Performed by: FAMILY MEDICINE

## 2025-02-10 PROCEDURE — 999N000147 HC STATISTIC PT IP EVAL DEFER: Performed by: PHYSICAL THERAPIST

## 2025-02-10 PROCEDURE — 85730 THROMBOPLASTIN TIME PARTIAL: CPT | Performed by: FAMILY MEDICINE

## 2025-02-10 PROCEDURE — 250N000009 HC RX 250: Performed by: FAMILY MEDICINE

## 2025-02-10 PROCEDURE — 96374 THER/PROPH/DIAG INJ IV PUSH: CPT | Mod: 59 | Performed by: FAMILY MEDICINE

## 2025-02-10 PROCEDURE — 250N000013 HC RX MED GY IP 250 OP 250 PS 637: Performed by: STUDENT IN AN ORGANIZED HEALTH CARE EDUCATION/TRAINING PROGRAM

## 2025-02-10 PROCEDURE — 85025 COMPLETE CBC W/AUTO DIFF WBC: CPT | Performed by: FAMILY MEDICINE

## 2025-02-10 PROCEDURE — 82310 ASSAY OF CALCIUM: CPT | Performed by: FAMILY MEDICINE

## 2025-02-10 PROCEDURE — 82247 BILIRUBIN TOTAL: CPT | Performed by: FAMILY MEDICINE

## 2025-02-10 PROCEDURE — 70450 CT HEAD/BRAIN W/O DYE: CPT

## 2025-02-10 PROCEDURE — 96375 TX/PRO/DX INJ NEW DRUG ADDON: CPT | Performed by: FAMILY MEDICINE

## 2025-02-10 PROCEDURE — 999N000104 CT LUMBAR SPINE RECONSTRUCTED

## 2025-02-10 PROCEDURE — 99285 EMERGENCY DEPT VISIT HI MDM: CPT | Performed by: FAMILY MEDICINE

## 2025-02-10 PROCEDURE — 85610 PROTHROMBIN TIME: CPT | Performed by: FAMILY MEDICINE

## 2025-02-10 PROCEDURE — 36415 COLL VENOUS BLD VENIPUNCTURE: CPT | Performed by: FAMILY MEDICINE

## 2025-02-10 RX ORDER — HYDROMORPHONE HYDROCHLORIDE 1 MG/ML
0.5 INJECTION, SOLUTION INTRAMUSCULAR; INTRAVENOUS; SUBCUTANEOUS ONCE
Status: COMPLETED | OUTPATIENT
Start: 2025-02-10 | End: 2025-02-10

## 2025-02-10 RX ORDER — LIDOCAINE 4 G/G
2 PATCH TOPICAL ONCE
Status: DISCONTINUED | OUTPATIENT
Start: 2025-02-10 | End: 2025-02-10 | Stop reason: HOSPADM

## 2025-02-10 RX ORDER — IOPAMIDOL 755 MG/ML
500 INJECTION, SOLUTION INTRAVASCULAR ONCE
Status: COMPLETED | OUTPATIENT
Start: 2025-02-10 | End: 2025-02-10

## 2025-02-10 RX ORDER — ACETAMINOPHEN 500 MG
1000 TABLET ORAL ONCE
Status: COMPLETED | OUTPATIENT
Start: 2025-02-10 | End: 2025-02-10

## 2025-02-10 RX ORDER — OXYCODONE AND ACETAMINOPHEN 5; 325 MG/1; MG/1
1 TABLET ORAL ONCE
Status: COMPLETED | OUTPATIENT
Start: 2025-02-10 | End: 2025-02-10

## 2025-02-10 RX ORDER — ORPHENADRINE CITRATE 30 MG/ML
60 INJECTION INTRAMUSCULAR; INTRAVENOUS ONCE
Status: COMPLETED | OUTPATIENT
Start: 2025-02-10 | End: 2025-02-10

## 2025-02-10 RX ORDER — OXYCODONE AND ACETAMINOPHEN 5; 325 MG/1; MG/1
1 TABLET ORAL EVERY 6 HOURS PRN
Qty: 12 TABLET | Refills: 0 | Status: SHIPPED | OUTPATIENT
Start: 2025-02-10 | End: 2025-02-13

## 2025-02-10 RX ADMIN — IOPAMIDOL 100 ML: 755 INJECTION, SOLUTION INTRAVENOUS at 06:56

## 2025-02-10 RX ADMIN — ORPHENADRINE CITRATE 60 MG: 60 INJECTION INTRAMUSCULAR; INTRAVENOUS at 08:30

## 2025-02-10 RX ADMIN — LIDOCAINE 2 PATCH: 4 PATCH TOPICAL at 08:35

## 2025-02-10 RX ADMIN — OXYCODONE HYDROCHLORIDE AND ACETAMINOPHEN 1 TABLET: 5; 325 TABLET ORAL at 12:04

## 2025-02-10 RX ADMIN — SODIUM CHLORIDE 60 ML: 9 INJECTION, SOLUTION INTRAVENOUS at 06:56

## 2025-02-10 RX ADMIN — ACETAMINOPHEN 1000 MG: 500 TABLET, FILM COATED ORAL at 08:35

## 2025-02-10 RX ADMIN — HYDROMORPHONE HYDROCHLORIDE 0.5 MG: 1 INJECTION, SOLUTION INTRAMUSCULAR; INTRAVENOUS; SUBCUTANEOUS at 08:27

## 2025-02-10 RX ADMIN — HYDROMORPHONE HYDROCHLORIDE 0.5 MG: 1 INJECTION, SOLUTION INTRAMUSCULAR; INTRAVENOUS; SUBCUTANEOUS at 06:27

## 2025-02-10 ASSESSMENT — ACTIVITIES OF DAILY LIVING (ADL)
ADLS_ACUITY_SCORE: 47

## 2025-02-10 ASSESSMENT — COLUMBIA-SUICIDE SEVERITY RATING SCALE - C-SSRS
6. HAVE YOU EVER DONE ANYTHING, STARTED TO DO ANYTHING, OR PREPARED TO DO ANYTHING TO END YOUR LIFE?: NO
2. HAVE YOU ACTUALLY HAD ANY THOUGHTS OF KILLING YOURSELF IN THE PAST MONTH?: NO
1. IN THE PAST MONTH, HAVE YOU WISHED YOU WERE DEAD OR WISHED YOU COULD GO TO SLEEP AND NOT WAKE UP?: NO

## 2025-02-10 NOTE — ED PROVIDER NOTES
[facility] ED Provider Note   Patient: Hollis Diggs  MRN #:  [id number]  Date of Visit: February 10, 2025  Trauma evaluation  CC:     Chief Complaint   Patient presents with    Fall     HPI:  Hollis Diggs is a 54 year old male on chronic anticoagulation with Coumadin for antiphospholipid syndrome with prior DVT who presented to the emergency department by EMS for evaluation of head and back injury.  Patient fell this morning going to work on the ice, falling back and hitting his head and back.  Coworker called 911, and the patient was transported by EMS.  He received 150 mcg of fentanyl en route to the ED.  Patient states he has a history of chronic back pain, with baseline pain level 5/10.  Since the fall, he has had increased pain in the low back area, slightly higher than where his chronic pain in the back is occurring.  Patient also reports left-sided abdominal pain worse with movement.  He states that the head pain has improved.  He had no loss of consciousness.  Patient additional complex medical history as noted below including coronary disease, obstructive sleep apnea, anxiety, hyperlipidemia, depression, obesity.  Patient is on Neurontin, lisinopril, Percocet once a day, Crestor, Deseril, Cymbalta, Abilify.    Problem List:  Patient Active Problem List    Diagnosis Date Noted    Aortic root dilatation 10/08/2024     Priority: Medium    Coronary atherosclerosis due to calcified coronary lesion of native artery 08/07/2024     Priority: Medium    Coronary artery disease involving native coronary artery of native heart, unspecified whether angina present 08/02/2024     Priority: Medium     Angio 2022 with PCI to RCA and residual 60% LAD.      DEDE (obstructive sleep apnea) 12/01/2023     Priority: Medium    Impingement syndrome of shoulder region, right 12/01/2023     Priority: Medium    Biceps tendonitis, right 12/01/2023     Priority:  Medium    Status post coronary angiogram 10/11/2022     Priority: Medium    Hiatal hernia 09/02/2022     Priority: Medium    Other iron deficiency anemia 05/26/2022     Priority: Medium    Other acute gastritis, presence of bleeding unspecified 05/26/2022     Priority: Medium    Benign essential hypertension 02/14/2022     Priority: Medium    HILTON (dyspnea on exertion) 02/14/2022     Priority: Medium    Chronic, continuous use of opioids 05/14/2021     Priority: Medium    Deep vein thrombosis (DVT) of proximal lower extremity, unspecified chronicity, unspecified laterality (H) 04/28/2021     Priority: Medium    Gastroesophageal reflux disease without esophagitis 04/26/2021     Priority: Medium    Antiphospholipid syndrome 03/06/2021     Priority: Medium    Suicidal behavior 06/22/2020     Priority: Medium    Class 1 obesity due to excess calories without serious comorbidity with body mass index (BMI) of 33.0 to 33.9 in adult 01/08/2020     Priority: Medium    Long-term use of high-risk medication 05/12/2017     Priority: Medium    History of deep venous thrombosis 04/14/2017     Priority: Medium    DDD (degenerative disc disease), lumbar 04/14/2017     Priority: Medium    On prednisone therapy 07/27/2016     Priority: Medium    Seronegative rheumatoid arthritis (H) 06/28/2016     Priority: Medium    Long-term (current) use of anticoagulants [Z79.01] 03/16/2016     Priority: Medium    Rheumatoid arthritis of multiple sites with negative rheumatoid factor (H) 12/07/2015     Priority: Medium    Midline low back pain, with sciatica presence unspecified 10/14/2015     Priority: Medium    Major depressive disorder, recurrent episode, mild 10/07/2015     Priority: Medium    Pain in joint, multiple sites 03/20/2015     Priority: Medium    Anxiety state 02/10/2015     Priority: Medium     Problem list name updated by automated process. Provider to review      CARDIOVASCULAR SCREENING; LDL GOAL LESS THAN 160 01/15/2013      Priority: Medium    Hyperlipidemia LDL goal <70 10/31/2010     Priority: Medium    Alopecia 02/19/2010     Priority: Medium    Ingrown toenail 08/18/2009     Priority: Medium    Anxiety 07/09/2008     Priority: Medium    Abdominal pain, epigastric 01/25/2006     Priority: Medium       Past Medical History:   Diagnosis Date    Antiphospholipid syndrome     Arthritis     Asthma     blood clot in leg     Depressive disorder, not elsewhere classified     ANKIT (generalised anxiety disorder)     HH (hiatus hernia)     Hypercholesteremia     Lumbar disc herniation 1992    DEDE (obstructive sleep apnea) 12/1/2023    Seronegative rheumatoid arthritis (H)        MEDS: ARIPiprazole (ABILIFY) 10 MG tablet  DULoxetine (CYMBALTA) 60 MG capsule  enoxaparin ANTICOAGULANT (LOVENOX) 120 MG/0.8ML syringe  ferrous sulfate (FEROSUL) 325 (65 Fe) MG tablet  gabapentin (NEURONTIN) 800 MG tablet  lisinopril (ZESTRIL) 5 MG tablet  nitroGLYcerin (NITROSTAT) 0.4 MG sublingual tablet  oxyCODONE-acetaminophen (PERCOCET) 5-325 MG tablet  pantoprazole (PROTONIX) 40 MG EC tablet  reason aspirin not prescribed, intentional,  rosuvastatin (CRESTOR) 20 MG tablet  sucralfate (CARAFATE) 1 GM tablet  traZODone (DESYREL) 150 MG tablet  warfarin ANTICOAGULANT (COUMADIN) 5 MG tablet  warfarin ANTICOAGULANT (COUMADIN) 5 MG tablet        ALLERGIES:  No Known Allergies    Past Surgical History:   Procedure Laterality Date    APPENDECTOMY      COLONOSCOPY N/A 8/2/2016    Procedure: COMBINED COLONOSCOPY, SINGLE OR MULTIPLE BIOPSY/POLYPECTOMY BY BIOPSY;  Surgeon: Sydnee Walton MD;  Location:  OR    COLONOSCOPY N/A 5/3/2022    Procedure: COLONOSCOPY;  Surgeon: Jose A Hurt MD;  Location: [facility]    COLONOSCOPY WITH CO2 INSUFFLATION N/A 8/2/2016    Procedure: COLONOSCOPY WITH CO2 INSUFFLATION;  Surgeon: Sydnee Walton MD;  Location: [facility]    COMBINED ESOPHAGOSCOPY, GASTROSCOPY, DUODENOSCOPY (EGD) WITH CO2 INSUFFLATION N/A 8/2/2016     Procedure: COMBINED ESOPHAGOSCOPY, GASTROSCOPY, DUODENOSCOPY (EGD) WITH CO2 INSUFFLATION;  Surgeon: Sydnee Walton MD;  Location: [facility]    COMBINED ESOPHAGOSCOPY, GASTROSCOPY, DUODENOSCOPY (EGD) WITH CO2 INSUFFLATION N/A 5/27/2021    Procedure: ESOPHAGOGASTRODUODENOSCOPY, WITH CO2 INSUFFLATION;  Surgeon: Alis Cotton DO;  Location: [facility]    CV CORONARY ANGIOGRAM N/A 10/11/2022    Procedure: Coronary Angiogram;  Surgeon: Hollis Avila MD;  Location: [facility] CARDIAC CATH LAB    CV CORONARY ANGIOGRAM N/A 8/7/2024    Procedure: Coronary Angiogram;  Surgeon: Sylvester Mohr MD;  Location:  HEART CARDIAC CATH LAB    CV INSTANTANEOUS WAVE-FREE RATIO N/A 10/11/2022    Procedure: Instantaneous Wave-Free Ratio;  Surgeon: Hollis Avila MD;  Location:  HEART CARDIAC CATH LAB    CV INSTANTANEOUS WAVE-FREE RATIO N/A 8/7/2024    Procedure: Instantaneous Wave-Free Ratio;  Surgeon: Sylvester Mohr MD;  Location: [facility] CARDIAC CATH LAB    CV INTRAVASULAR ULTRASOUND N/A 10/11/2022    Procedure: Intravascular Ultrasound;  Surgeon: Hollis Avila MD;  Location:  HEART CARDIAC CATH LAB    CV LEFT HEART CATH N/A 8/7/2024    Procedure: Left Heart Catheterization;  Surgeon: Sylvester Mohr MD;  Location: [facility] CARDIAC CATH LAB    CV PCI ANGIOPLASTY N/A 10/11/2022    Procedure: Percutaneous Transluminal Angioplasty;  Surgeon: Hollis Avila MD;  Location: [facility] CARDIAC CATH LAB    ESOPHAGOSCOPY, GASTROSCOPY, DUODENOSCOPY (EGD), COMBINED N/A 8/2/2016    Procedure: COMBINED ESOPHAGOSCOPY, GASTROSCOPY, DUODENOSCOPY (EGD), BIOPSY SINGLE OR MULTIPLE;  Surgeon: Sydnee Walton MD;  Location: [facility]    ESOPHAGOSCOPY, GASTROSCOPY, DUODENOSCOPY (EGD), COMBINED N/A 5/27/2021    Procedure: Esophagogastroduodenoscopy, With Biopsy;  Surgeon: Alis Cotton DO;  Location: [facility]    ESOPHAGOSCOPY, GASTROSCOPY, DUODENOSCOPY (EGD), COMBINED N/A 5/3/2022    Procedure:  ESOPHAGOGASTRODUODENOSCOPY, WITH BIOPSY;  Surgeon: Jose A Hurt MD;  Location: [facility]    HC REMOVAL OF TONSILS,<12 Y/O      Tonsils <11y.o.    HC UGI ENDOSCOPY DIAG W BIOPSY  02/01/06    HC VASECTOMY UNILAT/BILAT W POSTOP SEMEN  1/05    Vasectomy    History back lumbar laminectomy      INJECT EPIDURAL LUMBAR Right 4/22/2021    Procedure: Right Lumbar 4-5 and Lumbar 5 - Sacral 1 Epidural Steroid Injection;  Surgeon: Maxwell Zacarias MD;  Location: [facility]    ZZC NONSPECIFIC PROCEDURE  91 or 92    back surgery. lumbar. lamiectomy    ZZHC REPAIR INCISIONAL HERNIA,REDUCIBLE  1970's    Hernia Repair, Incisional, Unilateral       Social History     Tobacco Use    Smoking status: Never     Passive exposure: Never    Smokeless tobacco: Never   Vaping Use    Vaping status: Never Used   Substance Use Topics    Alcohol use: Yes     Comment: rare    Drug use: No         Review of Systems   Except as noted in HPI, all other systems were reviewed and are negative    Physical Exam   Vitals were reviewed  Patient Vitals for the past 12 hrs:   BP Temp Temp src Pulse Resp SpO2   02/10/25 0617 (!) 155/80 98.4  F (36.9  C) Oral 76 16 94 %     GENERAL APPEARANCE: Alert and oriented x 3  HEAD: Atraumatic, no cephalhematoma  FACE: normal facies  EYES: Pupils are equal reactive to light, extraocular muscles are intact  HENT: normal external exam: TMs are intact and appear normal  NECK: no adenopathy or asymmetry: No midline tenderness  RESP: normal respiratory effort; clear breath sounds bilaterally  CV: regular rate and rhythm; no significant murmurs, gallops or rubs  ABD: soft, obese, no reproducible tenderness; no rebound or guarding; bowel sounds are normal  MS: Patient has mild tenderness in the lumbar region.  No step-off of the midline spine on palpation.  EXT: No calf tenderness or pitting edema  SKIN: no worrisome rash  NEURO: no facial droop; no focal deficits, speech is normal, patient is able to move all 4 extremities  spontaneously.  Sensory and motor function is intact to the upper and lower extremities.        Available Lab/Imaging Results     Results for orders placed or performed during the hospital encounter of 02/10/25 (from the past 24 hours)   CBC with platelets differential    Narrative    The following orders were created for panel order CBC with platelets differential.  Procedure                               Abnormality         Status                     ---------                               -----------         ------                     CBC with platelets and d...[[id number]]  Abnormal            Final result                 Please view results for these tests on the individual orders.   Comprehensive metabolic panel   Result Value Ref Range    Sodium 140 135 - 145 mmol/L    Potassium 3.4 3.4 - 5.3 mmol/L    Carbon Dioxide (CO2) 26 22 - 29 mmol/L    Anion Gap 8 7 - 15 mmol/L    Urea Nitrogen 7.1 6.0 - 20.0 mg/dL    Creatinine 1.05 0.67 - 1.17 mg/dL    GFR Estimate 84 >60 mL/min/1.73m2    Calcium 8.5 (L) 8.8 - 10.4 mg/dL    Chloride 106 98 - 107 mmol/L    Glucose 126 (H) 70 - 99 mg/dL    Alkaline Phosphatase 68 40 - 150 U/L    AST 20 0 - 45 U/L    ALT 15 0 - 70 U/L    Protein Total 6.4 6.4 - 8.3 g/dL    Albumin 3.9 3.5 - 5.2 g/dL    Bilirubin Total 0.2 <=1.2 mg/dL   INR   Result Value Ref Range    INR 3.22 (H) 0.85 - 1.15   CBC with platelets and differential   Result Value Ref Range    WBC Count 5.3 4.0 - 11.0 10e3/uL    RBC Count 4.99 4.40 - 5.90 10e6/uL    Hemoglobin 12.6 (L) 13.3 - 17.7 g/dL    Hematocrit 39.6 (L) 40.0 - 53.0 %    MCV 79 78 - 100 fL    MCH 25.3 (L) 26.5 - 33.0 pg    MCHC 31.8 31.5 - 36.5 g/dL    RDW 14.4 10.0 - 15.0 %    Platelet Count 207 150 - 450 10e3/uL    % Neutrophils 66 %    % Lymphocytes 25 %    % Monocytes 7 %    % Eosinophils 2 %    % Basophils 1 %    % Immature Granulocytes 0 %    NRBCs per 100 WBC 0 <1 /100    Absolute Neutrophils 3.5 1.6 - 8.3 10e3/uL    Absolute Lymphocytes 1.3 0.8  - 5.3 10e3/uL    Absolute Monocytes 0.4 0.0 - 1.3 10e3/uL    Absolute Eosinophils 0.1 0.0 - 0.7 10e3/uL    Absolute Basophils 0.0 0.0 - 0.2 10e3/uL    Absolute Immature Granulocytes 0.0 <=0.4 10e3/uL    Absolute NRBCs 0.0 10e3/uL   Head CT w/o contrast    Narrative    EXAM: CT HEAD W/O CONTRAST  LOCATION: [facility]  DATE: 2/10/2025    INDICATION: On the ice, closed head injury, on Coumadin  COMPARISON: CTA head and neck 1/28/2024.  TECHNIQUE: Routine CT Head without IV contrast. Multiplanar reformats. Dose reduction techniques were used.    FINDINGS:  INTRACRANIAL CONTENTS: No intracranial hemorrhage, extraaxial collection, or mass effect.  No CT evidence of acute infarct. Mild presumed chronic small vessel ischemic changes. Normal ventricles and sulci.     VISUALIZED ORBITS/SINUSES/MASTOIDS: No intraorbital abnormality. Lateral maxillary sinus mucus retention cysts. No middle ear or mastoid effusion.    BONES/SOFT TISSUES: No scalp hematoma. No skull fracture. Osteoarthritis of the right temporomandibular joint.      Impression    IMPRESSION:  1.  No evidence of acute intracranial abnormality.       *Note: Due to a large number of results and/or encounters for the requested time period, some results have not been displayed. A complete set of results can be found in Results Review.                Impression     Final diagnoses:   Fall on ice   Closed head injury, initial encounter   Acute exacerbation of chronic low back pain   Chronic anticoagulation         ED Course & Medical Decision Making   Hollis Diggs is a 54 year old male on chronic anticoagulation for antiphospholipid syndrome who presented to the emergency department for evaluation of potential injuries after falling on the ice.  Upon arrival, trauma evaluation was called.  Patient was seen right away and after the patient was transferred from ambulance [facility] to her bed, history was obtained.  Patient apparently fell  backward, hit the back of his head, and mid to low back region.  He reports pain in the left abdominal region with movement.  Patient was transported by EMS and administered fentanyl 150 mcg on the way to the ED.  Patient states that his last INR level was 2.8 about 2 weeks ago.    Vital signs reveal a temp of 98.4, blood pressure 155/80, heart rate of 76, respiration 16, 94% oxygen saturation.  Weight is 241 pounds.  Patient's airway, breathing and circulation are intact.  Secondary survey reveals no cephalhematoma.  Pupils equally round reactive to light.  TMs are intact.  No malocclusion or dental trauma.  Neck is supple, without midline tenderness.  Lungs are clear, heart sounds are normal, no chest tenderness.  Abdomen is obese without any reproducible tenderness.  Patient has tenderness along the lumbar region of the back.  No midline step-off.  Patient moves all 4 extremities spontaneously.  Strength and sensation is grossly intact.    Patient was given a dose of Dilaudid 0.5 mg IV.  CT scan of the head, abdomen, and lumbar spine was ordered.    Medications   HYDROmorphone (PF) (DILAUDID) injection 0.5 mg (0.5 mg Intravenous $Given 2/10/25 0236)   iopamidol (ISOVUE-370) solution 500 mL (100 mLs Intravenous $Given 2/10/25 2156)   sodium chloride 0.9 % bag 100mL for CT scan flush use (60 mLs Intravenous $Given 2/10/25 6080)       Patient will be signed out to Dr. Dileep Santoyo at the change of shift.       Disclaimer: This note consists of words and symbols derived from keyboarding and dictation using voice recognition software.  As a result, there may be errors that have gone undetected.  Please consider this when interpreting information found in this note.       Sung Torres MD  02/10/25 [id number]

## 2025-02-10 NOTE — DISCHARGE INSTRUCTIONS
We are glad that you are feeling better.  The scans that we did today are reassuring.  We will have you follow-up with your primary doctor for further investigations and further care.  Physical therapy may help as well.  Please monitor your symptoms and come back to the ER if any worsening.    Please call your primary doctor in the morning to discuss your ER visit, make sure you are doing well, and to follow up on any incidental findings. Please come back to the ER for re evaluation if you feel like things are getting worse or have other concerns.

## 2025-02-10 NOTE — ED PROVIDER NOTES
Emergency Department Patient Sign-out       Brief HPI:      Patient signed out to me by the previous ER physician.  Reviewed previous documentation and workup.  Imaging was reviewed with the patient and family member at bedside.  No gross injuries appreciated.  Shared with him the incidental findings.  He states he is still in a lot of pain, was able to examine her myself, does not appear to have any chest symptoms or pain in the thoracic or cervical spine.  We will give him another round of symptomatic medications.    After various rounds of medications patient states he was not feeling much better.  Did discuss this case with the hospitalist service did not feel like he met criteria to stay in the hospital.  He mentioned that he takes oral Percocet, we have given this, watched him again for several hours in the ER, I did place a consult for physical therapy to evaluate him at bedside however he states he just wants to go home to rest tomorrow.  He states he feels well enough to go home.  He will come back to the hospital if any acute worsening.  We discussed all of his testing including incidental findings in detail prior to discharge.  Discussed that there are several resources for his back pain as an outpatient including physical therapy, pain management, primary doctor and neurosurgery.  Also discussed that there is room for further imaging such as MRI that can be pursued as an outpatient.    Discussed todays ER visit and current agreed upon treatment plan. Education provided and all questions answered. Instructed to follow up with pcp to discuss ER visit, make sure they are doing well, and to follow up on any incidental findings. Encouraged to come back to the ER for re evaluation if worsening or any other concerns.      Exam:   Patient Vitals for the past 24 hrs:   BP Temp Temp src Pulse Resp SpO2 Height Weight   02/10/25 1300 -- -- -- -- -- 94 % -- --   02/10/25 1203 -- -- -- -- -- -- 1.829 m (6') 115.7  kg (255 lb)   02/10/25 0930 119/81 -- -- 79 -- 95 % -- --   02/10/25 0830 -- -- -- -- -- 95 % -- --   02/10/25 0715 137/83 -- -- -- -- 96 % -- --   02/10/25 0617 (!) 155/80 98.4  F (36.9  C) Oral 76 16 94 % -- --           ED RESULTS:   Results for orders placed or performed during the hospital encounter of 02/10/25 (from the past 24 hours)   CBC with platelets differential     Status: Abnormal    Collection Time: 02/10/25  6:29 AM    Narrative    The following orders were created for panel order CBC with platelets differential.  Procedure                               Abnormality         Status                     ---------                               -----------         ------                     CBC with platelets and d...[[id number]]  Abnormal            Final result                 Please view results for these tests on the individual orders.   Comprehensive metabolic panel     Status: Abnormal    Collection Time: 02/10/25  6:29 AM   Result Value Ref Range    Sodium 140 135 - 145 mmol/L    Potassium 3.4 3.4 - 5.3 mmol/L    Carbon Dioxide (CO2) 26 22 - 29 mmol/L    Anion Gap 8 7 - 15 mmol/L    Urea Nitrogen 7.1 6.0 - 20.0 mg/dL    Creatinine 1.05 0.67 - 1.17 mg/dL    GFR Estimate 84 >60 mL/min/1.73m2    Calcium 8.5 (L) 8.8 - 10.4 mg/dL    Chloride 106 98 - 107 mmol/L    Glucose 126 (H) 70 - 99 mg/dL    Alkaline Phosphatase 68 40 - 150 U/L    AST 20 0 - 45 U/L    ALT 15 0 - 70 U/L    Protein Total 6.4 6.4 - 8.3 g/dL    Albumin 3.9 3.5 - 5.2 g/dL    Bilirubin Total 0.2 <=1.2 mg/dL   INR     Status: Abnormal    Collection Time: 02/10/25  6:29 AM   Result Value Ref Range    INR 3.22 (H) 0.85 - 1.15   Partial thromboplastin time     Status: Abnormal    Collection Time: 02/10/25  6:29 AM   Result Value Ref Range    aPTT 39 (H) 22 - 38 Seconds   CBC with platelets and differential     Status: Abnormal    Collection Time: 02/10/25  6:29 AM   Result Value Ref Range    WBC Count 5.3 4.0 - 11.0 10e3/uL    RBC Count 4.99  4.40 - 5.90 10e6/uL    Hemoglobin 12.6 (L) 13.3 - 17.7 g/dL    Hematocrit 39.6 (L) 40.0 - 53.0 %    MCV 79 78 - 100 fL    MCH 25.3 (L) 26.5 - 33.0 pg    MCHC 31.8 31.5 - 36.5 g/dL    RDW 14.4 10.0 - 15.0 %    Platelet Count 207 150 - 450 10e3/uL    % Neutrophils 66 %    % Lymphocytes 25 %    % Monocytes 7 %    % Eosinophils 2 %    % Basophils 1 %    % Immature Granulocytes 0 %    NRBCs per 100 WBC 0 <1 /100    Absolute Neutrophils 3.5 1.6 - 8.3 10e3/uL    Absolute Lymphocytes 1.3 0.8 - 5.3 10e3/uL    Absolute Monocytes 0.4 0.0 - 1.3 10e3/uL    Absolute Eosinophils 0.1 0.0 - 0.7 10e3/uL    Absolute Basophils 0.0 0.0 - 0.2 10e3/uL    Absolute Immature Granulocytes 0.0 <=0.4 10e3/uL    Absolute NRBCs 0.0 10e3/uL   Head CT w/o contrast     Status: None    Collection Time: 02/10/25  7:06 AM    Narrative    EXAM: CT HEAD W/O CONTRAST  LOCATION: [facility]  DATE: 2/10/2025    INDICATION: On the ice, closed head injury, on Coumadin  COMPARISON: CTA head and neck 1/28/2024.  TECHNIQUE: Routine CT Head without IV contrast. Multiplanar reformats. Dose reduction techniques were used.    FINDINGS:  INTRACRANIAL CONTENTS: No intracranial hemorrhage, extraaxial collection, or mass effect.  No CT evidence of acute infarct. Mild presumed chronic small vessel ischemic changes. Normal ventricles and sulci.     VISUALIZED ORBITS/SINUSES/MASTOIDS: No intraorbital abnormality. Lateral maxillary sinus mucus retention cysts. No middle ear or mastoid effusion.    BONES/SOFT TISSUES: No scalp hematoma. No skull fracture. Osteoarthritis of the right temporomandibular joint.      Impression    IMPRESSION:  1.  No evidence of acute intracranial abnormality.     CT Lumbar Spine Reconstructed     Status: None    Collection Time: 02/10/25  7:07 AM    Narrative    EXAM: CT LUMBAR SPINE RECONSTRUCTED  LOCATION: [facility]  DATE: 2/10/2025    INDICATION: Fall on ice, mid to low back  pain, pain radiating to the left anterior abdomen  COMPARISON: Lumbar spine MRI dated 03/08/2021  TECHNIQUE: Routine CT Lumbar Spine without IV contrast. Multiplanar reformats. Dose reduction techniques were used.     FINDINGS:  VERTEBRA: Mild anterior height loss of the L1 vertebral body is unchanged. Mild retrolisthesis at L5-S1, unchanged. Small Schmorl's nodes along the superior endplates of L3 and L4. Multilevel degenerative disc disease. Anterior bridging osteophytes in   the lower thoracic spine. No acute fracture or posttraumatic subluxation.     CANAL/FORAMINA: Large disc osteophyte complex at L2-L3 resulting in moderate to severe spinal canal stenosis, progressed from 2021. At L4-L5, there is a central disc extrusion indenting the ventral thecal sac and resulting in mild spinal canal narrowing.   Multilevel facet arthropathy contributes to neural foraminal stenosis, moderate at L2-L3 bilaterally, L3-L4 on the right, L4-L5 bilaterally and L5-S1 bilaterally.    PARASPINAL: Degenerative changes of the sacroiliac joints. Large hiatal hernia.      Impression    IMPRESSION:  1.  No acute fracture or posttraumatic subluxation.  2.  At L2-L3, there is a large disc osteophyte complex resulting in moderate to severe spinal canal stenosis, progressed from 2021.  3.  Multilevel moderate neural foraminal stenosis.   CT ABDOMEN PELVIS W CONTRAST     Status: None    Collection Time: 02/10/25  7:18 AM    Narrative    EXAM: CT ABDOMEN PELVIS W CONTRAST  LOCATION: [facility]  DATE: 2/10/2025    INDICATION: Fall on the ice, low back and abdominal pain; on chronic anticoagulation  COMPARISON: 12/22/2020  TECHNIQUE: CT scan of the abdomen and pelvis was performed following injection of IV contrast. Multiplanar reformats were obtained. Dose reduction techniques were used.  CONTRAST: Isovue 370, 98mL    FINDINGS:   LOWER CHEST: Patchy bibasilar atelectasis/scarring. Moderate to large hiatal  hernia. Coronary atherosclerosis/stents.    HEPATOBILIARY: Normal.    PANCREAS: Normal.    SPLEEN: Normal.    ADRENAL GLANDS: Normal.    KIDNEYS/BLADDER: No significant mass, stone, or hydronephrosis.    BOWEL: Diverticulosis of the colon. No acute inflammatory change. No obstruction. No free air or evidence of bowel injury. Normal appendix.    LYMPH NODES: Normal.    VASCULATURE: No abdominal aortic aneurysm.    PELVIC ORGANS: Mild prostatomegaly.    MUSCULOSKELETAL: Spine findings are dictated separately. No retroperitoneal hematoma. Small periumbilical fat-containing ventral hernia. Degenerative changes of the bilateral hips. No pelvic or proximal femur fractures.      Impression    IMPRESSION:   1.  No acute intra-abdominal findings.  2.  Spine findings are dictated separately.  3.  Moderate to large hiatal hernia.     XR Chest Port 1 View     Status: None    Collection Time: 02/10/25  9:47 AM    Narrative    EXAM: XR CHEST PORT 1 VIEW  LOCATION: [facility]  DATE: 2/10/2025    INDICATION: fall  COMPARISON: CT chest 3/30/2023. Chest radiograph 3/30/2023.      Impression    IMPRESSION: Low lung volumes. No focal consolidation, significant pleural effusion or pneumothorax. Minimal left basilar atelectasis. Similar moderate to large hiatal hernia. Right upper lung calcified granuloma. Similar heart size. No acute displaced   rib fracture.       ED MEDICATIONS:   Medications   Lidocaine (LIDOCARE) 4 % Patch 2 patch (2 patches Transdermal $Patch/Med Applied 2/10/25 0835)   HYDROmorphone (PF) (DILAUDID) injection 0.5 mg (0.5 mg Intravenous $Given 2/10/25 0627)   iopamidol (ISOVUE-370) solution 500 mL (100 mLs Intravenous $Given 2/10/25 0656)   sodium chloride 0.9 % bag 100mL for CT scan flush use (60 mLs Intravenous $Given 2/10/25 0656)   acetaminophen (TYLENOL) tablet 1,000 mg (1,000 mg Oral $Given 2/10/25 0835)   orphenadrine (NORFLEX) injection 60 mg (60 mg Intravenous $Given 2/10/25  0830)   HYDROmorphone (PF) (DILAUDID) injection 0.5 mg (0.5 mg Intravenous $Given 2/10/25 0825)   oxyCODONE-acetaminophen (PERCOCET) 5-325 MG per tablet 1 tablet (1 tablet Oral $Given 2/10/25 1203)         Impression:    ICD-10-CM    1. Fall on ice  W00.9XXA       2. Closed head injury, initial encounter  S09.90XA       3. Acute exacerbation of chronic low back pain  M54.50 Physical Therapy  Referral    G89.29       4. Chronic anticoagulation  Z79.01                  MD Yarelis Coley Jake, MD  02/10/25 1216

## 2025-02-10 NOTE — PLAN OF CARE
S-(situation): Physical therapy evaluation order acknowledged    B-(background): Patient is a 54 year old male, status post fall in the parking lot at work this morning striking the back of his head with report of back and abdominal pain. Current work up with acute findings. Patient unable to mobilize due to pain. Patient with a previous medical history of anxiety, chronic pain, chronic opioid use, depression, RA, obesity, aortic root dilation, CAD, DEDE, hiatal hernia, HILTON, HTN, DVT, lumbar DDD and sciatica, HLD.     A-(assessment): Spoke with patient's RN Evelyne who indicated recent provision of patient's baseline pain medications.     R-(recommendations): PT requests RN reassess patient's mobility status given additional pain control attempt and follow up with PT regarding patient's need for formal PT evaluation to determine disposition needs.     Addendum: No communication following mobility was provided to PT, however patient has been discharged as of 1339.     Thank you for your referral.  Veda Early, PT, DPT, ATC, [facility]  O: [phone]  E: [email or web address]    
No

## 2025-02-10 NOTE — ED TRIAGE NOTES
Patient fell while walking into work this morning. Hit the back of his head. He is on Warfarin. Denies any loss of consciousness. Complaining of pain in his abdomen and back.      Triage Assessment (Adult)       Row Name 02/10/25 0615          Triage Assessment    Airway WDL WDL        Respiratory WDL    Respiratory WDL WDL        Skin Circulation/Temperature WDL    Skin Circulation/Temperature WDL WDL        Cardiac WDL    Cardiac WDL WDL        Peripheral/Neurovascular WDL    Peripheral Neurovascular WDL WDL        Cognitive/Neuro/Behavioral WDL    Cognitive/Neuro/Behavioral WDL WDL

## 2025-02-10 NOTE — Clinical Note
Hollis Diggs was seen and treated in our emergency department on 2/10/2025.  He may return to work on 02/13/2025.       If you have any questions or concerns, please don't hesitate to call.      Dileep Santoyo MD

## 2025-02-10 NOTE — TELEPHONE ENCOUNTER
ANTICOAGULATION  MANAGEMENT: Discharge Review    Hollis Diggs chart reviewed for anticoagulation continuity of care    Emergency room visit on 2/10/25 for Fall.    Discharge disposition: Home    Results:    Recent labs: (last 7 days)     02/10/25  0629   INR 3.22*     Anticoagulation inpatient management:     not applicable     Anticoagulation discharge instructions:     Warfarin dosing: home regimen continued   Bridging: No   INR goal change: No      Medication changes affecting anticoagulation: No    Additional factors affecting anticoagulation: Yes: Fall, inflammation      PLAN     Recommend to adjust dose to 5 mg today x 1 dose due to INR of 3.22 in the ED    Spoke with Hollis    Anticoagulation Calendar updated    Jesse Flores RN  2/10/2025  Anticoagulation Clinic  [facility] for routing messages: malcom ALVAREZ  [facility] patient phone line: [phone]

## 2025-02-11 RX ORDER — OXYCODONE AND ACETAMINOPHEN 5; 325 MG/1; MG/1
1 TABLET ORAL EVERY 6 HOURS PRN
Qty: 30 TABLET | Refills: 0 | Status: SHIPPED | OUTPATIENT
Start: 2025-02-11

## 2025-02-13 ENCOUNTER — HOSPITAL ENCOUNTER (EMERGENCY)
Facility: CLINIC | Age: 55
Discharge: HOME | End: 2025-02-13
Attending: NURSE PRACTITIONER
Payer: COMMERCIAL

## 2025-02-13 ENCOUNTER — APPOINTMENT (OUTPATIENT)
Dept: CT IMAGING | Facility: CLINIC | Age: 55
End: 2025-02-13
Attending: NURSE PRACTITIONER
Payer: COMMERCIAL

## 2025-02-13 VITALS
TEMPERATURE: 98.6 F | OXYGEN SATURATION: 92 % | HEART RATE: 78 BPM | RESPIRATION RATE: 18 BRPM | WEIGHT: 245 LBS | BODY MASS INDEX: 33.23 KG/M2 | DIASTOLIC BLOOD PRESSURE: 75 MMHG | SYSTOLIC BLOOD PRESSURE: 130 MMHG

## 2025-02-13 DIAGNOSIS — M54.6 ACUTE LEFT-SIDED THORACIC BACK PAIN: ICD-10-CM
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
## 2025-02-13 DIAGNOSIS — R07.81 RIB PAIN ON LEFT SIDE: ICD-10-CM

## 2025-02-13 LAB
ANION GAP SERPL CALCULATED.3IONS-SCNC: 9 MMOL/L (ref 7–15)
BASOPHILS # BLD AUTO: 0 10E3/UL (ref 0–0.2)
BASOPHILS NFR BLD AUTO: 1 %
BUN SERPL-MCNC: 4.5 MG/DL (ref 6–20)
CALCIUM SERPL-MCNC: 9.2 MG/DL (ref 8.8–10.4)
CHLORIDE SERPL-SCNC: 102 MMOL/L (ref 98–107)
CREAT SERPL-MCNC: 1 MG/DL (ref 0.67–1.17)
EGFRCR SERPLBLD CKD-EPI 2021: 89 ML/MIN/1.73M2
EOSINOPHIL # BLD AUTO: 0.2 10E3/UL (ref 0–0.7)
EOSINOPHIL NFR BLD AUTO: 3 %
ERYTHROCYTE [DISTWIDTH] IN BLOOD BY AUTOMATED COUNT: 14.2 % (ref 10–15)
GLUCOSE SERPL-MCNC: 105 MG/DL (ref 70–99)
HCO3 SERPL-SCNC: 28 MMOL/L (ref 22–29)
HCT VFR BLD AUTO: 40.3 % (ref 40–53)
HGB BLD-MCNC: 12.8 G/DL (ref 13.3–17.7)
IMM GRANULOCYTES # BLD: 0 10E3/UL
IMM GRANULOCYTES NFR BLD: 0 %
INR PPP: 2.71 (ref 0.85–1.15)
LYMPHOCYTES # BLD AUTO: 1.4 10E3/UL (ref 0.8–5.3)
LYMPHOCYTES NFR BLD AUTO: 20 %
MCH RBC QN AUTO: 25.1 PG (ref 26.5–33)
MCHC RBC AUTO-ENTMCNC: 31.8 G/DL (ref 31.5–36.5)
MCV RBC AUTO: 79 FL (ref 78–100)
MONOCYTES # BLD AUTO: 0.5 10E3/UL (ref 0–1.3)
MONOCYTES NFR BLD AUTO: 7 %
NEUTROPHILS # BLD AUTO: 4.8 10E3/UL (ref 1.6–8.3)
NEUTROPHILS NFR BLD AUTO: 69 %
NRBC # BLD AUTO: 0 10E3/UL
NRBC BLD AUTO-RTO: 0 /100
PLATELET # BLD AUTO: 231 10E3/UL (ref 150–450)
POTASSIUM SERPL-SCNC: 3.5 MMOL/L (ref 3.4–5.3)
RBC # BLD AUTO: 5.1 10E6/UL (ref 4.4–5.9)
SODIUM SERPL-SCNC: 139 MMOL/L (ref 135–145)
WBC # BLD AUTO: 7 10E3/UL (ref 4–11)

## 2025-02-13 PROCEDURE — 250N000009 HC RX 250: Performed by: NURSE PRACTITIONER

## 2025-02-13 PROCEDURE — 250N000011 HC RX IP 250 OP 636: Performed by: NURSE PRACTITIONER

## 2025-02-13 PROCEDURE — 71260 CT THORAX DX C+: CPT

## 2025-02-13 PROCEDURE — 36415 COLL VENOUS BLD VENIPUNCTURE: CPT | Performed by: NURSE PRACTITIONER

## 2025-02-13 PROCEDURE — 85610 PROTHROMBIN TIME: CPT | Performed by: NURSE PRACTITIONER

## 2025-02-13 PROCEDURE — 96374 THER/PROPH/DIAG INJ IV PUSH: CPT | Mod: 59 | Performed by: NURSE PRACTITIONER

## 2025-02-13 PROCEDURE — 96375 TX/PRO/DX INJ NEW DRUG ADDON: CPT | Performed by: NURSE PRACTITIONER

## 2025-02-13 PROCEDURE — 85014 HEMATOCRIT: CPT | Performed by: NURSE PRACTITIONER

## 2025-02-13 PROCEDURE — 96376 TX/PRO/DX INJ SAME DRUG ADON: CPT | Mod: 59 | Performed by: NURSE PRACTITIONER

## 2025-02-13 PROCEDURE — 84295 ASSAY OF SERUM SODIUM: CPT | Performed by: NURSE PRACTITIONER

## 2025-02-13 PROCEDURE — 80048 BASIC METABOLIC PNL TOTAL CA: CPT | Performed by: NURSE PRACTITIONER

## 2025-02-13 PROCEDURE — 99285 EMERGENCY DEPT VISIT HI MDM: CPT | Performed by: NURSE PRACTITIONER

## 2025-02-13 PROCEDURE — 85025 COMPLETE CBC W/AUTO DIFF WBC: CPT | Performed by: NURSE PRACTITIONER

## 2025-02-13 PROCEDURE — 74177 CT ABD & PELVIS W/CONTRAST: CPT

## 2025-02-13 PROCEDURE — 96372 THER/PROPH/DIAG INJ SC/IM: CPT | Mod: 59 | Performed by: NURSE PRACTITIONER

## 2025-02-13 PROCEDURE — 99285 EMERGENCY DEPT VISIT HI MDM: CPT | Mod: 25 | Performed by: NURSE PRACTITIONER

## 2025-02-13 RX ORDER — KETOROLAC TROMETHAMINE 15 MG/ML
15 INJECTION, SOLUTION INTRAMUSCULAR; INTRAVENOUS ONCE
Status: COMPLETED | OUTPATIENT
Start: 2025-02-13 | End: 2025-02-13

## 2025-02-13 RX ORDER — ORPHENADRINE CITRATE 30 MG/ML
60 INJECTION INTRAMUSCULAR; INTRAVENOUS ONCE
Status: COMPLETED | OUTPATIENT
Start: 2025-02-13 | End: 2025-02-13

## 2025-02-13 RX ORDER — HYDROMORPHONE HYDROCHLORIDE 2 MG/1
2 TABLET ORAL EVERY 6 HOURS PRN
Qty: 12 TABLET | Refills: 0 | Status: SHIPPED | OUTPATIENT
Start: 2025-02-13 | End: 2025-02-16

## 2025-02-13 RX ORDER — CYCLOBENZAPRINE HCL 10 MG
10 TABLET ORAL 3 TIMES DAILY PRN
Qty: 12 TABLET | Refills: 0 | Status: SHIPPED | OUTPATIENT
Start: 2025-02-13

## 2025-02-13 RX ORDER — HYDROMORPHONE HYDROCHLORIDE 1 MG/ML
0.5 INJECTION, SOLUTION INTRAMUSCULAR; INTRAVENOUS; SUBCUTANEOUS EVERY 30 MIN PRN
Status: COMPLETED | OUTPATIENT
Start: 2025-02-13 | End: 2025-02-13

## 2025-02-13 RX ORDER — IOPAMIDOL 755 MG/ML
500 INJECTION, SOLUTION INTRAVASCULAR ONCE
Status: COMPLETED | OUTPATIENT
Start: 2025-02-13 | End: 2025-02-13

## 2025-02-13 RX ORDER — LIDOCAINE 4 G/G
1 PATCH TOPICAL EVERY 24 HOURS
Qty: 10 PATCH | Refills: 0 | Status: SHIPPED | OUTPATIENT
Start: 2025-02-13

## 2025-02-13 RX ADMIN — SODIUM CHLORIDE 60 ML: 9 INJECTION, SOLUTION INTRAVENOUS at 18:34

## 2025-02-13 RX ADMIN — KETOROLAC TROMETHAMINE 15 MG: 15 INJECTION, SOLUTION INTRAMUSCULAR; INTRAVENOUS at 18:54

## 2025-02-13 RX ADMIN — ORPHENADRINE CITRATE 60 MG: 60 INJECTION INTRAMUSCULAR; INTRAVENOUS at 20:23

## 2025-02-13 RX ADMIN — IOPAMIDOL 100 ML: 755 INJECTION, SOLUTION INTRAVENOUS at 18:34

## 2025-02-13 RX ADMIN — HYDROMORPHONE HYDROCHLORIDE 1 MG: 1 INJECTION, SOLUTION INTRAMUSCULAR; INTRAVENOUS; SUBCUTANEOUS at 18:20

## 2025-02-13 RX ADMIN — HYDROMORPHONE HYDROCHLORIDE 0.5 MG: 1 INJECTION, SOLUTION INTRAMUSCULAR; INTRAVENOUS; SUBCUTANEOUS at 18:57

## 2025-02-13 RX ADMIN — HYDROMORPHONE HYDROCHLORIDE 0.5 MG: 1 INJECTION, SOLUTION INTRAMUSCULAR; INTRAVENOUS; SUBCUTANEOUS at 20:26

## 2025-02-13 ASSESSMENT — ACTIVITIES OF DAILY LIVING (ADL)
ADLS_ACUITY_SCORE: 47

## 2025-02-13 ASSESSMENT — COLUMBIA-SUICIDE SEVERITY RATING SCALE - C-SSRS
1. IN THE PAST MONTH, HAVE YOU WISHED YOU WERE DEAD OR WISHED YOU COULD GO TO SLEEP AND NOT WAKE UP?: NO
2. HAVE YOU ACTUALLY HAD ANY THOUGHTS OF KILLING YOURSELF IN THE PAST MONTH?: NO
6. HAVE YOU EVER DONE ANYTHING, STARTED TO DO ANYTHING, OR PREPARED TO DO ANYTHING TO END YOUR LIFE?: NO

## 2025-02-13 NOTE — ED PROVIDER NOTES
History     Chief Complaint   Patient presents with    Back Pain     HPI  Hollis Diggs is a 54 year old male with history of hypertension, morbid obesity, DEDE, asthma, rheumatoid arthritis, lumbar radiculopathy with chronic low back pain (treated with Percocet) antiphospholipid syndrome/DVT (on Coumadin), GERD, degenerative disc disease, and hyperlipidemia who presents with persistent and worsening left lateral rib and mid back pain.  Patient fell on the ice landing on his back 3 days ago.   He landed on his back and did hit the back of his head.  He was brought here via EMS 3 days ago for evaluation and had head CT negative for skull fracture or intracranial bleeding, chest x-ray negative for rib fracture or pneumothorax, abdominal/pelvis CT negative for acute intra-abdominal findings, and lumbar spine CT negative for acute fracture or posttraumatic subluxation.  INR was supratherapeutic at 3.22.  Patient is normally on Percocet for chronic low back pain.  They had a difficult time getting his pain managed while he was here in the emergency department and did inquire about admitting him for pain control.  After several hours he did get discharged home.      He returns now with complaints of focal pain to the left lateral rib and left mid back.  Pain increases with taking a deep breath or coughing.  He is not getting any pain relief with his home dose of Percocet.  Denies fever or chills.  Denies cough or congestion.  Pain with inspiration, but no significant shortness of breath.      PDMP Review         Value Time User    State PDMP site checked  Yes 2/13/2025  5:50 PM Johanna Larson APRN CNP            Allergies:  No Known Allergies    Problem List:    Patient Active Problem List    Diagnosis Date Noted    Aortic root dilatation 10/08/2024     Priority: Medium    Coronary atherosclerosis due to calcified coronary lesion of native artery 08/07/2024     Priority: Medium    Coronary artery disease  involving native coronary artery of native heart, unspecified whether angina present 08/02/2024     Priority: Medium     Angio 2022 with PCI to RCA and residual 60% LAD.      DEDE (obstructive sleep apnea) 12/01/2023     Priority: Medium    Impingement syndrome of shoulder region, right 12/01/2023     Priority: Medium    Biceps tendonitis, right 12/01/2023     Priority: Medium    Status post coronary angiogram 10/11/2022     Priority: Medium    Hiatal hernia 09/02/2022     Priority: Medium    Other iron deficiency anemia 05/26/2022     Priority: Medium    Other acute gastritis, presence of bleeding unspecified 05/26/2022     Priority: Medium    Benign essential hypertension 02/14/2022     Priority: Medium    HILTON (dyspnea on exertion) 02/14/2022     Priority: Medium    Chronic, continuous use of opioids 05/14/2021     Priority: Medium    Deep vein thrombosis (DVT) of proximal lower extremity, unspecified chronicity, unspecified laterality (H) 04/28/2021     Priority: Medium    Gastroesophageal reflux disease without esophagitis 04/26/2021     Priority: Medium    Antiphospholipid syndrome 03/06/2021     Priority: Medium    Suicidal behavior 06/22/2020     Priority: Medium    Class 1 obesity due to excess calories without serious comorbidity with body mass index (BMI) of 33.0 to 33.9 in adult 01/08/2020     Priority: Medium    Long-term use of high-risk medication 05/12/2017     Priority: Medium    History of deep venous thrombosis 04/14/2017     Priority: Medium    DDD (degenerative disc disease), lumbar 04/14/2017     Priority: Medium    On prednisone therapy 07/27/2016     Priority: Medium    Seronegative rheumatoid arthritis (H) 06/28/2016     Priority: Medium    Long-term (current) use of anticoagulants [Z79.01] 03/16/2016     Priority: Medium    Rheumatoid arthritis of multiple sites with negative rheumatoid factor (H) 12/07/2015     Priority: Medium    Midline low back pain, with sciatica presence unspecified  10/14/2015     Priority: Medium    Major depressive disorder, recurrent episode, mild 10/07/2015     Priority: Medium    Pain in joint, multiple sites 03/20/2015     Priority: Medium    Anxiety state 02/10/2015     Priority: Medium     Problem list name updated by automated process. Provider to review      CARDIOVASCULAR SCREENING; LDL GOAL LESS THAN 160 01/15/2013     Priority: Medium    Hyperlipidemia LDL goal <70 10/31/2010     Priority: Medium    Alopecia 02/19/2010     Priority: Medium    Ingrown toenail 08/18/2009     Priority: Medium    Anxiety 07/09/2008     Priority: Medium    Abdominal pain, epigastric 01/25/2006     Priority: Medium        Past Medical History:    Past Medical History:   Diagnosis Date    Antiphospholipid syndrome     Arthritis     Asthma     blood clot in leg     Depressive disorder, not elsewhere classified     ANKIT (generalised anxiety disorder)     HH (hiatus hernia)     Hypercholesteremia     Lumbar disc herniation 1992    DEDE (obstructive sleep apnea) 12/1/2023    Seronegative rheumatoid arthritis (H)        Past Surgical History:    Past Surgical History:   Procedure Laterality Date    APPENDECTOMY      COLONOSCOPY N/A 8/2/2016    Procedure: COMBINED COLONOSCOPY, SINGLE OR MULTIPLE BIOPSY/POLYPECTOMY BY BIOPSY;  Surgeon: Sydnee Walton MD;  Location: [facility]    COLONOSCOPY N/A 5/3/2022    Procedure: COLONOSCOPY;  Surgeon: Jose A Hurt MD;  Location: [facility]    COLONOSCOPY WITH CO2 INSUFFLATION N/A 8/2/2016    Procedure: COLONOSCOPY WITH CO2 INSUFFLATION;  Surgeon: Sydnee Walton MD;  Location: [facility]    COMBINED ESOPHAGOSCOPY, GASTROSCOPY, DUODENOSCOPY (EGD) WITH CO2 INSUFFLATION N/A 8/2/2016    Procedure: COMBINED ESOPHAGOSCOPY, GASTROSCOPY, DUODENOSCOPY (EGD) WITH CO2 INSUFFLATION;  Surgeon: Sydnee Walton MD;  Location: [facility]    COMBINED ESOPHAGOSCOPY, GASTROSCOPY, DUODENOSCOPY (EGD) WITH CO2 INSUFFLATION N/A 5/27/2021    Procedure:  ESOPHAGOGASTRODUODENOSCOPY, WITH CO2 INSUFFLATION;  Surgeon: Alis Cotton DO;  Location: [facility]    CV CORONARY ANGIOGRAM N/A 10/11/2022    Procedure: Coronary Angiogram;  Surgeon: Hollis Avila MD;  Location:  [facility]    CV CORONARY ANGIOGRAM N/A 8/7/2024    Procedure: Coronary Angiogram;  Surgeon: Sylvester Mohr MD;  Location:  [facility]    CV INSTANTANEOUS WAVE-FREE RATIO N/A 10/11/2022    Procedure: Instantaneous Wave-Free Ratio;  Surgeon: Hollis Avila MD;  Location:  [facility]    CV INSTANTANEOUS WAVE-FREE RATIO N/A 8/7/2024    Procedure: Instantaneous Wave-Free Ratio;  Surgeon: Sylvester Mohr MD;  Location:  [facility] CATH [facility]    CV INTRAVASULAR ULTRASOUND N/A 10/11/2022    Procedure: Intravascular Ultrasound;  Surgeon: Hollis Avila MD;  Location:  [facility]ARD[facility] CATH LAB    CV LEFT HEART CATH N/A 8/7/2024    Procedure: Left Heart Catheterization;  Surgeon: Sylvester Mohr MD;  Location: [facility] CARDIAC CATH LAB    CV PCI ANGIOPLASTY N/A 10/11/2022    Procedure: Percutaneous Transluminal Angioplasty;  Surgeon: Hollis Avila MD;  Location:  HEART CARDIAC CATH LAB    ESOPHAGOSCOPY, GASTROSCOPY, DUODENOSCOPY (EGD), COMBINED N/A 8/2/2016    Procedure: COMBINED ESOPHAGOSCOPY, GASTROSCOPY, DUODENOSCOPY (EGD), BIOPSY SINGLE OR MULTIPLE;  Surgeon: Sydnee Walton MD;  Location: [facility]    ESOPHAGOSCOPY, GASTROSCOPY, DUODENOSCOPY (EGD), COMBINED N/A 5/27/2021    Procedure: Esophagogastroduodenoscopy, With Biopsy;  Surgeon: Alis Cotton DO;  Location: [facility]    ESOPHAGOSCOPY, GASTROSCOPY, DUODENOSCOPY (EGD), COMBINED N/A 5/3/2022    Procedure: ESOPHAGOGASTRODUODENOSCOPY, WITH BIOPSY;  Surgeon: Jose A Hurt MD;  Location:  GI    HC REMOVAL OF TONSILS,<10 Y/O      Tonsils <11y.o.    HC UGI ENDOSCOPY DIAG W BIOPSY  02/01/06    HC VASECTOMY UNILAT/BILAT W POSTOP SEMEN  1/05    Vasectomy    History  "back lumbar laminectomy      INJECT EPIDURAL LUMBAR Right 4/22/2021    Procedure: Right Lumbar 4-5 and Lumbar 5 - Sacral 1 Epidural Steroid Injection;  Surgeon: Maxwell Zacarias MD;  Location:  OR    [facility] NONSPECIFIC PROCEDURE  91 or 92    back surgery. lumbar. lamiectomy    ZZHC REPAIR INCISIONAL HERNIA,REDUCIBLE  1970's    Hernia Repair, Incisional, Unilateral       Family History:    Family History   Problem Relation Age of Onset    Brain Tumor Mother         Benign    Deep Vein Thrombosis Mother         \"neck\"     Heart Disease Father         \"wont tell anyone\"    Neurologic Disorder Paternal Grandmother         Parkinsons     Alcohol/Drug Paternal Grandfather         alcoholic    Cancer Maternal Grandfather         lung    Diabetes Maternal Grandmother     Cerebrovascular Disease Maternal Grandmother         tim age ~70    Arthritis Cousin         cousins x 2 \"RA\"    Musculoskeletal Disorder Maternal Aunt         MS    Family History Negative Other         psoriasis, crohns, UC, SLE       Social History:  Marital Status:   [2]  Social History     Tobacco Use    Smoking status: Never     Passive exposure: Never    Smokeless tobacco: Never   Vaping Use    Vaping status: Never Used   Substance Use Topics    Alcohol use: Yes     Comment: rare    Drug use: No        Medications:    ARIPiprazole (ABILIFY) 10 MG tablet  DULoxetine (CYMBALTA) 60 MG capsule  enoxaparin ANTICOAGULANT (LOVENOX) 120 MG/0.8ML syringe  ferrous sulfate (FEROSUL) 325 (65 Fe) MG tablet  gabapentin (NEURONTIN) 800 MG tablet  lisinopril (ZESTRIL) 5 MG tablet  nitroGLYcerin (NITROSTAT) 0.4 MG sublingual tablet  oxyCODONE-acetaminophen (PERCOCET) 5-325 MG tablet  oxyCODONE-acetaminophen (PERCOCET) 5-325 MG tablet  pantoprazole (PROTONIX) 40 MG EC tablet  reason aspirin not prescribed, intentional,  rosuvastatin (CRESTOR) 20 MG tablet  sucralfate (CARAFATE) 1 GM tablet  traZODone (DESYREL) 150 MG tablet  warfarin ANTICOAGULANT (COUMADIN) 5 MG " "tablet  warfarin ANTICOAGULANT (COUMADIN) 5 MG tablet          Review of Systems  As mentioned above in the history present illness. All other systems were reviewed and are negative.    Physical Exam   BP: (!) 155/102  Pulse: 90  Temp: 98.6  F (37  C)  Resp: 18  Weight: 111.1 kg (245 lb)  SpO2: 95 %      Physical Exam  Constitutional:       General: He is in acute distress (appears in pain).      Appearance: He is well-developed. He is not ill-appearing.   HENT:      Head: Normocephalic and atraumatic.      Right Ear: External ear normal.      Left Ear: External ear normal.      Nose: Nose normal.      Mouth/Throat:      Mouth: Mucous membranes are moist.   Eyes:      Conjunctiva/sclera: Conjunctivae normal.   Cardiovascular:      Rate and Rhythm: Normal rate and regular rhythm.      Heart sounds: Normal heart sounds. No murmur heard.  Pulmonary:      Effort: Pulmonary effort is normal. No respiratory distress.      Breath sounds: Normal breath sounds.       Abdominal:      General: Bowel sounds are normal. There is no distension.      Palpations: Abdomen is soft.      Tenderness: There is no abdominal tenderness.   Musculoskeletal:         General: Normal range of motion.      Cervical back: Normal.      Thoracic back: Spasms (left lateral rib) and tenderness (left lateral and mid left back) present. No swelling or deformity.      Lumbar back: Normal.   Skin:     General: Skin is warm and dry.      Findings: No rash.   Neurological:      General: No focal deficit present.      Mental Status: He is alert and oriented to person, place, and time.         ED Course        Procedures         {EKG done?:355906}    Critical Care time:  {none or minutes:626768}  {Trauma Activation or Fall?:246015}  {Sepsis/Septic Shock/Stemi/Stroke:868542::\"None\"}         No results found. However, due to the size of the patient record, not all encounters were searched. Please check Results Review for a complete set of " "results.    Medications - No data to display    Assessments & Plan (with Medical Decision Making)     I have reviewed the nursing notes.    I have reviewed the findings, diagnosis, plan and need for follow up with the patient.  {ED Addendum:359179::\" \"}        Medical Decision Making  The patient's presentation was of {[facility] Problem:855525}.    The patient's evaluation involved:  {[facility] Data:055875}    The patient's management necessitated {[facility] Management:351295}.        New Prescriptions    No medications on file       Final diagnoses:   None       2/13/2025   [facility] EMERGENCY DEPT    " chest, abdomen, and pelvis was performed following injection of IV contrast. Multiplanar reformats were obtained. Dose reduction techniques were used.   CONTRAST: Isovue 370, 100 mL    FINDINGS:   LUNGS AND PLEURA: Calcified granulomas. New trace pleural effusions, left greater than right. No pneumothorax.     MEDIASTINUM/AXILLAE: No significant mediastinal or hilar adenopathy. Large hiatal hernia.    CORONARY ARTERY CALCIFICATION: Severe.    HEPATOBILIARY: No biliary dilatation    PANCREAS: Unremarkable    SPLEEN: Unremarkable    ADRENAL GLANDS: Unremarkable    KIDNEYS/BLADDER: No hydronephrosis. Bladder is thick-walled but not well-distended.    BOWEL: No bowel wall thickening or mesenteric fluid collections. There are scattered uncomplicated colonic diverticula.    LYMPH NODES: No significant retroperitoneal adenopathy.    VASCULATURE: No abdominal aortic aneurysm. Patent portal vein.    PELVIC ORGANS: No free fluid    MUSCULOSKELETAL: Small fat-containing umbilical hernia. Nonaggressive expansile lesion within left fifth posterolateral rib is unchanged, likely a benign fibro-osseous lesion of bone. Moderate thoracic kyphosis. Mild multilevel spondylosis and facet   arthropathy.      Impression    IMPRESSION:  1.  No evidence of trauma in the chest, abdomen, or pelvis.  2.  Large hiatal hernia redemonstrated.  3.  New small bilateral pleural effusions.     *Note: Due to a large number of results and/or encounters for the requested time period, some results have not been displayed. A complete set of results can be found in Results Review.       Medications   HYDROmorphone (DILAUDID) injection 1 mg (1 mg Intravenous $Given 2/13/25 1820)   iopamidol (ISOVUE-370) solution 500 mL (100 mLs Intravenous $Given 2/13/25 1834)   sodium chloride 0.9 % bag 100mL for CT scan flush use (60 mLs Intravenous $Given 2/13/25 1834)   HYDROmorphone (PF) (DILAUDID) injection 0.5 mg (0.5 mg Intravenous $Given 2/13/25 2026)   ketorolac  (TORADOL) injection 15 mg (15 mg Intravenous $Given 2/13/25 1854)   orphenadrine (NORFLEX) injection 60 mg (60 mg Intramuscular $Given 2/13/25 2023)       Assessments & Plan (with Medical Decision Making)     54-year-old male who presents with ongoing left lateral rib pain that started after he suffered a fall 3 days ago.  He was brought in by EMS 3 days ago after he fell on the ice.  He had extensive workup here in the emergency department including CT scans and x-ray.  He returns due to pain not being controlled by Percocet at home.  He is chronically on Percocet for chronic low back pain.    On exam he appears uncomfortable.  Significant tenderness to the left lateral ribs and does appear to have spasms.  No crepitus.    Lung sounds CTA.  No hypoxia. Blood pressure is mildly elevated.  Given his worsening pain we did obtain a chest/abdomen/pelvis CT to rule out any traumatic injury such as rib fractures, pneumothorax, pulmonary contusion, splenic laceration, or kidney injury.  CT revealed no acute traumatic findings.  He was small bilateral pleural effusion, but otherwise no evidence of fracture pneumothorax.    Patient was given several doses of pain medication as noted above.  His pain does appear to be more controlled.  Blood pressure normalized.    Plan as follows:  Heating pad.  Lidoderm patch daily.  Flexeril 10 mg three times a day.  Dilaudid 2 mg every 6 hours as needed for severe pain. (Use this in place of oxycodone). You can restart you oxycodone in a couple days.    Make appointment for recheck with your clinic provider.        Discharge Medication List as of 2/13/2025  8:31 PM        START taking these medications    Details   cyclobenzaprine (FLEXERIL) 10 MG tablet Take 1 tablet (10 mg) by mouth 3 times daily as needed for muscle spasms., Disp-12 tablet, R-0, E-Prescribe      HYDROmorphone (DILAUDID) 2 MG tablet Take 1 tablet (2 mg) by mouth every 6 hours as needed for pain., Disp-12 tablet, R-0,  E-Prescribe      Lidocaine (LIDOCARE) 4 % Patch Place 1 patch over 12 hours onto the skin every 24 hours. To prevent lidocaine toxicity, patient should be patch free for 12 hrs daily.Disp-10 patch, [id number]             Final diagnoses:   Fall, initial encounter   Rib pain on left side   Acute left-sided thoracic back pain       2/13/2025   [facility] EMERGENCY DEPT       Marsha, FACUNDO Castaneda CNP  02/14/25 0043

## 2025-02-13 NOTE — ED TRIAGE NOTES
Patient with L sided back pain, states he was here Monday for it after he fell. Has Percocet at home, took 1 earlier today but nothing since then as it wasn't helping. 10/10 pain.     Triage Assessment (Adult)       Row Name 02/13/25 1744          Triage Assessment    Airway WDL WDL        Respiratory WDL    Respiratory WDL WDL        Skin Circulation/Temperature WDL    Skin Circulation/Temperature WDL WDL

## 2025-02-14 NOTE — DISCHARGE INSTRUCTIONS
Heating pad.  Lidoderm patch daily.  Flexeril 10 mg three times a day.  Dilaudid 2 mg every 6 hours as needed for severe pain. (Use this in place of oxycodone). You can restart you oxycodone in a couple days.    Make appointment for recheck with your clinic provider.

## 2025-02-17 ENCOUNTER — PATIENT OUTREACH (OUTPATIENT)
Dept: CARE COORDINATION | Facility: CLINIC | Age: 55
End: 2025-02-17
Payer: COMMERCIAL

## 2025-02-17 NOTE — LETTER
Hollis Diggs  [address] [place] [address]    Dear Hollis Diggs,      I am a team member within the [facility] with [facility]. I recently spoke to you to ensure you were doing well following a recent visit within our health system. I also wanted to take this chance to introduce Clinic Care Coordination.     Below is a description of Clinic Care Coordination and how this team can further assist you:       The Clinic Care Coordination team is made up of a Registered Nurse, , Financial Resource Worker, and a Community Health Worker who understand and can help navigate the health care system. The goal of clinic care coordination is to help you manage your health, improve access to care, and achieve optimal health outcomes. They work alongside your provider to assist you in determining your health and social needs, obtain health care and community resources, and provide you with necessary information and education. Clinic Care Coordination can work with you through any barriers and develop a care plan that helps coordinate and strengthen the relationship between you and your care team.    If you wish to connect with the Clinic Care Coordination Team, please let your [facility] Primary Care Provider or Clinic Care Team know and they can place a referral. The Clinic Care Coordination team will then reach out by phone to further support you.    We are focused on providing you with the highest-quality healthcare experience possible.    Sincerely,   Your care team with [facility]'s [address] [place] [address] ([phone]).    Cristina Min, [facility]  Care Coordination

## 2025-02-17 NOTE — PROGRESS NOTES
Clinic Care Coordination Contact  Community Health Worker Initial Outreach    CHW Initial Information Gathering:  Referral Source: ED Follow-Up  Current living arrangement:: Not Assessed  CHW Additional Questions  If ED/Hospital discharge, follow-up appointment scheduled as recommended?: Yes  Medication changes made following ED/Hospital discharge?: No  MyChart active?: Yes  Patient sent Social Drivers of Health questionnaire?: No    Patient accepts CC: No, declined. No additional support is needed at this time. Patient will be sent Care Coordination introduction letter for future reference.     Cristina Min [facility]  Care Coordination

## 2025-02-23 DIAGNOSIS — I25.10 CORONARY ATHEROSCLEROSIS DUE TO CALCIFIED CORONARY LESION OF NATIVE ARTERY: ICD-10-CM

## 2025-02-23 DIAGNOSIS — I25.84 CORONARY ATHEROSCLEROSIS DUE TO CALCIFIED CORONARY LESION OF NATIVE ARTERY: ICD-10-CM

## 2025-02-24 RX ORDER — ROSUVASTATIN CALCIUM 20 MG/1
20 TABLET, COATED ORAL DAILY
Qty: 90 TABLET | Refills: 2 | Status: SHIPPED | OUTPATIENT
Start: 2025-02-24

## 2025-02-25 ENCOUNTER — LAB (OUTPATIENT)
Dept: LAB | Facility: CLINIC | Age: 55
End: 2025-02-25
Payer: COMMERCIAL

## 2025-02-25 ENCOUNTER — DOCUMENTATION ONLY (OUTPATIENT)
Dept: ANTICOAGULATION | Facility: CLINIC | Age: 55
End: 2025-02-25

## 2025-02-25 ENCOUNTER — OFFICE VISIT (OUTPATIENT)
Dept: FAMILY MEDICINE | Facility: CLINIC | Age: 55
End: 2025-02-25
Payer: COMMERCIAL

## 2025-02-25 ENCOUNTER — ANTICOAGULATION THERAPY VISIT (OUTPATIENT)
Dept: ANTICOAGULATION | Facility: CLINIC | Age: 55
End: 2025-02-25

## 2025-02-25 VITALS
OXYGEN SATURATION: 96 % | BODY MASS INDEX: 35.61 KG/M2 | DIASTOLIC BLOOD PRESSURE: 78 MMHG | RESPIRATION RATE: 16 BRPM | HEART RATE: 76 BPM | TEMPERATURE: 97.5 F | SYSTOLIC BLOOD PRESSURE: 122 MMHG | WEIGHT: 254.4 LBS | HEIGHT: 71 IN

## 2025-02-25 DIAGNOSIS — M06.09 RHEUMATOID ARTHRITIS OF MULTIPLE SITES WITH NEGATIVE RHEUMATOID FACTOR (H): ICD-10-CM

## 2025-02-25 DIAGNOSIS — F11.90 CHRONIC, CONTINUOUS USE OF OPIOIDS: ICD-10-CM

## 2025-02-25 DIAGNOSIS — D68.61 ANTIPHOSPHOLIPID SYNDROME: ICD-10-CM

## 2025-02-25 DIAGNOSIS — E78.5 HYPERLIPIDEMIA LDL GOAL <70: ICD-10-CM

## 2025-02-25 DIAGNOSIS — K44.9 HIATAL HERNIA: ICD-10-CM

## 2025-02-25 DIAGNOSIS — I82.4Y9 DEEP VEIN THROMBOSIS (DVT) OF PROXIMAL LOWER EXTREMITY, UNSPECIFIED CHRONICITY, UNSPECIFIED LATERALITY (H): Primary | ICD-10-CM

## 2025-02-25 DIAGNOSIS — Z79.01 LONG TERM CURRENT USE OF ANTICOAGULANT THERAPY: Primary | ICD-10-CM
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
## 2025-02-25 DIAGNOSIS — I82.4Y9 DEEP VEIN THROMBOSIS (DVT) OF PROXIMAL LOWER EXTREMITY, UNSPECIFIED CHRONICITY, UNSPECIFIED LATERALITY (H): ICD-10-CM

## 2025-02-25 DIAGNOSIS — E66.01 CLASS 2 SEVERE OBESITY WITH BODY MASS INDEX (BMI) OF 35 TO 39.9 WITH SERIOUS COMORBIDITY (H): ICD-10-CM

## 2025-02-25 DIAGNOSIS — M54.6 ACUTE LEFT-SIDED THORACIC BACK PAIN: ICD-10-CM

## 2025-02-25 DIAGNOSIS — I25.10 CORONARY ARTERY DISEASE INVOLVING NATIVE CORONARY ARTERY OF NATIVE HEART, UNSPECIFIED WHETHER ANGINA PRESENT: ICD-10-CM

## 2025-02-25 DIAGNOSIS — Z79.01 LONG TERM CURRENT USE OF ANTICOAGULANT THERAPY: ICD-10-CM

## 2025-02-25 DIAGNOSIS — I10 BENIGN ESSENTIAL HYPERTENSION: ICD-10-CM

## 2025-02-25 DIAGNOSIS — F33.0 MAJOR DEPRESSIVE DISORDER, RECURRENT EPISODE, MILD: ICD-10-CM

## 2025-02-25 DIAGNOSIS — E66.812 CLASS 2 SEVERE OBESITY WITH BODY MASS INDEX (BMI) OF 35 TO 39.9 WITH SERIOUS COMORBIDITY (H): ICD-10-CM

## 2025-02-25 LAB — INR BLD: 3 (ref 0.9–1.1)

## 2025-02-25 PROCEDURE — 36416 COLLJ CAPILLARY BLOOD SPEC: CPT

## 2025-02-25 PROCEDURE — 99214 OFFICE O/P EST MOD 30 MIN: CPT | Performed by: STUDENT IN AN ORGANIZED HEALTH CARE EDUCATION/TRAINING PROGRAM

## 2025-02-25 PROCEDURE — 85610 PROTHROMBIN TIME: CPT

## 2025-02-25 PROCEDURE — G2211 COMPLEX E/M VISIT ADD ON: HCPCS | Performed by: STUDENT IN AN ORGANIZED HEALTH CARE EDUCATION/TRAINING PROGRAM

## 2025-02-25 RX ORDER — TIZANIDINE 2 MG/1
2-4 TABLET ORAL 3 TIMES DAILY PRN
Qty: 60 TABLET | Refills: 1 | Status: SHIPPED | OUTPATIENT
Start: 2025-02-25

## 2025-02-25 ASSESSMENT — PATIENT HEALTH QUESTIONNAIRE - PHQ9
SUM OF ALL RESPONSES TO PHQ QUESTIONS 1-9: 6
10. IF YOU CHECKED OFF ANY PROBLEMS, HOW DIFFICULT HAVE THESE PROBLEMS MADE IT FOR YOU TO DO YOUR WORK, TAKE CARE OF THINGS AT HOME, OR GET ALONG WITH OTHER PEOPLE: SOMEWHAT DIFFICULT
SUM OF ALL RESPONSES TO PHQ QUESTIONS 1-9: 6

## 2025-02-25 ASSESSMENT — PAIN SCALES - GENERAL: PAINLEVEL_OUTOF10: SEVERE PAIN (8)

## 2025-02-25 NOTE — LETTER
February 25, 2025      Hollis Diggs  [address] [place] [address]        To Whom It May Concern:    Hollis Diggs was seen in our clinic. He may return to work with no restriction on 3/3/25.       Sincerely,      Christiano Ledezma      Electronically signed

## 2025-02-25 NOTE — PROGRESS NOTES
Assessment & Plan   Problem List Items Addressed This Visit          Digestive    Class 2 severe obesity with body mass index (BMI) of 35 to 39.9 with serious comorbidity (H)       Endocrine    Hyperlipidemia LDL goal <70       Circulatory    Benign essential hypertension    Coronary artery disease involving native coronary artery of native heart, unspecified whether angina present       Immune    Rheumatoid arthritis of multiple sites with negative rheumatoid factor (H)    Relevant Medications    tiZANidine (ZANAFLEX) 2 MG tablet       Behavioral    Major depressive disorder, recurrent episode, mild       Other    Long-term (current) use of anticoagulants [Z79.01]    Chronic, continuous use of opioids     Other Visit Diagnoses       Fall, subsequent encounter    -  Primary    Acute left-sided thoracic back pain        Relevant Medications    tiZANidine (ZANAFLEX) 2 MG tablet           Overall patient is doing much better than he was and would like to return to work next week.  Would benefit from follow-up with physical therapy which she has scheduled.  Imaging from the ER reassuring.  Importance of long-term strengthening as well as activity modification and weight loss to help with his back reviewed.  Pain contract is up-to-date.  Will plan for trial of tizanidine now to use at night instead of Flexeril with potential side effects and contraindications reviewed.  Letter for work provided.    The longitudinal plan of care for the diagnosis(es)/condition(s) as documented were addressed during this visit. Due to the added complexity in care, I will continue to support Hollis in the subsequent management and with ongoing continuity of care.       MED REC REQUIRED  Post Medication Reconciliation Status:  Discharge medications reconciled and changed, see notes/orders      Subjective   Hollis is a 54 year old, presenting for the following health issues:  ER F/U (Work comp/)        2/25/2025     8:43 AM   Additional  "Questions   Roomed by Nathalie SALCEDO   Accompanied by Cassie, spouse         2/25/2025   Forms   Any forms needing to be completed Yes     HPI       ED/UC Followup:    Facility:  [facility] Emergency room  Date of visit: 2- and 02-  Reason for visit: work comp fall  Current Status: slight relief    Patient with fall on ice and did hit his mid back as well as head.  Was seen in the ER and then seen again due to rib pain without fractures noted on imaging during visit.  Still some difficulty with twisting but overall pain has gotten better.  Is also dealt with loss of his father in recent weeks which has been difficult.  Flexeril minimally helpful but he did tolerate without side effects.  He is taking oxycodone as prescribed and was given further opiates in the ER.  No new red flag symptoms.  No bowel or bladder changes.  No focal weakness.  Does not have radiating symptoms now.  Does think lower back has been worse in recent months.  He would like to go back to work next week without restrictions      Review of Systems  Constitutional, HEENT, cardiovascular, pulmonary, gi and gu systems are negative, except as otherwise noted.      Objective    /78   Pulse 76   Temp 97.5  F (36.4  C) (Temporal)   Resp 16   Ht 1.803 m (5' 11\")   Wt 115.4 kg (254 lb 6.4 oz)   SpO2 96%   BMI 35.48 kg/m    Body mass index is 35.48 kg/m .  Physical Exam   GENERAL: alert and no distress  EYES: Eyes grossly normal to inspection, PERRL and conjunctivae and sclerae normal  HENT: nose and mouth without ulcers or lesions  RESP: lungs clear to auscultation - no rales, rhonchi or wheezes  CV: regular rate and rhythm, normal S1 S2, no murmur, no peripheral edema  MS: no gross musculoskeletal defects noted, no edema, left sided paraspinal tenderness on his midthoracic.   SKIN: no suspicious lesions or rashes  NEURO: Normal strength and tone, mentation intact and speech normal  PSYCH: mentation appears " normal, affect normal/bright          Signed Electronically by: Christiano Ledezma MD

## 2025-02-25 NOTE — PROGRESS NOTES
ANTICOAGULATION MANAGEMENT     Hollis Diggs 54 year old male is on warfarin with therapeutic INR result. (Goal INR 2.0-3.0)    Recent labs: (last 7 days)     02/25/25  0836   INR 3.0*       ASSESSMENT     Source(s): Chart Review  Previous INR was Therapeutic last visit; previously outside of goal range  Medication, diet, health changes since last INR chart reviewed; none identified  Given zanaflex for back pain, on interaction anticipated          PLAN     Recommended plan for temporary change(s) affecting INR     Dosing Instructions: Continue your current warfarin dose with next INR in 3 weeks       Summary  As of 2/25/2025      Full warfarin instructions:  5 mg every Thu; 7.5 mg all other days   Next INR check:  3/18/2025               Detailed voice message left for Hollis with dosing instructions and follow up date.     Contact [phone] to schedule and with any changes, questions or concerns.     Education provided: Contact [phone] with any changes, questions or concerns.     Plan made per [facility] anticoagulation protocol    Ana Lfoton RN  2/25/2025  Anticoagulation Clinic  Transmit Promo for routing messages: malcom ALVAREZ  [facility] patient phone line: [phone]        _______________________________________________________________________     Anticoagulation Episode Summary       Current INR goal:  2.0-3.0   TTR:  70.6% (1 y)   Target end date:  Indefinite   Send INR reminders to:  TIMOTEO ALVAREZ    Indications    DVT (deep venous thrombosis) (H) (Resolved) [I82.409]  Long-term (current) use of anticoagulants [Z79.01] [Z79.01]  Deep vein thrombosis (DVT) of proximal lower extremity  unspecified chronicity  unspecified laterality (H) [I82.4Y9]  Antiphospholipid syndrome [D68.61]             Comments:  --             Anticoagulation Care Providers       Provider Role Specialty Phone number    Sylvester Cash MD Referring Family Practice     Christiano Ledezma MD Referring Family  Medicine [phone]

## 2025-02-25 NOTE — PROGRESS NOTES
ANTICOAGULATION CLINIC REFERRAL RENEWAL REQUEST       An annual renewal order is required for all patients referred to [facility].?  Please review and sign the pended referral order for Hollis Diggs.       ANTICOAGULATION SUMMARY      Warfarin indication(s)   DVT    Mechanical heart valve present?  NO       Current goal range   INR: 2.0-3.0     Goal appropriate for indication? Goal INR 2-3, standard for indication(s) above     Time in Therapeutic Range (TTR)  (Goal > 60%) 70.6%       Office visit with referring provider's group within last year yes on 2/25/25       Ana Lofton RN  [facility]

## 2025-03-06 ENCOUNTER — THERAPY VISIT (OUTPATIENT)
Dept: PHYSICAL THERAPY | Facility: CLINIC | Age: 55
End: 2025-03-06
Attending: STUDENT IN AN ORGANIZED HEALTH CARE EDUCATION/TRAINING PROGRAM
Payer: OTHER MISCELLANEOUS

## 2025-03-06 DIAGNOSIS — M54.50 ACUTE EXACERBATION OF CHRONIC LOW BACK PAIN: ICD-10-CM

## 2025-03-06 DIAGNOSIS — G89.29 ACUTE EXACERBATION OF CHRONIC LOW BACK PAIN: ICD-10-CM

## 2025-03-06 PROCEDURE — 97140 MANUAL THERAPY 1/> REGIONS: CPT | Mod: GP

## 2025-03-06 PROCEDURE — 97161 PT EVAL LOW COMPLEX 20 MIN: CPT | Mod: GP

## 2025-03-06 PROCEDURE — 97110 THERAPEUTIC EXERCISES: CPT | Mod: GP

## 2025-03-06 NOTE — PROGRESS NOTES
PHYSICAL THERAPY EVALUATION  Type of Visit: Evaluation     Fall Risk Screen:  Fall screen completed by: PT  Have you fallen 2 or more times in the past year?: Yes  Have you fallen and had an injury in the past year?: Yes  Is patient a fall risk?: No    Subjective      Patient is a 54 year old male presenting to physical therapy with acute on chronic low back pain after falling on the ice in the parking lot, reports he's had 5 falls on the ice in the past 3 years. Patient reports bending, reaching, standing, walking, lifting, and laying on left side to be his primary pain aggravators at this time. Patient reports using percocet and relative rest for pain relief up to this point in time. Patient reports wishing to participate in physical therapy services for reduced lumbar spine pain for his required functional transfer and ambulation demands in and outside of work at the local [employer] in [place], [place]. Patient will benefit from skilled physical therapy services and has sufficient social support.         Presenting condition or subjective complaint: back pain    Date of onset: 02/10/25      Relevant medical history: Chest pain; Depression; Epigastric abdominal pain; Pain in multiple joint sites; Midline low back pain with sciatica presence unspecified; Dyspnea on exertion; Hiatal hernia; DEDE; Obesity; GERD; Acute gastritis; Hyperlipidemia; DVT of proximal lower extremity; Hypertension; Coronary artery disease; Coronary atherosclerosis due to calcified coronary lesion of native artery; Aortic root dilation; Ingrown toenail; Alopecia; Lumbar DDD; Right shoulder region impingement syndrome; Right biceps tendonitis; Rheumatoid arthritis of multiple sites with negative rheumatoid factor; Seronegative rheumatoid arthritis; Antiphospholipid syndrome; Other iron deficiency anemia; Depression; Anxiety; Cardiovascular screening with LDL goal less than 160; Suicidal behavior; Anxiety state; Long term current use of  anticoagulants; On prednisone therapy; History of DVT; Long term use of high risk medication; Chronic continuous use of opioids; Status post coronary angiogram.     Dates & types of surgery:      Past Surgical History:   Procedure Laterality Date    APPENDECTOMY      COLONOSCOPY N/A 8/2/2016    Procedure: COMBINED COLONOSCOPY, SINGLE OR MULTIPLE BIOPSY/POLYPECTOMY BY BIOPSY;  Surgeon: Sydnee Walton MD;  Location: [facility] OR    COLONOSCOPY N/A 5/3/2022    Procedure: COLONOSCOPY;  Surgeon: Jose A Hurt MD;  Location: [facility]    COLONOSCOPY WITH CO2 INSUFFLATION N/A 8/2/2016    Procedure: COLONOSCOPY WITH CO2 INSUFFLATION;  Surgeon: Sydnee Walton MD;  Location: [facility] OR    COMBINED ESOPHAGOSCOPY, GASTROSCOPY, DUODENOSCOPY (EGD) WITH CO2 INSUFFLATION N/A 8/2/2016    Procedure: COMBINED ESOPHAGOSCOPY, GASTROSCOPY, DUODENOSCOPY (EGD) WITH CO2 INSUFFLATION;  Surgeon: Sydnee Walton MD;  Location: [facility] OR    COMBINED ESOPHAGOSCOPY, GASTROSCOPY, DUODENOSCOPY (EGD) WITH CO2 INSUFFLATION N/A 5/27/2021    Procedure: ESOPHAGOGASTRODUODENOSCOPY, WITH CO2 INSUFFLATION;  Surgeon: Alis Cotton DO;  Location: [facility] OR    CV CORONARY ANGIOGRAM N/A 10/11/2022    Procedure: Coronary Angiogram;  Surgeon: Hollis Avila MD;  Location: [facility] CARDIAC CATH LAB    CV CORONARY ANGIOGRAM N/A 8/7/2024    Procedure: Coronary Angiogram;  Surgeon: Sylvester Mohr MD;  Location: [facility] CARDIAC CATH LAB    CV INSTANTANEOUS WAVE-FREE RATIO N/A 10/11/2022    Procedure: Instantaneous Wave-Free Ratio;  Surgeon: Hollis Avila MD;  Location: [facility] CARDIAC CATH LAB    CV INSTANTANEOUS WAVE-FREE RATIO N/A 8/7/2024    Procedure: Instantaneous Wave-Free Ratio;  Surgeon: Sylvester Mohr MD;  Location: [facility] CARDIAC CATH LAB    CV INTRAVASULAR ULTRASOUND N/A 10/11/2022    Procedure: Intravascular Ultrasound;  Surgeon: Hollis Avila MD;  Location:  [facility]    CV  LEFT HEART CATH N/A 8/7/2024    Procedure: Left Heart Catheterization;  Surgeon: Sylvester Mohr MD;  Location:  [facility] CARDIAC CATH LAB    CV PCI ANGIOPLASTY N/A 10/11/2022    Procedure: Percutaneous Transluminal Angioplasty;  Surgeon: Hollis Avila MD;  Location:  [facility] CARDIAC CATH LAB    ESOPHAGOSCOPY, GASTROSCOPY, DUODENOSCOPY (EGD), COMBINED N/A 8/2/2016    Procedure: COMBINED ESOPHAGOSCOPY, GASTROSCOPY, DUODENOSCOPY (EGD), BIOPSY SINGLE OR MULTIPLE;  Surgeon: Sydnee Walton MD;  Location: [facility]    ESOPHAGOSCOPY, GASTROSCOPY, DUODENOSCOPY (EGD), COMBINED N/A 5/27/2021    Procedure: Esophagogastroduodenoscopy, With Biopsy;  Surgeon: Alis Cotton DO;  Location: [facility]    ESOPHAGOSCOPY, GASTROSCOPY, DUODENOSCOPY (EGD), COMBINED N/A 5/3/2022    Procedure: ESOPHAGOGASTRODUODENOSCOPY, WITH BIOPSY;  Surgeon: Jose A Hurt MD;  Location:  GI    HC REMOVAL OF TONSILS,<11 Y/O      Tonsils <11y.o.    HC UGI ENDOSCOPY DIAG W BIOPSY  02/01/06    HC VASECTOMY UNILAT/BILAT W POSTOP SEMEN  1/05    Vasectomy    History back lumbar laminectomy      INJECT EPIDURAL LUMBAR Right 4/22/2021    Procedure: Right Lumbar 4-5 and Lumbar 5 - Sacral 1 Epidural Steroid Injection;  Surgeon: Maxwell Zacarias MD;  Location:  OR    ZZC NONSPECIFIC PROCEDURE  91 or 92    back surgery. lumbar. lamiectomy    ZZ REPAIR INCISIONAL HERNIA,REDUCIBLE  1970's    Hernia Repair, Incisional, Unilateral        Prior diagnostic imaging/testing results: CT scan       - See patient's electronic medical record for all relevant head, chest, cervical spine, thoracic spine, and lumbar spine imaging studies.     Prior therapy history for the same diagnosis, illness or injury: No      Prior Level of Function  Transfers: Independent  Ambulation: Independent  ADL: Independent  IADL: Driving, Finances, Housekeeping, Laundry, Meal preparation, Work    Living Environment  Social support: With a significant other or spouse   Type  "of home: House; Multi-level   Stairs to enter the home: Yes 3 Is there a railing: No     Ramp: No   Stairs inside the home: Yes 16 Is there a railing: Yes     Help at home: None  Equipment owned:   None listed.     Employment: Yes   Hobbies/Interests: perennials yard work animals    Patient goals for therapy: normal activities    Pain assessment: Pain present  Location: Lumbar spine/Rating: 3-8/10  See objective evaluation for additional pain details     Objective   LUMBAR SPINE EVALUATION  PAIN: Pain Level at Rest: 3/10  Pain Level with Use: 8/10  Pain Location: lumbar spine  Pain Quality: Sharp  Pain Frequency: constant or daily  Pain is Exacerbated By: Bending, reaching, standing, walking, lifting, laying on left side.   Pain is Relieved By: rest and percocet per patient report; Heat and tylenol didn't help.   INTEGUMENTARY (edema, incisions): WFL  POSTURE: Sitting Posture: Rounded shoulders, Forward head, Thoracic kyphosis increased  GAIT:   Weightbearing Status:  FWB for BLE's   Assistive Device(s): None.   Gait Deviations: WFL's  BALANCE/PROPRIOCEPTION: WFL's  WEIGHTBEARING ALIGNMENT: WFL  NON-WEIGHTBEARING ALIGNMENT: WFL   ROM:   (Degrees) Left AROM Left PROM  Right AROM Right PROM   Hip Flexion WFL's  WFL's    Hip Extension WFL's  WFL's    Hip Abduction WFL's  WFL's    Hip Adduction WFL's  WFL's    Hip Internal Rotation  Mod deficit  Mod deficit   Hip External Rotation  Min deficit  Min deficit   Knee Flexion WFL's  WFL's    Knee Extension WNL's  WNL's    Lumbar Side glide Mod-max deficit Mod-max deficit with \"++\" left sided upper lumbar and low rib pain.   Lumbar Flexion Min deficit    Lumbar Extension Mod deficit with \"++\" increased left sided lumbar spine pain.    Pain: As detailed in ROM table above.   End feel: Hard lumbar SBing end feels; BLE's and Lumbar spine ROM measures otherwise with firm end feels.   PELVIC/SI SCREEN: Not assessed today.   STRENGTH: BLE seated MMT's are 4+/5 " "without lumbar spine pain exacerbation except for LLE seated hip flexion MMT causing \"+\" lumbar spine pain.   MYOTOMES:   DTR S: Not assessed today.   CORD SIGNS:   DERMATOMES: WNL's except for intermittently chronic numb right foot per patient report since onset of his most recent acute LBP onset date.   NEURAL TENSION: Lumbar WNL  Per hamstring stretch without neural tension with negative supine SLR tests for BLE's.   FLEXIBILITY: Moderate LLE and minimal RLE hamstring tightness; Moderate BLE PF tightness.   LUMBAR/HIP Special Tests: Positive BLE supine ANSELMO and negative BLE supine FADDIR testing.   PELVIS/SI SPECIAL TESTS: Not assessed today.   FUNCTIONAL TESTS:   PALPATION: TTP along left lateral ribcage at level of 9-10th ribs; Grossly TTP along lumbosacral spine as well with supine and standing assessments respectively.   SPINAL SEGMENTAL CONCLUSIONS: Not formally assessed today, to be assessed at future treatment session as needed.     Assessment & Plan   CLINICAL IMPRESSIONS  Medical Diagnosis: Acute exacerbation of chronic low back pain (M54.50, G89.29)    Treatment Diagnosis: Acute exacerbation of chronic low back pain (M54.50, G89.29)   Impression/Assessment: Patient is a 54 year old male with left low back pain complaints.  The following significant findings have been identified: Pain, Decreased ROM/flexibility, Decreased joint mobility, Impaired sensation, Impaired gait, Impaired muscle performance, Decreased activity tolerance, Impaired posture, and Instability. These impairments interfere with their ability to perform self care tasks, work tasks, recreational activities, household chores, household mobility, and community mobility as compared to previous level of function.     Clinical Decision Making (Complexity):  Clinical Presentation: Stable/Uncomplicated  Clinical Presentation Rationale: based on medical and personal factors listed in PT evaluation  Clinical Decision Making (Complexity): Low " complexity    PLAN OF CARE  Treatment Interventions:  Modalities: Cryotherapy, Hot Pack  Interventions: Gait Training, Manual Therapy, Neuromuscular Re-education, Therapeutic Activity, Therapeutic Exercise    Long Term Goals     PT Goal 1  Goal Identifier: Home Exercise Programs  Goal Description: Patient will demonstrate proper performance and good adherence to his HEP's for 10 weeks for 5 of 7 days per week to demonstrate improved long term independence with management of his low back pain.  Rationale: to maximize safety and independence with performance of ADLs and functional tasks;to maximize safety and independence within the home;to maximize safety and independence within the community  Goal Progress: Patient tolerated today's selected lumbar mobility and stretching exercises without abnormal lumbar spine pain cares at today's session.  Target Date: 05/15/25  PT Goal 2  Goal Identifier: Oswestry Disability Index  Goal Description: Patient will reduce his initial SOILA assessment score by 10% or greater to demonstrate reduced restriction of his low back pain with performance of his required functional mobility demands.  Rationale: to maximize safety and independence with performance of ADLs and functional tasks;to maximize safety and independence within the home;to maximize safety and independence within the community  Goal Progress: See initial SOILA assessment score.  Target Date: 05/15/25  PT Goal 3  Goal Identifier: Ambulation  Goal Description: Patient will ambulate for 30 minutes or greater with 4/10 or less lumbar spine pain to demonstrate improved tolerance for his required upright work related ambulation demands.  Rationale: to maximize safety and independence with performance of ADLs and functional tasks;to maximize safety and independence within the home;to maximize safety and independence within the community  Goal Progress: See initial evaluation note for initial gait assessment.  Target Date:  05/15/25      Frequency of Treatment: 1 visit per week  Duration of Treatment: 10 weeks    Recommended Referrals to Other Professionals: Not at this time.   Education Assessment:   Learner/Method: Patient;Listening;Demonstration;No Barriers to Learning  Education Comments: Patient reports understanding of his future therapeutic progression.    Risks and benefits of evaluation/treatment have been explained.   Patient/Family/caregiver agrees with Plan of Care.     Evaluation Time:     PT Eval, Low Complexity Minutes (72103): 30   Present: Not applicable     Signing Clinician:    Remi Olsen PT, DPT    [facility]  O: [phone]  E: [email or web address]

## 2025-03-09 NOTE — PROGRESS NOTES
Subjective     Hollis Diggs is a 49 year old male who presents to clinic today for the following health issues:    HPI   Patient has been told by his co-workers that he has been falling asleep at work. Patient works at the [employer] in [facility] and is on his feet all day. He has never noticed himself sleeping. Patient feels that he gets plenty of sleep at night, about 7-8 hours of sleep a night.       SUBJECTIVE:  Hollis  is a 49 year old male who presents for: Concerns of daytime drowsiness.  He has fallen asleep at work and coworkers have noted it.  He is on his feet all day.  He feels he gets enough sleep at night but he does admit to snoring.  States he can fall asleep easily at home during the day.  None of his medicines make him sleepy according to him.  His other concern is about his lack of ability to lose weight.  He does drink diet pop all day.  He has tried all kinds of diets and nothing helps he wants if there is a medication.    Past Medical History:   Diagnosis Date     Antiphospholipid syndrome (H)      Arthritis      Asthma     Exercise     blood clot in leg      Depressive disorder, not elsewhere classified     Depression (non-psychotic)     ANKIT (generalised anxiety disorder)      HH (hiatus hernia)      Hypercholesteremia     normal with weight loss 3/09     Lumbar disc herniation 1992     Seronegative rheumatoid arthritis (H)      Past Surgical History:   Procedure Laterality Date     APPENDECTOMY       C NONSPECIFIC PROCEDURE  91 or 92    back surgery. lumbar. lamiectomy     COLONOSCOPY N/A 8/2/2016    Procedure: COMBINED COLONOSCOPY, SINGLE OR MULTIPLE BIOPSY/POLYPECTOMY BY BIOPSY;  Surgeon: Sydnee Walton MD;  Location: [facility] OR     COLONOSCOPY WITH CO2 INSUFFLATION N/A 8/2/2016    Procedure: COLONOSCOPY WITH CO2 INSUFFLATION;  Surgeon: Sydnee Walton MD;  Location: [facility] OR     COMBINED ESOPHAGOSCOPY, GASTROSCOPY, DUODENOSCOPY (EGD) WITH CO2 INSUFFLATION N/A 8/2/2016     Procedure: COMBINED ESOPHAGOSCOPY, GASTROSCOPY, DUODENOSCOPY (EGD) WITH CO2 INSUFFLATION;  Surgeon: Sydnee Walton MD;  Location: [facility] OR     ESOPHAGOSCOPY, GASTROSCOPY, DUODENOSCOPY (EGD), COMBINED N/A 8/2/2016    Procedure: COMBINED ESOPHAGOSCOPY, GASTROSCOPY, DUODENOSCOPY (EGD), BIOPSY SINGLE OR MULTIPLE;  Surgeon: Sydnee Walton MD;  Location: [facility] OR     HC REMOVAL OF TONSILS,<12 Y/O      Tonsils <11y.o.     HC REPAIR INCISIONAL HERNIA,REDUCIBLE  1970's    Hernia Repair, Incisional, Unilateral     HC UGI ENDOSCOPY DIAG W BIOPSY  02/01/06     HC VASECTOMY UNILAT/BILAT W POSTOP SEMEN  1/05    Vasectomy     History back lumbar laminectomy       Social History     Tobacco Use     Smoking status: Never Smoker     Smokeless tobacco: Never Used   Substance Use Topics     Alcohol use: Yes     Comment: rare     Current Outpatient Medications   Medication Sig Dispense Refill     albuterol (PROAIR HFA/PROVENTIL HFA/VENTOLIN HFA) 108 (90 Base) MCG/ACT inhaler Inhale 2 puffs into the lungs every 4 hours as needed for shortness of breath / dyspnea 3 Inhaler 1     ARIPiprazole (ABILIFY) 5 MG tablet TAKE ONE TABLET BY MOUTH AT BEDTIME 30 tablet 4     atorvastatin (LIPITOR) 10 MG tablet TAKE ONE TABLET BY MOUTH ONCE DAILY 30 tablet 4     DULoxetine (CYMBALTA) 30 MG capsule TAKE THREE CAPSULES BY MOUTH EVERY  capsule 0     finasteride (PROSCAR) 5 MG tablet Take 1 tablet (5 mg) by mouth daily 30 tablet 10     oxyCODONE-acetaminophen (PERCOCET) 5-325 MG tablet Take 1 tablet by mouth every 8 hours as needed for moderate to severe pain Must last 30 days. 90 tablet 0     oxyCODONE-acetaminophen (PERCOCET) 5-325 MG tablet Take 1 tablet by mouth every 8 hours as needed for moderate to severe pain Must last 30 days. 90 tablet 0     phentermine (ADIPEX-P) 37.5 MG tablet Take 1 tablet (37.5 mg) by mouth every morning (before breakfast) 30 tablet 0     sildenafil (REVATIO) 20 MG tablet TAKE 1 TO 2  "TABLETS BY MOUTH ONCE DAILY AS NEEDED PRIOR TO SEXUAL ACTIVITY. NEVER USE WITH NITROGLYCERIN, TERAZOSIN OR DOXAZOSIN 30 tablet 1     terbinafine (LAMISIL) 250 MG tablet TAKE ONE TABLET BY MOUTH ONCE DAILY 30 tablet 0     traMADol (ULTRAM) 50 MG tablet Take 1 tablet as needed twice day for pain. Max 2 tab daily. No driving or alcohol use while taking medication. 60 tablet 0     tretinoin (RETIN-A) 0.05 % external cream Apply  topically At Bedtime. 45 g 3     vitamin D3 (CHOLECALCIFEROL) 2000 units (50 mcg) tablet Take 1 tablet (2,000 Units) by mouth daily 90 tablet 3     warfarin ANTICOAGULANT (COUMADIN) 5 MG tablet Take 10 mg on Monday and 7.5 mg all other days, or as directed by the [facility] 135 tablet 1     order for DME Equipment being ordered: elbow/heel protector, mesh (Patient not taking: Reported on 1/8/2020) 1 Device 0       REVIEW OF SYSTEMS:   5 point ROS negative except as noted above in HPI, including Gen., Resp, CV, GI &  system review.     OBJECTIVE:  Vitals: /84 (BP Location: Right arm, Patient Position: Sitting, Cuff Size: Adult Large)   Pulse 116   Temp 96.9  F (36.1  C) (Temporal)   Resp 16   Ht 1.854 m (6' 1\")   Wt 113.9 kg (251 lb)   SpO2 96%   BMI 33.12 kg/m    BMI= Body mass index is 33.12 kg/m .  Is alert and appears tired.  Pressures are noted vitals as noted.  TSH done 2 years ago was normal.    ASSESSMENT:  #1 narcolepsy #2 obesity    PLAN:  We will refer him to our sleep clinic for a consult.  Discussed different diet options.  We will let him try some phentermine and see how this goes.  Told him that he needs to watch stimulants such as caffeine along with this and he seems to understand.    Weight management plan: Discussed healthy diet and exercise guidelines    Sylvester Cash MD  [facility]            " uncooperative

## 2025-03-13 ENCOUNTER — MYC REFILL (OUTPATIENT)
Dept: FAMILY MEDICINE | Facility: CLINIC | Age: 55
End: 2025-03-13
Payer: COMMERCIAL

## 2025-03-13 DIAGNOSIS — M54.16 LUMBAR RADICULOPATHY: ICD-10-CM

## 2025-03-19 RX ORDER — OXYCODONE AND ACETAMINOPHEN 5; 325 MG/1; MG/1
1 TABLET ORAL EVERY 6 HOURS PRN
Qty: 30 TABLET | Refills: 0 | Status: SHIPPED | OUTPATIENT
Start: 2025-03-19

## 2025-03-25 ENCOUNTER — MYC MEDICAL ADVICE (OUTPATIENT)
Dept: ANTICOAGULATION | Facility: CLINIC | Age: 55
End: 2025-03-25
Payer: COMMERCIAL

## 2025-03-25 ENCOUNTER — TELEPHONE (OUTPATIENT)
Dept: FAMILY MEDICINE | Facility: CLINIC | Age: 55
End: 2025-03-25
Payer: COMMERCIAL

## 2025-03-25 NOTE — TELEPHONE ENCOUNTER
Reason for Call:  Form, our goal is to have forms completed with 72 hours, however, some forms may require a visit or additional information.    Type of letter, form or note:  worker's compensation    Who is the form from?: Insurance comp    Where did the form come from: form was faxed in    What clinic location was the form placed at?: [facility]    Where the form was placed: Given to physician    What number is listed as a contact on the form?: [phone]       Additional comments: Forms completed and faxed back to number provided.  Fax: [phone]  Sent to scanning.    Call taken on 3/25/2025 at 4:48 PM by Sydnee Jeong

## 2025-03-27 ENCOUNTER — LAB (OUTPATIENT)
Dept: LAB | Facility: CLINIC | Age: 55
End: 2025-03-27
Payer: COMMERCIAL

## 2025-03-27 ENCOUNTER — ANTICOAGULATION THERAPY VISIT (OUTPATIENT)
Dept: ANTICOAGULATION | Facility: CLINIC | Age: 55
End: 2025-03-27

## 2025-03-27 DIAGNOSIS — I82.4Y9 DEEP VEIN THROMBOSIS (DVT) OF PROXIMAL LOWER EXTREMITY, UNSPECIFIED CHRONICITY, UNSPECIFIED LATERALITY (H): ICD-10-CM

## 2025-03-27 DIAGNOSIS — D68.61 ANTIPHOSPHOLIPID SYNDROME: ICD-10-CM

## 2025-03-27 DIAGNOSIS — Z79.01 LONG TERM CURRENT USE OF ANTICOAGULANT THERAPY: Primary | ICD-10-CM

## 2025-03-27 LAB — INR BLD: 3.2 (ref 0.9–1.1)

## 2025-03-27 NOTE — PROGRESS NOTES
ANTICOAGULATION MANAGEMENT     Hollis Diggs 54 year old male is on warfarin with therapeutic INR result. (Goal INR 2.0-3.0)    Recent labs: (last 7 days)     03/27/25  1531   INR 3.2*       ASSESSMENT     Source(s): Chart Review  Previous INR was Therapeutic last 2(+) visits  Medication, diet, health changes since last INR chart reviewed; none identified         PLAN     Recommended plan for no diet, medication or health factor changes affecting INR     Dosing Instructions: partial hold then continue your current warfarin dose with next INR in 2 weeks       Summary  As of 3/27/2025      Full warfarin instructions:  3/27: 2.5 mg; Otherwise 5 mg every Thu; 7.5 mg all other days   Next INR check:  4/10/2025               Detailed voice message left for Hollis with dosing instructions and follow up date.     Contact [phone] to schedule and with any changes, questions or concerns.     Education provided: Please call back if any changes to your diet, medications or how you've been taking warfarin    Plan made per [facility] anticoagulation protocol    Анна Hale RN  3/27/2025  Anticoagulation Clinic  [employer] for routing messages: malcom ALVAREZ  [facility] patient phone line: [phone]        _______________________________________________________________________     Anticoagulation Episode Summary       Current INR goal:  2.0-3.0   TTR:  66.6% (1 y)   Target end date:  Indefinite   Send INR reminders to:  TIMOTEO ALVAREZ    Indications    DVT (deep venous thrombosis) (H) (Resolved) [I82.409]  Long-term (current) use of anticoagulants [Z79.01] [Z79.01]  Deep vein thrombosis (DVT) of proximal lower extremity  unspecified chronicity  unspecified laterality (H) [I82.4Y9]  Antiphospholipid syndrome [D68.61]             Comments:  --             Anticoagulation Care Providers       Provider Role Specialty Phone number    Sylvester Cash MD Referring Family Practice     Christiano Ledezma MD Referring Family  Medicine [phone]

## 2025-04-10 ENCOUNTER — LAB (OUTPATIENT)
Dept: LAB | Facility: CLINIC | Age: 55
End: 2025-04-10
Payer: COMMERCIAL

## 2025-04-10 ENCOUNTER — ANTICOAGULATION THERAPY VISIT (OUTPATIENT)
Dept: ANTICOAGULATION | Facility: CLINIC | Age: 55
End: 2025-04-10

## 2025-04-10 DIAGNOSIS — Z79.01 LONG TERM CURRENT USE OF ANTICOAGULANT THERAPY: Primary | ICD-10-CM

## 2025-04-10 DIAGNOSIS — I82.4Y9 DEEP VEIN THROMBOSIS (DVT) OF PROXIMAL LOWER EXTREMITY, UNSPECIFIED CHRONICITY, UNSPECIFIED LATERALITY (H): ICD-10-CM

## 2025-04-10 DIAGNOSIS — D68.61 ANTIPHOSPHOLIPID SYNDROME: ICD-10-CM

## 2025-04-10 LAB — INR BLD: 3.1 (ref 0.9–1.1)

## 2025-04-10 NOTE — PROGRESS NOTES
ANTICOAGULATION MANAGEMENT     Hollis Diggs 54 year old male is on warfarin with supratherapeutic INR result. (Goal INR 2.0-3.0)    Recent labs: (last 7 days)     04/10/25  1429   INR 3.1*       ASSESSMENT     Source(s): Chart Review and Patient/Caregiver Call     Warfarin doses taken: Warfarin taken as instructed  Diet: No new diet changes identified  Medication/supplement changes: None noted  New illness, injury, or hospitalization: No  Signs or symptoms of bleeding or clotting: No  Previous result: Supratherapeutic  Additional findings: None       PLAN     Recommended plan for no diet, medication or health factor changes affecting INR     Dosing Instructions: Continue your current warfarin dose with next INR in 2 weeks   - pt will try to add a little more Vit K to diet to help get this back in range.     Summary  As of 4/10/2025      Full warfarin instructions:  5 mg every Thu; 7.5 mg all other days   Next INR check:  4/24/2025               Telephone call with Hollis who verbalizes understanding and agrees to plan and who agrees to plan and repeated back plan correctly    Lab visit scheduled    Education provided: Goal range and lab monitoring: goal range and significance of current result, Importance of therapeutic range, and Importance of following up at instructed interval  Contact [phone] with any changes, questions or concerns.     Plan made per [facility] anticoagulation protocol    Jesse Flores RN  4/10/2025  Anticoagulation Clinic  [employer] for routing messages: malcom ALVAREZ  [facility] patient phone line: [phone]        _______________________________________________________________________     Anticoagulation Episode Summary       Current INR goal:  2.0-3.0   TTR:  66.6% (1 y)   Target end date:  Indefinite   Send INR reminders to:  TIMOTEO ALVAREZ    Indications    DVT (deep venous thrombosis) (H) (Resolved) [I82.409]  Long-term (current) use of anticoagulants [Z79.01] [Z79.01]  Deep  vein thrombosis (DVT) of proximal lower extremity  unspecified chronicity  unspecified laterality (H) [I82.4Y9]  Antiphospholipid syndrome [D68.61]             Comments:  --             Anticoagulation Care Providers       Provider Role Specialty Phone number    Sylvester Cash MD Referring Family Practice     Christiano Ledezma MD Referring Family Medicine [phone]

## 2025-04-24 ENCOUNTER — LAB (OUTPATIENT)
Dept: LAB | Facility: CLINIC | Age: 55
End: 2025-04-24
Payer: COMMERCIAL

## 2025-04-24 ENCOUNTER — ANTICOAGULATION THERAPY VISIT (OUTPATIENT)
Dept: ANTICOAGULATION | Facility: CLINIC | Age: 55
End: 2025-04-24

## 2025-04-24 DIAGNOSIS — Z79.01 LONG TERM CURRENT USE OF ANTICOAGULANT THERAPY: Primary | ICD-10-CM

## 2025-04-24 DIAGNOSIS — D68.61 ANTIPHOSPHOLIPID SYNDROME: ICD-10-CM

## 2025-04-24 DIAGNOSIS — I82.4Y9 DEEP VEIN THROMBOSIS (DVT) OF PROXIMAL LOWER EXTREMITY, UNSPECIFIED CHRONICITY, UNSPECIFIED LATERALITY (H): ICD-10-CM

## 2025-04-24 LAB — INR BLD: 2.7 (ref 0.9–1.1)

## 2025-04-24 NOTE — PROGRESS NOTES
ANTICOAGULATION MANAGEMENT     Hollis Diggs 54 year old male is on warfarin with therapeutic INR result. (Goal INR 2.0-3.0)    Recent labs: (last 7 days)     04/24/25  1512   INR 2.7*       ASSESSMENT     Source(s): Chart Review  Previous INR was Supratherapeutic  Medication, diet, health changes since last INR chart reviewed; none identified         PLAN     Recommended plan for no diet, medication or health factor changes affecting INR     Dosing Instructions: Continue your current warfarin dose with next INR in 3 weeks       Summary  As of 4/24/2025      Full warfarin instructions:  5 mg every Thu; 7.5 mg all other days   Next INR check:  5/15/2025               Detailed voice message left for Hollis with dosing instructions and follow up date.     Contact [phone] to schedule and with any changes, questions or concerns.     Education provided: Please call back if any changes to your diet, medications or how you've been taking warfarin    Plan made per [facility] anticoagulation protocol    Анна Hale RN  4/24/2025  Anticoagulation Clinic  [employer] for routing messages: malcom ALVAREZ  [facility] patient phone line: [phone]        _______________________________________________________________________     Anticoagulation Episode Summary       Current INR goal:  2.0-3.0   TTR:  67.0% (1 y)   Target end date:  Indefinite   Send INR reminders to:  TIMOTEO ALVAREZ    Indications    DVT (deep venous thrombosis) (H) (Resolved) [I82.409]  Long-term (current) use of anticoagulants [Z79.01] [Z79.01]  Deep vein thrombosis (DVT) of proximal lower extremity  unspecified chronicity  unspecified laterality (H) [I82.4Y9]  Antiphospholipid syndrome [D68.61]             Comments:  --             Anticoagulation Care Providers       Provider Role Specialty Phone number    Sylvester Cash MD Referring Family Practice     Christiano Ledezma MD Referring Family Medicine [phone]

## 2025-05-04 DIAGNOSIS — F41.9 ANXIETY: ICD-10-CM

## 2025-05-06 RX ORDER — GABAPENTIN 800 MG/1
800 TABLET ORAL 3 TIMES DAILY
Qty: 90 TABLET | Refills: 2 | Status: SHIPPED | OUTPATIENT
Start: 2025-05-06

## 2025-05-08 ENCOUNTER — ANTICOAGULATION THERAPY VISIT (OUTPATIENT)
Dept: ANTICOAGULATION | Facility: CLINIC | Age: 55
End: 2025-05-08

## 2025-05-08 ENCOUNTER — LAB (OUTPATIENT)
Dept: LAB | Facility: CLINIC | Age: 55
End: 2025-05-08
Payer: COMMERCIAL

## 2025-05-08 ENCOUNTER — RESULTS FOLLOW-UP (OUTPATIENT)
Dept: ANTICOAGULATION | Facility: CLINIC | Age: 55
End: 2025-05-08

## 2025-05-08 DIAGNOSIS — I82.4Y9 DEEP VEIN THROMBOSIS (DVT) OF PROXIMAL LOWER EXTREMITY, UNSPECIFIED CHRONICITY, UNSPECIFIED LATERALITY (H): ICD-10-CM

## 2025-05-08 DIAGNOSIS — D68.61 ANTIPHOSPHOLIPID SYNDROME: ICD-10-CM

## 2025-05-08 DIAGNOSIS — Z79.01 LONG TERM CURRENT USE OF ANTICOAGULANT THERAPY: Primary | ICD-10-CM

## 2025-05-08 LAB — INR BLD: 3 (ref 0.9–1.1)

## 2025-05-08 NOTE — PROGRESS NOTES
ANTICOAGULATION MANAGEMENT     Hollis Diggs 54 year old male is on warfarin with therapeutic INR result. (Goal INR 2.0-3.0)    Recent labs: (last 7 days)     05/08/25  1443   INR 3.0*       ASSESSMENT     Source(s): Chart Review and Patient/Caregiver Call     Warfarin doses taken: Warfarin taken as instructed  Diet: No new diet changes identified  Medication/supplement changes: None noted  New illness, injury, or hospitalization: No  Signs or symptoms of bleeding or clotting: No  Previous result: Therapeutic last visit; previously outside of goal range  Additional findings: None       PLAN     Recommended plan for no diet, medication or health factor changes affecting INR     Dosing Instructions: Continue your current warfarin dose with next INR in 4 weeks       Summary  As of 5/8/2025      Full warfarin instructions:  5 mg every Thu; 7.5 mg all other days   Next INR check:  6/5/2025               Telephone call with Hollis who verbalizes understanding and agrees to plan and who agrees to plan and repeated back plan correctly    Lab visit scheduled    Education provided: None required    Plan made per [facility] anticoagulation protocol    Анна Hale RN  5/8/2025  Anticoagulation Clinic  [employer] for routing messages: malcom ALVAREZ  [facility] patient phone line: [phone]        _______________________________________________________________________     Anticoagulation Episode Summary       Current INR goal:  2.0-3.0   TTR:  67.0% (1 y)   Target end date:  Indefinite   Send INR reminders to:  TIMOTEO ALVAREZ    Indications    DVT (deep venous thrombosis) (H) (Resolved) [I82.409]  Long-term (current) use of anticoagulants [Z79.01] [Z79.01]  Deep vein thrombosis (DVT) of proximal lower extremity  unspecified chronicity  unspecified laterality (H) [I82.4Y9]  Antiphospholipid syndrome [D68.61]             Comments:  --             Anticoagulation Care Providers       Provider Role Specialty Phone number     Sylvester Cash MD Referring Family Practice     Christiano Ledezma MD Referring Family Medicine [phone]

## 2025-05-15 ENCOUNTER — MYC REFILL (OUTPATIENT)
Dept: FAMILY MEDICINE | Facility: CLINIC | Age: 55
End: 2025-05-15
Payer: COMMERCIAL

## 2025-05-15 DIAGNOSIS — M54.16 LUMBAR RADICULOPATHY: ICD-10-CM

## 2025-05-15 RX ORDER — OXYCODONE AND ACETAMINOPHEN 5; 325 MG/1; MG/1
1 TABLET ORAL EVERY 6 HOURS PRN
Qty: 30 TABLET | Refills: 0 | Status: SHIPPED | OUTPATIENT
Start: 2025-05-15

## 2025-06-01 DIAGNOSIS — F33.0 MAJOR DEPRESSIVE DISORDER, RECURRENT EPISODE, MILD: ICD-10-CM

## 2025-06-02 RX ORDER — ARIPIPRAZOLE 10 MG/1
10 TABLET ORAL DAILY
Qty: 90 TABLET | Refills: 0 | OUTPATIENT
Start: 2025-06-02

## 2025-06-05 ENCOUNTER — RESULTS FOLLOW-UP (OUTPATIENT)
Dept: ANTICOAGULATION | Facility: CLINIC | Age: 55
End: 2025-06-05

## 2025-06-05 ENCOUNTER — LAB (OUTPATIENT)
Dept: LAB | Facility: CLINIC | Age: 55
End: 2025-06-05
Payer: COMMERCIAL

## 2025-06-05 ENCOUNTER — ANTICOAGULATION THERAPY VISIT (OUTPATIENT)
Dept: ANTICOAGULATION | Facility: CLINIC | Age: 55
End: 2025-06-05

## 2025-06-05 DIAGNOSIS — I82.4Y9 DEEP VEIN THROMBOSIS (DVT) OF PROXIMAL LOWER EXTREMITY, UNSPECIFIED CHRONICITY, UNSPECIFIED LATERALITY (H): ICD-10-CM

## 2025-06-05 DIAGNOSIS — D68.61 ANTIPHOSPHOLIPID SYNDROME: ICD-10-CM

## 2025-06-05 DIAGNOSIS — Z79.01 LONG TERM CURRENT USE OF ANTICOAGULANT THERAPY: Primary | ICD-10-CM

## 2025-06-05 LAB — INR BLD: 3.3 (ref 0.9–1.1)

## 2025-06-05 NOTE — PROGRESS NOTES
ANTICOAGULATION MANAGEMENT     Hollis Diggs 55 year old male is on warfarin with supratherapeutic INR result. (Goal INR 2.0-3.0)    Recent labs: (last 7 days)     06/05/25  1436   INR 3.3*       ASSESSMENT     Source(s): Chart Review and Patient/Caregiver Call     Warfarin doses taken: Warfarin taken as instructed  Diet: Increased greens/vitamin K in diet; plans to resume previous intake  Medication/supplement changes: None noted  New illness, injury, or hospitalization: No  Signs or symptoms of bleeding or clotting: No  Previous result: Therapeutic last 2(+) visits  Additional findings: None       PLAN     Recommended plan for temporary change(s) affecting INR     Dosing Instructions: decrease your warfarin dose (5% change) with next INR in 2 weeks (decreased slightly given he's had more greens, and his INR have ran more on the high side or slightly supratherapeutic in the past few months).    Summary  As of 6/5/2025      Full warfarin instructions:  5 mg every Mon, Thu; 7.5 mg all other days   Next INR check:  6/19/2025               Telephone call with Hollis who verbalizes understanding and agrees to plan and who agrees to plan and repeated back plan correctly    Lab visit scheduled    Education provided: None required    Plan made per [facility] anticoagulation protocol    Анна Hale RN  6/5/2025  Anticoagulation Clinic  [employer] for routing messages: malcom ALVAREZ  [facility] patient phone line: [phone]        _______________________________________________________________________     Anticoagulation Episode Summary       Current INR goal:  2.0-3.0   TTR:  59.3% (1 y)   Target end date:  Indefinite   Send INR reminders to:  TIMOTEO ALVAREZ    Indications    DVT (deep venous thrombosis) (H) (Resolved) [I82.409]  Long-term (current) use of anticoagulants [Z79.01] [Z79.01]  Deep vein thrombosis (DVT) of proximal lower extremity  unspecified chronicity  unspecified laterality (H)  [I82.4Y9]  Antiphospholipid syndrome [D68.61]             Comments:  --             Anticoagulation Care Providers       Provider Role Specialty Phone number    Sylvester Cash MD Referring Family Practice     Christiano Ledezma MD Referring Family Medicine [phone]

## 2025-06-12 ENCOUNTER — MYC REFILL (OUTPATIENT)
Dept: FAMILY MEDICINE | Facility: CLINIC | Age: 55
End: 2025-06-12
Payer: COMMERCIAL

## 2025-06-12 DIAGNOSIS — M54.16 LUMBAR RADICULOPATHY: ICD-10-CM

## 2025-06-12 RX ORDER — OXYCODONE AND ACETAMINOPHEN 5; 325 MG/1; MG/1
1 TABLET ORAL EVERY 6 HOURS PRN
Qty: 30 TABLET | Refills: 0 | Status: SHIPPED | OUTPATIENT
Start: 2025-06-12

## 2025-06-18 ENCOUNTER — LAB (OUTPATIENT)
Dept: LAB | Facility: CLINIC | Age: 55
End: 2025-06-18
Payer: COMMERCIAL

## 2025-06-18 ENCOUNTER — ANTICOAGULATION THERAPY VISIT (OUTPATIENT)
Dept: ANTICOAGULATION | Facility: CLINIC | Age: 55
End: 2025-06-18

## 2025-06-18 DIAGNOSIS — Z79.01 LONG TERM CURRENT USE OF ANTICOAGULANT THERAPY: Primary | ICD-10-CM

## 2025-06-18 DIAGNOSIS — I82.4Y9 DEEP VEIN THROMBOSIS (DVT) OF PROXIMAL LOWER EXTREMITY, UNSPECIFIED CHRONICITY, UNSPECIFIED LATERALITY (H): ICD-10-CM

## 2025-06-18 DIAGNOSIS — D68.61 ANTIPHOSPHOLIPID SYNDROME: ICD-10-CM

## 2025-06-18 LAB — INR BLD: 2.6 (ref 0.9–1.1)

## 2025-06-18 PROCEDURE — 36416 COLLJ CAPILLARY BLOOD SPEC: CPT

## 2025-06-18 PROCEDURE — 85610 PROTHROMBIN TIME: CPT

## 2025-06-18 NOTE — PROGRESS NOTES
ANTICOAGULATION MANAGEMENT     Hollis Diggs 55 year old male is on warfarin with therapeutic INR result. (Goal INR 2.0-3.0)    Recent labs: (last 7 days)     06/18/25  1443   INR 2.6*     3  ASSESSMENT     Source(s): Chart Review  Previous INR was Supratherapeutic  Medication, diet, health changes since last INR: chart reviewed; none identified         PLAN     Recommended plan for no diet, medication or health factor changes affecting INR     Dosing Instructions: Continue your current warfarin dose with next INR in 3 weeks       Summary  As of 6/18/2025      Full warfarin instructions:  5 mg every Mon, Thu; 7.5 mg all other days   Next INR check:  7/9/2025               Detailed voice message left for Hollis with dosing instructions and follow up date.         Education provided: Please call back if any changes to your diet, medications or how you've been taking warfarin    Plan made per [facility] anticoagulation protocol    Alvina Ojeda RN  6/18/2025  Anticoagulation Clinic  [employer] for routing messages: malcom ALVAREZ  [facility] patient phone line: [phone]        _______________________________________________________________________     Anticoagulation Episode Summary       Current INR goal:  2.0-3.0   TTR:  57.8% (1 y)   Target end date:  Indefinite   Send INR reminders to:  TIMOTEO ALVAREZ    Indications    DVT (deep venous thrombosis) (H) (Resolved) [I82.409]  Long-term (current) use of anticoagulants [Z79.01] [Z79.01]  Deep vein thrombosis (DVT) of proximal lower extremity  unspecified chronicity  unspecified laterality (H) [I82.4Y9]  Antiphospholipid syndrome [D68.61]             Comments:  --             Anticoagulation Care Providers       Provider Role Specialty Phone number    Sylvester Cash MD Referring Family Practice     Christiano Ledezma MD Referring Family Medicine [phone]

## 2025-06-24 ENCOUNTER — OFFICE VISIT (OUTPATIENT)
Dept: FAMILY MEDICINE | Facility: CLINIC | Age: 55
End: 2025-06-24
Payer: COMMERCIAL

## 2025-06-24 VITALS
HEART RATE: 80 BPM | RESPIRATION RATE: 18 BRPM | WEIGHT: 252.2 LBS | OXYGEN SATURATION: 97 % | HEIGHT: 71 IN | DIASTOLIC BLOOD PRESSURE: 70 MMHG | TEMPERATURE: 97.9 F | BODY MASS INDEX: 35.31 KG/M2 | SYSTOLIC BLOOD PRESSURE: 122 MMHG

## 2025-06-24 DIAGNOSIS — I10 BENIGN ESSENTIAL HYPERTENSION: ICD-10-CM

## 2025-06-24 DIAGNOSIS — I82.4Y9 DEEP VEIN THROMBOSIS (DVT) OF PROXIMAL LOWER EXTREMITY, UNSPECIFIED CHRONICITY, UNSPECIFIED LATERALITY (H): ICD-10-CM

## 2025-06-24 DIAGNOSIS — E78.5 HYPERLIPIDEMIA LDL GOAL <70: ICD-10-CM

## 2025-06-24 DIAGNOSIS — Z79.01 LONG TERM CURRENT USE OF ANTICOAGULANT THERAPY: ICD-10-CM

## 2025-06-24 DIAGNOSIS — I25.84 CORONARY ATHEROSCLEROSIS DUE TO CALCIFIED CORONARY LESION OF NATIVE ARTERY: ICD-10-CM

## 2025-06-24 DIAGNOSIS — F33.0 MAJOR DEPRESSIVE DISORDER, RECURRENT EPISODE, MILD: ICD-10-CM

## 2025-06-24 DIAGNOSIS — I25.10 CORONARY ATHEROSCLEROSIS DUE TO CALCIFIED CORONARY LESION OF NATIVE ARTERY: ICD-10-CM

## 2025-06-24 DIAGNOSIS — M51.360 DEGENERATION OF INTERVERTEBRAL DISC OF LUMBAR REGION WITH DISCOGENIC BACK PAIN: ICD-10-CM

## 2025-06-24 DIAGNOSIS — K21.9 GASTROESOPHAGEAL REFLUX DISEASE WITHOUT ESOPHAGITIS: ICD-10-CM

## 2025-06-24 DIAGNOSIS — K44.9 HIATAL HERNIA: ICD-10-CM

## 2025-06-24 DIAGNOSIS — G47.33 OSA (OBSTRUCTIVE SLEEP APNEA): ICD-10-CM

## 2025-06-24 DIAGNOSIS — M06.00 SERONEGATIVE RHEUMATOID ARTHRITIS (H): ICD-10-CM

## 2025-06-24 DIAGNOSIS — Z86.718 HISTORY OF DEEP VENOUS THROMBOSIS: ICD-10-CM

## 2025-06-24 DIAGNOSIS — R05.2 SUBACUTE COUGH: Primary | ICD-10-CM

## 2025-06-24 DIAGNOSIS — F11.90 CHRONIC, CONTINUOUS USE OF OPIOIDS: ICD-10-CM

## 2025-06-24 LAB
AMPHETAMINES UR QL SCN: ABNORMAL
BARBITURATES UR QL SCN: ABNORMAL
BENZODIAZ UR QL SCN: ABNORMAL
BZE UR QL SCN: ABNORMAL
CANNABINOIDS UR QL SCN: ABNORMAL
FENTANYL UR QL: ABNORMAL
OPIATES UR QL SCN: ABNORMAL
PCP QUAL URINE (ROCHE): ABNORMAL

## 2025-06-24 PROCEDURE — 3078F DIAST BP <80 MM HG: CPT | Performed by: STUDENT IN AN ORGANIZED HEALTH CARE EDUCATION/TRAINING PROGRAM

## 2025-06-24 PROCEDURE — 99214 OFFICE O/P EST MOD 30 MIN: CPT | Performed by: STUDENT IN AN ORGANIZED HEALTH CARE EDUCATION/TRAINING PROGRAM

## 2025-06-24 PROCEDURE — 3074F SYST BP LT 130 MM HG: CPT | Performed by: STUDENT IN AN ORGANIZED HEALTH CARE EDUCATION/TRAINING PROGRAM

## 2025-06-24 PROCEDURE — 1125F AMNT PAIN NOTED PAIN PRSNT: CPT | Performed by: STUDENT IN AN ORGANIZED HEALTH CARE EDUCATION/TRAINING PROGRAM

## 2025-06-24 PROCEDURE — 80307 DRUG TEST PRSMV CHEM ANLYZR: CPT | Performed by: STUDENT IN AN ORGANIZED HEALTH CARE EDUCATION/TRAINING PROGRAM

## 2025-06-24 PROCEDURE — G2211 COMPLEX E/M VISIT ADD ON: HCPCS | Performed by: STUDENT IN AN ORGANIZED HEALTH CARE EDUCATION/TRAINING PROGRAM

## 2025-06-24 RX ORDER — ARIPIPRAZOLE 5 MG/1
5 TABLET ORAL DAILY
Qty: 90 TABLET | Refills: 3 | Status: SHIPPED | OUTPATIENT
Start: 2025-06-24

## 2025-06-24 ASSESSMENT — PATIENT HEALTH QUESTIONNAIRE - PHQ9
10. IF YOU CHECKED OFF ANY PROBLEMS, HOW DIFFICULT HAVE THESE PROBLEMS MADE IT FOR YOU TO DO YOUR WORK, TAKE CARE OF THINGS AT HOME, OR GET ALONG WITH OTHER PEOPLE: SOMEWHAT DIFFICULT
SUM OF ALL RESPONSES TO PHQ QUESTIONS 1-9: 6
SUM OF ALL RESPONSES TO PHQ QUESTIONS 1-9: 6

## 2025-06-24 ASSESSMENT — PAIN SCALES - GENERAL: PAINLEVEL_OUTOF10: MODERATE PAIN (6)

## 2025-06-24 ASSESSMENT — ENCOUNTER SYMPTOMS: COUGH: 1

## 2025-06-24 NOTE — PROGRESS NOTES
Assessment & Plan   Problem List Items Addressed This Visit          Cardiovascular/Peripheral Vascular    Long-term (current) use of anticoagulants [Z79.01]    History of deep venous thrombosis    Benign essential hypertension    Coronary atherosclerosis due to calcified coronary lesion of native artery       Respiratory/Allergy    DEDE (obstructive sleep apnea)       Endocrine    Hyperlipidemia LDL goal <70       Behavioral Health    Major depressive disorder, recurrent episode, mild    Relevant Medications    ARIPiprazole (ABILIFY) 5 MG tablet    Chronic, continuous use of opioids    Relevant Orders    Urine Drug Screen       FEN/Gastrointestinal    Gastroesophageal reflux disease without esophagitis    Hiatal hernia       Rheumatology    Seronegative rheumatoid arthritis (H)       Orthopedic/Musculoskeletal    DDD (degenerative disc disease), lumbar     Other Visit Diagnoses         Subacute cough    -  Primary           Patient with history of grass allergies in the past and given minimal cough we will do trial of Flonase as well as antihistamine.  If things not improving it may be his hiatal hernia contributing and given his swallowing symptoms I would recommend repeat EGD.  Would also consider stopping lisinopril as this could be side effect related.     Controlled substance agreement up-to-date and repeat urine drug screen today.  Continues to limit oxycodone for his ongoing back and neck pain with potential side effects and interaction reviewed.    Plan for release of information from outside neurology evaluation with tremor.  Reported that they thought medications playing a role in this.  Mood has been stable and we will decrease his Abilify to 5 mg daily now and see if this improves.  Also potentially he is developing a mild essential tremor.  Different contributing factors including sleep and caffeine discussed.  Follow-up in 1 to 2 months but sooner if new or worsening issues arise.    The longitudinal  "plan of care for the diagnosis(es)/condition(s) as documented were addressed during this visit. Due to the added complexity in care, I will continue to support Hollis in the subsequent management and with ongoing continuity of care.       BMI  Estimated body mass index is 35.68 kg/m  as calculated from the following:    Height as of this encounter: 1.791 m (5' 10.5\").    Weight as of this encounter: 114.4 kg (252 lb 3.2 oz).   Weight management plan: Discussed healthy diet and exercise guidelines      Subjective   Hollis is a 55 year old, presenting for the following health issues:  Tremors and Cough (Comes and goes/)        6/24/2025     6:56 AM   Additional Questions   Roomed by Nathalie SALCEDO     Cough    History of Present Illness       Reason for visit:  Tremors and a cough    He eats 0-1 servings of fruits and vegetables daily.He consumes 6 sweetened beverage(s) daily.He exercises with enough effort to increase his heart rate 30 to 60 minutes per day.  He exercises with enough effort to increase his heart rate 3 or less days per week.   He is taking medications regularly.      Patient noted mild tremor into his hands and occasionally into his cheek for many years and reports seeing neurology in the past.  Had not been significantly bothersome but has no trouble with his signature at times.  Usually happens with intention but also at rest.  He does not note alcohol or caffeine or sleep to contribute to this.  No other substance use or new medications.  He has seen neurology in the past who told him they thought medications playing a role.  No changes noted at that time.    Hollis also notes ongoing mild dry cough occasionally that he does not associate with any specific activity.  He does not think worse with eating or lying flat but he does have known large hiatal hernia.  Some discomfort with swallowing with this.  No nausea or vomiting.  No other abdominal pain.  No other respiratory symptoms.  He does note " "having grass allergies in the past.  No sneezing or significant watery eyes now.  No ear pain or throat pain.  No recent illness.  He feels like dry cough has been worse over the last year intermittently.  He is not using any inhalers.  It is not worse at night and not significantly bothersome.      Review of Systems  Constitutional, HEENT, cardiovascular, pulmonary, GI, , musculoskeletal, neuro, skin, endocrine and psych systems are negative, except as otherwise noted.      Objective    /70   Pulse 80   Temp 97.9  F (36.6  C) (Temporal)   Resp 18   Ht 1.791 m (5' 10.5\")   Wt 114.4 kg (252 lb 3.2 oz)   SpO2 97%   BMI 35.68 kg/m    Body mass index is 35.68 kg/m .  Physical Exam   GENERAL: alert and no distress  EYES: Eyes grossly normal to inspection, PERRL and conjunctivae and sclerae normal  HENT: ear canals and TM's normal, nose and mouth without ulcers or lesions  NECK: no adenopathy, no asymmetry, masses, or scars  RESP: lungs clear to auscultation - no rales, rhonchi or wheezes  CV: regular rate and rhythm, normal S1 S2, no S3 or S4, no murmur, click or rub, no peripheral edema  ABDOMEN: soft, nontender, no hepatosplenomegaly, no masses and bowel sounds normal  MS: no gross musculoskeletal defects noted, no edema  SKIN: no suspicious lesions or rashes  NEURO: Normal strength and tone, mentation intact and speech normal, cranial nerves II through XII intact, subtle left and right-sided low amplitude tremor in his hand  PSYCH: mentation appears normal, affect normal/bright            Signed Electronically by: Christiano Ledezma MD    "

## 2025-07-27 ENCOUNTER — MYC MEDICAL ADVICE (OUTPATIENT)
Dept: FAMILY MEDICINE | Facility: CLINIC | Age: 55
End: 2025-07-27
Payer: COMMERCIAL

## 2025-07-29 DIAGNOSIS — F41.9 ANXIETY: ICD-10-CM

## 2025-07-31 RX ORDER — GABAPENTIN 800 MG/1
800 TABLET ORAL 3 TIMES DAILY
Qty: 270 TABLET | Refills: 3 | Status: SHIPPED | OUTPATIENT
Start: 2025-07-31

## 2025-08-13 ENCOUNTER — LAB (OUTPATIENT)
Dept: LAB | Facility: CLINIC | Age: 55
End: 2025-08-13
Payer: COMMERCIAL

## 2025-08-13 ENCOUNTER — RESULTS FOLLOW-UP (OUTPATIENT)
Dept: ANTICOAGULATION | Facility: CLINIC | Age: 55
End: 2025-08-13

## 2025-08-13 ENCOUNTER — ANTICOAGULATION THERAPY VISIT (OUTPATIENT)
Dept: ANTICOAGULATION | Facility: CLINIC | Age: 55
End: 2025-08-13

## 2025-08-13 DIAGNOSIS — Z79.01 LONG TERM CURRENT USE OF ANTICOAGULANT THERAPY: Primary | ICD-10-CM

## 2025-08-13 DIAGNOSIS — I82.4Y9 DEEP VEIN THROMBOSIS (DVT) OF PROXIMAL LOWER EXTREMITY, UNSPECIFIED CHRONICITY, UNSPECIFIED LATERALITY (H): ICD-10-CM

## 2025-08-13 DIAGNOSIS — D68.61 ANTIPHOSPHOLIPID SYNDROME: ICD-10-CM

## 2025-08-13 LAB — INR BLD: 4.6 (ref 0.9–1.1)

## 2025-08-13 PROCEDURE — 85610 PROTHROMBIN TIME: CPT

## 2025-08-13 PROCEDURE — 36415 COLL VENOUS BLD VENIPUNCTURE: CPT

## 2025-08-31 DIAGNOSIS — I10 HYPERTENSION, UNSPECIFIED TYPE: ICD-10-CM

## 2025-09-02 RX ORDER — LISINOPRIL 5 MG/1
5 TABLET ORAL DAILY
Qty: 90 TABLET | Refills: 0 | Status: SHIPPED | OUTPATIENT
Start: 2025-09-02

## (undated) DEVICE — CATH JACKY 5FR 3.5 CURVE 40-5023

## (undated) DEVICE — DEFIB PRO-PADZ LVP LQD GEL ADULT 8900-2105-01

## (undated) DEVICE — SHEATH GUIDING R2P DESTINATION 6FR 85CM GS-R6ST1C85W

## (undated) DEVICE — KIT HAND CONTROL ANGIOTOUCH ACIST 65CM AT-P65

## (undated) DEVICE — MANIFOLD KIT ANGIO AUTOMATED 014613

## (undated) DEVICE — INTRODUCER CATH VASC 5FRX10CM  MPIS-501-NT-U-SST

## (undated) DEVICE — CATH ANGIO JUDKINS R4 6FRX100CM INFINITI 534621T

## (undated) DEVICE — GW VASC OMNIWIRE J L185CM PRESSURE 89185J

## (undated) DEVICE — SYR 05ML LL W/O NDL

## (undated) DEVICE — SLEEVE TR BAND RADIAL COMPRESSION DEVICE 24CM TRB24-REG

## (undated) DEVICE — ELECTRODE MEDITRACE MULT FUNC AED ADULT 20770

## (undated) DEVICE — CATH US OD 5FR OD SEC 2.9FR EAGLE EYE PLATINUM 0.014 85900P

## (undated) DEVICE — PREP CHLORAPREP 26ML TINTED ORANGE  260815

## (undated) DEVICE — CATH BALLOON NC EMERGE 4.50X12MM H7493926712450

## (undated) DEVICE — TRAY PROCEDURE SUPPORT PAIN MANAGEMENT 332114

## (undated) DEVICE — GLOVE EXAM NITRILE LG

## (undated) DEVICE — NDL PERC ENTRY THINWALL 18GA 9.0" G00273

## (undated) DEVICE — SYR ANGIOGRAPHY MULTIUSE KIT ACIST 014612

## (undated) DEVICE — INTRO GLIDESHEATH SLENDER 6FR 10X45CM 60-1060

## (undated) DEVICE — CATH ANGIO INFINITI JR4 4FRX100CM 538421

## (undated) DEVICE — TUBING IV EXTENSION SET 34"

## (undated) DEVICE — 1.5MM 0.035 J-TIP FIXED CORE PTFE COATED INQWIRE GUIDE WIRE 260CM

## (undated) DEVICE — CATH LAUNCHER 6FR JR 4.0 LA6JR40

## (undated) DEVICE — SOL WATER IRRIG 1000ML BOTTLE 07139-09

## (undated) DEVICE — Device

## (undated) DEVICE — INTRO SHEATH 4FRX10CM PINNACLE RSS402

## (undated) DEVICE — CATH BALLOON NC EMERGE 5.00X20MM H7493926720500

## (undated) DEVICE — CATH DIAG 4FR JL 4.5 538417

## (undated) DEVICE — TOTE ANGIO CORP PC15AT SAN32CC83O

## (undated) DEVICE — TUBING SUCTION 12"X1/4" N612

## (undated) DEVICE — KIT LG BORE TOUHY ACCESS PLUS MAP152

## (undated) DEVICE — GUIDEWIRE VASC 0.014INX180CM RUNTHROUGH 25-1011

## (undated) DEVICE — SYR 10ML LL W/O NDL

## (undated) DEVICE — INFL DVC BASIXCOMPAK PLYCRB 30 ATM 13IN 20ML IN4530

## (undated) DEVICE — KIT ENDO TURNOVER/PROCEDURE CARRY-ON 101822

## (undated) DEVICE — NDL SPINAL 22GA 5" QUINCKE 405148

## (undated) DEVICE — LUBRICATING JELLY 4.25OZ

## (undated) DEVICE — CATH GUIDING BLUE YELLOW PTFE XB3.5 6FRX100CM 67005400

## (undated) DEVICE — GLOVE PROTEXIS W/NEU-THERA 7.5  2D73TE75

## (undated) DEVICE — CATH DIAG 4FR ANG PIG 538453S

## (undated) RX ORDER — FENTANYL CITRATE 50 UG/ML
INJECTION, SOLUTION INTRAMUSCULAR; INTRAVENOUS
Status: DISPENSED
Start: 2024-08-07

## (undated) RX ORDER — LIDOCAINE HYDROCHLORIDE 10 MG/ML
INJECTION, SOLUTION EPIDURAL; INFILTRATION; INTRACAUDAL; PERINEURAL
Status: DISPENSED
Start: 2024-08-07

## (undated) RX ORDER — NITROGLYCERIN 5 MG/ML
VIAL (ML) INTRAVENOUS
Status: DISPENSED
Start: 2024-08-07

## (undated) RX ORDER — ASPIRIN 325 MG
TABLET ORAL
Status: DISPENSED
Start: 2024-08-07

## (undated) RX ORDER — VERAPAMIL HYDROCHLORIDE 2.5 MG/ML
INJECTION, SOLUTION INTRAVENOUS
Status: DISPENSED
Start: 2022-10-11

## (undated) RX ORDER — LIDOCAINE HYDROCHLORIDE 10 MG/ML
INJECTION, SOLUTION EPIDURAL; INFILTRATION; INTRACAUDAL; PERINEURAL
Status: DISPENSED
Start: 2021-04-22

## (undated) RX ORDER — FENTANYL CITRATE 50 UG/ML
INJECTION, SOLUTION INTRAMUSCULAR; INTRAVENOUS
Status: DISPENSED
Start: 2021-05-27

## (undated) RX ORDER — LIDOCAINE HYDROCHLORIDE 10 MG/ML
INJECTION, SOLUTION EPIDURAL; INFILTRATION; INTRACAUDAL; PERINEURAL
Status: DISPENSED
Start: 2022-10-11

## (undated) RX ORDER — FENTANYL CITRATE 50 UG/ML
INJECTION, SOLUTION INTRAMUSCULAR; INTRAVENOUS
Status: DISPENSED
Start: 2022-10-11

## (undated) RX ORDER — POTASSIUM CHLORIDE 750 MG/1
TABLET, EXTENDED RELEASE ORAL
Status: DISPENSED
Start: 2022-10-11

## (undated) RX ORDER — HEPARIN SODIUM 1000 [USP'U]/ML
INJECTION, SOLUTION INTRAVENOUS; SUBCUTANEOUS
Status: DISPENSED
Start: 2022-10-11

## (undated) RX ORDER — HEPARIN SODIUM 200 [USP'U]/100ML
INJECTION, SOLUTION INTRAVENOUS
Status: DISPENSED
Start: 2024-08-07

## (undated) RX ORDER — HEPARIN SODIUM 200 [USP'U]/100ML
INJECTION, SOLUTION INTRAVENOUS
Status: DISPENSED
Start: 2022-10-11

## (undated) RX ORDER — HEPARIN SODIUM 1000 [USP'U]/ML
INJECTION, SOLUTION INTRAVENOUS; SUBCUTANEOUS
Status: DISPENSED
Start: 2024-08-07

## (undated) RX ORDER — ASPIRIN 325 MG
TABLET ORAL
Status: DISPENSED
Start: 2022-10-11

## (undated) RX ORDER — POTASSIUM CHLORIDE 1500 MG/1
TABLET, EXTENDED RELEASE ORAL
Status: DISPENSED
Start: 2022-10-11